# Patient Record
Sex: FEMALE | Race: BLACK OR AFRICAN AMERICAN | NOT HISPANIC OR LATINO | URBAN - METROPOLITAN AREA
[De-identification: names, ages, dates, MRNs, and addresses within clinical notes are randomized per-mention and may not be internally consistent; named-entity substitution may affect disease eponyms.]

---

## 2018-01-17 ENCOUNTER — INPATIENT (INPATIENT)
Facility: HOSPITAL | Age: 53
LOS: 7 days | Discharge: NURSING, REHAB OR LONG-TERM CARE | DRG: 602 | End: 2018-01-25
Attending: INTERNAL MEDICINE | Admitting: INTERNAL MEDICINE
Payer: MEDICARE

## 2018-01-17 VITALS
SYSTOLIC BLOOD PRESSURE: 148 MMHG | WEIGHT: 293 LBS | HEART RATE: 86 BPM | TEMPERATURE: 98 F | OXYGEN SATURATION: 99 % | HEIGHT: 65 IN | DIASTOLIC BLOOD PRESSURE: 89 MMHG | RESPIRATION RATE: 20 BRPM

## 2018-01-17 DIAGNOSIS — Z98.89 OTHER SPECIFIED POSTPROCEDURAL STATES: Chronic | ICD-10-CM

## 2018-01-17 DIAGNOSIS — Z29.9 ENCOUNTER FOR PROPHYLACTIC MEASURES, UNSPECIFIED: ICD-10-CM

## 2018-01-17 DIAGNOSIS — J96.02 ACUTE RESPIRATORY FAILURE WITH HYPERCAPNIA: ICD-10-CM

## 2018-01-17 DIAGNOSIS — L03.119 CELLULITIS OF UNSPECIFIED PART OF LIMB: ICD-10-CM

## 2018-01-17 DIAGNOSIS — E11.9 TYPE 2 DIABETES MELLITUS WITHOUT COMPLICATIONS: ICD-10-CM

## 2018-01-17 DIAGNOSIS — I10 ESSENTIAL (PRIMARY) HYPERTENSION: ICD-10-CM

## 2018-01-17 LAB
ALBUMIN SERPL ELPH-MCNC: 3.3 G/DL — LOW (ref 3.5–5)
ALP SERPL-CCNC: 27 U/L — LOW (ref 40–120)
ALT FLD-CCNC: 37 U/L DA — SIGNIFICANT CHANGE UP (ref 10–60)
ANION GAP SERPL CALC-SCNC: 6 MMOL/L — SIGNIFICANT CHANGE UP (ref 5–17)
ANION GAP SERPL CALC-SCNC: 6 MMOL/L — SIGNIFICANT CHANGE UP (ref 5–17)
APPEARANCE UR: CLEAR — SIGNIFICANT CHANGE UP
APTT BLD: 30.4 SEC — SIGNIFICANT CHANGE UP (ref 27.5–37.4)
AST SERPL-CCNC: 31 U/L — SIGNIFICANT CHANGE UP (ref 10–40)
BASE EXCESS BLDA CALC-SCNC: 7.1 MMOL/L — HIGH (ref -2–2)
BASE EXCESS BLDA CALC-SCNC: 8.5 MMOL/L — HIGH (ref -2–2)
BASOPHILS # BLD AUTO: 0.1 K/UL — SIGNIFICANT CHANGE UP (ref 0–0.2)
BASOPHILS NFR BLD AUTO: 2.2 % — HIGH (ref 0–2)
BILIRUB SERPL-MCNC: 0.6 MG/DL — SIGNIFICANT CHANGE UP (ref 0.2–1.2)
BILIRUB UR-MCNC: NEGATIVE — SIGNIFICANT CHANGE UP
BLOOD GAS COMMENTS ARTERIAL: SIGNIFICANT CHANGE UP
BUN SERPL-MCNC: 35 MG/DL — HIGH (ref 7–18)
BUN SERPL-MCNC: 36 MG/DL — HIGH (ref 7–18)
CALCIUM SERPL-MCNC: 8.4 MG/DL — SIGNIFICANT CHANGE UP (ref 8.4–10.5)
CALCIUM SERPL-MCNC: 8.6 MG/DL — SIGNIFICANT CHANGE UP (ref 8.4–10.5)
CHLORIDE SERPL-SCNC: 91 MMOL/L — LOW (ref 96–108)
CHLORIDE SERPL-SCNC: 94 MMOL/L — LOW (ref 96–108)
CK MB BLD-MCNC: 1 % — SIGNIFICANT CHANGE UP (ref 0–3.5)
CK MB CFR SERPL CALC: 2 NG/ML — SIGNIFICANT CHANGE UP (ref 0–3.6)
CK SERPL-CCNC: 194 U/L — SIGNIFICANT CHANGE UP (ref 21–215)
CO2 SERPL-SCNC: 34 MMOL/L — HIGH (ref 22–31)
CO2 SERPL-SCNC: 36 MMOL/L — HIGH (ref 22–31)
COLOR SPEC: YELLOW — SIGNIFICANT CHANGE UP
CREAT SERPL-MCNC: 1.2 MG/DL — SIGNIFICANT CHANGE UP (ref 0.5–1.3)
CREAT SERPL-MCNC: 1.39 MG/DL — HIGH (ref 0.5–1.3)
DIFF PNL FLD: NEGATIVE — SIGNIFICANT CHANGE UP
EOSINOPHIL # BLD AUTO: 0 K/UL — SIGNIFICANT CHANGE UP (ref 0–0.5)
EOSINOPHIL NFR BLD AUTO: 0.7 % — SIGNIFICANT CHANGE UP (ref 0–6)
GLUCOSE SERPL-MCNC: 62 MG/DL — LOW (ref 70–99)
GLUCOSE SERPL-MCNC: 87 MG/DL — SIGNIFICANT CHANGE UP (ref 70–99)
GLUCOSE UR QL: NEGATIVE — SIGNIFICANT CHANGE UP
HCO3 BLDA-SCNC: 36 MMOL/L — HIGH (ref 23–27)
HCO3 BLDA-SCNC: 36 MMOL/L — HIGH (ref 23–27)
HCT VFR BLD CALC: 45 % — SIGNIFICANT CHANGE UP (ref 34.5–45)
HGB BLD-MCNC: 13.1 G/DL — SIGNIFICANT CHANGE UP (ref 11.5–15.5)
HOROWITZ INDEX BLDA+IHG-RTO: 100 — SIGNIFICANT CHANGE UP
HOROWITZ INDEX BLDA+IHG-RTO: 50 — SIGNIFICANT CHANGE UP
INR BLD: 1.28 RATIO — HIGH (ref 0.88–1.16)
KETONES UR-MCNC: NEGATIVE — SIGNIFICANT CHANGE UP
LACTATE SERPL-SCNC: 0.6 MMOL/L — LOW (ref 0.7–2)
LEUKOCYTE ESTERASE UR-ACNC: NEGATIVE — SIGNIFICANT CHANGE UP
LYMPHOCYTES # BLD AUTO: 0.9 K/UL — LOW (ref 1–3.3)
LYMPHOCYTES # BLD AUTO: 16.7 % — SIGNIFICANT CHANGE UP (ref 13–44)
MAGNESIUM SERPL-MCNC: 2.4 MG/DL — SIGNIFICANT CHANGE UP (ref 1.6–2.6)
MCHC RBC-ENTMCNC: 29.2 GM/DL — LOW (ref 32–36)
MCHC RBC-ENTMCNC: 29.6 PG — SIGNIFICANT CHANGE UP (ref 27–34)
MCV RBC AUTO: 101.2 FL — HIGH (ref 80–100)
MONOCYTES # BLD AUTO: 0.8 K/UL — SIGNIFICANT CHANGE UP (ref 0–0.9)
MONOCYTES NFR BLD AUTO: 15.2 % — HIGH (ref 2–14)
NEUTROPHILS # BLD AUTO: 3.6 K/UL — SIGNIFICANT CHANGE UP (ref 1.8–7.4)
NEUTROPHILS NFR BLD AUTO: 65.2 % — SIGNIFICANT CHANGE UP (ref 43–77)
NITRITE UR-MCNC: NEGATIVE — SIGNIFICANT CHANGE UP
NT-PROBNP SERPL-SCNC: 2373 PG/ML — HIGH (ref 0–125)
PCO2 BLDA: 67 MMHG — HIGH (ref 32–46)
PCO2 BLDA: 77 MMHG — CRITICAL HIGH (ref 32–46)
PH BLDA: 7.29 — LOW (ref 7.35–7.45)
PH BLDA: 7.35 — SIGNIFICANT CHANGE UP (ref 7.35–7.45)
PH UR: 5 — SIGNIFICANT CHANGE UP (ref 5–8)
PLATELET # BLD AUTO: 286 K/UL — SIGNIFICANT CHANGE UP (ref 150–400)
PO2 BLDA: 117 MMHG — HIGH (ref 74–108)
PO2 BLDA: 93 MMHG — SIGNIFICANT CHANGE UP (ref 74–108)
POTASSIUM SERPL-MCNC: 3.8 MMOL/L — SIGNIFICANT CHANGE UP (ref 3.5–5.3)
POTASSIUM SERPL-MCNC: 4.1 MMOL/L — SIGNIFICANT CHANGE UP (ref 3.5–5.3)
POTASSIUM SERPL-SCNC: 3.8 MMOL/L — SIGNIFICANT CHANGE UP (ref 3.5–5.3)
POTASSIUM SERPL-SCNC: 4.1 MMOL/L — SIGNIFICANT CHANGE UP (ref 3.5–5.3)
PROT SERPL-MCNC: 7.2 G/DL — SIGNIFICANT CHANGE UP (ref 6–8.3)
PROT UR-MCNC: 30 MG/DL
PROTHROM AB SERPL-ACNC: 14 SEC — HIGH (ref 9.8–12.7)
RBC # BLD: 4.44 M/UL — SIGNIFICANT CHANGE UP (ref 3.8–5.2)
RBC # FLD: 15.6 % — HIGH (ref 10.3–14.5)
SAO2 % BLDA: 96 % — SIGNIFICANT CHANGE UP (ref 92–96)
SAO2 % BLDA: 98 % — HIGH (ref 92–96)
SODIUM SERPL-SCNC: 131 MMOL/L — LOW (ref 135–145)
SODIUM SERPL-SCNC: 136 MMOL/L — SIGNIFICANT CHANGE UP (ref 135–145)
SP GR SPEC: 1.01 — SIGNIFICANT CHANGE UP (ref 1.01–1.02)
TROPONIN I SERPL-MCNC: <0.015 NG/ML — SIGNIFICANT CHANGE UP (ref 0–0.04)
UROBILINOGEN FLD QL: NEGATIVE — SIGNIFICANT CHANGE UP
WBC # BLD: 5.4 K/UL — SIGNIFICANT CHANGE UP (ref 3.8–10.5)
WBC # FLD AUTO: 5.4 K/UL — SIGNIFICANT CHANGE UP (ref 3.8–10.5)

## 2018-01-17 PROCEDURE — 99291 CRITICAL CARE FIRST HOUR: CPT

## 2018-01-17 PROCEDURE — 71045 X-RAY EXAM CHEST 1 VIEW: CPT | Mod: 26

## 2018-01-17 RX ORDER — CEFTRIAXONE 500 MG/1
1 INJECTION, POWDER, FOR SOLUTION INTRAMUSCULAR; INTRAVENOUS ONCE
Qty: 0 | Refills: 0 | Status: COMPLETED | OUTPATIENT
Start: 2018-01-17 | End: 2018-01-17

## 2018-01-17 RX ORDER — INSULIN LISPRO 100/ML
VIAL (ML) SUBCUTANEOUS EVERY 6 HOURS
Qty: 0 | Refills: 0 | Status: DISCONTINUED | OUTPATIENT
Start: 2018-01-17 | End: 2018-01-21

## 2018-01-17 RX ORDER — CARVEDILOL PHOSPHATE 80 MG/1
3.12 CAPSULE, EXTENDED RELEASE ORAL EVERY 12 HOURS
Qty: 0 | Refills: 0 | Status: DISCONTINUED | OUTPATIENT
Start: 2018-01-17 | End: 2018-01-25

## 2018-01-17 RX ORDER — FUROSEMIDE 40 MG
40 TABLET ORAL EVERY 12 HOURS
Qty: 0 | Refills: 0 | Status: DISCONTINUED | OUTPATIENT
Start: 2018-01-17 | End: 2018-01-17

## 2018-01-17 RX ORDER — CEFAZOLIN SODIUM 1 G
1000 VIAL (EA) INJECTION EVERY 8 HOURS
Qty: 0 | Refills: 0 | Status: DISCONTINUED | OUTPATIENT
Start: 2018-01-17 | End: 2018-01-20

## 2018-01-17 RX ORDER — IPRATROPIUM/ALBUTEROL SULFATE 18-103MCG
3 AEROSOL WITH ADAPTER (GRAM) INHALATION ONCE
Qty: 0 | Refills: 0 | Status: COMPLETED | OUTPATIENT
Start: 2018-01-17 | End: 2018-01-17

## 2018-01-17 RX ORDER — FUROSEMIDE 40 MG
40 TABLET ORAL EVERY 12 HOURS
Qty: 0 | Refills: 0 | Status: DISCONTINUED | OUTPATIENT
Start: 2018-01-17 | End: 2018-01-22

## 2018-01-17 RX ORDER — ENOXAPARIN SODIUM 100 MG/ML
40 INJECTION SUBCUTANEOUS DAILY
Qty: 0 | Refills: 0 | Status: DISCONTINUED | OUTPATIENT
Start: 2018-01-17 | End: 2018-01-25

## 2018-01-17 RX ORDER — FUROSEMIDE 40 MG
40 TABLET ORAL ONCE
Qty: 0 | Refills: 0 | Status: COMPLETED | OUTPATIENT
Start: 2018-01-17 | End: 2018-01-17

## 2018-01-17 RX ORDER — IPRATROPIUM/ALBUTEROL SULFATE 18-103MCG
3 AEROSOL WITH ADAPTER (GRAM) INHALATION EVERY 6 HOURS
Qty: 0 | Refills: 0 | Status: DISCONTINUED | OUTPATIENT
Start: 2018-01-17 | End: 2018-01-25

## 2018-01-17 RX ADMIN — Medication 100 MILLIGRAM(S): at 23:14

## 2018-01-17 RX ADMIN — Medication 3 MILLILITER(S): at 20:48

## 2018-01-17 RX ADMIN — Medication 100 MILLIGRAM(S): at 13:50

## 2018-01-17 RX ADMIN — Medication 40 MILLIGRAM(S): at 23:00

## 2018-01-17 RX ADMIN — Medication 3 MILLILITER(S): at 14:19

## 2018-01-17 RX ADMIN — Medication 40 MILLIGRAM(S): at 15:28

## 2018-01-17 RX ADMIN — CEFTRIAXONE 100 GRAM(S): 500 INJECTION, POWDER, FOR SOLUTION INTRAMUSCULAR; INTRAVENOUS at 14:20

## 2018-01-17 NOTE — ED PROVIDER NOTE - OBJECTIVE STATEMENT
Pertinent PMH/PSH/FHx/SHx and Review of Systems contained within:  53 yo F w/ NIDDM, ?RA, CHF s/p AICD, HTN and morbid obesity pw bl le redness and swelling. symptoms x1 month, saw doctor yestereday and was sent to er. patient notes some and discomfort. no fever, chills, nausea, vomiting. she reports she is more somnolent than normal but does not know why  Fh and Sh not otherwise contributory  ROS otherwise negative

## 2018-01-17 NOTE — ED PROVIDER NOTE - MEDICAL DECISION MAKING DETAILS
patient pw bl le cellulitis. she is also somnolent and will need to be assessed for hypercarbia. will culture and start on empiric abx. I read her ekg as sinus with 1st deg av block as well as left axis deviation and RBBB. Will need to be admitted patient pw bl le cellulitis. she is also somnolent and will need to be assessed for hypercarbia. will culture and start on empiric abx. I read her ekg as sinus with 1st deg av block as well as left axis deviation and RBBB. Will need to be admitted. abg confirms hypercarbia. it resolves with co2 and her mental status normalizes. patient will also need some diureses. case d/w dr kimble, who will admit. patient pw bl le cellulitis. she is also somnolent and will need to be assessed for hypercarbia. will culture and start on empiric abx. I read her ekg as sinus with 1st deg av block as well as left axis deviation and RBBB. Will need to be admitted. abg confirms hypercarbia. it resolves with co2 and her mental status normalizes. patient will also need some diureses. case d/w dr kimble, who will admit to icu given patient is on bipap and hasn't been previously.

## 2018-01-17 NOTE — H&P ADULT - HISTORY OF PRESENT ILLNESS
52 [place] with PMH of CHF s/p ICD, HTN, DM is BIBA for 52 F [place] with PMH of CHF s/p ICD, HTN, DM is BIBA for dyspnea and sleepiness, LE edema and redness. Pt is poor historian, but claims that she has been experiencing some SOB, sleepiness and redness and pain in her LEs. ROS otherwise unremarkable as pt denies fever, chills, CP, cough, palpitations, syncope, purulent discharge from LEs, trauma to area.   In ED, pt was drowsy and dyspneic and found to be hypercapneic to pCO2 of 77 and pH of 7.29. Pt started on NIV and given lasix. CXR revealed b/l opacities c/w pulmonary edema

## 2018-01-17 NOTE — ED ADULT NURSE NOTE - OBJECTIVE STATEMENT
Patient sent from [facility] for BLE cellulitis. Patient noted confused but not agitated. Patient noted with BLE redness swelling +2 pitting edema. Patient c/o shortness of breath.

## 2018-01-17 NOTE — H&P ADULT - ASSESSMENT
52 morbidly obese F with hx of CAD, CHF s/p ICD, p/w dyspnea, somnolence and LE redness and swelling, admitted to MICU for acute hypercapneic respiratory failure 2/2 CHF and/or AMPARO/OHS in setting of LE cellulitis

## 2018-01-17 NOTE — H&P ADULT - ATTENDING COMMENTS
52 morbidly obese F with PMH of CAD, CHF s/p ICD, admitted with  dyspnea, somnolence and LE redness and swelling, admitted to MICU for acute hypercapneic respiratory failure 2/2 CHF decompensation and/or AMPARO/OHS in setting of BL LE cellulitis   Will initiate antibiotic therapy for LE Cellulitis    FU Cultures  Obtain LE Dopplers RO DVT   Start diuresis   NIPPV and adjust settings as needed with ABGs   HOB at 30   DVT prophylaxis   Glycemic control

## 2018-01-17 NOTE — CONSULT NOTE ADULT - SUBJECTIVE AND OBJECTIVE BOX
HPI:  52 [place] with PMH of CHF s/p ICD, HTN, DM is BIBA for dyspnea and sleepiness, LE edema and redness. Pt is poor historian, but claims that she has been experiencing some SOB, sleepiness and redness and pain in her LEs. ROS otherwise unremarkable as pt denies fever, chills, CP, cough, palpitations, syncope, purulent discharge from LEs, trauma to area.   In ED, pt was drowsy and dyspneic and found to be hypercapneic to pCO2 of 77 and pH of 7.29. Pt started on NIV and given lasix. CXR revealed b/l opacities c/w pulmonary edema (2018 18:02)      PAST MEDICAL & SURGICAL HISTORY:  RA (rheumatoid arthritis)  DM (diabetes mellitus)  HTN (hypertension)  History of cholecystectomy      No Known Allergies      ALBUTerol/ipratropium for Nebulization 3 milliLiter(s) Nebulizer every 6 hours  carvedilol 3.125 milliGRAM(s) Oral every 12 hours  ceFAZolin   IVPB 1000 milliGRAM(s) IV Intermittent every 8 hours  enoxaparin Injectable 40 milliGRAM(s) SubCutaneous daily  furosemide   Injectable 40 milliGRAM(s) IV Push every 12 hours  insulin lispro (HumaLOG) corrective regimen sliding scale   SubCutaneous every 6 hours  multivitamin 1 Tablet(s) Oral daily      MEDICATIONS  (PRN):      Social Hx:    FAMILY HISTORY:  Family history of hypertension in mother        ROS  [   ] UNABLE TO ELICIT     [ X  ] ALL ROS DONE    General:  [   ] Fever,    [   ] Malaise, [   ] LETHARGIC, [   ]  ANOREXIA    Skin: [   ]  RASH ,     HEENT:  [   ] Sore Throat  [   ] Photophobia	    Chest: [   ] SOB  [   ] Cough     Cardiovascular: [   ] CP	    Gastrointestinal: [   ] Abd pain   [   ] N    [   ] V   [   ] Diarrhea	    Genitourinary: [   ] Polyuria   [   ] Urgency   [   ] Dysuria    [   ]  Hematuria	    Musculoskeletal:  [  ] Back Pain	    Neurological: [  ]Dizziness  [  ]Visual Disturbance  [  ]Headaches        LABS/DIAGNOSTIC TESTS                          13.1   5.4   )-----------( 286      ( 2018 13:26 )             45.0     WBC Count: 5.4 K/uL ( @ 13:26)    PT/INR - ( 2018 13:26 )   PT: 14.0 sec;   INR: 1.28 ratio         PTT - ( 2018 13:26 )  PTT:30.4 sec        131<L>  |  91<L>  |  36<H>  ----------------------------<  87  4.1   |  34<H>  |  1.39<H>    Ca    8.6      2018 13:26  Mg     2.4         TPro  7.2  /  Alb  3.3<L>  /  TBili  0.6  /  DBili  x   /  AST  31  /  ALT  37  /  AlkPhos  27<L>          CARDIAC MARKERS ( 2018 18:03 )  <0.015 ng/mL / x     / 289 U/L / x     / 2.9 ng/mL          LIVER FUNCTIONS - ( 2018 13:26 )  Alb: 3.3 g/dL / Pro: 7.2 g/dL / ALK PHOS: 27 U/L / ALT: 37 U/L DA / AST: 31 U/L / GGT: x             ABG - ( 2018 14:21 )  pH: 7.35  /  pCO2: 67    /  pO2: 93    / HCO3: 36    / Base Excess: 8.5   /  SaO2: 96                  Urinalysis Basic - ( 2018 15:14 )    Color: Yellow / Appearance: Clear / S.010 / pH: x  Gluc: x / Ketone: Negative  / Bili: Negative / Urobili: Negative   Blood: x / Protein: 30 mg/dL / Nitrite: Negative   Leuk Esterase: Negative / RBC: x / WBC x   Sq Epi: x / Non Sq Epi: Few /HPF / Bacteria: Moderate /HPF        LACTATE:Lactate, Blood: 0.6 mmol/L ( @ 13:26)        CULTURES:       RADIOLOGY    CXR:  < from: Xray Chest 1 View AP-PORTABLE IMMEDIATE (18 @ 14:23) >  IMPRESSION:  Left cardiac device.    Increased interstitial lung markings. No pleural effusion.    Heart size cannot be accurately assessed in this projection, but appear   enlarged.      < end of copied text >      PHYSICAL EXAMINATION:  GENERAL APPEARANCE: NO DISTRESS  HEENT:   NO PALLOR, NO  JVD,  NO   NODES, NECK SUPPLE +  CVS: S1 +, S2 +,   RS: AEEB,   RALES,   RONCHI  ABD: SOFT, NT, NO, BS  EXT:  PE ++ B/L. ERYTHEMA OF B/L LE, RIGHT > LEFT, OOZING OF FLUID FROM RLE.   SKIN: WARM,   SKELETAL:  ROM  ACCEPTABLE  CNS:  AAOX 3   , NO  DEFICITS        ASSESSMENT AND PLAN:    52 [place] with PMH of CHF s/p ICD, HTN, DM is BIBA for dyspnea and sleepiness, LE edema and redness. Pt is poor historian, but claims that she has been experiencing some SOB, sleepiness and redness and pain in her LEs. ROS otherwise unremarkable as pt denies fever, chills, CP, cough, palpitations, syncope, purulent discharge from LEs, trauma to area.     - WORSENING B/L LE LYMPHEDEMA ,AND VENOUS INSUFFICIENCY , CELLULITIS OF RIGHT LOWER LEG ON IV CEFAZOLIN, IV LASIX  - OBESE, SUSPECT OBSTRUCTIVE SLEEP PNOEA, HYPERCAPNOEIC RESPIRATORY FAILURE - STARTED ON BIPAP, ICU CONSULT IS IN PROGRESS  - GI AND DVT PROPHYLAXIS  - DR. NATARAJAN

## 2018-01-17 NOTE — ED PROVIDER NOTE - PHYSICAL EXAMINATION
Gen: somnolent but arousable, morbidly obese  Head: NC, AT   Eyes: PERRL, EOMI, normal lids/conjunctiva  ENT: normal hearing, patent oropharynx without erythema/exudate, uvula midline  Neck: supple, no tenderness, Trachea midline  Pulm: Bilateral BS, normal resp effort, no wheeze/stridor/retractions  CV: RRR, no M/R/G, 2+ radial 2+ edema of lower ext to bl knees  Abd: soft, NT/ND, +BS, no hepatosplenomegaly  Mskel: extremities x4 with normal ROM and no joint effusions. no ctl spine ttp.   Skin: extensive erythema and warmth of the lower ext to the distal thigh bilaterally  Neuro: AAOx3, no sensory/motor deficits, CN 2-12 intact

## 2018-01-17 NOTE — H&P ADULT - MUSCULOSKELETAL
negative detailed exam no joint swelling/no joint erythema/calf tenderness/no joint warmth/ROM intact

## 2018-01-17 NOTE — H&P ADULT - PROBLEM SELECTOR PLAN 4
c/w coreg and valsartan  holding hctz as we are diuresing with lasix  if becomes more hypertensive, consider nitro for preload reduction in pulmonary edema c/w coreg  holding hctz as we are diuresing with lasix  hold arb 2/2 montse  if becomes more hypertensive, consider nitro for preload reduction in pulmonary edema

## 2018-01-17 NOTE — ED PROVIDER NOTE - CARE PLAN
Principal Discharge DX:	Cellulitis of lower extremity, unspecified laterality Principal Discharge DX:	Cellulitis of lower extremity, unspecified laterality  Secondary Diagnosis:	Hypercarbia

## 2018-01-17 NOTE — H&P ADULT - RS GEN PE MLT RESP DETAILS PC
rales/wheezes/diminished breath sounds, R/airway patent/diminished breath sounds, L/breath sounds equal

## 2018-01-18 LAB
24R-OH-CALCIDIOL SERPL-MCNC: 37.4 NG/ML — SIGNIFICANT CHANGE UP (ref 30–80)
ANION GAP SERPL CALC-SCNC: 6 MMOL/L — SIGNIFICANT CHANGE UP (ref 5–17)
BASE EXCESS BLDA CALC-SCNC: 7.6 MMOL/L — HIGH (ref -2–2)
BUN SERPL-MCNC: 32 MG/DL — HIGH (ref 7–18)
CALCIUM SERPL-MCNC: 7.1 MG/DL — LOW (ref 8.4–10.5)
CHLORIDE SERPL-SCNC: 102 MMOL/L — SIGNIFICANT CHANGE UP (ref 96–108)
CK MB BLD-MCNC: 0.9 % — SIGNIFICANT CHANGE UP (ref 0–3.5)
CK MB CFR SERPL CALC: 1.3 NG/ML — SIGNIFICANT CHANGE UP (ref 0–3.6)
CK SERPL-CCNC: 142 U/L — SIGNIFICANT CHANGE UP (ref 21–215)
CO2 SERPL-SCNC: 30 MMOL/L — SIGNIFICANT CHANGE UP (ref 22–31)
CREAT SERPL-MCNC: 1.03 MG/DL — SIGNIFICANT CHANGE UP (ref 0.5–1.3)
CULTURE RESULTS: NO GROWTH — SIGNIFICANT CHANGE UP
GLUCOSE SERPL-MCNC: 59 MG/DL — LOW (ref 70–99)
HBA1C BLD-MCNC: 6 % — HIGH (ref 4–5.6)
HCO3 BLDA-SCNC: 36 MMOL/L — HIGH (ref 23–27)
HCT VFR BLD CALC: 44.1 % — SIGNIFICANT CHANGE UP (ref 34.5–45)
HGB BLD-MCNC: 12.8 G/DL — SIGNIFICANT CHANGE UP (ref 11.5–15.5)
HOROWITZ INDEX BLDA+IHG-RTO: 30 — SIGNIFICANT CHANGE UP
LYMPHOCYTES # BLD AUTO: 15 % — SIGNIFICANT CHANGE UP (ref 13–44)
MAGNESIUM SERPL-MCNC: 1.9 MG/DL — SIGNIFICANT CHANGE UP (ref 1.6–2.6)
MCHC RBC-ENTMCNC: 29.2 GM/DL — LOW (ref 32–36)
MCHC RBC-ENTMCNC: 29.3 PG — SIGNIFICANT CHANGE UP (ref 27–34)
MCV RBC AUTO: 101.5 FL — HIGH (ref 80–100)
MONOCYTES NFR BLD AUTO: 16 % — HIGH (ref 2–14)
NEUTROPHILS NFR BLD AUTO: 68 % — SIGNIFICANT CHANGE UP (ref 43–77)
PCO2 BLDA: 76 MMHG — CRITICAL HIGH (ref 32–46)
PH BLDA: 7.3 — LOW (ref 7.35–7.45)
PLATELET # BLD AUTO: 180 K/UL — SIGNIFICANT CHANGE UP (ref 150–400)
PO2 BLDA: 58 MMHG — LOW (ref 74–108)
POTASSIUM SERPL-MCNC: 3.5 MMOL/L — SIGNIFICANT CHANGE UP (ref 3.5–5.3)
POTASSIUM SERPL-SCNC: 3.5 MMOL/L — SIGNIFICANT CHANGE UP (ref 3.5–5.3)
RBC # BLD: 4.34 M/UL — SIGNIFICANT CHANGE UP (ref 3.8–5.2)
RBC # FLD: 15.4 % — HIGH (ref 10.3–14.5)
SAO2 % BLDA: 85 % — LOW (ref 92–96)
SODIUM SERPL-SCNC: 138 MMOL/L — SIGNIFICANT CHANGE UP (ref 135–145)
SPECIMEN SOURCE: SIGNIFICANT CHANGE UP
TROPONIN I SERPL-MCNC: <0.015 NG/ML — SIGNIFICANT CHANGE UP (ref 0–0.04)
WBC # BLD: 4.1 K/UL — SIGNIFICANT CHANGE UP (ref 3.8–10.5)
WBC # FLD AUTO: 4.1 K/UL — SIGNIFICANT CHANGE UP (ref 3.8–10.5)

## 2018-01-18 PROCEDURE — 71045 X-RAY EXAM CHEST 1 VIEW: CPT | Mod: 26

## 2018-01-18 PROCEDURE — 93970 EXTREMITY STUDY: CPT | Mod: 26

## 2018-01-18 RX ORDER — DEXTROSE 50 % IN WATER 50 %
50 SYRINGE (ML) INTRAVENOUS ONCE
Qty: 0 | Refills: 0 | Status: COMPLETED | OUTPATIENT
Start: 2018-01-18 | End: 2018-01-18

## 2018-01-18 RX ORDER — ATORVASTATIN CALCIUM 80 MG/1
40 TABLET, FILM COATED ORAL AT BEDTIME
Qty: 0 | Refills: 0 | Status: DISCONTINUED | OUTPATIENT
Start: 2018-01-18 | End: 2018-01-25

## 2018-01-18 RX ORDER — ASPIRIN/CALCIUM CARB/MAGNESIUM 324 MG
81 TABLET ORAL DAILY
Qty: 0 | Refills: 0 | Status: DISCONTINUED | OUTPATIENT
Start: 2018-01-18 | End: 2018-01-25

## 2018-01-18 RX ADMIN — Medication 3 MILLILITER(S): at 15:03

## 2018-01-18 RX ADMIN — Medication 40 MILLIGRAM(S): at 22:07

## 2018-01-18 RX ADMIN — Medication 3 MILLILITER(S): at 02:02

## 2018-01-18 RX ADMIN — CARVEDILOL PHOSPHATE 3.12 MILLIGRAM(S): 80 CAPSULE, EXTENDED RELEASE ORAL at 17:36

## 2018-01-18 RX ADMIN — Medication 100 MILLIGRAM(S): at 06:22

## 2018-01-18 RX ADMIN — Medication 100 MILLIGRAM(S): at 22:08

## 2018-01-18 RX ADMIN — ATORVASTATIN CALCIUM 40 MILLIGRAM(S): 80 TABLET, FILM COATED ORAL at 22:07

## 2018-01-18 RX ADMIN — Medication 3 MILLILITER(S): at 09:21

## 2018-01-18 RX ADMIN — ENOXAPARIN SODIUM 40 MILLIGRAM(S): 100 INJECTION SUBCUTANEOUS at 11:50

## 2018-01-18 RX ADMIN — CARVEDILOL PHOSPHATE 3.12 MILLIGRAM(S): 80 CAPSULE, EXTENDED RELEASE ORAL at 06:25

## 2018-01-18 RX ADMIN — Medication 3 MILLILITER(S): at 21:11

## 2018-01-18 RX ADMIN — Medication 50 MILLILITER(S): at 06:24

## 2018-01-18 RX ADMIN — Medication 1 TABLET(S): at 14:01

## 2018-01-18 RX ADMIN — Medication 100 MILLIGRAM(S): at 14:00

## 2018-01-18 RX ADMIN — Medication 40 MILLIGRAM(S): at 10:21

## 2018-01-18 RX ADMIN — Medication 81 MILLIGRAM(S): at 11:50

## 2018-01-18 NOTE — PROGRESS NOTE ADULT - SUBJECTIVE AND OBJECTIVE BOX
Patient is a 52y old  Female who presents with a chief complaint of sob, sleepiness, LE edema and redness (2018 18:02)    PATIENT IS SEEN AND EXAMINED IN ICU.    ALLERGIES:  No Known Allergies      Daily Weight in k (2018 08:00)    VITALS:    Vital Signs Last 24 Hrs  T(C): 37.7 (2018 21:15), Max: 37.7 (2018 21:15)  T(F): 99.9 (2018 21:15), Max: 99.9 (2018 21:15)  HR: 94 (2018 21:12) (78 - 94)  BP: 107/56 (2018 18:00) (80/51 - 116/64)  BP(mean): 67 (2018 18:00) (56 - 75)  RR: 23 (2018 18:00) (15 - 28)  SpO2: 94% (2018 21:12) (89% - 97%)    LABS:  CBC Full  -  ( 2018 06:36 )  WBC Count : 4.1 K/uL  Hemoglobin : 12.8 g/dL  Hematocrit : 44.1 %  Platelet Count - Automated : 180 K/uL  Mean Cell Volume : 101.5 fl  Mean Cell Hemoglobin : 29.3 pg  Mean Cell Hemoglobin Concentration : 29.2 gm/dL  Auto Neutrophil # : x  Auto Lymphocyte # : x  Auto Monocyte # : x  Auto Eosinophil # : x  Auto Basophil # : x  Auto Neutrophil % : 68.0 %  Auto Lymphocyte % : 15.0 %  Auto Monocyte % : 16.0 %  Auto Eosinophil % : x  Auto Basophil % : x    PT/INR - ( 2018 13:26 )   PT: 14.0 sec;   INR: 1.28 ratio         PTT - ( 2018 13:26 )  PTT:30.4 sec  18    138  |  102  |  32<H>  ----------------------------<  59<L>  3.5   |  30  |  1.03    Ca    7.1<L>      2018 06:36  Mg     1.9     18    TPro  7.2  /  Alb  3.3<L>  /  TBili  0.6  /  DBili  x   /  AST  31  /  ALT  37  /  AlkPhos  27<L>  01-17    CAPILLARY BLOOD GLUCOSE      POCT Blood Glucose.: 100 mg/dL (2018 18:09)  POCT Blood Glucose.: 74 mg/dL (2018 11:09)  POCT Blood Glucose.: 112 mg/dL (2018 06:44)  POCT Blood Glucose.: 64 mg/dL (2018 06:16)  POCT Blood Glucose.: 76 mg/dL (2018 06:14)  POCT Blood Glucose.: 66 mg/dL (2018 23:17)    CARDIAC MARKERS ( 2018 06:36 )  <0.015 ng/mL / x     / 142 U/L / x     / 1.3 ng/mL  CARDIAC MARKERS ( 2018 23:14 )  <0.015 ng/mL / x     / 194 U/L / x     / 2.0 ng/mL  CARDIAC MARKERS ( 2018 18:03 )  <0.015 ng/mL / x     / 289 U/L / x     / 2.9 ng/mL      LIVER FUNCTIONS - ( 2018 13:26 )  Alb: 3.3 g/dL / Pro: 7.2 g/dL / ALK PHOS: 27 U/L / ALT: 37 U/L DA / AST: 31 U/L / GGT: x             ABG - ( 2018 05:48 )  pH: 7.30  /  pCO2: 76    /  pO2: 58    / HCO3: 36    / Base Excess: 7.6   /  SaO2: 85                  .Blood Blood  17 @ 18:44   No growth to date.  --  --          MEDICATIONS:    MEDICATIONS  (STANDING):  ALBUTerol/ipratropium for Nebulization 3 milliLiter(s) Nebulizer every 6 hours  aspirin  chewable 81 milliGRAM(s) Oral daily  atorvastatin 40 milliGRAM(s) Oral at bedtime  carvedilol 3.125 milliGRAM(s) Oral every 12 hours  ceFAZolin   IVPB 1000 milliGRAM(s) IV Intermittent every 8 hours  enoxaparin Injectable 40 milliGRAM(s) SubCutaneous daily  furosemide   Injectable 40 milliGRAM(s) IV Push every 12 hours  insulin lispro (HumaLOG) corrective regimen sliding scale   SubCutaneous every 6 hours  multivitamin 1 Tablet(s) Oral daily      MEDICATIONS  (PRN):      REVIEW OF SYSTEMS:                           ALL ROS DONE [ X   ]    CONSTITUTIONAL:  LETHARGIC [   ], FEVER [   ], UNRESPONSIVE [   ]  CVS:  CP  [   ], SOB, [   ], PALPITATIONS [   ], DIZZYNESS [   ]  RS: COUGH [   ], SPUTUM [   ]  GI: ABDOMINAL PAIN [   ], NAUSEA [   ], VOMITINGS [   ], DIARRHEA [   ], CONSTIPATION [   ]  :  DYSURIA [   ], NOCTURIA [   ], INCREASED FREQUENCY [   ], DRIBLING [   ],  SKELETAL: PAINFUL JOINTS [   ], SWOLLEN JOINTS [   ], NECK ACHE [   ], LOW BACK ACHE [   ],  SKIN : ULCERS [   ], RASH [   ], ITCHING [   ]  CNS: HEAD ACHE [   ], DOUBLE VISION [   ], BLURRED VISION [   ], AMS / CONFUSION [   ], SEIZURES [   ], WEAKNESS [   ],TINGLING / NUMBNESS [   ]    PHYSICAL EXAMINATION:  GENERAL APPEARANCE: NO DISTRESS  HEENT:  NO PALLOR, NO  JVD,  NO   NODES, NECK SUPPLE  CVS: S1 +, S2 +,   RS: AEEB,  OCCASIONAL  RALES +,   NO RONCHI  ABD: SOFT, NT, NO, BS +  EXT: PE +, ERYTHEMA OF B/L LE, R > L  SKIN: WARM,   SKELETAL:  ROM ACCEPTABLE  CNS:  AAO X 3   ,   DEFICITS    RADIOLOGY :    < from: Xray Chest 1 View AP -PORTABLE-Routine (18 @ 09:18) >  IMPRESSION: Interstitial prominence bilaterally may be related to   portable technique and shallow inspiration; an element of interstitial   edema cannot be fully excluded.       < end of copied text >  < from: Transthoracic Echocardiogram (18 @ 07:37) >  CONCLUSIONS:  1. Mild mitral regurgitation.  2. Moderately dilated left atrium.  LA volume index = 42  cc/m2.  3. Normal left ventricular internal dimensions and wall  thicknesses.  4. Endocardium not well visualized; grossly low normal left  ventricular systolic function. Segmental wall motion could  not be assessed.  5. Mild right atrial enlargement.  6. Right ventricular enlargement with normal RV function. A  device lead is visualized in the right heart.  7. RV systolic pressure is 48 mm Hg. Mild pulmonary  hypertension.  8. There is severe tricuspid regurgitation.  9. Trace pericardial effusion.    < end of copied text >      ASSESSMENT :     Cellulitis of extremity  RA (rheumatoid arthritis)  DM (diabetes mellitus)  HTN (hypertension)  History of cholecystectomy      PLAN:  HPI:  52 [place] with PMH of CHF s/p ICD, HTN, DM is BIBA for dyspnea and sleepiness, LE edema and redness. Pt is poor historian, but claims that she has been experiencing some SOB, sleepiness and redness and pain in her LEs. ROS otherwise unremarkable as pt denies fever, chills, CP, cough, palpitations, syncope, purulent discharge from LEs, trauma to area.   In ED, pt was drowsy and dyspneic and found to be hypercapneic to pCO2 of 77 and pH of 7.29. Pt started on NIV and given lasix. CXR revealed b/l opacities c/w pulmonary edema (2018 18:02)    - CELLULITIS OF B/L LE , WORSENING VENOUS INSUFFICIENCY OF B/L LE AND LYMPHEDEMA ON CEFAZOLIN  - AMPARO, HYPERCAPNOEA - BIPAP IS DISCONTINUED  - GI AND DVT PROPHYLAXIS  - DR. NATARAJAN

## 2018-01-18 NOTE — PROGRESS NOTE ADULT - ASSESSMENT
2 morbidly obese F with hx of CAD, CHF s/p ICD, p/w dyspnea, somnolence and LE redness and swelling, admitted to MICU for acute hypercapneic respiratory failure 2/2 CHF and/or AMPARO/OHS in setting of LE cellulitis     Problem/Plan - 1:  ·  Problem: Acute respiratory failure with hypercapnia.  Plan: 2/2 CHF and/or likely OHS/AMPARO  necessitating new BiPAP  c/w diuresis and monitor I/O's.      Problem/Plan - 2:  ·  Problem: Cellulitis of lower extremity, unspecified laterality.  Plan: c/w ancef  f/u dopplers  cultures sent.      Problem/Plan - 3:  ·  Problem: DM (diabetes mellitus).  Plan: c/w HISS  f/u A1c.      Problem/Plan - 4:  ·  Problem: HTN (hypertension).  Plan: c/w coreg  holding hctz as we are diuresing with lasix  hold arb 2/2 montse  if becomes more hypertensive, consider nitro for preload reduction in pulmonary edema.      Problem/Plan - 5:  ·  Problem: Prophylactic measure.  Plan: GI px  DVT px. 2 morbidly obese F with hx of CAD, CHF s/p ICD, p/w dyspnea, somnolence and LE redness and swelling, admitted to MICU for acute hypercapneic respiratory failure 2/2 CHF and/or AMPARO/OHS in setting of LE cellulitis     Problem/Plan - 1:  ·  Problem: Acute respiratory failure with hypercapnia.  2/2 CHF and/or likely OHS/AMPARO  On admission ABG Blood Gas Profile -    pH, Arterial: 7.30/76/58/36  patient was placed on Bipap due to hypercapnic Respiratory failure and admitted to ICU    CE neg, BNP 2373  - ECHO EF 55% with Severe TR and mild Pulmonary HTN   Likely due to CHF exacerbation 2/2 infection    - continue with Lasix 40 mg Q 12 hrs   - Strict I/O's patient is negative 2.4 L   - currently patient is off the Bipap, will use Nocturnal Bipap      Problem/Plan - 2:  ·  Problem: Cellulitis of lower extremity, unspecified laterality.  Plan: c/w ancef  f/u dopplers  cultures sent.      Problem/Plan - 3:  ·  Problem: DM (diabetes mellitus).  Plan: c/w HISS  f/u A1c.      Problem/Plan - 4:  ·  Problem: HTN (hypertension).  Plan: c/w coreg  holding hctz as we are diuresing with lasix  hold arb 2/2 montse  if becomes more hypertensive, consider nitro for preload reduction in pulmonary edema.      Problem/Plan - 5:  ·  Problem: Prophylactic measure.  Plan: GI px  DVT px.

## 2018-01-18 NOTE — CHART NOTE - NSCHARTNOTEFT_GEN_A_CORE
ICU Downgrade Note   52 F [place] with PMH of CHF s/p ICD, HTN, DM is BIBA for dyspnea and sleepiness, LE edema and redness. Pt is poor historian, but claims that she has been experiencing some SOB, sleepiness and redness and pain in her LEs. ROS otherwise unremarkable as pt denies fever, chills, CP, cough, palpitations, syncope, purulent discharge from LEs, trauma to area.   In ED, pt was drowsy and dyspneic and found to be hypercapneic to pCO2 of 77 and pH of 7.29. Pt started on NIV and given lasix. CXR revealed b/l opacities c/w pulmonary edema     patient was admitted to ICU with acute hypercapnic respiratory failure 2/2 CHF vs asthma exacerbation, patient was placed on Bipap. Bipap was removed in the morning, patient was maintaining normal SaO2 . She is also be treated for cellulitis of cefazolin, Follow up ECHO and venous doppler Lower Ext.     patient is stable for Downgrade to medicine floor. Plan of care Dr. Barajas ICU Downgrade Note   52 F [place] with PMH of CHF s/p ICD, HTN, DM is BIBA for dyspnea and sleepiness, LE edema and redness. Pt is poor historian, but claims that she has been experiencing some SOB, sleepiness and redness and pain in her LEs. ROS otherwise unremarkable as pt denies fever, chills, CP, cough, palpitations, syncope, purulent discharge from LEs, trauma to area.   In ED, pt was drowsy and dyspneic and found to be hypercapneic to pCO2 of 77 and pH of 7.29. Pt started on NIV and given lasix. CXR revealed b/l opacities c/w pulmonary edema     patient was admitted to ICU with acute hypercapnic respiratory failure 2/2 CHF vs asthma exacerbation, patient was placed on Bipap. Bipap was removed in the morning, patient was maintaining normal SaO2 . She is also be treated for cellulitis of cefazolin, Follow up ECHO and venous doppler Lower Ext.     patient is stable for Downgrade to medicine floor. Plan of care Dr. Barajas    Patient became febrile- so MSSA coverage was escalate to Vancomycin. Blood cultures ordered as well.

## 2018-01-18 NOTE — CONSULT NOTE ADULT - SUBJECTIVE AND OBJECTIVE BOX
HISTORY OF PRESENT ILLNESS: HPI:  52 F [place] with PMH of CHF s/p ICD, HTN, DM is BIBA for dyspnea and sleepiness, LE edema and redness. Pt is poor historian, but claims that she has been experiencing some SOB, sleepiness and redness and pain in her LEs. ROS otherwise unremarkable as pt denies fever, chills, CP, cough, palpitations, syncope, purulent discharge from LEs, trauma to area.   In ED, pt was drowsy and dyspneic and found to be hypercapneic to pCO2 of 77 and pH of 7.29. Pt started on NIV and given lasix. CXR revealed b/l opacities c/w pulmonary edema.  She denies CP, LOC or ICD shocks        PAST MEDICAL & SURGICAL HISTORY:  RA (rheumatoid arthritis)  DM (diabetes mellitus)  HTN (hypertension)  History of cholecystectomy          MEDICATIONS:  MEDICATIONS  (STANDING):  ALBUTerol/ipratropium for Nebulization 3 milliLiter(s) Nebulizer every 6 hours  aspirin  chewable 81 milliGRAM(s) Oral daily  atorvastatin 40 milliGRAM(s) Oral at bedtime  carvedilol 3.125 milliGRAM(s) Oral every 12 hours  ceFAZolin   IVPB 1000 milliGRAM(s) IV Intermittent every 8 hours  enoxaparin Injectable 40 milliGRAM(s) SubCutaneous daily  furosemide   Injectable 40 milliGRAM(s) IV Push every 12 hours  insulin lispro (HumaLOG) corrective regimen sliding scale   SubCutaneous every 6 hours  multivitamin 1 Tablet(s) Oral daily      Allergies    No Known Allergies    Intolerances        FAMILY HISTORY:  Family history of hypertension in mother    Non-contributary for premature coronary disease or sudden cardiac death    SOCIAL HISTORY:    [X ] Non-smoker  [ ] Smoker  [ ] Alcohol      REVIEW OF SYSTEMS:  [ ]chest pain  [X  ]shortness of breath  [  ]palpitations  [  ]syncope  [ ]near syncope [ ]upper extremity weakness   [ ] lower extremity weakness  [  ]diplopia  [  ]altered mental status   [  ]fevers  [ ]chills [ ]nausea  [ ]vomitting  [  ]dysphagia    [ ]abdominal pain  [ ]melena  [ ]BRBPR    [  ]epistaxis  [  ]rash    [ ]lower extremity edema        [X ] All others negative	  [ ] Unable to obtain    PHYSICAL EXAM:  T(C): 36.7 (01-18-18 @ 08:00), Max: 37.2 (01-17-18 @ 15:21)  HR: 83 (01-18-18 @ 11:00) (76 - 88)  BP: 104/59 (01-18-18 @ 11:00) (80/51 - 148/89)  RR: 23 (01-18-18 @ 11:00) (15 - 27)  SpO2: 94% (01-18-18 @ 11:00) (92% - 100%)  Wt(kg): --  I&O's Summary    17 Jan 2018 07:01  -  18 Jan 2018 07:00  --------------------------------------------------------  IN: 100 mL / OUT: 2560 mL / NET: -2460 mL    18 Jan 2018 07:01  -  18 Jan 2018 11:32  --------------------------------------------------------  IN: 0 mL / OUT: 285 mL / NET: -285 mL          HEENT:   Normal oral mucosa, PERRL, EOMI	  Lymphatic: No obvious lymphadenopathy , (+) edema  Cardiovascular: Normal S1 S2, No JVD,  1/6 RAYMOND murmur , Peripheral pulses palpable 2+ bilaterally  Respiratory: Lungs clear to auscultation, normal effort 	  Gastrointestinal:  Soft, Non-tender, + BS	  Skin: No rashes, No cyanosis, warm to touch  Musculoskeletal: Normal range of motion, normal strength  Psychiatry:  Appropriate Mood & affect     TELEMETRY: 	  Sinus  ECG:  	PENDING NON FOUND IN CHART  RADIOLOGY:     CXR:    Interstitial prominence bilaterally may be related to   portable technique and shallow inspiration; an element of interstitial   edema cannot be fully excluded.       	  LABS:	 	    CARDIAC MARKERS:  CARDIAC MARKERS ( 18 Jan 2018 06:36 )  <0.015 ng/mL / x     / 142 U/L / x     / 1.3 ng/mL  CARDIAC MARKERS ( 17 Jan 2018 23:14 )  <0.015 ng/mL / x     / 194 U/L / x     / 2.0 ng/mL  CARDIAC MARKERS ( 17 Jan 2018 18:03 )  <0.015 ng/mL / x     / 289 U/L / x     / 2.9 ng/mL                              12.8   4.1   )-----------( 180      ( 18 Jan 2018 06:36 )             44.1     Hb Trend: 12.8<--, 13.1<--    01-18    138  |  102  |  32<H>  ----------------------------<  59<L>  3.5   |  30  |  1.03    Ca    7.1<L>      18 Jan 2018 06:36  Mg     1.9     01-18    TPro  7.2  /  Alb  3.3<L>  /  TBili  0.6  /  DBili  x   /  AST  31  /  ALT  37  /  AlkPhos  27<L>  01-17    Creatinine Trend: 1.03<--, 1.20<--, 1.39<--    Coags:      proBNP: Serum Pro-Brain Natriuretic Peptide: 2373 pg/mL (01-17 @ 13:26)      ASSESSMENT/PLAN: 	52y Female well known to our office, historically low normal LV function, ICD, HTN, DM admitted with SOB and cellulitis.    - Cont Abx per MICU  - check echo  - Will follow with you    I once again thank you for allowing me to participate in the care of our mutual patient.  If you have any questions or concerns please do not hesitate to contact me.    Manuel Gold MD, FACC  [facility], [facility]  [address] [place] [address]  PHONE:  [phone]  BEEPER : [phone]

## 2018-01-18 NOTE — PROGRESS NOTE ADULT - SUBJECTIVE AND OBJECTIVE BOX
INTERVAL HPI/OVERNIGHT EVENTS: patient seen and examined at bed side, currently on Bipap, patient complains of generalized abdominal pain, No acute events overnight    PRESSORS: [ ] YES [ x] NO  WHICH:    ANTIBIOTICS:  cefazolin               DATE STARTED:  ANTIBIOTICS:                  DATE STARTED:  ANTIBIOTICS:                  DATE STARTED:    Antimicrobial:  ceFAZolin   IVPB 1000 milliGRAM(s) IV Intermittent every 8 hours    Cardiovascular:  carvedilol 3.125 milliGRAM(s) Oral every 12 hours  furosemide   Injectable 40 milliGRAM(s) IV Push every 12 hours    Pulmonary:  ALBUTerol/ipratropium for Nebulization 3 milliLiter(s) Nebulizer every 6 hours    Hematalogic:  aspirin  chewable 81 milliGRAM(s) Oral daily  enoxaparin Injectable 40 milliGRAM(s) SubCutaneous daily    Other:  atorvastatin 40 milliGRAM(s) Oral at bedtime  insulin lispro (HumaLOG) corrective regimen sliding scale   SubCutaneous every 6 hours  multivitamin 1 Tablet(s) Oral daily    ALBUTerol/ipratropium for Nebulization 3 milliLiter(s) Nebulizer every 6 hours  aspirin  chewable 81 milliGRAM(s) Oral daily  atorvastatin 40 milliGRAM(s) Oral at bedtime  carvedilol 3.125 milliGRAM(s) Oral every 12 hours  ceFAZolin   IVPB 1000 milliGRAM(s) IV Intermittent every 8 hours  enoxaparin Injectable 40 milliGRAM(s) SubCutaneous daily  furosemide   Injectable 40 milliGRAM(s) IV Push every 12 hours  insulin lispro (HumaLOG) corrective regimen sliding scale   SubCutaneous every 6 hours  multivitamin 1 Tablet(s) Oral daily    Drug Dosing Weight  Height (cm): 165.1 (2018 11:46)  Weight (kg): 141.5 (2018 11:46)  BMI (kg/m2): 51.9 (2018 11:46)  BSA (m2): 2.39 (2018 11:46)    CENTRAL LINE: [ ] YES [x ] NO  LOCATION:   DATE INSERTED:  REMOVE: [ ] YES [ ] NO  EXPLAIN:    AVILA: [x ] YES [ ] NO    DATE INSERTED:  REMOVE:  [ ] YES [ ] NO  EXPLAIN:    A-LINE:  [ ] YES [x ] NO  LOCATION:   DATE INSERTED:  REMOVE:  [ ] YES [ ] NO  EXPLAIN:    PMH -reviewed admission note, no change since admission  Heart faliure: acute [ ] chronic [ ] acute or chronic [ ] diastolic [ ] systolic [ ] combied systolic and diastolic[ ]  ANJU: ATN[ ] renal medullary necrosis [ ] CKD I [ ]CKDII [ ]CKD III [ ]CKD IV [ ]CKD V [ ]Other pathological lesions [ ]  Abdominal Nutrition Status: malnutrition [ ] cachexia [ ] morbid obesity/BMI=40 [ ] Supplement ordered [___________]     ICU Vital Signs Last 24 Hrs  T(C): 36.8 (2018 06:00), Max: 37.2 (2018 15:21)  T(F): 98.2 (2018 06:00), Max: 99 (2018 15:21)  HR: 86 (2018 08:00) (76 - 88)  BP: 101/60 (2018 08:00) (80/51 - 148/89)  BP(mean): 69 (2018 08:00) (56 - 82)  ABP: --  ABP(mean): --  RR: 21 (2018 08:00) (15 - 27)  SpO2: 96% (2018 08:00) (92% - 100%)      ABG - ( 2018 05:48 )  pH: 7.30  /  pCO2: 76    /  pO2: 58    / HCO3: 36    / Base Excess: 7.6   /  SaO2: 85                     @ 07:01  -  -18 @ 07:00  --------------------------------------------------------  IN: 100 mL / OUT: 2560 mL / NET: -2460 mL            PHYSICAL EXAM:    GENERAL: [ x]NAD, Obese   HEAD:  [x ]Atraumatic, [x ]Normocephalic  EYES: [ x]EOMI, [ ]PERRLA, [x ]conjunctiva and sclera clear  ENMT: [ x]No tonsillar erythema, exudates, or enlargement; [ x]Moist mucous membranes, [ ]Good dentition, [ ]No lesions  NECK: [x ]Supple, normal appearance, [x ]No JVD; [ ]Normal thyroid; [ ]Trachea midline  NERVOUS SYSTEM:  [ x]Alert & Oriented X3,  CHEST/LUNG: [x ]No chest deformity; [ ]Normal percussion bilaterally; [ ]No rales, rhonchi, wheezing   HEART: [ x]Regular rate and rhythm; [ ]No murmurs, rubs, or gallops  ABDOMEN: [x ]Soft, Nontender, Nondistended; [ ]Bowel sounds present  EXTREMITIES: B/l lower Ext swelling   LYMPH: [x]No lymphadenopathy noted  SKIN: B/L. ERYTHEMA OF B/L LE, RIGHT > LEFT      LABS:  CBC Full  -  ( 2018 06:36 )  WBC Count : 4.1 K/uL  Hemoglobin : 12.8 g/dL  Hematocrit : 44.1 %  Platelet Count - Automated : 180 K/uL  Mean Cell Volume : 101.5 fl  Mean Cell Hemoglobin : 29.3 pg  Mean Cell Hemoglobin Concentration : 29.2 gm/dL  Auto Neutrophil # : x  Auto Lymphocyte # : x  Auto Monocyte # : x  Auto Eosinophil # : x  Auto Basophil # : x  Auto Neutrophil % : x  Auto Lymphocyte % : x  Auto Monocyte % : x  Auto Eosinophil % : x  Auto Basophil % : x        138  |  102  |  32<H>  ----------------------------<  59<L>  3.5   |  30  |  1.03    Ca    7.1<L>      2018 06:36  Mg     1.9         TPro  7.2  /  Alb  3.3<L>  /  TBili  0.6  /  DBili  x   /  AST  31  /  ALT  37  /  AlkPhos  27<L>  17    PT/INR - ( 2018 13:26 )   PT: 14.0 sec;   INR: 1.28 ratio         PTT - ( 2018 13:26 )  PTT:30.4 sec  Urinalysis Basic - ( 2018 15:14 )    Color: Yellow / Appearance: Clear / S.010 / pH: x  Gluc: x / Ketone: Negative  / Bili: Negative / Urobili: Negative   Blood: x / Protein: 30 mg/dL / Nitrite: Negative   Leuk Esterase: Negative / RBC: x / WBC x   Sq Epi: x / Non Sq Epi: Few /HPF / Bacteria: Moderate /HPF          RADIOLOGY & ADDITIONAL STUDIES REVIEWED:  ***    [ ]GOALS OF CARE DISCUSSION WITH PATIENT/FAMILY/PROXY:    CRITICAL CARE TIME SPENT: 35 minutes INTERVAL HPI/OVERNIGHT EVENTS: patient seen and examined at bed side, currently on Bipap, patient complains of generalized abdominal pain, No acute events overnight    PRESSORS: [ ] YES [ x] NO  WHICH:    ANTIBIOTICS:  cefazolin               DATE STARTED:   ANTIBIOTICS:                  DATE STARTED:  ANTIBIOTICS:                  DATE STARTED:    Antimicrobial:  ceFAZolin   IVPB 1000 milliGRAM(s) IV Intermittent every 8 hours    Cardiovascular:  carvedilol 3.125 milliGRAM(s) Oral every 12 hours  furosemide   Injectable 40 milliGRAM(s) IV Push every 12 hours    Pulmonary:  ALBUTerol/ipratropium for Nebulization 3 milliLiter(s) Nebulizer every 6 hours    Hematalogic:  aspirin  chewable 81 milliGRAM(s) Oral daily  enoxaparin Injectable 40 milliGRAM(s) SubCutaneous daily    Other:  atorvastatin 40 milliGRAM(s) Oral at bedtime  insulin lispro (HumaLOG) corrective regimen sliding scale   SubCutaneous every 6 hours  multivitamin 1 Tablet(s) Oral daily    ALBUTerol/ipratropium for Nebulization 3 milliLiter(s) Nebulizer every 6 hours  aspirin  chewable 81 milliGRAM(s) Oral daily  atorvastatin 40 milliGRAM(s) Oral at bedtime  carvedilol 3.125 milliGRAM(s) Oral every 12 hours  ceFAZolin   IVPB 1000 milliGRAM(s) IV Intermittent every 8 hours  enoxaparin Injectable 40 milliGRAM(s) SubCutaneous daily  furosemide   Injectable 40 milliGRAM(s) IV Push every 12 hours  insulin lispro (HumaLOG) corrective regimen sliding scale   SubCutaneous every 6 hours  multivitamin 1 Tablet(s) Oral daily    Drug Dosing Weight  Height (cm): 165.1 (2018 11:46)  Weight (kg): 141.5 (2018 11:46)  BMI (kg/m2): 51.9 (2018 11:46)  BSA (m2): 2.39 (2018 11:46)    CENTRAL LINE: [ ] YES [x ] NO  LOCATION:   DATE INSERTED:  REMOVE: [ ] YES [ ] NO  EXPLAIN:    AVILA: [x ] YES [ ] NO    DATE INSERTED:  REMOVE:  [ ] YES [ ] NO  EXPLAIN:    A-LINE:  [ ] YES [x ] NO  LOCATION:   DATE INSERTED:  REMOVE:  [ ] YES [ ] NO  EXPLAIN:    PMH -reviewed admission note, no change since admission      ICU Vital Signs Last 24 Hrs  T(C): 36.8 (2018 06:00), Max: 37.2 (2018 15:21)  T(F): 98.2 (2018 06:00), Max: 99 (2018 15:21)  HR: 86 (2018 08:00) (76 - 88)  BP: 101/60 (2018 08:00) (80/51 - 148/89)  BP(mean): 69 (2018 08:00) (56 - 82)  ABP: --  ABP(mean): --  RR: 21 (2018 08:00) (15 - 27)  SpO2: 96% (2018 08:00) (92% - 100%)      ABG - ( 2018 05:48 )  pH: 7.30  /  pCO2: 76    /  pO2: 58    / HCO3: 36    / Base Excess: 7.6   /  SaO2: 85                    -17 @ 07:01  -  01-18 @ 07:00  --------------------------------------------------------  IN: 100 mL / OUT: 2560 mL / NET: -2460 mL            PHYSICAL EXAM:    GENERAL: [ x]NAD, Obese   HEAD:  [x ]Atraumatic, [x ]Normocephalic  EYES: [ x]EOMI, [ ]PERRLA, [x ]conjunctiva and sclera clear  ENMT: [ x]No tonsillar erythema, exudates, or enlargement; [ x]Moist mucous membranes, [ ]Good dentition, [ ]No lesions  NECK: [x ]Supple, normal appearance, [x ]No JVD; [ ]Normal thyroid; [ ]Trachea midline  NERVOUS SYSTEM:  [ x]Alert & Oriented X3,  CHEST/LUNG: [x ]No chest deformity; [ ]Normal percussion bilaterally; [ ]No rales, rhonchi, wheezing   HEART: [ x]Regular rate and rhythm; [ ]No murmurs, rubs, or gallops  ABDOMEN: [x ]Soft, Nontender, Nondistended; [ ]Bowel sounds present  EXTREMITIES: B/l lower Ext swelling   LYMPH: [x]No lymphadenopathy noted  SKIN: B/L. ERYTHEMA OF B/L LE, RIGHT > LEFT      LABS:  CBC Full  -  ( 2018 06:36 )  WBC Count : 4.1 K/uL  Hemoglobin : 12.8 g/dL  Hematocrit : 44.1 %  Platelet Count - Automated : 180 K/uL  Mean Cell Volume : 101.5 fl  Mean Cell Hemoglobin : 29.3 pg  Mean Cell Hemoglobin Concentration : 29.2 gm/dL  Auto Neutrophil # : x  Auto Lymphocyte # : x  Auto Monocyte # : x  Auto Eosinophil # : x  Auto Basophil # : x  Auto Neutrophil % : x  Auto Lymphocyte % : x  Auto Monocyte % : x  Auto Eosinophil % : x  Auto Basophil % : x        138  |  102  |  32<H>  ----------------------------<  59<L>  3.5   |  30  |  1.03    Ca    7.1<L>      2018 06:36  Mg     1.9         TPro  7.2  /  Alb  3.3<L>  /  TBili  0.6  /  DBili  x   /  AST  31  /  ALT  37  /  AlkPhos  27<L>  17    PT/INR - ( 2018 13:26 )   PT: 14.0 sec;   INR: 1.28 ratio         PTT - ( 2018 13:26 )  PTT:30.4 sec  Urinalysis Basic - ( 2018 15:14 )    Color: Yellow / Appearance: Clear / S.010 / pH: x  Gluc: x / Ketone: Negative  / Bili: Negative / Urobili: Negative   Blood: x / Protein: 30 mg/dL / Nitrite: Negative   Leuk Esterase: Negative / RBC: x / WBC x   Sq Epi: x / Non Sq Epi: Few /HPF / Bacteria: Moderate /HPF          RADIOLOGY & ADDITIONAL STUDIES REVIEWED:    CXR Interstitial prominence bilaterally     ECHO 1. Mild mitral regurgitation.  2. Moderately dilated left atrium.  LA volume index = 42  cc/m2.  3. Normal left ventricular internal dimensions and wall  thicknesses.  4. Endocardium not well visualized; grossly low normal left  ventricular systolic function. Segmental wall motion could  not be assessed.  5. Mild right atrial enlargement.  6. Right ventricular enlargement with normal RV function. A  device lead is visualized in the right heart.  7. RV systolic pressure is 48 mm Hg. Mild pulmonary  hypertension.  8. There is severe tricuspid regurgitation.  9. Trace pericardial effusion.      [ ]GOALS OF CARE DISCUSSION WITH PATIENT/FAMILY/PROXY:    CRITICAL CARE TIME SPENT: 35 minutes

## 2018-01-19 LAB
ANION GAP SERPL CALC-SCNC: 5 MMOL/L — SIGNIFICANT CHANGE UP (ref 5–17)
APPEARANCE UR: ABNORMAL
BACTERIA # UR AUTO: ABNORMAL /HPF
BASOPHILS # BLD AUTO: 0.1 K/UL — SIGNIFICANT CHANGE UP (ref 0–0.2)
BASOPHILS NFR BLD AUTO: 1.8 % — SIGNIFICANT CHANGE UP (ref 0–2)
BILIRUB UR-MCNC: NEGATIVE — SIGNIFICANT CHANGE UP
BUN SERPL-MCNC: 35 MG/DL — HIGH (ref 7–18)
CALCIUM SERPL-MCNC: 8.4 MG/DL — SIGNIFICANT CHANGE UP (ref 8.4–10.5)
CHLORIDE SERPL-SCNC: 93 MMOL/L — LOW (ref 96–108)
CO2 SERPL-SCNC: 38 MMOL/L — HIGH (ref 22–31)
COLOR SPEC: ABNORMAL
CREAT SERPL-MCNC: 1.42 MG/DL — HIGH (ref 0.5–1.3)
DIFF PNL FLD: ABNORMAL
EOSINOPHIL # BLD AUTO: 0.1 K/UL — SIGNIFICANT CHANGE UP (ref 0–0.5)
EOSINOPHIL NFR BLD AUTO: 1 % — SIGNIFICANT CHANGE UP (ref 0–6)
EPI CELLS # UR: SIGNIFICANT CHANGE UP /HPF
GLUCOSE SERPL-MCNC: 94 MG/DL — SIGNIFICANT CHANGE UP (ref 70–99)
GLUCOSE UR QL: NEGATIVE — SIGNIFICANT CHANGE UP
HCT VFR BLD CALC: 46.1 % — HIGH (ref 34.5–45)
HGB BLD-MCNC: 13.2 G/DL — SIGNIFICANT CHANGE UP (ref 11.5–15.5)
KETONES UR-MCNC: NEGATIVE — SIGNIFICANT CHANGE UP
LEUKOCYTE ESTERASE UR-ACNC: ABNORMAL
LYMPHOCYTES # BLD AUTO: 0.9 K/UL — LOW (ref 1–3.3)
LYMPHOCYTES # BLD AUTO: 15.3 % — SIGNIFICANT CHANGE UP (ref 13–44)
MAGNESIUM SERPL-MCNC: 2.4 MG/DL — SIGNIFICANT CHANGE UP (ref 1.6–2.6)
MCHC RBC-ENTMCNC: 28.7 GM/DL — LOW (ref 32–36)
MCHC RBC-ENTMCNC: 29 PG — SIGNIFICANT CHANGE UP (ref 27–34)
MCV RBC AUTO: 101.2 FL — HIGH (ref 80–100)
MONOCYTES # BLD AUTO: 0.7 K/UL — SIGNIFICANT CHANGE UP (ref 0–0.9)
MONOCYTES NFR BLD AUTO: 12.4 % — SIGNIFICANT CHANGE UP (ref 2–14)
NEUTROPHILS # BLD AUTO: 4.1 K/UL — SIGNIFICANT CHANGE UP (ref 1.8–7.4)
NEUTROPHILS NFR BLD AUTO: 69.5 % — SIGNIFICANT CHANGE UP (ref 43–77)
NITRITE UR-MCNC: NEGATIVE — SIGNIFICANT CHANGE UP
PH UR: 5 — SIGNIFICANT CHANGE UP (ref 5–8)
PHOSPHATE SERPL-MCNC: 4.3 MG/DL — SIGNIFICANT CHANGE UP (ref 2.5–4.5)
PLATELET # BLD AUTO: 259 K/UL — SIGNIFICANT CHANGE UP (ref 150–400)
POTASSIUM SERPL-MCNC: 3.7 MMOL/L — SIGNIFICANT CHANGE UP (ref 3.5–5.3)
POTASSIUM SERPL-SCNC: 3.7 MMOL/L — SIGNIFICANT CHANGE UP (ref 3.5–5.3)
PROT UR-MCNC: 100
RBC # BLD: 4.56 M/UL — SIGNIFICANT CHANGE UP (ref 3.8–5.2)
RBC # FLD: 15.4 % — HIGH (ref 10.3–14.5)
RBC CASTS # UR COMP ASSIST: >50 /HPF (ref 0–2)
SODIUM SERPL-SCNC: 136 MMOL/L — SIGNIFICANT CHANGE UP (ref 135–145)
SP GR SPEC: 1.02 — SIGNIFICANT CHANGE UP (ref 1.01–1.02)
UROBILINOGEN FLD QL: 1
WBC # BLD: 5.8 K/UL — SIGNIFICANT CHANGE UP (ref 3.8–10.5)
WBC # FLD AUTO: 5.8 K/UL — SIGNIFICANT CHANGE UP (ref 3.8–10.5)
WBC UR QL: SIGNIFICANT CHANGE UP /HPF (ref 0–5)

## 2018-01-19 PROCEDURE — 71045 X-RAY EXAM CHEST 1 VIEW: CPT | Mod: 26

## 2018-01-19 RX ORDER — ACETAMINOPHEN 500 MG
650 TABLET ORAL EVERY 6 HOURS
Qty: 0 | Refills: 0 | Status: DISCONTINUED | OUTPATIENT
Start: 2018-01-19 | End: 2018-01-25

## 2018-01-19 RX ADMIN — Medication 1 TABLET(S): at 11:06

## 2018-01-19 RX ADMIN — Medication 3 MILLILITER(S): at 14:33

## 2018-01-19 RX ADMIN — CARVEDILOL PHOSPHATE 3.12 MILLIGRAM(S): 80 CAPSULE, EXTENDED RELEASE ORAL at 18:06

## 2018-01-19 RX ADMIN — CARVEDILOL PHOSPHATE 3.12 MILLIGRAM(S): 80 CAPSULE, EXTENDED RELEASE ORAL at 05:25

## 2018-01-19 RX ADMIN — Medication 40 MILLIGRAM(S): at 10:00

## 2018-01-19 RX ADMIN — Medication 100 MILLIGRAM(S): at 14:58

## 2018-01-19 RX ADMIN — Medication 3 MILLILITER(S): at 08:10

## 2018-01-19 RX ADMIN — Medication 650 MILLIGRAM(S): at 16:15

## 2018-01-19 RX ADMIN — ATORVASTATIN CALCIUM 40 MILLIGRAM(S): 80 TABLET, FILM COATED ORAL at 22:18

## 2018-01-19 RX ADMIN — Medication 3 MILLILITER(S): at 02:11

## 2018-01-19 RX ADMIN — Medication 1: at 11:06

## 2018-01-19 RX ADMIN — Medication 81 MILLIGRAM(S): at 11:06

## 2018-01-19 RX ADMIN — ENOXAPARIN SODIUM 40 MILLIGRAM(S): 100 INJECTION SUBCUTANEOUS at 11:07

## 2018-01-19 RX ADMIN — Medication 1: at 23:08

## 2018-01-19 RX ADMIN — Medication 40 MILLIGRAM(S): at 22:18

## 2018-01-19 RX ADMIN — Medication 100 MILLIGRAM(S): at 05:25

## 2018-01-19 RX ADMIN — Medication 100 MILLIGRAM(S): at 22:17

## 2018-01-19 RX ADMIN — Medication 3 MILLILITER(S): at 21:07

## 2018-01-19 NOTE — PHYSICAL THERAPY INITIAL EVALUATION ADULT - RANGE OF MOTION EXAMINATION, REHAB EVAL
bilateral upper extremity ROM was WFL (within functional limits)/deficits as listed below/L hip/knee A/PROM: 30 deg, R hip/knee A/PROM: 20 deg, limited by generalized edema B/L LE.

## 2018-01-19 NOTE — PROGRESS NOTE ADULT - SUBJECTIVE AND OBJECTIVE BOX
Patient denies CP, SOB. ROS (-)  	  MEDICATIONS:  MEDICATIONS  (STANDING):  ALBUTerol/ipratropium for Nebulization 3 milliLiter(s) Nebulizer every 6 hours  aspirin  chewable 81 milliGRAM(s) Oral daily  atorvastatin 40 milliGRAM(s) Oral at bedtime  carvedilol 3.125 milliGRAM(s) Oral every 12 hours  ceFAZolin   IVPB 1000 milliGRAM(s) IV Intermittent every 8 hours  enoxaparin Injectable 40 milliGRAM(s) SubCutaneous daily  furosemide   Injectable 40 milliGRAM(s) IV Push every 12 hours  insulin lispro (HumaLOG) corrective regimen sliding scale   SubCutaneous every 6 hours  multivitamin 1 Tablet(s) Oral daily      LABS:	 	    CARDIAC MARKERS:  CARDIAC MARKERS ( 18 Jan 2018 06:36 )  <0.015 ng/mL / x     / 142 U/L / x     / 1.3 ng/mL  CARDIAC MARKERS ( 17 Jan 2018 23:14 )  <0.015 ng/mL / x     / 194 U/L / x     / 2.0 ng/mL  CARDIAC MARKERS ( 17 Jan 2018 18:03 )  <0.015 ng/mL / x     / 289 U/L / x     / 2.9 ng/mL                                13.2   5.8   )-----------( 259      ( 19 Jan 2018 06:07 )             46.1     Hemoglobin: 13.2 g/dL (01-19 @ 06:07)  Hemoglobin: 12.8 g/dL (01-18 @ 06:36)  Hemoglobin: 13.1 g/dL (01-17 @ 13:26)      01-19    136  |  93<L>  |  35<H>  ----------------------------<  94  3.7   |  38<H>  |  1.42<H>    Ca    8.4      19 Jan 2018 06:07  Phos  4.3     01-19  Mg     2.4     01-19      Creatinine Trend: 1.42<--, 1.03<--, 1.20<--, 1.39<--        PHYSICAL EXAM:  T(C): 38.1 (01-19-18 @ 19:00), Max: 38.2 (01-19-18 @ 15:34)  HR: 93 (01-19-18 @ 19:00) (86 - 102)  BP: 109/58 (01-19-18 @ 19:00) (94/49 - 131/80)  RR: 17 (01-19-18 @ 19:00) (13 - 34)  SpO2: 92% (01-19-18 @ 19:00) (81% - 99%)  Wt(kg): --  I&O's Summary    18 Jan 2018 07:01  -  19 Jan 2018 07:00  --------------------------------------------------------  IN: 400 mL / OUT: 2623 mL / NET: -2223 mL    19 Jan 2018 07:01  -  19 Jan 2018 20:19  --------------------------------------------------------  IN: 925 mL / OUT: 863 mL / NET: 62 mL          HEENT:   Normal oral mucosa, PERRL, EOMI	  Lymphatic: No obvious lymphadenopathy , no edema  Cardiovascular: Normal S1 S2, No JVD, 1/6 RAYMOND murmur, Peripheral pulses palpable 2+ bilaterally  Respiratory: Lungs clear to auscultation, normal effort 	  Gastrointestinal:  Soft, Non-tender, + BS	  Skin: No rashes,  No cyanosis, warm to touch  Musculoskeletal: Normal range of motion, normal strength  Psychiatry:  Appropriate Mood & affect     TELEMETRY: 	sinus     	      ASSESSMENT/PLAN: 	52y Female ell known to our office, historically low normal LV function, ICD, HTN, DM admitted with SOB and cellulitis.    - No pertinent findings on echo  - ABx per primary team    Manuel Gold MD, FACC  [facility], [facility]  [address] [place] [address]  PHONE:  [phone]  BEEPER : [phone]

## 2018-01-19 NOTE — PHYSICAL THERAPY INITIAL EVALUATION ADULT - CRITERIA FOR SKILLED THERAPEUTIC INTERVENTIONS
anticipated discharge recommendation/impairments found/risk reduction/prevention/therapy frequency/predicted duration of therapy intervention/rehab potential/anticipated equipment needs at discharge/functional limitations in following categories

## 2018-01-19 NOTE — PROGRESS NOTE ADULT - ASSESSMENT
52 morbidly obese F with hx of CAD, CHF s/p ICD, p/w dyspnea, somnolence and LE redness and swelling, admitted to MICU for acute hypercapneic respiratory failure 2/2 CHF and/or AMPARO/OHS in setting of LE cellulitis     Problem/Plan - 1:  ·  Problem: Acute respiratory failure with hypercapnia.  2/2 CHF and/or likely OHS/AMPARO  On admission ABG Blood Gas Profile -pH, Arterial: 7.30/76/58/36  patient was placed on Bipap due to hypercapnic Respiratory failure and admitted to ICU    CE neg, BNP 2373  - ECHO EF 55% with Severe TR and mild Pulmonary HTN   - Venous Doppler NO DVT  - Likely due to CHF exacerbation 2/2 infection    - continue with Lasix 40 mg Q 12 hrs   - Strict I/O's patient is negative 2.2L   - currently patient is off the Bipap, will use Nocturnal Bipap      Problem/Plan - 2:  ·  Problem: Cellulitis of lower extremity, unspecified laterality.  Plan: c/w ancef  f/u dopplers negative for DVT   urine, and blood cultures are negative      Problem/Plan - 3:  ·  Problem: DM (diabetes mellitus).  Plan: c/w HISS  f/u A1c. 6.0     Problem/Plan - 4:  ·  Problem: HTN (hypertension).  Plan: c/w coreg  holding hctz as we are diuresing with lasix  hold arb 2/2 montse  if becomes more hypertensive, consider nitro for preload reduction in pulmonary edema.      Problem/Plan - 5:  ·  Problem: Prophylactic measure.  Plan: GI px  DVT px.

## 2018-01-19 NOTE — PROGRESS NOTE ADULT - SUBJECTIVE AND OBJECTIVE BOX
Patient is a 52y old  Female who presents with a chief complaint of sob, sleepiness, LE edema and redness (2018 18:02)    PATIENT IS SEEN AND EXAMINED IN MEDICAL FLOOR.    ALLERGIES:  No Known Allergies      Daily Weight in k.3 (2018 07:00)    VITALS:    Vital Signs Last 24 Hrs  T(C): 38.1 (2018 19:00), Max: 38.2 (2018 15:34)  T(F): 100.6 (2018 19:00), Max: 100.7 (2018 15:34)  HR: 93 (2018 19:00) (86 - 102)  BP: 109/58 (2018 19:00) (94/49 - 131/80)  BP(mean): 68 (2018 19:00) (59 - 92)  RR: 17 (2018 19:00) (13 - 34)  SpO2: 92% (2018 19:00) (81% - 99%)    LABS:  CBC Full  -  ( 2018 06:07 )  WBC Count : 5.8 K/uL  Hemoglobin : 13.2 g/dL  Hematocrit : 46.1 %  Platelet Count - Automated : 259 K/uL  Mean Cell Volume : 101.2 fl  Mean Cell Hemoglobin : 29.0 pg  Mean Cell Hemoglobin Concentration : 28.7 gm/dL  Auto Neutrophil # : 4.1 K/uL  Auto Lymphocyte # : 0.9 K/uL  Auto Monocyte # : 0.7 K/uL  Auto Eosinophil # : 0.1 K/uL  Auto Basophil # : 0.1 K/uL  Auto Neutrophil % : 69.5 %  Auto Lymphocyte % : 15.3 %  Auto Monocyte % : 12.4 %  Auto Eosinophil % : 1.0 %  Auto Basophil % : 1.8 %          136  |  93<L>  |  35<H>  ----------------------------<  94  3.7   |  38<H>  |  1.42<H>    Ca    8.4      2018 06:07  Phos  4.3     -  Mg     2.4           CAPILLARY BLOOD GLUCOSE      POCT Blood Glucose.: 141 mg/dL (2018 16:06)  POCT Blood Glucose.: 194 mg/dL (2018 10:58)  POCT Blood Glucose.: 113 mg/dL (2018 05:30)  POCT Blood Glucose.: 104 mg/dL (2018 23:48)    CARDIAC MARKERS ( 2018 06:36 )  <0.015 ng/mL / x     / 142 U/L / x     / 1.3 ng/mL  CARDIAC MARKERS ( 2018 23:14 )  <0.015 ng/mL / x     / 194 U/L / x     / 2.0 ng/mL          ABG - ( 2018 05:48 )  pH: 7.30  /  pCO2: 76    /  pO2: 58    / HCO3: 36    / Base Excess: 7.6   /  SaO2: 85                  .Urine Clean Catch (Midstream)   @ 22:53   No growth  --  --      .Blood Blood   @ 18:44   No growth to date.  --  --          MEDICATIONS:    MEDICATIONS  (STANDING):  ALBUTerol/ipratropium for Nebulization 3 milliLiter(s) Nebulizer every 6 hours  aspirin  chewable 81 milliGRAM(s) Oral daily  atorvastatin 40 milliGRAM(s) Oral at bedtime  carvedilol 3.125 milliGRAM(s) Oral every 12 hours  ceFAZolin   IVPB 1000 milliGRAM(s) IV Intermittent every 8 hours  enoxaparin Injectable 40 milliGRAM(s) SubCutaneous daily  furosemide   Injectable 40 milliGRAM(s) IV Push every 12 hours  insulin lispro (HumaLOG) corrective regimen sliding scale   SubCutaneous every 6 hours  multivitamin 1 Tablet(s) Oral daily      MEDICATIONS  (PRN):  acetaminophen   Tablet 650 milliGRAM(s) Oral every 6 hours PRN For Temp greater than 38 C (100.4 F)      REVIEW OF SYSTEMS:                           ALL ROS DONE [ X   ]    CONSTITUTIONAL:  LETHARGIC [   ], FEVER [   ], UNRESPONSIVE [   ]  CVS:  CP  [   ], SOB, [   ], PALPITATIONS [   ], DIZZYNESS [   ]  RS: COUGH [   ], SPUTUM [   ]  GI: ABDOMINAL PAIN [   ], NAUSEA [   ], VOMITINGS [   ], DIARRHEA [   ], CONSTIPATION [   ]  :  DYSURIA [   ], NOCTURIA [   ], INCREASED FREQUENCY [   ], DRIBLING [   ],  SKELETAL: PAINFUL JOINTS [   ], SWOLLEN JOINTS [   ], NECK ACHE [   ], LOW BACK ACHE [   ],  SKIN : ULCERS [   ], RASH [   ], ITCHING [   ]  CNS: HEAD ACHE [   ], DOUBLE VISION [   ], BLURRED VISION [   ], AMS / CONFUSION [   ], SEIZURES [   ], WEAKNESS [   ],TINGLING / NUMBNESS [   ]    PHYSICAL EXAMINATION:  GENERAL APPEARANCE: NO DISTRESS  HEENT:  NO PALLOR, NO  JVD,  NO   NODES, NECK SUPPLE  CVS: S1 +, S2 +,   RS: AEEB,  OCCASIONAL  RALES +,   NO RONCHI  ABD: SOFT, NT, NO, BS +  EXT: PE +, ERYTHEMA OF B/L LE, R > L  SKIN: WARM,   SKELETAL:  ROM ACCEPTABLE  CNS:  AAO X 3   ,   DEFICITS    RADIOLOGY :    < from: Xray Chest 1 View AP -PORTABLE-Routine (18 @ 09:18) >  IMPRESSION: Interstitial prominence bilaterally may be related to   portable technique and shallow inspiration; an element of interstitial   edema cannot be fully excluded.       < end of copied text >  < from: Transthoracic Echocardiogram (18 @ 07:37) >  CONCLUSIONS:  1. Mild mitral regurgitation.  2. Moderately dilated left atrium.  LA volume index = 42  cc/m2.  3. Normal left ventricular internal dimensions and wall  thicknesses.  4. Endocardium not well visualized; grossly low normal left  ventricular systolic function. Segmental wall motion could  not be assessed.  5. Mild right atrial enlargement.  6. Right ventricular enlargement with normal RV function. A  device lead is visualized in the right heart.  7. RV systolic pressure is 48 mm Hg. Mild pulmonary  hypertension.  8. There is severe tricuspid regurgitation.  9. Trace pericardial effusion.    < end of copied text >      ASSESSMENT :     Cellulitis of extremity  RA (rheumatoid arthritis)  DM (diabetes mellitus)  HTN (hypertension)  History of cholecystectomy      PLAN:  HPI:  52 [place] with PMH of CHF s/p ICD, HTN, DM is BIBA for dyspnea and sleepiness, LE edema and redness. Pt is poor historian, but claims that she has been experiencing some SOB, sleepiness and redness and pain in her LEs. ROS otherwise unremarkable as pt denies fever, chills, CP, cough, palpitations, syncope, purulent discharge from LEs, trauma to area.   In ED, pt was drowsy and dyspneic and found to be hypercapneic to pCO2 of 77 and pH of 7.29. Pt started on NIV and given lasix. CXR revealed b/l opacities c/w pulmonary edema (2018 18:02)    - CELLULITIS OF B/L LE , WORSENING VENOUS INSUFFICIENCY OF B/L LE AND LYMPHEDEMA ON CEFAZOLIN  - AMPARO, HYPERCAPNOEA - BIPAP AT NIGHTS  - GI AND DVT PROPHYLAXIS  - DR. NATARAJAN

## 2018-01-19 NOTE — PROGRESS NOTE ADULT - SUBJECTIVE AND OBJECTIVE BOX
INTERVAL HPI/OVERNIGHT EVENTS: patient seen and examined at bed, was on Bipap overnight, Afebrile, HD stable,     PRESSORS: [ ] YES [ x] NO  WHICH:    ANTIBIOTICS:   Cefazolin    DATE STARTED:  ANTIBIOTICS:                  DATE STARTED:  ANTIBIOTICS:                  DATE STARTED:    Antimicrobial:  ceFAZolin   IVPB 1000 milliGRAM(s) IV Intermittent every 8 hours    Cardiovascular:  carvedilol 3.125 milliGRAM(s) Oral every 12 hours  furosemide   Injectable 40 milliGRAM(s) IV Push every 12 hours    Pulmonary:  ALBUTerol/ipratropium for Nebulization 3 milliLiter(s) Nebulizer every 6 hours    Hematalogic:  aspirin  chewable 81 milliGRAM(s) Oral daily  enoxaparin Injectable 40 milliGRAM(s) SubCutaneous daily    Other:  atorvastatin 40 milliGRAM(s) Oral at bedtime  insulin lispro (HumaLOG) corrective regimen sliding scale   SubCutaneous every 6 hours  multivitamin 1 Tablet(s) Oral daily    ALBUTerol/ipratropium for Nebulization 3 milliLiter(s) Nebulizer every 6 hours  aspirin  chewable 81 milliGRAM(s) Oral daily  atorvastatin 40 milliGRAM(s) Oral at bedtime  carvedilol 3.125 milliGRAM(s) Oral every 12 hours  ceFAZolin   IVPB 1000 milliGRAM(s) IV Intermittent every 8 hours  enoxaparin Injectable 40 milliGRAM(s) SubCutaneous daily  furosemide   Injectable 40 milliGRAM(s) IV Push every 12 hours  insulin lispro (HumaLOG) corrective regimen sliding scale   SubCutaneous every 6 hours  multivitamin 1 Tablet(s) Oral daily    Drug Dosing Weight  Height (cm): 165.1 (2018 11:46)  Weight (kg): 141.5 (2018 11:46)  BMI (kg/m2): 51.9 (2018 11:46)  BSA (m2): 2.39 (2018 11:46)    CENTRAL LINE: [ ] YES [x ] NO  LOCATION:   DATE INSERTED:  REMOVE: [ ] YES [ ] NO  EXPLAIN:    AVILA: [ x] YES [ ] NO    DATE INSERTED:  REMOVE:  [ ] YES [ ] NO  EXPLAIN:    A-LINE:  [ ] YES [x ] NO  LOCATION:   DATE INSERTED:  REMOVE:  [ ] YES [ ] NO  EXPLAIN:    PMH -reviewed admission note, no change since admission      ICU Vital Signs Last 24 Hrs  T(C): 37.9 (2018 08:00), Max: 37.9 (2018 23:27)  T(F): 100.2 (2018 08:00), Max: 100.3 (2018 23:27)  HR: 92 (2018 08:00) (78 - 97)  BP: 131/80 (2018 08:00) (94/49 - 131/80)  BP(mean): 92 (2018 08:00) (59 - 92)  ABP: --  ABP(mean): --  RR: 15 (2018 08:00) (13 - 34)  SpO2: 92% (2018 08:00) (81% - 99%)      ABG - ( 2018 05:48 )  pH: 7.30  /  pCO2: 76    /  pO2: 58    / HCO3: 36    / Base Excess: 7.6   /  SaO2: 85                     @ 07:01  -  01-19 @ 07:00  --------------------------------------------------------  IN: 400 mL / OUT: 2623 mL / NET: -2223 mL            PHYSICAL EXAM:  GENERAL: [ x]NAD, Obese   HEAD:  [x ]Atraumatic, [x ]Normocephalic  EYES: [ x]EOMI, [ ]PERRLA, [x ]conjunctiva and sclera clear  ENMT: [ x]No tonsillar erythema, exudates, or enlargement; [ x]Moist mucous membranes, [ ]Good dentition, [ ]No lesions  NECK: [x ]Supple, normal appearance, [x ]No JVD; [ ]Normal thyroid; [ ]Trachea midline  NERVOUS SYSTEM:  [ x]Alert & Oriented X3,  CHEST/LUNG: [x ]No chest deformity; [ ]Normal percussion bilaterally; [ ]No rales, rhonchi, wheezing   HEART: [ x]Regular rate and rhythm; [ ]No murmurs, rubs, or gallops  ABDOMEN: [x ]Soft, Nontender, Nondistended; [ ]Bowel sounds present  EXTREMITIES: B/l lower Ext swelling   LYMPH: [x]No lymphadenopathy noted  SKIN: B/L. ERYTHEMA OF B/L LE, RIGHT > LEFT      LABS:  CBC Full  -  ( 2018 06:07 )  WBC Count : 5.8 K/uL  Hemoglobin : 13.2 g/dL  Hematocrit : 46.1 %  Platelet Count - Automated : 259 K/uL  Mean Cell Volume : 101.2 fl  Mean Cell Hemoglobin : 29.0 pg  Mean Cell Hemoglobin Concentration : 28.7 gm/dL  Auto Neutrophil # : 4.1 K/uL  Auto Lymphocyte # : 0.9 K/uL  Auto Monocyte # : 0.7 K/uL  Auto Eosinophil # : 0.1 K/uL  Auto Basophil # : 0.1 K/uL  Auto Neutrophil % : 69.5 %  Auto Lymphocyte % : 15.3 %  Auto Monocyte % : 12.4 %  Auto Eosinophil % : 1.0 %  Auto Basophil % : 1.8 %        136  |  93<L>  |  35<H>  ----------------------------<  94  3.7   |  38<H>  |  1.42<H>    Ca    8.4      2018 06:07  Phos  4.3       Mg     2.4         TPro  7.2  /  Alb  3.3<L>  /  TBili  0.6  /  DBili  x   /  AST  31  /  ALT  37  /  AlkPhos  27<L>      PT/INR - ( 2018 13:26 )   PT: 14.0 sec;   INR: 1.28 ratio         PTT - ( 2018 13:26 )  PTT:30.4 sec  Urinalysis Basic - ( 2018 15:14 )    Color: Yellow / Appearance: Clear / S.010 / pH: x  Gluc: x / Ketone: Negative  / Bili: Negative / Urobili: Negative   Blood: x / Protein: 30 mg/dL / Nitrite: Negative   Leuk Esterase: Negative / RBC: x / WBC x   Sq Epi: x / Non Sq Epi: Few /HPF / Bacteria: Moderate /HPF      Culture Results:   No growth ( @ 22:53)  Culture Results:   No growth to date. ( @ 18:44)  Culture Results:   No growth to date. ( @ 18:44)      RADIOLOGY & ADDITIONAL STUDIES REVIEWED:  ***    [ x]GOALS OF CARE DISCUSSION WITH PATIENT/FAMILY/PROXY:    CRITICAL CARE TIME SPENT: 35 minutes

## 2018-01-19 NOTE — PHYSICAL THERAPY INITIAL EVALUATION ADULT - IMPAIRMENTS FOUND, PT EVAL
posture/ROM/ventilation and respiration/gas exchange/aerobic capacity/endurance/gait, locomotion, and balance

## 2018-01-20 LAB
ANION GAP SERPL CALC-SCNC: 2 MMOL/L — LOW (ref 5–17)
BASOPHILS # BLD AUTO: 0.1 K/UL — SIGNIFICANT CHANGE UP (ref 0–0.2)
BASOPHILS NFR BLD AUTO: 3.4 % — HIGH (ref 0–2)
BUN SERPL-MCNC: 35 MG/DL — HIGH (ref 7–18)
CALCIUM SERPL-MCNC: 8.6 MG/DL — SIGNIFICANT CHANGE UP (ref 8.4–10.5)
CHLORIDE SERPL-SCNC: 93 MMOL/L — LOW (ref 96–108)
CO2 SERPL-SCNC: 39 MMOL/L — HIGH (ref 22–31)
CREAT SERPL-MCNC: 1.33 MG/DL — HIGH (ref 0.5–1.3)
EOSINOPHIL # BLD AUTO: 0.1 K/UL — SIGNIFICANT CHANGE UP (ref 0–0.5)
EOSINOPHIL NFR BLD AUTO: 1.4 % — SIGNIFICANT CHANGE UP (ref 0–6)
GLUCOSE BLDC GLUCOMTR-MCNC: 141 MG/DL — HIGH (ref 70–99)
GLUCOSE BLDC GLUCOMTR-MCNC: 146 MG/DL — HIGH (ref 70–99)
GLUCOSE BLDC GLUCOMTR-MCNC: 173 MG/DL — HIGH (ref 70–99)
GLUCOSE SERPL-MCNC: 157 MG/DL — HIGH (ref 70–99)
HCT VFR BLD CALC: 43.4 % — SIGNIFICANT CHANGE UP (ref 34.5–45)
HGB BLD-MCNC: 12.6 G/DL — SIGNIFICANT CHANGE UP (ref 11.5–15.5)
LYMPHOCYTES # BLD AUTO: 0.6 K/UL — LOW (ref 1–3.3)
LYMPHOCYTES # BLD AUTO: 14.5 % — SIGNIFICANT CHANGE UP (ref 13–44)
MAGNESIUM SERPL-MCNC: 2.5 MG/DL — SIGNIFICANT CHANGE UP (ref 1.6–2.6)
MCHC RBC-ENTMCNC: 29.2 GM/DL — LOW (ref 32–36)
MCHC RBC-ENTMCNC: 29.5 PG — SIGNIFICANT CHANGE UP (ref 27–34)
MCV RBC AUTO: 101.2 FL — HIGH (ref 80–100)
MONOCYTES # BLD AUTO: 1 K/UL — HIGH (ref 0–0.9)
MONOCYTES NFR BLD AUTO: 24.3 % — HIGH (ref 2–14)
NEUTROPHILS # BLD AUTO: 2.2 K/UL — SIGNIFICANT CHANGE UP (ref 1.8–7.4)
NEUTROPHILS NFR BLD AUTO: 56.4 % — SIGNIFICANT CHANGE UP (ref 43–77)
PHOSPHATE SERPL-MCNC: 3.7 MG/DL — SIGNIFICANT CHANGE UP (ref 2.5–4.5)
PLATELET # BLD AUTO: 230 K/UL — SIGNIFICANT CHANGE UP (ref 150–400)
POTASSIUM SERPL-MCNC: 3.7 MMOL/L — SIGNIFICANT CHANGE UP (ref 3.5–5.3)
POTASSIUM SERPL-SCNC: 3.7 MMOL/L — SIGNIFICANT CHANGE UP (ref 3.5–5.3)
RBC # BLD: 4.29 M/UL — SIGNIFICANT CHANGE UP (ref 3.8–5.2)
RBC # FLD: 15.2 % — HIGH (ref 10.3–14.5)
SODIUM SERPL-SCNC: 134 MMOL/L — LOW (ref 135–145)
WBC # BLD: 4 K/UL — SIGNIFICANT CHANGE UP (ref 3.8–10.5)
WBC # FLD AUTO: 4 K/UL — SIGNIFICANT CHANGE UP (ref 3.8–10.5)

## 2018-01-20 RX ORDER — VANCOMYCIN HCL 1 G
1500 VIAL (EA) INTRAVENOUS EVERY 12 HOURS
Qty: 0 | Refills: 0 | Status: DISCONTINUED | OUTPATIENT
Start: 2018-01-20 | End: 2018-01-23

## 2018-01-20 RX ORDER — SENNA PLUS 8.6 MG/1
2 TABLET ORAL AT BEDTIME
Qty: 0 | Refills: 0 | Status: DISCONTINUED | OUTPATIENT
Start: 2018-01-20 | End: 2018-01-25

## 2018-01-20 RX ORDER — SODIUM CHLORIDE 0.65 %
1 AEROSOL, SPRAY (ML) NASAL EVERY 4 HOURS
Qty: 0 | Refills: 0 | Status: DISCONTINUED | OUTPATIENT
Start: 2018-01-20 | End: 2018-01-25

## 2018-01-20 RX ORDER — DOCUSATE SODIUM 100 MG
100 CAPSULE ORAL THREE TIMES A DAY
Qty: 0 | Refills: 0 | Status: DISCONTINUED | OUTPATIENT
Start: 2018-01-20 | End: 2018-01-25

## 2018-01-20 RX ADMIN — ENOXAPARIN SODIUM 40 MILLIGRAM(S): 100 INJECTION SUBCUTANEOUS at 13:03

## 2018-01-20 RX ADMIN — Medication 3 MILLILITER(S): at 14:47

## 2018-01-20 RX ADMIN — Medication 333.33 MILLIGRAM(S): at 18:10

## 2018-01-20 RX ADMIN — Medication 3 MILLILITER(S): at 20:29

## 2018-01-20 RX ADMIN — Medication 3 MILLILITER(S): at 09:04

## 2018-01-20 RX ADMIN — ATORVASTATIN CALCIUM 40 MILLIGRAM(S): 80 TABLET, FILM COATED ORAL at 22:14

## 2018-01-20 RX ADMIN — Medication 1: at 18:06

## 2018-01-20 RX ADMIN — Medication 40 MILLIGRAM(S): at 22:15

## 2018-01-20 RX ADMIN — Medication 81 MILLIGRAM(S): at 13:03

## 2018-01-20 RX ADMIN — CARVEDILOL PHOSPHATE 3.12 MILLIGRAM(S): 80 CAPSULE, EXTENDED RELEASE ORAL at 06:17

## 2018-01-20 RX ADMIN — Medication 1 TABLET(S): at 14:39

## 2018-01-20 RX ADMIN — Medication 100 MILLIGRAM(S): at 22:14

## 2018-01-20 RX ADMIN — Medication 333.33 MILLIGRAM(S): at 05:40

## 2018-01-20 RX ADMIN — CARVEDILOL PHOSPHATE 3.12 MILLIGRAM(S): 80 CAPSULE, EXTENDED RELEASE ORAL at 18:06

## 2018-01-20 RX ADMIN — Medication 3 MILLILITER(S): at 03:18

## 2018-01-20 RX ADMIN — SENNA PLUS 2 TABLET(S): 8.6 TABLET ORAL at 22:14

## 2018-01-20 RX ADMIN — Medication 40 MILLIGRAM(S): at 10:17

## 2018-01-20 NOTE — PROGRESS NOTE ADULT - SUBJECTIVE AND OBJECTIVE BOX
pt seen and examined, no complaints on exam.   NAD on exam, ROS - .    acetaminophen   Tablet 650 milliGRAM(s) Oral every 6 hours PRN  ALBUTerol/ipratropium for Nebulization 3 milliLiter(s) Nebulizer every 6 hours  aspirin  chewable 81 milliGRAM(s) Oral daily  atorvastatin 40 milliGRAM(s) Oral at bedtime  carvedilol 3.125 milliGRAM(s) Oral every 12 hours  enoxaparin Injectable 40 milliGRAM(s) SubCutaneous daily  furosemide   Injectable 40 milliGRAM(s) IV Push every 12 hours  insulin lispro (HumaLOG) corrective regimen sliding scale   SubCutaneous every 6 hours  multivitamin 1 Tablet(s) Oral daily  sodium chloride 0.65% Nasal 1 Spray(s) Both Nostrils every 4 hours PRN  vancomycin  IVPB 1500 milliGRAM(s) IV Intermittent every 12 hours                            13.2   5.8   )-----------( 259      ( 19 Jan 2018 06:07 )             46.1       Hemoglobin: 13.2 g/dL (01-19 @ 06:07)  Hemoglobin: 12.8 g/dL (01-18 @ 06:36)  Hemoglobin: 13.1 g/dL (01-17 @ 13:26)      01-19    136  |  93<L>  |  35<H>  ----------------------------<  94  3.7   |  38<H>  |  1.42<H>    Ca    8.4      19 Jan 2018 06:07  Phos  4.3     01-19  Mg     2.4     01-19      Creatinine Trend: 1.42<--, 1.03<--, 1.20<--, 1.39<--    COAGS:     CARDIAC MARKERS ( 18 Jan 2018 06:36 )  <0.015 ng/mL / x     / 142 U/L / x     / 1.3 ng/mL  CARDIAC MARKERS ( 17 Jan 2018 23:14 )  <0.015 ng/mL / x     / 194 U/L / x     / 2.0 ng/mL  CARDIAC MARKERS ( 17 Jan 2018 18:03 )  <0.015 ng/mL / x     / 289 U/L / x     / 2.9 ng/mL        T(C): 37.3 (01-20-18 @ 06:04), Max: 38.2 (01-19-18 @ 15:34)  HR: 92 (01-20-18 @ 06:04) (86 - 102)  BP: 141/61 (01-20-18 @ 06:04) (108/51 - 148/68)  RR: 18 (01-20-18 @ 06:10) (15 - 32)  SpO2: 94% (01-20-18 @ 06:10) (90% - 98%)  Wt(kg): --    I&O's Summary    19 Jan 2018 07:01  -  20 Jan 2018 07:00  --------------------------------------------------------  IN: 925 mL / OUT: 1763 mL / NET: -838 mL      HEENT:   Normal oral mucosa, PERRL, EOMI	  Lymphatic: No obvious lymphadenopathy , no edema  Cardiovascular: Normal S1 S2, No JVD, 1/6 RAYMOND murmur, Peripheral pulses palpable 2+ bilaterally  Respiratory: Lungs clear to auscultation, normal effort 	  Gastrointestinal:  Soft, Non-tender, + BS	  Skin: No rashes,  No cyanosis, warm to touch  Musculoskeletal: Normal range of motion, normal strength  Psychiatry:  Appropriate Mood & affect     < from: Transthoracic Echocardiogram (01.18.18 @ 07:37) >  CONCLUSIONS:  1. Mild mitral regurgitation.  2. Moderately dilated left atrium.  LA volume index = 42  cc/m2.  3. Normal left ventricular internal dimensions and wall  thicknesses.  4. Endocardium not well visualized; grossly low normal left  ventricular systolic function. Segmental wall motion could  not be assessed.  5. Mild right atrial enlargement.  6. Right ventricular enlargement with normal RV function. A  device lead is visualized in the right heart.  7. RV systolic pressure is 48 mm Hg. Mild pulmonary  hypertension.  8. There is severe tricuspid regurgitation.  9. Trace pericardial effusion.    ------------------------------------------------------------------------  Confirmed on  1/18/2018 - 13:59:26 by Manuel Gold MD  ------------------------------------------------------------------------    < end of copied text >      ASSESSMENT/PLAN: 	52y Female ell known to our office, historically low normal LV function, ICD, HTN, DM admitted with SOB and cellulitis.    - Echo neg   -ASA, statin   - GI / DVT prophylaxis.  - keep K>4, mag >2.0   - ABx per primary team  Bipap per medicine   no chf on exam   D/W Dr Gold

## 2018-01-20 NOTE — PROGRESS NOTE ADULT - SUBJECTIVE AND OBJECTIVE BOX
Patient is a 52y old  Female who presents with a chief complaint of sob, sleepiness, LE edema and redness (2018 18:02)    PATIENT IS SEEN AND EXAMINED IN MEDICAL FLOOR.    ALLERGIES:  No Known Allergies      Daily Weight in k.2 (2018 06:24)    VITALS:    Vital Signs Last 24 Hrs  T(C): 37 (2018 15:00), Max: 37.8 (2018 01:57)  T(F): 98.6 (2018 15:00), Max: 100.1 (2018 01:57)  HR: 100 (2018 18:07) (86 - 100)  BP: 133/67 (2018 18:07) (114/63 - 148/68)  BP(mean): 88 (2018 01:57) (69 - 88)  RR: 16 (2018 15:00) (16 - 30)  SpO2: 93% (2018 15:00) (90% - 98%)    LABS:  CBC Full  -  ( 2018 07:21 )  WBC Count : 4.0 K/uL  Hemoglobin : 12.6 g/dL  Hematocrit : 43.4 %  Platelet Count - Automated : 230 K/uL  Mean Cell Volume : 101.2 fl  Mean Cell Hemoglobin : 29.5 pg  Mean Cell Hemoglobin Concentration : 29.2 gm/dL  Auto Neutrophil # : 2.2 K/uL  Auto Lymphocyte # : 0.6 K/uL  Auto Monocyte # : 1.0 K/uL  Auto Eosinophil # : 0.1 K/uL  Auto Basophil # : 0.1 K/uL  Auto Neutrophil % : 56.4 %  Auto Lymphocyte % : 14.5 %  Auto Monocyte % : 24.3 %  Auto Eosinophil % : 1.4 %  Auto Basophil % : 3.4 %      20    134<L>  |  93<L>  |  35<H>  ----------------------------<  157<H>  3.7   |  39<H>  |  1.33<H>    Ca    8.6      2018 07:21  Phos  3.7     20  Mg     2.5           CAPILLARY BLOOD GLUCOSE      POCT Blood Glucose.: 173 mg/dL (2018 17:55)  POCT Blood Glucose.: 146 mg/dL (2018 12:10)  POCT Blood Glucose.: 141 mg/dL (2018 05:28)  POCT Blood Glucose.: 152 mg/dL (2018 22:59)      .Urine Clean Catch (Midstream)   @ 22:53   No growth  --  --      .Blood Blood   @ 18:44   No growth to date.  --  --          MEDICATIONS:    MEDICATIONS  (STANDING):  ALBUTerol/ipratropium for Nebulization 3 milliLiter(s) Nebulizer every 6 hours  aspirin  chewable 81 milliGRAM(s) Oral daily  atorvastatin 40 milliGRAM(s) Oral at bedtime  carvedilol 3.125 milliGRAM(s) Oral every 12 hours  docusate sodium 100 milliGRAM(s) Oral three times a day  enoxaparin Injectable 40 milliGRAM(s) SubCutaneous daily  furosemide   Injectable 40 milliGRAM(s) IV Push every 12 hours  insulin lispro (HumaLOG) corrective regimen sliding scale   SubCutaneous every 6 hours  multivitamin 1 Tablet(s) Oral daily  senna 2 Tablet(s) Oral at bedtime  vancomycin  IVPB 1500 milliGRAM(s) IV Intermittent every 12 hours      MEDICATIONS  (PRN):  acetaminophen   Tablet 650 milliGRAM(s) Oral every 6 hours PRN For Temp greater than 38 C (100.4 F)  sodium chloride 0.65% Nasal 1 Spray(s) Both Nostrils every 4 hours PRN Nasal Congestion      REVIEW OF SYSTEMS:                           ALL ROS DONE [ X   ]    CONSTITUTIONAL:  LETHARGIC [   ], FEVER [   ], UNRESPONSIVE [   ]  CVS:  CP  [   ], SOB, [   ], PALPITATIONS [   ], DIZZYNESS [   ]  RS: COUGH [   ], SPUTUM [   ]  GI: ABDOMINAL PAIN [   ], NAUSEA [   ], VOMITINGS [   ], DIARRHEA [   ], CONSTIPATION [   ]  :  DYSURIA [   ], NOCTURIA [   ], INCREASED FREQUENCY [   ], DRIBLING [   ],  SKELETAL: PAINFUL JOINTS [   ], SWOLLEN JOINTS [   ], NECK ACHE [   ], LOW BACK ACHE [   ],  SKIN : ULCERS [   ], RASH [   ], ITCHING [   ]  CNS: HEAD ACHE [   ], DOUBLE VISION [   ], BLURRED VISION [   ], AMS / CONFUSION [   ], SEIZURES [   ], WEAKNESS [   ],TINGLING / NUMBNESS [   ]    PHYSICAL EXAMINATION:  GENERAL APPEARANCE: NO DISTRESS  HEENT:  NO PALLOR, NO  JVD,  NO   NODES, NECK SUPPLE  CVS: S1 +, S2 +,   RS: AEEB,  OCCASIONAL  RALES +,   NO RONCHI  ABD: SOFT, NT, NO, BS +  EXT: PE +, ERYTHEMA OF B/L LE, R > L  SKIN: WARM,   SKELETAL:  ROM ACCEPTABLE  CNS:  AAO X 3   ,   DEFICITS    RADIOLOGY :    < from: Xray Chest 1 View AP -PORTABLE-Routine (18 @ 09:18) >  IMPRESSION: Interstitial prominence bilaterally may be related to   portable technique and shallow inspiration; an element of interstitial   edema cannot be fully excluded.       < end of copied text >  < from: Transthoracic Echocardiogram (18 @ 07:37) >  CONCLUSIONS:  1. Mild mitral regurgitation.  2. Moderately dilated left atrium.  LA volume index = 42  cc/m2.  3. Normal left ventricular internal dimensions and wall  thicknesses.  4. Endocardium not well visualized; grossly low normal left  ventricular systolic function. Segmental wall motion could  not be assessed.  5. Mild right atrial enlargement.  6. Right ventricular enlargement with normal RV function.   device lead is visualized in the right heart.  7. RV systolic pressure is 48 mm Hg. Mild pulmonary  hypertension.  8. There is severe tricuspid regurgitation.  9. Trace pericardial effusion.    < end of copied text >      ASSESSMENT :     Cellulitis of extremity  RA (rheumatoid arthritis)  DM (diabetes mellitus)  HTN (hypertension)  History of cholecystectomy      PLAN:  HPI:  52 [place] with PMH of CHF s/p ICD, HTN, DM is BIBA for dyspnea and sleepiness, LE edema and redness. Pt is poor historian, but claims that she has been experiencing some SOB, sleepiness and redness and pain in her LEs. ROS otherwise unremarkable as pt denies fever, chills, CP, cough, palpitations, syncope, purulent discharge from LEs, trauma to area.   In ED, pt was drowsy and dyspneic and found to be hypercapneic to pCO2 of 77 and pH of 7.29. Pt started on NIV and given lasix. CXR revealed b/l opacities c/w pulmonary edema (2018 18:02)    - CELLULITIS OF B/L LE , WORSENING VENOUS INSUFFICIENCY OF B/L LE AND LYMPHEDEMA ON CEFAZOLIN  - AMPARO, HYPERCAPNOEA - BIPAP AT NIGHTS  - GI AND DVT PROPHYLAXIS  - DR. NATARAJAN

## 2018-01-21 LAB
GLUCOSE BLDC GLUCOMTR-MCNC: 119 MG/DL — HIGH (ref 70–99)
GLUCOSE BLDC GLUCOMTR-MCNC: 132 MG/DL — HIGH (ref 70–99)
GLUCOSE BLDC GLUCOMTR-MCNC: 139 MG/DL — HIGH (ref 70–99)
GLUCOSE BLDC GLUCOMTR-MCNC: 156 MG/DL — HIGH (ref 70–99)
GLUCOSE BLDC GLUCOMTR-MCNC: 215 MG/DL — HIGH (ref 70–99)
VANCOMYCIN TROUGH SERPL-MCNC: 22.7 UG/ML — HIGH (ref 10–20)

## 2018-01-21 RX ORDER — POLYETHYLENE GLYCOL 3350 17 G/17G
17 POWDER, FOR SOLUTION ORAL DAILY
Qty: 0 | Refills: 0 | Status: COMPLETED | OUTPATIENT
Start: 2018-01-21 | End: 2018-01-23

## 2018-01-21 RX ORDER — INSULIN LISPRO 100/ML
VIAL (ML) SUBCUTANEOUS
Qty: 0 | Refills: 0 | Status: DISCONTINUED | OUTPATIENT
Start: 2018-01-21 | End: 2018-01-25

## 2018-01-21 RX ADMIN — CARVEDILOL PHOSPHATE 3.12 MILLIGRAM(S): 80 CAPSULE, EXTENDED RELEASE ORAL at 18:21

## 2018-01-21 RX ADMIN — CARVEDILOL PHOSPHATE 3.12 MILLIGRAM(S): 80 CAPSULE, EXTENDED RELEASE ORAL at 06:19

## 2018-01-21 RX ADMIN — Medication 3 MILLILITER(S): at 02:20

## 2018-01-21 RX ADMIN — Medication 3 MILLILITER(S): at 20:09

## 2018-01-21 RX ADMIN — Medication 3 MILLILITER(S): at 15:35

## 2018-01-21 RX ADMIN — ATORVASTATIN CALCIUM 40 MILLIGRAM(S): 80 TABLET, FILM COATED ORAL at 22:23

## 2018-01-21 RX ADMIN — Medication 100 MILLIGRAM(S): at 22:23

## 2018-01-21 RX ADMIN — Medication 2: at 11:58

## 2018-01-21 RX ADMIN — Medication 1 TABLET(S): at 12:00

## 2018-01-21 RX ADMIN — Medication 3 MILLILITER(S): at 09:51

## 2018-01-21 RX ADMIN — Medication 1: at 22:25

## 2018-01-21 RX ADMIN — Medication 100 MILLIGRAM(S): at 06:19

## 2018-01-21 RX ADMIN — Medication 100 MILLIGRAM(S): at 13:39

## 2018-01-21 RX ADMIN — Medication 81 MILLIGRAM(S): at 11:58

## 2018-01-21 RX ADMIN — Medication 40 MILLIGRAM(S): at 22:23

## 2018-01-21 RX ADMIN — Medication 40 MILLIGRAM(S): at 11:57

## 2018-01-21 RX ADMIN — SENNA PLUS 2 TABLET(S): 8.6 TABLET ORAL at 22:23

## 2018-01-21 RX ADMIN — ENOXAPARIN SODIUM 40 MILLIGRAM(S): 100 INJECTION SUBCUTANEOUS at 11:58

## 2018-01-21 RX ADMIN — Medication 333.33 MILLIGRAM(S): at 06:19

## 2018-01-21 NOTE — PROGRESS NOTE ADULT - SUBJECTIVE AND OBJECTIVE BOX
Patient is a 52y old  Female who presents with a chief complaint of sob, sleepiness, LE edema and redness (2018 18:02)    PATIENT IS SEEN AND EXAMINED IN MEDICAL FLOOR.    ALLERGIES:  No Known Allergies      Daily Weight in k (2018 07:00)    VITALS:    Vital Signs Last 24 Hrs  T(C): 36.7 (2018 13:29), Max: 37.4 (2018 21:01)  T(F): 98.1 (2018 13:29), Max: 99.4 (2018 21:01)  HR: 92 (2018 18:00) (85 - 95)  BP: 125/58 (2018 18:00) (118/61 - 143/65)  BP(mean): --  RR: 18 (2018 13:29) (18 - 18)  SpO2: 100% (2018 13:29) (90% - 100%)    LABS:  CBC Full  -  ( 2018 07:21 )  WBC Count : 4.0 K/uL  Hemoglobin : 12.6 g/dL  Hematocrit : 43.4 %  Platelet Count - Automated : 230 K/uL  Mean Cell Volume : 101.2 fl  Mean Cell Hemoglobin : 29.5 pg  Mean Cell Hemoglobin Concentration : 29.2 gm/dL  Auto Neutrophil # : 2.2 K/uL  Auto Lymphocyte # : 0.6 K/uL  Auto Monocyte # : 1.0 K/uL  Auto Eosinophil # : 0.1 K/uL  Auto Basophil # : 0.1 K/uL  Auto Neutrophil % : 56.4 %  Auto Lymphocyte % : 14.5 %  Auto Monocyte % : 24.3 %  Auto Eosinophil % : 1.4 %  Auto Basophil % : 3.4 %      20    134<L>  |  93<L>  |  35<H>  ----------------------------<  157<H>  3.7   |  39<H>  |  1.33<H>    Ca    8.6      2018 07:21  Phos  3.7     -20  Mg     2.5     20      CAPILLARY BLOOD GLUCOSE      POCT Blood Glucose.: 139 mg/dL (2018 17:12)  POCT Blood Glucose.: 215 mg/dL (2018 11:16)  POCT Blood Glucose.: 119 mg/dL (2018 06:17)  POCT Blood Glucose.: 132 mg/dL (2018 00:10)        .Blood Blood-Peripheral   @ 09:22   No growth to date.  --  --      .Urine Clean Catch (Midstream)   @ 22:53   No growth  --  --      .Blood Blood   @ 18:44   No growth to date.  --  --    MEDICATIONS:    MEDICATIONS  (STANDING):  ALBUTerol/ipratropium for Nebulization 3 milliLiter(s) Nebulizer every 6 hours  aspirin  chewable 81 milliGRAM(s) Oral daily  atorvastatin 40 milliGRAM(s) Oral at bedtime  carvedilol 3.125 milliGRAM(s) Oral every 12 hours  docusate sodium 100 milliGRAM(s) Oral three times a day  enoxaparin Injectable 40 milliGRAM(s) SubCutaneous daily  furosemide   Injectable 40 milliGRAM(s) IV Push every 12 hours  insulin lispro (HumaLOG) corrective regimen sliding scale   SubCutaneous every 6 hours  multivitamin 1 Tablet(s) Oral daily  senna 2 Tablet(s) Oral at bedtime  vancomycin  IVPB 1500 milliGRAM(s) IV Intermittent every 12 hours      MEDICATIONS  (PRN):  acetaminophen   Tablet 650 milliGRAM(s) Oral every 6 hours PRN For Temp greater than 38 C (100.4 F)  sodium chloride 0.65% Nasal 1 Spray(s) Both Nostrils every 4 hours PRN Nasal Congestion      REVIEW OF SYSTEMS:                           ALL ROS DONE [ X   ]    CONSTITUTIONAL:  LETHARGIC [   ], FEVER [   ], UNRESPONSIVE [   ]  CVS:  CP  [   ], SOB, [   ], PALPITATIONS [   ], DIZZYNESS [   ]  RS: COUGH [   ], SPUTUM [   ]  GI: ABDOMINAL PAIN [   ], NAUSEA [   ], VOMITINGS [   ], DIARRHEA [   ], CONSTIPATION [   ]  :  DYSURIA [   ], NOCTURIA [   ], INCREASED FREQUENCY [   ], DRIBLING [   ],  SKELETAL: PAINFUL JOINTS [   ], SWOLLEN JOINTS [   ], NECK ACHE [   ], LOW BACK ACHE [   ],  SKIN : ULCERS [   ], RASH [   ], ITCHING [   ]  CNS: HEAD ACHE [   ], DOUBLE VISION [   ], BLURRED VISION [   ], AMS / CONFUSION [   ], SEIZURES [   ], WEAKNESS [   ],TINGLING / NUMBNESS [   ]    PHYSICAL EXAMINATION:  GENERAL APPEARANCE: NO DISTRESS  HEENT:  NO PALLOR, NO  JVD,  NO   NODES, NECK SUPPLE  CVS: S1 +, S2 +,   RS: AEEB,  OCCASIONAL  RALES +,   NO RONCHI  ABD: SOFT, NT, NO, BS +  EXT: PE +, ERYTHEMA OF B/L LE, R > L  SKIN: WARM,   SKELETAL:  ROM ACCEPTABLE  CNS:  AAO X 3   ,   DEFICITS    RADIOLOGY :    < from: Xray Chest 1 View AP -PORTABLE-Routine (18 @ 09:18) >  IMPRESSION: Interstitial prominence bilaterally may be related to   portable technique and shallow inspiration; an element of interstitial   edema cannot be fully excluded.       < end of copied text >  < from: Transthoracic Echocardiogram (18 @ 07:37) >  CONCLUSIONS:  1. Mild mitral regurgitation.  2. Moderately dilated left atrium.  LA volume index = 42  cc/m2.  3. Normal left ventricular internal dimensions and wall  thicknesses.  4. Endocardium not well visualized; grossly low normal left  ventricular systolic function. Segmental wall motion could  not be assessed.  5. Mild right atrial enlargement.  6. Right ventricular enlargement with normal RV function.   device lead is visualized in the right heart.  7. RV systolic pressure is 48 mm Hg. Mild pulmonary  hypertension.  8. There is severe tricuspid regurgitation.  9. Trace pericardial effusion.    < end of copied text >      ASSESSMENT :     Cellulitis of extremity  RA (rheumatoid arthritis)  DM (diabetes mellitus)  HTN (hypertension)  History of cholecystectomy      PLAN:  HPI:  52 [place] with [place] of CHF s/p ICD, HTN, DM is BIBA for dyspnea and sleepiness, LE edema and redness. Pt is poor historian, but claims that she has been experiencing some SOB, sleepiness and redness and pain in her LEs. ROS otherwise unremarkable as pt denies fever, chills, CP, cough, palpitations, syncope, purulent discharge from LEs, trauma to area.   In ED, pt was drowsy and dyspneic and found to be hypercapneic to pCO2 of 77 and pH of 7.29. Pt started on NIV and given lasix. CXR revealed b/l opacities c/w pulmonary edema (2018 18:02)    - CELLULITIS OF B/L LE , WORSENING VENOUS INSUFFICIENCY OF B/L LE AND LYMPHEDEMA ON CEFAZOLIN  - AMPARO, HYPERCAPNOEA - BIPAP AT NIGHTS  - GI AND DVT PROPHYLAXIS  - OBTAIN PT AND OT EVALUATION  - DR. NATARAJAN

## 2018-01-21 NOTE — PROGRESS NOTE ADULT - SUBJECTIVE AND OBJECTIVE BOX
pt seen and examined, no complaints on exam.   NAD on exam, ROS - .  VSS    acetaminophen   Tablet 650 milliGRAM(s) Oral every 6 hours PRN  ALBUTerol/ipratropium for Nebulization 3 milliLiter(s) Nebulizer every 6 hours  aspirin  chewable 81 milliGRAM(s) Oral daily  atorvastatin 40 milliGRAM(s) Oral at bedtime  carvedilol 3.125 milliGRAM(s) Oral every 12 hours  docusate sodium 100 milliGRAM(s) Oral three times a day  enoxaparin Injectable 40 milliGRAM(s) SubCutaneous daily  furosemide   Injectable 40 milliGRAM(s) IV Push every 12 hours  insulin lispro (HumaLOG) corrective regimen sliding scale   SubCutaneous every 6 hours  multivitamin 1 Tablet(s) Oral daily  senna 2 Tablet(s) Oral at bedtime  sodium chloride 0.65% Nasal 1 Spray(s) Both Nostrils every 4 hours PRN  vancomycin  IVPB 1500 milliGRAM(s) IV Intermittent every 12 hours                            12.6   4.0   )-----------( 230      ( 20 Jan 2018 07:21 )             43.4       Hemoglobin: 12.6 g/dL (01-20 @ 07:21)  Hemoglobin: 13.2 g/dL (01-19 @ 06:07)  Hemoglobin: 12.8 g/dL (01-18 @ 06:36)  Hemoglobin: 13.1 g/dL (01-17 @ 13:26)      01-20    134<L>  |  93<L>  |  35<H>  ----------------------------<  157<H>  3.7   |  39<H>  |  1.33<H>    Ca    8.6      20 Jan 2018 07:21  Phos  3.7     01-20  Mg     2.5     01-20      Creatinine Trend: 1.33<--, 1.42<--, 1.03<--, 1.20<--, 1.39<--    COAGS:           T(C): 36.7 (01-21-18 @ 04:40), Max: 37.4 (01-20-18 @ 21:01)  HR: 88 (01-21-18 @ 04:40) (88 - 100)  BP: 118/61 (01-21-18 @ 04:40) (118/61 - 143/65)  RR: 18 (01-21-18 @ 04:40) (16 - 18)  SpO2: 90% (01-21-18 @ 04:40) (90% - 94%)  Wt(kg): --    I&O's Summary    20 Jan 2018 07:01  -  21 Jan 2018 07:00  --------------------------------------------------------  IN: 0 mL / OUT: 1500 mL / NET: -1500 mL          HEENT:   Normal oral mucosa, PERRL, EOMI	  Lymphatic: No obvious lymphadenopathy , no edema  Cardiovascular: Normal S1 S2, No JVD, 1/6 RAYMOND murmur, Peripheral pulses palpable 2+ bilaterally  Respiratory: Lungs clear to auscultation, normal effort 	  Gastrointestinal:  Soft, Non-tender, + BS	  Skin: No rashes,  No cyanosis, warm to touch  Musculoskeletal: Normal range of motion, normal strength  Psychiatry:  Appropriate Mood & affect     < from: Transthoracic Echocardiogram (01.18.18 @ 07:37) >  CONCLUSIONS:  1. Mild mitral regurgitation.  2. Moderately dilated left atrium.  LA volume index = 42  cc/m2.  3. Normal left ventricular internal dimensions and wall  thicknesses.  4. Endocardium not well visualized; grossly low normal left  ventricular systolic function. Segmental wall motion could  not be assessed.  5. Mild right atrial enlargement.  6. Right ventricular enlargement with normal RV function. A  device lead is visualized in the right heart.  7. RV systolic pressure is 48 mm Hg. Mild pulmonary  hypertension.  8. There is severe tricuspid regurgitation.  9. Trace pericardial effusion.    ------------------------------------------------------------------------  Confirmed on  1/18/2018 - 13:59:26 by Manuel Gold MD  ------------------------------------------------------------------------    < end of copied text >      ASSESSMENT/PLAN: 	52y Female ell known to our office, historically low normal LV function, ICD, HTN, DM admitted with SOB and cellulitis.    echo with NL lv /RV fx   -ASA, statin   - GI / DVT prophylaxis.  - keep K>4, mag >2.0   - ABx per primary team  no further cardiac work up needed inpatient, can be done as out patient.   no chf on exam   D/W Dr Gold

## 2018-01-22 LAB
ALBUMIN SERPL ELPH-MCNC: 2.9 G/DL — LOW (ref 3.5–5)
ALP SERPL-CCNC: 22 U/L — LOW (ref 40–120)
ALT FLD-CCNC: 16 U/L DA — SIGNIFICANT CHANGE UP (ref 10–60)
ANION GAP SERPL CALC-SCNC: 2 MMOL/L — LOW (ref 5–17)
AST SERPL-CCNC: 13 U/L — SIGNIFICANT CHANGE UP (ref 10–40)
BILIRUB SERPL-MCNC: 0.5 MG/DL — SIGNIFICANT CHANGE UP (ref 0.2–1.2)
BUN SERPL-MCNC: 34 MG/DL — HIGH (ref 7–18)
CALCIUM SERPL-MCNC: 8.6 MG/DL — SIGNIFICANT CHANGE UP (ref 8.4–10.5)
CHLORIDE SERPL-SCNC: 93 MMOL/L — LOW (ref 96–108)
CO2 SERPL-SCNC: 41 MMOL/L — HIGH (ref 22–31)
CREAT SERPL-MCNC: 1.13 MG/DL — SIGNIFICANT CHANGE UP (ref 0.5–1.3)
CULTURE RESULTS: SIGNIFICANT CHANGE UP
CULTURE RESULTS: SIGNIFICANT CHANGE UP
GLUCOSE BLDC GLUCOMTR-MCNC: 109 MG/DL — HIGH (ref 70–99)
GLUCOSE BLDC GLUCOMTR-MCNC: 118 MG/DL — HIGH (ref 70–99)
GLUCOSE BLDC GLUCOMTR-MCNC: 120 MG/DL — HIGH (ref 70–99)
GLUCOSE BLDC GLUCOMTR-MCNC: 207 MG/DL — HIGH (ref 70–99)
GLUCOSE SERPL-MCNC: 121 MG/DL — HIGH (ref 70–99)
POTASSIUM SERPL-MCNC: 4.4 MMOL/L — SIGNIFICANT CHANGE UP (ref 3.5–5.3)
POTASSIUM SERPL-SCNC: 4.4 MMOL/L — SIGNIFICANT CHANGE UP (ref 3.5–5.3)
PROT SERPL-MCNC: 6.7 G/DL — SIGNIFICANT CHANGE UP (ref 6–8.3)
SODIUM SERPL-SCNC: 136 MMOL/L — SIGNIFICANT CHANGE UP (ref 135–145)
SPECIMEN SOURCE: SIGNIFICANT CHANGE UP
SPECIMEN SOURCE: SIGNIFICANT CHANGE UP

## 2018-01-22 RX ORDER — FUROSEMIDE 40 MG
40 TABLET ORAL DAILY
Qty: 0 | Refills: 0 | Status: DISCONTINUED | OUTPATIENT
Start: 2018-01-22 | End: 2018-01-23

## 2018-01-22 RX ADMIN — Medication 333.33 MILLIGRAM(S): at 17:58

## 2018-01-22 RX ADMIN — ENOXAPARIN SODIUM 40 MILLIGRAM(S): 100 INJECTION SUBCUTANEOUS at 12:00

## 2018-01-22 RX ADMIN — Medication 100 MILLIGRAM(S): at 06:03

## 2018-01-22 RX ADMIN — Medication 40 MILLIGRAM(S): at 11:59

## 2018-01-22 RX ADMIN — CARVEDILOL PHOSPHATE 3.12 MILLIGRAM(S): 80 CAPSULE, EXTENDED RELEASE ORAL at 06:03

## 2018-01-22 RX ADMIN — Medication 81 MILLIGRAM(S): at 11:59

## 2018-01-22 RX ADMIN — Medication 333.33 MILLIGRAM(S): at 07:41

## 2018-01-22 RX ADMIN — Medication 1 TABLET(S): at 11:59

## 2018-01-22 RX ADMIN — CARVEDILOL PHOSPHATE 3.12 MILLIGRAM(S): 80 CAPSULE, EXTENDED RELEASE ORAL at 17:59

## 2018-01-22 RX ADMIN — POLYETHYLENE GLYCOL 3350 17 GRAM(S): 17 POWDER, FOR SOLUTION ORAL at 12:01

## 2018-01-22 RX ADMIN — Medication 3 MILLILITER(S): at 09:20

## 2018-01-22 RX ADMIN — ATORVASTATIN CALCIUM 40 MILLIGRAM(S): 80 TABLET, FILM COATED ORAL at 22:48

## 2018-01-22 RX ADMIN — Medication 100 MILLIGRAM(S): at 13:38

## 2018-01-22 RX ADMIN — Medication 3 MILLILITER(S): at 15:56

## 2018-01-22 RX ADMIN — Medication 2: at 12:00

## 2018-01-22 RX ADMIN — Medication 3 MILLILITER(S): at 20:26

## 2018-01-22 NOTE — PROGRESS NOTE ADULT - ASSESSMENT
61 yo F w/ pmh of morbid obesity who p/w LE cellulitis w/ lympedema c/b hypercarbic resp failure 2/2 pulmonary edema now resolving following diuresis and antibx

## 2018-01-22 NOTE — PROGRESS NOTE ADULT - SUBJECTIVE AND OBJECTIVE BOX
NP Note discussed with  Primary Attending    Patient is a 52y old  Female who presents with a chief complaint of sob, sleepiness, LE edema and redness (17 Jan 2018 18:02)      INTERVAL HPI/OVERNIGHT EVENTS: no new complaints    MEDICATIONS  (STANDING):  ALBUTerol/ipratropium for Nebulization 3 milliLiter(s) Nebulizer every 6 hours  aspirin  chewable 81 milliGRAM(s) Oral daily  atorvastatin 40 milliGRAM(s) Oral at bedtime  carvedilol 3.125 milliGRAM(s) Oral every 12 hours  docusate sodium 100 milliGRAM(s) Oral three times a day  enoxaparin Injectable 40 milliGRAM(s) SubCutaneous daily  furosemide   Injectable 40 milliGRAM(s) IV Push daily  insulin lispro (HumaLOG) corrective regimen sliding scale   SubCutaneous three times a day with meals  multivitamin 1 Tablet(s) Oral daily  polyethylene glycol 3350 17 Gram(s) Oral daily  senna 2 Tablet(s) Oral at bedtime  vancomycin  IVPB 1500 milliGRAM(s) IV Intermittent every 12 hours    MEDICATIONS  (PRN):  acetaminophen   Tablet 650 milliGRAM(s) Oral every 6 hours PRN For Temp greater than 38 C (100.4 F)  sodium chloride 0.65% Nasal 1 Spray(s) Both Nostrils every 4 hours PRN Nasal Congestion      __________________________________________________  REVIEW OF SYSTEMS:    CONSTITUTIONAL: No fever,   EYES: no acute visual disturbances  NECK: No pain or stiffness  RESPIRATORY: No cough; No shortness of breath  CARDIOVASCULAR: No chest pain, no palpitations  GASTROINTESTINAL: No pain. No nausea or vomiting; No diarrhea   NEUROLOGICAL: No headache or numbness, no tremors  MUSCULOSKELETAL: No joint pain, no muscle pain  GENITOURINARY: no dysuria, no frequency, no hesitancy  PSYCHIATRY: no depression , no anxiety  ALL OTHER  ROS negative        Vital Signs Last 24 Hrs  T(C): 37.3 (22 Jan 2018 13:50), Max: 37.4 (21 Jan 2018 21:59)  T(F): 99.1 (22 Jan 2018 13:50), Max: 99.3 (21 Jan 2018 21:59)  HR: 87 (22 Jan 2018 13:50) (87 - 92)  BP: 128/64 (22 Jan 2018 13:50) (125/58 - 151/62)  BP(mean): --  RR: 16 (22 Jan 2018 13:50) (16 - 16)  SpO2: 91% (22 Jan 2018 13:50) (91% - 95%)    ________________________________________________  PHYSICAL EXAM:  GENERAL: NAD  HEENT: Normocephalic;  conjunctivae and sclerae clear; moist mucous membranes;   NECK : supple  CHEST/LUNG: Clear to auscultation bilaterally with good air entry   HEART: S1 S2  regular; no murmurs, gallops or rubs  ABDOMEN: Soft, Nontender, Nondistended; Bowel sounds present  EXTREMITIES: no cyanosis; no edema; no calf tenderness  SKIN: warm and dry; no rash  NERVOUS SYSTEM:  Awake and alert; Oriented  to place, person and time ; no new deficits    _________________________________________________  LABS:    01-22    136  |  93<L>  |  34<H>  ----------------------------<  121<H>  4.4   |  41<H>  |  1.13    Ca    8.6      22 Jan 2018 05:42    TPro  6.7  /  Alb  2.9<L>  /  TBili  0.5  /  DBili  x   /  AST  13  /  ALT  16  /  AlkPhos  22<L>  01-22        CAPILLARY BLOOD GLUCOSE      POCT Blood Glucose.: 207 mg/dL (22 Jan 2018 11:48)  POCT Blood Glucose.: 109 mg/dL (22 Jan 2018 07:40)  POCT Blood Glucose.: 156 mg/dL (21 Jan 2018 21:41)  POCT Blood Glucose.: 139 mg/dL (21 Jan 2018 17:12)        RADIOLOGY & ADDITIONAL TESTS:    Imaging Personally Reviewed:  YES/NO    Consultant(s) Notes Reviewed:   YES/ No    Care Discussed with Consultants :     Plan of care was discussed with patient and /or primary care giver; all questions and concerns were addressed and care was aligned with patient's wishes. NP Note discussed with  Primary Attending    Patient is a 52y old  Female who presents with a chief complaint of sob, sleepiness, LE edema and redness (17 Jan 2018 18:02)    INTERVAL HPI:  52 F [place] with [place]H of CHF s/p ICD, HTN, DM is BIBA for dyspnea and sleepiness, LE edema and redness. Pt is poor historian, but claims that she has been experiencing some SOB, sleepiness and redness and pain in her LEs. In ED, pt was drowsy and dyspneic and found to be hypercapneic to pCO2 of 77 and pH of 7.29. Pt started on NIV and given lasix. CXR revealed b/l opacities c/w pulmonary edema (17 Jan 2018 18:02). Pt noted to have B/L LE cellulitis likely related to lymphedema which was complicated by diuresis. Pt was started on Vancomycin w/ resolution of cellulitis.       MEDICATIONS  (STANDING):  ALBUTerol/ipratropium for Nebulization 3 milliLiter(s) Nebulizer every 6 hours  aspirin  chewable 81 milliGRAM(s) Oral daily  atorvastatin 40 milliGRAM(s) Oral at bedtime  carvedilol 3.125 milliGRAM(s) Oral every 12 hours  docusate sodium 100 milliGRAM(s) Oral three times a day  enoxaparin Injectable 40 milliGRAM(s) SubCutaneous daily  furosemide   Injectable 40 milliGRAM(s) IV Push daily  insulin lispro (HumaLOG) corrective regimen sliding scale   SubCutaneous three times a day with meals  multivitamin 1 Tablet(s) Oral daily  polyethylene glycol 3350 17 Gram(s) Oral daily  senna 2 Tablet(s) Oral at bedtime  vancomycin  IVPB 1500 milliGRAM(s) IV Intermittent every 12 hours    MEDICATIONS  (PRN):  acetaminophen   Tablet 650 milliGRAM(s) Oral every 6 hours PRN For Temp greater than 38 C (100.4 F)  sodium chloride 0.65% Nasal 1 Spray(s) Both Nostrils every 4 hours PRN Nasal Congestion      __________________________________________________  REVIEW OF SYSTEMS:    CONSTITUTIONAL: No fever,   EYES: no acute visual disturbances  NECK: No pain or stiffness  RESPIRATORY: No cough; No shortness of breath  CARDIOVASCULAR: No chest pain, no palpitations  GASTROINTESTINAL: No pain. No nausea or vomiting; No diarrhea   NEUROLOGICAL: No headache or numbness, no tremors  MUSCULOSKELETAL: No joint pain, no muscle pain  GENITOURINARY: no dysuria, no frequency, no hesitancy  PSYCHIATRY: no depression , no anxiety  ALL OTHER  ROS negative        Vital Signs Last 24 Hrs  T(C): 37.3 (22 Jan 2018 13:50), Max: 37.4 (21 Jan 2018 21:59)  T(F): 99.1 (22 Jan 2018 13:50), Max: 99.3 (21 Jan 2018 21:59)  HR: 87 (22 Jan 2018 13:50) (87 - 92)  BP: 128/64 (22 Jan 2018 13:50) (125/58 - 151/62)  BP(mean): --  RR: 16 (22 Jan 2018 13:50) (16 - 16)  SpO2: 91% (22 Jan 2018 13:50) (91% - 95%)    ________________________________________________  PHYSICAL EXAM:  GENERAL: NAD  HEENT: Normocephalic;  conjunctivae and sclerae clear; moist mucous membranes;   NECK : supple  CHEST/LUNG: Clear to auscultation bilaterally with good air entry   HEART: S1 S2  regular; no murmurs, gallops or rubs  ABDOMEN: Soft, Nontender, Nondistended; Bowel sounds present  EXTREMITIES: no cyanosis; no edema; no calf tenderness  SKIN: warm and dry; no rash  NERVOUS SYSTEM:  Awake and alert; Oriented  to place, person and time ; no new deficits    _________________________________________________  LABS:    01-22    136  |  93<L>  |  34<H>  ----------------------------<  121<H>  4.4   |  41<H>  |  1.13    Ca    8.6      22 Jan 2018 05:42    TPro  6.7  /  Alb  2.9<L>  /  TBili  0.5  /  DBili  x   /  AST  13  /  ALT  16  /  AlkPhos  22<L>  01-22        CAPILLARY BLOOD GLUCOSE      POCT Blood Glucose.: 207 mg/dL (22 Jan 2018 11:48)  POCT Blood Glucose.: 109 mg/dL (22 Jan 2018 07:40)  POCT Blood Glucose.: 156 mg/dL (21 Jan 2018 21:41)  POCT Blood Glucose.: 139 mg/dL (21 Jan 2018 17:12)        RADIOLOGY & ADDITIONAL TESTS:    Imaging Personally Reviewed:  YES/NO    Consultant(s) Notes Reviewed:   YES/ No    Care Discussed with Consultants :     Plan of care was discussed with patient and /or primary care giver; all questions and concerns were addressed and care was aligned with patient's wishes. NP Note discussed with  Primary Attending    Patient is a 52y old  Female who presents with a chief complaint of sob, sleepiness, LE edema and redness (17 Jan 2018 18:02)    INTERVAL HPI:  52 F [place] of CHF s/p ICD, HTN, DM is BIBA for dyspnea and sleepiness, LE edema and redness. Pt is poor historian, but claims that she has been experiencing some SOB, sleepiness and redness and pain in her LEs. In ED, pt was drowsy and dyspneic and found to be hypercapneic to pCO2 of 77 and pH of 7.29. Pt started on NIV and given lasix. CXR revealed b/l opacities c/w pulmonary edema (17 Jan 2018 18:02). Pt noted to have B/L LE cellulitis likely related to lymphedema which was complicated by diuresis. Pt was started on Vancomycin w/ resolution of cellulitis.       MEDICATIONS  (STANDING):  ALBUTerol/ipratropium for Nebulization 3 milliLiter(s) Nebulizer every 6 hours  aspirin  chewable 81 milliGRAM(s) Oral daily  atorvastatin 40 milliGRAM(s) Oral at bedtime  carvedilol 3.125 milliGRAM(s) Oral every 12 hours  docusate sodium 100 milliGRAM(s) Oral three times a day  enoxaparin Injectable 40 milliGRAM(s) SubCutaneous daily  furosemide   Injectable 40 milliGRAM(s) IV Push daily  insulin lispro (HumaLOG) corrective regimen sliding scale   SubCutaneous three times a day with meals  multivitamin 1 Tablet(s) Oral daily  polyethylene glycol 3350 17 Gram(s) Oral daily  senna 2 Tablet(s) Oral at bedtime  vancomycin  IVPB 1500 milliGRAM(s) IV Intermittent every 12 hours    MEDICATIONS  (PRN):  acetaminophen   Tablet 650 milliGRAM(s) Oral every 6 hours PRN For Temp greater than 38 C (100.4 F)  sodium chloride 0.65% Nasal 1 Spray(s) Both Nostrils every 4 hours PRN Nasal Congestion      __________________________________________________  REVIEW OF SYSTEMS:    CONSTITUTIONAL: No fever,   EYES: no acute visual disturbances  NECK: No pain or stiffness  RESPIRATORY: No cough; No shortness of breath  CARDIOVASCULAR: No chest pain, no palpitations  GASTROINTESTINAL: No pain. No nausea or vomiting; No diarrhea   NEUROLOGICAL: No headache or numbness, no tremors  MUSCULOSKELETAL: No joint pain, no muscle pain  GENITOURINARY: no dysuria, no frequency, no hesitancy  PSYCHIATRY: no depression , no anxiety  ALL OTHER  ROS negative        Vital Signs Last 24 Hrs  T(C): 37.3 (22 Jan 2018 13:50), Max: 37.4 (21 Jan 2018 21:59)  T(F): 99.1 (22 Jan 2018 13:50), Max: 99.3 (21 Jan 2018 21:59)  HR: 87 (22 Jan 2018 13:50) (87 - 92)  BP: 128/64 (22 Jan 2018 13:50) (125/58 - 151/62)  BP(mean): --  RR: 16 (22 Jan 2018 13:50) (16 - 16)  SpO2: 91% (22 Jan 2018 13:50) (91% - 95%)    ________________________________________________  PHYSICAL EXAM:  GENERAL: NAD  HEENT: Normocephalic;  conjunctivae and sclerae clear; moist mucous membranes;   NECK : supple  CHEST/LUNG: Clear to auscultation bilaterally with good air entry   HEART: S1 S2  regular; no murmurs, gallops or rubs  ABDOMEN: Soft, Nontender, Nondistended; Bowel sounds present  EXTREMITIES: no cyanosis; no edema; no calf tenderness  SKIN: warm and dry; no rash  NERVOUS SYSTEM:  Awake and alert; Oriented  to place, person and time ; no new deficits    _________________________________________________  LABS:    01-22    136  |  93<L>  |  34<H>  ----------------------------<  121<H>  4.4   |  41<H>  |  1.13    Ca    8.6      22 Jan 2018 05:42    TPro  6.7  /  Alb  2.9<L>  /  TBili  0.5  /  DBili  x   /  AST  13  /  ALT  16  /  AlkPhos  22<L>  01-22    CAPILLARY BLOOD GLUCOSE    POCT Blood Glucose.: 207 mg/dL (22 Jan 2018 11:48)  POCT Blood Glucose.: 109 mg/dL (22 Jan 2018 07:40)  POCT Blood Glucose.: 156 mg/dL (21 Jan 2018 21:41)  POCT Blood Glucose.: 139 mg/dL (21 Jan 2018 17:12)    RADIOLOGY & ADDITIONAL TESTS:    Imaging Personally Reviewed:  YES    Consultant(s) Notes Reviewed:   YEs    Care Discussed with Consultants :     Plan of care was discussed with patient and /or primary care giver; all questions and concerns were addressed and care was aligned with patient's wishes.

## 2018-01-22 NOTE — PROGRESS NOTE ADULT - SUBJECTIVE AND OBJECTIVE BOX
Pt denies CP, SOB, Edema improving    acetaminophen   Tablet 650 milliGRAM(s) Oral every 6 hours PRN  ALBUTerol/ipratropium for Nebulization 3 milliLiter(s) Nebulizer every 6 hours  aspirin  chewable 81 milliGRAM(s) Oral daily  atorvastatin 40 milliGRAM(s) Oral at bedtime  carvedilol 3.125 milliGRAM(s) Oral every 12 hours  docusate sodium 100 milliGRAM(s) Oral three times a day  enoxaparin Injectable 40 milliGRAM(s) SubCutaneous daily  furosemide   Injectable 40 milliGRAM(s) IV Push daily  insulin lispro (HumaLOG) corrective regimen sliding scale   SubCutaneous three times a day with meals  multivitamin 1 Tablet(s) Oral daily  polyethylene glycol 3350 17 Gram(s) Oral daily  senna 2 Tablet(s) Oral at bedtime  sodium chloride 0.65% Nasal 1 Spray(s) Both Nostrils every 4 hours PRN  vancomycin  IVPB 1500 milliGRAM(s) IV Intermittent every 12 hours          Hemoglobin: 12.6 g/dL (01-20 @ 07:21)  Hemoglobin: 13.2 g/dL (01-19 @ 06:07)  Hemoglobin: 12.8 g/dL (01-18 @ 06:36)      01-22    136  |  93<L>  |  34<H>  ----------------------------<  121<H>  4.4   |  41<H>  |  1.13    Ca    8.6      22 Jan 2018 05:42    TPro  6.7  /  Alb  2.9<L>  /  TBili  0.5  /  DBili  x   /  AST  13  /  ALT  16  /  AlkPhos  22<L>  01-22    Creatinine Trend: 1.13<--, 1.33<--, 1.42<--, 1.03<--, 1.20<--, 1.39<--    COAGS:           T(C): 37.3 (01-22-18 @ 13:50), Max: 37.4 (01-21-18 @ 21:59)  HR: 87 (01-22-18 @ 13:50) (87 - 92)  BP: 128/64 (01-22-18 @ 13:50) (125/58 - 151/62)  RR: 16 (01-22-18 @ 13:50) (16 - 16)  SpO2: 91% (01-22-18 @ 13:50) (91% - 95%)  Wt(kg): --    I&O's Summary    21 Jan 2018 07:01  -  22 Jan 2018 07:00  --------------------------------------------------------  IN: 0 mL / OUT: 2300 mL / NET: -2300 mL    22 Jan 2018 07:01  -  22 Jan 2018 15:14  --------------------------------------------------------  IN: 0 mL / OUT: 1000 mL / NET: -1000 mL              HEENT:   Normal oral mucosa, PERRL, EOMI	  Lymphatic: No obvious lymphadenopathy , no edema  Cardiovascular: Normal S1 S2, No JVD, 1/6 RAYMOND murmur, Peripheral pulses palpable 2+ bilaterally  Respiratory: Lungs clear to auscultation, normal effort 	  Gastrointestinal:  Soft, Non-tender, + BS	  Skin: No rashes,  No cyanosis, warm to touch  Musculoskeletal: Normal range of motion, normal strength  Psychiatry:  Appropriate Mood & affect     < from: Transthoracic Echocardiogram (01.18.18 @ 07:37) >  CONCLUSIONS:  1. Mild mitral regurgitation.  2. Moderately dilated left atrium.  LA volume index = 42  cc/m2.  3. Normal left ventricular internal dimensions and wall  thicknesses.  4. Endocardium not well visualized; grossly low normal left  ventricular systolic function. Segmental wall motion could  not be assessed.  5. Mild right atrial enlargement.  6. Right ventricular enlargement with normal RV function. A  device lead is visualized in the right heart.  7. RV systolic pressure is 48 mm Hg. Mild pulmonary  hypertension.  8. There is severe tricuspid regurgitation.  9. Trace pericardial effusion.    ------------------------------------------------------------------------  Confirmed on  1/18/2018 - 13:59:26 by Manuel Gold MD  ------------------------------------------------------------------------    < end of copied text >      ASSESSMENT/PLAN: 	52y Female ell known to our office, historically low normal LV function, ICD, HTN, DM admitted with SOB and cellulitis.    - i suspect her edema is at least in part due to obesity and possibly cor pulmonale physiology.  Consider outpatient sleep study  - Cont to keep O>I  - volume status inproving cont IV lasix until d/C    Manuel Gold MD, FACC  [facility], [facility]  [address] [place] [address]  PHONE:  [phone]  BEEPER : [phone]

## 2018-01-23 LAB
ANION GAP SERPL CALC-SCNC: 3 MMOL/L — LOW (ref 5–17)
BASE EXCESS BLDA CALC-SCNC: 14.1 MMOL/L — HIGH (ref -2–2)
BLOOD GAS COMMENTS ARTERIAL: SIGNIFICANT CHANGE UP
BUN SERPL-MCNC: 30 MG/DL — HIGH (ref 7–18)
CALCIUM SERPL-MCNC: 8.9 MG/DL — SIGNIFICANT CHANGE UP (ref 8.4–10.5)
CHLORIDE SERPL-SCNC: 92 MMOL/L — LOW (ref 96–108)
CO2 SERPL-SCNC: 40 MMOL/L — HIGH (ref 22–31)
CREAT SERPL-MCNC: 1.04 MG/DL — SIGNIFICANT CHANGE UP (ref 0.5–1.3)
GLUCOSE BLDC GLUCOMTR-MCNC: 111 MG/DL — HIGH (ref 70–99)
GLUCOSE BLDC GLUCOMTR-MCNC: 138 MG/DL — HIGH (ref 70–99)
GLUCOSE BLDC GLUCOMTR-MCNC: 167 MG/DL — HIGH (ref 70–99)
GLUCOSE BLDC GLUCOMTR-MCNC: 217 MG/DL — HIGH (ref 70–99)
GLUCOSE SERPL-MCNC: 145 MG/DL — HIGH (ref 70–99)
HCO3 BLDA-SCNC: 42 MMOL/L — HIGH (ref 23–27)
HCT VFR BLD CALC: 43.9 % — SIGNIFICANT CHANGE UP (ref 34.5–45)
HGB BLD-MCNC: 12.4 G/DL — SIGNIFICANT CHANGE UP (ref 11.5–15.5)
HOROWITZ INDEX BLDA+IHG-RTO: SIGNIFICANT CHANGE UP
MAGNESIUM SERPL-MCNC: 2.4 MG/DL — SIGNIFICANT CHANGE UP (ref 1.6–2.6)
MCHC RBC-ENTMCNC: 28.2 GM/DL — LOW (ref 32–36)
MCHC RBC-ENTMCNC: 28.7 PG — SIGNIFICANT CHANGE UP (ref 27–34)
MCV RBC AUTO: 101.8 FL — HIGH (ref 80–100)
PCO2 BLDA: 68 MMHG — HIGH (ref 32–46)
PH BLDA: 7.41 — SIGNIFICANT CHANGE UP (ref 7.35–7.45)
PLATELET # BLD AUTO: 202 K/UL — SIGNIFICANT CHANGE UP (ref 150–400)
PO2 BLDA: 57 MMHG — LOW (ref 74–108)
POTASSIUM SERPL-MCNC: 4.5 MMOL/L — SIGNIFICANT CHANGE UP (ref 3.5–5.3)
POTASSIUM SERPL-SCNC: 4.5 MMOL/L — SIGNIFICANT CHANGE UP (ref 3.5–5.3)
RBC # BLD: 4.32 M/UL — SIGNIFICANT CHANGE UP (ref 3.8–5.2)
RBC # FLD: 14.9 % — HIGH (ref 10.3–14.5)
SAO2 % BLDA: 88 % — LOW (ref 92–96)
SODIUM SERPL-SCNC: 135 MMOL/L — SIGNIFICANT CHANGE UP (ref 135–145)
WBC # BLD: 3.1 K/UL — LOW (ref 3.8–10.5)
WBC # FLD AUTO: 3.1 K/UL — LOW (ref 3.8–10.5)

## 2018-01-23 PROCEDURE — 71045 X-RAY EXAM CHEST 1 VIEW: CPT | Mod: 26

## 2018-01-23 RX ORDER — FUROSEMIDE 40 MG
40 TABLET ORAL EVERY 12 HOURS
Qty: 0 | Refills: 0 | Status: DISCONTINUED | OUTPATIENT
Start: 2018-01-23 | End: 2018-01-25

## 2018-01-23 RX ADMIN — Medication 3 MILLILITER(S): at 20:16

## 2018-01-23 RX ADMIN — Medication 333.33 MILLIGRAM(S): at 05:51

## 2018-01-23 RX ADMIN — Medication 100 MILLIGRAM(S): at 05:50

## 2018-01-23 RX ADMIN — Medication: at 13:52

## 2018-01-23 RX ADMIN — Medication 1 TABLET(S): at 11:50

## 2018-01-23 RX ADMIN — Medication 100 MILLIGRAM(S): at 14:34

## 2018-01-23 RX ADMIN — Medication 3 MILLILITER(S): at 14:09

## 2018-01-23 RX ADMIN — Medication 40 MILLIGRAM(S): at 16:00

## 2018-01-23 RX ADMIN — CARVEDILOL PHOSPHATE 3.12 MILLIGRAM(S): 80 CAPSULE, EXTENDED RELEASE ORAL at 05:49

## 2018-01-23 RX ADMIN — SENNA PLUS 2 TABLET(S): 8.6 TABLET ORAL at 22:49

## 2018-01-23 RX ADMIN — Medication 40 MILLIGRAM(S): at 05:50

## 2018-01-23 RX ADMIN — Medication 100 MILLIGRAM(S): at 17:24

## 2018-01-23 RX ADMIN — POLYETHYLENE GLYCOL 3350 17 GRAM(S): 17 POWDER, FOR SOLUTION ORAL at 11:51

## 2018-01-23 RX ADMIN — ENOXAPARIN SODIUM 40 MILLIGRAM(S): 100 INJECTION SUBCUTANEOUS at 11:50

## 2018-01-23 RX ADMIN — Medication 3 MILLILITER(S): at 02:18

## 2018-01-23 RX ADMIN — Medication 81 MILLIGRAM(S): at 11:49

## 2018-01-23 RX ADMIN — Medication 3 MILLILITER(S): at 09:48

## 2018-01-23 RX ADMIN — ATORVASTATIN CALCIUM 40 MILLIGRAM(S): 80 TABLET, FILM COATED ORAL at 22:49

## 2018-01-23 RX ADMIN — CARVEDILOL PHOSPHATE 3.12 MILLIGRAM(S): 80 CAPSULE, EXTENDED RELEASE ORAL at 17:25

## 2018-01-23 RX ADMIN — Medication 100 MILLIGRAM(S): at 22:49

## 2018-01-23 NOTE — DIETITIAN INITIAL EVALUATION ADULT. - PROBLEM SELECTOR PLAN 4
c/w coreg  holding hctz as we are diuresing with lasix  hold arb 2/2 montse  if becomes more hypertensive, consider nitro for preload reduction in pulmonary edema

## 2018-01-23 NOTE — PROGRESS NOTE ADULT - ASSESSMENT
59 yo F w/ pmh of morbid obesity who p/w LE cellulitis w/ lympedema c/b hypercarbic resp failure 2/2 pulmonary edema now resolving following diuresis and antibx

## 2018-01-23 NOTE — PROGRESS NOTE ADULT - SUBJECTIVE AND OBJECTIVE BOX
NP Note discussed with  Primary Attending    Patient is a 52y old  Female who presents with a chief complaint of sob, sleepiness, LE edema and redness (17 Jan 2018 18:02)  52 morbidly obese F with hx of CAD, CHF s/p ICD, p/w dyspnea, somnolence and LE redness and swelling, admitted to MICU for acute hypercapneic respiratory failure 2/2 CHF and/or AMPARO/OHS in setting of LE cellulitis        INTERVAL HPI/OVERNIGHT EVENTS: no new complaints. noted with hypoxia on 3L nasal cannula. abg/cxr ordered. Patient denies any difficulty breathing stating "I'm fine".    MEDICATIONS  (STANDING):  ALBUTerol/ipratropium for Nebulization 3 milliLiter(s) Nebulizer every 6 hours  aspirin  chewable 81 milliGRAM(s) Oral daily  atorvastatin 40 milliGRAM(s) Oral at bedtime  carvedilol 3.125 milliGRAM(s) Oral every 12 hours  docusate sodium 100 milliGRAM(s) Oral three times a day  enoxaparin Injectable 40 milliGRAM(s) SubCutaneous daily  furosemide   Injectable 40 milliGRAM(s) IV Push daily  insulin lispro (HumaLOG) corrective regimen sliding scale   SubCutaneous three times a day with meals  multivitamin 1 Tablet(s) Oral daily  senna 2 Tablet(s) Oral at bedtime  vancomycin  IVPB 1500 milliGRAM(s) IV Intermittent every 12 hours    MEDICATIONS  (PRN):  acetaminophen   Tablet 650 milliGRAM(s) Oral every 6 hours PRN For Temp greater than 38 C (100.4 F)  sodium chloride 0.65% Nasal 1 Spray(s) Both Nostrils every 4 hours PRN Nasal Congestion      __________________________________________________  REVIEW OF SYSTEMS:    CONSTITUTIONAL: No fever,   EYES: no acute visual disturbances  NECK: No pain or stiffness  RESPIRATORY: No cough; No shortness of breath  CARDIOVASCULAR: No chest pain, no palpitations  GASTROINTESTINAL: No pain. No nausea or vomiting; No diarrhea   NEUROLOGICAL: No headache or numbness, no tremors  MUSCULOSKELETAL: No joint pain, no muscle pain  GENITOURINARY: no dysuria, no frequency, no hesitancy  PSYCHIATRY: no depression , no anxiety  ALL OTHER  ROS negative        Vital Signs Last 24 Hrs  T(C): 37.9 (23 Jan 2018 14:40), Max: 37.9 (23 Jan 2018 14:40)  T(F): 100.3 (23 Jan 2018 14:40), Max: 100.3 (23 Jan 2018 14:40)  HR: 93 (23 Jan 2018 14:40) (72 - 93)  BP: 129/66 (23 Jan 2018 14:40) (127/66 - 144/58)  BP(mean): --  RR: 17 (23 Jan 2018 14:40) (17 - 24)  SpO2: 93% (23 Jan 2018 14:40) (88% - 95%)    ________________________________________________  PHYSICAL EXAM:  GENERAL: NAD, large female in nad  HEENT: Normocephalic;  conjunctivae and sclerae clear; moist mucous membranes;   NECK : supple  CHEST/LUNG: Clear to auscultation bilaterally with good air entry, no wheeze, no crackles  HEART: S1 S2  regular; no murmurs, gallops or rubs  ABDOMEN: Soft, Nontender, Nondistended; Bowel sounds present  EXTREMITIES: no cyanosis; no edema; no calf tenderness  SKIN: warm and dry; no rash  NERVOUS SYSTEM:  Awake and alert; Oriented  to place, person and time ; no new deficits    _________________________________________________  LABS:                        12.4   3.1   )-----------( 202      ( 23 Jan 2018 09:02 )             43.9     01-23    135  |  92<L>  |  30<H>  ----------------------------<  145<H>  4.5   |  40<H>  |  1.04    Ca    8.9      23 Jan 2018 09:02  Mg     2.4     01-23    TPro  6.7  /  Alb  2.9<L>  /  TBili  0.5  /  DBili  x   /  AST  13  /  ALT  16  /  AlkPhos  22<L>  01-22        CAPILLARY BLOOD GLUCOSE      POCT Blood Glucose.: 167 mg/dL (23 Jan 2018 12:06)  POCT Blood Glucose.: 138 mg/dL (23 Jan 2018 08:20)  POCT Blood Glucose.: 120 mg/dL (22 Jan 2018 21:20)  POCT Blood Glucose.: 118 mg/dL (22 Jan 2018 16:59)        RADIOLOGY & ADDITIONAL TESTS:    Imaging  Reviewed:  YES    Consultant(s) Notes Reviewed:   YES      Plan of care was discussed with patient and /or primary care giver; all questions and concerns were addressed

## 2018-01-23 NOTE — DIETITIAN INITIAL EVALUATION ADULT. - OTHER INFO
pt visited. Per pt  appetite  is good. Po tolerated. Food choices obtained. Pt H/O DM On oral Hypoglycemic agents. Pt is on o2

## 2018-01-23 NOTE — PROGRESS NOTE ADULT - SUBJECTIVE AND OBJECTIVE BOX
Pt denies CP, SOB, Edema improving    acetaminophen   Tablet 650 milliGRAM(s) Oral every 6 hours PRN  ALBUTerol/ipratropium for Nebulization 3 milliLiter(s) Nebulizer every 6 hours  aspirin  chewable 81 milliGRAM(s) Oral daily  atorvastatin 40 milliGRAM(s) Oral at bedtime  carvedilol 3.125 milliGRAM(s) Oral every 12 hours  docusate sodium 100 milliGRAM(s) Oral three times a day  enoxaparin Injectable 40 milliGRAM(s) SubCutaneous daily  furosemide   Injectable 40 milliGRAM(s) IV Push daily  insulin lispro (HumaLOG) corrective regimen sliding scale   SubCutaneous three times a day with meals  multivitamin 1 Tablet(s) Oral daily  senna 2 Tablet(s) Oral at bedtime  sodium chloride 0.65% Nasal 1 Spray(s) Both Nostrils every 4 hours PRN  vancomycin  IVPB 1500 milliGRAM(s) IV Intermittent every 12 hours                            12.4   3.1   )-----------( 202      ( 23 Jan 2018 09:02 )             43.9       Hemoglobin: 12.4 g/dL (01-23 @ 09:02)  Hemoglobin: 12.6 g/dL (01-20 @ 07:21)  Hemoglobin: 13.2 g/dL (01-19 @ 06:07)      01-23    135  |  92<L>  |  30<H>  ----------------------------<  145<H>  4.5   |  40<H>  |  1.04    Ca    8.9      23 Jan 2018 09:02  Mg     2.4     01-23    TPro  6.7  /  Alb  2.9<L>  /  TBili  0.5  /  DBili  x   /  AST  13  /  ALT  16  /  AlkPhos  22<L>  01-22    Creatinine Trend: 1.04<--, 1.13<--, 1.33<--, 1.42<--, 1.03<--, 1.20<--    COAGS:           T(C): 36.2 (01-23-18 @ 05:42), Max: 37.3 (01-22-18 @ 13:50)  HR: 90 (01-23-18 @ 11:58) (72 - 90)  BP: 143/86 (01-23-18 @ 11:58) (127/66 - 144/58)  RR: 20 (01-23-18 @ 11:58) (16 - 24)  SpO2: 95% (01-23-18 @ 11:58) (88% - 95%)  Wt(kg): --    I&O's Summary    22 Jan 2018 07:01  -  23 Jan 2018 07:00  --------------------------------------------------------  IN: 0 mL / OUT: 2450 mL / NET: -2450 mL              HEENT:   Normal oral mucosa, PERRL, EOMI	  Lymphatic: No obvious lymphadenopathy , no edema  Cardiovascular: Normal S1 S2, No JVD, 1/6 RAYMOND murmur, Peripheral pulses palpable 2+ bilaterally  Respiratory: Lungs clear to auscultation, normal effort 	  Gastrointestinal:  Soft, Non-tender, + BS	  Skin: No rashes,  No cyanosis, warm to touch  Musculoskeletal: Normal range of motion, normal strength  Psychiatry:  Appropriate Mood & affect     < from: Transthoracic Echocardiogram (01.18.18 @ 07:37) >  CONCLUSIONS:  1. Mild mitral regurgitation.  2. Moderately dilated left atrium.  LA volume index = 42  cc/m2.  3. Normal left ventricular internal dimensions and wall  thicknesses.  4. Endocardium not well visualized; grossly low normal left  ventricular systolic function. Segmental wall motion could  not be assessed.  5. Mild right atrial enlargement.  6. Right ventricular enlargement with normal RV function. A  device lead is visualized in the right heart.  7. RV systolic pressure is 48 mm Hg. Mild pulmonary  hypertension.  8. There is severe tricuspid regurgitation.  9. Trace pericardial effusion.    ------------------------------------------------------------------------  Confirmed on  1/18/2018 - 13:59:26 by Manuel Gold MD  ------------------------------------------------------------------------    < end of copied text >      ASSESSMENT/PLAN: 	52y Female ell known to our office, historically low normal LV function, ICD, HTN, DM admitted with SOB and cellulitis.    - i suspect her edema is at least in part due to obesity and possibly cor pulmonale physiology.  Consider outpatient sleep study  - Cont to keep O>I  - volume status inproving cont IV lasix until d/C  - Abx per primary team    Manuel Gold MD, FACC  [facility], [facility]  [address] [place] [address]  PHONE:  [phone]  BEEPER : [phone]

## 2018-01-24 DIAGNOSIS — R53.81 OTHER MALAISE: ICD-10-CM

## 2018-01-24 DIAGNOSIS — E66.01 MORBID (SEVERE) OBESITY DUE TO EXCESS CALORIES: ICD-10-CM

## 2018-01-24 LAB
ANION GAP SERPL CALC-SCNC: 1 MMOL/L — LOW (ref 5–17)
BUN SERPL-MCNC: 31 MG/DL — HIGH (ref 7–18)
CALCIUM SERPL-MCNC: 8.9 MG/DL — SIGNIFICANT CHANGE UP (ref 8.4–10.5)
CHLORIDE SERPL-SCNC: 93 MMOL/L — LOW (ref 96–108)
CO2 SERPL-SCNC: 43 MMOL/L — HIGH (ref 22–31)
CREAT SERPL-MCNC: 1.19 MG/DL — SIGNIFICANT CHANGE UP (ref 0.5–1.3)
GLUCOSE BLDC GLUCOMTR-MCNC: 120 MG/DL — HIGH (ref 70–99)
GLUCOSE BLDC GLUCOMTR-MCNC: 121 MG/DL — HIGH (ref 70–99)
GLUCOSE BLDC GLUCOMTR-MCNC: 128 MG/DL — HIGH (ref 70–99)
GLUCOSE BLDC GLUCOMTR-MCNC: 129 MG/DL — HIGH (ref 70–99)
GLUCOSE SERPL-MCNC: 130 MG/DL — HIGH (ref 70–99)
HCT VFR BLD CALC: 45.5 % — HIGH (ref 34.5–45)
HGB BLD-MCNC: 12.7 G/DL — SIGNIFICANT CHANGE UP (ref 11.5–15.5)
MAGNESIUM SERPL-MCNC: 2.5 MG/DL — SIGNIFICANT CHANGE UP (ref 1.6–2.6)
MCHC RBC-ENTMCNC: 28 GM/DL — LOW (ref 32–36)
MCHC RBC-ENTMCNC: 28.3 PG — SIGNIFICANT CHANGE UP (ref 27–34)
MCV RBC AUTO: 101.1 FL — HIGH (ref 80–100)
PLATELET # BLD AUTO: 205 K/UL — SIGNIFICANT CHANGE UP (ref 150–400)
POTASSIUM SERPL-MCNC: 4.2 MMOL/L — SIGNIFICANT CHANGE UP (ref 3.5–5.3)
POTASSIUM SERPL-SCNC: 4.2 MMOL/L — SIGNIFICANT CHANGE UP (ref 3.5–5.3)
RBC # BLD: 4.5 M/UL — SIGNIFICANT CHANGE UP (ref 3.8–5.2)
RBC # FLD: 14.8 % — HIGH (ref 10.3–14.5)
SODIUM SERPL-SCNC: 137 MMOL/L — SIGNIFICANT CHANGE UP (ref 135–145)
WBC # BLD: 3.5 K/UL — LOW (ref 3.8–10.5)
WBC # FLD AUTO: 3.5 K/UL — LOW (ref 3.8–10.5)

## 2018-01-24 RX ADMIN — Medication 3 MILLILITER(S): at 09:18

## 2018-01-24 RX ADMIN — ENOXAPARIN SODIUM 40 MILLIGRAM(S): 100 INJECTION SUBCUTANEOUS at 12:58

## 2018-01-24 RX ADMIN — SENNA PLUS 2 TABLET(S): 8.6 TABLET ORAL at 21:51

## 2018-01-24 RX ADMIN — Medication 40 MILLIGRAM(S): at 05:34

## 2018-01-24 RX ADMIN — Medication 81 MILLIGRAM(S): at 12:58

## 2018-01-24 RX ADMIN — Medication 100 MILLIGRAM(S): at 05:34

## 2018-01-24 RX ADMIN — CARVEDILOL PHOSPHATE 3.12 MILLIGRAM(S): 80 CAPSULE, EXTENDED RELEASE ORAL at 17:41

## 2018-01-24 RX ADMIN — ATORVASTATIN CALCIUM 40 MILLIGRAM(S): 80 TABLET, FILM COATED ORAL at 21:51

## 2018-01-24 RX ADMIN — CARVEDILOL PHOSPHATE 3.12 MILLIGRAM(S): 80 CAPSULE, EXTENDED RELEASE ORAL at 05:34

## 2018-01-24 RX ADMIN — Medication 3 MILLILITER(S): at 15:09

## 2018-01-24 RX ADMIN — Medication 100 MILLIGRAM(S): at 17:41

## 2018-01-24 RX ADMIN — Medication 40 MILLIGRAM(S): at 17:40

## 2018-01-24 RX ADMIN — Medication 3 MILLILITER(S): at 02:53

## 2018-01-24 RX ADMIN — Medication 100 MILLIGRAM(S): at 21:51

## 2018-01-24 RX ADMIN — Medication 3 MILLILITER(S): at 21:13

## 2018-01-24 RX ADMIN — Medication 100 MILLIGRAM(S): at 13:01

## 2018-01-24 RX ADMIN — Medication 1 TABLET(S): at 12:58

## 2018-01-24 NOTE — PROGRESS NOTE ADULT - SUBJECTIVE AND OBJECTIVE BOX
pt seen and examined, no complaints on exam.   NAD on exam, ROS - .  VSS  Breathing much improved per pt    acetaminophen   Tablet 650 milliGRAM(s) Oral every 6 hours PRN  ALBUTerol/ipratropium for Nebulization 3 milliLiter(s) Nebulizer every 6 hours  aspirin  chewable 81 milliGRAM(s) Oral daily  atorvastatin 40 milliGRAM(s) Oral at bedtime  carvedilol 3.125 milliGRAM(s) Oral every 12 hours  docusate sodium 100 milliGRAM(s) Oral three times a day  doxycycline hyclate Capsule 100 milliGRAM(s) Oral every 12 hours  enoxaparin Injectable 40 milliGRAM(s) SubCutaneous daily  furosemide   Injectable 40 milliGRAM(s) IV Push every 12 hours  insulin lispro (HumaLOG) corrective regimen sliding scale   SubCutaneous three times a day with meals  multivitamin 1 Tablet(s) Oral daily  senna 2 Tablet(s) Oral at bedtime  sodium chloride 0.65% Nasal 1 Spray(s) Both Nostrils every 4 hours PRN                            12.4   3.1   )-----------( 202      ( 23 Jan 2018 09:02 )             43.9       Hemoglobin: 12.4 g/dL (01-23 @ 09:02)  Hemoglobin: 12.6 g/dL (01-20 @ 07:21)  Hemoglobin: 13.2 g/dL (01-19 @ 06:07)      01-23    135  |  92<L>  |  30<H>  ----------------------------<  145<H>  4.5   |  40<H>  |  1.04    Ca    8.9      23 Jan 2018 09:02  Mg     2.4     01-23    TPro  6.7  /  Alb  2.9<L>  /  TBili  0.5  /  DBili  x   /  AST  13  /  ALT  16  /  AlkPhos  22<L>  01-22    Creatinine Trend: 1.04<--, 1.13<--, 1.33<--, 1.42<--, 1.03<--, 1.20<--    COAGS:       T(C): 36.8 (01-23-18 @ 21:34), Max: 37.9 (01-23-18 @ 14:40)  HR: 83 (01-23-18 @ 21:34) (72 - 93)  BP: 146/74 (01-23-18 @ 21:34) (129/66 - 146/74)  RR: 16 (01-23-18 @ 21:34) (16 - 20)  SpO2: 92% (01-23-18 @ 21:34) (88% - 95%)  Wt(kg): --    I&O's Summary    22 Jan 2018 07:01  -  23 Jan 2018 07:00  --------------------------------------------------------  IN: 0 mL / OUT: 2450 mL / NET: -2450 mL      HEENT:   Normal oral mucosa, PERRL, EOMI	  Lymphatic: No obvious lymphadenopathy , ++ edema  Cardiovascular: Normal S1 S2, No JVD, 1/6 RAYMOND murmur, Peripheral pulses palpable 2+ bilaterally  Respiratory: Lungs clear to auscultation, normal effort 	  Gastrointestinal:  Soft, Non-tender, + BS	      < from: Transthoracic Echocardiogram (01.18.18 @ 07:37) >  CONCLUSIONS:  1. Mild mitral regurgitation.  2. Moderately dilated left atrium.  LA volume index = 42  cc/m2.  3. Normal left ventricular internal dimensions and wall  thicknesses.  4. Endocardium not well visualized; grossly low normal left  ventricular systolic function. Segmental wall motion could  not be assessed.  5. Mild right atrial enlargement.  6. Right ventricular enlargement with normal RV function. A  device lead is visualized in the right heart.  7. RV systolic pressure is 48 mm Hg. Mild pulmonary  hypertension.  8. There is severe tricuspid regurgitation.  9. Trace pericardial effusion.    ------------------------------------------------------------------------  Confirmed on  1/18/2018 - 13:59:26 by Manuel Gold MD  ------------------------------------------------------------------------    < end of copied text >      ASSESSMENT/PLAN: 	52y Female ell known to our office, historically low normal LV function, ICD, HTN, DM admitted with SOB and cellulitis.    echo with NL lv /RV fx   -ASA, statin , Coreg   - keep net neg with IV lasix  - GI / DVT prophylaxis.  - keep K>4, mag >2.0   - ABx per primary team  D/W Dr Gold

## 2018-01-24 NOTE — PROGRESS NOTE ADULT - SUBJECTIVE AND OBJECTIVE BOX
NP Note discussed with  Primary Attending    Patient is a 52y old  Female who presents with a chief complaint of sob, sleepiness, LE edema and redness (17 Jan 2018 18:02)  52 morbidly obese F with hx of CAD, CHF s/p ICD, p/w dyspnea, somnolence and LE redness and swelling, admitted to MICU for acute hypercapneic respiratory failure 2/2 CHF and/or AMPARO/OHS in setting of LE cellulitis    INTERVAL HPI/OVERNIGHT EVENTS: no new complaints. doing better. Denies sob.    MEDICATIONS  (STANDING):  ALBUTerol/ipratropium for Nebulization 3 milliLiter(s) Nebulizer every 6 hours  aspirin  chewable 81 milliGRAM(s) Oral daily  atorvastatin 40 milliGRAM(s) Oral at bedtime  carvedilol 3.125 milliGRAM(s) Oral every 12 hours  docusate sodium 100 milliGRAM(s) Oral three times a day  doxycycline hyclate Capsule 100 milliGRAM(s) Oral every 12 hours  enoxaparin Injectable 40 milliGRAM(s) SubCutaneous daily  furosemide   Injectable 40 milliGRAM(s) IV Push every 12 hours  insulin lispro (HumaLOG) corrective regimen sliding scale   SubCutaneous three times a day with meals  multivitamin 1 Tablet(s) Oral daily  senna 2 Tablet(s) Oral at bedtime    MEDICATIONS  (PRN):  acetaminophen   Tablet 650 milliGRAM(s) Oral every 6 hours PRN For Temp greater than 38 C (100.4 F)  sodium chloride 0.65% Nasal 1 Spray(s) Both Nostrils every 4 hours PRN Nasal Congestion      __________________________________________________  REVIEW OF SYSTEMS:    CONSTITUTIONAL: No fever,   EYES: no acute visual disturbances  NECK: No pain or stiffness  RESPIRATORY: No cough; No shortness of breath  CARDIOVASCULAR: No chest pain, no palpitations  GASTROINTESTINAL: No pain. No nausea or vomiting; No diarrhea   NEUROLOGICAL: No headache or numbness, no tremors  MUSCULOSKELETAL: No joint pain, no muscle pain  GENITOURINARY: no dysuria, no frequency, no hesitancy  PSYCHIATRY: no depression , no anxiety  ALL OTHER  ROS negative        Vital Signs Last 24 Hrs  T(C): 36.8 (24 Jan 2018 14:55), Max: 36.8 (23 Jan 2018 21:34)  T(F): 98.3 (24 Jan 2018 14:55), Max: 98.3 (23 Jan 2018 21:34)  HR: 82 (24 Jan 2018 15:22) (76 - 90)  BP: 140/73 (24 Jan 2018 15:22) (135/74 - 158/97)  BP(mean): 92 (23 Jan 2018 21:34) (92 - 92)  RR: 18 (24 Jan 2018 14:55) (16 - 18)  SpO2: 93% (24 Jan 2018 15:22) (90% - 97%)    ________________________________________________  PHYSICAL EXAM:  GENERAL: NAD  HEENT: Normocephalic;  conjunctivae and sclerae clear; moist mucous membranes;   NECK : supple  CHEST/LUNG: Clear to auscultation bilaterally with good air entry   HEART: S1 S2  regular; no murmurs, gallops or rubs  ABDOMEN: Soft, Nontender, Nondistended; Bowel sounds present  EXTREMITIES: no cyanosis; no edema; no calf tenderness  SKIN: warm and dry; no rash  NERVOUS SYSTEM:  Awake and alert; Oriented  to place, person and time ; no new deficits    _________________________________________________  LABS:                        12.7   3.5   )-----------( 205      ( 24 Jan 2018 08:07 )             45.5     01-24    137  |  93<L>  |  31<H>  ----------------------------<  130<H>  4.2   |  43<H>  |  1.19    Ca    8.9      24 Jan 2018 08:07  Mg     2.5     01-24          CAPILLARY BLOOD GLUCOSE      POCT Blood Glucose.: 121 mg/dL (24 Jan 2018 16:42)  POCT Blood Glucose.: 129 mg/dL (24 Jan 2018 12:07)  POCT Blood Glucose.: 128 mg/dL (24 Jan 2018 07:47)  POCT Blood Glucose.: 217 mg/dL (23 Jan 2018 21:39)        RADIOLOGY & ADDITIONAL TESTS:    Imaging  Reviewed:  YES  Consultant(s) Notes Reviewed:   YES      Plan of care was discussed with patient; all questions and concerns were addressed

## 2018-01-25 ENCOUNTER — TRANSCRIPTION ENCOUNTER (OUTPATIENT)
Age: 53
End: 2018-01-25

## 2018-01-25 VITALS
DIASTOLIC BLOOD PRESSURE: 79 MMHG | HEART RATE: 79 BPM | SYSTOLIC BLOOD PRESSURE: 150 MMHG | OXYGEN SATURATION: 92 % | RESPIRATION RATE: 18 BRPM | TEMPERATURE: 98 F

## 2018-01-25 LAB
ANION GAP SERPL CALC-SCNC: 3 MMOL/L — LOW (ref 5–17)
BUN SERPL-MCNC: 30 MG/DL — HIGH (ref 7–18)
CALCIUM SERPL-MCNC: 9 MG/DL — SIGNIFICANT CHANGE UP (ref 8.4–10.5)
CHLORIDE SERPL-SCNC: 94 MMOL/L — LOW (ref 96–108)
CO2 SERPL-SCNC: 41 MMOL/L — HIGH (ref 22–31)
CREAT SERPL-MCNC: 1.04 MG/DL — SIGNIFICANT CHANGE UP (ref 0.5–1.3)
CULTURE RESULTS: SIGNIFICANT CHANGE UP
CULTURE RESULTS: SIGNIFICANT CHANGE UP
GLUCOSE BLDC GLUCOMTR-MCNC: 120 MG/DL — HIGH (ref 70–99)
GLUCOSE BLDC GLUCOMTR-MCNC: 125 MG/DL — HIGH (ref 70–99)
GLUCOSE BLDC GLUCOMTR-MCNC: 138 MG/DL — HIGH (ref 70–99)
GLUCOSE BLDC GLUCOMTR-MCNC: 92 MG/DL — SIGNIFICANT CHANGE UP (ref 70–99)
GLUCOSE SERPL-MCNC: 122 MG/DL — HIGH (ref 70–99)
HCT VFR BLD CALC: 44.7 % — SIGNIFICANT CHANGE UP (ref 34.5–45)
HGB BLD-MCNC: 12.6 G/DL — SIGNIFICANT CHANGE UP (ref 11.5–15.5)
MAGNESIUM SERPL-MCNC: 2.3 MG/DL — SIGNIFICANT CHANGE UP (ref 1.6–2.6)
MCHC RBC-ENTMCNC: 28.1 GM/DL — LOW (ref 32–36)
MCHC RBC-ENTMCNC: 28.4 PG — SIGNIFICANT CHANGE UP (ref 27–34)
MCV RBC AUTO: 100.8 FL — HIGH (ref 80–100)
PLATELET # BLD AUTO: 210 K/UL — SIGNIFICANT CHANGE UP (ref 150–400)
POTASSIUM SERPL-MCNC: 4.1 MMOL/L — SIGNIFICANT CHANGE UP (ref 3.5–5.3)
POTASSIUM SERPL-SCNC: 4.1 MMOL/L — SIGNIFICANT CHANGE UP (ref 3.5–5.3)
RBC # BLD: 4.43 M/UL — SIGNIFICANT CHANGE UP (ref 3.8–5.2)
RBC # FLD: 14.8 % — HIGH (ref 10.3–14.5)
SODIUM SERPL-SCNC: 138 MMOL/L — SIGNIFICANT CHANGE UP (ref 135–145)
SPECIMEN SOURCE: SIGNIFICANT CHANGE UP
SPECIMEN SOURCE: SIGNIFICANT CHANGE UP
WBC # BLD: 4 K/UL — SIGNIFICANT CHANGE UP (ref 3.8–10.5)
WBC # FLD AUTO: 4 K/UL — SIGNIFICANT CHANGE UP (ref 3.8–10.5)

## 2018-01-25 PROCEDURE — 81001 URINALYSIS AUTO W/SCOPE: CPT

## 2018-01-25 PROCEDURE — 80202 ASSAY OF VANCOMYCIN: CPT

## 2018-01-25 PROCEDURE — 82550 ASSAY OF CK (CPK): CPT

## 2018-01-25 PROCEDURE — 82306 VITAMIN D 25 HYDROXY: CPT

## 2018-01-25 PROCEDURE — 85610 PROTHROMBIN TIME: CPT

## 2018-01-25 PROCEDURE — 97162 PT EVAL MOD COMPLEX 30 MIN: CPT

## 2018-01-25 PROCEDURE — 71045 X-RAY EXAM CHEST 1 VIEW: CPT

## 2018-01-25 PROCEDURE — 94660 CPAP INITIATION&MGMT: CPT

## 2018-01-25 PROCEDURE — 87040 BLOOD CULTURE FOR BACTERIA: CPT

## 2018-01-25 PROCEDURE — 93005 ELECTROCARDIOGRAM TRACING: CPT

## 2018-01-25 PROCEDURE — 87086 URINE CULTURE/COLONY COUNT: CPT

## 2018-01-25 PROCEDURE — 96374 THER/PROPH/DIAG INJ IV PUSH: CPT

## 2018-01-25 PROCEDURE — 83036 HEMOGLOBIN GLYCOSYLATED A1C: CPT

## 2018-01-25 PROCEDURE — 84100 ASSAY OF PHOSPHORUS: CPT

## 2018-01-25 PROCEDURE — 93306 TTE W/DOPPLER COMPLETE: CPT

## 2018-01-25 PROCEDURE — 82553 CREATINE MB FRACTION: CPT

## 2018-01-25 PROCEDURE — 84484 ASSAY OF TROPONIN QUANT: CPT

## 2018-01-25 PROCEDURE — 82962 GLUCOSE BLOOD TEST: CPT

## 2018-01-25 PROCEDURE — 93970 EXTREMITY STUDY: CPT

## 2018-01-25 PROCEDURE — 83735 ASSAY OF MAGNESIUM: CPT

## 2018-01-25 PROCEDURE — 97530 THERAPEUTIC ACTIVITIES: CPT

## 2018-01-25 PROCEDURE — 80053 COMPREHEN METABOLIC PANEL: CPT

## 2018-01-25 PROCEDURE — 97110 THERAPEUTIC EXERCISES: CPT

## 2018-01-25 PROCEDURE — 82803 BLOOD GASES ANY COMBINATION: CPT

## 2018-01-25 PROCEDURE — 80048 BASIC METABOLIC PNL TOTAL CA: CPT

## 2018-01-25 PROCEDURE — 83880 ASSAY OF NATRIURETIC PEPTIDE: CPT

## 2018-01-25 PROCEDURE — 99291 CRITICAL CARE FIRST HOUR: CPT | Mod: 25

## 2018-01-25 PROCEDURE — 94640 AIRWAY INHALATION TREATMENT: CPT

## 2018-01-25 PROCEDURE — 85730 THROMBOPLASTIN TIME PARTIAL: CPT

## 2018-01-25 PROCEDURE — 96375 TX/PRO/DX INJ NEW DRUG ADDON: CPT

## 2018-01-25 PROCEDURE — 83605 ASSAY OF LACTIC ACID: CPT

## 2018-01-25 PROCEDURE — 85027 COMPLETE CBC AUTOMATED: CPT

## 2018-01-25 RX ORDER — ASPIRIN/CALCIUM CARB/MAGNESIUM 324 MG
1 TABLET ORAL
Qty: 0 | Refills: 0 | DISCHARGE
Start: 2018-01-25

## 2018-01-25 RX ORDER — SENNA PLUS 8.6 MG/1
2 TABLET ORAL
Qty: 0 | Refills: 0 | DISCHARGE
Start: 2018-01-25

## 2018-01-25 RX ORDER — ALLOPURINOL 300 MG
1 TABLET ORAL
Qty: 0 | Refills: 0 | COMMUNITY

## 2018-01-25 RX ORDER — ATORVASTATIN CALCIUM 80 MG/1
1 TABLET, FILM COATED ORAL
Qty: 0 | Refills: 0 | DISCHARGE
Start: 2018-01-25

## 2018-01-25 RX ORDER — SODIUM CHLORIDE 0.65 %
1 AEROSOL, SPRAY (ML) NASAL
Qty: 0 | Refills: 0 | DISCHARGE
Start: 2018-01-25

## 2018-01-25 RX ADMIN — Medication 40 MILLIGRAM(S): at 17:39

## 2018-01-25 RX ADMIN — Medication 3 MILLILITER(S): at 03:18

## 2018-01-25 RX ADMIN — Medication 3 MILLILITER(S): at 20:47

## 2018-01-25 RX ADMIN — Medication 100 MILLIGRAM(S): at 06:30

## 2018-01-25 RX ADMIN — ENOXAPARIN SODIUM 40 MILLIGRAM(S): 100 INJECTION SUBCUTANEOUS at 12:59

## 2018-01-25 RX ADMIN — CARVEDILOL PHOSPHATE 3.12 MILLIGRAM(S): 80 CAPSULE, EXTENDED RELEASE ORAL at 06:30

## 2018-01-25 RX ADMIN — Medication 3 MILLILITER(S): at 14:43

## 2018-01-25 RX ADMIN — Medication 100 MILLIGRAM(S): at 17:39

## 2018-01-25 RX ADMIN — Medication 100 MILLIGRAM(S): at 21:15

## 2018-01-25 RX ADMIN — Medication 3 MILLILITER(S): at 08:43

## 2018-01-25 RX ADMIN — SENNA PLUS 2 TABLET(S): 8.6 TABLET ORAL at 21:15

## 2018-01-25 RX ADMIN — Medication 40 MILLIGRAM(S): at 06:30

## 2018-01-25 RX ADMIN — Medication 1 TABLET(S): at 12:59

## 2018-01-25 RX ADMIN — ATORVASTATIN CALCIUM 40 MILLIGRAM(S): 80 TABLET, FILM COATED ORAL at 21:16

## 2018-01-25 RX ADMIN — CARVEDILOL PHOSPHATE 3.12 MILLIGRAM(S): 80 CAPSULE, EXTENDED RELEASE ORAL at 19:31

## 2018-01-25 RX ADMIN — Medication 81 MILLIGRAM(S): at 12:59

## 2018-01-25 NOTE — DISCHARGE NOTE ADULT - CARE PLAN
Principal Discharge DX:	Acute respiratory failure with hypercapnia  Goal:	Remain stable  Assessment and plan of treatment:	2/2 CHF and/or likely OHS/AMPARO  necessitating new BiPAP - Settings: IPAP 16; CPAP 8; FIO2 40%   c/w lasix and monitor I/O's.  Secondary Diagnosis:	Cellulitis of lower extremity, unspecified laterality  Assessment and plan of treatment:	c/w doxycycline x 4 more doses  LE doppler neg for DVT  Secondary Diagnosis:	DM (diabetes mellitus)  Assessment and plan of treatment:	A1c 6.0  home meds metformin and januvia resumed  glipizide held can resume if deemed necessary  Secondary Diagnosis:	HTN (hypertension)  Assessment and plan of treatment:	c/w coreg  holding hctz as we are diuresing with lasix  hold arb 2/2 montse which has now resolved  if becomes more hypertensive, consider nitro for preload reduction in pulmonary edema.  Secondary Diagnosis:	Morbid obesity with BMI of 50.0-59.9, adult  Assessment and plan of treatment:	weight reduction plan Principal Discharge DX:	Acute respiratory failure with hypercapnia  Goal:	Remain stable  Assessment and plan of treatment:	2/2 CHF and/or likely OHS/AMPARO  necessitating new BiPAP - Settings: IPAP 16; CPAP 8; FIO2 40%   supplement 02  c/w lasix and monitor I/O's.  Secondary Diagnosis:	Cellulitis of lower extremity, unspecified laterality  Assessment and plan of treatment:	c/w doxycycline x 4 more doses  LE doppler neg for DVT  Secondary Diagnosis:	DM (diabetes mellitus)  Assessment and plan of treatment:	A1c 6.0  home meds metformin and januvia resumed  glipizide held can resume if deemed necessary  Secondary Diagnosis:	HTN (hypertension)  Assessment and plan of treatment:	c/w coreg  holding hctz as we are diuresing with lasix  hold arb 2/2 montse which has now resolved  if becomes more hypertensive, consider nitro for preload reduction in pulmonary edema.  Secondary Diagnosis:	Morbid obesity with BMI of 50.0-59.9, adult  Assessment and plan of treatment:	weight reduction plan

## 2018-01-25 NOTE — PROGRESS NOTE ADULT - PROBLEM SELECTOR PROBLEM 5
Prophylactic measure
Morbid obesity with BMI of 50.0-59.9, adult
Morbid obesity with BMI of 50.0-59.9, adult

## 2018-01-25 NOTE — DISCHARGE NOTE ADULT - HOSPITAL COURSE
52 morbidly obese F with PMH of CAD, CHF s/p ICD, admitted with  dyspnea, somnolence and LE redness and swelling, admitted to MICU for acute hypercapneic respiratory failure 2/2 CHF decompensation and/or AMPARO/OHS in setting of BL LE cellulitis. Treated with IV antibiotic therapy for LE Cellulitis. Diuresis with lasix; Placed on NIV. Will need outpatient sleep study.

## 2018-01-25 NOTE — DISCHARGE NOTE ADULT - CARE PROVIDER_API CALL
Ramana Barajas ([employer]), Medicine  [address] [place] [address]  Phone: [phone]  Fax: [phone]

## 2018-01-25 NOTE — DISCHARGE NOTE ADULT - MEDICATION SUMMARY - MEDICATIONS TO STOP TAKING
I will STOP taking the medications listed below when I get home from the hospital:    hydrochlorothiazide-valsartan 25 mg-160 mg oral tablet  -- 1 tab(s) by mouth once a day    glipiZIDE 5 mg oral tablet  -- 1 tab(s) by mouth once a day    acetaminophen-oxyCODONE 325 mg-5 mg oral tablet  -- 1 tab(s) by mouth every 4 hours, As needed, Moderate Pain    acetaminophen-oxyCODONE 325 mg-5 mg oral tablet  -- 2 tab(s) by mouth every 6 hours, As needed, Severe Pain    celecoxib 200 mg oral capsule  -- 1 cap(s) by mouth once a day    Diovan  mg-12.5 mg oral tablet  -- 1 tab(s) by mouth once a day    allopurinol 100 mg oral tablet  -- 1 tab(s) by mouth 2 times a day

## 2018-01-25 NOTE — DISCHARGE NOTE ADULT - PATIENT PORTAL LINK FT
“You can access the [employer] Patient Portal, offered by [facility], by registering with the following website: [email or web address]”

## 2018-01-25 NOTE — PROGRESS NOTE ADULT - PROBLEM/PLAN-7
----- Message from Joshua Rowe sent at 8/11/2017 10:28 AM CDT -----  Contact: self/[phone]  Patient is returning your call.  Please advise.      Gave patient neg hiv test results and negative gc/ct results   DISPLAY PLAN FREE TEXT

## 2018-01-25 NOTE — DISCHARGE NOTE ADULT - SECONDARY DIAGNOSIS.
Cellulitis of lower extremity, unspecified laterality DM (diabetes mellitus) HTN (hypertension) Morbid obesity with BMI of 50.0-59.9, adult

## 2018-01-25 NOTE — DISCHARGE NOTE ADULT - MEDICATION SUMMARY - MEDICATIONS TO TAKE
I will START or STAY ON the medications listed below when I get home from the hospital:    acetaminophen 325 mg oral tablet  -- 2 tab(s) by mouth every 6 hours, As needed, Mild Pain  -- Indication: For Pain    aspirin 81 mg oral tablet, chewable  -- 1 tab(s) by mouth once a day  -- Indication: For Hypercarbia    metFORMIN 500 mg oral tablet, extended release  -- 1 tab(s) by mouth 2 times a day  -- Indication: For DM (diabetes mellitus)    Januvia 50 mg oral tablet  -- 1 tab(s) by mouth once a day  -- Indication: For DM (diabetes mellitus)    atorvastatin 40 mg oral tablet  -- 1 tab(s) by mouth once a day (at bedtime)  -- Indication: For HLD/CAD    doxycycline monohydrate 100 mg oral capsule  -- 1 cap(s) by mouth every 12 hours for 4 more doses  -- Indication: For Cellulitis of extremity    Coreg 3.125 mg oral tablet  -- 1 tab(s) by mouth 2 times a day  -- Indication: For CAD    Ventolin HFA 90 mcg/inh inhalation aerosol  -- 2 puff(s) inhaled 4 times a day, As Needed  -- Indication: For Acute respiratory failure with hypercapnia    Lasix 40 mg oral tablet  -- 1 tab(s) by mouth every 12 hours  -- Indication: For CHF    Feosol 325 mg (65 mg elemental iron) oral tablet  -- 1 tab(s) by mouth once a day  -- Indication: For supplement    docusate sodium 100 mg oral capsule  -- 1 cap(s) by mouth once a day  -- Indication: For Constipation    senna oral tablet  -- 2 tab(s) by mouth once a day (at bedtime)  -- Indication: For Constipation    sodium chloride 0.65% nasal spray  -- 1 spray(s) in each nostril every 4 hours, As Needed  -- Indication: For nasal congestion    multivitamin  -- 1 tab(s) by mouth once a day  -- Indication: For supplement    folic acid 1 mg oral tablet  -- 1 tab(s) by mouth once a day  -- Indication: For supplement    Vitamin D3 1000 intl units oral capsule  -- 1 cap(s) by mouth once a day  -- Indication: For supplement    cyanocobalamin 1000 mcg oral tablet  -- 1 tab(s) by mouth once a day  -- Indication: For supplement

## 2018-01-25 NOTE — PROGRESS NOTE ADULT - SUBJECTIVE AND OBJECTIVE BOX
pt seen and examined, no complaints on exam.     acetaminophen   Tablet 650 milliGRAM(s) Oral every 6 hours PRN  ALBUTerol/ipratropium for Nebulization 3 milliLiter(s) Nebulizer every 6 hours  aspirin  chewable 81 milliGRAM(s) Oral daily  atorvastatin 40 milliGRAM(s) Oral at bedtime  carvedilol 3.125 milliGRAM(s) Oral every 12 hours  docusate sodium 100 milliGRAM(s) Oral three times a day  doxycycline hyclate Capsule 100 milliGRAM(s) Oral every 12 hours  enoxaparin Injectable 40 milliGRAM(s) SubCutaneous daily  furosemide   Injectable 40 milliGRAM(s) IV Push every 12 hours  insulin lispro (HumaLOG) corrective regimen sliding scale   SubCutaneous three times a day with meals  multivitamin 1 Tablet(s) Oral daily  senna 2 Tablet(s) Oral at bedtime  sodium chloride 0.65% Nasal 1 Spray(s) Both Nostrils every 4 hours PRN                            12.7   3.5   )-----------( 205      ( 24 Jan 2018 08:07 )             45.5       Hemoglobin: 12.7 g/dL (01-24 @ 08:07)  Hemoglobin: 12.4 g/dL (01-23 @ 09:02)  Hemoglobin: 12.6 g/dL (01-20 @ 07:21)      01-24    137  |  93<L>  |  31<H>  ----------------------------<  130<H>  4.2   |  43<H>  |  1.19    Ca    8.9      24 Jan 2018 08:07  Mg     2.5     01-24      Creatinine Trend: 1.19<--, 1.04<--, 1.13<--, 1.33<--, 1.42<--, 1.03<--    COAGS:           T(C): 36.6 (01-25-18 @ 04:30), Max: 36.9 (01-24-18 @ 20:24)  HR: 73 (01-25-18 @ 04:30) (73 - 87)  BP: 153/88 (01-25-18 @ 04:30) (135/74 - 153/88)  RR: 20 (01-25-18 @ 04:30) (18 - 20)  SpO2: 94% (01-25-18 @ 04:30) (90% - 95%)  Wt(kg): --    I&O's Summary    HEENT:   Normal oral mucosa, PERRL, EOMI	  Lymphatic: No obvious lymphadenopathy , ++ edema  Cardiovascular: Normal S1 S2, No JVD, 1/6 RAYMOND murmur, Peripheral pulses palpable 2+ bilaterally  Respiratory: Lungs clear to auscultation, normal effort 	  Gastrointestinal:  Soft, Non-tender, + BS	      < from: Transthoracic Echocardiogram (01.18.18 @ 07:37) >  CONCLUSIONS:  1. Mild mitral regurgitation.  2. Moderately dilated left atrium.  LA volume index = 42  cc/m2.  3. Normal left ventricular internal dimensions and wall  thicknesses.  4. Endocardium not well visualized; grossly low normal left  ventricular systolic function. Segmental wall motion could  not be assessed.  5. Mild right atrial enlargement.  6. Right ventricular enlargement with normal RV function. A  device lead is visualized in the right heart.  7. RV systolic pressure is 48 mm Hg. Mild pulmonary  hypertension.  8. There is severe tricuspid regurgitation.  9. Trace pericardial effusion.    ------------------------------------------------------------------------  Confirmed on  1/18/2018 - 13:59:26 by Manuel Gold MD  ------------------------------------------------------------------------    < end of copied text >      ASSESSMENT/PLAN: 	52y Female ell known to our office, historically low normal LV function, ICD, HTN, DM admitted with SOB and cellulitis.    echo with NL lv /RV fx   -ASA, statin , Coreg   - keep net neg with IV lasix- should switch to PO in the next 24 hrs.   GI / DVT prophylaxis.  - keep K>4, mag >2.0   - ABx per primary team  - tpatient AMPARO work up . primary cause of SOB might be amparo related  D/W Dr Gold

## 2018-01-25 NOTE — PROGRESS NOTE ADULT - PROBLEM SELECTOR PLAN 2
- s/p Vanco, now on doxycycline 100mg po for 4 days more. May be discharge 1/24 to [facility] for rehab per PT recs.
- s/p Vanco, now on doxycycline 100mg po for 3 days more. May be discharge 1/25 to [facility] for rehab per PT recs.
- s/p Vanco, now on doxycycline 100mg po for 3 days more. May be discharge 1/25 to [facility] for rehab per PT recs.
- Continue Vanco (  Day 3) will likely need 7 days course. May be discharge 1/23 or 1/24 to [facility] for rehab per PT recs.

## 2018-01-25 NOTE — DISCHARGE NOTE ADULT - PLAN OF CARE
Remain stable 2/2 CHF and/or likely OHS/AMPARO  necessitating new BiPAP - Settings: IPAP 16; CPAP 8; FIO2 40%   c/w lasix and monitor I/O's. c/w doxycycline x 4 more doses  LE doppler neg for DVT A1c 6.0  home meds metformin and januvia resumed  glipizide held can resume if deemed necessary c/w coreg  holding hctz as we are diuresing with lasix  hold arb 2/2 montse which has now resolved  if becomes more hypertensive, consider nitro for preload reduction in pulmonary edema. weight reduction plan 2/2 CHF and/or likely OHS/AMPARO  necessitating new BiPAP - Settings: IPAP 16; CPAP 8; FIO2 40%   supplement 02  c/w lasix and monitor I/O's.

## 2018-01-25 NOTE — PROGRESS NOTE ADULT - PROBLEM SELECTOR PROBLEM 2
Cellulitis of lower extremity, unspecified laterality

## 2018-01-25 NOTE — PROGRESS NOTE ADULT - PROBLEM SELECTOR PLAN 1
2/2 chf, incrased lasix yesterday, doing better, refusing bipap PRN  - appreciate cardiology f/u
2/2 chf, incrased lasix yesterday, doing better, refusing bipap PRN  - appreciate cardiology f/u
Pt now on 2 L NC without e.o pulm edema on auscultation of BS  - Lasix decreased to 40 mg IV as discussed w. Dr Gold. Pt has normal LV w/o DD w/ total -9 L since admission. Will transition to po diuetic per cards recs
and now hypoxia, f/u cxr, bipap PRN  - Lasix increased to 40 mg IV bid, Pt has normal LV w/o DD w/ total -9 L since admission. Will transition to po diuetic per cards recs upon dc

## 2018-01-25 NOTE — PROGRESS NOTE ADULT - ATTENDING COMMENTS
Agree with above assessment and plan as outlined above.    - Echo with normal LV and RV function  - Abx per med  - Remainder of cardiac w/u can be done on an outpatient basis once she recovers from her infection    Manuel Gold MD, [facility], [facility]  [address] [place] [address]  PHONE:  [phone]  BEEPER : [phone]
Patient seen and examined, agree with above assessment and plan as transcribed above.    - Will need outpatient sleep study  - I suspect she may have AMPARO and cor pulmonale physiology    Manuel Gold MD, FACC  [facility], [facility]  [address] [place] [address]  PHONE:  [phone]  BEEPER : [phone]
Patient seen and examined, agree with above assessment and plan as transcribed above.    - cont Abx per med    Manuel Gold MD, FACC  [facility], [facility]  [address] [place] [address]  PHONE:  [phone]  BEEPER : [phone]
Patient seen and examined, agree with above assessment and plan as transcribed above.    - cont Lasix  - outpt sleep study  - Complete Caleb per team    Manuel Gold MD, FACC  [facility], [facility]  [address] [place] [address]  PHONE:  [phone]  BEEPER : [phone]
83 female with hx of obesity, DM, CHF, AICD, presented with acute resp failure due to pulmonary edema/chf exacerbation, lower extremity edema (suspect stasis dermatitis buy currently on cefazolin for cellulitis).    Plan:  - attempt to wean off bipap   - ancef for possible cellulitis  - IV lasix  - echo pending  - will need nocturnal bipap for chronic hypercapnic resp failure (OHS/AMPARO?)  total cc time 31 min.
83 female with hx of obesity, DM, CHF, AICD, presented with acute resp failure due to pulmonary edema/chf exacerbation, lower extremity edema and suspected cellulitis   Plan:  - nocturnal bipap, daytime nasal cannula  - ancef for possible cellulitis  - continue lasix, can switch to PO soon  - will need nocturnal bipap for chronic hypercapnic resp failure on discharge.

## 2018-01-25 NOTE — PROGRESS NOTE ADULT - PROBLEM SELECTOR PLAN 3
HgA1C 6.0  continue with humalog ss  monitor fsbg  ADA diet

## 2018-01-25 NOTE — PROGRESS NOTE ADULT - PROBLEM SELECTOR PROBLEM 1
Acute respiratory failure with hypercapnia

## 2018-01-25 NOTE — PROGRESS NOTE ADULT - SUBJECTIVE AND OBJECTIVE BOX
NP Note discussed with  Primary Attending    Patient is a 52y old  Female who presents with a chief complaint of sob, sleepiness, LE edema and redness (2018 18:02)  52 morbidly obese F with hx of CAD, CHF s/p ICD, p/w dyspnea, somnolence and LE redness and swelling, admitted to MICU for acute hypercapneic respiratory failure 2/2 CHF and/or AMPARO/OHS in setting of LE cellulitis    INTERVAL HPI/OVERNIGHT EVENTS: no new complaints. doing better. Denies sob.    MEDICATIONS  (STANDING):  ALBUTerol/ipratropium for Nebulization 3 milliLiter(s) Nebulizer every 6 hours  aspirin  chewable 81 milliGRAM(s) Oral daily  atorvastatin 40 milliGRAM(s) Oral at bedtime  carvedilol 3.125 milliGRAM(s) Oral every 12 hours  docusate sodium 100 milliGRAM(s) Oral three times a day  doxycycline hyclate Capsule 100 milliGRAM(s) Oral every 12 hours  enoxaparin Injectable 40 milliGRAM(s) SubCutaneous daily  furosemide   Injectable 40 milliGRAM(s) IV Push every 12 hours  insulin lispro (HumaLOG) corrective regimen sliding scale   SubCutaneous three times a day with meals  multivitamin 1 Tablet(s) Oral daily  senna 2 Tablet(s) Oral at bedtime    MEDICATIONS  (PRN):  acetaminophen   Tablet 650 milliGRAM(s) Oral every 6 hours PRN For Temp greater than 38 C (100.4 F)  sodium chloride 0.65% Nasal 1 Spray(s) Both Nostrils every 4 hours PRN Nasal Congestion  __________________________________________________  REVIEW OF SYSTEMS:    CONSTITUTIONAL: No fever,   EYES: no acute visual disturbances  NECK: No pain or stiffness  RESPIRATORY: No cough; No shortness of breath  CARDIOVASCULAR: No chest pain, no palpitations  GASTROINTESTINAL: No pain. No nausea or vomiting; No diarrhea   NEUROLOGICAL: No headache or numbness, no tremors  MUSCULOSKELETAL: No joint pain, no muscle pain  GENITOURINARY: no dysuria, no frequency, no hesitancy  PSYCHIATRY: no depression , no anxiety  ALL OTHER  ROS negative      Vital Signs Last 24 Hrs  T(C): 36.9 (2018 21:24), Max: 37.1 (2018 13:28)  T(F): 98.5 (2018 21:24), Max: 98.8 (2018 13:28)  HR: 79 (2018 21:24) (73 - 86)  BP: 150/79 (2018 21:24) (138/91 - 153/88)  BP(mean): --  RR: 18 (2018 21:24) (16 - 20)  SpO2: 92% (2018 21:24) (92% - 96%)  ________________________________________________  PHYSICAL EXAM:  GENERAL: NAD  HEENT: Normocephalic;  conjunctivae and sclerae clear; moist mucous membranes;   NECK : supple  CHEST/LUNG: Clear to auscultation bilaterally with good air entry   HEART: S1 S2  regular; no murmurs, gallops or rubs  ABDOMEN: Soft, Nontender, Nondistended; Bowel sounds present  EXTREMITIES: no cyanosis; no edema; no calf tenderness  SKIN: warm and dry; no rash  NERVOUS SYSTEM:  Awake and alert; Oriented  to place, person and time ; no new deficits    _________________________________________________  LABS:      138  |  94<L>  |  30<H>  ----------------------------<  122<H>  4.1   |  41<H>  |  1.04    Ca    9.0      2018 08:28  Mg     2.3           Creatinine Trend: 1.04 <--, 1.19 <--, 1.04 <--, 1.13 <--, 1.33 <--, 1.42 <--                        12.6   4.0   )-----------( 210      ( 2018 08:28 )             44.7     Urine Studies:  Urinalysis Basic - ( 2018 19:00 )    Color: Ruthann / Appearance: Slightly Turbid / S.020 / pH:   Gluc:  / Ketone: Negative  / Bili: Negative / Urobili: 1   Blood:  / Protein: 100 / Nitrite: Negative   Leuk Esterase: Small / RBC: >50 /HPF / WBC 0-2 /HPF   Sq Epi:  / Non Sq Epi: Few /HPF / Bacteria: Trace /HPF

## 2018-01-25 NOTE — PROGRESS NOTE ADULT - PROVIDER SPECIALTY LIST ADULT
Cardiology
Internal Medicine
MICU
MICU
Cardiology
Internal Medicine

## 2018-01-25 NOTE — DISCHARGE NOTE ADULT - NURSING SECTION COMPLETE
Breath Sounds equal & clear to percussion & auscultation, no accessory muscle use
Patient/Caregiver provided printed discharge information.

## 2018-10-26 ENCOUNTER — INPATIENT (INPATIENT)
Facility: HOSPITAL | Age: 53
LOS: 9 days | Discharge: ANOTHER HOSPITAL | DRG: 305 | End: 2018-11-05
Attending: INTERNAL MEDICINE | Admitting: INTERNAL MEDICINE
Payer: MEDICARE

## 2018-10-26 VITALS
WEIGHT: 293 LBS | SYSTOLIC BLOOD PRESSURE: 166 MMHG | HEIGHT: 68 IN | DIASTOLIC BLOOD PRESSURE: 92 MMHG | TEMPERATURE: 98 F | HEART RATE: 102 BPM | RESPIRATION RATE: 20 BRPM | OXYGEN SATURATION: 98 %

## 2018-10-26 DIAGNOSIS — Z98.89 OTHER SPECIFIED POSTPROCEDURAL STATES: Chronic | ICD-10-CM

## 2018-10-26 DIAGNOSIS — I50.9 HEART FAILURE, UNSPECIFIED: ICD-10-CM

## 2018-10-26 LAB
ACETONE SERPL-MCNC: NEGATIVE — SIGNIFICANT CHANGE UP
ALBUMIN SERPL ELPH-MCNC: 2.8 G/DL — LOW (ref 3.5–5)
ALP SERPL-CCNC: 62 U/L — SIGNIFICANT CHANGE UP (ref 40–120)
ALT FLD-CCNC: 18 U/L DA — SIGNIFICANT CHANGE UP (ref 10–60)
ANION GAP SERPL CALC-SCNC: 6 MMOL/L — SIGNIFICANT CHANGE UP (ref 5–17)
APTT BLD: 31 SEC — SIGNIFICANT CHANGE UP (ref 27.5–37.4)
AST SERPL-CCNC: 12 U/L — SIGNIFICANT CHANGE UP (ref 10–40)
BASOPHILS # BLD AUTO: 0.2 K/UL — SIGNIFICANT CHANGE UP (ref 0–0.2)
BASOPHILS NFR BLD AUTO: 1.7 % — SIGNIFICANT CHANGE UP (ref 0–2)
BILIRUB SERPL-MCNC: 0.6 MG/DL — SIGNIFICANT CHANGE UP (ref 0.2–1.2)
BUN SERPL-MCNC: 29 MG/DL — HIGH (ref 7–18)
CALCIUM SERPL-MCNC: 8.4 MG/DL — SIGNIFICANT CHANGE UP (ref 8.4–10.5)
CHLORIDE SERPL-SCNC: 104 MMOL/L — SIGNIFICANT CHANGE UP (ref 96–108)
CK MB BLD-MCNC: 0.9 % — SIGNIFICANT CHANGE UP (ref 0–3.5)
CK MB CFR SERPL CALC: 1.2 NG/ML — SIGNIFICANT CHANGE UP (ref 0–3.6)
CK SERPL-CCNC: 133 U/L — SIGNIFICANT CHANGE UP (ref 21–215)
CO2 SERPL-SCNC: 33 MMOL/L — HIGH (ref 22–31)
CREAT SERPL-MCNC: 1.38 MG/DL — HIGH (ref 0.5–1.3)
EOSINOPHIL # BLD AUTO: 0 K/UL — SIGNIFICANT CHANGE UP (ref 0–0.5)
EOSINOPHIL NFR BLD AUTO: 0.1 % — SIGNIFICANT CHANGE UP (ref 0–6)
GLUCOSE SERPL-MCNC: 120 MG/DL — HIGH (ref 70–99)
HCT VFR BLD CALC: 39 % — SIGNIFICANT CHANGE UP (ref 34.5–45)
HGB BLD-MCNC: 11.9 G/DL — SIGNIFICANT CHANGE UP (ref 11.5–15.5)
INR BLD: 1.38 RATIO — HIGH (ref 0.88–1.16)
LACTATE SERPL-SCNC: 1 MMOL/L — SIGNIFICANT CHANGE UP (ref 0.7–2)
LYMPHOCYTES # BLD AUTO: 1.3 K/UL — SIGNIFICANT CHANGE UP (ref 1–3.3)
LYMPHOCYTES # BLD AUTO: 8.9 % — LOW (ref 13–44)
MAGNESIUM SERPL-MCNC: 2.1 MG/DL — SIGNIFICANT CHANGE UP (ref 1.6–2.6)
MCHC RBC-ENTMCNC: 29.2 PG — SIGNIFICANT CHANGE UP (ref 27–34)
MCHC RBC-ENTMCNC: 30.4 GM/DL — LOW (ref 32–36)
MCV RBC AUTO: 96 FL — SIGNIFICANT CHANGE UP (ref 80–100)
MONOCYTES # BLD AUTO: 1.8 K/UL — HIGH (ref 0–0.9)
MONOCYTES NFR BLD AUTO: 12.7 % — SIGNIFICANT CHANGE UP (ref 2–14)
NEUTROPHILS # BLD AUTO: 11 K/UL — HIGH (ref 1.8–7.4)
NEUTROPHILS NFR BLD AUTO: 76.6 % — SIGNIFICANT CHANGE UP (ref 43–77)
NT-PROBNP SERPL-SCNC: 7597 PG/ML — HIGH (ref 0–125)
PLATELET # BLD AUTO: 296 K/UL — SIGNIFICANT CHANGE UP (ref 150–400)
POTASSIUM SERPL-MCNC: 3.3 MMOL/L — LOW (ref 3.5–5.3)
POTASSIUM SERPL-SCNC: 3.3 MMOL/L — LOW (ref 3.5–5.3)
PROT SERPL-MCNC: 7 G/DL — SIGNIFICANT CHANGE UP (ref 6–8.3)
PROTHROM AB SERPL-ACNC: 15.1 SEC — HIGH (ref 9.8–12.7)
RBC # BLD: 4.07 M/UL — SIGNIFICANT CHANGE UP (ref 3.8–5.2)
RBC # FLD: 16.5 % — HIGH (ref 10.3–14.5)
SODIUM SERPL-SCNC: 143 MMOL/L — SIGNIFICANT CHANGE UP (ref 135–145)
TROPONIN I SERPL-MCNC: 0.07 NG/ML — HIGH (ref 0–0.04)
WBC # BLD: 14.3 K/UL — HIGH (ref 3.8–10.5)
WBC # FLD AUTO: 14.3 K/UL — HIGH (ref 3.8–10.5)

## 2018-10-26 PROCEDURE — 71045 X-RAY EXAM CHEST 1 VIEW: CPT | Mod: 26

## 2018-10-26 PROCEDURE — 99285 EMERGENCY DEPT VISIT HI MDM: CPT

## 2018-10-26 RX ORDER — POTASSIUM CHLORIDE 20 MEQ
40 PACKET (EA) ORAL ONCE
Qty: 0 | Refills: 0 | Status: COMPLETED | OUTPATIENT
Start: 2018-10-26 | End: 2018-10-26

## 2018-10-26 RX ORDER — ACETAMINOPHEN 500 MG
650 TABLET ORAL ONCE
Qty: 0 | Refills: 0 | Status: COMPLETED | OUTPATIENT
Start: 2018-10-26 | End: 2018-10-26

## 2018-10-26 RX ORDER — FUROSEMIDE 40 MG
80 TABLET ORAL ONCE
Qty: 0 | Refills: 0 | Status: COMPLETED | OUTPATIENT
Start: 2018-10-26 | End: 2018-10-26

## 2018-10-26 RX ORDER — ASPIRIN/CALCIUM CARB/MAGNESIUM 324 MG
81 TABLET ORAL ONCE
Qty: 0 | Refills: 0 | Status: COMPLETED | OUTPATIENT
Start: 2018-10-26 | End: 2018-10-26

## 2018-10-26 RX ORDER — KETOROLAC TROMETHAMINE 30 MG/ML
30 SYRINGE (ML) INJECTION ONCE
Qty: 0 | Refills: 0 | Status: DISCONTINUED | OUTPATIENT
Start: 2018-10-26 | End: 2018-10-26

## 2018-10-26 RX ORDER — PIPERACILLIN AND TAZOBACTAM 4; .5 G/20ML; G/20ML
3.38 INJECTION, POWDER, LYOPHILIZED, FOR SOLUTION INTRAVENOUS ONCE
Qty: 0 | Refills: 0 | Status: COMPLETED | OUTPATIENT
Start: 2018-10-26 | End: 2018-10-26

## 2018-10-26 RX ADMIN — Medication 650 MILLIGRAM(S): at 19:46

## 2018-10-26 RX ADMIN — Medication 81 MILLIGRAM(S): at 21:01

## 2018-10-26 RX ADMIN — Medication 80 MILLIGRAM(S): at 21:01

## 2018-10-26 RX ADMIN — Medication 40 MILLIEQUIVALENT(S): at 21:01

## 2018-10-26 RX ADMIN — PIPERACILLIN AND TAZOBACTAM 200 GRAM(S): 4; .5 INJECTION, POWDER, LYOPHILIZED, FOR SOLUTION INTRAVENOUS at 19:46

## 2018-10-26 RX ADMIN — Medication 30 MILLIGRAM(S): at 19:46

## 2018-10-26 NOTE — ED ADULT TRIAGE NOTE - DIRECT TO ROOM CARE INITIATED:
Chief Complaint   Patient presents with   • Follow-up     3 month f/u HTN       Nettie Aviles female 67 year old presents with hypertension follow up.       Current Medications    ANASTROZOLE (ARIMIDEX) 1 MG TABLET    Take 1 tablet by mouth daily.    ASCORBIC ACID (VITAMIN C) 250 MG TABLET    Take 250 mg by mouth daily.    ATORVASTATIN (LIPITOR) 20 MG TABLET    Take 1 tablet by mouth daily.    CLOPIDOGREL (PLAVIX) 75 MG TABLET    Take 1 tablet by mouth daily. Not to be taken together with aspirin.    LEVOTHYROXINE (SYNTHROID, LEVOTHROID) 88 MCG TABLET    Take 1 tablet by mouth daily.    MECLIZINE HCL (ANTIVERT) 25 MG TABLET    Take 1 tablet by mouth 3 times daily as needed for dizziness.    METOPROLOL (TOPROL-XL) 100 MG 24 HR TABLET    Take 1 tablet by mouth daily.    MULTIPLE VITAMINS-MINERALS (CENTRUM SILVER ULTRA WOMENS PO)    Take 1 tablet by mouth daily.    PAROXETINE (PAXIL) 40 MG TABLET    Take 1 tablet by mouth daily    SALINE NASAL (AYR,NASOGEL) GEL        TRAMADOL (ULTRAM) 50 MG TABLET    1 tab  every 6 hours prn pain    TRAZODONE (DESYREL) 150 MG TABLET    Take one and one-half tablets by mouth at bedtime as needed for sleep.     ALLERGIES:   Allergen Reactions   • Shellfish Allergy SHORTNESS OF BREATH     Chest tightness.   • Meloxicam RASH   • Aspirin RASH   • Celebrex [Celecoxib] RASH   • Penicillins RASH   • Septra [Bactrim]      Septra DS. Swelling    • Sulfa Drugs Cross Reactors      Swelling    • Vioxx [Rofecoxib]      Swelling      Visit Vitals  /67   Pulse 61   Resp 12   Wt 90.3 kg   BMI 33.63 kg/m²       GENERAL: Patient is awake, alert, and in no apparent distress. Appears stated age.  EYE: PERRLA/EOMI  EENT: Ears are clear without any cerumen or bulging erythematous membrane. Throat is clear. There is no sinus tenderness. No discolored rhinorrhea.   CV: S1S2, no murmurs, pulse is regular, no peripheral edema  RESP: Clear to auscultation, no wheezes, no apparent shortness of  breath  PSYCH: Does not appear anxious or depressed    Health Maintenance Summary     Topic Due On Due Status Completed On Postpone Until Reason    Osteoporosis Screening  Completed Jan 11, 2016      Colorectal Cancer Screening - Colonoscopy  Nov 25, 2018 Not Due Nov 25, 2015      Immunization - Pneumococcal  Completed Oct 12, 2015      Immunization - TDAP Pregnancy  Hidden       Medicare Wellness Visit Oct 23, 2018 Not Due Oct 23, 2017      IMMUNIZATION - DTaP/Tdap/Td Nov 7, 2006 Postponed Nov 6, 2006 Jan 24, 2018 Patient Refused    Immunization-Influenza  Completed Oct 23, 2017      Hepatitis C Screening Nov 6, 1999 Postponed  Jan 24, 2018 Patient Refused          ASSESSMENT AND PLAN:  Essential hypertension, benign  Patient is here for blood pressure. She feels that if she takes her metoprolol xl 100 mg daily in the night she can't sleep. She is not exercising. She is not having any chest pains or shortness of breath. She will continue with the metoprolol xl 100 mg in AM. Will see her for her MWV    Memory loss  She feels she has had more memory loss. She does drive. She does not forget where she is going. She just forgets little thiings. She tried PRevagen and that didn't help. She just wanted us to be aware.       No orders of the defined types were placed in this encounter.       26-Oct-2018 17:32

## 2018-10-26 NOTE — ED PROVIDER NOTE - PMH
AICD (automatic cardioverter/defibrillator) present    COPD (chronic obstructive pulmonary disease)    DM (diabetes mellitus)    HTN (hypertension)    RA (rheumatoid arthritis) AICD (automatic cardioverter/defibrillator) present    CAD (coronary artery disease)    CHF (congestive heart failure)    COPD (chronic obstructive pulmonary disease)    DM (diabetes mellitus)    GERD (gastroesophageal reflux disease)    HTN (hypertension)    RA (rheumatoid arthritis)

## 2018-10-26 NOTE — ED PROVIDER NOTE - MEDICAL DECISION MAKING DETAILS
Pt w/ SOB, cough, congestion, decreased PO intake. Rhonchi on the L upper and lower fields. Concern for PNA. Will do Labs, give abx. Will not give IV fluids due to pt's CHF.

## 2018-10-26 NOTE — ED ADULT NURSE NOTE - NSIMPLEMENTINTERV_GEN_ALL_ED
Implemented All Universal Safety Interventions:  [place] to call system. Call bell, personal items and telephone within reach. Instruct patient to call for assistance. Room bathroom lighting operational. Non-slip footwear when patient is off stretcher. Physically safe environment: no spills, clutter or unnecessary equipment. Stretcher in lowest position, wheels locked, appropriate side rails in place.

## 2018-10-26 NOTE — ED PROVIDER NOTE - OBJECTIVE STATEMENT
54 y/o f pt w/ hx of RA, DM, HTN, CAD, CHF, COPD, HLD, gout, GERD cholecystectomy presents c/o SOB,  congestion, productive cough w/ white sputum, fever, decreased PO intake x 2 days. Pt is from assisted living; PMD is Dr. Barajas who sent in pt for crackles in the left base and cough. Got flu shot this year. Pt has chronic pedal edema at baseline. Denies any other complaints. Former smoker. NKDA.

## 2018-10-26 NOTE — ED PROVIDER NOTE - CARE PLAN
Principal Discharge DX:	CHF exacerbation  Secondary Diagnosis:	PNA (pneumonia) Principal Discharge DX:	CHF exacerbation  Secondary Diagnosis:	PNA (pneumonia)  Secondary Diagnosis:	Hypokalemia

## 2018-10-27 DIAGNOSIS — N17.9 ACUTE KIDNEY FAILURE, UNSPECIFIED: ICD-10-CM

## 2018-10-27 DIAGNOSIS — E11.9 TYPE 2 DIABETES MELLITUS WITHOUT COMPLICATIONS: ICD-10-CM

## 2018-10-27 DIAGNOSIS — J96.02 ACUTE RESPIRATORY FAILURE WITH HYPERCAPNIA: ICD-10-CM

## 2018-10-27 DIAGNOSIS — I10 ESSENTIAL (PRIMARY) HYPERTENSION: ICD-10-CM

## 2018-10-27 DIAGNOSIS — J44.9 CHRONIC OBSTRUCTIVE PULMONARY DISEASE, UNSPECIFIED: ICD-10-CM

## 2018-10-27 DIAGNOSIS — I25.10 ATHEROSCLEROTIC HEART DISEASE OF NATIVE CORONARY ARTERY WITHOUT ANGINA PECTORIS: ICD-10-CM

## 2018-10-27 DIAGNOSIS — I50.9 HEART FAILURE, UNSPECIFIED: ICD-10-CM

## 2018-10-27 DIAGNOSIS — Z29.9 ENCOUNTER FOR PROPHYLACTIC MEASURES, UNSPECIFIED: ICD-10-CM

## 2018-10-27 DIAGNOSIS — J18.9 PNEUMONIA, UNSPECIFIED ORGANISM: ICD-10-CM

## 2018-10-27 LAB
24R-OH-CALCIDIOL SERPL-MCNC: 40.7 NG/ML — SIGNIFICANT CHANGE UP (ref 30–80)
ANION GAP SERPL CALC-SCNC: 8 MMOL/L — SIGNIFICANT CHANGE UP (ref 5–17)
BASE EXCESS BLDA CALC-SCNC: 0.8 MMOL/L — SIGNIFICANT CHANGE UP (ref -2–2)
BASE EXCESS BLDA CALC-SCNC: 2.1 MMOL/L — HIGH (ref -2–2)
BASE EXCESS BLDA CALC-SCNC: 2.4 MMOL/L — HIGH (ref -2–2)
BLOOD GAS COMMENTS ARTERIAL: SIGNIFICANT CHANGE UP
BUN SERPL-MCNC: 33 MG/DL — HIGH (ref 7–18)
CALCIUM SERPL-MCNC: 8.1 MG/DL — LOW (ref 8.4–10.5)
CHLORIDE SERPL-SCNC: 104 MMOL/L — SIGNIFICANT CHANGE UP (ref 96–108)
CHOLEST SERPL-MCNC: 119 MG/DL — SIGNIFICANT CHANGE UP (ref 10–199)
CK MB BLD-MCNC: 1.6 % — SIGNIFICANT CHANGE UP (ref 0–3.5)
CK MB CFR SERPL CALC: 2 NG/ML — SIGNIFICANT CHANGE UP (ref 0–3.6)
CK SERPL-CCNC: 124 U/L — SIGNIFICANT CHANGE UP (ref 21–215)
CO2 SERPL-SCNC: 30 MMOL/L — SIGNIFICANT CHANGE UP (ref 22–31)
CREAT SERPL-MCNC: 1.64 MG/DL — HIGH (ref 0.5–1.3)
EOSINOPHIL NFR BLD AUTO: 1 % — SIGNIFICANT CHANGE UP (ref 0–6)
FOLATE SERPL-MCNC: 18.4 NG/ML — SIGNIFICANT CHANGE UP
GLUCOSE SERPL-MCNC: 165 MG/DL — HIGH (ref 70–99)
HBA1C BLD-MCNC: 6.9 % — HIGH (ref 4–5.6)
HCO3 BLDA-SCNC: 29 MMOL/L — HIGH (ref 23–27)
HCO3 BLDA-SCNC: 30 MMOL/L — HIGH (ref 23–27)
HCO3 BLDA-SCNC: 31 MMOL/L — HIGH (ref 23–27)
HCT VFR BLD CALC: 37.2 % — SIGNIFICANT CHANGE UP (ref 34.5–45)
HDLC SERPL-MCNC: 39 MG/DL — LOW
HGB BLD-MCNC: 11.1 G/DL — LOW (ref 11.5–15.5)
HOROWITZ INDEX BLDA+IHG-RTO: 40 — SIGNIFICANT CHANGE UP
LIPID PNL WITH DIRECT LDL SERPL: 66 MG/DL — SIGNIFICANT CHANGE UP
LYMPHOCYTES # BLD AUTO: 15 % — SIGNIFICANT CHANGE UP (ref 13–44)
MAGNESIUM SERPL-MCNC: 2.1 MG/DL — SIGNIFICANT CHANGE UP (ref 1.6–2.6)
MCHC RBC-ENTMCNC: 29 PG — SIGNIFICANT CHANGE UP (ref 27–34)
MCHC RBC-ENTMCNC: 29.9 GM/DL — LOW (ref 32–36)
MCV RBC AUTO: 96.8 FL — SIGNIFICANT CHANGE UP (ref 80–100)
MONOCYTES NFR BLD AUTO: 13 % — SIGNIFICANT CHANGE UP (ref 2–14)
NEUTROPHILS NFR BLD AUTO: 71 % — SIGNIFICANT CHANGE UP (ref 43–77)
PCO2 BLDA: 65 MMHG — HIGH (ref 32–46)
PCO2 BLDA: 66 MMHG — HIGH (ref 32–46)
PCO2 BLDA: 70 MMHG — CRITICAL HIGH (ref 32–46)
PH BLDA: 7.26 — LOW (ref 7.35–7.45)
PH BLDA: 7.26 — LOW (ref 7.35–7.45)
PH BLDA: 7.28 — LOW (ref 7.35–7.45)
PHOSPHATE SERPL-MCNC: 4.9 MG/DL — HIGH (ref 2.5–4.5)
PLATELET # BLD AUTO: 268 K/UL — SIGNIFICANT CHANGE UP (ref 150–400)
PO2 BLDA: 60 MMHG — LOW (ref 74–108)
PO2 BLDA: 72 MMHG — LOW (ref 74–108)
PO2 BLDA: 80 MMHG — SIGNIFICANT CHANGE UP (ref 74–108)
POTASSIUM SERPL-MCNC: 3.3 MMOL/L — LOW (ref 3.5–5.3)
POTASSIUM SERPL-SCNC: 3.3 MMOL/L — LOW (ref 3.5–5.3)
RAPID RVP RESULT: SIGNIFICANT CHANGE UP
RBC # BLD: 3.84 M/UL — SIGNIFICANT CHANGE UP (ref 3.8–5.2)
RBC # FLD: 16.5 % — HIGH (ref 10.3–14.5)
SAO2 % BLDA: 87 % — LOW (ref 92–96)
SAO2 % BLDA: 92 % — SIGNIFICANT CHANGE UP (ref 92–96)
SAO2 % BLDA: 93 % — SIGNIFICANT CHANGE UP (ref 92–96)
SODIUM SERPL-SCNC: 142 MMOL/L — SIGNIFICANT CHANGE UP (ref 135–145)
TOTAL CHOLESTEROL/HDL RATIO MEASUREMENT: 3.1 RATIO — LOW (ref 3.3–7.1)
TRIGL SERPL-MCNC: 72 MG/DL — SIGNIFICANT CHANGE UP (ref 10–149)
TROPONIN I SERPL-MCNC: 0.06 NG/ML — HIGH (ref 0–0.04)
TSH SERPL-MCNC: 1.3 UU/ML — SIGNIFICANT CHANGE UP (ref 0.34–4.82)
VIT B12 SERPL-MCNC: >2000 PG/ML — HIGH (ref 232–1245)
WBC # BLD: 11.7 K/UL — HIGH (ref 3.8–10.5)
WBC # FLD AUTO: 11.7 K/UL — HIGH (ref 3.8–10.5)

## 2018-10-27 PROCEDURE — 93970 EXTREMITY STUDY: CPT | Mod: 26

## 2018-10-27 RX ORDER — ASPIRIN/CALCIUM CARB/MAGNESIUM 324 MG
81 TABLET ORAL DAILY
Qty: 0 | Refills: 0 | Status: DISCONTINUED | OUTPATIENT
Start: 2018-10-27 | End: 2018-11-05

## 2018-10-27 RX ORDER — HEPARIN SODIUM 5000 [USP'U]/ML
5000 INJECTION INTRAVENOUS; SUBCUTANEOUS EVERY 8 HOURS
Qty: 0 | Refills: 0 | Status: DISCONTINUED | OUTPATIENT
Start: 2018-10-27 | End: 2018-11-05

## 2018-10-27 RX ORDER — FUROSEMIDE 40 MG
40 TABLET ORAL EVERY 12 HOURS
Qty: 0 | Refills: 0 | Status: DISCONTINUED | OUTPATIENT
Start: 2018-10-27 | End: 2018-11-05

## 2018-10-27 RX ORDER — BUDESONIDE AND FORMOTEROL FUMARATE DIHYDRATE 160; 4.5 UG/1; UG/1
2 AEROSOL RESPIRATORY (INHALATION)
Qty: 0 | Refills: 0 | Status: DISCONTINUED | OUTPATIENT
Start: 2018-10-27 | End: 2018-10-27

## 2018-10-27 RX ORDER — INSULIN LISPRO 100/ML
VIAL (ML) SUBCUTANEOUS
Qty: 0 | Refills: 0 | Status: DISCONTINUED | OUTPATIENT
Start: 2018-10-27 | End: 2018-10-28

## 2018-10-27 RX ORDER — PREGABALIN 225 MG/1
1000 CAPSULE ORAL DAILY
Qty: 0 | Refills: 0 | Status: DISCONTINUED | OUTPATIENT
Start: 2018-10-27 | End: 2018-11-05

## 2018-10-27 RX ORDER — IPRATROPIUM/ALBUTEROL SULFATE 18-103MCG
3 AEROSOL WITH ADAPTER (GRAM) INHALATION EVERY 6 HOURS
Qty: 0 | Refills: 0 | Status: DISCONTINUED | OUTPATIENT
Start: 2018-10-27 | End: 2018-10-27

## 2018-10-27 RX ORDER — CEFTRIAXONE 500 MG/1
1 INJECTION, POWDER, FOR SOLUTION INTRAMUSCULAR; INTRAVENOUS ONCE
Qty: 0 | Refills: 0 | Status: COMPLETED | OUTPATIENT
Start: 2018-10-27 | End: 2018-10-27

## 2018-10-27 RX ORDER — CEFTRIAXONE 500 MG/1
INJECTION, POWDER, FOR SOLUTION INTRAMUSCULAR; INTRAVENOUS
Qty: 0 | Refills: 0 | Status: DISCONTINUED | OUTPATIENT
Start: 2018-10-27 | End: 2018-10-27

## 2018-10-27 RX ORDER — POTASSIUM CHLORIDE 20 MEQ
10 PACKET (EA) ORAL
Qty: 0 | Refills: 0 | Status: COMPLETED | OUTPATIENT
Start: 2018-10-27 | End: 2018-10-27

## 2018-10-27 RX ORDER — SITAGLIPTIN 50 MG/1
1 TABLET, FILM COATED ORAL
Qty: 0 | Refills: 0 | COMMUNITY

## 2018-10-27 RX ORDER — CHOLECALCIFEROL (VITAMIN D3) 125 MCG
1000 CAPSULE ORAL DAILY
Qty: 0 | Refills: 0 | Status: DISCONTINUED | OUTPATIENT
Start: 2018-10-27 | End: 2018-11-05

## 2018-10-27 RX ORDER — AZITHROMYCIN 500 MG/1
500 TABLET, FILM COATED ORAL ONCE
Qty: 0 | Refills: 0 | Status: COMPLETED | OUTPATIENT
Start: 2018-10-27 | End: 2018-10-27

## 2018-10-27 RX ORDER — ATORVASTATIN CALCIUM 80 MG/1
40 TABLET, FILM COATED ORAL AT BEDTIME
Qty: 0 | Refills: 0 | Status: DISCONTINUED | OUTPATIENT
Start: 2018-10-27 | End: 2018-11-05

## 2018-10-27 RX ORDER — PANTOPRAZOLE SODIUM 20 MG/1
40 TABLET, DELAYED RELEASE ORAL DAILY
Qty: 0 | Refills: 0 | Status: DISCONTINUED | OUTPATIENT
Start: 2018-10-27 | End: 2018-11-05

## 2018-10-27 RX ORDER — METFORMIN HYDROCHLORIDE 850 MG/1
1 TABLET ORAL
Qty: 0 | Refills: 0 | COMMUNITY

## 2018-10-27 RX ORDER — AZITHROMYCIN 500 MG/1
TABLET, FILM COATED ORAL
Qty: 0 | Refills: 0 | Status: DISCONTINUED | OUTPATIENT
Start: 2018-10-27 | End: 2018-10-27

## 2018-10-27 RX ADMIN — ATORVASTATIN CALCIUM 40 MILLIGRAM(S): 80 TABLET, FILM COATED ORAL at 21:07

## 2018-10-27 RX ADMIN — Medication 100 MILLIEQUIVALENT(S): at 15:22

## 2018-10-27 RX ADMIN — Medication 40 MILLIGRAM(S): at 10:26

## 2018-10-27 RX ADMIN — Medication 100 MILLIEQUIVALENT(S): at 13:17

## 2018-10-27 RX ADMIN — AZITHROMYCIN 250 MILLIGRAM(S): 500 TABLET, FILM COATED ORAL at 12:32

## 2018-10-27 RX ADMIN — HEPARIN SODIUM 5000 UNIT(S): 5000 INJECTION INTRAVENOUS; SUBCUTANEOUS at 15:22

## 2018-10-27 RX ADMIN — CEFTRIAXONE 100 GRAM(S): 500 INJECTION, POWDER, FOR SOLUTION INTRAMUSCULAR; INTRAVENOUS at 12:30

## 2018-10-27 RX ADMIN — Medication 125 MILLIGRAM(S): at 10:27

## 2018-10-27 RX ADMIN — PANTOPRAZOLE SODIUM 40 MILLIGRAM(S): 20 TABLET, DELAYED RELEASE ORAL at 12:19

## 2018-10-27 RX ADMIN — HEPARIN SODIUM 5000 UNIT(S): 5000 INJECTION INTRAVENOUS; SUBCUTANEOUS at 21:06

## 2018-10-27 RX ADMIN — Medication 100 MILLIEQUIVALENT(S): at 12:13

## 2018-10-27 NOTE — H&P ADULT - ASSESSMENT
53 Female form [facility] PMH of Obesity, CHF HFpEF 55%,  s/p ICD, HTN, B/l LE edema, DM, Gout, CAD, HLD, COPD ( not on home O2) presented to ED with SOB. As per patient she was having cough since 4-5 days along with Productive cough with whitish sputum, pt reports of having low grade fever on Thursday. patient was started on Abx at AL, fever was resolved but  with  worsening of SOB. Patient denies headaches, chest pain, palpitations, nausea, vomiting, diarrhea, abdominal pain or dysuria. Patient has been compliant  with her medications. patient has chronic B/l lower leg swelling, with CHF was on lasix 40 mg BID and metolazone 5 mg daily. Last ECHO was done in Jan 2018 has EF 55%.    on admission patient was afebrile, HD stable, O2 sat 85 on 40 % oxygen venti mask, AGB 7.28/65/60/87, cbc leucocytosis, elevated BUN and creatinine. CXR showed cardiomegaly, patient was in mod respiratory distress and lethargic, RRT was called patient was placed on Bipap   s/p lasix 120 mg, Iv solumedrol 125 mg and levofloxacin, pt was transferred to ICU

## 2018-10-27 NOTE — H&P ADULT - PROBLEM SELECTOR PLAN 5
no chest pain, no ekg changes   trop slightly elevated likely demand   c/w with aspirin, statin   Hold coreg for now due to SOB

## 2018-10-27 NOTE — H&P ADULT - PMH
AICD (automatic cardioverter/defibrillator) present    CAD (coronary artery disease)    CHF (congestive heart failure)    COPD (chronic obstructive pulmonary disease)    DM (diabetes mellitus)    GERD (gastroesophageal reflux disease)    HTN (hypertension)    RA (rheumatoid arthritis)

## 2018-10-27 NOTE — CONSULT NOTE ADULT - SUBJECTIVE AND OBJECTIVE BOX
Patient is a 53y old  Female who presents with a chief complaint of     Initial HPI on admission:  HPI:        PAST MEDICAL & SURGICAL HISTORY:  GERD (gastroesophageal reflux disease)  CHF (congestive heart failure)  CAD (coronary artery disease)  AICD (automatic cardioverter/defibrillator) present  COPD (chronic obstructive pulmonary disease)  RA (rheumatoid arthritis)  DM (diabetes mellitus)  HTN (hypertension)  History of cholecystectomy    Allergies    No Known Allergies    Intolerances      FAMILY HISTORY:  Family history of hypertension in mother    Social history reviewed: ***    Review of Systems:  CONSTITUTIONAL: No fever, chills, or fatigue  EYES: No eye pain, visual disturbances, or discharge  ENMT:  No difficulty hearing, tinnitus, vertigo; No sinus or throat pain  NECK: No pain or stiffness  RESPIRATORY: No cough, wheezing, chills or hemoptysis; No shortness of breath  CARDIOVASCULAR: No chest pain, palpitations, dizziness, or leg swelling  GASTROINTESTINAL: No abdominal or epigastric pain. No nausea, vomiting, or hematemesis; No diarrhea or constipation. No melena or hematochezia.  GENITOURINARY: No dysuria, frequency, hematuria, or incontinence  NEUROLOGICAL: No headaches, memory loss, loss of strength, numbness, or tremors  SKIN: No itching, burning, rashes, or lesions   MUSCULOSKELETAL: No joint pain or swelling; No muscle, back, or extremity pain  PSYCHIATRIC: No depression, anxiety, mood swings, or difficulty sleeping      Medications:  ALBUTerol/ipratropium for Nebulization 3 milliLiter(s) Nebulizer every 6 hours  aspirin  chewable 81 milliGRAM(s) Oral daily  atorvastatin 40 milliGRAM(s) Oral at bedtime  azithromycin  IVPB 500 milliGRAM(s) IV Intermittent once  azithromycin  IVPB      buDESOnide  80 MICROgram(s)/formoterol 4.5 MICROgram(s) Inhaler 2 Puff(s) Inhalation two times a day  cefTRIAXone   IVPB      cefTRIAXone   IVPB 1 Gram(s) IV Intermittent once  cholecalciferol 1000 Unit(s) Oral daily  cyanocobalamin 1000 MICROGram(s) Oral daily  furosemide   Injectable 40 milliGRAM(s) IV Push every 12 hours  heparin  Injectable 5000 Unit(s) SubCutaneous every 8 hours  insulin lispro (HumaLOG) corrective regimen sliding scale   SubCutaneous three times a day before meals  methylPREDNISolone sodium succinate Injectable 40 milliGRAM(s) IV Push every 8 hours  potassium chloride  10 mEq/100 mL IVPB 10 milliEquivalent(s) IV Intermittent every 1 hour      vent settings      Vital Signs Last 24 Hrs  T(C): 37 (27 Oct 2018 07:22), Max: 37.4 (26 Oct 2018 21:11)  T(F): 98.6 (27 Oct 2018 07:22), Max: 99.3 (26 Oct 2018 21:11)  HR: 79 (27 Oct 2018 07:22) (79 - 104)  BP: 132/84 (27 Oct 2018 07:22) (127/68 - 166/92)  BP(mean): --  RR: 18 (27 Oct 2018 07:22) (18 - 20)  SpO2: 92% (27 Oct 2018 07:22) (83% - 100%)    ABG - ( 27 Oct 2018 10:05 )  pH, Arterial: 7.28  pH, Blood: x     /  pCO2: 65    /  pO2: 60    / HCO3: 30    / Base Excess: 2.1   /  SaO2: 87                        LABS:                        11.1   11.7  )-----------( 268      ( 27 Oct 2018 06:34 )             37.2     10-27    142  |  104  |  33<H>  ----------------------------<  165<H>  3.3<L>   |  30  |  1.64<H>    Ca    8.1<L>      27 Oct 2018 06:34  Phos  4.9     10-27  Mg     2.1     10-27    TPro  7.0  /  Alb  2.8<L>  /  TBili  0.6  /  DBili  x   /  AST  12  /  ALT  18  /  AlkPhos  62  10-26      CARDIAC MARKERS ( 27 Oct 2018 06:34 )  0.059 ng/mL / x     / 124 U/L / x     / 2.0 ng/mL  CARDIAC MARKERS ( 26 Oct 2018 19:15 )  0.066 ng/mL / x     / 133 U/L / x     / 1.2 ng/mL      CAPILLARY BLOOD GLUCOSE        PT/INR - ( 26 Oct 2018 19:15 )   PT: 15.1 sec;   INR: 1.38 ratio         PTT - ( 26 Oct 2018 19:15 )  PTT:31.0 sec    CULTURES:        Physical Examination:    General: No acute distress.      HEENT: Pupils equal, reactive to light.  Symmetric.    PULM: Clear to auscultation bilaterally, no significant sputum production    CVS: Regular rate and rhythm, no murmurs, rubs, or gallops    ABD: Soft, nondistended, nontender, normoactive bowel sounds, no masses    EXT: No edema, nontender    SKIN: Warm and well perfused, no rashes noted.    NEURO: Alert, oriented, interactive, nonfocal    RADIOLOGY REVIEWED ***    CRITICAL CARE TIME SPENT: 35 minutes Patient is a 53y old  Female who presents with a chief complaint of SOB    HPI:      53 Female form [facility] PMH of Obesity, CHF HFpEF 55%,  s/p ICD, HTN, B/l LE edema, DM, Gout, CAD, HLD, COPD ( not on home O2) presented to ED with SOB. As per patient she was having cough since 4-5 days along with Productive cough with whitish sputum, pt reports of having low grade fever on Thursday. patient was started on Abx at AL, fever was resolved but  with  worsening of SOB. Patient denies headaches, chest pain, palpitations, nausea, vomiting, diarrhea, abdominal pain or dysuria. Patient has been compliant  with her medications. patient has chronic B/l lower leg swelling, with CHF was on lasix 40 mg BID and metolazone 5 mg daily. Last ECHO was done in Jan 2018 has EF 55%.      PAST MEDICAL & SURGICAL HISTORY:  GERD (gastroesophageal reflux disease)  CHF (congestive heart failure)  CAD (coronary artery disease)  AICD (automatic cardioverter/defibrillator) present  COPD (chronic obstructive pulmonary disease)  RA (rheumatoid arthritis)  DM (diabetes mellitus)  HTN (hypertension)  History of cholecystectomy      FAMILY HISTORY:  Family history of hypertension in mother         Review of Systems:  · Negative General Symptoms	no chills; no anorexia; no weight loss; no weight gain; no polyuria; no polydipsia  · General Symptoms	fever; fatigue; weakness  · Negative Skin Symptoms	no rash; no itching  · Negative Ophthalmologic Symptoms	no diplopia; no photophobia  · Negative ENMT Symptoms	no hearing difficulty  · Negative Respiratory and Thorax Symptoms	no pleuritic chest pain  · Respiratory and Thorax Symptoms	wheezing  dyspnea  cough  sputum production  · Negative Cardiovascular Symptoms	no chest pain; no palpitations; no orthopnea; no paroxysmal nocturnal dyspnea  · Cardiovascular Symptoms	dyspnea on exertion; peripheral edema  · Negative Gastrointestinal Symptoms	no nausea; no vomiting; no diarrhea; no abdominal pain  · Negative General Genitourinary Symptoms	no hematuria; no dysuria; normal urinary frequency  · Negative Musculoskeletal Symptoms	no arthralgia; no arthritis; no joint swelling  · Negative Neurological Symptoms	no headache  · Psychiatric	negative  · Hematology/Lymphatics	negative  · Endocrine	negative  · Allergic/Immunologic	negative        Medications:  ALBUTerol/ipratropium for Nebulization 3 milliLiter(s) Nebulizer every 6 hours  aspirin  chewable 81 milliGRAM(s) Oral daily  atorvastatin 40 milliGRAM(s) Oral at bedtime  azithromycin  IVPB 500 milliGRAM(s) IV Intermittent once  azithromycin  IVPB      buDESOnide  80 MICROgram(s)/formoterol 4.5 MICROgram(s) Inhaler 2 Puff(s) Inhalation two times a day  cefTRIAXone   IVPB      cefTRIAXone   IVPB 1 Gram(s) IV Intermittent once  cholecalciferol 1000 Unit(s) Oral daily  cyanocobalamin 1000 MICROGram(s) Oral daily  furosemide   Injectable 40 milliGRAM(s) IV Push every 12 hours  heparin  Injectable 5000 Unit(s) SubCutaneous every 8 hours  insulin lispro (HumaLOG) corrective regimen sliding scale   SubCutaneous three times a day before meals  methylPREDNISolone sodium succinate Injectable 40 milliGRAM(s) IV Push every 8 hours  potassium chloride  10 mEq/100 mL IVPB 10 milliEquivalent(s) IV Intermittent every 1 hour      Vital Signs Last 24 Hrs  T(C): 37 (27 Oct 2018 07:22), Max: 37.4 (26 Oct 2018 21:11)  T(F): 98.6 (27 Oct 2018 07:22), Max: 99.3 (26 Oct 2018 21:11)  HR: 79 (27 Oct 2018 07:22) (79 - 104)  BP: 132/84 (27 Oct 2018 07:22) (127/68 - 166/92)  BP(mean): --  RR: 18 (27 Oct 2018 07:22) (18 - 20)  SpO2: 92% (27 Oct 2018 07:22) (83% - 100%)    ABG - ( 27 Oct 2018 10:05 )  pH, Arterial: 7.28  pH, Blood: x     /  pCO2: 65    /  pO2: 60    / HCO3: 30    / Base Excess: 2.1   /  SaO2: 87                        LABS:                        11.1   11.7  )-----------( 268      ( 27 Oct 2018 06:34 )             37.2     10-27    142  |  104  |  33<H>  ----------------------------<  165<H>  3.3<L>   |  30  |  1.64<H>    Ca    8.1<L>      27 Oct 2018 06:34  Phos  4.9     10-27  Mg     2.1     10-27    TPro  7.0  /  Alb  2.8<L>  /  TBili  0.6  /  DBili  x   /  AST  12  /  ALT  18  /  AlkPhos  62  10-26      CARDIAC MARKERS ( 27 Oct 2018 06:34 )  0.059 ng/mL / x     / 124 U/L / x     / 2.0 ng/mL  CARDIAC MARKERS ( 26 Oct 2018 19:15 )  0.066 ng/mL / x     / 133 U/L / x     / 1.2 ng/mL      CAPILLARY BLOOD GLUCOSE        PT/INR - ( 26 Oct 2018 19:15 )   PT: 15.1 sec;   INR: 1.38 ratio         PTT - ( 26 Oct 2018 19:15 )  PTT:31.0 sec    CULTURES:         Physical Exam:  · Constitutional	detailed exam  · Constitutional Details	respiratory distress  · Respiratory Distress	moderate  · Constitutional Comments	Morbid obese, lethargic  · Eyes	detailed exam  · Eyes Details	PERRL; EOMI; conjunctiva clear  · ENMT	No oral lesions; no gross abnormalities  · Neck	detailed exam  · Neck Details	supple; no JVD  · Respiratory	detailed exam  · Respiratory Comments	B/l decreased breath sounds  · Cardiovascular	detailed exam  · Cardiovascular Details	regular rate and rhythm  no murmur  · Cardiovascular Details	positive S1; positive S2  · Gastrointestinal	detailed exam  · GI Normal	soft; nontender; no rebound tenderness; no guarding, abdominal hernia  · GI Abnormal	distended  · Gastrointestinal Comments	abdominal hernia  · Extremities	detailed exam  · Extremities Details	pedal edema  · Pedal Edema Severity	3+  · Pedal Edema Type	pitting  · Extremities Comments	chronic hyperpigmentation changes  · Vascular	detailed exam  · Radial Pulse	right normal; left normal  · DP Pulse	right normal; left normal  · Neurological	detailed exam  · Mental Status	AAOx2  · Cranial Nerve	intact  · Motor	5/5 upper and lower Ext  · Sensory	intact  · Skin	detailed exam  · Skin Details	warm and dry  · Lymph Nodes	No lymphadedenopathy  · Musculoskeletal	No joint pain, swelling or deformity; no limitation of movement  · Psychiatric	Affect and characteristics of appearance, verbalizations, behaviors are appropriate

## 2018-10-27 NOTE — CONSULT NOTE ADULT - ATTENDING COMMENTS
Patient seen and examined with resident, Addendum to above.    54y/o female with history of Obesity, CHF HFpEF 55%,  s/p ICD, HTN, B/l LE edema, DM, Gout, CAD, HLD patient likely also has underlying OHS. Admitted with shortness of breath and lower extremity swelling. ICU evaluation for hypercapnic respiratory failure. CXR noted with enlarged heart and pulmonary vascular congestion. Patient placed on bipap and given lasix. Will admit patient to ICU for optimization of cardiac function given acute decompensation of heart failure vs due to decompensation of suspected AMPARO     Assessment:  1. Acute hypercapnic respiratory failure  2. Decompensation of heart failure   3. r/o AMPARO/OHS  4. ANJU   5. DM  6. HTN     - Admit patient to ICU   - Continue lasix to achieve negative balance  - Cont. bipap support   - Obtain ABG on bipap   - Supplement electrolytes and repeat BMP  - No consolidation noted on chest xray, d/c antibiotics   - Patient likely has underlying OHS which may be the primary reason for CO2 retention   - No history of COPD in the past, d/c IV steroids  - Check RVP panel  - F/u cardiology recs  - DVT prophylaxis

## 2018-10-27 NOTE — CONSULT NOTE ADULT - SUBJECTIVE AND OBJECTIVE BOX
HPI:  53 Female form [facility] PMH of Obesity, CHF HFpEF 55%,  s/p ICD, HTN, B/l LE edema, DM, Gout, CAD, HLD, COPD ( not on home O2) presented to ED with SOB. As per patient she was having cough since 4-5 days along with Productive cough with whitish sputum, pt reports of having low grade fever on Thursday. patient was started on Abx at AL, fever was resolved but  with  worsening of SOB. Patient denies headaches, chest pain, palpitations, nausea, vomiting, diarrhea, abdominal pain or dysuria. Patient has been compliant  with her medications. patient has chronic B/l lower leg swelling, with CHF was on lasix 40 mg BID and metolazone 5 mg daily. Last ECHO was done in Jan 2018 has EF 55%. (27 Oct 2018 10:22)      PAST MEDICAL & SURGICAL HISTORY:  GERD (gastroesophageal reflux disease)  CHF (congestive heart failure)  CAD (coronary artery disease)  AICD (automatic cardioverter/defibrillator) present  COPD (chronic obstructive pulmonary disease)  RA (rheumatoid arthritis)  DM (diabetes mellitus)  HTN (hypertension)  History of cholecystectomy      No Known Allergies      Social Hx:    FAMILY HISTORY:  Family history of hypertension in mother (Mother)      aspirin  chewable 81 milliGRAM(s) Oral daily  atorvastatin 40 milliGRAM(s) Oral at bedtime  cholecalciferol 1000 Unit(s) Oral daily  cyanocobalamin 1000 MICROGram(s) Oral daily  furosemide   Injectable 40 milliGRAM(s) IV Push every 12 hours  heparin  Injectable 5000 Unit(s) SubCutaneous every 8 hours  insulin lispro (HumaLOG) corrective regimen sliding scale   SubCutaneous three times a day before meals  pantoprazole  Injectable 40 milliGRAM(s) IV Push daily      MEDICATIONS  (PRN):        LABS/DIAGNOSTIC TESTS                          11.1   11.7  )-----------( 268      ( 27 Oct 2018 06:34 )             37.2     WBC Count: 11.7 K/uL (10-27 @ 06:34)  WBC Count: 14.3 K/uL (10-26 @ 19:15)    PT/INR - ( 26 Oct 2018 19:15 )   PT: 15.1 sec;   INR: 1.38 ratio         PTT - ( 26 Oct 2018 19:15 )  PTT:31.0 sec    10-27    142  |  104  |  33<H>  ----------------------------<  165<H>  3.3<L>   |  30  |  1.64<H>    Ca    8.1<L>      27 Oct 2018 06:34  Phos  4.9     10-27  Mg     2.1     10-27    TPro  7.0  /  Alb  2.8<L>  /  TBili  0.6  /  DBili  x   /  AST  12  /  ALT  18  /  AlkPhos  62  10-26      CAPILLARY BLOOD GLUCOSE      POCT Blood Glucose.: 182 mg/dL (27 Oct 2018 16:23)      CARDIAC MARKERS ( 27 Oct 2018 06:34 )  0.059 ng/mL / x     / 124 U/L / x     / 2.0 ng/mL  CARDIAC MARKERS ( 26 Oct 2018 19:15 )  0.066 ng/mL / x     / 133 U/L / x     / 1.2 ng/mL        PRO BNP :    LIVER FUNCTIONS - ( 26 Oct 2018 19:15 )  Alb: 2.8 g/dL / Pro: 7.0 g/dL / ALK PHOS: 62 U/L / ALT: 18 U/L DA / AST: 12 U/L / GGT: x             ABG - ( 27 Oct 2018 19:42 )  pH, Arterial: 7.26  pH, Blood: x     /  pCO2: 70    /  pO2: 72    / HCO3: 31    / Base Excess: 2.4   /  SaO2: 92            RADIOLOGY :    CXR:  < from: Xray Chest 1 View AP/PA (10.26.18 @ 22:01) >  FINDINGS:     There is a cardiac conduction device overlying the left axilla with   intact lead to the heart.    There are no lung consolidations. The heart is enlarged. The osseous   structures are intact.    IMPRESSION:    No lung consolidations.    < end of copied text >    < from: US Duplex Venous Lower Ext Complete, Bilateral (10.27.18 @ 11:59) >    IMPRESSION:  No evidence of DVT in the bilateral femoropopliteal systems    < end of copied text >          ROS  [   ] UNABLE TO ELICIT     [   ] ALL ROS DONE    General:  [   ] Fever,    [   ] Malaise, [   ] LETHARGIC, [   ]  ANOREXIA    Skin: [   ]  RASH ,     HEENT:  [   ] Sore Throat  [   ] Photophobia	    Chest: [   ] SOB  [   ] Cough     Cardiovascular: [   ] CP	    Gastrointestinal: [   ] Abd pain   [   ] N    [   ] V   [   ] Diarrhea	    Genitourinary: [   ] Polyuria   [   ] Urgency   [   ] Dysuria    [   ]  Hematuria	    Musculoskeletal:  [  ] Back Pain	    Neurological: [  ]Dizziness  [  ]Visual Disturbance  [  ]Headaches    Vital Signs Last 24 Hrs  T(C): 36.2 (27 Oct 2018 16:00), Max: 37.4 (26 Oct 2018 21:11)  T(F): 97.2 (27 Oct 2018 16:00), Max: 99.3 (26 Oct 2018 21:11)  HR: 76 (27 Oct 2018 19:00) (72 - 104)  BP: 111/59 (27 Oct 2018 19:00) (105/50 - 140/66)  BP(mean): 70 (27 Oct 2018 19:00) (63 - 87)  RR: 16 (27 Oct 2018 19:00) (13 - 21)  SpO2: 94% (27 Oct 2018 19:00) (83% - 100%)    PHYSICAL EXAMINATION:  GENERAL APPEARANCE: NO DISTRESS  HEENT:  NO  PALLOR, NO  JVD, NO    NODES, NECK SUPPLE +  CVS: S1 +, S2 +,   RS: AEEB,   OCCASIONAL RALES +, NO  RONCHI  ABD: SOFT, NT, NO, BS +  EXT:  PE +  SKIN: WARM,   SKELETAL:  ROM  ACCEPTABLE  CNS:  AAO X  0  , NO  DEFICITS        ASSESSMENT AND PLAN:      - RESPIRATORY FAILURE, CHF EXACERBATION ON IV LASIX. BEING MONITORED IN ICU  - FEVER, COUGH - SUSPECT ACUTE BRONCHITIS  - GI AND DVT PROPHYLAXIS  - DR. NATARAJAN

## 2018-10-27 NOTE — H&P ADULT - ENDOCRINE
Carpal Tunnel Syndrome   AMBULATORY CARE:   Carpal tunnel syndrome (CTS)  is a condition that causes pressure to build in the carpal tunnel  The carpal tunnel is a small area between bones and tissues in your wrist  Swelling in this area puts pressure on the median nerve  The median nerve controls muscles and feeling in the hand  Common signs and symptoms:   · Dull, sharp, or shooting pain in your hand    · Numbness, tingling, or a burning feeling in your thumb, first finger, and middle finger    · Arm pain that may extend to your shoulder    · Weakness in your hand    · Swelling in your hand    · Not being able to control how your hand moves, or you drop objects  Seek care immediately if:   · You suddenly lose feeling in your hand or fingers and you cannot move them  · Your hand suddenly changes color  Contact your healthcare provider if:   · Your symptoms get worse  · Your hand and fingers are so weak that you cannot grab, squeeze, or lift items  · You have questions or concerns about your condition or care  Treatment  may not be needed  If your symptoms continue or are severe, you may need any of the following:  · NSAIDs  may be recommended to decrease swelling and pain  NSAIDs are available without a doctor's order  Ask your healthcare provider which medicine is right for you and how much to take  Take as directed  NSAIDs can cause stomach bleeding or kidney problems if not taken correctly  If you take blood thinning medicine, always ask your healthcare provider if NSAIDs are safe for you  · Steroid injections  may help decrease pain and swelling  Steroid medicine is injected into the carpal tunnel  · Transcutaneous electric nerve stimulation  uses mild electrical impulses to help decrease your wrist pain      · Surgery  called decompression may be used to take pressure off of the median nerve in your wrist   Manage your symptoms:   · Apply ice to your wrist   Ice helps decrease swelling and pain in your wrist  Ice may also help prevent tissue damage  Use an ice pack, or put crushed ice in a plastic bag  Cover the ice pack with a towel  Place it on your wrist for 15 to 20 minutes every hour, or as directed  · Rest your hands  Let your hands rest for a short time between repetitive motions, such as typing  If you feel pain, stop what you are doing and gently massage your wrist and hand  · Get physical and occupational therapy, if directed  Physical therapists will show you ways to exercise and strengthen your wrist  Occupational therapists will show you safe ways to use your wrist while you do your usual activities  · Use a wrist splint as directed  A splint will keep your wrist straight or in a slightly bent position  A wrist splint decreases pressure on the median nerve by letting your wrist rest  You may need to wear the splint for up to 8 weeks  You may need to wear it at night  Follow up with your healthcare provider as directed:  Write down your questions so you remember to ask them during your visits  © 2017 [facility] Information is for End User's use only and may not be sold, redistributed or otherwise used for commercial purposes  All illustrations and images included in CareNotes® are the copyrighted property of A D A M [employer] Inc  or Aryan Chowdary  The above information is an  only  It is not intended as medical advice for individual conditions or treatments  Talk to your doctor, nurse or pharmacist before following any medical regimen to see if it is safe and effective for you  Velcro wrist splints  He can purchase these at the drug store  Return to emergency room if condition worsens  negative

## 2018-10-27 NOTE — CONSULT NOTE ADULT - PROBLEM SELECTOR RECOMMENDATION 9
Patient came with SOB, productive cough with B/l lower Ext swelling   afebrile, Hd stable, mild leucocytosis   on PE patient was in mod respiratory distress, with B/l decreased breath sounds   Trop t1 0.066, Bnp 7k   ABG 7.28/65/60/87 on 40% O2 venti mask   respiratory Failure likely due to CHF exacerbation Vs COPD   s/p 120 mg Lasix in 24 hrs. s/p IV solumedrol   placed on Bipap for hypercapnic RF   ICU team evaluated the patient and patient was tranferred to ICU   c/w with Aspirin, statin, lasix 40 mg BID   daily I/o   f/u venous doppler. heparin sc

## 2018-10-27 NOTE — H&P ADULT - NSHPLABSRESULTS_GEN_ALL_CORE
11.1   11.7  )-----------( 268      ( 27 Oct 2018 06:34 )             37.2       10-27    142  |  104  |  33<H>  ----------------------------<  165<H>  3.3<L>   |  30  |  1.64<H>    Ca    8.1<L>      27 Oct 2018 06:34  Phos  4.9     10-27  Mg     2.1     10-27    TPro  7.0  /  Alb  2.8<L>  /  TBili  0.6  /  DBili  x   /  AST  12  /  ALT  18  /  AlkPhos  62  10-26      Blood Gas Profile - Arterial (10.27.18 @ 10:05)    pH, Arterial: 7.28    pCO2, Arterial: 65 mmHg    pO2, Arterial: 60 mmHg    HCO3, Arterial: 30 mmol/L    Base Excess, Arterial: 2.1 mmol/L    Oxygen Saturation, Arterial: 87 %    FIO2, Arterial: 40    Blood Gas Comments Arterial: left radial

## 2018-10-27 NOTE — PATIENT PROFILE ADULT - NSPROGENOTHERPROVIDER_GEN_A_NUR
History & Physical    Date of admission: 1/11/2018    Patient name: Vivian Magallanes  MRN: [id number]  YOB: 1944  Age: 74 y.o. Primary care provider:  Bill Cheng MD     Source of Information: patient, ED and medical records                                 Chief complaint:   Patient does not provide    History of present illness  Vivina Magallanes is a 74 y.o. male from [facility] with past medical history of sleep disorder presented to the ED from the [facility] via EMS with reported altered mental status. Patient is a poor historian. Majority of limited history is obtained per review of ED and medical records. Per these collective reports, per EMS, patient became acutely confused tonight. Patient reportedly is usually oriented but en route to ED, he was only oriented to self and place. On arrival in the ED, patient complained of back pain. Workup included CT head which was negative. CT lumbar spine revealed an acute L1 fracture and L4, L5 central spinal stenosis. ABG showed pO2 = 57, pCO2= 46.0, saO2= 88%. CTA chest revealed left lower lobe pneumonia and emphysema but no PE.  patient was started on Levofloxacin and Zosyn. CK= 1,926. Patient was given 0.9% NS 1000 ml IV fluid bolus. Patient is now seen for admission to the hospitalist service for continued evaluation and treatments. There were no reports of new onset syncope, loss of consciousness, headache, neck pain, visual disturbance, numbness, paresthesias, focal weakness, chest pain, palpitations, abdominal pain, nausea, vomiting, diarrhea, calf pain, increased leg swelling/ edema, rash, fever, chills. Past Medical History:   Diagnosis Date    Sleep disorder     insomnia      History reviewed. No pertinent surgical history. Prior to Admission medications    Medication Sig Start Date End Date Taking?  Authorizing Provider   temazepam (RESTORIL) 30 mg capsule Take  by mouth nightly as needed for Sleep. Yes Bill Cheng MD   traZODone (DESYREL) 150 mg tablet Take 150 mg by mouth nightly. Yes Bill Cheng MD   risperiDONE (RISPERDAL) 2 mg tablet Take  by mouth. Yes Bill Cheng MD     ALLERGIES:  NO KNOWN DRUG ALLERGIES    FAMILY HISTORY:  Unable to obtain from patient        Social history  Patient resides    Independently      With family care      Assisted living      SNF    Ambulates    Independently      With cane       Assisted walker         Alcohol history     None     Social     Chronic   Smoking history    None     Former smoker     Current smoker     History   Smoking Status    Unknown If Ever Smoked   Smokeless Tobacco    Never Used       Code status  x  Full code     Review of systems  All systems reviewed; pertinent positives were as noted in HPI, otherwise negative. Physical Examination   Visit Vitals    /57    Pulse 70    Temp 97.8 °F (36.6 °C)    Resp 16    Ht 5' 9\" (1.753 m)    Wt 79.4 kg (175 lb)    SpO2 98%    BMI 25.84 kg/m2      O2 Flow Rate (L/min): 2 l/min   O2 Device: Nasal cannula    General:  Elderly appearing male in no acute respiratory distress; appears disheveled   Head:  Normocephalic, without obvious abnormality, atraumatic   Eyes:  Conjunctivae/corneas clear. PERRL, EOMs intact   E/N/M/T: Nares normal. Septum midline.  No nasal drainage or sinus tenderness  Tongue midline/ non-edematous  Oral mucosa dry  Clear oropharynx   Neck: Normal appearance and movements, symmetrical, trachea midline  No palpable adenopathy  No thyroid enlargement, tenderness or nodules  No carotid bruit   Normal JVD   Lungs:   Symmetrical chest expansion and respiratory effort  Clear to auscultation bilaterally   Chest wall:  No tenderness or deformity   Heart:  Regular rate and rhythm   Sounds normal; no murmur, click, rub or gallop   Abdomen:   Soft, no tenderness  No rebound, guarding, or rigidity  Non-distended  Bowel sounds normal  No masses or hepatosplenomegaly  No hernias present   Back: Generalized tenderness   Extremities: Extremities normal, atraumatic  No cyanosis or edema     Pulses 2+ radial/ 1+ DP bilateral pulses   Skin: No rashes or ulcers  Warm// dry   Musculo-      skeletal: Gait not tested  No calf tenderness   Neuro: GCS 14 (E4 V4 M6). Moves all extremities x 4 with generalized weakness. No slurred speech. No facial droop. Sensation grossly intact. Psych: Intermittently somnolent but arouses and slowly orients x 3  Very slow to answer some questions       Data Review      24 Hour Results:  Recent Results (from the past 24 hour(s))   MAGNESIUM    Collection Time: 01/11/18  1:19 AM   Result Value Ref Range    Magnesium 2.1 1.6 - 2.4 mg/dL   PHOSPHORUS    Collection Time: 01/11/18  1:19 AM   Result Value Ref Range    Phosphorus 4.4 2.6 - 4.7 MG/DL   CBC WITH AUTOMATED DIFF    Collection Time: 01/11/18  1:19 AM   Result Value Ref Range    WBC 8.5 4.1 - 11.1 K/uL    RBC 3.57 (L) 4.10 - 5.70 M/uL    HGB 12.1 12.1 - 17.0 g/dL    HCT 35.9 (L) 36.6 - 50.3 %    .6 (H) 80.0 - 99.0 FL    MCH 33.9 26.0 - 34.0 PG    MCHC 33.7 30.0 - 36.5 g/dL    RDW 12.6 11.5 - 14.5 %    PLATELET 663 739 - 842 K/uL    NEUTROPHILS 84 (H) 32 - 75 %    LYMPHOCYTES 8 (L) 12 - 49 %    MONOCYTES 8 5 - 13 %    EOSINOPHILS 0 0 - 7 %    BASOPHILS 0 0 - 1 %    ABS. NEUTROPHILS 7.1 1.8 - 8.0 K/UL    ABS. LYMPHOCYTES 0.7 (L) 0.8 - 3.5 K/UL    ABS. MONOCYTES 0.7 0.0 - 1.0 K/UL    ABS. EOSINOPHILS 0.0 0.0 - 0.4 K/UL    ABS.  BASOPHILS 0.0 0.0 - 0.1 K/UL    DF SMEAR SCANNED      RBC COMMENTS MACROCYTOSIS  1+        RBC COMMENTS OVALOCYTES  PRESENT       METABOLIC PANEL, COMPREHENSIVE    Collection Time: 01/11/18  1:19 AM   Result Value Ref Range    Sodium 137 136 - 145 mmol/L    Potassium 3.9 3.5 - 5.1 mmol/L    Chloride 100 97 - 108 mmol/L    CO2 30 21 - 32 mmol/L    Anion gap 7 5 - 15 mmol/L    Glucose 101 (H) 65 - 100 mg/dL    BUN 16 6 - 20 MG/DL Creatinine 1.03 0.70 - 1.30 MG/DL    BUN/Creatinine ratio 16 12 - 20      GFR est AA >60 >60 ml/min/1.73m2    GFR est non-AA >60 >60 ml/min/1.73m2    Calcium 8.9 8.5 - 10.1 MG/DL    Bilirubin, total 0.9 0.2 - 1.0 MG/DL    ALT (SGPT) 38 12 - 78 U/L    AST (SGOT) 79 (H) 15 - 37 U/L    Alk.  phosphatase 68 45 - 117 U/L    Protein, total 7.4 6.4 - 8.2 g/dL    Albumin 3.4 (L) 3.5 - 5.0 g/dL    Globulin 4.0 2.0 - 4.0 g/dL    A-G Ratio 0.9 (L) 1.1 - 2.2     CK W/ CKMB & INDEX    Collection Time: 01/11/18  1:19 AM   Result Value Ref Range    CK 1926 (H) 39 - 308 U/L    CK - MB 9.2 (H) <3.6 NG/ML    CK-MB Index 0.5 0 - 2.5     TROPONIN I    Collection Time: 01/11/18  1:19 AM   Result Value Ref Range    Troponin-I, Qt. <0.04 <0.05 ng/mL   PTT    Collection Time: 01/11/18  1:19 AM   Result Value Ref Range    aPTT 49.7 (H) 22.1 - 32.5 sec    aPTT, therapeutic range     58.0 - 77.0 SECS   ETHYL ALCOHOL    Collection Time: 01/11/18  1:19 AM   Result Value Ref Range    ALCOHOL(ETHYL),SERUM <10 <10 MG/DL   AMMONIA    Collection Time: 01/11/18  1:19 AM   Result Value Ref Range    Ammonia 14 <32 UMOL/L   POC LACTIC ACID    Collection Time: 01/11/18  1:30 AM   Result Value Ref Range    Lactic Acid (POC) 1.1 0.4 - 2.0 mmol/L   EKG, 12 LEAD, INITIAL    Collection Time: 01/11/18  2:13 AM   Result Value Ref Range    Ventricular Rate 76 BPM    Atrial Rate 76 BPM    P-R Interval 118 ms    QRS Duration 72 ms    Q-T Interval 408 ms    QTC Calculation (Bezet) 459 ms    Calculated P Axis 78 degrees    Calculated R Axis 65 degrees    Calculated T Axis 85 degrees    Diagnosis Normal sinus rhythm  No previous ECGs available      URINALYSIS W/ RFLX MICROSCOPIC    Collection Time: 01/11/18  2:27 AM   Result Value Ref Range    Color DARK YELLOW      Appearance CLOUDY (A) CLEAR      Specific gravity 1.027 1.003 - 1.030      pH (UA) 5.0 5.0 - 8.0      Protein 30 (A) NEG mg/dL    Glucose NEGATIVE  NEG mg/dL    Ketone NEGATIVE  NEG mg/dL    Bilirubin NEGATIVE  NEG      Blood NEGATIVE  NEG      Urobilinogen 0.2 0.2 - 1.0 EU/dL    Nitrites NEGATIVE  NEG      Leukocyte Esterase NEGATIVE  NEG      WBC 0-4 0 - 4 /hpf    RBC 0-5 0 - 5 /hpf    Epithelial cells FEW FEW /lpf    Bacteria NEGATIVE  NEG /hpf    Mucus 1+ (A) NEG /lpf    Amorphous Crystals FEW (A) NEG     DRUG SCREEN, URINE    Collection Time: 01/11/18  2:27 AM   Result Value Ref Range    AMPHETAMINES NEGATIVE  NEG      BARBITURATES NEGATIVE  NEG      BENZODIAZEPINES POSITIVE (A) NEG      COCAINE NEGATIVE  NEG      METHADONE NEGATIVE  NEG      OPIATES NEGATIVE  NEG      PCP(PHENCYCLIDINE) NEGATIVE  NEG      THC (TH-CANNABINOL) NEGATIVE  NEG      Drug screen comment (NOTE)    POC G3 - PUL    Collection Time: 01/11/18  2:45 AM   Result Value Ref Range    pH (POC) 7.361 7.35 - 7.45      pCO2 (POC) 46.0 (H) 35.0 - 45.0 MMHG    pO2 (POC) 57 (L) 80 - 100 MMHG    HCO3 (POC) 26.1 (H) 22 - 26 MMOL/L    sO2 (POC) 88 (L) 92 - 97 %    Base excess (POC) 1 mmol/L    Site RIGHT BRACHIAL      Device: ROOM AIR      Allens test (POC) N/A      Specimen type (POC) ARTERIAL      Total resp. rate 18       Recent Labs      01/11/18   0119   WBC  8.5   HGB  12.1   HCT  35.9*   PLT  172     Recent Labs      01/11/18   0119   NA  137   K  3.9   CL  100   CO2  30   GLU  101*   BUN  16   CREA  1.03   CA  8.9   MG  2.1   PHOS  4.4   ALB  3.4*   TBILI  0.9   SGOT  79*   ALT  38       Imaging  CT HEAD WO CONT     INDICATION:   AMS, confusion     COMPARISON: None.     CONTRAST:  None.     TECHNIQUE: Unenhanced CT of the head was performed using 5 mm images. Brain and  bone windows were generated. CT dose reduction was achieved through use of a  standardized protocol tailored for this examination and automatic exposure  control for dose modulation.       FINDINGS:  The ventricles and sulci are normal in size, shape and configuration and  midline.  There is periventricular hypoattenuation compatible with chronic small  vessel ischemic changes. There is no intracranial hemorrhage, extra-axial  collection, mass, mass effect or midline shift. The basilar cisterns are open. No acute infarct is identified. The bone windows demonstrate no abnormalities. The visualized portions of the paranasal sinuses and mastoid air cells are  clear. IMPRESSION: No acute findings. CT lumbar spine without contrast:  FINDINGS:     There is a mild compression deformity of L2 with a fracture of the anterior  superior lip of the vertebral body. There is a benign-appearing area of  sclerosis within the right iliac wing.     T12-L1: The spinal canal and neural foramina are widely patent.     L1-L2:   The spinal canal and neural foramina are widely patent.     L2-L3:   The spinal canal and neural foramina are widely patent.     L3-L4:   The spinal canal and neural foramina are widely patent.     L4-L5:   There is a disc bulge and bilateral ligamentum flavum hypertrophy  resulting in moderate central spinal stenosis.     L5-S1:   The spinal canal and neural foramina are widely patent. IMPRESSION:     1. Acute L1 fracture. 2. Central spinal stenosis L4-5. XR CHEST PORT     INDICATION:  Chest Pain     COMPARISON:  None.     FINDINGS: A portable AP radiograph of the chest was obtained. The patient is on  a cardiac monitor. The lungs are hyperinflated. There is airspace disease in  the left lower lobe. The cardiac and mediastinal contours and pulmonary  vascularity are normal.  The bones and soft tissues are grossly within normal  limits.      IMPRESSION: Left lower lobe airspace disease which may be acute or chronic    Assessment and Plan   1. AMS (altered mental status). Admit to telemetry. Place on neurovascular checks and fall precautions. Ordered acetaminophen, salicylate, TSH levels. 2.  Pneumonia. Continue IV antibiotic coverage with Zosyn and Levofloxacin. Check blood culture results. May adjust antibiotics accordingly.      3.  Acute on likely chronic hypercapnic/ hypoxemic respiratory failure- with compensated primary respiratory acidosis. Continue supplemental oxygen. Continuous pulse oximetry monitoring. 4.  Acute L1 fracture. Consult orthopedic spine specialist for further evaluation and treatments. In interim, patient will be on bedrest.    5. L4-L5 spinal stenosis. Plan as noted above. 6.  Back pain. Plan as above. 7.  COPD. Order Duoneb nebulizer treatments. 8.   Elevated CK total.  Possible rhabdomyolysis. Continue IV fluid hydration. Repeat CK levels. Check urine myoglobin. 9.  Sleep disorder (unspecified). Considering ABG results possible sleep apnea. Recommend overnight sleep study to evaluate for the same. Will likely need eventual CPAP. VTE prophylaxis. SCDs to BLEs.        Signed by: Milton Holter, MD    January 11, 2018 at 5:05 AM none

## 2018-10-27 NOTE — CONSULT NOTE ADULT - PROBLEM SELECTOR RECOMMENDATION 2
patient has h/o CHF HF pEf 55% last EcHO jan 2018  on lasix 40 mg BID pO and metolazone 5 mg daily   trop T1 0,06 T2 0.055   EKG showed RBBB with sinus rhythm  with no st t changes    F/u ECHO   tele monitor   continue with Diuresis with Lasix 40 mg BID   monitor daily I/o's   cardio consult Dr Gold.

## 2018-10-27 NOTE — H&P ADULT - PROBLEM SELECTOR PLAN 1
Patient came with SOB, productive cough with B/l lower Ext swelling   afebrile, Hd stable, mild leucocytosis   on PE patient was in mod respiratory distress, with B/l decreased breath sounds   Trop t1 0.066, Bnp 7k   ABG 7.28/65/60/87 on 40% O2 venti mask   respiratory Failure likely due to CHF exacerbation Vs COPD   s/p 120 mg Lasix in 24 hrs. s/p IV solumedrol   placed on Bipap for hypercapnic RF   ICU team evaluated the patient and patient was tranferred to ICU   c/w with Aspirin, statin, lasix 40 mg BID   daily I/o   f/u venous doppler

## 2018-10-27 NOTE — H&P ADULT - NSHPPHYSICALEXAM_GEN_ALL_CORE
Vital Signs Last 24 Hrs  T(C): 37 (27 Oct 2018 07:22), Max: 37.4 (26 Oct 2018 21:11)  T(F): 98.6 (27 Oct 2018 07:22), Max: 99.3 (26 Oct 2018 21:11)  HR: 79 (27 Oct 2018 07:22) (79 - 104)  BP: 132/84 (27 Oct 2018 07:22) (127/68 - 166/92)  BP(mean): --  RR: 18 (27 Oct 2018 07:22) (18 - 20)  SpO2: 92% (27 Oct 2018 07:22) (83% - 100%)

## 2018-10-27 NOTE — CONSULT NOTE ADULT - ASSESSMENT
53 Female form [facility] PMH of Obesity, CHF HFpEF 55%,  s/p ICD, HTN, B/l LE edema, DM, Gout, CAD, HLD, COPD ( not on home O2) presented to ED with SOB. As per patient she was having cough since 4-5 days along with Productive cough with whitish sputum, pt reports of having low grade fever on Thursday. patient was started on Abx at AL, fever was resolved but  with  worsening of SOB. Patient denies headaches, chest pain, palpitations, nausea, vomiting, diarrhea, abdominal pain or dysuria. Patient has been compliant  with her medications. patient has chronic B/l lower leg swelling, with CHF was on lasix 40 mg BID and metolazone 5 mg daily. Last ECHO was done in Jan 2018 has EF 55%.    on admission patient was afebrile, HD stable, O2 sat 85 on 40 % oxygen venti mask, AGB 7.28/65/60/87, cbc leucocytosis, elevated BUN and creatinine. CXR showed cardiomegaly, patient was in mod respiratory distress and lethargic, RRT was called patient was placed on Bipap   s/p lasix 120 mg, Iv solumedrol 125 mg and levofloxacin, pt was transferred to ICU

## 2018-10-27 NOTE — CONSULT NOTE ADULT - PROBLEM SELECTOR RECOMMENDATION 3
patient came with productive cough and h/o low grade fever at NH   mild leucocytosis   c/w Rocephin and Azithromycin   f/u procal.

## 2018-10-27 NOTE — CONSULT NOTE ADULT - PROBLEM SELECTOR RECOMMENDATION 5
no chest pain, no ekg changes   trop slightly elevated likely demand   c/w with aspirin, statin   Hold coreg for now due to SOB.

## 2018-10-27 NOTE — H&P ADULT - PROBLEM SELECTOR PLAN 3
patient came with productive cough and h/o low grade fever at NH   mild leucocytosis   c/w Rocephin and Azithromycin   f/u procal

## 2018-10-27 NOTE — CONSULT NOTE ADULT - SUBJECTIVE AND OBJECTIVE BOX
Requesting Physician : Dr. Barajas    Reason for Consultation: CHF     HISTORY OF PRESENT ILLNESS:  52 yo F with history of HFpEF (last TTE with EF 55%), s/p AICD, HTN, HLD, COPD, ? history of CAD who is being seen for heart failure.  The patient was admitted from NH with dyspnea and cough for 4-5 days.  Upon evaluation, the patient was lethargic and unable to provide a full history.  She denies chest pain.  She was admitted and found to have hypercapneic respiratory failure.  She was evaluated by the ICU and uninitiated on BIPAP with plans for ICU transfer.       PAST MEDICAL & SURGICAL HISTORY:  GERD (gastroesophageal reflux disease)  CHF (congestive heart failure)  CAD (coronary artery disease)  AICD (automatic cardioverter/defibrillator) present  COPD (chronic obstructive pulmonary disease)  RA (rheumatoid arthritis)  DM (diabetes mellitus)  HTN (hypertension)  History of cholecystectomy          MEDICATIONS:  MEDICATIONS  (STANDING):  ALBUTerol/ipratropium for Nebulization 3 milliLiter(s) Nebulizer every 6 hours  aspirin  chewable 81 milliGRAM(s) Oral daily  atorvastatin 40 milliGRAM(s) Oral at bedtime  azithromycin  IVPB      buDESOnide  80 MICROgram(s)/formoterol 4.5 MICROgram(s) Inhaler 2 Puff(s) Inhalation two times a day  cefTRIAXone   IVPB      cholecalciferol 1000 Unit(s) Oral daily  cyanocobalamin 1000 MICROGram(s) Oral daily  furosemide   Injectable 40 milliGRAM(s) IV Push every 12 hours  heparin  Injectable 5000 Unit(s) SubCutaneous every 8 hours  insulin lispro (HumaLOG) corrective regimen sliding scale   SubCutaneous three times a day before meals  methylPREDNISolone sodium succinate Injectable 40 milliGRAM(s) IV Push every 8 hours  pantoprazole  Injectable 40 milliGRAM(s) IV Push daily  potassium chloride  10 mEq/100 mL IVPB 10 milliEquivalent(s) IV Intermittent every 1 hour      Allergies    No Known Allergies    Intolerances        FAMILY HISTORY:  Family history of hypertension in mother (Mother)    Non-contributary for premature coronary disease or sudden cardiac death    SOCIAL HISTORY:    [x ] Non-smoker  [ ] Smoker  [ ] Alcohol      REVIEW OF SYSTEMS:  [ ]chest pain  [ x ]shortness of breath  [  ]palpitations  [  ]syncope  [ ]near syncope [ ]upper extremity weakness   [ ] lower extremity weakness  [  ]diplopia  [  ]altered mental status   [  ]fevers  [ ]chills [ ]nausea  [ ]vomitting  [  ]dysphagia    [ ]abdominal pain  [ ]melena  [ ]BRBPR    [  ]epistaxis  [  ]rash    [x ]lower extremity edema        [ ] All others negative	  [ ] Unable to obtain    PHYSICAL EXAM:  T(C): 36.8 (10-27-18 @ 11:32), Max: 37.4 (10-26-18 @ 21:11)  HR: 81 (10-27-18 @ 11:32) (79 - 104)  BP: 137/75 (10-27-18 @ 11:32) (127/68 - 166/92)  RR: 18 (10-27-18 @ 11:32) (18 - 20)  SpO2: 96% (10-27-18 @ 11:32) (83% - 100%)  Wt(kg): --  I&O's Summary        I	  Lymphatic: No lymphadenopathy + edema in LE bilaterally   Cardiovascular: Normal S1 S2, RRR, No JVD, No murmurs , Peripheral pulses palpable 2+ bilaterally  Respiratory: decreased BS at bases bilaterally 	  Gastrointestinal:  Soft, Non-tender, + BS	  Skin: No rashes, No ecchymoses, No cyanosis, warm to touch        TELEMETRY: SR	    ECG: SR, RBBB  	  RADIOLOGY:  OTHER:     DIAGNOSTIC TESTING:  [ ] Echocardiogram: < from: Transthoracic Echocardiogram (01.18.18 @ 07:37) >  CONCLUSIONS:  1. Mild mitral regurgitation.  2. Moderately dilated left atrium.  LA volume index = 42  cc/m2.  3. Normal left ventricular internal dimensions and wall  thicknesses.  4. Endocardium not well visualized; grossly low normal left  ventricular systolic function. Segmental wall motion could  not be assessed.  5. Mild right atrial enlargement.  6. Right ventricular enlargement with normal RV function. A  device lead is visualized in the right heart.  7. RV systolic pressure is 48 mm Hg. Mild pulmonary  hypertension.  8. There is severe tricuspid regurgitation.  9. Trace pericardial effusion.    < end of copied text >    [ ]  Catheterization:  [ ] Stress Test:    	  	  LABS:	 	    CARDIAC MARKERS:  CARDIAC MARKERS ( 27 Oct 2018 06:34 )  0.059 ng/mL / x     / 124 U/L / x     / 2.0 ng/mL  CARDIAC MARKERS ( 26 Oct 2018 19:15 )  0.066 ng/mL / x     / 133 U/L / x     / 1.2 ng/mL                              11.1   11.7  )-----------( 268      ( 27 Oct 2018 06:34 )             37.2     10-27    142  |  104  |  33<H>  ----------------------------<  165<H>  3.3<L>   |  30  |  1.64<H>    Ca    8.1<L>      27 Oct 2018 06:34  Phos  4.9     10-27  Mg     2.1     10-27    TPro  7.0  /  Alb  2.8<L>  /  TBili  0.6  /  DBili  x   /  AST  12  /  ALT  18  /  AlkPhos  62  10-26    proBNP: Serum Pro-Brain Natriuretic Peptide: 7597 pg/mL (10-26 @ 19:15)    Lipid Profile:   HgA1c: Hemoglobin A1C, Whole Blood: 6.9 % (10-27 @ 09:28)    TSH: Thyroid Stimulating Hormone, Serum: 1.30 uU/mL (10-27 @ 06:34)      ASSESSMENT/PLAN: 	53yFemale with history of HFpEF, HTN, HLD, COPD admitted with dyspnea/cough found to have hypercapnic respiratory failure.   -BIPAP per pulm/ICU  -continue with IV diuresis to keep O > I  -check TTE  -pt. with ? history of PAF - will obtain old records  -abx per primary team  -further workup pending above    Camilo Polk MD

## 2018-10-27 NOTE — H&P ADULT - PROBLEM SELECTOR PLAN 9
IMPROVE VTE Individual Risk Assessment          RISK                                                          Points  [  ] Previous VTE                                                3  [  ] Thrombophilia                                             2  [  ] Lower limb paralysis                                   2        (unable to hold up >15 seconds)    [  ] Current Cancer                                             2         (within 6 months)  [ x ] Immobilization > 24 hrs                              1  [ x ] ICU/CCU stay > 24 hours                             1  [  ] Age > 60                                                         1    IMPROVE VTE Score: VTE 2 heparin for DVT prophylaxis   protonix for GI prophylaxis

## 2018-10-27 NOTE — H&P ADULT - PROBLEM SELECTOR PLAN 8
came with elevated BUN and creatinine level   at baseline pt has normal renal functions   likely due to CHF   continue with Diuresis   avoid nephrotoxic drugs

## 2018-10-27 NOTE — CHART NOTE - NSCHARTNOTEFT_GEN_A_CORE
Pt was on NC O2 2L, O2 Sat dropped to late 80s. O2 increased to 4L. Pt was on NC O2 2L, O2 Sat dropped to late 80s. O2 increased to 4L. Initially improve to 92% but when down to mid 80s. Venturi mask at 40% ordered.

## 2018-10-27 NOTE — H&P ADULT - HISTORY OF PRESENT ILLNESS
53 Female form [facility] PMH of Obesity, CHF HFpEF 55%,  s/p ICD, HTN, B/l LE edema, DM, Gout, CAD, HLD, COPD ( not on home O2) presented to ED with SOB. As per patient she was having cough since 4-5 days along with Productive cough with whitish sputum, pt reports of having low grade fever on Thursday. patient was started on Abx at AL, fever was resolved but  with  worsening of SOB. Patient denies headaches, chest pain, palpitations, nausea, vomiting, diarrhea, abdominal pain or dysuria. Patient has been compliant  with her medications. patient has chronic B/l lower leg swelling, with CHF was on lasix 40 mg BID and metolazone 5 mg daily. Last ECHO was done in Jan 2018 has EF 55%.

## 2018-10-27 NOTE — CONSULT NOTE ADULT - PROBLEM SELECTOR RECOMMENDATION 6
patient is on glipizide XL 5 mg daily   hold home medications   sliding scale   lantus at bed time and humalog premeals.

## 2018-10-27 NOTE — CONSULT NOTE ADULT - PROBLEM SELECTOR RECOMMENDATION 8
came with elevated BUN and creatinine level   at baseline pt has normal renal functions   likely due to CHF   continue with Diuresis   avoid nephrotoxic drugs.

## 2018-10-27 NOTE — H&P ADULT - PROBLEM SELECTOR PLAN 2
patient has h/o CHF HF pEf 55% last [facility] jan 2018  on lasix 40 mg BID pO and metolazone 5 mg daily   trop T1 0,06 T2 0.055   EKG showed RBBB with sinus rhythm  with no st t changes    F/u ECHO   tele monitor   continue with Diuresis with Lasix 40 mg BID   monitor daily I/o's   cardio consult Dr Gold

## 2018-10-28 LAB
ANION GAP SERPL CALC-SCNC: 8 MMOL/L — SIGNIFICANT CHANGE UP (ref 5–17)
BASE EXCESS BLDA CALC-SCNC: 3.7 MMOL/L — HIGH (ref -2–2)
BASOPHILS # BLD AUTO: 0 K/UL — SIGNIFICANT CHANGE UP (ref 0–0.2)
BASOPHILS NFR BLD AUTO: 0.3 % — SIGNIFICANT CHANGE UP (ref 0–2)
BLOOD GAS COMMENTS ARTERIAL: SIGNIFICANT CHANGE UP
BUN SERPL-MCNC: 40 MG/DL — HIGH (ref 7–18)
CALCIUM SERPL-MCNC: 8.4 MG/DL — SIGNIFICANT CHANGE UP (ref 8.4–10.5)
CHLORIDE SERPL-SCNC: 106 MMOL/L — SIGNIFICANT CHANGE UP (ref 96–108)
CO2 SERPL-SCNC: 30 MMOL/L — SIGNIFICANT CHANGE UP (ref 22–31)
CREAT SERPL-MCNC: 1.49 MG/DL — HIGH (ref 0.5–1.3)
EOSINOPHIL # BLD AUTO: 0 K/UL — SIGNIFICANT CHANGE UP (ref 0–0.5)
EOSINOPHIL NFR BLD AUTO: 0 % — SIGNIFICANT CHANGE UP (ref 0–6)
GLUCOSE SERPL-MCNC: 134 MG/DL — HIGH (ref 70–99)
HCO3 BLDA-SCNC: 31 MMOL/L — HIGH (ref 23–27)
HCT VFR BLD CALC: 38.6 % — SIGNIFICANT CHANGE UP (ref 34.5–45)
HGB BLD-MCNC: 11.4 G/DL — LOW (ref 11.5–15.5)
HOROWITZ INDEX BLDA+IHG-RTO: 40 — SIGNIFICANT CHANGE UP
LYMPHOCYTES # BLD AUTO: 1 K/UL — SIGNIFICANT CHANGE UP (ref 1–3.3)
LYMPHOCYTES # BLD AUTO: 9.7 % — LOW (ref 13–44)
MAGNESIUM SERPL-MCNC: 2.2 MG/DL — SIGNIFICANT CHANGE UP (ref 1.6–2.6)
MCHC RBC-ENTMCNC: 28.9 PG — SIGNIFICANT CHANGE UP (ref 27–34)
MCHC RBC-ENTMCNC: 29.7 GM/DL — LOW (ref 32–36)
MCV RBC AUTO: 97.4 FL — SIGNIFICANT CHANGE UP (ref 80–100)
MONOCYTES # BLD AUTO: 0.7 K/UL — SIGNIFICANT CHANGE UP (ref 0–0.9)
MONOCYTES NFR BLD AUTO: 6.6 % — SIGNIFICANT CHANGE UP (ref 2–14)
NEUTROPHILS # BLD AUTO: 8.8 K/UL — HIGH (ref 1.8–7.4)
NEUTROPHILS NFR BLD AUTO: 83.4 % — HIGH (ref 43–77)
PCO2 BLDA: 63 MMHG — HIGH (ref 32–46)
PH BLDA: 7.31 — LOW (ref 7.35–7.45)
PHOSPHATE SERPL-MCNC: 6.2 MG/DL — HIGH (ref 2.5–4.5)
PLATELET # BLD AUTO: 272 K/UL — SIGNIFICANT CHANGE UP (ref 150–400)
PO2 BLDA: 95 MMHG — SIGNIFICANT CHANGE UP (ref 74–108)
POTASSIUM SERPL-MCNC: 3.9 MMOL/L — SIGNIFICANT CHANGE UP (ref 3.5–5.3)
POTASSIUM SERPL-SCNC: 3.9 MMOL/L — SIGNIFICANT CHANGE UP (ref 3.5–5.3)
PROCALCITONIN SERPL-MCNC: 0.15 NG/ML — HIGH (ref 0.02–0.1)
RBC # BLD: 3.96 M/UL — SIGNIFICANT CHANGE UP (ref 3.8–5.2)
RBC # FLD: 16.6 % — HIGH (ref 10.3–14.5)
SAO2 % BLDA: 97 % — HIGH (ref 92–96)
SODIUM SERPL-SCNC: 144 MMOL/L — SIGNIFICANT CHANGE UP (ref 135–145)
WBC # BLD: 10.5 K/UL — SIGNIFICANT CHANGE UP (ref 3.8–10.5)
WBC # FLD AUTO: 10.5 K/UL — SIGNIFICANT CHANGE UP (ref 3.8–10.5)

## 2018-10-28 PROCEDURE — 71045 X-RAY EXAM CHEST 1 VIEW: CPT | Mod: 26

## 2018-10-28 RX ORDER — INSULIN LISPRO 100/ML
VIAL (ML) SUBCUTANEOUS EVERY 6 HOURS
Qty: 0 | Refills: 0 | Status: DISCONTINUED | OUTPATIENT
Start: 2018-10-28 | End: 2018-10-29

## 2018-10-28 RX ORDER — CARVEDILOL PHOSPHATE 80 MG/1
3.12 CAPSULE, EXTENDED RELEASE ORAL EVERY 12 HOURS
Qty: 0 | Refills: 0 | Status: DISCONTINUED | OUTPATIENT
Start: 2018-10-28 | End: 2018-11-05

## 2018-10-28 RX ADMIN — HEPARIN SODIUM 5000 UNIT(S): 5000 INJECTION INTRAVENOUS; SUBCUTANEOUS at 21:01

## 2018-10-28 RX ADMIN — CARVEDILOL PHOSPHATE 3.12 MILLIGRAM(S): 80 CAPSULE, EXTENDED RELEASE ORAL at 20:59

## 2018-10-28 RX ADMIN — PREGABALIN 1000 MICROGRAM(S): 225 CAPSULE ORAL at 11:11

## 2018-10-28 RX ADMIN — PANTOPRAZOLE SODIUM 40 MILLIGRAM(S): 20 TABLET, DELAYED RELEASE ORAL at 11:11

## 2018-10-28 RX ADMIN — ATORVASTATIN CALCIUM 40 MILLIGRAM(S): 80 TABLET, FILM COATED ORAL at 21:01

## 2018-10-28 RX ADMIN — Medication 1000 UNIT(S): at 11:11

## 2018-10-28 RX ADMIN — Medication 40 MILLIGRAM(S): at 17:09

## 2018-10-28 RX ADMIN — HEPARIN SODIUM 5000 UNIT(S): 5000 INJECTION INTRAVENOUS; SUBCUTANEOUS at 13:05

## 2018-10-28 RX ADMIN — Medication 81 MILLIGRAM(S): at 11:11

## 2018-10-28 RX ADMIN — Medication 40 MILLIGRAM(S): at 05:41

## 2018-10-28 RX ADMIN — HEPARIN SODIUM 5000 UNIT(S): 5000 INJECTION INTRAVENOUS; SUBCUTANEOUS at 05:41

## 2018-10-28 NOTE — PROGRESS NOTE ADULT - SUBJECTIVE AND OBJECTIVE BOX
INTERVAL HPI/ OVERNIGHT EVENTS:     PRESSORS: [ ] YES [ ] NO  WHICH:    ANTIBIOTICS:                  DATE STARTED:  ANTIBIOTICS:                  DATE STARTED:  ANTIBIOTICS:                  DATE STARTED:    Antimicrobial:    Cardiovascular:  furosemide   Injectable 40 milliGRAM(s) IV Push every 12 hours    Pulmonary:    Hematalogic:  aspirin  chewable 81 milliGRAM(s) Oral daily  heparin  Injectable 5000 Unit(s) SubCutaneous every 8 hours    Other:  atorvastatin 40 milliGRAM(s) Oral at bedtime  cholecalciferol 1000 Unit(s) Oral daily  cyanocobalamin 1000 MICROGram(s) Oral daily  insulin lispro (HumaLOG) corrective regimen sliding scale   SubCutaneous every 6 hours  pantoprazole  Injectable 40 milliGRAM(s) IV Push daily    aspirin  chewable 81 milliGRAM(s) Oral daily  atorvastatin 40 milliGRAM(s) Oral at bedtime  cholecalciferol 1000 Unit(s) Oral daily  cyanocobalamin 1000 MICROGram(s) Oral daily  furosemide   Injectable 40 milliGRAM(s) IV Push every 12 hours  heparin  Injectable 5000 Unit(s) SubCutaneous every 8 hours  insulin lispro (HumaLOG) corrective regimen sliding scale   SubCutaneous every 6 hours  pantoprazole  Injectable 40 milliGRAM(s) IV Push daily    Drug Dosing Weight  Height (cm): 172.72 (26 Oct 2018 17:30)  Weight (kg): 142.7 (27 Oct 2018 13:58)  BMI (kg/m2): 47.8 (27 Oct 2018 13:58)  BSA (m2): 2.48 (27 Oct 2018 13:58)    CENTRAL LINE: [ ] YES [ ] NO  LOCATION:         AVILA: [ ] YES [ ] NO          A-LINE:  [ ] YES [ ] NO  LOCATION:             ICU Vital Signs Last 24 Hrs  T(C): 35.6 (28 Oct 2018 00:00), Max: 37.2 (27 Oct 2018 04:35)  T(F): 96 (28 Oct 2018 00:00), Max: 98.9 (27 Oct 2018 04:35)  HR: 78 (27 Oct 2018 23:00) (72 - 92)  BP: 125/71 (27 Oct 2018 23:00) (103/56 - 140/66)  BP(mean): 84 (27 Oct 2018 23:00) (63 - 87)  ABP: --  ABP(mean): --  RR: 33 (27 Oct 2018 23:00) (3 - 33)  SpO2: 93% (27 Oct 2018 23:00) (83% - 97%)      ABG - ( 27 Oct 2018 19:42 )  pH, Arterial: 7.26  pH, Blood: x     /  pCO2: 70    /  pO2: 72    / HCO3: 31    / Base Excess: 2.4   /  SaO2: 92                            REVIEW OF SYSTEMS:    CONSTITUTIONAL: No weakness, fevers or chills  NECK: No pain or stiffness  RESPIRATORY: No cough, wheezing, hemoptysis; No shortness of breath  CARDIOVASCULAR: No chest pain or palpitations  GASTROINTESTINAL: No abdominal or epigastric pain. No nausea, vomiting, No diarrhea or constipation. No melena or hematochezia.  GENITOURINARY: No dysuria, frequency or hematuria  NEUROLOGICAL: No numbness or weakness  All other review of systems is negative unless indicated above      PHYSICAL EXAM:    GENERAL: NAD, well-groomed, well-developed  EYES: EOMI, PERRLA,   NECK: Supple, No JVD; Normal thyroid; Trachea midline  NERVOUS SYSTEM:  Alert & Oriented X3,  Motor Strength 5/5 B/L upper and lower extremities; DTRs 2+ intact and symmetric  CHEST/LUNG: No rales, rhonchi, wheezing   HEART: Regular rate and rhythm; No murmurs,   ABDOMEN: Soft, Nontender, Nondistended; Bowel sounds present  EXTREMITIES:  2+ Peripheral Pulses, No clubbing, cyanosis, or edema        LABS:  CBC Full  -  ( 27 Oct 2018 06:34 )  WBC Count : 11.7 K/uL  Hemoglobin : 11.1 g/dL  Hematocrit : 37.2 %  Platelet Count - Automated : 268 K/uL  Mean Cell Volume : 96.8 fl  Mean Cell Hemoglobin : 29.0 pg  Mean Cell Hemoglobin Concentration : 29.9 gm/dL  Auto Neutrophil # : x  Auto Lymphocyte # : x  Auto Monocyte # : x  Auto Eosinophil # : x  Auto Basophil # : x  Auto Neutrophil % : 71.0 %  Auto Lymphocyte % : 15.0 %  Auto Monocyte % : 13.0 %  Auto Eosinophil % : 1.0 %  Auto Basophil % : x    10-27    142  |  104  |  33<H>  ----------------------------<  165<H>  3.3<L>   |  30  |  1.64<H>    Ca    8.1<L>      27 Oct 2018 06:34  Phos  4.9     10-27  Mg     2.1     10-27    TPro  7.0  /  Alb  2.8<L>  /  TBili  0.6  /  DBili  x   /  AST  12  /  ALT  18  /  AlkPhos  62  10-26    PT/INR - ( 26 Oct 2018 19:15 )   PT: 15.1 sec;   INR: 1.38 ratio         PTT - ( 26 Oct 2018 19:15 )  PTT:31.0 sec        RADIOLOGY & ADDITIONAL STUDIES REVIEWED:  ***    [ ]GOALS OF CARE DISCUSSION WITH PATIENT/FAMILY/PROXY:    CRITICAL CARE TIME SPENT: 35 minutes INTERVAL HPI/ OVERNIGHT EVENTS: on BIPAP overnight    PRESSORS: [ ] YES [x ] NO  WHICH:    ANTIBIOTICS:                  DATE STARTED:  ANTIBIOTICS:                  DATE STARTED:  ANTIBIOTICS:                  DATE STARTED:    Antimicrobial:    Cardiovascular:  furosemide   Injectable 40 milliGRAM(s) IV Push every 12 hours    Pulmonary:    Hematalogic:  aspirin  chewable 81 milliGRAM(s) Oral daily  heparin  Injectable 5000 Unit(s) SubCutaneous every 8 hours    Other:  atorvastatin 40 milliGRAM(s) Oral at bedtime  cholecalciferol 1000 Unit(s) Oral daily  cyanocobalamin 1000 MICROGram(s) Oral daily  insulin lispro (HumaLOG) corrective regimen sliding scale   SubCutaneous every 6 hours  pantoprazole  Injectable 40 milliGRAM(s) IV Push daily    aspirin  chewable 81 milliGRAM(s) Oral daily  atorvastatin 40 milliGRAM(s) Oral at bedtime  cholecalciferol 1000 Unit(s) Oral daily  cyanocobalamin 1000 MICROGram(s) Oral daily  furosemide   Injectable 40 milliGRAM(s) IV Push every 12 hours  heparin  Injectable 5000 Unit(s) SubCutaneous every 8 hours  insulin lispro (HumaLOG) corrective regimen sliding scale   SubCutaneous every 6 hours  pantoprazole  Injectable 40 milliGRAM(s) IV Push daily    Drug Dosing Weight  Height (cm): 172.72 (26 Oct 2018 17:30)  Weight (kg): 142.7 (27 Oct 2018 13:58)  BMI (kg/m2): 47.8 (27 Oct 2018 13:58)  BSA (m2): 2.48 (27 Oct 2018 13:58)    CENTRAL LINE: [ ] YES [ ] NO  LOCATION:         AVILA: [ ] YES [ ] NO          A-LINE:  [ ] YES [ ] NO  LOCATION:             ICU Vital Signs Last 24 Hrs  T(C): 35.6 (28 Oct 2018 00:00), Max: 37.2 (27 Oct 2018 04:35)  T(F): 96 (28 Oct 2018 00:00), Max: 98.9 (27 Oct 2018 04:35)  HR: 78 (27 Oct 2018 23:00) (72 - 92)  BP: 125/71 (27 Oct 2018 23:00) (103/56 - 140/66)  BP(mean): 84 (27 Oct 2018 23:00) (63 - 87)  ABP: --  ABP(mean): --  RR: 33 (27 Oct 2018 23:00) (3 - 33)  SpO2: 93% (27 Oct 2018 23:00) (83% - 97%)      ABG - ( 27 Oct 2018 19:42 )  pH, Arterial: 7.26  pH, Blood: x     /  pCO2: 70    /  pO2: 72    / HCO3: 31    / Base Excess: 2.4   /  SaO2: 92                            REVIEW OF SYSTEMS:    CONSTITUTIONAL: No weakness, fevers or chills  NECK: No pain or stiffness  RESPIRATORY: No cough, wheezing, hemoptysis; No shortness of breath  CARDIOVASCULAR: No chest pain or palpitations  GASTROINTESTINAL: No abdominal or epigastric pain. No nausea, vomiting, No diarrhea or constipation. No melena or hematochezia.  GENITOURINARY: No dysuria, frequency or hematuria  NEUROLOGICAL: No numbness or weakness  All other review of systems is negative unless indicated above      PHYSICAL EXAM:    GENERAL: NAD, well-groomed, well-developed  EYES: EOMI, PERRLA,   NECK: Supple, No JVD; Normal thyroid; Trachea midline  NERVOUS SYSTEM:  Alert & Oriented X3,  Motor Strength 5/5 B/L upper and lower extremities; DTRs 2+ intact and symmetric  CHEST/LUNG: No rales, rhonchi, wheezing   HEART: Regular rate and rhythm; No murmurs,   ABDOMEN: Soft, Nontender, Nondistended; Bowel sounds present  EXTREMITIES:  2+ Peripheral Pulses, No clubbing, cyanosis, or edema        LABS:  CBC Full  -  ( 27 Oct 2018 06:34 )  WBC Count : 11.7 K/uL  Hemoglobin : 11.1 g/dL  Hematocrit : 37.2 %  Platelet Count - Automated : 268 K/uL  Mean Cell Volume : 96.8 fl  Mean Cell Hemoglobin : 29.0 pg  Mean Cell Hemoglobin Concentration : 29.9 gm/dL  Auto Neutrophil # : x  Auto Lymphocyte # : x  Auto Monocyte # : x  Auto Eosinophil # : x  Auto Basophil # : x  Auto Neutrophil % : 71.0 %  Auto Lymphocyte % : 15.0 %  Auto Monocyte % : 13.0 %  Auto Eosinophil % : 1.0 %  Auto Basophil % : x    10-27    142  |  104  |  33<H>  ----------------------------<  165<H>  3.3<L>   |  30  |  1.64<H>    Ca    8.1<L>      27 Oct 2018 06:34  Phos  4.9     10-27  Mg     2.1     10-27    TPro  7.0  /  Alb  2.8<L>  /  TBili  0.6  /  DBili  x   /  AST  12  /  ALT  18  /  AlkPhos  62  10-26    PT/INR - ( 26 Oct 2018 19:15 )   PT: 15.1 sec;   INR: 1.38 ratio         PTT - ( 26 Oct 2018 19:15 )  PTT:31.0 sec        RADIOLOGY & ADDITIONAL STUDIES REVIEWED:  ***    [ ]GOALS OF CARE DISCUSSION WITH PATIENT/FAMILY/PROXY:    CRITICAL CARE TIME SPENT: 35 minutes INTERVAL HPI/ OVERNIGHT EVENTS: on BIPAP overnight    PRESSORS: [ ] YES [x ] NO  WHICH:    ANTIBIOTICS:                  DATE STARTED:  ANTIBIOTICS:                  DATE STARTED:  ANTIBIOTICS:                  DATE STARTED:    Antimicrobial:    Cardiovascular:  furosemide   Injectable 40 milliGRAM(s) IV Push every 12 hours    Pulmonary:    Hematalogic:  aspirin  chewable 81 milliGRAM(s) Oral daily  heparin  Injectable 5000 Unit(s) SubCutaneous every 8 hours    Other:  atorvastatin 40 milliGRAM(s) Oral at bedtime  cholecalciferol 1000 Unit(s) Oral daily  cyanocobalamin 1000 MICROGram(s) Oral daily  insulin lispro (HumaLOG) corrective regimen sliding scale   SubCutaneous every 6 hours  pantoprazole  Injectable 40 milliGRAM(s) IV Push daily    aspirin  chewable 81 milliGRAM(s) Oral daily  atorvastatin 40 milliGRAM(s) Oral at bedtime  cholecalciferol 1000 Unit(s) Oral daily  cyanocobalamin 1000 MICROGram(s) Oral daily  furosemide   Injectable 40 milliGRAM(s) IV Push every 12 hours  heparin  Injectable 5000 Unit(s) SubCutaneous every 8 hours  insulin lispro (HumaLOG) corrective regimen sliding scale   SubCutaneous every 6 hours  pantoprazole  Injectable 40 milliGRAM(s) IV Push daily    Drug Dosing Weight  Height (cm): 172.72 (26 Oct 2018 17:30)  Weight (kg): 142.7 (27 Oct 2018 13:58)  BMI (kg/m2): 47.8 (27 Oct 2018 13:58)  BSA (m2): 2.48 (27 Oct 2018 13:58)    CENTRAL LINE: [ ] YES [ ] NO  LOCATION:         AVILA: [ ] YES [ ] NO          A-LINE:  [ ] YES [ ] NO  LOCATION:             ICU Vital Signs Last 24 Hrs  T(C): 35.6 (28 Oct 2018 00:00), Max: 37.2 (27 Oct 2018 04:35)  T(F): 96 (28 Oct 2018 00:00), Max: 98.9 (27 Oct 2018 04:35)  HR: 78 (27 Oct 2018 23:00) (72 - 92)  BP: 125/71 (27 Oct 2018 23:00) (103/56 - 140/66)  BP(mean): 84 (27 Oct 2018 23:00) (63 - 87)  ABP: --  ABP(mean): --  RR: 33 (27 Oct 2018 23:00) (3 - 33)  SpO2: 93% (27 Oct 2018 23:00) (83% - 97%)      ABG - ( 27 Oct 2018 19:42 )  pH, Arterial: 7.26  pH, Blood: x     /  pCO2: 70    /  pO2: 72    / HCO3: 31    / Base Excess: 2.4   /  SaO2: 92        REVIEW OF SYSTEMS:    CONSTITUTIONAL: No weakness, fevers or chills  NECK: No pain or stiffness  RESPIRATORY: No cough, wheezing, hemoptysis; No shortness of breath  CARDIOVASCULAR: No chest pain or palpitations  GASTROINTESTINAL: No abdominal or epigastric pain. No nausea, vomiting, No diarrhea or constipation. No melena or hematochezia.  GENITOURINARY: No dysuria, frequency or hematuria  NEUROLOGICAL: No numbness or weakness  All other review of systems is negative unless indicated above      PHYSICAL EXAM:    GENERAL: NAD, Morbidly obese  EYES: EOMI, PERRLA,   NECK: Supple, No JVD; Normal thyroid; Trachea midline  NERVOUS SYSTEM:  Alert & Oriented X3,  Motor Strength 5/5 B/L upper and lower extremities; DTRs 2+ intact and symmetric  CHEST/LUNG: No rales, rhonchi, wheezing   HEART: Regular rate and rhythm; No murmurs,   ABDOMEN: Soft, Nontender, Nondistended; Bowel sounds present  EXTREMITIES:  2+ Peripheral Pulses, No clubbing, cyanosis, +2 edema        LABS:  CBC Full  -  ( 27 Oct 2018 06:34 )  WBC Count : 11.7 K/uL  Hemoglobin : 11.1 g/dL  Hematocrit : 37.2 %  Platelet Count - Automated : 268 K/uL  Mean Cell Volume : 96.8 fl  Mean Cell Hemoglobin : 29.0 pg  Mean Cell Hemoglobin Concentration : 29.9 gm/dL  Auto Neutrophil # : x  Auto Lymphocyte # : x  Auto Monocyte # : x  Auto Eosinophil # : x  Auto Basophil # : x  Auto Neutrophil % : 71.0 %  Auto Lymphocyte % : 15.0 %  Auto Monocyte % : 13.0 %  Auto Eosinophil % : 1.0 %  Auto Basophil % : x    10-27    142  |  104  |  33<H>  ----------------------------<  165<H>  3.3<L>   |  30  |  1.64<H>    Ca    8.1<L>      27 Oct 2018 06:34  Phos  4.9     10-27  Mg     2.1     10-27    TPro  7.0  /  Alb  2.8<L>  /  TBili  0.6  /  DBili  x   /  AST  12  /  ALT  18  /  AlkPhos  62  10-26    PT/INR - ( 26 Oct 2018 19:15 )   PT: 15.1 sec;   INR: 1.38 ratio         PTT - ( 26 Oct 2018 19:15 )  PTT:31.0 sec        RADIOLOGY & ADDITIONAL STUDIES REVIEWED:  ***    [ ]GOALS OF CARE DISCUSSION WITH PATIENT/FAMILY/PROXY:    CRITICAL CARE TIME SPENT: 35 minutes INTERVAL HPI/ OVERNIGHT EVENTS: on BIPAP overnight    PRESSORS: [ ] YES [x ] NO  WHICH:        Cardiovascular:  furosemide   Injectable 40 milliGRAM(s) IV Push every 12 hours    Pulmonary:    Hematalogic:  aspirin  chewable 81 milliGRAM(s) Oral daily  heparin  Injectable 5000 Unit(s) SubCutaneous every 8 hours    Other:  atorvastatin 40 milliGRAM(s) Oral at bedtime  cholecalciferol 1000 Unit(s) Oral daily  cyanocobalamin 1000 MICROGram(s) Oral daily  insulin lispro (HumaLOG) corrective regimen sliding scale   SubCutaneous every 6 hours  pantoprazole  Injectable 40 milliGRAM(s) IV Push daily    aspirin  chewable 81 milliGRAM(s) Oral daily  atorvastatin 40 milliGRAM(s) Oral at bedtime  cholecalciferol 1000 Unit(s) Oral daily  cyanocobalamin 1000 MICROGram(s) Oral daily  furosemide   Injectable 40 milliGRAM(s) IV Push every 12 hours  heparin  Injectable 5000 Unit(s) SubCutaneous every 8 hours  insulin lispro (HumaLOG) corrective regimen sliding scale   SubCutaneous every 6 hours  pantoprazole  Injectable 40 milliGRAM(s) IV Push daily    Drug Dosing Weight  Height (cm): 172.72 (26 Oct 2018 17:30)  Weight (kg): 142.7 (27 Oct 2018 13:58)  BMI (kg/m2): 47.8 (27 Oct 2018 13:58)  BSA (m2): 2.48 (27 Oct 2018 13:58)    CENTRAL LINE: [ ] YES [ ] NO  LOCATION:         AVILA: [ ] YES [x ] NO          A-LINE:  [ ] YES [ x] NO  LOCATION:             ICU Vital Signs Last 24 Hrs  T(C): 35.6 (28 Oct 2018 00:00), Max: 37.2 (27 Oct 2018 04:35)  T(F): 96 (28 Oct 2018 00:00), Max: 98.9 (27 Oct 2018 04:35)  HR: 78 (27 Oct 2018 23:00) (72 - 92)  BP: 125/71 (27 Oct 2018 23:00) (103/56 - 140/66)  BP(mean): 84 (27 Oct 2018 23:00) (63 - 87)  ABP: --  ABP(mean): --  RR: 33 (27 Oct 2018 23:00) (3 - 33)  SpO2: 93% (27 Oct 2018 23:00) (83% - 97%)      ABG - ( 27 Oct 2018 19:42 )  pH, Arterial: 7.26  pH, Blood: x     /  pCO2: 70    /  pO2: 72    / HCO3: 31    / Base Excess: 2.4   /  SaO2: 92        REVIEW OF SYSTEMS:    CONSTITUTIONAL: No weakness, fevers or chills  NECK: No pain or stiffness  RESPIRATORY: No cough, wheezing, hemoptysis; No shortness of breath  CARDIOVASCULAR: No chest pain or palpitations  GASTROINTESTINAL: No abdominal or epigastric pain. No nausea, vomiting, No diarrhea or constipation. No melena or hematochezia.  GENITOURINARY: No dysuria, frequency or hematuria  NEUROLOGICAL: No numbness or weakness  All other review of systems is negative unless indicated above      PHYSICAL EXAM:    GENERAL: NAD, Morbidly obese  EYES: EOMI, PERRLA,   NECK: Supple, No JVD; Normal thyroid; Trachea midline  NERVOUS SYSTEM:  Alert & Oriented X3,  Motor Strength 5/5 B/L upper and lower extremities; DTRs 2+ intact and symmetric  CHEST/LUNG: No rales, rhonchi, wheezing   HEART: Regular rate and rhythm; No murmurs,   ABDOMEN: Soft, Nontender, Nondistended; Bowel sounds present  EXTREMITIES:  2+ Peripheral Pulses, No clubbing, cyanosis, +2 edema        LABS:  CBC Full  -  ( 27 Oct 2018 06:34 )  WBC Count : 11.7 K/uL  Hemoglobin : 11.1 g/dL  Hematocrit : 37.2 %  Platelet Count - Automated : 268 K/uL  Mean Cell Volume : 96.8 fl  Mean Cell Hemoglobin : 29.0 pg  Mean Cell Hemoglobin Concentration : 29.9 gm/dL  Auto Neutrophil # : x  Auto Lymphocyte # : x  Auto Monocyte # : x  Auto Eosinophil # : x  Auto Basophil # : x  Auto Neutrophil % : 71.0 %  Auto Lymphocyte % : 15.0 %  Auto Monocyte % : 13.0 %  Auto Eosinophil % : 1.0 %  Auto Basophil % : x    10-27    142  |  104  |  33<H>  ----------------------------<  165<H>  3.3<L>   |  30  |  1.64<H>    Ca    8.1<L>      27 Oct 2018 06:34  Phos  4.9     10-27  Mg     2.1     10-27    TPro  7.0  /  Alb  2.8<L>  /  TBili  0.6  /  DBili  x   /  AST  12  /  ALT  18  /  AlkPhos  62  10-26    PT/INR - ( 26 Oct 2018 19:15 )   PT: 15.1 sec;   INR: 1.38 ratio         PTT - ( 26 Oct 2018 19:15 )  PTT:31.0 sec        RADIOLOGY & ADDITIONAL STUDIES REVIEWED:  ***    [ ]GOALS OF CARE DISCUSSION WITH PATIENT/FAMILY/PROXY:    CRITICAL CARE TIME SPENT: 35 minutes

## 2018-10-28 NOTE — PROGRESS NOTE ADULT - SUBJECTIVE AND OBJECTIVE BOX
Patient is a 53y old  Female who presents with a chief complaint of SOB with cough and fever (28 Oct 2018 00:31)    PATIENT IS SEEN AND EXAMINED IN MEDICAL FLOOR.    ALLERGIES:  No Known Allergies      Daily Weight in k (28 Oct 2018 07:00)    VITALS:    Vital Signs Last 24 Hrs  T(C): 36.6 (28 Oct 2018 10:00), Max: 36.7 (27 Oct 2018 20:39)  T(F): 97.9 (28 Oct 2018 10:00), Max: 98.1 (28 Oct 2018 06:00)  HR: 73 (28 Oct 2018 13:00) (64 - 84)  BP: 127/71 (28 Oct 2018 13:00) (103/56 - 129/70)  BP(mean): 84 (28 Oct 2018 13:00) (63 - 87)  RR: 15 (28 Oct 2018 13:00) (3 - 33)  SpO2: 87% (28 Oct 2018 13:00) (87% - 100%)    LABS:  CBC Full  -  ( 28 Oct 2018 06:18 )  WBC Count : 10.5 K/uL  Hemoglobin : 11.4 g/dL  Hematocrit : 38.6 %  Platelet Count - Automated : 272 K/uL  Mean Cell Volume : 97.4 fl  Mean Cell Hemoglobin : 28.9 pg  Mean Cell Hemoglobin Concentration : 29.7 gm/dL  Auto Neutrophil # : 8.8 K/uL  Auto Lymphocyte # : 1.0 K/uL  Auto Monocyte # : 0.7 K/uL  Auto Eosinophil # : 0.0 K/uL  Auto Basophil # : 0.0 K/uL  Auto Neutrophil % : 83.4 %  Auto Lymphocyte % : 9.7 %  Auto Monocyte % : 6.6 %  Auto Eosinophil % : 0.0 %  Auto Basophil % : 0.3 %    PT/INR - ( 26 Oct 2018 19:15 )   PT: 15.1 sec;   INR: 1.38 ratio         PTT - ( 26 Oct 2018 19:15 )  PTT:31.0 sec  10-28    144  |  106  |  40<H>  ----------------------------<  134<H>  3.9   |  30  |  1.49<H>    Ca    8.4      28 Oct 2018 06:18  Phos  6.2     10-28  Mg     2.2     10-28    TPro  7.0  /  Alb  2.8<L>  /  TBili  0.6  /  DBili  x   /  AST  12  /  ALT  18  /  AlkPhos  62  10-26    CAPILLARY BLOOD GLUCOSE    POCT Blood Glucose.: 120 mg/dL (28 Oct 2018 11:09)  POCT Blood Glucose.: 142 mg/dL (28 Oct 2018 05:34)  POCT Blood Glucose.: 182 mg/dL (27 Oct 2018 16:23)    CARDIAC MARKERS ( 28 Oct 2018 09:25 )  0.015 ng/mL / x     / 92 U/L / x     / x      CARDIAC MARKERS ( 28 Oct 2018 09:24 )  x     / x     / x     / x     / 1.5 ng/mL  CARDIAC MARKERS ( 27 Oct 2018 06:34 )  0.059 ng/mL / x     / 124 U/L / x     / 2.0 ng/mL  CARDIAC MARKERS ( 26 Oct 2018 19:15 )  0.066 ng/mL / x     / 133 U/L / x     / 1.2 ng/mL      LIVER FUNCTIONS - ( 26 Oct 2018 19:15 )  Alb: 2.8 g/dL / Pro: 7.0 g/dL / ALK PHOS: 62 U/L / ALT: 18 U/L DA / AST: 12 U/L / GGT: x             ABG - ( 28 Oct 2018 04:22 )  pH, Arterial: 7.31  pH, Blood: x     /  pCO2: 63    /  pO2: 95    / HCO3: 31    / Base Excess: 3.7   /  SaO2: 97          .Blood Blood-Peripheral  10-27 @ 00:14   No growth to date.  --  --      MEDICATIONS:    MEDICATIONS  (STANDING):  aspirin  chewable 81 milliGRAM(s) Oral daily  atorvastatin 40 milliGRAM(s) Oral at bedtime  cholecalciferol 1000 Unit(s) Oral daily  cyanocobalamin 1000 MICROGram(s) Oral daily  furosemide   Injectable 40 milliGRAM(s) IV Push every 12 hours  heparin  Injectable 5000 Unit(s) SubCutaneous every 8 hours  insulin lispro (HumaLOG) corrective regimen sliding scale   SubCutaneous every 6 hours  pantoprazole  Injectable 40 milliGRAM(s) IV Push daily      MEDICATIONS  (PRN):      REVIEW OF SYSTEMS:                           ALL ROS DONE [ X   ]    CONSTITUTIONAL:  LETHARGIC [   ], FEVER [   ], UNRESPONSIVE [   ]  CVS:  CP  [   ], SOB, [   ], PALPITATIONS [   ], DIZZYNESS [   ]  RS: COUGH [   ], SPUTUM [   ]  GI: ABDOMINAL PAIN [   ], NAUSEA [   ], VOMITINGS [   ], DIARRHEA [   ], CONSTIPATION [   ]  :  DYSURIA [   ], NOCTURIA [   ], INCREASED FREQUENCY [   ], DRIBLING [   ],  SKELETAL: PAINFUL JOINTS [   ], SWOLLEN JOINTS [   ], NECK ACHE [   ], LOW BACK ACHE [   ],  SKIN : ULCERS [   ], RASH [   ], ITCHING [   ]  CNS: HEAD ACHE [   ], DOUBLE VISION [   ], BLURRED VISION [   ], AMS / CONFUSION [   ], SEIZURES [   ], WEAKNESS [   ],TINGLING / NUMBNESS [   ]    PHYSICAL EXAMINATION:  GENERAL APPEARANCE: NO DISTRESS  HEENT:  NO PALLOR, NO  JVD,  NO   NODES, NECK SUPPLE  CVS: S1 +, S2 +,   RS: AEEB,  OCCASIONAL  RALES +, MILD RONCHI +  ABD: SOFT, NT, NO, BS +  EXT:  PE ++  SKIN: WARM,   SKELETAL:  ROM ACCEPTABLE  CNS:  AAO X 1-2   , NO  DEFICITS    RADIOLOGY :    < from: Xray Chest 1 View- PORTABLE-Routine (10.28.18 @ 10:29) >  INTERPRETATION:  Clinical history: Shortness of breath    Compared to 10/26/2018, diffuse cardiomegaly is unaltered. Vascular   prominence is unchanged and suggests pulmonary venous congestion. ICD   overlies the left hemithorax with the distal electrode overlying the   right ventricle. No progressive airspace opacities or pneumothorax.    < end of copied text >    < from: US Duplex Venous Lower Ext Complete, Bilateral (10.27.18 @ 11:59) >    IMPRESSION:  No evidence of DVT in the bilateral femoropopliteal systems    < end of copied text >      ASSESSMENT :     Heart failure  GERD (gastroesophageal reflux disease)  CHF (congestive heart failure)  CAD (coronary artery disease)  AICD (automatic cardioverter/defibrillator) present  COPD (chronic obstructive pulmonary disease)  RA (rheumatoid arthritis)  DM (diabetes mellitus)  HTN (hypertension)  History of cholecystectomy      PLAN:  HPI:  53 Female form [facility] PMH of Obesity, CHF HFpEF 55%,  s/p ICD, HTN, B/l LE edema, DM, Gout, CAD, HLD, COPD ( not on home O2) presented to ED with SOB. As per patient she was having cough since 4-5 days along with Productive cough with whitish sputum, pt reports of having low grade fever on Thursday. patient was started on Abx at [facility], fever was resolved but  with  worsening of SOB. Patient denies headaches, chest pain, palpitations, nausea, vomiting, diarrhea, abdominal pain or dysuria. Patient has been compliant  with her medications. patient has chronic B/l lower leg swelling, with CHF was on lasix 40 mg BID and metolazone 5 mg daily. Last ECHO was done in 2018 has EF 55%. (27 Oct 2018 10:22)    - RESPIRATORY FAILURE ON BiPAP , CHF EXACERBATION ON IV LASIX. BEING MONITORED IN ICU  - FEVER, COUGH - SUSPECT ACUTE BRONCHITIS  - GI AND DVT PROPHYLAXIS  - DR. NATARAJAN

## 2018-10-28 NOTE — PROGRESS NOTE ADULT - SUBJECTIVE AND OBJECTIVE BOX
Subjective: off bipap upon examination this morning   	  MEDICATIONS:  MEDICATIONS  (STANDING):  aspirin  chewable 81 milliGRAM(s) Oral daily  atorvastatin 40 milliGRAM(s) Oral at bedtime  cholecalciferol 1000 Unit(s) Oral daily  cyanocobalamin 1000 MICROGram(s) Oral daily  furosemide   Injectable 40 milliGRAM(s) IV Push every 12 hours  heparin  Injectable 5000 Unit(s) SubCutaneous every 8 hours  insulin lispro (HumaLOG) corrective regimen sliding scale   SubCutaneous every 6 hours  pantoprazole  Injectable 40 milliGRAM(s) IV Push daily      LABS:	 	    CARDIAC MARKERS:  CARDIAC MARKERS ( 28 Oct 2018 09:25 )  0.015 ng/mL / x     / 92 U/L / x     / x      CARDIAC MARKERS ( 28 Oct 2018 09:24 )  x     / x     / x     / x     / 1.5 ng/mL  CARDIAC MARKERS ( 27 Oct 2018 06:34 )  0.059 ng/mL / x     / 124 U/L / x     / 2.0 ng/mL  CARDIAC MARKERS ( 26 Oct 2018 19:15 )  0.066 ng/mL / x     / 133 U/L / x     / 1.2 ng/mL                                11.4   10.5  )-----------( 272      ( 28 Oct 2018 06:18 )             38.6     10-28    144  |  106  |  40<H>  ----------------------------<  134<H>  3.9   |  30  |  1.49<H>    Ca    8.4      28 Oct 2018 06:18  Phos  6.2     10-28  Mg     2.2     10-28    TPro  7.0  /  Alb  2.8<L>  /  TBili  0.6  /  DBili  x   /  AST  12  /  ALT  18  /  AlkPhos  62  10-26    proBNP:   Lipid Profile:   HgA1c:   TSH:       PHYSICAL EXAM:  T(C): 36.6 (10-28-18 @ 10:00), Max: 36.7 (10-27-18 @ 20:39)  HR: 73 (10-28-18 @ 13:00) (64 - 84)  BP: 127/71 (10-28-18 @ 13:00) (103/56 - 129/70)  RR: 15 (10-28-18 @ 13:00) (3 - 33)  SpO2: 87% (10-28-18 @ 13:00) (87% - 100%)  Wt(kg): --  I&O's Summary    27 Oct 2018 07:01  -  28 Oct 2018 07:00  --------------------------------------------------------  IN: 200 mL / OUT: 1600 mL / NET: -1400 mL    28 Oct 2018 07:01  -  28 Oct 2018 13:54  --------------------------------------------------------  IN: 0 mL / OUT: 400 mL / NET: -400 mL        Weight (kg): 142.7 (10-27 @ 13:58)      Lymphatic: No lymphadenopathy , + edema in LE bilaterally   Cardiovascular: Normal S1 S2, RRR, No JVD, No murmurs , Peripheral pulses palpable 2+ bilaterally  Respiratory: Lungs clear to auscultation, normal effort 	  Gastrointestinal:  Soft, Non-tender, + BS	  Skin: No rashes, No ecchymoses, No cyanosis, warm to touch      TELEMETRY: SR	    ECG:  < from: 12 Lead ECG (10.26.18 @ 17:51) >  Normal sinus rhythm  Possible Left atrial enlargement  Right bundle branch block  Left ventricular hypertrophy with repolarization abnormality  Abnormal ECG    < end of copied text >  	  RADIOLOGY:   DIAGNOSTIC TESTING:  [ ] Echocardiogram: < from: Transthoracic Echocardiogram (01.18.18 @ 07:37) >  1. Mild mitral regurgitation.  2. Moderately dilated left atrium.  LA volume index = 42  cc/m2.  3. Normal left ventricular internal dimensions and wall  thicknesses.  4. Endocardium not well visualized; grossly low normal left  ventricular systolic function. Segmental wall motion could  not be assessed.  5. Mild right atrial enlargement.  6. Right ventricular enlargement with normal RV function. A  device lead is visualized in the right heart.  7. RV systolic pressure is 48 mm Hg. Mild pulmonary  hypertension.  8. There is severe tricuspid regurgitation.  9. Trace pericardial effusion.    < end of copied text >    [ ]  Catheterization:  [ ] Stress Test:    OTHER: 	      ASSESSMENT/PLAN:  53yFemale with history of HFpEF, HTN, HLD, COPD admitted with dyspnea/cough found to have hypercapnic respiratory failure.     -pt. with lower extremity edema / appears volume overloaded  -continue with iv lasix to keep O > I  -check TTE  -treatment of hypercapnic respiratory failure per ICU  -supportive care per ICU    Camilo Polk MD

## 2018-10-28 NOTE — PROGRESS NOTE ADULT - ASSESSMENT
53 Female form [facility] PMH of Obesity, CHF HFpEF 55%,  s/p ICD, HTN, B/l LE edema, DM, Gout, CAD, HLD, COPD ( not on home O2) presented to ED with SOB, Productive cough with whitish sputum since 4-5 days, low grade fever on Thursday. patient was started on Abx at AL, fever was resolved but with  worsening of SOB. Patient has chronic B/l lower leg swelling, with CHF was on lasix 40 mg BID and metolazone 5 mg daily. Last ECHO was done in Jan 2018 has EF 55%.    On admission patient was afebrile, HD stable, O2 sat 85 on 40 % oxygen venti mask, AGB 7.28/65/60/87, cbc leucocytosis, elevated BUN and creatinine. CXR showed cardiomegaly, patient was in mod respiratory distress and lethargic, RRT was called patient was placed on Bipap.  s/p lasix 120 mg, Iv solumedrol 125 mg and levofloxacin, pt was transferred to ICU. 53 Female form [facility] PMH of Obesity, CHF HFpEF 55%,  s/p ICD, HTN, B/l LE edema, DM, Gout, CAD, HLD presented to ED with SOB, Productive cough with whitish sputum since 4-5 days, low grade fever on Thursday. patient was started on Abx at AL, fever was resolved but with  worsening of SOB. Patient has chronic B/l lower leg swelling, with CHF was on lasix 40 mg BID and metolazone 5 mg daily. Last ECHO was done in Jan 2018 has EF 55%.    On admission patient was afebrile, HD stable, O2 sat 85 on 40 % oxygen venti mask, AGB 7.28/65/60/87, cbc leucocytosis, elevated BUN and creatinine. CXR showed cardiomegaly, patient was in mod respiratory distress and lethargic, RRT was called patient was placed on Bipap.  s/p lasix 120 mg, Iv solumedrol 125 mg and levofloxacin, pt was transferred to ICU.

## 2018-10-28 NOTE — PROGRESS NOTE ADULT - PROBLEM SELECTOR PLAN 2
h/o CHF HF pEf 55% last [facility] jan 2018  on lasix 40 mg BID pO and metolazone 5 mg daily   trop T1 0,06 T2 0.055   EKG showed RBBB with sinus rhythm  with no st t changes    tele monitor   continue with Diuresis with Lasix 40 mg BID   monitor daily I/o's   F/u ECHO   cardio consult Dr Gold. h/o CHF HF pEf 55% last [facility] jan 2018  on lasix 40 mg BID pO and metolazone 5 mg daily   trop T1 0,06 T2 0.055   CXR enlarged heart and pulmonary vascular congestion  EKG showed RBBB with sinus rhythm  with no st t changes    tele monitor   continue with Diuresis with Lasix 40 mg BID   monitor daily I/o's ,maintain negative balance  F/u ECHO   cardio consult Dr Gold.

## 2018-10-28 NOTE — PROGRESS NOTE ADULT - PROBLEM SELECTOR PLAN 1
p/w SOB, productive cough with B/l lower Ext swelling   Respiratory Failure likely due to CHF exacerbation Vs COPD   Trop t1 0.066, t2 0.059 Bnp 7k   RVP -ve  s/p 120 mg Lasix in 24 hrs. s/p IV solumedrol   on Bipap for hypercapnic RF   c/w with Aspirin, statin, lasix 40 mg BID   daily I/o , weight  obtain old records p/w SOB, productive cough with B/l lower Ext swelling   Respiratory Failure likely due to CHF exacerbation vs AMPARO  Trop t1 0.066, t2 0.059 Bnp 7k   RVP -ve  s/p 120 mg Lasix in 24 hrs. s/p IV solumedrol   on Bipap for hypercapnic RF   c/w with Aspirin, statin, lasix 40 mg BID   daily I/o , weight  maintain negative balance  obtain old records p/w SOB, productive cough with B/l lower Ext swelling   Respiratory Failure likely due to CHF exacerbation vs AMPARO  Trop t1 0.066, t2 0.059 Bnp 7k   RVP -ve  s/p 120 mg Lasix in 24 hrs. s/p IV solumedrol   on Bipap for hypercapnic RF with breaks for meal   c/w with Aspirin, statin, lasix 40 mg BID   daily I/o , weight  maintain negative balance  obtain old records  No sedatives

## 2018-10-28 NOTE — PROGRESS NOTE ADULT - PROBLEM SELECTOR PLAN 3
trop T1 0,06 T2 0.055   trop slightly elevated likely demand   c/w with aspirin, statin   Hold coreg for now due to SOB.

## 2018-10-29 LAB
ANION GAP SERPL CALC-SCNC: 4 MMOL/L — LOW (ref 5–17)
ANION GAP SERPL CALC-SCNC: 7 MMOL/L — SIGNIFICANT CHANGE UP (ref 5–17)
BASE EXCESS BLDA CALC-SCNC: 8 MMOL/L — HIGH (ref -2–2)
BASOPHILS # BLD AUTO: 0.1 K/UL — SIGNIFICANT CHANGE UP (ref 0–0.2)
BASOPHILS NFR BLD AUTO: 0.7 % — SIGNIFICANT CHANGE UP (ref 0–2)
BLOOD GAS COMMENTS ARTERIAL: SIGNIFICANT CHANGE UP
BUN SERPL-MCNC: 38 MG/DL — HIGH (ref 7–18)
BUN SERPL-MCNC: 42 MG/DL — HIGH (ref 7–18)
CALCIUM SERPL-MCNC: 8.2 MG/DL — LOW (ref 8.4–10.5)
CALCIUM SERPL-MCNC: 8.2 MG/DL — LOW (ref 8.4–10.5)
CHLORIDE SERPL-SCNC: 103 MMOL/L — SIGNIFICANT CHANGE UP (ref 96–108)
CHLORIDE SERPL-SCNC: 104 MMOL/L — SIGNIFICANT CHANGE UP (ref 96–108)
CO2 SERPL-SCNC: 34 MMOL/L — HIGH (ref 22–31)
CO2 SERPL-SCNC: 38 MMOL/L — HIGH (ref 22–31)
CREAT SERPL-MCNC: 1.4 MG/DL — HIGH (ref 0.5–1.3)
CREAT SERPL-MCNC: 1.44 MG/DL — HIGH (ref 0.5–1.3)
EOSINOPHIL # BLD AUTO: 0.1 K/UL — SIGNIFICANT CHANGE UP (ref 0–0.5)
EOSINOPHIL NFR BLD AUTO: 0.7 % — SIGNIFICANT CHANGE UP (ref 0–6)
GLUCOSE SERPL-MCNC: 118 MG/DL — HIGH (ref 70–99)
GLUCOSE SERPL-MCNC: 99 MG/DL — SIGNIFICANT CHANGE UP (ref 70–99)
HCO3 BLDA-SCNC: 35 MMOL/L — HIGH (ref 23–27)
HCT VFR BLD CALC: 38.3 % — SIGNIFICANT CHANGE UP (ref 34.5–45)
HGB BLD-MCNC: 11.4 G/DL — LOW (ref 11.5–15.5)
HOROWITZ INDEX BLDA+IHG-RTO: 50 — SIGNIFICANT CHANGE UP
LYMPHOCYTES # BLD AUTO: 1.9 K/UL — SIGNIFICANT CHANGE UP (ref 1–3.3)
LYMPHOCYTES # BLD AUTO: 19 % — SIGNIFICANT CHANGE UP (ref 13–44)
MAGNESIUM SERPL-MCNC: 1.9 MG/DL — SIGNIFICANT CHANGE UP (ref 1.6–2.6)
MCHC RBC-ENTMCNC: 28.8 PG — SIGNIFICANT CHANGE UP (ref 27–34)
MCHC RBC-ENTMCNC: 29.8 GM/DL — LOW (ref 32–36)
MCV RBC AUTO: 96.7 FL — SIGNIFICANT CHANGE UP (ref 80–100)
MONOCYTES # BLD AUTO: 0.7 K/UL — SIGNIFICANT CHANGE UP (ref 0–0.9)
MONOCYTES NFR BLD AUTO: 6.7 % — SIGNIFICANT CHANGE UP (ref 2–14)
NEUTROPHILS # BLD AUTO: 7.4 K/UL — SIGNIFICANT CHANGE UP (ref 1.8–7.4)
NEUTROPHILS NFR BLD AUTO: 72.9 % — SIGNIFICANT CHANGE UP (ref 43–77)
PCO2 BLDA: 67 MMHG — HIGH (ref 32–46)
PH BLDA: 7.34 — LOW (ref 7.35–7.45)
PHOSPHATE SERPL-MCNC: 4.5 MG/DL — SIGNIFICANT CHANGE UP (ref 2.5–4.5)
PLATELET # BLD AUTO: 316 K/UL — SIGNIFICANT CHANGE UP (ref 150–400)
PO2 BLDA: 84 MMHG — SIGNIFICANT CHANGE UP (ref 74–108)
POTASSIUM SERPL-MCNC: 3.3 MMOL/L — LOW (ref 3.5–5.3)
POTASSIUM SERPL-MCNC: 3.6 MMOL/L — SIGNIFICANT CHANGE UP (ref 3.5–5.3)
POTASSIUM SERPL-SCNC: 3.3 MMOL/L — LOW (ref 3.5–5.3)
POTASSIUM SERPL-SCNC: 3.6 MMOL/L — SIGNIFICANT CHANGE UP (ref 3.5–5.3)
RBC # BLD: 3.96 M/UL — SIGNIFICANT CHANGE UP (ref 3.8–5.2)
RBC # FLD: 16.6 % — HIGH (ref 10.3–14.5)
SAO2 % BLDA: 96 % — SIGNIFICANT CHANGE UP (ref 92–96)
SODIUM SERPL-SCNC: 145 MMOL/L — SIGNIFICANT CHANGE UP (ref 135–145)
SODIUM SERPL-SCNC: 145 MMOL/L — SIGNIFICANT CHANGE UP (ref 135–145)
WBC # BLD: 10.2 K/UL — SIGNIFICANT CHANGE UP (ref 3.8–10.5)
WBC # FLD AUTO: 10.2 K/UL — SIGNIFICANT CHANGE UP (ref 3.8–10.5)

## 2018-10-29 PROCEDURE — 71045 X-RAY EXAM CHEST 1 VIEW: CPT | Mod: 26

## 2018-10-29 RX ORDER — INSULIN LISPRO 100/ML
VIAL (ML) SUBCUTANEOUS
Qty: 0 | Refills: 0 | Status: DISCONTINUED | OUTPATIENT
Start: 2018-10-29 | End: 2018-11-05

## 2018-10-29 RX ORDER — POTASSIUM CHLORIDE 20 MEQ
40 PACKET (EA) ORAL ONCE
Qty: 0 | Refills: 0 | Status: COMPLETED | OUTPATIENT
Start: 2018-10-29 | End: 2018-10-29

## 2018-10-29 RX ORDER — DOCUSATE SODIUM 100 MG
100 CAPSULE ORAL DAILY
Qty: 0 | Refills: 0 | Status: DISCONTINUED | OUTPATIENT
Start: 2018-10-29 | End: 2018-11-05

## 2018-10-29 RX ORDER — SENNA PLUS 8.6 MG/1
1 TABLET ORAL DAILY
Qty: 0 | Refills: 0 | Status: DISCONTINUED | OUTPATIENT
Start: 2018-10-29 | End: 2018-11-05

## 2018-10-29 RX ADMIN — ATORVASTATIN CALCIUM 40 MILLIGRAM(S): 80 TABLET, FILM COATED ORAL at 21:33

## 2018-10-29 RX ADMIN — Medication 100 MILLIGRAM(S): at 12:11

## 2018-10-29 RX ADMIN — PANTOPRAZOLE SODIUM 40 MILLIGRAM(S): 20 TABLET, DELAYED RELEASE ORAL at 12:09

## 2018-10-29 RX ADMIN — HEPARIN SODIUM 5000 UNIT(S): 5000 INJECTION INTRAVENOUS; SUBCUTANEOUS at 13:09

## 2018-10-29 RX ADMIN — Medication 1000 UNIT(S): at 12:09

## 2018-10-29 RX ADMIN — CARVEDILOL PHOSPHATE 3.12 MILLIGRAM(S): 80 CAPSULE, EXTENDED RELEASE ORAL at 17:34

## 2018-10-29 RX ADMIN — HEPARIN SODIUM 5000 UNIT(S): 5000 INJECTION INTRAVENOUS; SUBCUTANEOUS at 06:27

## 2018-10-29 RX ADMIN — Medication 40 MILLIEQUIVALENT(S): at 10:39

## 2018-10-29 RX ADMIN — Medication 1: at 12:09

## 2018-10-29 RX ADMIN — HEPARIN SODIUM 5000 UNIT(S): 5000 INJECTION INTRAVENOUS; SUBCUTANEOUS at 21:33

## 2018-10-29 RX ADMIN — SENNA PLUS 1 TABLET(S): 8.6 TABLET ORAL at 12:11

## 2018-10-29 RX ADMIN — Medication 40 MILLIGRAM(S): at 17:34

## 2018-10-29 RX ADMIN — CARVEDILOL PHOSPHATE 3.12 MILLIGRAM(S): 80 CAPSULE, EXTENDED RELEASE ORAL at 06:25

## 2018-10-29 RX ADMIN — Medication 40 MILLIGRAM(S): at 06:25

## 2018-10-29 RX ADMIN — PREGABALIN 1000 MICROGRAM(S): 225 CAPSULE ORAL at 13:09

## 2018-10-29 RX ADMIN — Medication 81 MILLIGRAM(S): at 12:09

## 2018-10-29 NOTE — PROGRESS NOTE ADULT - SUBJECTIVE AND OBJECTIVE BOX
Patient is a 53y old  Female who presents with a chief complaint of SOB with cough and fever (29 Oct 2018 14:42)    PATIENT IS SEEN AND EXAMINED IN  ICU ON NASAL CANULA. TOLERATING WELL    ALLERGIES:  No Known Allergies      Daily Weight in k.3 (29 Oct 2018 08:00)    VITALS:    Vital Signs Last 24 Hrs  T(C): 36.5 (29 Oct 2018 17:29), Max: 36.9 (29 Oct 2018 06:00)  T(F): 97.7 (29 Oct 2018 17:29), Max: 98.5 (29 Oct 2018 06:00)  HR: 72 (29 Oct 2018 17:29) (65 - 92)  BP: 117/60 (29 Oct 2018 17:29) (96/54 - 136/78)  BP(mean): 90 (29 Oct 2018 17:00) (63 - 91)  RR: 17 (29 Oct 2018 17:29) (11 - 31)  SpO2: 96% (29 Oct 2018 17:29) (88% - 100%)    LABS:  CBC Full  -  ( 29 Oct 2018 05:52 )  WBC Count : 10.2 K/uL  Hemoglobin : 11.4 g/dL  Hematocrit : 38.3 %  Platelet Count - Automated : 316 K/uL  Mean Cell Volume : 96.7 fl  Mean Cell Hemoglobin : 28.8 pg  Mean Cell Hemoglobin Concentration : 29.8 gm/dL  Auto Neutrophil # : 7.4 K/uL  Auto Lymphocyte # : 1.9 K/uL  Auto Monocyte # : 0.7 K/uL  Auto Eosinophil # : 0.1 K/uL  Auto Basophil # : 0.1 K/uL  Auto Neutrophil % : 72.9 %  Auto Lymphocyte % : 19.0 %  Auto Monocyte % : 6.7 %  Auto Eosinophil % : 0.7 %  Auto Basophil % : 0.7 %      10-29    145  |  103  |  38<H>  ----------------------------<  118<H>  3.6   |  38<H>  |  1.40<H>    Ca    8.2<L>      29 Oct 2018 15:35  Phos  4.5     10-29  Mg     1.9     10-29      CAPILLARY BLOOD GLUCOSE      POCT Blood Glucose.: 112 mg/dL (29 Oct 2018 16:28)  POCT Blood Glucose.: 167 mg/dL (29 Oct 2018 11:35)  POCT Blood Glucose.: 105 mg/dL (29 Oct 2018 05:32)  POCT Blood Glucose.: 128 mg/dL (28 Oct 2018 23:08)    CARDIAC MARKERS ( 28 Oct 2018 09:25 )  0.015 ng/mL / x     / 92 U/L / x     / x      CARDIAC MARKERS ( 28 Oct 2018 09:24 )  x     / x     / x     / x     / 1.5 ng/mL      ABG - ( 29 Oct 2018 06:32 )  pH, Arterial: 7.34  pH, Blood: x     /  pCO2: 67    /  pO2: 84    / HCO3: 35    / Base Excess: 8.0   /  SaO2: 96        .Blood Blood-Peripheral  10- @ 00:14   No growth to date.  --  --      MEDICATIONS:    MEDICATIONS  (STANDING):  aspirin  chewable 81 milliGRAM(s) Oral daily  atorvastatin 40 milliGRAM(s) Oral at bedtime  carvedilol 3.125 milliGRAM(s) Oral every 12 hours  cholecalciferol 1000 Unit(s) Oral daily  cyanocobalamin 1000 MICROGram(s) Oral daily  docusate sodium 100 milliGRAM(s) Oral daily  furosemide   Injectable 40 milliGRAM(s) IV Push every 12 hours  heparin  Injectable 5000 Unit(s) SubCutaneous every 8 hours  insulin lispro (HumaLOG) corrective regimen sliding scale   SubCutaneous Before meals and at bedtime  pantoprazole  Injectable 40 milliGRAM(s) IV Push daily  senna 1 Tablet(s) Oral daily      MEDICATIONS  (PRN):      REVIEW OF SYSTEMS:                           ALL ROS DONE [ X   ]    CONSTITUTIONAL:  LETHARGIC [   ], FEVER [   ], UNRESPONSIVE [   ]  CVS:  CP  [   ], SOB, [   ], PALPITATIONS [   ], DIZZYNESS [   ]  RS: COUGH [   ], SPUTUM [   ]  GI: ABDOMINAL PAIN [   ], NAUSEA [   ], VOMITINGS [   ], DIARRHEA [   ], CONSTIPATION [   ]  :  DYSURIA [   ], NOCTURIA [   ], INCREASED FREQUENCY [   ], DRIBLING [   ],  SKELETAL: PAINFUL JOINTS [   ], SWOLLEN JOINTS [   ], NECK ACHE [   ], LOW BACK ACHE [   ],  SKIN : ULCERS [   ], RASH [   ], ITCHING [   ]  CNS: HEAD ACHE [   ], DOUBLE VISION [   ], BLURRED VISION [   ], AMS / CONFUSION [   ], SEIZURES [   ], WEAKNESS [   ],TINGLING / NUMBNESS [   ]    PHYSICAL EXAMINATION:  GENERAL APPEARANCE: NO DISTRESS  HEENT:  NO PALLOR, NO  JVD,  NO   NODES, NECK SUPPLE  CVS: S1 +, S2 +,   RS: AEEB,  OCCASIONAL  RALES +, MILD RONCHI +  ABD: SOFT, NT, NO, BS +  EXT:  PE ++  SKIN: WARM,   SKELETAL:  ROM ACCEPTABLE  CNS:  AAO X 1-2   , NO  DEFICITS    RADIOLOGY :    < from: Xray Chest 1 View- PORTABLE-Routine (10.28.18 @ 10:29) >  INTERPRETATION:  Clinical history: Shortness of breath    Compared to 10/26/2018, diffuse cardiomegaly is unaltered. Vascular   prominence is unchanged and suggests pulmonary venous congestion. ICD   overlies the left hemithorax with the distal electrode overlying the   right ventricle. No progressive airspace opacities or pneumothorax.    < end of copied text >    < from: US Duplex Venous Lower Ext Complete, Bilateral (10.27.18 @ 11:59) >    IMPRESSION:  No evidence of DVT in the bilateral femoropopliteal systems    < end of copied text >      ASSESSMENT :     Heart failure  GERD (gastroesophageal reflux disease)  CHF (congestive heart failure)  CAD (coronary artery disease)  AICD (automatic cardioverter/defibrillator) present  COPD (chronic obstructive pulmonary disease)  RA (rheumatoid arthritis)  DM (diabetes mellitus)  HTN (hypertension)  History of cholecystectomy      PLAN:  HPI:  53 Female form [facility] PMH of Obesity, CHF HFpEF 55%,  s/p ICD, HTN, B/l LE edema, DM, Gout, CAD, HLD, COPD ( not on home O2) presented to ED with SOB. As per patient she was having cough since 4-5 days along with Productive cough with whitish sputum, pt reports of having low grade fever on Thursday. patient was started on Abx at AL, fever was resolved but  with  worsening of SOB. Patient denies headaches, chest pain, palpitations, nausea, vomiting, diarrhea, abdominal pain or dysuria. Patient has been compliant  with her medications. patient has chronic B/l lower leg swelling, with CHF was on lasix 40 mg BID and metolazone 5 mg daily. Last ECHO was done in 2018 has EF 55%. (27 Oct 2018 10:22)    - DC PLAN BACK TO [facility] IF STABLE IN AM. PT AND OT EVALUATION.  - RESPIRATORY FAILURE ON BiPAP. NOW TOLERATING NASAL CANULA WELL. CHF EXACERBATION ON IV LASIX. BEING DOWN GRADED FROM ICU  - FEVER, COUGH - SUSPECT ACUTE BRONCHITIS  - GI AND DVT PROPHYLAXIS  - DR. NATARAJAN

## 2018-10-29 NOTE — PROGRESS NOTE ADULT - PROBLEM SELECTOR PLAN 3
trop T1 0,06 T2 0.055   trop slightly elevated likely demand   c/w with aspirin, statin   Hold coreg for now due to SOB. trop T1 0,06 T2 0.055   trop slightly elevated likely demand   c/w with aspirin, statin , coreg

## 2018-10-29 NOTE — PROGRESS NOTE ADULT - SUBJECTIVE AND OBJECTIVE BOX
INTERVAL HPI/OVERNIGHT EVENTS:     PRESSORS: [ ] YES [x ] NO  WHICH:    ANTIBIOTICS:                  DATE STARTED:  ANTIBIOTICS:                  DATE STARTED:  ANTIBIOTICS:                  DATE STARTED:    Antimicrobial:    Cardiovascular:  carvedilol 3.125 milliGRAM(s) Oral every 12 hours  furosemide   Injectable 40 milliGRAM(s) IV Push every 12 hours    Pulmonary:    Hematalogic:  aspirin  chewable 81 milliGRAM(s) Oral daily  heparin  Injectable 5000 Unit(s) SubCutaneous every 8 hours    Other:  atorvastatin 40 milliGRAM(s) Oral at bedtime  cholecalciferol 1000 Unit(s) Oral daily  cyanocobalamin 1000 MICROGram(s) Oral daily  insulin lispro (HumaLOG) corrective regimen sliding scale   SubCutaneous every 6 hours  pantoprazole  Injectable 40 milliGRAM(s) IV Push daily    aspirin  chewable 81 milliGRAM(s) Oral daily  atorvastatin 40 milliGRAM(s) Oral at bedtime  carvedilol 3.125 milliGRAM(s) Oral every 12 hours  cholecalciferol 1000 Unit(s) Oral daily  cyanocobalamin 1000 MICROGram(s) Oral daily  furosemide   Injectable 40 milliGRAM(s) IV Push every 12 hours  heparin  Injectable 5000 Unit(s) SubCutaneous every 8 hours  insulin lispro (HumaLOG) corrective regimen sliding scale   SubCutaneous every 6 hours  pantoprazole  Injectable 40 milliGRAM(s) IV Push daily    Drug Dosing Weight  Height (cm): 172.72 (26 Oct 2018 17:30)  Weight (kg): 142.7 (27 Oct 2018 13:58)  BMI (kg/m2): 47.8 (27 Oct 2018 13:58)  BSA (m2): 2.48 (27 Oct 2018 13:58)    CENTRAL LINE: [ ] YES [x ] NO  LOCATION:         AVILA: [ ] YES [x ] NO          A-LINE:  [ ] YES [x ] NO  LOCATION:             ICU Vital Signs Last 24 Hrs  T(C): 36.2 (29 Oct 2018 00:26), Max: 36.6 (28 Oct 2018 10:00)  T(F): 97.1 (29 Oct 2018 00:26), Max: 97.9 (28 Oct 2018 10:00)  HR: 70 (29 Oct 2018 04:00) (64 - 92)  BP: 123/59 (29 Oct 2018 04:00) (106/51 - 140/82)  BP(mean): 72 (29 Oct 2018 04:00) (63 - 94)  ABP: --  ABP(mean): --  RR: 15 (29 Oct 2018 04:00) (11 - 31)  SpO2: 98% (29 Oct 2018 04:00) (87% - 100%)      ABG - ( 29 Oct 2018 06:32 )  pH, Arterial: 7.34  pH, Blood: x     /  pCO2: 67    /  pO2: 84    / HCO3: 35    / Base Excess: 8.0   /  SaO2: 96                    10-27 @ 07:01  -  10-28 @ 07:00  --------------------------------------------------------  IN: 200 mL / OUT: 1600 mL / NET: -1400 mL            REVIEW OF SYSTEMS:    CONSTITUTIONAL: No weakness, fevers or chills  NECK: No pain or stiffness  RESPIRATORY: No cough, wheezing, hemoptysis; No shortness of breath  CARDIOVASCULAR: No chest pain or palpitations  GASTROINTESTINAL: No abdominal or epigastric pain. No nausea, vomiting, No diarrhea or constipation. No melena or hematochezia.  GENITOURINARY: No dysuria, frequency or hematuria  NEUROLOGICAL: No numbness or weakness  All other review of systems is negative unless indicated above      PHYSICAL EXAM:    GENERAL: NAD, well-groomed, well-developed  EYES: EOMI, PERRLA,   NECK: Supple, No JVD; Normal thyroid; Trachea midline  NERVOUS SYSTEM:  Alert & Oriented X3,  Motor Strength 5/5 B/L upper and lower extremities; DTRs 2+ intact and symmetric  CHEST/LUNG: No rales, rhonchi, wheezing   HEART: Regular rate and rhythm; No murmurs,   ABDOMEN: Soft, Nontender, Nondistended; Bowel sounds present  EXTREMITIES:  2+ Peripheral Pulses, No clubbing, cyanosis, or edema        LABS:  CBC Full  -  ( 28 Oct 2018 06:18 )  WBC Count : 10.5 K/uL  Hemoglobin : 11.4 g/dL  Hematocrit : 38.6 %  Platelet Count - Automated : 272 K/uL  Mean Cell Volume : 97.4 fl  Mean Cell Hemoglobin : 28.9 pg  Mean Cell Hemoglobin Concentration : 29.7 gm/dL  Auto Neutrophil # : 8.8 K/uL  Auto Lymphocyte # : 1.0 K/uL  Auto Monocyte # : 0.7 K/uL  Auto Eosinophil # : 0.0 K/uL  Auto Basophil # : 0.0 K/uL  Auto Neutrophil % : 83.4 %  Auto Lymphocyte % : 9.7 %  Auto Monocyte % : 6.6 %  Auto Eosinophil % : 0.0 %  Auto Basophil % : 0.3 %    10-29    145  |  104  |  42<H>  ----------------------------<  99  3.3<L>   |  34<H>  |  1.44<H>    Ca    8.2<L>      29 Oct 2018 05:52  Phos  4.5     10-29  Mg     1.9     10-29          Culture Results:   No growth to date. (10-27 @ 00:14)  Culture Results:   No growth to date. (10-27 @ 00:14)      RADIOLOGY & ADDITIONAL STUDIES REVIEWED:          < from: US Duplex Venous Lower Ext Complete, Bilateral (10.27.18 @ 11:59) >    IMPRESSION:  No evidence of DVT in the bilateral femoropopliteal systems    < from: Xray Chest 1 View- PORTABLE-Routine (10.28.18 @ 10:29) >  INTERPRETATION:  Clinical history: Shortness of breath    Compared to 10/26/2018, diffuse cardiomegaly is unaltered. Vascular   prominence is unchanged and suggests pulmonary venous congestion. ICD   overlies the left hemithorax with the distal electrode overlying the   right ventricle. No progressive airspace opacities or pneumothorax.    < from: Xray Chest 1 View AP/PA (10.26.18 @ 22:01) >  INTERPRETATION:  PROCEDURE: AP view of the chest.    CLINICAL INFORMATION: Shortness of breath and cough    COMPARISON: 1/23/2018    FINDINGS:     There is a cardiac conduction device overlying the left axilla with   intact lead to the heart.    There are no lung consolidations. The heart is enlarged. The osseous   structures are intact.    IMPRESSION:    No lung consolidations.          [ ]GOALS OF CARE DISCUSSION WITH PATIENT/FAMILY/PROXY:    CRITICAL CARE TIME SPENT: 35 minutes INTERVAL HPI/OVERNIGHT EVENTS: off BIPAP, on NC     PRESSORS: [ ] YES [x ] NO  WHICH:    ANTIBIOTICS:                  DATE STARTED:  ANTIBIOTICS:                  DATE STARTED:  ANTIBIOTICS:                  DATE STARTED:    Antimicrobial:    Cardiovascular:  carvedilol 3.125 milliGRAM(s) Oral every 12 hours  furosemide   Injectable 40 milliGRAM(s) IV Push every 12 hours    Pulmonary:    Hematalogic:  aspirin  chewable 81 milliGRAM(s) Oral daily  heparin  Injectable 5000 Unit(s) SubCutaneous every 8 hours    Other:  atorvastatin 40 milliGRAM(s) Oral at bedtime  cholecalciferol 1000 Unit(s) Oral daily  cyanocobalamin 1000 MICROGram(s) Oral daily  insulin lispro (HumaLOG) corrective regimen sliding scale   SubCutaneous every 6 hours  pantoprazole  Injectable 40 milliGRAM(s) IV Push daily    aspirin  chewable 81 milliGRAM(s) Oral daily  atorvastatin 40 milliGRAM(s) Oral at bedtime  carvedilol 3.125 milliGRAM(s) Oral every 12 hours  cholecalciferol 1000 Unit(s) Oral daily  cyanocobalamin 1000 MICROGram(s) Oral daily  furosemide   Injectable 40 milliGRAM(s) IV Push every 12 hours  heparin  Injectable 5000 Unit(s) SubCutaneous every 8 hours  insulin lispro (HumaLOG) corrective regimen sliding scale   SubCutaneous every 6 hours  pantoprazole  Injectable 40 milliGRAM(s) IV Push daily    Drug Dosing Weight  Height (cm): 172.72 (26 Oct 2018 17:30)  Weight (kg): 142.7 (27 Oct 2018 13:58)  BMI (kg/m2): 47.8 (27 Oct 2018 13:58)  BSA (m2): 2.48 (27 Oct 2018 13:58)    CENTRAL LINE: [ ] YES [x ] NO  LOCATION:         AVILA: [ ] YES [x ] NO          A-LINE:  [ ] YES [x ] NO  LOCATION:             ICU Vital Signs Last 24 Hrs  T(C): 36.2 (29 Oct 2018 00:26), Max: 36.6 (28 Oct 2018 10:00)  T(F): 97.1 (29 Oct 2018 00:26), Max: 97.9 (28 Oct 2018 10:00)  HR: 70 (29 Oct 2018 04:00) (64 - 92)  BP: 123/59 (29 Oct 2018 04:00) (106/51 - 140/82)  BP(mean): 72 (29 Oct 2018 04:00) (63 - 94)  ABP: --  ABP(mean): --  RR: 15 (29 Oct 2018 04:00) (11 - 31)  SpO2: 98% (29 Oct 2018 04:00) (87% - 100%)      ABG - ( 29 Oct 2018 06:32 )  pH, Arterial: 7.34  pH, Blood: x     /  pCO2: 67    /  pO2: 84    / HCO3: 35    / Base Excess: 8.0   /  SaO2: 96                    10-27 @ 07:01  -  10-28 @ 07:00  --------------------------------------------------------  IN: 200 mL / OUT: 1600 mL / NET: -1400 mL            REVIEW OF SYSTEMS:    CONSTITUTIONAL: No weakness, fevers or chills  NECK: No pain or stiffness  RESPIRATORY: No cough, wheezing, hemoptysis; No shortness of breath  CARDIOVASCULAR: No chest pain or palpitations  GASTROINTESTINAL: No abdominal or epigastric pain. No nausea, vomiting, No diarrhea or constipation. No melena or hematochezia.  GENITOURINARY: No dysuria, frequency or hematuria  NEUROLOGICAL: No numbness or weakness  All other review of systems is negative unless indicated above      PHYSICAL EXAM:    GENERAL: NAD, well-groomed, well-developed  EYES: EOMI, PERRLA,   NECK: Supple, No JVD; Normal thyroid; Trachea midline  NERVOUS SYSTEM:  Alert & Oriented X3,  Motor Strength 5/5 B/L upper and lower extremities; DTRs 2+ intact and symmetric  CHEST/LUNG: No rales, rhonchi, wheezing   HEART: Regular rate and rhythm; No murmurs,   ABDOMEN: Soft, Nontender, Nondistended; Bowel sounds present  EXTREMITIES:  2+ Peripheral Pulses, No clubbing, cyanosis, or edema        LABS:  CBC Full  -  ( 28 Oct 2018 06:18 )  WBC Count : 10.5 K/uL  Hemoglobin : 11.4 g/dL  Hematocrit : 38.6 %  Platelet Count - Automated : 272 K/uL  Mean Cell Volume : 97.4 fl  Mean Cell Hemoglobin : 28.9 pg  Mean Cell Hemoglobin Concentration : 29.7 gm/dL  Auto Neutrophil # : 8.8 K/uL  Auto Lymphocyte # : 1.0 K/uL  Auto Monocyte # : 0.7 K/uL  Auto Eosinophil # : 0.0 K/uL  Auto Basophil # : 0.0 K/uL  Auto Neutrophil % : 83.4 %  Auto Lymphocyte % : 9.7 %  Auto Monocyte % : 6.6 %  Auto Eosinophil % : 0.0 %  Auto Basophil % : 0.3 %    10-29    145  |  104  |  42<H>  ----------------------------<  99  3.3<L>   |  34<H>  |  1.44<H>    Ca    8.2<L>      29 Oct 2018 05:52  Phos  4.5     10-29  Mg     1.9     10-29          Culture Results:   No growth to date. (10-27 @ 00:14)  Culture Results:   No growth to date. (10-27 @ 00:14)      RADIOLOGY & ADDITIONAL STUDIES REVIEWED:          < from: US Duplex Venous Lower Ext Complete, Bilateral (10.27.18 @ 11:59) >    IMPRESSION:  No evidence of DVT in the bilateral femoropopliteal systems    < from: Xray Chest 1 View- PORTABLE-Routine (10.28.18 @ 10:29) >  INTERPRETATION:  Clinical history: Shortness of breath    Compared to 10/26/2018, diffuse cardiomegaly is unaltered. Vascular   prominence is unchanged and suggests pulmonary venous congestion. ICD   overlies the left hemithorax with the distal electrode overlying the   right ventricle. No progressive airspace opacities or pneumothorax.    < from: Xray Chest 1 View AP/PA (10.26.18 @ 22:01) >  INTERPRETATION:  PROCEDURE: AP view of the chest.    CLINICAL INFORMATION: Shortness of breath and cough    COMPARISON: 1/23/2018    FINDINGS:     There is a cardiac conduction device overlying the left axilla with   intact lead to the heart.    There are no lung consolidations. The heart is enlarged. The osseous   structures are intact.    IMPRESSION:    No lung consolidations.          [ ]GOALS OF CARE DISCUSSION WITH PATIENT/FAMILY/PROXY:    CRITICAL CARE TIME SPENT: 35 minutes INTERVAL HPI/OVERNIGHT EVENTS: off BIPAP, on NC     PRESSORS: [ ] YES [x ] NO  WHICH:    ANTIBIOTICS:                  DATE STARTED:  ANTIBIOTICS:                  DATE STARTED:  ANTIBIOTICS:                  DATE STARTED:    Antimicrobial:    Cardiovascular:  carvedilol 3.125 milliGRAM(s) Oral every 12 hours  furosemide   Injectable 40 milliGRAM(s) IV Push every 12 hours    Pulmonary:    Hematalogic:  aspirin  chewable 81 milliGRAM(s) Oral daily  heparin  Injectable 5000 Unit(s) SubCutaneous every 8 hours    Other:  atorvastatin 40 milliGRAM(s) Oral at bedtime  cholecalciferol 1000 Unit(s) Oral daily  cyanocobalamin 1000 MICROGram(s) Oral daily  insulin lispro (HumaLOG) corrective regimen sliding scale   SubCutaneous every 6 hours  pantoprazole  Injectable 40 milliGRAM(s) IV Push daily    aspirin  chewable 81 milliGRAM(s) Oral daily  atorvastatin 40 milliGRAM(s) Oral at bedtime  carvedilol 3.125 milliGRAM(s) Oral every 12 hours  cholecalciferol 1000 Unit(s) Oral daily  cyanocobalamin 1000 MICROGram(s) Oral daily  furosemide   Injectable 40 milliGRAM(s) IV Push every 12 hours  heparin  Injectable 5000 Unit(s) SubCutaneous every 8 hours  insulin lispro (HumaLOG) corrective regimen sliding scale   SubCutaneous every 6 hours  pantoprazole  Injectable 40 milliGRAM(s) IV Push daily    Drug Dosing Weight  Height (cm): 172.72 (26 Oct 2018 17:30)  Weight (kg): 142.7 (27 Oct 2018 13:58)  BMI (kg/m2): 47.8 (27 Oct 2018 13:58)  BSA (m2): 2.48 (27 Oct 2018 13:58)    CENTRAL LINE: [ ] YES [x ] NO  LOCATION:         AVILA: [ ] YES [x ] NO          A-LINE:  [ ] YES [x ] NO  LOCATION:             ICU Vital Signs Last 24 Hrs  T(C): 36.2 (29 Oct 2018 00:26), Max: 36.6 (28 Oct 2018 10:00)  T(F): 97.1 (29 Oct 2018 00:26), Max: 97.9 (28 Oct 2018 10:00)  HR: 70 (29 Oct 2018 04:00) (64 - 92)  BP: 123/59 (29 Oct 2018 04:00) (106/51 - 140/82)  BP(mean): 72 (29 Oct 2018 04:00) (63 - 94)  ABP: --  ABP(mean): --  RR: 15 (29 Oct 2018 04:00) (11 - 31)  SpO2: 98% (29 Oct 2018 04:00) (87% - 100%)      ABG - ( 29 Oct 2018 06:32 )  pH, Arterial: 7.34  pH, Blood: x     /  pCO2: 67    /  pO2: 84    / HCO3: 35    / Base Excess: 8.0   /  SaO2: 96                    10-27 @ 07:01  -  10-28 @ 07:00  --------------------------------------------------------  IN: 200 mL / OUT: 1600 mL / NET: -1400 mL            REVIEW OF SYSTEMS:    CONSTITUTIONAL: No weakness, fevers or chills  NECK: No pain or stiffness  RESPIRATORY: No cough, wheezing, hemoptysis; No shortness of breath  CARDIOVASCULAR: No chest pain or palpitations  GASTROINTESTINAL: No abdominal or epigastric pain. No nausea, vomiting, No diarrhea or constipation. No melena or hematochezia.  GENITOURINARY: No dysuria, frequency or hematuria  NEUROLOGICAL: No numbness or weakness  All other review of systems is negative unless indicated above      PHYSICAL EXAM:    GENERAL: NAD, well-groomed, well-developed  EYES: EOMI, PERRLA,   NECK: Supple, No JVD; Normal thyroid; Trachea midline  NERVOUS SYSTEM:  Alert & Oriented X3,  Motor Strength 5/5 B/L upper and lower extremities; DTRs 2+ intact and symmetric  CHEST/LUNG: No rales, rhonchi, wheezing   HEART: Regular rate and rhythm; No murmurs,   ABDOMEN: Soft, Nontender, Nondistended; Bowel sounds present  EXTREMITIES:  2+ Peripheral Pulses, No clubbing, cyanosis, 1+ edema        LABS:  CBC Full  -  ( 28 Oct 2018 06:18 )  WBC Count : 10.5 K/uL  Hemoglobin : 11.4 g/dL  Hematocrit : 38.6 %  Platelet Count - Automated : 272 K/uL  Mean Cell Volume : 97.4 fl  Mean Cell Hemoglobin : 28.9 pg  Mean Cell Hemoglobin Concentration : 29.7 gm/dL  Auto Neutrophil # : 8.8 K/uL  Auto Lymphocyte # : 1.0 K/uL  Auto Monocyte # : 0.7 K/uL  Auto Eosinophil # : 0.0 K/uL  Auto Basophil # : 0.0 K/uL  Auto Neutrophil % : 83.4 %  Auto Lymphocyte % : 9.7 %  Auto Monocyte % : 6.6 %  Auto Eosinophil % : 0.0 %  Auto Basophil % : 0.3 %    10-29    145  |  104  |  42<H>  ----------------------------<  99  3.3<L>   |  34<H>  |  1.44<H>    Ca    8.2<L>      29 Oct 2018 05:52  Phos  4.5     10-29  Mg     1.9     10-29          Culture Results:   No growth to date. (10-27 @ 00:14)  Culture Results:   No growth to date. (10-27 @ 00:14)      RADIOLOGY & ADDITIONAL STUDIES REVIEWED:          < from: US Duplex Venous Lower Ext Complete, Bilateral (10.27.18 @ 11:59) >    IMPRESSION:  No evidence of DVT in the bilateral femoropopliteal systems    < from: Xray Chest 1 View- PORTABLE-Routine (10.28.18 @ 10:29) >  INTERPRETATION:  Clinical history: Shortness of breath    Compared to 10/26/2018, diffuse cardiomegaly is unaltered. Vascular   prominence is unchanged and suggests pulmonary venous congestion. ICD   overlies the left hemithorax with the distal electrode overlying the   right ventricle. No progressive airspace opacities or pneumothorax.    < from: Xray Chest 1 View AP/PA (10.26.18 @ 22:01) >  INTERPRETATION:  PROCEDURE: AP view of the chest.    CLINICAL INFORMATION: Shortness of breath and cough    COMPARISON: 1/23/2018    FINDINGS:     There is a cardiac conduction device overlying the left axilla with   intact lead to the heart.    There are no lung consolidations. The heart is enlarged. The osseous   structures are intact.    IMPRESSION:    No lung consolidations.          [ ]GOALS OF CARE DISCUSSION WITH PATIENT/FAMILY/PROXY:    CRITICAL CARE TIME SPENT: 35 minutes INTERVAL HPI/OVERNIGHT EVENTS: off BIPAP, saturating well on NC 4lt     PRESSORS: [ ] YES [x ] NO  WHICH:    ANTIBIOTICS:                  DATE STARTED:  ANTIBIOTICS:                  DATE STARTED:  ANTIBIOTICS:                  DATE STARTED:    Antimicrobial:    Cardiovascular:  carvedilol 3.125 milliGRAM(s) Oral every 12 hours  furosemide   Injectable 40 milliGRAM(s) IV Push every 12 hours    Pulmonary:    Hematalogic:  aspirin  chewable 81 milliGRAM(s) Oral daily  heparin  Injectable 5000 Unit(s) SubCutaneous every 8 hours    Other:  atorvastatin 40 milliGRAM(s) Oral at bedtime  cholecalciferol 1000 Unit(s) Oral daily  cyanocobalamin 1000 MICROGram(s) Oral daily  insulin lispro (HumaLOG) corrective regimen sliding scale   SubCutaneous every 6 hours  pantoprazole  Injectable 40 milliGRAM(s) IV Push daily    aspirin  chewable 81 milliGRAM(s) Oral daily  atorvastatin 40 milliGRAM(s) Oral at bedtime  carvedilol 3.125 milliGRAM(s) Oral every 12 hours  cholecalciferol 1000 Unit(s) Oral daily  cyanocobalamin 1000 MICROGram(s) Oral daily  furosemide   Injectable 40 milliGRAM(s) IV Push every 12 hours  heparin  Injectable 5000 Unit(s) SubCutaneous every 8 hours  insulin lispro (HumaLOG) corrective regimen sliding scale   SubCutaneous every 6 hours  pantoprazole  Injectable 40 milliGRAM(s) IV Push daily    Drug Dosing Weight  Height (cm): 172.72 (26 Oct 2018 17:30)  Weight (kg): 142.7 (27 Oct 2018 13:58)  BMI (kg/m2): 47.8 (27 Oct 2018 13:58)  BSA (m2): 2.48 (27 Oct 2018 13:58)    CENTRAL LINE: [ ] YES [x ] NO  LOCATION:         AVILA: [ ] YES [x ] NO          A-LINE:  [ ] YES [x ] NO  LOCATION:             ICU Vital Signs Last 24 Hrs  T(C): 36.2 (29 Oct 2018 00:26), Max: 36.6 (28 Oct 2018 10:00)  T(F): 97.1 (29 Oct 2018 00:26), Max: 97.9 (28 Oct 2018 10:00)  HR: 70 (29 Oct 2018 04:00) (64 - 92)  BP: 123/59 (29 Oct 2018 04:00) (106/51 - 140/82)  BP(mean): 72 (29 Oct 2018 04:00) (63 - 94)  ABP: --  ABP(mean): --  RR: 15 (29 Oct 2018 04:00) (11 - 31)  SpO2: 98% (29 Oct 2018 04:00) (87% - 100%)      ABG - ( 29 Oct 2018 06:32 )  pH, Arterial: 7.34  pH, Blood: x     /  pCO2: 67    /  pO2: 84    / HCO3: 35    / Base Excess: 8.0   /  SaO2: 96                    10-27 @ 07:01  -  10-28 @ 07:00  --------------------------------------------------------  IN: 200 mL / OUT: 1600 mL / NET: -1400 mL            REVIEW OF SYSTEMS:    CONSTITUTIONAL: No weakness, fevers or chills  NECK: No pain or stiffness  RESPIRATORY: No cough, wheezing, hemoptysis; No shortness of breath  CARDIOVASCULAR: No chest pain or palpitations  GASTROINTESTINAL: No abdominal or epigastric pain. No nausea, vomiting, No diarrhea or constipation. No melena or hematochezia.  GENITOURINARY: No dysuria, frequency or hematuria  NEUROLOGICAL: No numbness or weakness  All other review of systems is negative unless indicated above      PHYSICAL EXAM:    GENERAL: obese, NAD, well-groomed, well-developed  EYES: EOMI, PERRLA,   NECK: Supple, No JVD; Normal thyroid; Trachea midline  NERVOUS SYSTEM:  Alert & Oriented X3,  Motor Strength 5/5 B/L upper and lower extremities; DTRs 2+ intact and symmetric  CHEST/LUNG: No rales, rhonchi, wheezing   HEART: Regular rate and rhythm; No murmurs,   ABDOMEN: Soft, Nontender, Nondistended; Bowel sounds present  EXTREMITIES:  2+ Peripheral Pulses, No clubbing, cyanosis, 1+ edema        LABS:  CBC Full  -  ( 28 Oct 2018 06:18 )  WBC Count : 10.5 K/uL  Hemoglobin : 11.4 g/dL  Hematocrit : 38.6 %  Platelet Count - Automated : 272 K/uL  Mean Cell Volume : 97.4 fl  Mean Cell Hemoglobin : 28.9 pg  Mean Cell Hemoglobin Concentration : 29.7 gm/dL  Auto Neutrophil # : 8.8 K/uL  Auto Lymphocyte # : 1.0 K/uL  Auto Monocyte # : 0.7 K/uL  Auto Eosinophil # : 0.0 K/uL  Auto Basophil # : 0.0 K/uL  Auto Neutrophil % : 83.4 %  Auto Lymphocyte % : 9.7 %  Auto Monocyte % : 6.6 %  Auto Eosinophil % : 0.0 %  Auto Basophil % : 0.3 %    10-29    145  |  104  |  42<H>  ----------------------------<  99  3.3<L>   |  34<H>  |  1.44<H>    Ca    8.2<L>      29 Oct 2018 05:52  Phos  4.5     10-29  Mg     1.9     10-29          Culture Results:   No growth to date. (10-27 @ 00:14)  Culture Results:   No growth to date. (10-27 @ 00:14)      RADIOLOGY & ADDITIONAL STUDIES REVIEWED:          < from: US Duplex Venous Lower Ext Complete, Bilateral (10.27.18 @ 11:59) >    IMPRESSION:  No evidence of DVT in the bilateral femoropopliteal systems    < from: Xray Chest 1 View- PORTABLE-Routine (10.28.18 @ 10:29) >  INTERPRETATION:  Clinical history: Shortness of breath    Compared to 10/26/2018, diffuse cardiomegaly is unaltered. Vascular   prominence is unchanged and suggests pulmonary venous congestion. ICD   overlies the left hemithorax with the distal electrode overlying the   right ventricle. No progressive airspace opacities or pneumothorax.    < from: Xray Chest 1 View AP/PA (10.26.18 @ 22:01) >  INTERPRETATION:  PROCEDURE: AP view of the chest.    CLINICAL INFORMATION: Shortness of breath and cough    COMPARISON: 1/23/2018    FINDINGS:     There is a cardiac conduction device overlying the left axilla with   intact lead to the heart.    There are no lung consolidations. The heart is enlarged. The osseous   structures are intact.    IMPRESSION:    No lung consolidations.          [ ]GOALS OF CARE DISCUSSION WITH PATIENT/FAMILY/PROXY:    CRITICAL CARE TIME SPENT: 35 minutes INTERVAL HPI/OVERNIGHT EVENTS: off BIPAP, saturating well on NC 4lt. Denies any chest pain or shortness of breath. Not in distress.     PRESSORS: [ ] YES [x ] NO  WHICH:    ANTIBIOTICS:                  DATE STARTED:  ANTIBIOTICS:                  DATE STARTED:  ANTIBIOTICS:                  DATE STARTED:    Antimicrobial:    Cardiovascular:  carvedilol 3.125 milliGRAM(s) Oral every 12 hours  furosemide   Injectable 40 milliGRAM(s) IV Push every 12 hours    Pulmonary:    Hematalogic:  aspirin  chewable 81 milliGRAM(s) Oral daily  heparin  Injectable 5000 Unit(s) SubCutaneous every 8 hours    Other:  atorvastatin 40 milliGRAM(s) Oral at bedtime  cholecalciferol 1000 Unit(s) Oral daily  cyanocobalamin 1000 MICROGram(s) Oral daily  insulin lispro (HumaLOG) corrective regimen sliding scale   SubCutaneous every 6 hours  pantoprazole  Injectable 40 milliGRAM(s) IV Push daily    aspirin  chewable 81 milliGRAM(s) Oral daily  atorvastatin 40 milliGRAM(s) Oral at bedtime  carvedilol 3.125 milliGRAM(s) Oral every 12 hours  cholecalciferol 1000 Unit(s) Oral daily  cyanocobalamin 1000 MICROGram(s) Oral daily  furosemide   Injectable 40 milliGRAM(s) IV Push every 12 hours  heparin  Injectable 5000 Unit(s) SubCutaneous every 8 hours  insulin lispro (HumaLOG) corrective regimen sliding scale   SubCutaneous every 6 hours  pantoprazole  Injectable 40 milliGRAM(s) IV Push daily    Drug Dosing Weight  Height (cm): 172.72 (26 Oct 2018 17:30)  Weight (kg): 142.7 (27 Oct 2018 13:58)  BMI (kg/m2): 47.8 (27 Oct 2018 13:58)  BSA (m2): 2.48 (27 Oct 2018 13:58)    CENTRAL LINE: [ ] YES [x ] NO  LOCATION:         AVILA: [ ] YES [x ] NO          A-LINE:  [ ] YES [x ] NO  LOCATION:             ICU Vital Signs Last 24 Hrs  T(C): 36.2 (29 Oct 2018 00:26), Max: 36.6 (28 Oct 2018 10:00)  T(F): 97.1 (29 Oct 2018 00:26), Max: 97.9 (28 Oct 2018 10:00)  HR: 70 (29 Oct 2018 04:00) (64 - 92)  BP: 123/59 (29 Oct 2018 04:00) (106/51 - 140/82)  BP(mean): 72 (29 Oct 2018 04:00) (63 - 94)  ABP: --  ABP(mean): --  RR: 15 (29 Oct 2018 04:00) (11 - 31)  SpO2: 98% (29 Oct 2018 04:00) (87% - 100%)      ABG - ( 29 Oct 2018 06:32 )  pH, Arterial: 7.34  pH, Blood: x     /  pCO2: 67    /  pO2: 84    / HCO3: 35    / Base Excess: 8.0   /  SaO2: 96                    10-27 @ 07:01  -  10-28 @ 07:00  --------------------------------------------------------  IN: 200 mL / OUT: 1600 mL / NET: -1400 mL            REVIEW OF SYSTEMS:    CONSTITUTIONAL: No weakness, fevers or chills  NECK: No pain or stiffness  RESPIRATORY: No cough, wheezing, hemoptysis; No shortness of breath  CARDIOVASCULAR: No chest pain or palpitations  GASTROINTESTINAL: No abdominal or epigastric pain. No nausea, vomiting, No diarrhea or constipation. No melena or hematochezia.  GENITOURINARY: No dysuria, frequency or hematuria  NEUROLOGICAL: No numbness or weakness  All other review of systems is negative unless indicated above      PHYSICAL EXAM:    GENERAL: obese, NAD, well-groomed, well-developed  EYES: EOMI, PERRLA,   NECK: Supple, No JVD; Normal thyroid; Trachea midline  NERVOUS SYSTEM:  Alert & Oriented X3,  Motor Strength 5/5 B/L upper and lower extremities; DTRs 2+ intact and symmetric  CHEST/LUNG: No rales, rhonchi, wheezing   HEART: Regular rate and rhythm; No murmurs,   ABDOMEN: Soft, Nontender, Nondistended; Bowel sounds present  EXTREMITIES:  2+ Peripheral Pulses, No clubbing, cyanosis, 1+ edema        LABS:  CBC Full  -  ( 28 Oct 2018 06:18 )  WBC Count : 10.5 K/uL  Hemoglobin : 11.4 g/dL  Hematocrit : 38.6 %  Platelet Count - Automated : 272 K/uL  Mean Cell Volume : 97.4 fl  Mean Cell Hemoglobin : 28.9 pg  Mean Cell Hemoglobin Concentration : 29.7 gm/dL  Auto Neutrophil # : 8.8 K/uL  Auto Lymphocyte # : 1.0 K/uL  Auto Monocyte # : 0.7 K/uL  Auto Eosinophil # : 0.0 K/uL  Auto Basophil # : 0.0 K/uL  Auto Neutrophil % : 83.4 %  Auto Lymphocyte % : 9.7 %  Auto Monocyte % : 6.6 %  Auto Eosinophil % : 0.0 %  Auto Basophil % : 0.3 %    10-29    145  |  104  |  42<H>  ----------------------------<  99  3.3<L>   |  34<H>  |  1.44<H>    Ca    8.2<L>      29 Oct 2018 05:52  Phos  4.5     10-29  Mg     1.9     10-29          Culture Results:   No growth to date. (10-27 @ 00:14)  Culture Results:   No growth to date. (10-27 @ 00:14)      RADIOLOGY & ADDITIONAL STUDIES REVIEWED:          < from: US Duplex Venous Lower Ext Complete, Bilateral (10.27.18 @ 11:59) >    IMPRESSION:  No evidence of DVT in the bilateral femoropopliteal systems    < from: Xray Chest 1 View- PORTABLE-Routine (10.28.18 @ 10:29) >  INTERPRETATION:  Clinical history: Shortness of breath    Compared to 10/26/2018, diffuse cardiomegaly is unaltered. Vascular   prominence is unchanged and suggests pulmonary venous congestion. ICD   overlies the left hemithorax with the distal electrode overlying the   right ventricle. No progressive airspace opacities or pneumothorax.    < from: Xray Chest 1 View AP/PA (10.26.18 @ 22:01) >  INTERPRETATION:  PROCEDURE: AP view of the chest.    CLINICAL INFORMATION: Shortness of breath and cough    COMPARISON: 1/23/2018    FINDINGS:     There is a cardiac conduction device overlying the left axilla with   intact lead to the heart.    There are no lung consolidations. The heart is enlarged. The osseous   structures are intact.    IMPRESSION:    No lung consolidations.          [ ]GOALS OF CARE DISCUSSION WITH PATIENT/FAMILY/PROXY:    CRITICAL CARE TIME SPENT: 35 minutes

## 2018-10-29 NOTE — PROGRESS NOTE ADULT - PROBLEM SELECTOR PLAN 2
h/o CHF HF pEf 55% last [facility] jan 2018  on lasix 40 mg BID pO and metolazone 5 mg daily   trop T1 0,06 T2 0.055   CXR enlarged heart and pulmonary vascular congestion  EKG showed RBBB with sinus rhythm  with no st t changes    tele monitor   continue with Diuresis with Lasix 40 mg BID   monitor daily I/o's ,maintain negative balance  F/u ECHO   cardio consult Dr Gold. h/o CHF HF pEf 55% last [facility] jan 2018  on lasix 40 mg BID pO and metolazone 5 mg daily at home  trop T1 0,06 T2 0.055   CXR enlarged heart and pulmonary vascular congestion  EKG showed RBBB with sinus rhythm  with no st t changes    tele monitor   continue with Diuresis with Lasix 40 mg BID   on aspirin, statin and coreg  monitor daily I/o's ,maintain negative balance   ECHO shows EF 20-25%, GII DD, severe TR, mod. MR, AI.  cardio consult Dr Gold.

## 2018-10-29 NOTE — PROGRESS NOTE ADULT - ASSESSMENT
53 Female form [facility] PMH of Obesity, CHF HFpEF 55%,  s/p ICD, HTN, B/l LE edema, DM, Gout, CAD, HLD presented to ED with SOB, Productive cough with whitish sputum since 4-5 days, low grade fever on Thursday. patient was started on Abx at [facility], fever was resolved but with  worsening of SOB. Patient has chronic B/l lower leg swelling, with CHF was on lasix 40 mg BID and metolazone 5 mg daily. Last ECHO was done in Jan 2018 has EF 55%.    On admission patient was afebrile, HD stable, O2 sat 85 on 40 % oxygen venti mask, AGB 7.28/65/60/87, cbc leucocytosis, elevated BUN and creatinine. CXR showed cardiomegaly, patient was in mod respiratory distress and lethargic, RRT was called patient was placed on Bipap.  s/p lasix 120 mg, Iv solumedrol 125 mg and levofloxacin, pt was transferred to ICU.

## 2018-10-29 NOTE — PROGRESS NOTE ADULT - SUBJECTIVE AND OBJECTIVE BOX
Patient denies CP, Breathing better  Review of systems otherwise (-)    aspirin  chewable 81 milliGRAM(s) Oral daily  atorvastatin 40 milliGRAM(s) Oral at bedtime  carvedilol 3.125 milliGRAM(s) Oral every 12 hours  cholecalciferol 1000 Unit(s) Oral daily  cyanocobalamin 1000 MICROGram(s) Oral daily  docusate sodium 100 milliGRAM(s) Oral daily  furosemide   Injectable 40 milliGRAM(s) IV Push every 12 hours  heparin  Injectable 5000 Unit(s) SubCutaneous every 8 hours  insulin lispro (HumaLOG) corrective regimen sliding scale   SubCutaneous every 6 hours  pantoprazole  Injectable 40 milliGRAM(s) IV Push daily  senna 1 Tablet(s) Oral daily                            11.4   10.2  )-----------( 316      ( 29 Oct 2018 05:52 )             38.3       Hemoglobin: 11.4 g/dL (10-29 @ 05:52)  Hemoglobin: 11.4 g/dL (10-28 @ 06:18)  Hemoglobin: 11.1 g/dL (10-27 @ 06:34)  Hemoglobin: 11.9 g/dL (10-26 @ 19:15)      10-29    145  |  104  |  42<H>  ----------------------------<  99  3.3<L>   |  34<H>  |  1.44<H>    Ca    8.2<L>      29 Oct 2018 05:52  Phos  4.5     10-29  Mg     1.9     10-29      Creatinine Trend: 1.44<--, 1.49<--, 1.64<--, 1.38<--    COAGS:     CARDIAC MARKERS ( 28 Oct 2018 09:25 )  0.015 ng/mL / x     / 92 U/L / x     / x      CARDIAC MARKERS ( 28 Oct 2018 09:24 )  x     / x     / x     / x     / 1.5 ng/mL  CARDIAC MARKERS ( 27 Oct 2018 06:34 )  0.059 ng/mL / x     / 124 U/L / x     / 2.0 ng/mL  CARDIAC MARKERS ( 26 Oct 2018 19:15 )  0.066 ng/mL / x     / 133 U/L / x     / 1.2 ng/mL        T(C): 36.1 (10-29-18 @ 10:00), Max: 36.9 (10-29-18 @ 06:00)  HR: 76 (10-29-18 @ 12:11) (65 - 92)  BP: 127/67 (10-29-18 @ 12:00) (96/54 - 140/82)  RR: 26 (10-29-18 @ 12:00) (11 - 31)  SpO2: 93% (10-29-18 @ 12:11) (88% - 100%)  Wt(kg): --    I&O's Summary    28 Oct 2018 07:01  -  29 Oct 2018 07:00  --------------------------------------------------------  IN: 590 mL / OUT: 5600 mL / NET: -5010 mL    29 Oct 2018 07:01  -  29 Oct 2018 14:43  --------------------------------------------------------  IN: 150 mL / OUT: 800 mL / NET: -650 mL        Lymphatic: No lymphadenopathy , + edema in LE bilaterally   Cardiovascular: Normal S1 S2, RRR, No JVD, No murmurs , Peripheral pulses palpable 2+ bilaterally  Respiratory: Lungs clear to auscultation, normal effort 	  Gastrointestinal:  Soft, Non-tender, + BS	  Skin: No rashes, No ecchymoses, No cyanosis, warm to touch      TELEMETRY: SR	    ECG:  < from: 12 Lead ECG (10.26.18 @ 17:51) >  Normal sinus rhythm  Possible Left atrial enlargement  Right bundle branch block  Left ventricular hypertrophy with repolarization abnormality  Abnormal ECG    < end of copied text >  	  RADIOLOGY:   DIAGNOSTIC TESTING:  [ ] Echocardiogram: < from: Transthoracic Echocardiogram (01.18.18 @ 07:37) >  1. Mild mitral regurgitation.  2. Moderately dilated left atrium.  LA volume index = 42  cc/m2.  3. Normal left ventricular internal dimensions and wall  thicknesses.  4. Endocardium not well visualized; grossly low normal left  ventricular systolic function. Segmental wall motion could  not be assessed.  5. Mild right atrial enlargement.  6. Right ventricular enlargement with normal RV function. A  device lead is visualized in the right heart.  7. RV systolic pressure is 48 mm Hg. Mild pulmonary  hypertension.  8. There is severe tricuspid regurgitation.  9. Trace pericardial effusion.    < end of copied text >    [ ]  Catheterization:  [ ] Stress Test:    OTHER: 	      ASSESSMENT/PLAN:  53yFemale with history of HFpEF, HTN, HLD, COPD admitted with dyspnea/cough found to have hypercapnic respiratory failure.     -pt. with lower extremity edema / appears volume overloaded  -continue with iv lasix to keep O > I  - Echo with severe LV dysfx, will need cath once optimized    Manuel Gold MD, FACC

## 2018-10-29 NOTE — PROGRESS NOTE ADULT - PROBLEM SELECTOR PLAN 5
patient on coreg 6.125 bid, lasix 40 mg BID and metolazone 5 mg daily   c/w lasix for now. patient on coreg 6.125 bid, lasix 40 mg BID and metolazone 5 mg daily at home  on coreg and lasix

## 2018-10-29 NOTE — PHYSICAL THERAPY INITIAL EVALUATION ADULT - GENERAL OBSERVATIONS, REHAB EVAL
Awake, alert, oriented A&Ox3, IV access, O2 support 4L via NC, Bilateral LE edema, morbidly obese, pt seen supine in bed

## 2018-10-29 NOTE — PROGRESS NOTE ADULT - PROBLEM SELECTOR PLAN 1
off bipap  p/w SOB, productive cough with B/l lower Ext swelling   Respiratory Failure likely due to CHF exacerbation vs AMPARO  chest xray shows diffuse cardiomegaly with ICD   Trop t1 0.066, t2 0.059 Bnp 7k   RVP -ve  s/p 120 mg Lasix in 24 hrs. s/p IV solumedrol   c/w with Aspirin, statin, lasix 40 mg BID   daily I/o , weight  maintain negative balance  obtain old records  No sedatives off bipap, on NC 4lt  p/w SOB, productive cough with B/l lower Ext swelling   Respiratory Failure likely due to CHF exacerbation vs AMPARO  chest xray shows diffuse cardiomegaly with ICD   Trop t1 0.066, t2 0.059 Bnp 7k   RVP -ve  s/p 120 mg Lasix in 24 hrs. s/p IV solumedrol   c/w with Aspirin, statin, lasix 40 mg BID   daily I/o , weight  maintain negative balance  obtain old records  No sedatives

## 2018-10-29 NOTE — CHART NOTE - NSCHARTNOTEFT_GEN_A_CORE
53 Female form [facility] PMH of Obesity, CHF HFpEF 55%, CAD, s/p ICD, HTN, DM, Gout, HLD presented to ED with SOB, Productive cough with whitish sputum since 4-5 days, low grade fever on 10/26. She was started on Abx at [facility], fever was resolved but with  worsening of SOB. She has chronic B/l lower leg swelling, with CHF was on lasix 40 mg BID and metolazone 5 mg daily. Last ECHO was done in Jan 2018 has EF 55%. On admission patient was afebrile, HD stable, O2 sat 85 on 40 % oxygen venti mask, AGB 7.28/65/60/87, cbc leucocytosis, elevated BUN and creatinine. CXR showed cardiomegaly, patient was in mod respiratory distress and lethargic, RRT was called patient was placed on Bipap. She was  transferred to ICU for Respiratory Failure likely due to CHF exacerbation vs AMPARO. She has been titrated off bipap and is saturating well on NC 4lt. Her RVP is negative. EKG showed RBBB with sinus rhythm  with no st t changes.  ECHO shows EF 20-25%, GII DD, severe TR, mod. MR, AI. Cardio Dr Gold was consulted. Her trop slightly elevated likely demand ischemia and she is on aspirin, statin , coreg, and lasix.      daily I/o , weight  maintain negative balance 53 Female form [facility] PMH of Obesity, CHF HFpEF 55%, CAD, s/p ICD, HTN, DM, Gout, HLD presented to ED with SOB, productive cough with whitish sputum since 4-5 days, low grade fever on 10/26. She was started on Abx at [facility], fever was resolved but with  worsening of SOB. She has chronic B/l lower leg swelling, with CHF was on lasix 40 mg BID and metolazone 5 mg daily. Last ECHO was done in Jan 2018 has EF 55%. On admission patient was afebrile, HD stable, O2 sat 85 on 40 % oxygen venti mask, AGB 7.28/65/60/87, cbc leucocytosis, elevated BUN and creatinine. CXR showed cardiomegaly, patient was in mod respiratory distress and lethargic, RRT was called patient was placed on Bipap. She was  transferred to ICU for Respiratory Failure likely due to CHF exacerbation vs AMPARO. She has been titrated off bipap and is saturating well on NC 4lt. Her RVP is negative. EKG showed RBBB with sinus rhythm  with no st t changes.  ECHO shows EF 20-25%, GII DD, severe TR, mod. MR, AI. Cardio Dr Gold was consulted. Her trop slightly elevated likely demand ischemia and she is on aspirin, statin , coreg, and lasix. Sheis stable for downgrade. Attending is Dr. Barajas.    To monitor and follow:  1) Daily I/o , weight  2) Maintain negative balance

## 2018-10-29 NOTE — PHYSICAL THERAPY INITIAL EVALUATION ADULT - ACTIVE RANGE OF MOTION EXAMINATION, REHAB EVAL
deficits as listed below/Bilateral hip flexion limited to 30 degrees; L Knee flexion 60 degrees, R knee flexion is 30 degrees

## 2018-10-30 LAB
ANION GAP SERPL CALC-SCNC: 5 MMOL/L — SIGNIFICANT CHANGE UP (ref 5–17)
BASOPHILS # BLD AUTO: 0.1 K/UL — SIGNIFICANT CHANGE UP (ref 0–0.2)
BASOPHILS NFR BLD AUTO: 1.3 % — SIGNIFICANT CHANGE UP (ref 0–2)
BUN SERPL-MCNC: 33 MG/DL — HIGH (ref 7–18)
CALCIUM SERPL-MCNC: 8 MG/DL — LOW (ref 8.4–10.5)
CHLORIDE SERPL-SCNC: 102 MMOL/L — SIGNIFICANT CHANGE UP (ref 96–108)
CO2 SERPL-SCNC: 39 MMOL/L — HIGH (ref 22–31)
CREAT SERPL-MCNC: 1.25 MG/DL — SIGNIFICANT CHANGE UP (ref 0.5–1.3)
EOSINOPHIL # BLD AUTO: 0.2 K/UL — SIGNIFICANT CHANGE UP (ref 0–0.5)
EOSINOPHIL NFR BLD AUTO: 2.5 % — SIGNIFICANT CHANGE UP (ref 0–6)
GLUCOSE BLDC GLUCOMTR-MCNC: 139 MG/DL — HIGH (ref 70–99)
GLUCOSE BLDC GLUCOMTR-MCNC: 155 MG/DL — HIGH (ref 70–99)
GLUCOSE BLDC GLUCOMTR-MCNC: 211 MG/DL — HIGH (ref 70–99)
GLUCOSE SERPL-MCNC: 100 MG/DL — HIGH (ref 70–99)
HCT VFR BLD CALC: 38.4 % — SIGNIFICANT CHANGE UP (ref 34.5–45)
HGB BLD-MCNC: 11.3 G/DL — LOW (ref 11.5–15.5)
LYMPHOCYTES # BLD AUTO: 1.7 K/UL — SIGNIFICANT CHANGE UP (ref 1–3.3)
LYMPHOCYTES # BLD AUTO: 24 % — SIGNIFICANT CHANGE UP (ref 13–44)
MAGNESIUM SERPL-MCNC: 1.8 MG/DL — SIGNIFICANT CHANGE UP (ref 1.6–2.6)
MCHC RBC-ENTMCNC: 28.8 PG — SIGNIFICANT CHANGE UP (ref 27–34)
MCHC RBC-ENTMCNC: 29.4 GM/DL — LOW (ref 32–36)
MCV RBC AUTO: 98 FL — SIGNIFICANT CHANGE UP (ref 80–100)
MONOCYTES # BLD AUTO: 0.9 K/UL — SIGNIFICANT CHANGE UP (ref 0–0.9)
MONOCYTES NFR BLD AUTO: 12.9 % — SIGNIFICANT CHANGE UP (ref 2–14)
NEUTROPHILS # BLD AUTO: 4.2 K/UL — SIGNIFICANT CHANGE UP (ref 1.8–7.4)
NEUTROPHILS NFR BLD AUTO: 59.3 % — SIGNIFICANT CHANGE UP (ref 43–77)
PHOSPHATE SERPL-MCNC: 4.3 MG/DL — SIGNIFICANT CHANGE UP (ref 2.5–4.5)
PLATELET # BLD AUTO: 284 K/UL — SIGNIFICANT CHANGE UP (ref 150–400)
POTASSIUM SERPL-MCNC: 3.3 MMOL/L — LOW (ref 3.5–5.3)
POTASSIUM SERPL-SCNC: 3.3 MMOL/L — LOW (ref 3.5–5.3)
RBC # BLD: 3.92 M/UL — SIGNIFICANT CHANGE UP (ref 3.8–5.2)
RBC # FLD: 16.2 % — HIGH (ref 10.3–14.5)
SODIUM SERPL-SCNC: 146 MMOL/L — HIGH (ref 135–145)
WBC # BLD: 7.1 K/UL — SIGNIFICANT CHANGE UP (ref 3.8–10.5)
WBC # FLD AUTO: 7.1 K/UL — SIGNIFICANT CHANGE UP (ref 3.8–10.5)

## 2018-10-30 RX ORDER — POTASSIUM CHLORIDE 20 MEQ
40 PACKET (EA) ORAL ONCE
Qty: 0 | Refills: 0 | Status: COMPLETED | OUTPATIENT
Start: 2018-10-30 | End: 2018-10-30

## 2018-10-30 RX ADMIN — HEPARIN SODIUM 5000 UNIT(S): 5000 INJECTION INTRAVENOUS; SUBCUTANEOUS at 21:51

## 2018-10-30 RX ADMIN — Medication 2: at 21:51

## 2018-10-30 RX ADMIN — PANTOPRAZOLE SODIUM 40 MILLIGRAM(S): 20 TABLET, DELAYED RELEASE ORAL at 11:10

## 2018-10-30 RX ADMIN — SENNA PLUS 1 TABLET(S): 8.6 TABLET ORAL at 11:10

## 2018-10-30 RX ADMIN — Medication 100 MILLIGRAM(S): at 11:11

## 2018-10-30 RX ADMIN — CARVEDILOL PHOSPHATE 3.12 MILLIGRAM(S): 80 CAPSULE, EXTENDED RELEASE ORAL at 06:31

## 2018-10-30 RX ADMIN — Medication 1: at 11:47

## 2018-10-30 RX ADMIN — Medication 81 MILLIGRAM(S): at 11:09

## 2018-10-30 RX ADMIN — HEPARIN SODIUM 5000 UNIT(S): 5000 INJECTION INTRAVENOUS; SUBCUTANEOUS at 06:31

## 2018-10-30 RX ADMIN — ATORVASTATIN CALCIUM 40 MILLIGRAM(S): 80 TABLET, FILM COATED ORAL at 21:51

## 2018-10-30 RX ADMIN — HEPARIN SODIUM 5000 UNIT(S): 5000 INJECTION INTRAVENOUS; SUBCUTANEOUS at 13:16

## 2018-10-30 RX ADMIN — CARVEDILOL PHOSPHATE 3.12 MILLIGRAM(S): 80 CAPSULE, EXTENDED RELEASE ORAL at 17:18

## 2018-10-30 RX ADMIN — Medication 40 MILLIGRAM(S): at 06:31

## 2018-10-30 RX ADMIN — Medication 40 MILLIGRAM(S): at 17:18

## 2018-10-30 RX ADMIN — PREGABALIN 1000 MICROGRAM(S): 225 CAPSULE ORAL at 11:09

## 2018-10-30 RX ADMIN — Medication 1000 UNIT(S): at 11:09

## 2018-10-30 RX ADMIN — Medication 40 MILLIEQUIVALENT(S): at 11:21

## 2018-10-30 NOTE — PROGRESS NOTE ADULT - PROBLEM SELECTOR PLAN 5
patient on coreg 6.125 bid, lasix 40 mg BID and metolazone 5 mg daily   c/w lasix for now. , lasix 40 mg BID and metolazone 5 mg daily   c/w lasix for now.

## 2018-10-30 NOTE — PROGRESS NOTE ADULT - SUBJECTIVE AND OBJECTIVE BOX
Patient denies CP, Breathing better  but still orthopneic Review of systems otherwise (-)    aspirin  chewable 81 milliGRAM(s) Oral daily  atorvastatin 40 milliGRAM(s) Oral at bedtime  carvedilol 3.125 milliGRAM(s) Oral every 12 hours  cholecalciferol 1000 Unit(s) Oral daily  cyanocobalamin 1000 MICROGram(s) Oral daily  docusate sodium 100 milliGRAM(s) Oral daily  furosemide   Injectable 40 milliGRAM(s) IV Push every 12 hours  heparin  Injectable 5000 Unit(s) SubCutaneous every 8 hours  insulin lispro (HumaLOG) corrective regimen sliding scale   SubCutaneous Before meals and at bedtime  pantoprazole  Injectable 40 milliGRAM(s) IV Push daily  senna 1 Tablet(s) Oral daily                            11.3   7.1   )-----------( 284      ( 30 Oct 2018 06:16 )             38.4       Hemoglobin: 11.3 g/dL (10-30 @ 06:16)  Hemoglobin: 11.4 g/dL (10-29 @ 05:52)  Hemoglobin: 11.4 g/dL (10-28 @ 06:18)  Hemoglobin: 11.1 g/dL (10-27 @ 06:34)  Hemoglobin: 11.9 g/dL (10-26 @ 19:15)      10-30    146<H>  |  102  |  33<H>  ----------------------------<  100<H>  3.3<L>   |  39<H>  |  1.25    Ca    8.0<L>      30 Oct 2018 06:16  Phos  4.3     10-30  Mg     1.8     10-30      Creatinine Trend: 1.25<--, 1.40<--, 1.44<--, 1.49<--, 1.64<--, 1.38<--    COAGS:     CARDIAC MARKERS ( 28 Oct 2018 09:25 )  0.015 ng/mL / x     / 92 U/L / x     / x      CARDIAC MARKERS ( 28 Oct 2018 09:24 )  x     / x     / x     / x     / 1.5 ng/mL        T(C): 36.8 (10-30-18 @ 15:44), Max: 36.8 (10-29-18 @ 23:30)  HR: 79 (10-30-18 @ 15:44) (68 - 79)  BP: 154/81 (10-30-18 @ 15:44) (117/60 - 154/81)  RR: 16 (10-30-18 @ 15:44) (14 - 20)  SpO2: 99% (10-30-18 @ 15:44) (95% - 100%)  Wt(kg): --    I&O's Summary    29 Oct 2018 07:01  -  30 Oct 2018 07:00  --------------------------------------------------------  IN: 625 mL / OUT: 2550 mL / NET: -1925 mL      Lymphatic: No lymphadenopathy , + edema in LE bilaterally   Cardiovascular: Normal S1 S2, RRR, No JVD, No murmurs , Peripheral pulses palpable 2+ bilaterally  Respiratory: Lungs clear to auscultation, normal effort 	  Gastrointestinal:  Soft, Non-tender, + BS	  Skin: No rashes, No ecchymoses, No cyanosis, warm to touch      TELEMETRY: SR	    ECG:  < from: 12 Lead ECG (10.26.18 @ 17:51) >  Normal sinus rhythm  Possible Left atrial enlargement  Right bundle branch block  Left ventricular hypertrophy with repolarization abnormality  Abnormal ECG    < end of copied text >  	  RADIOLOGY:   DIAGNOSTIC TESTING:  [ ] Echocardiogram: < from: Transthoracic Echocardiogram (01.18.18 @ 07:37) >  1. Mild mitral regurgitation.  2. Moderately dilated left atrium.  LA volume index = 42  cc/m2.  3. Normal left ventricular internal dimensions and wall  thicknesses.  4. Endocardium not well visualized; grossly low normal left  ventricular systolic function. Segmental wall motion could  not be assessed.  5. Mild right atrial enlargement.  6. Right ventricular enlargement with normal RV function. A  device lead is visualized in the right heart.  7. RV systolic pressure is 48 mm Hg. Mild pulmonary  hypertension.  8. There is severe tricuspid regurgitation.  9. Trace pericardial effusion.    < end of copied text >    [ ]  Catheterization:  [ ] Stress Test:    OTHER: 	      ASSESSMENT/PLAN:  53yFemale with history of HFpEF, HTN, HLD, COPD admitted with dyspnea/cough found to have hypercapnic respiratory failure.     -pt. with lower extremity edema / appears volume overloaded  -continue with iv lasix to keep O > I  - Echo with severe LV dysfx, will need cath once optimized and can lay flat  - Daily bernard Gold MD, FACC

## 2018-10-30 NOTE — PROGRESS NOTE ADULT - PROBLEM SELECTOR PLAN 2
h/o CHF HF pEf 55% last [facility] jan 2018  on lasix 40 mg BID pO and metolazone 5 mg daily   tele monitor   continue with Diuresis with Lasix 40 mg BID   monitor daily I/o's ,maintaining  negative balance of 8335   ECHO showed grade 2 diastolic dysfunction and EF 20-25%. Severe left atrial enlargement and moderate pulmonary hypertension.  cardio consult Dr Gold.

## 2018-10-30 NOTE — PROGRESS NOTE ADULT - ASSESSMENT
53 Female form [facility] PMH of Obesity, CHF HFpEF 55%,  s/p ICD, HTN, B/l LE edema, DM, Gout, CAD, HLD presented to ED with SOB, Productive cough with whitish sputum since 4-5 days, low grade fever on Thursday. patient was started on Abx at [facility], fever was resolved but with  worsening of SOB. Patient has chronic B/l lower leg swelling, with CHF was on lasix 40 mg BID and metolazone 5 mg daily. Last ECHO was done in Jan 2018 has EF 55%.    On admission patient was afebrile, HD stable, O2 sat 85 on 40 % oxygen venti mask, AGB 7.28/65/60/87, cbc leucocytosis, elevated BUN and creatinine. CXR showed cardiomegaly, patient was in mod respiratory distress and lethargic, RRT was called patient was placed on Bipap.  s/p lasix 120 mg, Iv solumedrol 125 mg and levofloxacin, pt was transferred to ICU.

## 2018-10-30 NOTE — PROGRESS NOTE ADULT - SUBJECTIVE AND OBJECTIVE BOX
PGY 1 Note discussed with supervising resident and primary attending    Patient is a 53y old  Female who presents with a chief complaint of SOB with cough and fever (29 Oct 2018 19:27)      INTERVAL HPI/OVERNIGHT EVENTS: no events noted overnight.    MEDICATIONS  (STANDING):  aspirin  chewable 81 milliGRAM(s) Oral daily  atorvastatin 40 milliGRAM(s) Oral at bedtime  carvedilol 3.125 milliGRAM(s) Oral every 12 hours  cholecalciferol 1000 Unit(s) Oral daily  cyanocobalamin 1000 MICROGram(s) Oral daily  docusate sodium 100 milliGRAM(s) Oral daily  furosemide   Injectable 40 milliGRAM(s) IV Push every 12 hours  heparin  Injectable 5000 Unit(s) SubCutaneous every 8 hours  insulin lispro (HumaLOG) corrective regimen sliding scale   SubCutaneous Before meals and at bedtime  pantoprazole  Injectable 40 milliGRAM(s) IV Push daily  senna 1 Tablet(s) Oral daily    MEDICATIONS  (PRN):      __________________________________________________  REVIEW OF SYSTEMS:  Patient denies cough, sputum production, fevers/chills/nausea/vomiting. No diarrhea, abdominal pain.        Vital Signs Last 24 Hrs  T(C): 36.8 (30 Oct 2018 15:44), Max: 36.8 (29 Oct 2018 23:30)  T(F): 98.2 (30 Oct 2018 15:44), Max: 98.3 (30 Oct 2018 07:14)  HR: 79 (30 Oct 2018 15:44) (68 - 79)  BP: 154/81 (30 Oct 2018 15:44) (117/60 - 154/81)  BP(mean): 90 (29 Oct 2018 17:00) (90 - 90)  RR: 16 (30 Oct 2018 15:44) (14 - 20)  SpO2: 99% (30 Oct 2018 15:44) (95% - 100%)    ________________________________________________  PHYSICAL EXAM:  GENERAL: NAD  HEENT: Normocephalic;  conjunctivae and sclerae clear; moist mucous membranes;   NECK : supple  CHEST/LUNG: Clear to auscultation bilaterally with good air entry   HEART: S1 S2  regular; no murmurs, gallops or rubs  ABDOMEN: Soft, Nontender, Nondistended; Bowel sounds present  EXTREMITIES: no cyanosis; no edema; no calf tenderness  SKIN: warm and dry; no rash  NERVOUS SYSTEM:  Awake and alert; Oriented  to place, person and time ; no new deficits    _________________________________________________  LABS:                        11.3   7.1   )-----------( 284      ( 30 Oct 2018 06:16 )             38.4     10-30    146<H>  |  102  |  33<H>  ----------------------------<  100<H>  3.3<L>   |  39<H>  |  1.25    Ca    8.0<L>      30 Oct 2018 06:16  Phos  4.3     10-30  Mg     1.8     10-30          CAPILLARY BLOOD GLUCOSE      POCT Blood Glucose.: 155 mg/dL (30 Oct 2018 11:41)  POCT Blood Glucose.: 108 mg/dL (30 Oct 2018 07:44)  POCT Blood Glucose.: 116 mg/dL (29 Oct 2018 21:03)  POCT Blood Glucose.: 112 mg/dL (29 Oct 2018 16:28)

## 2018-10-31 LAB
ANION GAP SERPL CALC-SCNC: 1 MMOL/L — LOW (ref 5–17)
BUN SERPL-MCNC: 30 MG/DL — HIGH (ref 7–18)
CALCIUM SERPL-MCNC: 8.2 MG/DL — LOW (ref 8.4–10.5)
CHLORIDE SERPL-SCNC: 101 MMOL/L — SIGNIFICANT CHANGE UP (ref 96–108)
CO2 SERPL-SCNC: 43 MMOL/L — HIGH (ref 22–31)
CREAT SERPL-MCNC: 1.16 MG/DL — SIGNIFICANT CHANGE UP (ref 0.5–1.3)
GLUCOSE BLDC GLUCOMTR-MCNC: 130 MG/DL — HIGH (ref 70–99)
GLUCOSE BLDC GLUCOMTR-MCNC: 131 MG/DL — HIGH (ref 70–99)
GLUCOSE BLDC GLUCOMTR-MCNC: 200 MG/DL — HIGH (ref 70–99)
GLUCOSE BLDC GLUCOMTR-MCNC: 98 MG/DL — SIGNIFICANT CHANGE UP (ref 70–99)
GLUCOSE SERPL-MCNC: 107 MG/DL — HIGH (ref 70–99)
HCT VFR BLD CALC: 39.8 % — SIGNIFICANT CHANGE UP (ref 34.5–45)
HGB BLD-MCNC: 11.7 G/DL — SIGNIFICANT CHANGE UP (ref 11.5–15.5)
MCHC RBC-ENTMCNC: 28.7 PG — SIGNIFICANT CHANGE UP (ref 27–34)
MCHC RBC-ENTMCNC: 29.4 GM/DL — LOW (ref 32–36)
MCV RBC AUTO: 97.6 FL — SIGNIFICANT CHANGE UP (ref 80–100)
PLATELET # BLD AUTO: 286 K/UL — SIGNIFICANT CHANGE UP (ref 150–400)
POTASSIUM SERPL-MCNC: 3.5 MMOL/L — SIGNIFICANT CHANGE UP (ref 3.5–5.3)
POTASSIUM SERPL-SCNC: 3.5 MMOL/L — SIGNIFICANT CHANGE UP (ref 3.5–5.3)
RBC # BLD: 4.08 M/UL — SIGNIFICANT CHANGE UP (ref 3.8–5.2)
RBC # FLD: 15.9 % — HIGH (ref 10.3–14.5)
SODIUM SERPL-SCNC: 145 MMOL/L — SIGNIFICANT CHANGE UP (ref 135–145)
WBC # BLD: 6 K/UL — SIGNIFICANT CHANGE UP (ref 3.8–10.5)
WBC # FLD AUTO: 6 K/UL — SIGNIFICANT CHANGE UP (ref 3.8–10.5)

## 2018-10-31 RX ADMIN — PREGABALIN 1000 MICROGRAM(S): 225 CAPSULE ORAL at 11:59

## 2018-10-31 RX ADMIN — Medication 81 MILLIGRAM(S): at 11:59

## 2018-10-31 RX ADMIN — HEPARIN SODIUM 5000 UNIT(S): 5000 INJECTION INTRAVENOUS; SUBCUTANEOUS at 14:15

## 2018-10-31 RX ADMIN — CARVEDILOL PHOSPHATE 3.12 MILLIGRAM(S): 80 CAPSULE, EXTENDED RELEASE ORAL at 05:50

## 2018-10-31 RX ADMIN — Medication 1: at 17:07

## 2018-10-31 RX ADMIN — CARVEDILOL PHOSPHATE 3.12 MILLIGRAM(S): 80 CAPSULE, EXTENDED RELEASE ORAL at 17:07

## 2018-10-31 RX ADMIN — HEPARIN SODIUM 5000 UNIT(S): 5000 INJECTION INTRAVENOUS; SUBCUTANEOUS at 05:50

## 2018-10-31 RX ADMIN — ATORVASTATIN CALCIUM 40 MILLIGRAM(S): 80 TABLET, FILM COATED ORAL at 21:26

## 2018-10-31 RX ADMIN — Medication 40 MILLIGRAM(S): at 17:07

## 2018-10-31 RX ADMIN — PANTOPRAZOLE SODIUM 40 MILLIGRAM(S): 20 TABLET, DELAYED RELEASE ORAL at 11:59

## 2018-10-31 RX ADMIN — Medication 100 MILLIGRAM(S): at 11:59

## 2018-10-31 RX ADMIN — Medication 40 MILLIGRAM(S): at 05:50

## 2018-10-31 RX ADMIN — SENNA PLUS 1 TABLET(S): 8.6 TABLET ORAL at 11:59

## 2018-10-31 RX ADMIN — HEPARIN SODIUM 5000 UNIT(S): 5000 INJECTION INTRAVENOUS; SUBCUTANEOUS at 21:25

## 2018-10-31 RX ADMIN — Medication 1000 UNIT(S): at 11:59

## 2018-10-31 NOTE — PROGRESS NOTE ADULT - SUBJECTIVE AND OBJECTIVE BOX
PGY 1 Note discussed with supervising resident and primary attending    Patient is a 53y old  Female who presents with a chief complaint of SOB with cough and fever (31 Oct 2018 14:14)      INTERVAL HPI/OVERNIGHT EVENTS: no events noted overnight.    MEDICATIONS  (STANDING):  aspirin  chewable 81 milliGRAM(s) Oral daily  atorvastatin 40 milliGRAM(s) Oral at bedtime  carvedilol 3.125 milliGRAM(s) Oral every 12 hours  cholecalciferol 1000 Unit(s) Oral daily  cyanocobalamin 1000 MICROGram(s) Oral daily  docusate sodium 100 milliGRAM(s) Oral daily  furosemide   Injectable 40 milliGRAM(s) IV Push every 12 hours  heparin  Injectable 5000 Unit(s) SubCutaneous every 8 hours  insulin lispro (HumaLOG) corrective regimen sliding scale   SubCutaneous Before meals and at bedtime  pantoprazole  Injectable 40 milliGRAM(s) IV Push daily  senna 1 Tablet(s) Oral daily    MEDICATIONS  (PRN):      __________________________________________________  REVIEW OF SYSTEMS:  Patient denies cough, sputum production, fevers/chills/nausea/vomiting. No diarrhea, abdominal pain.      Vital Signs Last 24 Hrs  T(C): 36.4 (31 Oct 2018 15:40), Max: 37.2 (30 Oct 2018 23:54)  T(F): 97.5 (31 Oct 2018 15:40), Max: 98.9 (30 Oct 2018 23:54)  HR: 73 (31 Oct 2018 15:40) (70 - 77)  BP: 121/66 (31 Oct 2018 15:40) (110/55 - 147/72)  BP(mean): 94 (31 Oct 2018 12:34) (94 - 94)  RR: 18 (31 Oct 2018 15:40) (16 - 18)  SpO2: 100% (31 Oct 2018 15:40) (96% - 100%)    ________________________________________________  PHYSICAL EXAM:  GENERAL: NAD  HEENT: Normocephalic;  conjunctivae and sclerae clear; moist mucous membranes;   NECK : supple  CHEST/LUNG: Clear to auscultation bilaterally with good air entry   HEART: S1 S2  regular; no murmurs, gallops or rubs  ABDOMEN: Soft, Nontender, Nondistended; Bowel sounds present  EXTREMITIES: no cyanosis; no edema; no calf tenderness  SKIN: warm and dry; no rash  NERVOUS SYSTEM:  Awake and alert; Oriented  to place, person and time ; no new deficits    _________________________________________________  LABS:                        11.7   6.0   )-----------( 286      ( 31 Oct 2018 07:03 )             39.8     10-31    145  |  101  |  30<H>  ----------------------------<  107<H>  3.5   |  43<H>  |  1.16    Ca    8.2<L>      31 Oct 2018 07:03  Phos  4.3     10-30  Mg     1.8     10-30          CAPILLARY BLOOD GLUCOSE      POCT Blood Glucose.: 130 mg/dL (31 Oct 2018 11:51)  POCT Blood Glucose.: 98 mg/dL (31 Oct 2018 08:06)  POCT Blood Glucose.: 211 mg/dL (30 Oct 2018 21:40)  POCT Blood Glucose.: 139 mg/dL (30 Oct 2018 16:40)

## 2018-10-31 NOTE — PROGRESS NOTE ADULT - SUBJECTIVE AND OBJECTIVE BOX
Patient denies CP, Breathing better  Review of systems otherwise (-)    aspirin  chewable 81 milliGRAM(s) Oral daily  atorvastatin 40 milliGRAM(s) Oral at bedtime  carvedilol 3.125 milliGRAM(s) Oral every 12 hours  cholecalciferol 1000 Unit(s) Oral daily  cyanocobalamin 1000 MICROGram(s) Oral daily  docusate sodium 100 milliGRAM(s) Oral daily  furosemide   Injectable 40 milliGRAM(s) IV Push every 12 hours  heparin  Injectable 5000 Unit(s) SubCutaneous every 8 hours  insulin lispro (HumaLOG) corrective regimen sliding scale   SubCutaneous Before meals and at bedtime  pantoprazole  Injectable 40 milliGRAM(s) IV Push daily  senna 1 Tablet(s) Oral daily                            11.7   6.0   )-----------( 286      ( 31 Oct 2018 07:03 )             39.8       Hemoglobin: 11.7 g/dL (10-31 @ 07:03)  Hemoglobin: 11.3 g/dL (10-30 @ 06:16)  Hemoglobin: 11.4 g/dL (10-29 @ 05:52)  Hemoglobin: 11.4 g/dL (10-28 @ 06:18)  Hemoglobin: 11.1 g/dL (10-27 @ 06:34)      10-31    145  |  101  |  30<H>  ----------------------------<  107<H>  3.5   |  43<H>  |  1.16    Ca    8.2<L>      31 Oct 2018 07:03  Phos  4.3     10-30  Mg     1.8     10-30      Creatinine Trend: 1.16<--, 1.25<--, 1.40<--, 1.44<--, 1.49<--, 1.64<--    COAGS:           T(C): 36.9 (10-31-18 @ 11:29), Max: 37.2 (10-30-18 @ 23:54)  HR: 72 (10-31-18 @ 12:34) (70 - 79)  BP: 146/79 (10-31-18 @ 12:34) (110/55 - 154/81)  RR: 18 (10-31-18 @ 12:34) (16 - 18)  SpO2: 100% (10-31-18 @ 12:34) (96% - 100%)  Wt(kg): --    I&O's Summary    30 Oct 2018 07:01  -  31 Oct 2018 07:00  --------------------------------------------------------  IN: 0 mL / OUT: 200 mL / NET: -200 mL    31 Oct 2018 07:01  -  31 Oct 2018 14:14  --------------------------------------------------------  IN: 0 mL / OUT: 300 mL / NET: -300 mL        Lymphatic: No lymphadenopathy , + edema in LE bilaterally   Cardiovascular: Normal S1 S2, RRR, No JVD, No murmurs , Peripheral pulses palpable 2+ bilaterally  Respiratory: Lungs clear to auscultation, normal effort 	  Gastrointestinal:  Soft, Non-tender, + BS	  Skin: No rashes, No ecchymoses, No cyanosis, warm to touch      TELEMETRY: SR	    ECG:  < from: 12 Lead ECG (10.26.18 @ 17:51) >  Normal sinus rhythm  Possible Left atrial enlargement  Right bundle branch block  Left ventricular hypertrophy with repolarization abnormality  Abnormal ECG    < end of copied text >  	  RADIOLOGY:   DIAGNOSTIC TESTING:  [ ] Echocardiogram: < from: Transthoracic Echocardiogram (01.18.18 @ 07:37) >  1. Mild mitral regurgitation.  2. Moderately dilated left atrium.  LA volume index = 42  cc/m2.  3. Normal left ventricular internal dimensions and wall  thicknesses.  4. Endocardium not well visualized; grossly low normal left  ventricular systolic function. Segmental wall motion could  not be assessed.  5. Mild right atrial enlargement.  6. Right ventricular enlargement with normal RV function. A  device lead is visualized in the right heart.  7. RV systolic pressure is 48 mm Hg. Mild pulmonary  hypertension.  8. There is severe tricuspid regurgitation.  9. Trace pericardial effusion.    < end of copied text >      ASSESSMENT/PLAN:  53yFemale with history of HFpEF, HTN, HLD, COPD admitted with dyspnea/cough found to have hypercapnic respiratory failure.     -pt. with lower extremity edema / appears volume overloaded  -continue with iv lasix to keep O > I  - Echo with severe LV dysfx, will need cath once optimized and can lay flat  - Daily bernard Gold MD, FACC

## 2018-10-31 NOTE — PROGRESS NOTE ADULT - PROBLEM SELECTOR PLAN 2
h/o CHF HF pEf 55% last [facility] jan 2018  on lasix 40 mg BID pO and metolazone 5 mg daily   tele monitor   continue with Diuresis with Lasix 40 mg BID   monitor daily I/o's    cardio consult Dr Gold.

## 2018-10-31 NOTE — PROGRESS NOTE ADULT - ASSESSMENT
53 Female form [facility] PMH of Obesity, CHF HFpEF 55%,  s/p ICD, HTN, B/l LE edema, DM, Gout, CAD, HLD presented to ED with SOB, Productive cough with whitish sputum since 4-5 days, low grade fever on Thursday. she was admitted for CHF.

## 2018-11-01 LAB
ANION GAP SERPL CALC-SCNC: 3 MMOL/L — LOW (ref 5–17)
BUN SERPL-MCNC: 22 MG/DL — HIGH (ref 7–18)
CALCIUM SERPL-MCNC: 8.3 MG/DL — LOW (ref 8.4–10.5)
CHLORIDE SERPL-SCNC: 97 MMOL/L — SIGNIFICANT CHANGE UP (ref 96–108)
CO2 SERPL-SCNC: 45 MMOL/L — CRITICAL HIGH (ref 22–31)
CREAT SERPL-MCNC: 1.05 MG/DL — SIGNIFICANT CHANGE UP (ref 0.5–1.3)
CULTURE RESULTS: SIGNIFICANT CHANGE UP
CULTURE RESULTS: SIGNIFICANT CHANGE UP
GLUCOSE BLDC GLUCOMTR-MCNC: 135 MG/DL — HIGH (ref 70–99)
GLUCOSE BLDC GLUCOMTR-MCNC: 135 MG/DL — HIGH (ref 70–99)
GLUCOSE BLDC GLUCOMTR-MCNC: 140 MG/DL — HIGH (ref 70–99)
GLUCOSE BLDC GLUCOMTR-MCNC: 146 MG/DL — HIGH (ref 70–99)
GLUCOSE BLDC GLUCOMTR-MCNC: 185 MG/DL — HIGH (ref 70–99)
GLUCOSE BLDC GLUCOMTR-MCNC: 94 MG/DL — SIGNIFICANT CHANGE UP (ref 70–99)
GLUCOSE SERPL-MCNC: 96 MG/DL — SIGNIFICANT CHANGE UP (ref 70–99)
HCT VFR BLD CALC: 39.8 % — SIGNIFICANT CHANGE UP (ref 34.5–45)
HGB BLD-MCNC: 11.8 G/DL — SIGNIFICANT CHANGE UP (ref 11.5–15.5)
MCHC RBC-ENTMCNC: 29 PG — SIGNIFICANT CHANGE UP (ref 27–34)
MCHC RBC-ENTMCNC: 29.6 GM/DL — LOW (ref 32–36)
MCV RBC AUTO: 98 FL — SIGNIFICANT CHANGE UP (ref 80–100)
PLATELET # BLD AUTO: 282 K/UL — SIGNIFICANT CHANGE UP (ref 150–400)
POTASSIUM SERPL-MCNC: 3.5 MMOL/L — SIGNIFICANT CHANGE UP (ref 3.5–5.3)
POTASSIUM SERPL-SCNC: 3.5 MMOL/L — SIGNIFICANT CHANGE UP (ref 3.5–5.3)
RBC # BLD: 4.06 M/UL — SIGNIFICANT CHANGE UP (ref 3.8–5.2)
RBC # FLD: 16.1 % — HIGH (ref 10.3–14.5)
SODIUM SERPL-SCNC: 145 MMOL/L — SIGNIFICANT CHANGE UP (ref 135–145)
SPECIMEN SOURCE: SIGNIFICANT CHANGE UP
SPECIMEN SOURCE: SIGNIFICANT CHANGE UP
WBC # BLD: 5.2 K/UL — SIGNIFICANT CHANGE UP (ref 3.8–10.5)
WBC # FLD AUTO: 5.2 K/UL — SIGNIFICANT CHANGE UP (ref 3.8–10.5)

## 2018-11-01 PROCEDURE — 71045 X-RAY EXAM CHEST 1 VIEW: CPT | Mod: 26

## 2018-11-01 RX ORDER — ACETAZOLAMIDE 250 MG/1
125 TABLET ORAL DAILY
Qty: 0 | Refills: 0 | Status: DISCONTINUED | OUTPATIENT
Start: 2018-11-01 | End: 2018-11-05

## 2018-11-01 RX ORDER — ACETAZOLAMIDE 250 MG/1
250 TABLET ORAL ONCE
Qty: 0 | Refills: 0 | Status: COMPLETED | OUTPATIENT
Start: 2018-11-01 | End: 2018-11-01

## 2018-11-01 RX ADMIN — HEPARIN SODIUM 5000 UNIT(S): 5000 INJECTION INTRAVENOUS; SUBCUTANEOUS at 05:15

## 2018-11-01 RX ADMIN — ACETAZOLAMIDE 125 MILLIGRAM(S): 250 TABLET ORAL at 13:26

## 2018-11-01 RX ADMIN — HEPARIN SODIUM 5000 UNIT(S): 5000 INJECTION INTRAVENOUS; SUBCUTANEOUS at 13:28

## 2018-11-01 RX ADMIN — Medication 81 MILLIGRAM(S): at 13:25

## 2018-11-01 RX ADMIN — ATORVASTATIN CALCIUM 40 MILLIGRAM(S): 80 TABLET, FILM COATED ORAL at 22:08

## 2018-11-01 RX ADMIN — PREGABALIN 1000 MICROGRAM(S): 225 CAPSULE ORAL at 13:25

## 2018-11-01 RX ADMIN — SENNA PLUS 1 TABLET(S): 8.6 TABLET ORAL at 13:28

## 2018-11-01 RX ADMIN — Medication 100 MILLIGRAM(S): at 13:25

## 2018-11-01 RX ADMIN — HEPARIN SODIUM 5000 UNIT(S): 5000 INJECTION INTRAVENOUS; SUBCUTANEOUS at 22:08

## 2018-11-01 RX ADMIN — ACETAZOLAMIDE 250 MILLIGRAM(S): 250 TABLET ORAL at 17:59

## 2018-11-01 RX ADMIN — PANTOPRAZOLE SODIUM 40 MILLIGRAM(S): 20 TABLET, DELAYED RELEASE ORAL at 18:00

## 2018-11-01 RX ADMIN — Medication 1000 UNIT(S): at 13:25

## 2018-11-01 RX ADMIN — Medication 40 MILLIGRAM(S): at 17:59

## 2018-11-01 RX ADMIN — Medication 40 MILLIGRAM(S): at 05:15

## 2018-11-01 RX ADMIN — CARVEDILOL PHOSPHATE 3.12 MILLIGRAM(S): 80 CAPSULE, EXTENDED RELEASE ORAL at 05:15

## 2018-11-01 RX ADMIN — CARVEDILOL PHOSPHATE 3.12 MILLIGRAM(S): 80 CAPSULE, EXTENDED RELEASE ORAL at 17:59

## 2018-11-01 NOTE — PROGRESS NOTE ADULT - SUBJECTIVE AND OBJECTIVE BOX
Patient denies CP, Breathing better  Review of systems otherwise (-)    aspirin  chewable 81 milliGRAM(s) Oral daily  atorvastatin 40 milliGRAM(s) Oral at bedtime  carvedilol 3.125 milliGRAM(s) Oral every 12 hours  cholecalciferol 1000 Unit(s) Oral daily  cyanocobalamin 1000 MICROGram(s) Oral daily  docusate sodium 100 milliGRAM(s) Oral daily  furosemide   Injectable 40 milliGRAM(s) IV Push every 12 hours  heparin  Injectable 5000 Unit(s) SubCutaneous every 8 hours  insulin lispro (HumaLOG) corrective regimen sliding scale   SubCutaneous Before meals and at bedtime  pantoprazole  Injectable 40 milliGRAM(s) IV Push daily  senna 1 Tablet(s) Oral daily                            11.8   5.2   )-----------( 282      ( 01 Nov 2018 06:40 )             39.8       Hemoglobin: 11.8 g/dL (11-01 @ 06:40)  Hemoglobin: 11.7 g/dL (10-31 @ 07:03)  Hemoglobin: 11.3 g/dL (10-30 @ 06:16)  Hemoglobin: 11.4 g/dL (10-29 @ 05:52)  Hemoglobin: 11.4 g/dL (10-28 @ 06:18)      11-01    145  |  97  |  22<H>  ----------------------------<  96  3.5   |  45<HH>  |  1.05    Ca    8.3<L>      01 Nov 2018 06:40      Creatinine Trend: 1.05<--, 1.16<--, 1.25<--, 1.40<--, 1.44<--, 1.49<--    COAGS:           T(C): 36.5 (11-01-18 @ 07:27), Max: 36.9 (10-31-18 @ 11:29)  HR: 68 (11-01-18 @ 07:27) (65 - 73)  BP: 125/66 (11-01-18 @ 07:27) (110/55 - 146/79)  RR: 18 (11-01-18 @ 07:27) (18 - 18)  SpO2: 100% (11-01-18 @ 07:27) (98% - 100%)  Wt(kg): --    I&O's Summary    31 Oct 2018 07:01  -  01 Nov 2018 07:00  --------------------------------------------------------  IN: 0 mL / OUT: 300 mL / NET: -300 mL          Lymphatic: No lymphadenopathy , + edema in LE bilaterally   Cardiovascular: Normal S1 S2, RRR, No JVD, No murmurs , Peripheral pulses palpable 2+ bilaterally  Respiratory: Lungs clear to auscultation, normal effort 	  Gastrointestinal:  Soft, Non-tender, + BS	  Skin: No rashes, No ecchymoses, No cyanosis, warm to touch      TELEMETRY: SR	    ECG:  < from: 12 Lead ECG (10.26.18 @ 17:51) >  Normal sinus rhythm  Possible Left atrial enlargement  Right bundle branch block  Left ventricular hypertrophy with repolarization abnormality  Abnormal ECG    < end of copied text >  	  RADIOLOGY:   DIAGNOSTIC TESTING:  [ ] Echocardiogram: < from: Transthoracic Echocardiogram (01.18.18 @ 07:37) >  1. Mild mitral regurgitation.  2. Moderately dilated left atrium.  LA volume index = 42  cc/m2.  3. Normal left ventricular internal dimensions and wall  thicknesses.  4. Endocardium not well visualized; grossly low normal left  ventricular systolic function. Segmental wall motion could  not be assessed.  5. Mild right atrial enlargement.  6. Right ventricular enlargement with normal RV function. A  device lead is visualized in the right heart.  7. RV systolic pressure is 48 mm Hg. Mild pulmonary  hypertension.  8. There is severe tricuspid regurgitation.  9. Trace pericardial effusion.    < end of copied text >      ASSESSMENT/PLAN:  53yFemale with history of HFpEF, HTN, HLD, COPD admitted with dyspnea/cough found to have hypercapnic respiratory failure.     -pt. with lower extremity edema / appears volume overloaded  -continue with iv lasix to keep O > I  - HCO3 continues to rise despite requiring diuresis.  Start Diamox 25 mg PO daily  - Eventual cath when optimized    Manuel Gold MD, FACC

## 2018-11-01 NOTE — PROGRESS NOTE ADULT - SUBJECTIVE AND OBJECTIVE BOX
PGY 1 Note discussed with supervising resident and primary attending    Patient is a 53y old  Female who presents with a chief complaint of SOB with cough and fever (01 Nov 2018 18:18)      INTERVAL HPI/OVERNIGHT EVENTS: no events noted overnight.    MEDICATIONS  (STANDING):  acetazolamide    Tablet 125 milliGRAM(s) Oral daily  aspirin  chewable 81 milliGRAM(s) Oral daily  atorvastatin 40 milliGRAM(s) Oral at bedtime  carvedilol 3.125 milliGRAM(s) Oral every 12 hours  cholecalciferol 1000 Unit(s) Oral daily  cyanocobalamin 1000 MICROGram(s) Oral daily  docusate sodium 100 milliGRAM(s) Oral daily  furosemide   Injectable 40 milliGRAM(s) IV Push every 12 hours  heparin  Injectable 5000 Unit(s) SubCutaneous every 8 hours  insulin lispro (HumaLOG) corrective regimen sliding scale   SubCutaneous Before meals and at bedtime  pantoprazole  Injectable 40 milliGRAM(s) IV Push daily  senna 1 Tablet(s) Oral daily    MEDICATIONS  (PRN):      __________________________________________________  REVIEW OF SYSTEMS:    CONSTITUTIONAL: No fever,   EYES: no acute visual disturbances  NECK: No pain or stiffness  RESPIRATORY: No cough; No shortness of breath  CARDIOVASCULAR: No chest pain, no palpitations  GASTROINTESTINAL: No pain. No nausea or vomiting; No diarrhea   NEUROLOGICAL: No headache or numbness, no tremors  MUSCULOSKELETAL: No joint pain, no muscle pain  GENITOURINARY: no dysuria, no frequency, no hesitancy  PSYCHIATRY: no depression , no anxiety  ALL OTHER  ROS negative        Vital Signs Last 24 Hrs  T(C): 36.3 (01 Nov 2018 16:00), Max: 36.5 (01 Nov 2018 07:27)  T(F): 97.4 (01 Nov 2018 16:00), Max: 97.7 (01 Nov 2018 07:27)  HR: 73 (01 Nov 2018 16:00) (65 - 73)  BP: 142/81 (01 Nov 2018 16:00) (123/70 - 142/81)  BP(mean): --  RR: 18 (01 Nov 2018 16:00) (18 - 18)  SpO2: 100% (01 Nov 2018 16:00) (99% - 100%)    ________________________________________________  PHYSICAL EXAM:  GENERAL: NAD  HEENT: Normocephalic;  conjunctivae and sclerae clear; moist mucous membranes;   NECK : supple  CHEST/LUNG: Clear to auscultation bilaterally with good air entry   HEART: S1 S2  regular; no murmurs, gallops or rubs  ABDOMEN: Soft, Nontender, Nondistended; Bowel sounds present  EXTREMITIES: no cyanosis; no edema; no calf tenderness  SKIN: warm and dry; no rash  NERVOUS SYSTEM:  Awake and alert; Oriented  to place, person and time ; no new deficits    _________________________________________________  LABS:                        11.8   5.2   )-----------( 282      ( 01 Nov 2018 06:40 )             39.8     11-01    145  |  97  |  22<H>  ----------------------------<  96  3.5   |  45<HH>  |  1.05    Ca    8.3<L>      01 Nov 2018 06:40          CAPILLARY BLOOD GLUCOSE      POCT Blood Glucose.: 140 mg/dL (01 Nov 2018 12:03)  POCT Blood Glucose.: 94 mg/dL (01 Nov 2018 08:07)  POCT Blood Glucose.: 131 mg/dL (31 Oct 2018 21:10)

## 2018-11-01 NOTE — PROGRESS NOTE ADULT - PROBLEM SELECTOR PLAN 2
h/o CHF HF pEf 55% last [facility] jan 2018  on lasix 40 mg BID pO and metolazone 5 mg daily   tele monitor   PPM is interrogated and papers are put in the chart  continue with Diuresis with Lasix 40 mg BID   monitor daily I/o's    cardio consult Dr Gold.

## 2018-11-02 LAB
ANION GAP SERPL CALC-SCNC: 3 MMOL/L — LOW (ref 5–17)
BUN SERPL-MCNC: 24 MG/DL — HIGH (ref 7–18)
CALCIUM SERPL-MCNC: 8.1 MG/DL — LOW (ref 8.4–10.5)
CHLORIDE SERPL-SCNC: 98 MMOL/L — SIGNIFICANT CHANGE UP (ref 96–108)
CO2 SERPL-SCNC: 42 MMOL/L — HIGH (ref 22–31)
CREAT SERPL-MCNC: 1.15 MG/DL — SIGNIFICANT CHANGE UP (ref 0.5–1.3)
GLUCOSE BLDC GLUCOMTR-MCNC: 102 MG/DL — HIGH (ref 70–99)
GLUCOSE BLDC GLUCOMTR-MCNC: 106 MG/DL — HIGH (ref 70–99)
GLUCOSE BLDC GLUCOMTR-MCNC: 115 MG/DL — HIGH (ref 70–99)
GLUCOSE BLDC GLUCOMTR-MCNC: 138 MG/DL — HIGH (ref 70–99)
GLUCOSE SERPL-MCNC: 101 MG/DL — HIGH (ref 70–99)
HCT VFR BLD CALC: 38.4 % — SIGNIFICANT CHANGE UP (ref 34.5–45)
HGB BLD-MCNC: 11.3 G/DL — LOW (ref 11.5–15.5)
MCHC RBC-ENTMCNC: 28.7 PG — SIGNIFICANT CHANGE UP (ref 27–34)
MCHC RBC-ENTMCNC: 29.4 GM/DL — LOW (ref 32–36)
MCV RBC AUTO: 97.4 FL — SIGNIFICANT CHANGE UP (ref 80–100)
PLATELET # BLD AUTO: 257 K/UL — SIGNIFICANT CHANGE UP (ref 150–400)
POTASSIUM SERPL-MCNC: 3.6 MMOL/L — SIGNIFICANT CHANGE UP (ref 3.5–5.3)
POTASSIUM SERPL-SCNC: 3.6 MMOL/L — SIGNIFICANT CHANGE UP (ref 3.5–5.3)
RBC # BLD: 3.94 M/UL — SIGNIFICANT CHANGE UP (ref 3.8–5.2)
RBC # FLD: 16.1 % — HIGH (ref 10.3–14.5)
SODIUM SERPL-SCNC: 143 MMOL/L — SIGNIFICANT CHANGE UP (ref 135–145)
WBC # BLD: 5.6 K/UL — SIGNIFICANT CHANGE UP (ref 3.8–10.5)
WBC # FLD AUTO: 5.6 K/UL — SIGNIFICANT CHANGE UP (ref 3.8–10.5)

## 2018-11-02 RX ADMIN — Medication 100 MILLIGRAM(S): at 11:53

## 2018-11-02 RX ADMIN — HEPARIN SODIUM 5000 UNIT(S): 5000 INJECTION INTRAVENOUS; SUBCUTANEOUS at 22:36

## 2018-11-02 RX ADMIN — SENNA PLUS 1 TABLET(S): 8.6 TABLET ORAL at 11:53

## 2018-11-02 RX ADMIN — PREGABALIN 1000 MICROGRAM(S): 225 CAPSULE ORAL at 11:53

## 2018-11-02 RX ADMIN — Medication 81 MILLIGRAM(S): at 11:53

## 2018-11-02 RX ADMIN — HEPARIN SODIUM 5000 UNIT(S): 5000 INJECTION INTRAVENOUS; SUBCUTANEOUS at 17:11

## 2018-11-02 RX ADMIN — Medication 1000 UNIT(S): at 11:53

## 2018-11-02 RX ADMIN — ACETAZOLAMIDE 125 MILLIGRAM(S): 250 TABLET ORAL at 11:55

## 2018-11-02 RX ADMIN — HEPARIN SODIUM 5000 UNIT(S): 5000 INJECTION INTRAVENOUS; SUBCUTANEOUS at 06:13

## 2018-11-02 RX ADMIN — CARVEDILOL PHOSPHATE 3.12 MILLIGRAM(S): 80 CAPSULE, EXTENDED RELEASE ORAL at 06:13

## 2018-11-02 RX ADMIN — Medication 40 MILLIGRAM(S): at 17:11

## 2018-11-02 RX ADMIN — ATORVASTATIN CALCIUM 40 MILLIGRAM(S): 80 TABLET, FILM COATED ORAL at 22:36

## 2018-11-02 RX ADMIN — Medication 40 MILLIGRAM(S): at 06:06

## 2018-11-02 RX ADMIN — PANTOPRAZOLE SODIUM 40 MILLIGRAM(S): 20 TABLET, DELAYED RELEASE ORAL at 11:54

## 2018-11-02 RX ADMIN — CARVEDILOL PHOSPHATE 3.12 MILLIGRAM(S): 80 CAPSULE, EXTENDED RELEASE ORAL at 17:11

## 2018-11-02 NOTE — PROGRESS NOTE ADULT - SUBJECTIVE AND OBJECTIVE BOX
PGY 1 Note discussed with supervising resident and primary attending    Patient is a 53y old  Female who presents with a chief complaint of SOB with cough and fever (02 Nov 2018 14:16)      INTERVAL HPI/OVERNIGHT EVENTS: no events noted overnight.    MEDICATIONS  (STANDING):  acetazolamide    Tablet 125 milliGRAM(s) Oral daily  aspirin  chewable 81 milliGRAM(s) Oral daily  atorvastatin 40 milliGRAM(s) Oral at bedtime  carvedilol 3.125 milliGRAM(s) Oral every 12 hours  cholecalciferol 1000 Unit(s) Oral daily  cyanocobalamin 1000 MICROGram(s) Oral daily  docusate sodium 100 milliGRAM(s) Oral daily  furosemide   Injectable 40 milliGRAM(s) IV Push every 12 hours  heparin  Injectable 5000 Unit(s) SubCutaneous every 8 hours  insulin lispro (HumaLOG) corrective regimen sliding scale   SubCutaneous Before meals and at bedtime  pantoprazole  Injectable 40 milliGRAM(s) IV Push daily  senna 1 Tablet(s) Oral daily    MEDICATIONS  (PRN):      __________________________________________________  REVIEW OF SYSTEMS:   Denies nausea, vomiting, diarrhea, abdominal pain, chest pain, palpitations, dizziness, headache, cough, wheezing, joint pain or swelling, fever, chills.   D          Vital Signs Last 24 Hrs  T(C): 36.4 (02 Nov 2018 15:15), Max: 36.9 (02 Nov 2018 12:00)  T(F): 97.5 (02 Nov 2018 15:15), Max: 98.4 (02 Nov 2018 12:00)  HR: 71 (02 Nov 2018 15:15) (67 - 75)  BP: 121/70 (02 Nov 2018 15:15) (112/77 - 128/70)  BP(mean): --  RR: 17 (02 Nov 2018 15:15) (16 - 18)  SpO2: 100% (02 Nov 2018 15:15) (93% - 100%)    ________________________________________________  PHYSICAL EXAM:  GENERAL: NAD  HEENT: Normocephalic;  conjunctivae and sclerae clear; moist mucous membranes;   NECK : supple  CHEST/LUNG: Clear to auscultation bilaterally with good air entry   HEART: S1 S2  regular; no murmurs, gallops or rubs  ABDOMEN: Soft, Nontender, Nondistended; Bowel sounds present  EXTREMITIES: no cyanosis; no edema; no calf tenderness  SKIN: warm and dry; no rash  NERVOUS SYSTEM:  Awake and alert; Oriented  to place, person and time ; no new deficits    _________________________________________________  LABS:                        11.3   5.6   )-----------( 257      ( 02 Nov 2018 06:35 )             38.4     11-02    143  |  98  |  24<H>  ----------------------------<  101<H>  3.6   |  42<H>  |  1.15    Ca    8.1<L>      02 Nov 2018 06:35          CAPILLARY BLOOD GLUCOSE      POCT Blood Glucose.: 138 mg/dL (02 Nov 2018 16:36)  POCT Blood Glucose.: 115 mg/dL (02 Nov 2018 11:35)  POCT Blood Glucose.: 102 mg/dL (02 Nov 2018 07:50)  POCT Blood Glucose.: 135 mg/dL (01 Nov 2018 22:07)

## 2018-11-02 NOTE — PROGRESS NOTE ADULT - SUBJECTIVE AND OBJECTIVE BOX
Patient denies CP, Breathing better  Review of systems otherwise (-)    acetazolamide    Tablet 125 milliGRAM(s) Oral daily  aspirin  chewable 81 milliGRAM(s) Oral daily  atorvastatin 40 milliGRAM(s) Oral at bedtime  carvedilol 3.125 milliGRAM(s) Oral every 12 hours  cholecalciferol 1000 Unit(s) Oral daily  cyanocobalamin 1000 MICROGram(s) Oral daily  docusate sodium 100 milliGRAM(s) Oral daily  furosemide   Injectable 40 milliGRAM(s) IV Push every 12 hours  heparin  Injectable 5000 Unit(s) SubCutaneous every 8 hours  insulin lispro (HumaLOG) corrective regimen sliding scale   SubCutaneous Before meals and at bedtime  pantoprazole  Injectable 40 milliGRAM(s) IV Push daily  senna 1 Tablet(s) Oral daily                            11.3   5.6   )-----------( 257      ( 02 Nov 2018 06:35 )             38.4       Hemoglobin: 11.3 g/dL (11-02 @ 06:35)  Hemoglobin: 11.8 g/dL (11-01 @ 06:40)  Hemoglobin: 11.7 g/dL (10-31 @ 07:03)  Hemoglobin: 11.3 g/dL (10-30 @ 06:16)  Hemoglobin: 11.4 g/dL (10-29 @ 05:52)      11-02    143  |  98  |  24<H>  ----------------------------<  101<H>  3.6   |  42<H>  |  1.15    Ca    8.1<L>      02 Nov 2018 06:35      Creatinine Trend: 1.15<--, 1.05<--, 1.16<--, 1.25<--, 1.40<--, 1.44<--    COAGS:           T(C): 36.9 (11-02-18 @ 12:00), Max: 36.9 (11-02-18 @ 12:00)  HR: 75 (11-02-18 @ 12:00) (67 - 75)  BP: 128/70 (11-02-18 @ 12:00) (112/77 - 142/81)  RR: 18 (11-02-18 @ 12:00) (16 - 18)  SpO2: 99% (11-02-18 @ 12:00) (93% - 100%)  Wt(kg): --    I&O's Summary        Lymphatic: No lymphadenopathy , + edema in LE bilaterally   Cardiovascular: Normal S1 S2, RRR, No JVD, No murmurs , Peripheral pulses palpable 2+ bilaterally  Respiratory: Lungs clear to auscultation, normal effort 	  Gastrointestinal:  Soft, Non-tender, + BS	  Skin: No rashes, No ecchymoses, No cyanosis, warm to touch      TELEMETRY: SR	    ECG:  < from: 12 Lead ECG (10.26.18 @ 17:51) >  Normal sinus rhythm  Possible Left atrial enlargement  Right bundle branch block  Left ventricular hypertrophy with repolarization abnormality  Abnormal ECG    < end of copied text >  	  RADIOLOGY:   DIAGNOSTIC TESTING:  [ ] Echocardiogram: < from: Transthoracic Echocardiogram (01.18.18 @ 07:37) >  1. Mild mitral regurgitation.  2. Moderately dilated left atrium.  LA volume index = 42  cc/m2.  3. Normal left ventricular internal dimensions and wall  thicknesses.  4. Endocardium not well visualized; grossly low normal left  ventricular systolic function. Segmental wall motion could  not be assessed.  5. Mild right atrial enlargement.  6. Right ventricular enlargement with normal RV function. A  device lead is visualized in the right heart.  7. RV systolic pressure is 48 mm Hg. Mild pulmonary  hypertension.  8. There is severe tricuspid regurgitation.  9. Trace pericardial effusion.    < end of copied text >      ASSESSMENT/PLAN:  53yFemale with history of HFpEF, HTN, HLD, COPD admitted with dyspnea/cough found to have hypercapnic respiratory failure.     -pt. with lower extremity edema / appears volume overloaded  -continue with iv lasix to keep O > I  - HCO3 improving on diamox  - Eventual cath when optimized  - CPAP per primary team    Manuel Gold MD, FACC

## 2018-11-03 ENCOUNTER — TRANSCRIPTION ENCOUNTER (OUTPATIENT)
Age: 53
End: 2018-11-03

## 2018-11-03 LAB
ANION GAP SERPL CALC-SCNC: 3 MMOL/L — LOW (ref 5–17)
BUN SERPL-MCNC: 28 MG/DL — HIGH (ref 7–18)
CALCIUM SERPL-MCNC: 8.3 MG/DL — LOW (ref 8.4–10.5)
CHLORIDE SERPL-SCNC: 99 MMOL/L — SIGNIFICANT CHANGE UP (ref 96–108)
CO2 SERPL-SCNC: 41 MMOL/L — HIGH (ref 22–31)
CREAT SERPL-MCNC: 1.31 MG/DL — HIGH (ref 0.5–1.3)
GLUCOSE BLDC GLUCOMTR-MCNC: 115 MG/DL — HIGH (ref 70–99)
GLUCOSE BLDC GLUCOMTR-MCNC: 121 MG/DL — HIGH (ref 70–99)
GLUCOSE BLDC GLUCOMTR-MCNC: 121 MG/DL — HIGH (ref 70–99)
GLUCOSE BLDC GLUCOMTR-MCNC: 139 MG/DL — HIGH (ref 70–99)
GLUCOSE SERPL-MCNC: 96 MG/DL — SIGNIFICANT CHANGE UP (ref 70–99)
HCT VFR BLD CALC: 39.6 % — SIGNIFICANT CHANGE UP (ref 34.5–45)
HGB BLD-MCNC: 11.7 G/DL — SIGNIFICANT CHANGE UP (ref 11.5–15.5)
MCHC RBC-ENTMCNC: 28.6 PG — SIGNIFICANT CHANGE UP (ref 27–34)
MCHC RBC-ENTMCNC: 29.5 GM/DL — LOW (ref 32–36)
MCV RBC AUTO: 97.1 FL — SIGNIFICANT CHANGE UP (ref 80–100)
PLATELET # BLD AUTO: 258 K/UL — SIGNIFICANT CHANGE UP (ref 150–400)
POTASSIUM SERPL-MCNC: 3.8 MMOL/L — SIGNIFICANT CHANGE UP (ref 3.5–5.3)
POTASSIUM SERPL-SCNC: 3.8 MMOL/L — SIGNIFICANT CHANGE UP (ref 3.5–5.3)
RBC # BLD: 4.08 M/UL — SIGNIFICANT CHANGE UP (ref 3.8–5.2)
RBC # FLD: 15.9 % — HIGH (ref 10.3–14.5)
SODIUM SERPL-SCNC: 143 MMOL/L — SIGNIFICANT CHANGE UP (ref 135–145)
WBC # BLD: 5.4 K/UL — SIGNIFICANT CHANGE UP (ref 3.8–10.5)
WBC # FLD AUTO: 5.4 K/UL — SIGNIFICANT CHANGE UP (ref 3.8–10.5)

## 2018-11-03 RX ADMIN — Medication 100 MILLIGRAM(S): at 12:54

## 2018-11-03 RX ADMIN — ACETAZOLAMIDE 125 MILLIGRAM(S): 250 TABLET ORAL at 12:53

## 2018-11-03 RX ADMIN — HEPARIN SODIUM 5000 UNIT(S): 5000 INJECTION INTRAVENOUS; SUBCUTANEOUS at 21:29

## 2018-11-03 RX ADMIN — PREGABALIN 1000 MICROGRAM(S): 225 CAPSULE ORAL at 12:53

## 2018-11-03 RX ADMIN — Medication 81 MILLIGRAM(S): at 12:53

## 2018-11-03 RX ADMIN — CARVEDILOL PHOSPHATE 3.12 MILLIGRAM(S): 80 CAPSULE, EXTENDED RELEASE ORAL at 05:55

## 2018-11-03 RX ADMIN — Medication 1000 UNIT(S): at 12:54

## 2018-11-03 RX ADMIN — ATORVASTATIN CALCIUM 40 MILLIGRAM(S): 80 TABLET, FILM COATED ORAL at 21:30

## 2018-11-03 RX ADMIN — SENNA PLUS 1 TABLET(S): 8.6 TABLET ORAL at 12:54

## 2018-11-03 RX ADMIN — CARVEDILOL PHOSPHATE 3.12 MILLIGRAM(S): 80 CAPSULE, EXTENDED RELEASE ORAL at 17:14

## 2018-11-03 RX ADMIN — Medication 40 MILLIGRAM(S): at 05:55

## 2018-11-03 RX ADMIN — HEPARIN SODIUM 5000 UNIT(S): 5000 INJECTION INTRAVENOUS; SUBCUTANEOUS at 17:13

## 2018-11-03 RX ADMIN — Medication 40 MILLIGRAM(S): at 17:14

## 2018-11-03 RX ADMIN — PANTOPRAZOLE SODIUM 40 MILLIGRAM(S): 20 TABLET, DELAYED RELEASE ORAL at 12:54

## 2018-11-03 RX ADMIN — HEPARIN SODIUM 5000 UNIT(S): 5000 INJECTION INTRAVENOUS; SUBCUTANEOUS at 05:55

## 2018-11-03 NOTE — DISCHARGE NOTE ADULT - HOSPITAL COURSE
53 Female form [facility] PMH of Obesity, CHF HFpEF 55%,  s/p ICD, HTN, B/l LE edema, DM, Gout, CAD, HLD, COPD ( not on home O2) presented to ED with SOB. As per patient she was having cough since 4-5 days along with Productive cough with whitish sputum, pt reports of having low grade fever. patient has chronic B/l lower leg swelling, with CHF was on lasix 40 mg BID and metolazone 5 mg daily. Last ECHO was done in Jan 2018 has EF 55. She was treated with lasix and solumedrol was given. She was placed on Biapap and was transferred to ICU. She clinically improved and transferred to telemetry floor. Her  trop were mildly elevated due to demand ischenmia. EKG showed RBBB with sinus rhythm  with no st t changes. ECHO showed EF 20-25% and grade 2 diastolic dysfunction. Left atrial enlargement and pulmonary hypertension. AICD was interrogated and it was functioning well. She is being transferred to the facility for cardiac cath.

## 2018-11-03 NOTE — DIETITIAN INITIAL EVALUATION ADULT. - NUTRITION INTERVENTION
Collaboration and Referral of Nutrition Care/Discharge and Transfer of Nutrition Care to New Setting/Meals and Snack/Feeding Assistance

## 2018-11-03 NOTE — DIETITIAN INITIAL EVALUATION ADULT. - PERTINENT MEDS FT
MEDICATIONS  (STANDING):  acetazolamide    Tablet 125 milliGRAM(s) Oral daily  aspirin  chewable 81 milliGRAM(s) Oral daily  atorvastatin 40 milliGRAM(s) Oral at bedtime  carvedilol 3.125 milliGRAM(s) Oral every 12 hours  cholecalciferol 1000 Unit(s) Oral daily  cyanocobalamin 1000 MICROGram(s) Oral daily  docusate sodium 100 milliGRAM(s) Oral daily  furosemide   Injectable 40 milliGRAM(s) IV Push every 12 hours  heparin  Injectable 5000 Unit(s) SubCutaneous every 8 hours  insulin lispro (HumaLOG) corrective regimen sliding scale   SubCutaneous Before meals and at bedtime  pantoprazole  Injectable 40 milliGRAM(s) IV Push daily  senna 1 Tablet(s) Oral daily

## 2018-11-03 NOTE — DIETITIAN INITIAL EVALUATION ADULT. - NS FNS WEIGHT USED FOR CALC
adjusted/Ht=5' 8"   Adjusted HWR=357 lb    Current ok=787.8 lb 11/3/18   BMI=37.1    Admission lm=782.5 lb 10/27/18, 3+ pitting edema

## 2018-11-03 NOTE — DISCHARGE NOTE ADULT - CARE PLAN
Principal Discharge DX:	CHF exacerbation  Goal:	Prevention of exacerbations of CHF  Assessment and plan of treatment:	You came here with shortness of breath. You were diagnosed with acute exacerbation of CHF. You had Echo sig for reduced ejection fraction 20-25% and Grade ii diastolic dysfunction. EKG RBBB. You were started on Lasix and oxygen, monitoring strict I/O s and daily weights. You had telemonitoring.  You were counselled regarding compliance of taking medications and visiting doctors for appointments regularly. You have to limit your drinking to 1 liter/day. Please avoid salty food. Please visit your physician when you have chest pain and worsening of shortness of breath.  Secondary Diagnosis:	COPD (chronic obstructive pulmonary disease)  Goal:	Prevention of worsening of COPD  Assessment and plan of treatment:	You came here with shortness of breath, fever and cough. You had respiratory failure. You were transferred to ICU for Bipap. You were given lasix and solumedrol. You were transferred to telemetry floor. Follow up with your Primary medical doctor within 1 week of discharge.  Secondary Diagnosis:	DM (diabetes mellitus)  Goal:	Control of blood sugar within normal range  Assessment and plan of treatment:	You have high blood sugar. Please continue the current medication regimen. Follow up with your primary medical doctor for Hba1c level measurement. Your sugars are well controlled. You were given education about diabetes. Please carry with you a candy, eat it whenever you feel dizzy, sweating a lot and weak that may be due to low blood glucose. Please take the medications regularly and go to appointments regularly.  Outpatient follow up with ophthalmologist due to possible diabetic retinopathy.  Please follow up with your PCp within 1-2 weeks of discharge from the hospital for continued care.  Secondary Diagnosis:	HTN (hypertension)  Goal:	Control of blood pressure within normal range  Assessment and plan of treatment:	You have a history of hypertension. Your blood pressure has been controlled. You are advised to eat DASH diet. Please monitor your blood pressure daily, record it and take it to your primary doctor. Follow up with your Primary medical doctor. You are advised to exercise 30min/day for 5-7 days/week. You are advised to reduce weight and also control your diet.

## 2018-11-03 NOTE — DIETITIAN INITIAL EVALUATION ADULT. - NS AS NUTRI INTERV MEALS SNACK
Composition of meals/snacks/Diets modified for specific foods and ingredients/continue diet Rx as ordered

## 2018-11-03 NOTE — DIETITIAN INITIAL EVALUATION ADULT. - OTHER INFO
nutrition assessment for length of stay; from assisted living facility; downgraded from ICU; skin intact; no GI distress, chewing or swallowing problem reported at present, tolerating 55 to 100% of meals per flow sheets; wt data from [facility] EMR reviewed: 312.6 lb 1/25/18 -->307.5 lb 10/27/18-->243.8 lb 11/3/18, significant wt loss since this admission from 10/27/18, quantity questionable, part of wt loss may due to fluid loss from diuretic Rx and scale variance; 3+ pitting edema upon admission; Pending cardiac catheterization when medically optimized per MD

## 2018-11-03 NOTE — DISCHARGE NOTE ADULT - PATIENT PORTAL LINK FT
You can access the [email or web address] Patient Portal, offered by [facility], by registering with the following website: [email or web address]

## 2018-11-03 NOTE — DISCHARGE NOTE ADULT - PLAN OF CARE
Prevention of exacerbations of CHF You came here with shortness of breath. You were diagnosed with acute exacerbation of CHF. You had Echo sig for reduced ejection fraction 20-25% and Grade ii diastolic dysfunction. EKG RBBB. You were started on Lasix and oxygen, monitoring strict I/O s and daily weights. You had telemonitoring.  You were counselled regarding compliance of taking medications and visiting doctors for appointments regularly. You have to limit your drinking to 1 liter/day. Please avoid salty food. Please visit your physician when you have chest pain and worsening of shortness of breath. Prevention of worsening of COPD You came here with shortness of breath, fever and cough. You had respiratory failure. You were transferred to ICU for Bipap. You were given lasix and solumedrol. You were transferred to telemetry floor. Follow up with your Primary medical doctor within 1 week of discharge. Control of blood sugar within normal range You have high blood sugar. Please continue the current medication regimen. Follow up with your primary medical doctor for Hba1c level measurement. Your sugars are well controlled. You were given education about diabetes. Please carry with you a candy, eat it whenever you feel dizzy, sweating a lot and weak that may be due to low blood glucose. Please take the medications regularly and go to appointments regularly.  Outpatient follow up with ophthalmologist due to possible diabetic retinopathy.  Please follow up with your PCp within 1-2 weeks of discharge from the hospital for continued care. Control of blood pressure within normal range You have a history of hypertension. Your blood pressure has been controlled. You are advised to eat DASH diet. Please monitor your blood pressure daily, record it and take it to your primary doctor. Follow up with your Primary medical doctor. You are advised to exercise 30min/day for 5-7 days/week. You are advised to reduce weight and also control your diet.

## 2018-11-03 NOTE — PROGRESS NOTE ADULT - SUBJECTIVE AND OBJECTIVE BOX
Patient is a 53y old  Female who presents with a chief complaint of SOB with cough and fever (2018 21:01)    PATIENT IS SEEN AND EXAMINED IN MEDICAL FLOOR / TELEMETRY    ALLERGIES:  No Known Allergies      Daily Weight in k.6 (2018 21:01)    VITALS:    Vital Signs Last 24 Hrs  T(C): 36.4 (2018 20:00), Max: 36.6 (2018 17:00)  T(F): 97.5 (2018 20:00), Max: 97.9 (2018 17:00)  HR: 67 (2018 20:00) (62 - 71)  BP: 120/66 (2018 20:00) (116/59 - 141/80)  BP(mean): --  RR: 16 (2018 20:00) (16 - 19)  SpO2: 100% (2018 20:00) (93% - 100%)    LABS:  CBC Full  -  ( 2018 06:31 )  WBC Count : 5.4 K/uL  Hemoglobin : 11.7 g/dL  Hematocrit : 39.6 %  Platelet Count - Automated : 258 K/uL  Mean Cell Volume : 97.1 fl  Mean Cell Hemoglobin : 28.6 pg  Mean Cell Hemoglobin Concentration : 29.5 gm/dL  Auto Neutrophil # : x  Auto Lymphocyte # : x  Auto Monocyte # : x  Auto Eosinophil # : x  Auto Basophil # : x  Auto Neutrophil % : x  Auto Lymphocyte % : x  Auto Monocyte % : x  Auto Eosinophil % : x  Auto Basophil % : x      11-03    143  |  99  |  28<H>  ----------------------------<  96  3.8   |  41<H>  |  1.31<H>    Ca    8.3<L>      2018 06:31      CAPILLARY BLOOD GLUCOSE    POCT Blood Glucose.: 139 mg/dL (2018 21:29)  POCT Blood Glucose.: 115 mg/dL (2018 16:32)  POCT Blood Glucose.: 121 mg/dL (2018 12:44)  POCT Blood Glucose.: 121 mg/dL (2018 08:42)    .Blood Blood-Peripheral  10-27 @ 00:14   No growth at 5 days.  --  --      MEDICATIONS:    MEDICATIONS  (STANDING):  acetazolamide    Tablet 125 milliGRAM(s) Oral daily  aspirin  chewable 81 milliGRAM(s) Oral daily  atorvastatin 40 milliGRAM(s) Oral at bedtime  carvedilol 3.125 milliGRAM(s) Oral every 12 hours  cholecalciferol 1000 Unit(s) Oral daily  cyanocobalamin 1000 MICROGram(s) Oral daily  docusate sodium 100 milliGRAM(s) Oral daily  furosemide   Injectable 40 milliGRAM(s) IV Push every 12 hours  heparin  Injectable 5000 Unit(s) SubCutaneous every 8 hours  insulin lispro (HumaLOG) corrective regimen sliding scale   SubCutaneous Before meals and at bedtime  pantoprazole  Injectable 40 milliGRAM(s) IV Push daily  senna 1 Tablet(s) Oral daily      MEDICATIONS  (PRN):      REVIEW OF SYSTEMS:                           ALL ROS DONE [ X   ]    CONSTITUTIONAL:  LETHARGIC [   ], FEVER [   ], UNRESPONSIVE [   ]  CVS:  CP  [   ], SOB, [   ], PALPITATIONS [   ], DIZZYNESS [   ]  RS: COUGH [   ], SPUTUM [   ]  GI: ABDOMINAL PAIN [   ], NAUSEA [   ], VOMITINGS [   ], DIARRHEA [   ], CONSTIPATION [   ]  :  DYSURIA [   ], NOCTURIA [   ], INCREASED FREQUENCY [   ], DRIBLING [   ],  SKELETAL: PAINFUL JOINTS [   ], SWOLLEN JOINTS [   ], NECK ACHE [   ], LOW BACK ACHE [   ],  SKIN : ULCERS [   ], RASH [   ], ITCHING [   ]  CNS: HEAD ACHE [   ], DOUBLE VISION [   ], BLURRED VISION [   ], AMS / CONFUSION [   ], SEIZURES [   ], WEAKNESS [   ],TINGLING / NUMBNESS [   ]    PHYSICAL EXAMINATION:  GENERAL APPEARANCE: NO DISTRESS  HEENT:  NO PALLOR, NO  JVD,  NO   NODES, NECK SUPPLE  CVS: S1 +, S2 +,   RS: AEEB,  OCCASIONAL  RALES +,   NO RONCHI  ABD: SOFT, NT, NO, BS +  EXT:  PE +  SKIN: WARM,   SKELETAL:  ROM ACCEPTABLE  CNS:  AAO X  3  , NO  DEFICITS    RADIOLOGY :      ASSESSMENT :     Heart failure  GERD (gastroesophageal reflux disease)  CHF (congestive heart failure)  CAD (coronary artery disease)  AICD (automatic cardioverter/defibrillator) present  COPD (chronic obstructive pulmonary disease)  RA (rheumatoid arthritis)  DM (diabetes mellitus)  HTN (hypertension)  History of cholecystectomy      PLAN:  HPI:  53 Female form [facility] PMH of Obesity, CHF HFpEF 55%,  s/p ICD, HTN, B/l LE edema, DM, Gout, CAD, HLD, COPD ( not on home O2) presented to ED with SOB. As per patient she was having cough since 4-5 days along with Productive cough with whitish sputum, pt reports of having low grade fever on Thursday. patient was started on Abx at [facility], fever was resolved but  with  worsening of SOB. Patient denies headaches, chest pain, palpitations, nausea, vomiting, diarrhea, abdominal pain or dysuria. Patient has been compliant  with her medications. patient has chronic B/l lower leg swelling, with CHF was on lasix 40 mg BID and metolazone 5 mg daily. Last ECHO was done in 2018 has EF 55%. (27 Oct 2018 10:22)    - SYSTOLIC CHF ( ON AICD ) , HYPERCAPNOEIC RESPIRATORY FAILURE ON BiPAP, IV LASIX, ACETAZOLAMIDE. S/P DOWN GRADE FROM ICU   - OBESE, OBSTRUCTIVE SLEEP APNOEA  - DC PLAN ON MONDAY BACK TO [facility]  - GI AND DVT PROPHYLAXIS  - DR. NATARAJAN

## 2018-11-03 NOTE — DIETITIAN INITIAL EVALUATION ADULT. - PERTINENT LABORATORY DATA
11-03 Na143 mmol/L Glu 96 mg/dL K+ 3.8 mmol/L Cr  1.31 mg/dL<H> BUN 28 mg/dL<H> 11-03 Na143 mmol/L Glu 96 mg/dL K+ 3.8 mmol/L Cr  1.31 mg/dL<H> BUN 28 mg/dL<H>     VrhM3J=5.9

## 2018-11-03 NOTE — PROGRESS NOTE ADULT - SUBJECTIVE AND OBJECTIVE BOX
pt seen and examined, no complaints, ROS - .     acetazolamide    Tablet 125 milliGRAM(s) Oral daily  aspirin  chewable 81 milliGRAM(s) Oral daily  atorvastatin 40 milliGRAM(s) Oral at bedtime  carvedilol 3.125 milliGRAM(s) Oral every 12 hours  cholecalciferol 1000 Unit(s) Oral daily  cyanocobalamin 1000 MICROGram(s) Oral daily  docusate sodium 100 milliGRAM(s) Oral daily  furosemide   Injectable 40 milliGRAM(s) IV Push every 12 hours  heparin  Injectable 5000 Unit(s) SubCutaneous every 8 hours  insulin lispro (HumaLOG) corrective regimen sliding scale   SubCutaneous Before meals and at bedtime  pantoprazole  Injectable 40 milliGRAM(s) IV Push daily  senna 1 Tablet(s) Oral daily                            11.3   5.6   )-----------( 257      ( 02 Nov 2018 06:35 )             38.4       Hemoglobin: 11.3 g/dL (11-02 @ 06:35)  Hemoglobin: 11.8 g/dL (11-01 @ 06:40)  Hemoglobin: 11.7 g/dL (10-31 @ 07:03)  Hemoglobin: 11.3 g/dL (10-30 @ 06:16)  Hemoglobin: 11.4 g/dL (10-29 @ 05:52)      11-02    143  |  98  |  24<H>  ----------------------------<  101<H>  3.6   |  42<H>  |  1.15    Ca    8.1<L>      02 Nov 2018 06:35      Creatinine Trend: 1.15<--, 1.05<--, 1.16<--, 1.25<--, 1.40<--, 1.44<--    COAGS:           T(C): 36.3 (11-03-18 @ 00:06), Max: 36.9 (11-02-18 @ 12:00)  HR: 65 (11-03-18 @ 00:06) (63 - 75)  BP: 134/63 (11-03-18 @ 00:06) (112/77 - 134/63)  RR: 18 (11-03-18 @ 00:06) (16 - 18)  SpO2: 100% (11-03-18 @ 00:06) (93% - 100%)  Wt(kg): --    I&O's Summary    02 Nov 2018 07:01  -  03 Nov 2018 04:21  --------------------------------------------------------  IN: 150 mL / OUT: 600 mL / NET: -450 mL        Lymphatic: No lymphadenopathy , + edema in LE bilaterally   Cardiovascular: Normal S1 S2, RRR, No JVD, No murmurs , Peripheral pulses palpable 2+ bilaterally  Respiratory: Lungs clear to auscultation, normal effort 	  Gastrointestinal:  Soft, Non-tender, + BS	  Skin: No rashes, No ecchymoses, No cyanosis, warm to touch      TELEMETRY: SR	    ECG:  < from: 12 Lead ECG (10.26.18 @ 17:51) >  Normal sinus rhythm  Possible Left atrial enlargement  Right bundle branch block  Left ventricular hypertrophy with repolarization abnormality  Abnormal ECG    < end of copied text >  	  RADIOLOGY:   DIAGNOSTIC TESTING:  [ ] Echocardiogram: < from: Transthoracic Echocardiogram (01.18.18 @ 07:37) >  1. Mild mitral regurgitation.  2. Moderately dilated left atrium.  LA volume index = 42  cc/m2.  3. Normal left ventricular internal dimensions and wall  thicknesses.  4. Endocardium not well visualized; grossly low normal left  ventricular systolic function. Segmental wall motion could  not be assessed.  5. Mild right atrial enlargement.  6. Right ventricular enlargement with normal RV function. A  device lead is visualized in the right heart.  7. RV systolic pressure is 48 mm Hg. Mild pulmonary  hypertension.  8. There is severe tricuspid regurgitation.  9. Trace pericardial effusion.    < end of copied text >      ASSESSMENT/PLAN:  53yFemale with history of HFpEF, HTN, HLD, COPD admitted with dyspnea/cough found to have hypercapnic respiratory failure.     - ASA , statin   - tele stable ,   - cont LASIX / Diamox at present , SOB improving   - ECHO noted   -  GI / DVT prophylaxis,  keep K>4, mag >2.0   - Eventual cath when optimized  - CPAP per primary team  D/W Dr Peterson

## 2018-11-04 LAB
ANION GAP SERPL CALC-SCNC: 3 MMOL/L — LOW (ref 5–17)
BUN SERPL-MCNC: 30 MG/DL — HIGH (ref 7–18)
CALCIUM SERPL-MCNC: 8.6 MG/DL — SIGNIFICANT CHANGE UP (ref 8.4–10.5)
CHLORIDE SERPL-SCNC: 101 MMOL/L — SIGNIFICANT CHANGE UP (ref 96–108)
CO2 SERPL-SCNC: 40 MMOL/L — HIGH (ref 22–31)
CREAT SERPL-MCNC: 1.27 MG/DL — SIGNIFICANT CHANGE UP (ref 0.5–1.3)
GLUCOSE BLDC GLUCOMTR-MCNC: 106 MG/DL — HIGH (ref 70–99)
GLUCOSE BLDC GLUCOMTR-MCNC: 108 MG/DL — HIGH (ref 70–99)
GLUCOSE BLDC GLUCOMTR-MCNC: 119 MG/DL — HIGH (ref 70–99)
GLUCOSE BLDC GLUCOMTR-MCNC: 166 MG/DL — HIGH (ref 70–99)
GLUCOSE SERPL-MCNC: 100 MG/DL — HIGH (ref 70–99)
HCT VFR BLD CALC: 38.6 % — SIGNIFICANT CHANGE UP (ref 34.5–45)
HGB BLD-MCNC: 11.4 G/DL — LOW (ref 11.5–15.5)
MCHC RBC-ENTMCNC: 28.7 PG — SIGNIFICANT CHANGE UP (ref 27–34)
MCHC RBC-ENTMCNC: 29.6 GM/DL — LOW (ref 32–36)
MCV RBC AUTO: 96.9 FL — SIGNIFICANT CHANGE UP (ref 80–100)
PLATELET # BLD AUTO: 246 K/UL — SIGNIFICANT CHANGE UP (ref 150–400)
POTASSIUM SERPL-MCNC: 3.8 MMOL/L — SIGNIFICANT CHANGE UP (ref 3.5–5.3)
POTASSIUM SERPL-SCNC: 3.8 MMOL/L — SIGNIFICANT CHANGE UP (ref 3.5–5.3)
RBC # BLD: 3.99 M/UL — SIGNIFICANT CHANGE UP (ref 3.8–5.2)
RBC # FLD: 15.6 % — HIGH (ref 10.3–14.5)
SODIUM SERPL-SCNC: 144 MMOL/L — SIGNIFICANT CHANGE UP (ref 135–145)
WBC # BLD: 4.6 K/UL — SIGNIFICANT CHANGE UP (ref 3.8–10.5)
WBC # FLD AUTO: 4.6 K/UL — SIGNIFICANT CHANGE UP (ref 3.8–10.5)

## 2018-11-04 RX ORDER — CALCIUM CARBONATE 500(1250)
1 TABLET ORAL ONCE
Qty: 0 | Refills: 0 | Status: COMPLETED | OUTPATIENT
Start: 2018-11-04 | End: 2018-11-04

## 2018-11-04 RX ADMIN — HEPARIN SODIUM 5000 UNIT(S): 5000 INJECTION INTRAVENOUS; SUBCUTANEOUS at 06:05

## 2018-11-04 RX ADMIN — Medication 40 MILLIGRAM(S): at 06:05

## 2018-11-04 RX ADMIN — PANTOPRAZOLE SODIUM 40 MILLIGRAM(S): 20 TABLET, DELAYED RELEASE ORAL at 12:20

## 2018-11-04 RX ADMIN — CARVEDILOL PHOSPHATE 3.12 MILLIGRAM(S): 80 CAPSULE, EXTENDED RELEASE ORAL at 06:05

## 2018-11-04 RX ADMIN — ATORVASTATIN CALCIUM 40 MILLIGRAM(S): 80 TABLET, FILM COATED ORAL at 22:17

## 2018-11-04 RX ADMIN — Medication 100 MILLIGRAM(S): at 12:21

## 2018-11-04 RX ADMIN — Medication 40 MILLIGRAM(S): at 17:17

## 2018-11-04 RX ADMIN — Medication 1000 UNIT(S): at 12:21

## 2018-11-04 RX ADMIN — ACETAZOLAMIDE 125 MILLIGRAM(S): 250 TABLET ORAL at 12:21

## 2018-11-04 RX ADMIN — Medication 1 TABLET(S): at 06:05

## 2018-11-04 RX ADMIN — PREGABALIN 1000 MICROGRAM(S): 225 CAPSULE ORAL at 12:21

## 2018-11-04 RX ADMIN — Medication 81 MILLIGRAM(S): at 12:21

## 2018-11-04 RX ADMIN — SENNA PLUS 1 TABLET(S): 8.6 TABLET ORAL at 12:21

## 2018-11-04 RX ADMIN — CARVEDILOL PHOSPHATE 3.12 MILLIGRAM(S): 80 CAPSULE, EXTENDED RELEASE ORAL at 17:17

## 2018-11-04 RX ADMIN — HEPARIN SODIUM 5000 UNIT(S): 5000 INJECTION INTRAVENOUS; SUBCUTANEOUS at 22:17

## 2018-11-04 RX ADMIN — Medication 1: at 12:22

## 2018-11-04 NOTE — PROGRESS NOTE ADULT - SUBJECTIVE AND OBJECTIVE BOX
PGY 1 Note discussed with supervising resident and primary attending    Patient is a 53y old  Female who presents with a chief complaint of SOB with cough and fever (04 Nov 2018 01:04)      INTERVAL HPI/OVERNIGHT EVENTS: no events noted overnight.    MEDICATIONS  (STANDING):  acetazolamide    Tablet 125 milliGRAM(s) Oral daily  aspirin  chewable 81 milliGRAM(s) Oral daily  atorvastatin 40 milliGRAM(s) Oral at bedtime  carvedilol 3.125 milliGRAM(s) Oral every 12 hours  cholecalciferol 1000 Unit(s) Oral daily  cyanocobalamin 1000 MICROGram(s) Oral daily  docusate sodium 100 milliGRAM(s) Oral daily  furosemide   Injectable 40 milliGRAM(s) IV Push every 12 hours  heparin  Injectable 5000 Unit(s) SubCutaneous every 8 hours  insulin lispro (HumaLOG) corrective regimen sliding scale   SubCutaneous Before meals and at bedtime  pantoprazole  Injectable 40 milliGRAM(s) IV Push daily  senna 1 Tablet(s) Oral daily    MEDICATIONS  (PRN):      __________________________________________________  REVIEW OF SYSTEMS:   Denies nausea, vomiting, diarrhea, abdominal pain, chest pain, palpitations, dizziness, headache, cough, wheezing, joint pain or swelling, fever, chills.   D      Vital Signs Last 24 Hrs  T(C): 36.9 (04 Nov 2018 00:43), Max: 36.9 (04 Nov 2018 00:43)  T(F): 98.4 (04 Nov 2018 00:43), Max: 98.4 (04 Nov 2018 00:43)  HR: 67 (04 Nov 2018 00:43) (62 - 74)  BP: 114/56 (04 Nov 2018 00:43) (114/56 - 141/80)  BP(mean): --  RR: 18 (04 Nov 2018 00:43) (16 - 21)  SpO2: 98% (04 Nov 2018 00:43) (96% - 100%)    ________________________________________________  PHYSICAL EXAM:  GENERAL: NAD  HEENT: Normocephalic;  conjunctivae and sclerae clear; moist mucous membranes;   NECK : supple  CHEST/LUNG: Clear to auscultation bilaterally with good air entry   HEART: S1 S2  regular; no murmurs, gallops or rubs  ABDOMEN: Soft, Nontender, Nondistended; Bowel sounds present  EXTREMITIES: no cyanosis; no edema; no calf tenderness  SKIN: warm and dry; no rash  NERVOUS SYSTEM:  Awake and alert; Oriented  to place, person and time ; no new deficits    _________________________________________________  LABS:                        11.7   5.4   )-----------( 258      ( 03 Nov 2018 06:31 )             39.6     11-03    143  |  99  |  28<H>  ----------------------------<  96  3.8   |  41<H>  |  1.31<H>    Ca    8.3<L>      03 Nov 2018 06:31          CAPILLARY BLOOD GLUCOSE      POCT Blood Glucose.: 139 mg/dL (03 Nov 2018 21:29)  POCT Blood Glucose.: 115 mg/dL (03 Nov 2018 16:32)  POCT Blood Glucose.: 121 mg/dL (03 Nov 2018 12:44)  POCT Blood Glucose.: 121 mg/dL (03 Nov 2018 08:42)

## 2018-11-04 NOTE — PROGRESS NOTE ADULT - PROBLEM/PLAN-1
DISPLAY PLAN FREE TEXT
warm/no tingling/no numbness/no discoloration

## 2018-11-04 NOTE — PROGRESS NOTE ADULT - PROBLEM SELECTOR PROBLEM 2
CHF exacerbation

## 2018-11-04 NOTE — PROGRESS NOTE ADULT - PROBLEM SELECTOR PLAN 4
patient is on glipizide XL 5 mg daily   hold home medications   on sliding scale

## 2018-11-04 NOTE — PROGRESS NOTE ADULT - SUBJECTIVE AND OBJECTIVE BOX
pt seen and examined, no complaints, ROS - .     acetazolamide    Tablet 125 milliGRAM(s) Oral daily  aspirin  chewable 81 milliGRAM(s) Oral daily  atorvastatin 40 milliGRAM(s) Oral at bedtime  carvedilol 3.125 milliGRAM(s) Oral every 12 hours  cholecalciferol 1000 Unit(s) Oral daily  cyanocobalamin 1000 MICROGram(s) Oral daily  docusate sodium 100 milliGRAM(s) Oral daily  furosemide   Injectable 40 milliGRAM(s) IV Push every 12 hours  heparin  Injectable 5000 Unit(s) SubCutaneous every 8 hours  insulin lispro (HumaLOG) corrective regimen sliding scale   SubCutaneous Before meals and at bedtime  pantoprazole  Injectable 40 milliGRAM(s) IV Push daily  senna 1 Tablet(s) Oral daily                            11.7   5.4   )-----------( 258      ( 03 Nov 2018 06:31 )             39.6       Hemoglobin: 11.7 g/dL (11-03 @ 06:31)  Hemoglobin: 11.3 g/dL (11-02 @ 06:35)  Hemoglobin: 11.8 g/dL (11-01 @ 06:40)  Hemoglobin: 11.7 g/dL (10-31 @ 07:03)  Hemoglobin: 11.3 g/dL (10-30 @ 06:16)      11-03    143  |  99  |  28<H>  ----------------------------<  96  3.8   |  41<H>  |  1.31<H>    Ca    8.3<L>      03 Nov 2018 06:31      Creatinine Trend: 1.31<--, 1.15<--, 1.05<--, 1.16<--, 1.25<--, 1.40<--    COAGS:           T(C): 36.9 (11-04-18 @ 00:43), Max: 36.9 (11-04-18 @ 00:43)  HR: 67 (11-04-18 @ 00:43) (62 - 74)  BP: 114/56 (11-04-18 @ 00:43) (114/56 - 141/80)  RR: 18 (11-04-18 @ 00:43) (16 - 21)  SpO2: 98% (11-04-18 @ 00:43) (96% - 100%)  Wt(kg): --    I&O's Summary    02 Nov 2018 07:01  -  03 Nov 2018 07:00  --------------------------------------------------------  IN: 150 mL / OUT: 1300 mL / NET: -1150 mL      Lymphatic: No lymphadenopathy , + edema in LE bilaterally   Cardiovascular: Normal S1 S2, RRR, No JVD, No murmurs , Peripheral pulses palpable 2+ bilaterally  Respiratory: Lungs clear to auscultation, normal effort 	  Gastrointestinal:  Soft, Non-tender, + BS	  Skin: No rashes, No ecchymoses, No cyanosis, warm to touch      TELEMETRY: SR	    ECG:  < from: 12 Lead ECG (10.26.18 @ 17:51) >  Normal sinus rhythm  Possible Left atrial enlargement  Right bundle branch block  Left ventricular hypertrophy with repolarization abnormality  Abnormal ECG    < end of copied text >  	  RADIOLOGY:   DIAGNOSTIC TESTING:  [ ] Echocardiogram: < from: Transthoracic Echocardiogram (01.18.18 @ 07:37) >  1. Mild mitral regurgitation.  2. Moderately dilated left atrium.  LA volume index = 42  cc/m2.  3. Normal left ventricular internal dimensions and wall  thicknesses.  4. Endocardium not well visualized; grossly low normal left  ventricular systolic function. Segmental wall motion could  not be assessed.  5. Mild right atrial enlargement.  6. Right ventricular enlargement with normal RV function. A  device lead is visualized in the right heart.  7. RV systolic pressure is 48 mm Hg. Mild pulmonary  hypertension.  8. There is severe tricuspid regurgitation.  9. Trace pericardial effusion.    < end of copied text >      ASSESSMENT/PLAN:  53yFemale with history of HFpEF, HTN, HLD, COPD admitted with dyspnea/cough found to have hypercapnic respiratory failure.     - ASA , statin   - tele stable ,   - cont LASIX / Diamox at present ,  cpap inuse per pulm  - ECHO noted   -  GI / DVT prophylaxis,  keep K>4, mag >2.0   - Eventual cath when optimized  D/W Dr Peterson

## 2018-11-04 NOTE — PROGRESS NOTE ADULT - PROBLEM SELECTOR PLAN 6
resolved  at baseline pt has normal renal functions   likely due to CHF   continue with Diuresis   avoid nephrotoxic drugs.
resolved  at baseline pt has normal renal functions   likely due to CHF   continue with Diuresis   avoid nephrotoxic drugs.
came with elevated BUN and creatinine level   at baseline pt has normal renal functions   likely due to CHF   continue with Diuresis   avoid nephrotoxic drugs.
resolved  at baseline pt has normal renal functions   likely due to CHF   continue with Diuresis   avoid nephrotoxic drugs.
resolved  at baseline pt has normal renal functions   likely due to CHF   continue with Diuresis   avoid nephrotoxic drugs.
came with elevated BUN and creatinine level   at baseline pt has normal renal functions   likely due to CHF   continue with Diuresis   avoid nephrotoxic drugs.
came with elevated BUN and creatinine level   at baseline pt has normal renal functions   likely due to CHF   continue with Diuresis   avoid nephrotoxic drugs.

## 2018-11-04 NOTE — PROGRESS NOTE ADULT - PROBLEM SELECTOR PROBLEM 1
Acute hypercapnic respiratory failure

## 2018-11-04 NOTE — PROGRESS NOTE ADULT - ATTENDING COMMENTS
CARDIOLOGY ATTENDING    Agree with above. Continue lasix, eventual cath
CARDIOLOGY ATTENDING    Agree with above. Continue lasix, eventual cath
pt was evaluated by me around 1:10 pm
Patient seen and examined with resident, Addendum to above.    52y/o female with history of Obesity, CHF HFpEF 55%,  s/p ICD, HTN, B/l LE edema, DM, Gout, CAD, HLD patient likely also has underlying OHS. Admitted with shortness of breath and lower extremity swelling. ICU evaluation for hypercapnic respiratory failure. Secondary to suspected OHS vs CHF exacerbation. Edema is improved, respiratory status is better as well.     Assessment:  1. Acute hypercapnic respiratory failure  2. Decompensation of heart failure   3. r/o AMPARO/OHS  4. ANJU   5. DM  6. HTN     - Continue lasix to negative balance  - Cont. bipap support at night time   - Patient likely has underlying OHS which may be the primary reason for CO2 retention   - RVP negative  - F/u cardiology recs  - Echo findings showing depressed EF, will likely need evaluation once optimized  - Supplement potassium and recheck bmp in PM  - DVT prophylaxis  - OOB to chair  - PT evaluation  - Can be transferred out to telemetry today
Patient seen and examined with resident, Addendum to above.    52y/o female with history of Obesity, CHF HFpEF 55%,  s/p ICD, HTN, B/l LE edema, DM, Gout, CAD, HLD patient likely also has underlying OHS. Admitted with shortness of breath and lower extremity swelling. ICU evaluation for hypercapnic respiratory failure. Secondary to suspected OHS vs CHF exacerbation. Clinically she is improved, slight improvement with bipap.     Assessment:  1. Acute hypercapnic respiratory failure  2. Decompensation of heart failure   3. r/o AMPARO/OHS  4. ANJU   5. DM  6. HTN     - Continue lasix to negative balance, better lower extremity edema today  - Cont. bipap support, with breaks for meals  - Acidosis is improving  - Patient likely has underlying OHS which may be the primary reason for CO2 retention   - RVP negative  - F/u cardiology recs  - F/u echo results   - DVT prophylaxis  - OOB to chair  - PT evaluation  - 36 min of critical care time spent on this patient's care

## 2018-11-04 NOTE — PROGRESS NOTE ADULT - PROBLEM/PLAN-6
Date & Time: 3/2/2019, 10:29 AM  Patient: Dallas Gloria  Encounter Provider(s):    Stefanie Adair MD       To Whom It May Concern:    Christian Brennangilbertos was seen and treated in our department on 3/2/2019.  He should not return to
DISPLAY PLAN FREE TEXT

## 2018-11-05 ENCOUNTER — INPATIENT (INPATIENT)
Facility: HOSPITAL | Age: 53
LOS: 1 days | Discharge: HOME | End: 2018-11-07
Attending: INTERNAL MEDICINE | Admitting: INTERNAL MEDICINE
Payer: MEDICARE

## 2018-11-05 VITALS
OXYGEN SATURATION: 100 % | TEMPERATURE: 98 F | HEART RATE: 66 BPM | DIASTOLIC BLOOD PRESSURE: 73 MMHG | SYSTOLIC BLOOD PRESSURE: 136 MMHG | RESPIRATION RATE: 17 BRPM

## 2018-11-05 VITALS
HEART RATE: 70 BPM | DIASTOLIC BLOOD PRESSURE: 66 MMHG | TEMPERATURE: 98 F | OXYGEN SATURATION: 99 % | RESPIRATION RATE: 20 BRPM | SYSTOLIC BLOOD PRESSURE: 124 MMHG

## 2018-11-05 DIAGNOSIS — I21.4 NON-ST ELEVATION (NSTEMI) MYOCARDIAL INFARCTION: ICD-10-CM

## 2018-11-05 DIAGNOSIS — Z98.89 OTHER SPECIFIED POSTPROCEDURAL STATES: Chronic | ICD-10-CM

## 2018-11-05 DIAGNOSIS — Z95.810 PRESENCE OF AUTOMATIC (IMPLANTABLE) CARDIAC DEFIBRILLATOR: Chronic | ICD-10-CM

## 2018-11-05 DIAGNOSIS — I50.22 CHRONIC SYSTOLIC (CONGESTIVE) HEART FAILURE: ICD-10-CM

## 2018-11-05 DIAGNOSIS — I50.9 HEART FAILURE, UNSPECIFIED: ICD-10-CM

## 2018-11-05 PROBLEM — J44.9 CHRONIC OBSTRUCTIVE PULMONARY DISEASE, UNSPECIFIED: Chronic | Status: ACTIVE | Noted: 2018-10-26

## 2018-11-05 PROBLEM — I25.10 ATHEROSCLEROTIC HEART DISEASE OF NATIVE CORONARY ARTERY WITHOUT ANGINA PECTORIS: Chronic | Status: ACTIVE | Noted: 2018-10-26

## 2018-11-05 PROBLEM — K21.9 GASTRO-ESOPHAGEAL REFLUX DISEASE WITHOUT ESOPHAGITIS: Chronic | Status: ACTIVE | Noted: 2018-10-26

## 2018-11-05 LAB
ANION GAP SERPL CALC-SCNC: 4 MMOL/L — LOW (ref 5–17)
BUN SERPL-MCNC: 32 MG/DL — HIGH (ref 7–18)
CALCIUM SERPL-MCNC: 8.5 MG/DL — SIGNIFICANT CHANGE UP (ref 8.4–10.5)
CHLORIDE SERPL-SCNC: 100 MMOL/L — SIGNIFICANT CHANGE UP (ref 96–108)
CO2 SERPL-SCNC: 39 MMOL/L — HIGH (ref 22–31)
CREAT SERPL-MCNC: 1.41 MG/DL — HIGH (ref 0.5–1.3)
GLUCOSE BLDC GLUCOMTR-MCNC: 101 MG/DL — HIGH (ref 70–99)
GLUCOSE BLDC GLUCOMTR-MCNC: 111 MG/DL — HIGH (ref 70–99)
GLUCOSE SERPL-MCNC: 105 MG/DL — HIGH (ref 70–99)
HCT VFR BLD CALC: 38.9 % — SIGNIFICANT CHANGE UP (ref 34.5–45)
HGB BLD-MCNC: 11.6 G/DL — SIGNIFICANT CHANGE UP (ref 11.5–15.5)
MCHC RBC-ENTMCNC: 28.7 PG — SIGNIFICANT CHANGE UP (ref 27–34)
MCHC RBC-ENTMCNC: 29.7 GM/DL — LOW (ref 32–36)
MCV RBC AUTO: 96.5 FL — SIGNIFICANT CHANGE UP (ref 80–100)
PLATELET # BLD AUTO: 246 K/UL — SIGNIFICANT CHANGE UP (ref 150–400)
POTASSIUM SERPL-MCNC: 3.9 MMOL/L — SIGNIFICANT CHANGE UP (ref 3.5–5.3)
POTASSIUM SERPL-SCNC: 3.9 MMOL/L — SIGNIFICANT CHANGE UP (ref 3.5–5.3)
RBC # BLD: 4.02 M/UL — SIGNIFICANT CHANGE UP (ref 3.8–5.2)
RBC # FLD: 15.6 % — HIGH (ref 10.3–14.5)
SODIUM SERPL-SCNC: 143 MMOL/L — SIGNIFICANT CHANGE UP (ref 135–145)
WBC # BLD: 4.8 K/UL — SIGNIFICANT CHANGE UP (ref 3.8–10.5)
WBC # FLD AUTO: 4.8 K/UL — SIGNIFICANT CHANGE UP (ref 3.8–10.5)

## 2018-11-05 PROCEDURE — 93010 ELECTROCARDIOGRAM REPORT: CPT

## 2018-11-05 RX ORDER — PANTOPRAZOLE SODIUM 20 MG/1
1 TABLET, DELAYED RELEASE ORAL
Qty: 0 | Refills: 0 | DISCHARGE
Start: 2018-11-05

## 2018-11-05 RX ORDER — ATORVASTATIN CALCIUM 80 MG/1
40 TABLET, FILM COATED ORAL AT BEDTIME
Qty: 0 | Refills: 0 | Status: DISCONTINUED | OUTPATIENT
Start: 2018-11-05 | End: 2018-11-07

## 2018-11-05 RX ORDER — INSULIN LISPRO 100/ML
VIAL (ML) SUBCUTANEOUS AT BEDTIME
Qty: 0 | Refills: 0 | Status: DISCONTINUED | OUTPATIENT
Start: 2018-11-05 | End: 2018-11-07

## 2018-11-05 RX ORDER — DEXTROSE 50 % IN WATER 50 %
15 SYRINGE (ML) INTRAVENOUS ONCE
Qty: 0 | Refills: 0 | Status: DISCONTINUED | OUTPATIENT
Start: 2018-11-05 | End: 2018-11-07

## 2018-11-05 RX ORDER — ACETAZOLAMIDE 250 MG/1
1 TABLET ORAL
Qty: 0 | Refills: 0 | COMMUNITY
Start: 2018-11-05

## 2018-11-05 RX ORDER — CARVEDILOL PHOSPHATE 80 MG/1
1 CAPSULE, EXTENDED RELEASE ORAL
Qty: 0 | Refills: 0 | COMMUNITY

## 2018-11-05 RX ORDER — DOCUSATE SODIUM 100 MG
100 CAPSULE ORAL DAILY
Qty: 0 | Refills: 0 | Status: DISCONTINUED | OUTPATIENT
Start: 2018-11-05 | End: 2018-11-07

## 2018-11-05 RX ORDER — DEXTROSE 50 % IN WATER 50 %
25 SYRINGE (ML) INTRAVENOUS ONCE
Qty: 0 | Refills: 0 | Status: DISCONTINUED | OUTPATIENT
Start: 2018-11-05 | End: 2018-11-07

## 2018-11-05 RX ORDER — SODIUM CHLORIDE 9 MG/ML
3 INJECTION INTRAMUSCULAR; INTRAVENOUS; SUBCUTANEOUS EVERY 8 HOURS
Qty: 0 | Refills: 0 | Status: DISCONTINUED | OUTPATIENT
Start: 2018-11-05 | End: 2018-11-07

## 2018-11-05 RX ORDER — SODIUM CHLORIDE 9 MG/ML
1000 INJECTION, SOLUTION INTRAVENOUS
Qty: 0 | Refills: 0 | Status: DISCONTINUED | OUTPATIENT
Start: 2018-11-05 | End: 2018-11-07

## 2018-11-05 RX ORDER — ASPIRIN/CALCIUM CARB/MAGNESIUM 324 MG
81 TABLET ORAL DAILY
Qty: 0 | Refills: 0 | Status: DISCONTINUED | OUTPATIENT
Start: 2018-11-05 | End: 2018-11-07

## 2018-11-05 RX ORDER — DEXTROSE 50 % IN WATER 50 %
12.5 SYRINGE (ML) INTRAVENOUS ONCE
Qty: 0 | Refills: 0 | Status: DISCONTINUED | OUTPATIENT
Start: 2018-11-05 | End: 2018-11-07

## 2018-11-05 RX ORDER — CARVEDILOL PHOSPHATE 80 MG/1
3.12 CAPSULE, EXTENDED RELEASE ORAL EVERY 12 HOURS
Qty: 0 | Refills: 0 | Status: DISCONTINUED | OUTPATIENT
Start: 2018-11-05 | End: 2018-11-07

## 2018-11-05 RX ORDER — GLUCAGON INJECTION, SOLUTION 0.5 MG/.1ML
1 INJECTION, SOLUTION SUBCUTANEOUS ONCE
Qty: 0 | Refills: 0 | Status: DISCONTINUED | OUTPATIENT
Start: 2018-11-05 | End: 2018-11-07

## 2018-11-05 RX ORDER — ENOXAPARIN SODIUM 100 MG/ML
40 INJECTION SUBCUTANEOUS EVERY 24 HOURS
Qty: 0 | Refills: 0 | Status: DISCONTINUED | OUTPATIENT
Start: 2018-11-06 | End: 2018-11-07

## 2018-11-05 RX ORDER — INSULIN LISPRO 100/ML
VIAL (ML) SUBCUTANEOUS
Qty: 0 | Refills: 0 | Status: DISCONTINUED | OUTPATIENT
Start: 2018-11-05 | End: 2018-11-07

## 2018-11-05 RX ORDER — ACETAZOLAMIDE 250 MG/1
125 TABLET ORAL DAILY
Qty: 0 | Refills: 0 | Status: DISCONTINUED | OUTPATIENT
Start: 2018-11-05 | End: 2018-11-06

## 2018-11-05 RX ORDER — PANTOPRAZOLE SODIUM 20 MG/1
40 TABLET, DELAYED RELEASE ORAL
Qty: 0 | Refills: 0 | Status: DISCONTINUED | OUTPATIENT
Start: 2018-11-05 | End: 2018-11-05

## 2018-11-05 RX ORDER — FUROSEMIDE 40 MG
40 TABLET ORAL EVERY 12 HOURS
Qty: 0 | Refills: 0 | Status: DISCONTINUED | OUTPATIENT
Start: 2018-11-05 | End: 2018-11-06

## 2018-11-05 RX ORDER — SENNA PLUS 8.6 MG/1
2 TABLET ORAL AT BEDTIME
Qty: 0 | Refills: 0 | Status: DISCONTINUED | OUTPATIENT
Start: 2018-11-05 | End: 2018-11-07

## 2018-11-05 RX ORDER — PANTOPRAZOLE SODIUM 20 MG/1
40 TABLET, DELAYED RELEASE ORAL
Qty: 0 | Refills: 0 | Status: DISCONTINUED | OUTPATIENT
Start: 2018-11-05 | End: 2018-11-07

## 2018-11-05 RX ADMIN — Medication 100 MILLIGRAM(S): at 11:56

## 2018-11-05 RX ADMIN — CARVEDILOL PHOSPHATE 3.12 MILLIGRAM(S): 80 CAPSULE, EXTENDED RELEASE ORAL at 05:21

## 2018-11-05 RX ADMIN — SENNA PLUS 1 TABLET(S): 8.6 TABLET ORAL at 11:56

## 2018-11-05 RX ADMIN — PREGABALIN 1000 MICROGRAM(S): 225 CAPSULE ORAL at 11:56

## 2018-11-05 RX ADMIN — ATORVASTATIN CALCIUM 40 MILLIGRAM(S): 80 TABLET, FILM COATED ORAL at 21:29

## 2018-11-05 RX ADMIN — Medication 40 MILLIGRAM(S): at 19:01

## 2018-11-05 RX ADMIN — HEPARIN SODIUM 5000 UNIT(S): 5000 INJECTION INTRAVENOUS; SUBCUTANEOUS at 05:21

## 2018-11-05 RX ADMIN — Medication 81 MILLIGRAM(S): at 11:56

## 2018-11-05 RX ADMIN — SENNA PLUS 2 TABLET(S): 8.6 TABLET ORAL at 21:29

## 2018-11-05 RX ADMIN — Medication 1000 UNIT(S): at 11:56

## 2018-11-05 RX ADMIN — Medication 100 MILLIGRAM(S): at 21:29

## 2018-11-05 RX ADMIN — Medication 40 MILLIGRAM(S): at 05:21

## 2018-11-05 RX ADMIN — ACETAZOLAMIDE 125 MILLIGRAM(S): 250 TABLET ORAL at 13:44

## 2018-11-05 RX ADMIN — SODIUM CHLORIDE 3 MILLILITER(S): 9 INJECTION INTRAMUSCULAR; INTRAVENOUS; SUBCUTANEOUS at 21:29

## 2018-11-05 RX ADMIN — CARVEDILOL PHOSPHATE 3.12 MILLIGRAM(S): 80 CAPSULE, EXTENDED RELEASE ORAL at 19:01

## 2018-11-05 NOTE — PROGRESS NOTE ADULT - PROVIDER SPECIALTY LIST ADULT
Cardiology
Critical Care
Critical Care
Internal Medicine

## 2018-11-05 NOTE — TRANSFER ACCEPTANCE NOTE - ASSESSMENT
52 y/o F form [facility] Assited Living w/ PMH of Obesity, CHF HFpEF 55%,  s/p ICD, HTN, B/l LE edema, DM, Gout, CAD, HLD, COPD ( not on home O2) presented to [facility] ED with SOB.    + Acute on Chronic CHF exacerbation -  40mg IV Lasix BID  + NSTEMI

## 2018-11-05 NOTE — PROGRESS NOTE ADULT - SUBJECTIVE AND OBJECTIVE BOX
Patient denies CP, SOB Review of systems otherwise (-)    acetazolamide    Tablet 125 milliGRAM(s) Oral daily  aspirin  chewable 81 milliGRAM(s) Oral daily  atorvastatin 40 milliGRAM(s) Oral at bedtime  carvedilol 3.125 milliGRAM(s) Oral every 12 hours  cholecalciferol 1000 Unit(s) Oral daily  cyanocobalamin 1000 MICROGram(s) Oral daily  docusate sodium 100 milliGRAM(s) Oral daily  furosemide   Injectable 40 milliGRAM(s) IV Push every 12 hours  heparin  Injectable 5000 Unit(s) SubCutaneous every 8 hours  insulin lispro (HumaLOG) corrective regimen sliding scale   SubCutaneous Before meals and at bedtime  pantoprazole  Injectable 40 milliGRAM(s) IV Push daily  senna 1 Tablet(s) Oral daily                            11.6   4.8   )-----------( 246      ( 05 Nov 2018 06:09 )             38.9       Hemoglobin: 11.6 g/dL (11-05 @ 06:09)  Hemoglobin: 11.4 g/dL (11-04 @ 06:37)  Hemoglobin: 11.7 g/dL (11-03 @ 06:31)  Hemoglobin: 11.3 g/dL (11-02 @ 06:35)  Hemoglobin: 11.8 g/dL (11-01 @ 06:40)      11-05    143  |  100  |  32<H>  ----------------------------<  105<H>  3.9   |  39<H>  |  1.41<H>    Ca    8.5      05 Nov 2018 06:09      Creatinine Trend: 1.41<--, 1.27<--, 1.31<--, 1.15<--, 1.05<--, 1.16<--    COAGS:           T(C): 36.8 (11-05-18 @ 07:00), Max: 37 (11-05-18 @ 00:08)  HR: 68 (11-05-18 @ 07:00) (64 - 70)  BP: 141/58 (11-05-18 @ 07:00) (114/60 - 141/58)  RR: 16 (11-05-18 @ 07:00) (16 - 20)  SpO2: 98% (11-05-18 @ 07:00) (94% - 100%)  Wt(kg): --    I&O's Summary    04 Nov 2018 07:01  -  05 Nov 2018 07:00  --------------------------------------------------------  IN: 0 mL / OUT: 1075 mL / NET: -1075 mL          Lymphatic: No lymphadenopathy , + edema in LE bilaterally   Cardiovascular: Normal S1 S2, RRR, No JVD, No murmurs , Peripheral pulses palpable 2+ bilaterally  Respiratory: Lungs clear to auscultation, normal effort 	  Gastrointestinal:  Soft, Non-tender, + BS	  Skin: No rashes, No ecchymoses, No cyanosis, warm to touch      TELEMETRY: SR	    ECG:  < from: 12 Lead ECG (10.26.18 @ 17:51) >  Normal sinus rhythm  Possible Left atrial enlargement  Right bundle branch block  Left ventricular hypertrophy with repolarization abnormality  Abnormal ECG    < end of copied text >  	  RADIOLOGY:   DIAGNOSTIC TESTING:  [ ] Echocardiogram: < from: Transthoracic Echocardiogram (01.18.18 @ 07:37) >  1. Mild mitral regurgitation.  2. Moderately dilated left atrium.  LA volume index = 42  cc/m2.  3. Normal left ventricular internal dimensions and wall  thicknesses.  4. Endocardium not well visualized; grossly low normal left  ventricular systolic function. Segmental wall motion could  not be assessed.  5. Mild right atrial enlargement.  6. Right ventricular enlargement with normal RV function. A  device lead is visualized in the right heart.  7. RV systolic pressure is 48 mm Hg. Mild pulmonary  hypertension.  8. There is severe tricuspid regurgitation.  9. Trace pericardial effusion.    < end of copied text >      ASSESSMENT/PLAN:  53yFemale with history of HFpEF, HTN, HLD, COPD admitted with dyspnea/cough found to have hypercapnic respiratory failure.     - Clinically improved  - Given decline in EF plan for Cath Today  - change to Lasix 40 mg PO BID today  - Plan to D/C diamox in next 48 hrs  - Cont Coreg / Ace    Manuel Gold MD, FACC

## 2018-11-05 NOTE — TRANSFER ACCEPTANCE NOTE - NEGATIVE MUSCULOSKELETAL SYMPTOMS
no arthralgia/no arthritis/no joint swelling/no stiffness/no back pain/no neck pain/no muscle cramps/no muscle weakness/no myalgia

## 2018-11-05 NOTE — TRANSFER ACCEPTANCE NOTE - NEGATIVE NEUROLOGICAL SYMPTOMS
no syncope/no vertigo/no loss of consciousness/no hemiparesis/no facial palsy/no transient paralysis/no difficulty walking/no tremors/no loss of sensation/no weakness/no generalized seizures/no focal seizures/no confusion/no headache/no paresthesias

## 2018-11-05 NOTE — ACUTE INTERFACILITY TRANSFER NOTE - PLAN OF CARE
Transfer to [facility] for cath Your echocardiogram revealed systolic and diastolic congestive heart failure with EF 20-25%. Our cardiologist has advised that you be transferred to [facility] for cardiac catheterization.

## 2018-11-05 NOTE — PROGRESS NOTE ADULT - REASON FOR ADMISSION
SOB with cough and fever

## 2018-11-05 NOTE — ACUTE INTERFACILITY TRANSFER NOTE - HOSPITAL COURSE
53 Female form [facility] PMH of Obesity, CHF HFpEF 55%,  s/p ICD, HTN, B/l LE edema, DM, Gout, CAD, HLD, COPD ( not on home O2) presented to ED with SOB. As per patient she was having cough since 4-5 days along with Productive cough with whitish sputum, pt reports of having low grade fever on Thursday. patient was started on Abx at AL, fever was resolved but  with  worsening of SOB. Patient denies headaches, chest pain, palpitations, nausea, vomiting, diarrhea, abdominal pain or dysuria. Patient has been compliant  with her medications. patient has chronic B/l lower leg swelling, with CHF was on lasix 40 mg BID and metolazone 5 mg daily. Last ECHO was done in Jan 2018 has EF 55%.    on admission patient was afebrile, HD stable, O2 sat 85 on 40 % oxygen venti mask, AGB 7.28/65/60/87, cbc leucocytosis, elevated BUN and creatinine. CXR showed cardiomegaly, patient was in mod respiratory distress and lethargic, RRT was called patient was placed on Bipap   s/p lasix 120 mg, Iv solumedrol 125 mg and levofloxacin, pt was transferred to ICU.    Patient downgraded to tele and stable for transfer for cardiac cath (due to significantly 53 Female form [facility] PMH of Obesity, CHF HFpEF 55%,  s/p ICD, HTN, B/l LE edema, DM, Gout, CAD, HLD, COPD ( not on home O2) presented to ED with SOB. As per patient she was having cough since 4-5 days along with Productive cough with whitish sputum, pt reports of having low grade fever on Thursday. patient was started on Abx at AL, fever was resolved but  with  worsening of SOB. Patient denies headaches, chest pain, palpitations, nausea, vomiting, diarrhea, abdominal pain or dysuria. Patient has been compliant  with her medications. patient has chronic B/l lower leg swelling, with CHF was on lasix 40 mg BID and metolazone 5 mg daily. Last ECHO was done in Jan 2018 has EF 55%.    on admission patient was afebrile, HD stable, O2 sat 85 on 40 % oxygen venti mask, AGB 7.28/65/60/87, cbc leucocytosis, elevated BUN and creatinine. CXR showed cardiomegaly, patient was in mod respiratory distress and lethargic, RRT was called patient was placed on Bipap   s/p lasix 120 mg, Iv solumedrol 125 mg and levofloxacin, pt was transferred to ICU.    Patient downgraded to [facility] and stable for transfer for cardiac cath to [facility] (due to EF 20-25%).

## 2018-11-06 LAB
ALBUMIN SERPL ELPH-MCNC: 3.2 G/DL — LOW (ref 3.3–5)
ALP SERPL-CCNC: 56 U/L — SIGNIFICANT CHANGE UP (ref 40–120)
ALT FLD-CCNC: 18 U/L — SIGNIFICANT CHANGE UP (ref 4–33)
AST SERPL-CCNC: 19 U/L — SIGNIFICANT CHANGE UP (ref 4–32)
BILIRUB SERPL-MCNC: 0.5 MG/DL — SIGNIFICANT CHANGE UP (ref 0.2–1.2)
BUN SERPL-MCNC: 25 MG/DL — HIGH (ref 7–23)
CALCIUM SERPL-MCNC: 8.9 MG/DL — SIGNIFICANT CHANGE UP (ref 8.4–10.5)
CHLORIDE SERPL-SCNC: 98 MMOL/L — SIGNIFICANT CHANGE UP (ref 98–107)
CO2 SERPL-SCNC: 32 MMOL/L — HIGH (ref 22–31)
CREAT SERPL-MCNC: 1.25 MG/DL — SIGNIFICANT CHANGE UP (ref 0.5–1.3)
GLUCOSE SERPL-MCNC: 97 MG/DL — SIGNIFICANT CHANGE UP (ref 70–99)
HCT VFR BLD CALC: 41.4 % — SIGNIFICANT CHANGE UP (ref 34.5–45)
HGB BLD-MCNC: 12.3 G/DL — SIGNIFICANT CHANGE UP (ref 11.5–15.5)
MCHC RBC-ENTMCNC: 29.2 PG — SIGNIFICANT CHANGE UP (ref 27–34)
MCHC RBC-ENTMCNC: 29.7 % — LOW (ref 32–36)
MCV RBC AUTO: 98.3 FL — SIGNIFICANT CHANGE UP (ref 80–100)
NRBC # FLD: 0 — SIGNIFICANT CHANGE UP
PLATELET # BLD AUTO: 229 K/UL — SIGNIFICANT CHANGE UP (ref 150–400)
PMV BLD: 10.4 FL — SIGNIFICANT CHANGE UP (ref 7–13)
POTASSIUM SERPL-MCNC: 4.1 MMOL/L — SIGNIFICANT CHANGE UP (ref 3.5–5.3)
POTASSIUM SERPL-SCNC: 4.1 MMOL/L — SIGNIFICANT CHANGE UP (ref 3.5–5.3)
PROT SERPL-MCNC: 6.5 G/DL — SIGNIFICANT CHANGE UP (ref 6–8.3)
RBC # BLD: 4.21 M/UL — SIGNIFICANT CHANGE UP (ref 3.8–5.2)
RBC # FLD: 15 % — HIGH (ref 10.3–14.5)
SODIUM SERPL-SCNC: 141 MMOL/L — SIGNIFICANT CHANGE UP (ref 135–145)
WBC # BLD: 4.52 K/UL — SIGNIFICANT CHANGE UP (ref 3.8–10.5)
WBC # FLD AUTO: 4.52 K/UL — SIGNIFICANT CHANGE UP (ref 3.8–10.5)

## 2018-11-06 RX ORDER — FUROSEMIDE 40 MG
40 TABLET ORAL
Qty: 0 | Refills: 0 | Status: DISCONTINUED | OUTPATIENT
Start: 2018-11-06 | End: 2018-11-07

## 2018-11-06 RX ADMIN — CARVEDILOL PHOSPHATE 3.12 MILLIGRAM(S): 80 CAPSULE, EXTENDED RELEASE ORAL at 06:25

## 2018-11-06 RX ADMIN — CARVEDILOL PHOSPHATE 3.12 MILLIGRAM(S): 80 CAPSULE, EXTENDED RELEASE ORAL at 17:43

## 2018-11-06 RX ADMIN — Medication 100 MILLIGRAM(S): at 13:40

## 2018-11-06 RX ADMIN — Medication 40 MILLIGRAM(S): at 06:24

## 2018-11-06 RX ADMIN — SODIUM CHLORIDE 3 MILLILITER(S): 9 INJECTION INTRAMUSCULAR; INTRAVENOUS; SUBCUTANEOUS at 21:49

## 2018-11-06 RX ADMIN — Medication 40 MILLIGRAM(S): at 17:43

## 2018-11-06 RX ADMIN — PANTOPRAZOLE SODIUM 40 MILLIGRAM(S): 20 TABLET, DELAYED RELEASE ORAL at 06:25

## 2018-11-06 RX ADMIN — SODIUM CHLORIDE 3 MILLILITER(S): 9 INJECTION INTRAMUSCULAR; INTRAVENOUS; SUBCUTANEOUS at 06:27

## 2018-11-06 RX ADMIN — ACETAZOLAMIDE 125 MILLIGRAM(S): 250 TABLET ORAL at 13:40

## 2018-11-06 RX ADMIN — ATORVASTATIN CALCIUM 40 MILLIGRAM(S): 80 TABLET, FILM COATED ORAL at 21:47

## 2018-11-06 RX ADMIN — Medication 81 MILLIGRAM(S): at 13:40

## 2018-11-06 RX ADMIN — SODIUM CHLORIDE 3 MILLILITER(S): 9 INJECTION INTRAMUSCULAR; INTRAVENOUS; SUBCUTANEOUS at 13:40

## 2018-11-06 RX ADMIN — ENOXAPARIN SODIUM 40 MILLIGRAM(S): 100 INJECTION SUBCUTANEOUS at 06:30

## 2018-11-06 RX ADMIN — SENNA PLUS 2 TABLET(S): 8.6 TABLET ORAL at 21:47

## 2018-11-06 NOTE — PROGRESS NOTE ADULT - PROBLEM SELECTOR PLAN 6
resolved  at baseline pt has normal renal functions   likely due to CHF   continue with Diuresis   avoid nephrotoxic drugs.

## 2018-11-06 NOTE — PROGRESS NOTE ADULT - SUBJECTIVE AND OBJECTIVE BOX
INTERVAL HPI/OVERNIGHT EVENTS: no events noted overnight.  _  MEDICATIONS  (STANDING):  aspirin enteric coated 81 milliGRAM(s) Oral daily  atorvastatin 40 milliGRAM(s) Oral at bedtime  carvedilol 3.125 milliGRAM(s) Oral every 12 hours  dextrose 5%. 1000 milliLiter(s) (50 mL/Hr) IV Continuous <Continuous>  dextrose 50% Injectable 12.5 Gram(s) IV Push once  dextrose 50% Injectable 25 Gram(s) IV Push once  dextrose 50% Injectable 25 Gram(s) IV Push once  docusate sodium 100 milliGRAM(s) Oral daily  enoxaparin Injectable 40 milliGRAM(s) SubCutaneous every 24 hours  furosemide    Tablet 40 milliGRAM(s) Oral two times a day  insulin lispro (HumaLOG) corrective regimen sliding scale   SubCutaneous three times a day before meals  insulin lispro (HumaLOG) corrective regimen sliding scale   SubCutaneous at bedtime  pantoprazole    Tablet 40 milliGRAM(s) Oral before breakfast  senna 2 Tablet(s) Oral at bedtime  sodium chloride 0.9% lock flush 3 milliLiter(s) IV Push every 8 hours    MEDICATIONS  (PRN):  dextrose 40% Gel 15 Gram(s) Oral once PRN Blood Glucose LESS THAN 70 milliGRAM(s)/deciliter  glucagon  Injectable 1 milliGRAM(s) IntraMuscular once PRN Glucose LESS THAN 70 milligrams/deciliter    Vital Signs Last 24 Hrs  T(C): 36.9 (06 Nov 2018 20:23), Max: 36.9 (06 Nov 2018 20:23)  T(F): 98.4 (06 Nov 2018 20:23), Max: 98.4 (06 Nov 2018 20:23)  HR: 65 (06 Nov 2018 20:23) (65 - 83)  BP: 134/71 (06 Nov 2018 20:23) (115/62 - 135/72)  BP(mean): --  RR: 18 (06 Nov 2018 20:23) (18 - 19)  SpO2: 99% (06 Nov 2018 20:23) (98% - 100%)    Constitutional: No fever, fatigue  Skin: No rash.  Eyes: No recent vision problems or eye pain.  ENT: No congestion, ear pain, or sore throat.  Cardiovascular: No chest pain or palpation.  Respiratory: No cough, shortness of breath, congestion, or wheezing.  Gastrointestinal: No abdominal pain, nausea, vomiting, or diarrhea.  Genitourinary: No dysuria.  Musculoskeletal: No joint swelling.  Neurologic: No headache.      PHYSICAL EXAM:  GENERAL: NAD  HEENT: Normocephalic;  conjunctivae and sclerae clear; moist mucous membranes;   NECK : supple  CHEST/LUNG: Clear to auscultation bilaterally with good air entry   HEART: S1 S2  regular; no murmurs, gallops or rubs  ABDOMEN: Soft, Nontender, Nondistended; Bowel sounds present  EXTREMITIES: no cyanosis; no edema; no calf tenderness  SKIN: warm and dry; no rash  NERVOUS SYSTEM:  Awake and alert; Oriented  to place, person and time ; no new deficits    _________________________________________________LABS:  11-06    141  |  98  |  25<H>  ----------------------------<  97  4.1   |  32<H>  |  1.25    Ca    8.9      06 Nov 2018 07:10    TPro  6.5  /  Alb  3.2<L>  /  TBili  0.5  /  DBili      /  AST  19  /  ALT  18  /  AlkPhos  56  11-06    Creatinine Trend: 1.25 <--, 1.41 <--, 1.27 <--, 1.31 <--, 1.15 <--, 1.05 <--, 1.16 <--                        12.3   4.52  )-----------( 229      ( 06 Nov 2018 07:10 )             41.4     Urine Studies:            LIVER FUNCTIONS - ( 06 Nov 2018 07:10 )  Alb: 3.2 g/dL / Pro: 6.5 g/dL / ALK PHOS: 56 u/L / ALT: 18 u/L / AST: 19 u/L / GGT: x             POCT Blood Glucose.: 121 mg/dL (03 Nov 2018 12:44)  POCT Blood Glucose.: 121 mg/dL (03 Nov 2018 08:42)

## 2018-11-06 NOTE — CHART NOTE - NSCHARTNOTEFT_GEN_A_CORE
Patient s/p cardiac cath, Rt wrist appears clean, dry, intact, no evidence of active bleeding, no hematoma, + pulses, good capillary refill. Continue to monitor.

## 2018-11-06 NOTE — PROGRESS NOTE ADULT - ATTENDING COMMENTS
Patient seen and examined.  Agree with above.   -Cath with mild CAD  -medical management of NICM recommended  -pt. appears more euvolemic  -change lasix to PO   -dc diamox  -no further inpatient cardiac workup needed at this time.   -dc planning to assisted living    Camilo Polk MD

## 2018-11-06 NOTE — PROGRESS NOTE ADULT - SUBJECTIVE AND OBJECTIVE BOX
Patient denies CP, SOB Review of systems otherwise (-)    MEDICATIONS  (STANDING):  acetazolamide    Tablet 125 milliGRAM(s) Oral daily  aspirin enteric coated 81 milliGRAM(s) Oral daily  atorvastatin 40 milliGRAM(s) Oral at bedtime  carvedilol 3.125 milliGRAM(s) Oral every 12 hours  docusate sodium 100 milliGRAM(s) Oral daily  enoxaparin Injectable 40 milliGRAM(s) SubCutaneous every 24 hours  furosemide   Injectable 40 milliGRAM(s) IV Push every 12 hours  insulin lispro (HumaLOG) corrective regimen sliding scale   SubCutaneous three times a day before meals  insulin lispro (HumaLOG) corrective regimen sliding scale   SubCutaneous at bedtime  pantoprazole    Tablet 40 milliGRAM(s) Oral before breakfast  senna 2 Tablet(s) Oral at bedtime  sodium chloride 0.9% lock flush 3 milliLiter(s) IV Push every 8 hours    MEDICATIONS  (PRN):  dextrose 40% Gel 15 Gram(s) Oral once PRN Blood Glucose LESS THAN 70 milliGRAM(s)/deciliter  glucagon  Injectable 1 milliGRAM(s) IntraMuscular once PRN Glucose LESS THAN 70 milligrams/deciliter      LABS:                        12.3   4.52  )-----------( 229      ( 06 Nov 2018 07:10 )             41.4     141  |  98  |  25<H>  ----------------------------<  97  4.1   |  32<H>  |  1.25    Ca    8.9      06 Nov 2018 07:10    TPro  6.5  /  Alb  3.2<L>  /  TBili  0.5  /  DBili  x   /  AST  19  /  ALT  18  /  AlkPhos  56  11-06    Creatinine Trend: 1.25<--, 1.41<--, 1.27<--, 1.31<--, 1.15<--, 1.05<--     PHYSICAL EXAM  Vital Signs Last 24 Hrs  T(C): 36.6 (06 Nov 2018 06:17), Max: 37.3 (05 Nov 2018 22:33)  T(F): 97.8 (06 Nov 2018 06:17), Max: 99.1 (05 Nov 2018 22:33)  HR: 72 (06 Nov 2018 06:17) (66 - 72)  BP: 115/62 (06 Nov 2018 06:17) (115/62 - 146/73)  RR: 18 (06 Nov 2018 06:17) (18 - 20)  SpO2: 100% (06 Nov 2018 06:17) (99% - 100%)    Lymphatic: No lymphadenopathy , + edema in LE bilaterally   Cardiovascular: Normal S1 S2, RRR, No JVD, No murmurs , Peripheral pulses palpable 2+ bilaterally  Respiratory: Lungs clear to auscultation, normal effort 	  Gastrointestinal:  Soft, Non-tender, + BS	  Skin: No rashes, No ecchymoses, No cyanosis, warm to touch      TELEMETRY: SR	      < from: 12 Lead ECG (10.26.18 @ 17:51) >  Normal sinus rhythm  Possible Left atrial enlargement  Right bundle branch block  Left ventricular hypertrophy with repolarization abnormality  Abnormal ECG    < end of copied text >  	   < from: Transthoracic Echocardiogram (01.18.18 @ 07:37) >  1. Mild mitral regurgitation.  2. Moderately dilated left atrium.  LA volume index = 42  cc/m2.  3. Normal left ventricular internal dimensions and wall  thicknesses.  4. Endocardium not well visualized; grossly low normal left  ventricular systolic function. Segmental wall motion could  not be assessed.  5. Mild right atrial enlargement.  6. Right ventricular enlargement with normal RV function. A  device lead is visualized in the right heart.  7. RV systolic pressure is 48 mm Hg. Mild pulmonary  hypertension.  8. There is severe tricuspid regurgitation.  9. Trace pericardial effusion.    < end of copied text >      < from: Transthoracic Echocardiogram (10.28.18 @ 10:29) >  CONCLUSIONS:  1. Normal mitral valve. Moderate mitral regurgitation.  2. Normal trileaflet aortic valve. Mild aortic  insufficiency.  3. Aortic Root: 3.5 cm.  4. Severe left atrial enlargement.  5. Normal left ventricular internal dimensions and wall  thicknesses.  6. Dilated LV with severe global left ventricular systolic  dysfunction (EF=20-25%).  7. Grade II diastolic dysfunction.  8. Mild right atrial enlargement.  9. Normal right ventricular size and function.A device lead  is visualized in the right heart.  10. RV systolic pressure is mildly increased at  44 mm Hg.  11. There is severe tricuspid regurgitation.  12. There is mild pulmonic regurgitation.  13. Normal pericardium with no pericardial effusion.    ------------------------------------------------------------------------  Confirmed on  10/29/2018 - 08:00:43 by Miguelina Lynn MD    < end of copied text >        ASSESSMENT/PLAN:  53yFemale with history of HFpEF, HTN, HLD, COPD admitted with dyspnea/cough found to have hypercapnic respiratory failure and New severe LV dysfxn    - Clinically improved  - s/p LHC 11/5 with no obs CAD and EDP 21  - cont IV lasix  - Plan to D/C diamox in next 24 hrs  - Cont Coreg / Ace

## 2018-11-07 ENCOUNTER — TRANSCRIPTION ENCOUNTER (OUTPATIENT)
Age: 53
End: 2018-11-07

## 2018-11-07 LAB
BUN SERPL-MCNC: 27 MG/DL — HIGH (ref 7–23)
CALCIUM SERPL-MCNC: 9.3 MG/DL — SIGNIFICANT CHANGE UP (ref 8.4–10.5)
CHLORIDE SERPL-SCNC: 100 MMOL/L — SIGNIFICANT CHANGE UP (ref 98–107)
CO2 SERPL-SCNC: 39 MMOL/L — HIGH (ref 22–31)
CREAT SERPL-MCNC: 1.43 MG/DL — HIGH (ref 0.5–1.3)
GLUCOSE SERPL-MCNC: 116 MG/DL — HIGH (ref 70–99)
HCT VFR BLD CALC: 41.9 % — SIGNIFICANT CHANGE UP (ref 34.5–45)
HGB BLD-MCNC: 12.3 G/DL — SIGNIFICANT CHANGE UP (ref 11.5–15.5)
MAGNESIUM SERPL-MCNC: 2.3 MG/DL — SIGNIFICANT CHANGE UP (ref 1.6–2.6)
MCHC RBC-ENTMCNC: 29.4 % — LOW (ref 32–36)
MCHC RBC-ENTMCNC: 29.4 PG — SIGNIFICANT CHANGE UP (ref 27–34)
MCV RBC AUTO: 100 FL — SIGNIFICANT CHANGE UP (ref 80–100)
NRBC # FLD: 0 — SIGNIFICANT CHANGE UP
PLATELET # BLD AUTO: 218 K/UL — SIGNIFICANT CHANGE UP (ref 150–400)
PMV BLD: 10.5 FL — SIGNIFICANT CHANGE UP (ref 7–13)
POTASSIUM SERPL-MCNC: 4.3 MMOL/L — SIGNIFICANT CHANGE UP (ref 3.5–5.3)
POTASSIUM SERPL-SCNC: 4.3 MMOL/L — SIGNIFICANT CHANGE UP (ref 3.5–5.3)
RBC # BLD: 4.19 M/UL — SIGNIFICANT CHANGE UP (ref 3.8–5.2)
RBC # FLD: 14.9 % — HIGH (ref 10.3–14.5)
SODIUM SERPL-SCNC: 145 MMOL/L — SIGNIFICANT CHANGE UP (ref 135–145)
WBC # BLD: 4.17 K/UL — SIGNIFICANT CHANGE UP (ref 3.8–10.5)
WBC # FLD AUTO: 4.17 K/UL — SIGNIFICANT CHANGE UP (ref 3.8–10.5)

## 2018-11-07 RX ADMIN — Medication 40 MILLIGRAM(S): at 06:15

## 2018-11-07 RX ADMIN — Medication 100 MILLIGRAM(S): at 11:58

## 2018-11-07 RX ADMIN — SODIUM CHLORIDE 3 MILLILITER(S): 9 INJECTION INTRAMUSCULAR; INTRAVENOUS; SUBCUTANEOUS at 06:15

## 2018-11-07 RX ADMIN — ENOXAPARIN SODIUM 40 MILLIGRAM(S): 100 INJECTION SUBCUTANEOUS at 06:15

## 2018-11-07 RX ADMIN — PANTOPRAZOLE SODIUM 40 MILLIGRAM(S): 20 TABLET, DELAYED RELEASE ORAL at 06:15

## 2018-11-07 RX ADMIN — SODIUM CHLORIDE 3 MILLILITER(S): 9 INJECTION INTRAMUSCULAR; INTRAVENOUS; SUBCUTANEOUS at 13:10

## 2018-11-07 RX ADMIN — Medication 81 MILLIGRAM(S): at 11:58

## 2018-11-07 RX ADMIN — CARVEDILOL PHOSPHATE 3.12 MILLIGRAM(S): 80 CAPSULE, EXTENDED RELEASE ORAL at 06:15

## 2018-11-07 NOTE — DISCHARGE NOTE ADULT - PLAN OF CARE
Please follow up with your primary care provider within 2 weeks call for an appointment Low salt diet, fluid restriction to 1500 ml daily, monitor your fluid intake and weight daily, exercise as tolerated 30 minutes daily, and follow up with your physician within 1 to 2 weeks.

## 2018-11-07 NOTE — DISCHARGE NOTE ADULT - HOSPITAL COURSE
This is a 54 yo F admitted with acute on chronic CHF, Patient treated with IV lasix. Patient had a Left heart cath with Left anterior descending artery luminal disease and left circumflex mild disease. Patient was on diamox as well. She has a creat of 1.44 and can have creatinine checked when she follow up with her PMD within the week . This is a 52 yo F admitted with acute on chronic CHF, Patient treated with IV lasix. Patient had a Left heart cath with Left anterior descending artery luminal disease and left circumflex mild disease. Patient was on diamox as well. She has a creat of 1.44 and can have creatinine checked when she follow up with her PMD within the week .       Attending addendum: Patient seen and examined.  Agree with above.  Admitted initially to [facility] with hypercapnic respiratory failure and heart failure.  The pt. was placed on BIPAP and diuresed at [facility] and subsequently transferred to [facility] for LHC after TTE showed severe LV dysfunction.  Cath was performed showing mild CAD which was medically managed.  The patient's NICM was medically managed and she was sent back to assisted living in stable condition with outpatient follow up with Dr. Guerrero.  DC planning performed > 35 minutes.      Camilo Polk MD

## 2018-11-07 NOTE — DISCHARGE NOTE ADULT - MEDICATION SUMMARY - MEDICATIONS TO TAKE
I will START or STAY ON the medications listed below when I get home from the hospital:    aspirin 81 mg oral tablet, chewable  -- 1 tab(s) by mouth once a day  -- Indication: For heart    Glucotrol XL 5 mg oral tablet, extended release  -- 1 tab(s) by mouth once a day  -- Indication: For diabetes    atorvastatin 40 mg oral tablet  -- 1 tab(s) by mouth once a day (at bedtime)  -- Indication: For Cholesterol    Coreg 6.25 mg oral tablet  -- 1 tab(s) by mouth 2 times a day  -- Indication: For HTN    Ventolin HFA 90 mcg/inh inhalation aerosol  -- 2 puff(s) inhaled 4 times a day, As Needed  -- Indication: For Inhaler    Lasix 40 mg oral tablet  -- 1 tab(s) by mouth every 12 hours  -- Indication: For HTN/ heart failure    Feosol 325 mg (65 mg elemental iron) oral tablet  -- 1 tab(s) by mouth once a day  -- Indication: For Iron supplement    senna oral tablet  -- 2 tab(s) by mouth once a day (at bedtime)  -- Indication: For stool softener    docusate sodium 100 mg oral capsule  -- 1 cap(s) by mouth once a day  -- Indication: For stool softener    sodium chloride 0.65% nasal spray  -- 1 spray(s) in each nostril every 4 hours, As Needed  -- Indication: For Nasal spray     pantoprazole 40 mg oral delayed release tablet  -- 1 tab(s) by mouth once a day (before a meal)  -- Indication: For GERD    multivitamin  -- 1 tab(s) by mouth once a day  -- Indication: For SUPPLEMENT    folic acid 1 mg oral tablet  -- 1 tab(s) by mouth once a day  -- Indication: For supplement    Vitamin D3 1000 intl units oral capsule  -- 1 cap(s) by mouth once a day  -- Indication: For supplement    cyanocobalamin 1000 mcg oral tablet  -- 1 tab(s) by mouth once a day  -- Indication: For supplement

## 2018-11-07 NOTE — DISCHARGE NOTE ADULT - CARE PROVIDER_API CALL
Please call your PMD for follow up within 2 weeks,   Phone: (   )    -  Fax: (   )    -    Call your PMD for follow up within the week,   Phone: (   )    -  Fax: (   )    -

## 2018-11-07 NOTE — DISCHARGE NOTE ADULT - MEDICATION SUMMARY - MEDICATIONS TO STOP TAKING
I will STOP taking the medications listed below when I get home from the hospital:    acetaZOLAMIDE 125 mg oral tablet  -- 1 tab(s) by mouth once a day

## 2018-11-07 NOTE — DISCHARGE NOTE ADULT - PROVIDER TOKENS
FREE:[LAST:[Please call your PMD for follow up within 2 weeks],PHONE:[(   )    -],FAX:[(   )    -]],FREE:[LAST:[Call your PMD for follow up within the week],PHONE:[(   )    -],FAX:[(   )    -]]

## 2018-11-07 NOTE — PROGRESS NOTE ADULT - SUBJECTIVE AND OBJECTIVE BOX
Patient denies CP, SOB Review of systems otherwise (-)    MEDICATIONS  (STANDING):  aspirin enteric coated 81 milliGRAM(s) Oral daily  atorvastatin 40 milliGRAM(s) Oral at bedtime  carvedilol 3.125 milliGRAM(s) Oral every 12 hours  dextrose 5%. 1000 milliLiter(s) (50 mL/Hr) IV Continuous <Continuous>  dextrose 50% Injectable 12.5 Gram(s) IV Push once  dextrose 50% Injectable 25 Gram(s) IV Push once  dextrose 50% Injectable 25 Gram(s) IV Push once  docusate sodium 100 milliGRAM(s) Oral daily  enoxaparin Injectable 40 milliGRAM(s) SubCutaneous every 24 hours  furosemide    Tablet 40 milliGRAM(s) Oral two times a day  insulin lispro (HumaLOG) corrective regimen sliding scale   SubCutaneous three times a day before meals  insulin lispro (HumaLOG) corrective regimen sliding scale   SubCutaneous at bedtime  pantoprazole    Tablet 40 milliGRAM(s) Oral before breakfast  senna 2 Tablet(s) Oral at bedtime  sodium chloride 0.9% lock flush 3 milliLiter(s) IV Push every 8 hours    LABS:                        12.3   4.17  )-----------( 218      ( 07 Nov 2018 06:30 )             41.9     145  |  100  |  27<H>  ----------------------------<  116<H>  4.3   |  39<H>  |  1.43<H>    Ca    9.3      07 Nov 2018 06:30  Mg     2.3     11-07    TPro  6.5  /  Alb  3.2<L>  /  TBili  0.5  /  DBili  x   /  AST  19  /  ALT  18  /  AlkPhos  56  11-06    Creatinine Trend: 1.43<--, 1.25<--, 1.41<--, 1.27<--, 1.31<--, 1.15<--     PHYSICAL EXAM  Vital Signs Last 24 Hrs  T(C): 36.7 (07 Nov 2018 06:13), Max: 36.9 (06 Nov 2018 20:23)  T(F): 98 (07 Nov 2018 06:13), Max: 98.4 (06 Nov 2018 20:23)  HR: 60 (07 Nov 2018 06:13) (60 - 83)  BP: 120/64 (07 Nov 2018 06:13) (115/63 - 134/71)  RR: 19 (07 Nov 2018 06:13) (18 - 19)  SpO2: 100% (07 Nov 2018 06:13) (98% - 100%)    Lymphatic: No lymphadenopathy , + edema in LE bilaterally   Cardiovascular: Normal S1 S2, RRR, No JVD, No murmurs , Peripheral pulses palpable 2+ bilaterally  Respiratory: Lungs clear to auscultation, normal effort 	  Gastrointestinal:  Soft, Non-tender, + BS	  Skin: No rashes, No ecchymoses, No cyanosis, warm to touch    DIAGNOSTIC DATA:    TELEMETRY: SR	 , 6 NSVT     < from: 12 Lead ECG (10.26.18 @ 17:51) >  Normal sinus rhythm  Possible Left atrial enlargement  Right bundle branch block  Left ventricular hypertrophy with repolarization abnormality  Abnormal ECG    < end of copied text >  	   < from: Transthoracic Echocardiogram (01.18.18 @ 07:37) >  1. Mild mitral regurgitation.  2. Moderately dilated left atrium.  LA volume index = 42  cc/m2.  3. Normal left ventricular internal dimensions and wall  thicknesses.  4. Endocardium not well visualized; grossly low normal left  ventricular systolic function. Segmental wall motion could  not be assessed.  5. Mild right atrial enlargement.  6. Right ventricular enlargement with normal RV function. A  device lead is visualized in the right heart.  7. RV systolic pressure is 48 mm Hg. Mild pulmonary  hypertension.  8. There is severe tricuspid regurgitation.  9. Trace pericardial effusion.    < end of copied text >      < from: Transthoracic Echocardiogram (10.28.18 @ 10:29) >  CONCLUSIONS:  1. Normal mitral valve. Moderate mitral regurgitation.  2. Normal trileaflet aortic valve. Mild aortic  insufficiency.  3. Aortic Root: 3.5 cm.  4. Severe left atrial enlargement.  5. Normal left ventricular internal dimensions and wall  thicknesses.  6. Dilated LV with severe global left ventricular systolic  dysfunction (EF=20-25%).  7. Grade II diastolic dysfunction.  8. Mild right atrial enlargement.  9. Normal right ventricular size and function.A device lead  is visualized in the right heart.  10. RV systolic pressure is mildly increased at  44 mm Hg.  11. There is severe tricuspid regurgitation.  12. There is mild pulmonic regurgitation.  13. Normal pericardium with no pericardial effusion.    ------------------------------------------------------------------------  Confirmed on  10/29/2018 - 08:00:43 by Miguelina Lynn MD    < end of copied text >        ASSESSMENT/PLAN:  53yFemale with history of HFpEF, AICD, HTN, HLD, COPD admitted with dyspnea/cough found to have hypercapnic respiratory failure and acute on chronic systolic CHF, TTE with New severe LV dysfxn comp to prior    - Clinically improved  - s/p LHC 11/5 with no obs CAD and EDP 21  - cont PO Lasix/ Coreg  - Eval to start ACE As OP   - DC planning back to Assisted living

## 2018-11-07 NOTE — DISCHARGE NOTE ADULT - CARE PLAN
Principal Discharge DX:	Acute on chronic congestive heart failure, unspecified heart failure type  Goal:	Please follow up with your primary care provider within 2 weeks call for an appointment  Assessment and plan of treatment:	Low salt diet, fluid restriction to 1500 ml daily, monitor your fluid intake and weight daily, exercise as tolerated 30 minutes daily, and follow up with your physician within 1 to 2 weeks.

## 2018-11-07 NOTE — DISCHARGE NOTE ADULT - PATIENT PORTAL LINK FT
You can access the [facility] Patient Portal, offered by [facility], by registering with the following website: [email or web address]

## 2018-11-11 PROCEDURE — 82306 VITAMIN D 25 HYDROXY: CPT

## 2018-11-11 PROCEDURE — 85027 COMPLETE CBC AUTOMATED: CPT

## 2018-11-11 PROCEDURE — 82803 BLOOD GASES ANY COMBINATION: CPT

## 2018-11-11 PROCEDURE — 94760 N-INVAS EAR/PLS OXIMETRY 1: CPT

## 2018-11-11 PROCEDURE — 82009 KETONE BODYS QUAL: CPT

## 2018-11-11 PROCEDURE — 87633 RESP VIRUS 12-25 TARGETS: CPT

## 2018-11-11 PROCEDURE — 83880 ASSAY OF NATRIURETIC PEPTIDE: CPT

## 2018-11-11 PROCEDURE — 87486 CHLMYD PNEUM DNA AMP PROBE: CPT

## 2018-11-11 PROCEDURE — 80048 BASIC METABOLIC PNL TOTAL CA: CPT

## 2018-11-11 PROCEDURE — 87581 M.PNEUMON DNA AMP PROBE: CPT

## 2018-11-11 PROCEDURE — 82607 VITAMIN B-12: CPT

## 2018-11-11 PROCEDURE — 93005 ELECTROCARDIOGRAM TRACING: CPT

## 2018-11-11 PROCEDURE — 93306 TTE W/DOPPLER COMPLETE: CPT

## 2018-11-11 PROCEDURE — 97162 PT EVAL MOD COMPLEX 30 MIN: CPT

## 2018-11-11 PROCEDURE — 80061 LIPID PANEL: CPT

## 2018-11-11 PROCEDURE — 85610 PROTHROMBIN TIME: CPT

## 2018-11-11 PROCEDURE — 82962 GLUCOSE BLOOD TEST: CPT

## 2018-11-11 PROCEDURE — 84100 ASSAY OF PHOSPHORUS: CPT

## 2018-11-11 PROCEDURE — 84443 ASSAY THYROID STIM HORMONE: CPT

## 2018-11-11 PROCEDURE — 82746 ASSAY OF FOLIC ACID SERUM: CPT

## 2018-11-11 PROCEDURE — 71045 X-RAY EXAM CHEST 1 VIEW: CPT

## 2018-11-11 PROCEDURE — 84145 PROCALCITONIN (PCT): CPT

## 2018-11-11 PROCEDURE — 83735 ASSAY OF MAGNESIUM: CPT

## 2018-11-11 PROCEDURE — 99285 EMERGENCY DEPT VISIT HI MDM: CPT | Mod: 25

## 2018-11-11 PROCEDURE — 82550 ASSAY OF CK (CPK): CPT

## 2018-11-11 PROCEDURE — 87040 BLOOD CULTURE FOR BACTERIA: CPT

## 2018-11-11 PROCEDURE — 87798 DETECT AGENT NOS DNA AMP: CPT

## 2018-11-11 PROCEDURE — 85730 THROMBOPLASTIN TIME PARTIAL: CPT

## 2018-11-11 PROCEDURE — 94660 CPAP INITIATION&MGMT: CPT

## 2018-11-11 PROCEDURE — 82553 CREATINE MB FRACTION: CPT

## 2018-11-11 PROCEDURE — 93970 EXTREMITY STUDY: CPT

## 2018-11-11 PROCEDURE — 83605 ASSAY OF LACTIC ACID: CPT

## 2018-11-11 PROCEDURE — 80053 COMPREHEN METABOLIC PANEL: CPT

## 2018-11-11 PROCEDURE — 84484 ASSAY OF TROPONIN QUANT: CPT

## 2018-11-11 PROCEDURE — 83036 HEMOGLOBIN GLYCOSYLATED A1C: CPT

## 2019-01-01 NOTE — PATIENT PROFILE ADULT. - NS PRO OT REFERRAL QUES 1 YN
..... Pentacel, Prevnar, Hepatitis B and Rotavirus vaccines given today.    ..... Petroleum Jelly may be applied to skin of neck or armpits to prevent excessive wetting of the skin.     [facility]            PATIENT INFORMATION SHEET: 2 MONTH EXAM    Kathi  2019  Temperature 97.2 °F (36.2 °C), temperature source Temporal, height 24\" (61 cm), weight 5.514 kg, head circumference 40 cm (15.75\").    BABYSITTERS: You may occasionally need a break from your baby even as much as you love him/her.  Information to leave:  1.  YOUR name, address and telephone number.  2.  Where you will be.  3.   Police telephone number (or 911 if available in your area).  4.   Fire Department number (or 911).    5.   Poison Control number  [phone]  6.   Name and telephone number of person to call if you cannot be reached.    FEEDING:     Breast milk or formula continue to be the major source of calories for infants from 2 months of age through 4 months of age.  It provides all the fluid, calories and nutrients that a baby needs to grow and develop.  Most infants will be ready to start solid food feedings around 4 months of age.  Lip smacking and pushing the tongue out are signs they have sufficient oromotor development to eat from the spoon.  If they have difficulty eating from the spoon initially, then stop and try again in the next 2-3 weeks.      STARTING SOLID FOODS.  Start with 1-2 Tbsp of dry rice cereal mixed with either formula, breast milk or water to make a loose oatmeal-type consistency.  It is best to start the feeding about 2  hours after the last breast or bottle feeding so your infant is not voraciously hungry.   Solid food just doesn't go in as fast as breast milk or formula and they can get frustrated with the slower pace.    For the first week, feed the cereal once daily.  The time of day is not important, it will depend on your time and when your infant is most alert.  After the first week of cereal, then  increase to twice daily and mix up a larger amount, feeding until your baby is full, turning her head away or not opening her mouth.  Babies do a very good job of regulating how much they need to eat and will stop eating when they are full.    DEVELOPMENT:  Most babies at this age will begin to make experimental noises, smile, stare at their fists and gain control over head movements. In the next 2 months, your baby will start to bat at objects with her hands and reach to grasp for things closer to 4 months of age.  You may start to hear high pitched, gleeful squealing around 4 months of age as well.  Vision starts to improve and they will be able to focus about 4-5 feet from their faces at 2 months and 15 feet at 4 months.  Occasional eye crossing is still common at 2 months but should be absent at 4 months of age.    SAFETY:  Infants become more active between 2 and 4 months, increasing the risk for rolling off surfaces like beds and changing tables.   Try to keep your hand on your child when turning away from her on changing tables or pick her up and take her with you.   Around 4 months of age, everything will go to their mouths as they continue to sample the world.  This increases the risk for choking or poking injuries to the face.   Make sure toys are appropriate for infants like teething rings or rattles. Do not use metal keys, pens or pencils or cell phones for infants to hold and play with.    SLEEPING:   Between 2 and 4 months of age, most babies will start sleeping longer periods at night.  Once a baby has had several spontaneous sleeps of 5-6 hours, babies are ready to sleep up to 8-10 hours without feeding.  Their livers have matured to the point of being able to sustain their blood sugars for longer times without eating.  In order to sleep all night, however, a baby must be capable of putting themselves to sleep without any external influences (i.e.. pacifiers, rocking, being fed to sleep).   For  consistent sleeping at night, it is crucial that you separate feeding and sleeping, otherwise the baby will always need to feed in order to fall asleep.  Around 4 months of age, a baby's sleep pattern changes to include two 4-5 hour sleep cycles.  If they needed something to get them to sleep initially at the beginning of the night, they will be waking up to get you to perform that same ritual 4-5 hours later.  If this is not addressed in the first 6 months of life, this will likely become a very strong habit and will continue for the next 2-3 years of their life.  The following program is very successful and will have your baby sleeping through the night in 2-3 days.  It is important that the baby be in her own room for this to work.  Infants who sleep in their parent's rooms or beds, rarely sleep through the night because they can hear you and you can hear them.    .... On the First night - You may feed your baby just before bed but do not let her fall asleep while feeding.  Watch her face and when her eyes start getting sleepy, stop the feeding at that time, burp her and change her diaper (if needed) and dress her for bed.  This little bit of intervention assures that your baby is going into bed somewhat alert so she can be aware of their surroundings and be familiar with where she will be waking up.  Your baby will probably fuss or even cry and this may continue for 15-30 or 40 minutes on the first night.  She eventually will tire out, however, and fall asleep.  Try to resist going in and comforting her but if you need to go in, comfort her by voice and gentle touch but not by picking them up. (If she cries for longer than 40 minutes, then please pick her up, comfort her and try this again in another week or two).  Make sure it is dark in the room and no night lights are on.  The lights may encourage waking later in the night or early in the morning.   She will generally wake that first night about 4-5 hours  into the night. It is important not to intervene at this time.  You may peek into the room to make sure she are not crying for a reason (scooted to the edge of the crib, foot caught in crib rail) but do so without her seeing you.  She may cry for another 15-20 minutes at this time, it will generally be about 1/2 the time she cried at the beginning of the night.  If she was successful in getting to sleep at the beginning of the night, she should be successful at this time as well and fall asleep for another 4-5 hours.  ..... On the Second night - Having survived the first night, things should get much easier.  Follow the same routine.  Your baby should be able to fall asleep in about 1/2 the time that it took the first night and if she wakes in the middle of the night,  it should only be about 5-10 minutes of crying before falling asleep again.  ..... On the Third night - Most babies will be able to fall asleep in about 5-10 minutes with much less fussing and you will probably not hear from them in the middle of the night.     Please realize that the time you invest in healthy, independent sleep for your baby now will reap many benefits in the months and years to come.  It is very important for adults to get 8 hours of uninterrupted  sleep in order to be fully rested, healthy and alert.  Chronic sleep deprivation can lead to more frequent infections, poor short term memory and drowsiness while driving.  And finally if a baby is a good, all-night sleeper, when she starts waking in the middle of the night, you have a better indicator that she may truly be ill or feeling pain.    ACCIDENT PREVENTION:  1.   Falls: Many babies roll over between three and six months of age.  Be careful not to leave them alone on high places.  Position your infant on a changing table so they are perpendicular to the length of the changing table.  In this position, they can roll several times without falling off the table.   2.   Scalding:  Double check that your hot water is no more than 130 degrees.  3.   Walkers: Please do not purchase or use walkers.  They are the cause of many accidents and have no development advantages.  Stationary \"saucers\" and jumpers provide the advantages without the dangers.  4.   Toys: Choose toys that have been approved for your child's age and which encourage the development of appropriate motor skills. Around 4 months of age, infants will be able to grasp objects and bring them up to the face and mouth.  Make sure that they cannot grasp objects that may be dangerous (such as keys, pens, small toys from older siblings).    INFANT CAR SEAT: Take advantage of one of the free programs through the police, hospitals, fire departments or car dealers that checks to see if your seat is properly installed.    MEDICATION FOR FEVER OR PAIN:   Acetaminophen (Tylenol) Infant drops (80 mg/1 mL).  Give 1 mL by mouth every 4 hours as needed for fever/pain or   Acetaminophen (Tylenol) Infant or Children's suspension (160 mg/5 mL).  Give 1/2 tsp or 2.5 mL by mouth every 4 hours as needed for fever/pain.  These are maximum doses.  Half these doses may be used for mild fever or pain.  Do not exceed the maximum doses.     Because we have less sunlight in our part of the country, infants whose only source of nutrition is mother's milk should have nursing baby vitamins (available without prescription at the drug store) most days of the week.  Poly-Vi-Sol with iron or NovaFerrum with Iron infant drops.  Take 1 mL once daily.  This should be taken with breast milk or formula as Vitamin D is fat soluble and needs fat to be absorbed.     Most Recent Immunizations   Administered Date(s) Administered   • Hep B, adolescent or pediatric 2019       Notify your pediatrician by calling the pediatric phone nurse, if your child develops any of the following reactions within 72 hours after the immunization:  1.   A temperature of 105 degrees or  above.  2.   More than 3 hours of continuous crying.  3.   A shrill high pitched cry.  4.   A seizure or a fainting spell.  In this case you should call 911 or proceed to the emergency room.    NEXT VISIT: IN TWO MONTHS   no

## 2019-05-02 NOTE — ED ADULT NURSE NOTE - NSSISCREENINGQ5_ED_A_ED
Routine Office Visit    Patient Name: Alaina Vee    : 1944  MRN: [id number]    Subjective:  Alaina is a 74 y.o. female who presents today for:   Chief Complaint   Patient presents with    Establish Care     74-year-old female comes in to establish care with myself as a primary.  Patient has hypertension, atrial fibrillation, chronic bilateral elbow pain however left is worse, bilateral knee pains status post knee replacements, obstructive sleep apnea, GERD, hyperlipidemia, and does with disc disease. She has recently seen Cardiology for her cardiac care her reports that she is stable from that perspective.  She is currently seeing vascular surgery for lower extremity edema and had some studies done recently and has an appointment for follow-up on these studies scheduled.  The patient's only new concern today is that like 2 months ago she is having a postnasal drip causing some hoarseness.  She states that she forgot she was previously prescribed loratadine.  She states that it had helped at the time.    Past Medical History  Past Medical History:   Diagnosis Date    Arthritis     knees    Atrial fibrillation     Atrial flutter     Back pain     Degenerative disc disease     Depression     General anesthetics causing adverse effect in therapeutic use     pt states sometimes slow to awaken.    GERD (gastroesophageal reflux disease)     Hyperlipidemia     Hypertension     Kidney stone     Obesity     Sleep apnea     does not wear CPAP    Urinary incontinence     Varicosities     Ventral hernia        Past Surgical History  Past Surgical History:   Procedure Laterality Date    ABLATION N/A 2017    Per[facility] CATH LAB    APPENDECTOMY      BREAST BIOPSY Bilateral     1985    breast biopsy, left      CARDIOVERSION N/A 10/4/2017    [facility] CATH LAB    CARDIOVERSION N/A 2017    [facility]  CATH LAB    CHOLECYSTECTOMY      JOINT REPLACEMENT      TKR , right    JOINT REPLACEMENT  2009    TKR  left    LITHOTRIPSY-EXTRACORPOREAL SHOCK WAVE Right 2/27/2017    [facility]    ID EXPLORATORY OF ABDOMEN      TRANSESOPHAGEAL ECHOCARDIOGRAM (SKINNY) N/A 8/31/2017    [facility] C[facility]    TRANSESOPHAGEAL ECHOCARDIOGRAM (SKINNY) N/A 6/12/2017    Per[facility] CATH LAB        Family History  Family History   Problem Relation Age of Onset    COPD Mother     Heart disease Sister         half sister    COPD Sister     Parkinsonism Sister        Social History  Social History     Socioeconomic History    Marital status:      Spouse name: Not on file    Number of children: Not on file    Years of education: Not on file    Highest education level: Not on file   Occupational History    Not on file   Social Needs    Financial resource strain: Not on file    Food insecurity:     Worry: Not on file     Inability: Not on file    Transportation needs:     Medical: Not on file     Non-medical: Not on file   Tobacco Use    Smoking status: Never Smoker    Smokeless tobacco: Never Used   Substance and Sexual Activity    Alcohol use: No    Drug use: No    Sexual activity: Yes     Partners: Male   Lifestyle    Physical activity:     Days per week: Not on file     Minutes per session: Not on file    Stress: Not on file   Relationships    Social connections:     Talks on phone: Not on file     Gets together: Not on file     Attends [employer] service: Not on file     Active member of club or organization: Not on file     Attends meetings of clubs or organizations: Not on file     Relationship status: Not on file   Other Topics Concern    Not on file   Social History Narrative    Not on file       Current Medications  Current Outpatient Medications on File Prior to Visit   Medication Sig Dispense Refill     "baclofen (LIORESAL) 10 MG tablet       CYANOCOBALAMIN, VITAMIN B-12, (VITAMIN B-12 ORAL) Take 2,500 mcg by mouth every evening.      diclofenac sodium (VOLTAREN) 1 % Gel Apply 2 g topically once daily. 100 g 3    fish oil-omega-3 fatty acids 300-1,000 mg capsule Take 1 g by mouth once daily.      flecainide (TAMBOCOR) 100 MG Tab Take 1 tablet (100 mg total) by mouth every 12 (twelve) hours. 180 tablet 3    ketoconazole (NIZORAL) 2 % cream       loratadine (CLARITIN) 10 mg tablet Take 1 tablet (10 mg total) by mouth once daily. 30 tablet 0    multivitamin (THERAGRAN) per tablet Take 1 tablet by mouth every evening. 1 Tablet Oral Every day      oxybutynin (DITROPAN) 5 MG Tab TAKE 1 TABLET THREE TIMES DAILY 270 tablet 11    simvastatin (ZOCOR) 40 MG tablet Take 40 mg by mouth every evening.      warfarin (COUMADIN) 5 MG tablet Take 0.5-1 tablets (2.5-5 mg total) by mouth Daily. As directed by coumadin clinic 90 tablet 3     No current facility-administered medications on file prior to visit.        Allergies   Review of patient's allergies indicates:   Allergen Reactions    Celebrex [celecoxib]      Chest tightness    Lipitor [atorvastatin] Other (See Comments)     Review of Systems   Constitutional: Negative for chills and fever.   HENT: Positive for postnasal drip and voice change. Negative for congestion and trouble swallowing.    Respiratory: Negative for shortness of breath and wheezing.    Cardiovascular: Positive for leg swelling. Negative for chest pain and palpitations.   Gastrointestinal: Negative for abdominal pain, blood in stool, constipation and diarrhea.   Genitourinary: Negative for dysuria.   Hematological: Negative for adenopathy.     /76 (BP Location: Left arm, Patient Position: Sitting, BP Method: Medium (Manual))   Pulse 69   Temp 97.6 °F (36.4 °C) (Oral)   Resp 17   Ht 5' 6" (1.676 m)   Wt 114.6 kg (252 lb 10.4 oz)   SpO2 97%   BMI 40.78 kg/m²     Physical Exam "   Constitutional: She appears well-developed and well-nourished.   HENT:   Head: Normocephalic and atraumatic.   Right Ear: External ear normal.   Left Ear: External ear normal.   Nose: Nose normal.   Mouth/Throat: Oropharynx is clear and moist. No oropharyngeal exudate.   Eyes: Pupils are equal, round, and reactive to light. Conjunctivae and EOM are normal. Right eye exhibits no discharge. Left eye exhibits no discharge.   Neck: Normal range of motion. Neck supple. No tracheal deviation present.   Cardiovascular: Normal rate, regular rhythm, normal heart sounds and intact distal pulses.   No murmur heard.  Pulmonary/Chest: Effort normal and breath sounds normal. She has no wheezes. She has no rales.   Abdominal: Soft. Bowel sounds are normal. She exhibits no mass. There is no tenderness.   Musculoskeletal:        Right lower leg: She exhibits edema (2+).        Left lower leg: She exhibits edema (2+).   Lymphadenopathy:     She has no cervical adenopathy.   Psychiatric: She has a normal mood and affect.   Vitals reviewed.      Assessment/Plan:  Alaina was seen today for establish care.    Diagnoses and all orders for this visit:    Chronic elbow pain, left  -     X-Ray Elbow Complete Left; Future  Will check x-ray for further evaluation.    Postnasal drip  Advised patient to restart very short course of loratadine for symptom relief.  If no improvement within a week or 2, patient will need re-evaluation and should contact me.    Change in voice  If no improvement within the next week or 2, will need referral to ENT.    Essential hypertension  Blood pressure stable.    PAF (paroxysmal atrial fibrillation)  Recent seen by Cardiology.  Patient is on warfarin.    Typical atrial flutter  Recently seen by Cardiology.    Need for vaccination  -     SHINGRIX, PF, 50 mcg/0.5 mL injection; Inject 0.5 mLs into the muscle once. Now and 1 dose in 2-6 months for 1 dose  -     (In Office Administered) Td Vaccine - Preservative  Free  Tetanus vaccination ordered.  Will have shingles vaccine done at pharmacy.        This office note has been dictated.  This dictation has been generated using [employer] Fluency Direct dictation; some phonetic errors may occur.      No

## 2019-09-27 ENCOUNTER — INPATIENT (INPATIENT)
Facility: HOSPITAL | Age: 54
LOS: 6 days | Discharge: ANOTHER HOSPITAL | DRG: 291 | End: 2019-10-04
Attending: INTERNAL MEDICINE | Admitting: INTERNAL MEDICINE
Payer: MEDICARE

## 2019-09-27 VITALS
HEART RATE: 83 BPM | DIASTOLIC BLOOD PRESSURE: 77 MMHG | HEIGHT: 65 IN | SYSTOLIC BLOOD PRESSURE: 137 MMHG | RESPIRATION RATE: 22 BRPM | WEIGHT: 259.93 LBS

## 2019-09-27 DIAGNOSIS — Z95.810 PRESENCE OF AUTOMATIC (IMPLANTABLE) CARDIAC DEFIBRILLATOR: Chronic | ICD-10-CM

## 2019-09-27 DIAGNOSIS — I10 ESSENTIAL (PRIMARY) HYPERTENSION: ICD-10-CM

## 2019-09-27 DIAGNOSIS — J44.1 CHRONIC OBSTRUCTIVE PULMONARY DISEASE WITH (ACUTE) EXACERBATION: ICD-10-CM

## 2019-09-27 DIAGNOSIS — J12.9 VIRAL PNEUMONIA, UNSPECIFIED: ICD-10-CM

## 2019-09-27 DIAGNOSIS — E13.01 OTHER SPECIFIED DIABETES MELLITUS WITH HYPEROSMOLARITY WITH COMA: ICD-10-CM

## 2019-09-27 DIAGNOSIS — I50.9 HEART FAILURE, UNSPECIFIED: ICD-10-CM

## 2019-09-27 DIAGNOSIS — R09.02 HYPOXEMIA: ICD-10-CM

## 2019-09-27 DIAGNOSIS — Z29.9 ENCOUNTER FOR PROPHYLACTIC MEASURES, UNSPECIFIED: ICD-10-CM

## 2019-09-27 DIAGNOSIS — Z98.89 OTHER SPECIFIED POSTPROCEDURAL STATES: Chronic | ICD-10-CM

## 2019-09-27 DIAGNOSIS — N17.9 ACUTE KIDNEY FAILURE, UNSPECIFIED: ICD-10-CM

## 2019-09-27 LAB
ACETONE SERPL-MCNC: NEGATIVE — SIGNIFICANT CHANGE UP
ALBUMIN SERPL ELPH-MCNC: 3 G/DL — LOW (ref 3.5–5)
ALP SERPL-CCNC: 74 U/L — SIGNIFICANT CHANGE UP (ref 40–120)
ALT FLD-CCNC: 27 U/L DA — SIGNIFICANT CHANGE UP (ref 10–60)
ANION GAP SERPL CALC-SCNC: 8 MMOL/L — SIGNIFICANT CHANGE UP (ref 5–17)
APTT BLD: 24.5 SEC — LOW (ref 27.5–36.3)
AST SERPL-CCNC: 28 U/L — SIGNIFICANT CHANGE UP (ref 10–40)
BASE EXCESS BLDA CALC-SCNC: 7.2 MMOL/L — HIGH (ref -2–2)
BASOPHILS # BLD AUTO: 0.04 K/UL — SIGNIFICANT CHANGE UP (ref 0–0.2)
BASOPHILS NFR BLD AUTO: 0.4 % — SIGNIFICANT CHANGE UP (ref 0–2)
BILIRUB SERPL-MCNC: 0.4 MG/DL — SIGNIFICANT CHANGE UP (ref 0.2–1.2)
BLOOD GAS COMMENTS ARTERIAL: SIGNIFICANT CHANGE UP
BUN SERPL-MCNC: 77 MG/DL — HIGH (ref 7–18)
CALCIUM SERPL-MCNC: 8.5 MG/DL — SIGNIFICANT CHANGE UP (ref 8.4–10.5)
CHLORIDE SERPL-SCNC: 95 MMOL/L — LOW (ref 96–108)
CK MB BLD-MCNC: 0.6 % — SIGNIFICANT CHANGE UP (ref 0–3.5)
CK MB CFR SERPL CALC: 1.7 NG/ML — SIGNIFICANT CHANGE UP (ref 0–3.6)
CK SERPL-CCNC: 281 U/L — HIGH (ref 21–215)
CO2 SERPL-SCNC: 33 MMOL/L — HIGH (ref 22–31)
CREAT SERPL-MCNC: 3.43 MG/DL — HIGH (ref 0.5–1.3)
EOSINOPHIL # BLD AUTO: 0.05 K/UL — SIGNIFICANT CHANGE UP (ref 0–0.5)
EOSINOPHIL NFR BLD AUTO: 0.5 % — SIGNIFICANT CHANGE UP (ref 0–6)
GLUCOSE BLDC GLUCOMTR-MCNC: 422 MG/DL — HIGH (ref 70–99)
GLUCOSE SERPL-MCNC: 312 MG/DL — HIGH (ref 70–99)
HCO3 BLDA-SCNC: 33 MMOL/L — HIGH (ref 23–27)
HCT VFR BLD CALC: 32.4 % — LOW (ref 34.5–45)
HGB BLD-MCNC: 10.1 G/DL — LOW (ref 11.5–15.5)
HOROWITZ INDEX BLDA+IHG-RTO: 32 — SIGNIFICANT CHANGE UP
IMM GRANULOCYTES NFR BLD AUTO: 0.6 % — SIGNIFICANT CHANGE UP (ref 0–1.5)
INR BLD: 1.26 RATIO — HIGH (ref 0.88–1.16)
LACTATE SERPL-SCNC: 1.1 MMOL/L — SIGNIFICANT CHANGE UP (ref 0.7–2)
LYMPHOCYTES # BLD AUTO: 1.21 K/UL — SIGNIFICANT CHANGE UP (ref 1–3.3)
LYMPHOCYTES # BLD AUTO: 12.1 % — LOW (ref 13–44)
MAGNESIUM SERPL-MCNC: 2.7 MG/DL — HIGH (ref 1.6–2.6)
MCHC RBC-ENTMCNC: 30 PG — SIGNIFICANT CHANGE UP (ref 27–34)
MCHC RBC-ENTMCNC: 31.2 GM/DL — LOW (ref 32–36)
MCV RBC AUTO: 96.1 FL — SIGNIFICANT CHANGE UP (ref 80–100)
MONOCYTES # BLD AUTO: 0.95 K/UL — HIGH (ref 0–0.9)
MONOCYTES NFR BLD AUTO: 9.5 % — SIGNIFICANT CHANGE UP (ref 2–14)
NEUTROPHILS # BLD AUTO: 7.65 K/UL — HIGH (ref 1.8–7.4)
NEUTROPHILS NFR BLD AUTO: 76.9 % — SIGNIFICANT CHANGE UP (ref 43–77)
NRBC # BLD: 0 /100 WBCS — SIGNIFICANT CHANGE UP (ref 0–0)
NT-PROBNP SERPL-SCNC: 6789 PG/ML — HIGH (ref 0–125)
PCO2 BLDA: 56 MMHG — HIGH (ref 32–46)
PH BLDA: 7.39 — SIGNIFICANT CHANGE UP (ref 7.35–7.45)
PLATELET # BLD AUTO: 239 K/UL — SIGNIFICANT CHANGE UP (ref 150–400)
PO2 BLDA: 37 MMHG — CRITICAL LOW (ref 74–108)
POTASSIUM SERPL-MCNC: 4 MMOL/L — SIGNIFICANT CHANGE UP (ref 3.5–5.3)
POTASSIUM SERPL-SCNC: 4 MMOL/L — SIGNIFICANT CHANGE UP (ref 3.5–5.3)
PROT SERPL-MCNC: 7.6 G/DL — SIGNIFICANT CHANGE UP (ref 6–8.3)
PROTHROM AB SERPL-ACNC: 14.1 SEC — HIGH (ref 10–12.9)
RBC # BLD: 3.37 M/UL — LOW (ref 3.8–5.2)
RBC # FLD: 14 % — SIGNIFICANT CHANGE UP (ref 10.3–14.5)
SAO2 % BLDA: 64 % — LOW (ref 92–96)
SODIUM SERPL-SCNC: 136 MMOL/L — SIGNIFICANT CHANGE UP (ref 135–145)
TROPONIN I SERPL-MCNC: 0.05 NG/ML — HIGH (ref 0–0.04)
TROPONIN I SERPL-MCNC: 0.05 NG/ML — HIGH (ref 0–0.04)
WBC # BLD: 9.96 K/UL — SIGNIFICANT CHANGE UP (ref 3.8–10.5)
WBC # FLD AUTO: 9.96 K/UL — SIGNIFICANT CHANGE UP (ref 3.8–10.5)

## 2019-09-27 PROCEDURE — 99291 CRITICAL CARE FIRST HOUR: CPT

## 2019-09-27 PROCEDURE — 71045 X-RAY EXAM CHEST 1 VIEW: CPT | Mod: 26

## 2019-09-27 RX ORDER — GLUCAGON INJECTION, SOLUTION 0.5 MG/.1ML
1 INJECTION, SOLUTION SUBCUTANEOUS ONCE
Refills: 0 | Status: DISCONTINUED | OUTPATIENT
Start: 2019-09-27 | End: 2019-09-28

## 2019-09-27 RX ORDER — PANTOPRAZOLE SODIUM 20 MG/1
40 TABLET, DELAYED RELEASE ORAL
Refills: 0 | Status: DISCONTINUED | OUTPATIENT
Start: 2019-09-27 | End: 2019-10-04

## 2019-09-27 RX ORDER — DEXTROSE 50 % IN WATER 50 %
15 SYRINGE (ML) INTRAVENOUS ONCE
Refills: 0 | Status: DISCONTINUED | OUTPATIENT
Start: 2019-09-27 | End: 2019-09-28

## 2019-09-27 RX ORDER — ALBUTEROL 90 UG/1
2 AEROSOL, METERED ORAL EVERY 6 HOURS
Refills: 0 | Status: DISCONTINUED | OUTPATIENT
Start: 2019-09-27 | End: 2019-10-04

## 2019-09-27 RX ORDER — ASPIRIN/CALCIUM CARB/MAGNESIUM 324 MG
81 TABLET ORAL DAILY
Refills: 0 | Status: DISCONTINUED | OUTPATIENT
Start: 2019-09-27 | End: 2019-10-04

## 2019-09-27 RX ORDER — ATORVASTATIN CALCIUM 80 MG/1
40 TABLET, FILM COATED ORAL AT BEDTIME
Refills: 0 | Status: DISCONTINUED | OUTPATIENT
Start: 2019-09-27 | End: 2019-10-04

## 2019-09-27 RX ORDER — CEFTRIAXONE 500 MG/1
1000 INJECTION, POWDER, FOR SOLUTION INTRAMUSCULAR; INTRAVENOUS EVERY 24 HOURS
Refills: 0 | Status: COMPLETED | OUTPATIENT
Start: 2019-09-28 | End: 2019-10-02

## 2019-09-27 RX ORDER — ALLOPURINOL 300 MG
100 TABLET ORAL DAILY
Refills: 0 | Status: DISCONTINUED | OUTPATIENT
Start: 2019-09-27 | End: 2019-10-04

## 2019-09-27 RX ORDER — SENNA PLUS 8.6 MG/1
2 TABLET ORAL AT BEDTIME
Refills: 0 | Status: DISCONTINUED | OUTPATIENT
Start: 2019-09-27 | End: 2019-10-04

## 2019-09-27 RX ORDER — DEXTROSE 50 % IN WATER 50 %
12.5 SYRINGE (ML) INTRAVENOUS ONCE
Refills: 0 | Status: DISCONTINUED | OUTPATIENT
Start: 2019-09-27 | End: 2019-09-28

## 2019-09-27 RX ORDER — DOCUSATE SODIUM 100 MG
100 CAPSULE ORAL DAILY
Refills: 0 | Status: DISCONTINUED | OUTPATIENT
Start: 2019-09-27 | End: 2019-10-04

## 2019-09-27 RX ORDER — CEFTRIAXONE 500 MG/1
INJECTION, POWDER, FOR SOLUTION INTRAMUSCULAR; INTRAVENOUS
Refills: 0 | Status: COMPLETED | OUTPATIENT
Start: 2019-09-27 | End: 2019-10-03

## 2019-09-27 RX ORDER — DEXTROSE 50 % IN WATER 50 %
25 SYRINGE (ML) INTRAVENOUS ONCE
Refills: 0 | Status: DISCONTINUED | OUTPATIENT
Start: 2019-09-27 | End: 2019-09-30

## 2019-09-27 RX ORDER — IPRATROPIUM/ALBUTEROL SULFATE 18-103MCG
3 AEROSOL WITH ADAPTER (GRAM) INHALATION
Refills: 0 | Status: COMPLETED | OUTPATIENT
Start: 2019-09-27 | End: 2019-09-27

## 2019-09-27 RX ORDER — CEFTRIAXONE 500 MG/1
1000 INJECTION, POWDER, FOR SOLUTION INTRAMUSCULAR; INTRAVENOUS ONCE
Refills: 0 | Status: COMPLETED | OUTPATIENT
Start: 2019-09-27 | End: 2019-09-27

## 2019-09-27 RX ORDER — IPRATROPIUM/ALBUTEROL SULFATE 18-103MCG
3 AEROSOL WITH ADAPTER (GRAM) INHALATION EVERY 6 HOURS
Refills: 0 | Status: DISCONTINUED | OUTPATIENT
Start: 2019-09-27 | End: 2019-10-04

## 2019-09-27 RX ORDER — CHOLECALCIFEROL (VITAMIN D3) 125 MCG
1000 CAPSULE ORAL DAILY
Refills: 0 | Status: DISCONTINUED | OUTPATIENT
Start: 2019-09-27 | End: 2019-10-04

## 2019-09-27 RX ORDER — AZITHROMYCIN 500 MG/1
TABLET, FILM COATED ORAL
Refills: 0 | Status: COMPLETED | OUTPATIENT
Start: 2019-09-27 | End: 2019-10-03

## 2019-09-27 RX ORDER — INSULIN LISPRO 100/ML
VIAL (ML) SUBCUTANEOUS
Refills: 0 | Status: DISCONTINUED | OUTPATIENT
Start: 2019-09-27 | End: 2019-10-04

## 2019-09-27 RX ORDER — CARVEDILOL PHOSPHATE 80 MG/1
6.25 CAPSULE, EXTENDED RELEASE ORAL EVERY 12 HOURS
Refills: 0 | Status: DISCONTINUED | OUTPATIENT
Start: 2019-09-27 | End: 2019-10-04

## 2019-09-27 RX ORDER — PIPERACILLIN AND TAZOBACTAM 4; .5 G/20ML; G/20ML
3.38 INJECTION, POWDER, LYOPHILIZED, FOR SOLUTION INTRAVENOUS ONCE
Refills: 0 | Status: COMPLETED | OUTPATIENT
Start: 2019-09-27 | End: 2019-09-27

## 2019-09-27 RX ORDER — AZITHROMYCIN 500 MG/1
500 TABLET, FILM COATED ORAL ONCE
Refills: 0 | Status: COMPLETED | OUTPATIENT
Start: 2019-09-27 | End: 2019-09-27

## 2019-09-27 RX ORDER — FERROUS SULFATE 325(65) MG
325 TABLET ORAL DAILY
Refills: 0 | Status: DISCONTINUED | OUTPATIENT
Start: 2019-09-27 | End: 2019-09-29

## 2019-09-27 RX ORDER — PREGABALIN 225 MG/1
1000 CAPSULE ORAL DAILY
Refills: 0 | Status: DISCONTINUED | OUTPATIENT
Start: 2019-09-27 | End: 2019-10-04

## 2019-09-27 RX ORDER — SODIUM CHLORIDE 9 MG/ML
1000 INJECTION, SOLUTION INTRAVENOUS
Refills: 0 | Status: DISCONTINUED | OUTPATIENT
Start: 2019-09-27 | End: 2019-09-28

## 2019-09-27 RX ORDER — HEPARIN SODIUM 5000 [USP'U]/ML
5000 INJECTION INTRAVENOUS; SUBCUTANEOUS EVERY 8 HOURS
Refills: 0 | Status: DISCONTINUED | OUTPATIENT
Start: 2019-09-27 | End: 2019-10-04

## 2019-09-27 RX ORDER — FUROSEMIDE 40 MG
80 TABLET ORAL
Refills: 0 | Status: DISCONTINUED | OUTPATIENT
Start: 2019-09-27 | End: 2019-09-29

## 2019-09-27 RX ORDER — AZITHROMYCIN 500 MG/1
500 TABLET, FILM COATED ORAL EVERY 24 HOURS
Refills: 0 | Status: COMPLETED | OUTPATIENT
Start: 2019-09-28 | End: 2019-10-02

## 2019-09-27 RX ADMIN — HEPARIN SODIUM 5000 UNIT(S): 5000 INJECTION INTRAVENOUS; SUBCUTANEOUS at 22:33

## 2019-09-27 RX ADMIN — CEFTRIAXONE 100 MILLIGRAM(S): 500 INJECTION, POWDER, FOR SOLUTION INTRAMUSCULAR; INTRAVENOUS at 20:32

## 2019-09-27 RX ADMIN — Medication 40 MILLIGRAM(S): at 22:32

## 2019-09-27 RX ADMIN — SENNA PLUS 2 TABLET(S): 8.6 TABLET ORAL at 22:44

## 2019-09-27 RX ADMIN — PIPERACILLIN AND TAZOBACTAM 200 GRAM(S): 4; .5 INJECTION, POWDER, LYOPHILIZED, FOR SOLUTION INTRAVENOUS at 16:25

## 2019-09-27 RX ADMIN — Medication 80 MILLIGRAM(S): at 20:33

## 2019-09-27 RX ADMIN — PIPERACILLIN AND TAZOBACTAM 3.38 GRAM(S): 4; .5 INJECTION, POWDER, LYOPHILIZED, FOR SOLUTION INTRAVENOUS at 16:30

## 2019-09-27 RX ADMIN — Medication 3 MILLILITER(S): at 20:38

## 2019-09-27 RX ADMIN — AZITHROMYCIN 255 MILLIGRAM(S): 500 TABLET, FILM COATED ORAL at 20:32

## 2019-09-27 RX ADMIN — Medication 12: at 22:34

## 2019-09-27 RX ADMIN — Medication 80 MILLIGRAM(S): at 16:27

## 2019-09-27 RX ADMIN — Medication 3 MILLILITER(S): at 16:27

## 2019-09-27 RX ADMIN — Medication 3 MILLILITER(S): at 16:54

## 2019-09-27 RX ADMIN — ATORVASTATIN CALCIUM 40 MILLIGRAM(S): 80 TABLET, FILM COATED ORAL at 22:33

## 2019-09-27 RX ADMIN — Medication 3 MILLILITER(S): at 16:30

## 2019-09-27 NOTE — H&P ADULT - PROBLEM SELECTOR PLAN 4
Holding home medications   -Currently on  -BP WNL upon evaluation  DASH diet   -Continue to monitor patient on coreg 6.125 bid,   c/w lasix for now.     -Continue to monitor

## 2019-09-27 NOTE — H&P ADULT - HISTORY OF PRESENT ILLNESS
[facility] is 53 y/o  [facility] H[facility] 25 %, s/p AICD, HTN, B/l LE edema, DM, Gout, CAD, HLD, COPD  presented to ED with SOB. As per patient she had fever, chills, shortness of breath, dizzy, loss of appetite, weight loss, muscle aches on Tuesday and she was diagnosed with Flu. She was given antibiotics and treatment for flu. But she took medications for 2 days. She was saturating 77% on admission and gasping for breath. In ED she was on NRB and saturated 97%. She had sick contacts at her place diagnosed with Flu. Patient denies cough, sputum production, fevers/chills/nausea/vomiting. No diarrhea, abdominal pain.  GOC FUll CODE

## 2019-09-27 NOTE — H&P ADULT - NSICDXPASTSURGICALHX_GEN_ALL_CORE_FT
PAST SURGICAL HISTORY:  History of cholecystectomy     ICD (implantable cardioverter-defibrillator) in place

## 2019-09-27 NOTE — H&P ADULT - PROBLEM SELECTOR PLAN 3
ISS  Accucheck: Before meals and at bedtime  f/u Hba1c patient is on glipizide XL 5 mg daily   hold home medications   sliding scale   f/u Hb a1c

## 2019-09-27 NOTE — H&P ADULT - ASSESSMENT
53 [facility]  [facility] HFrEF 25 %, s/p AICD, HTN, B/l LE edema, DM, Gout, CAD, HLD, COPD  presented to ED with SOB. As per patient she had fever, chills, shortness of breath, dizzy, loss of appetite, weight loss, muscle aches on Tuesday and she was diagnosed with Flu. She was given antibiotics and treatment for flu. But she took medications for 2 days. She was saturating 77% on admission and gasping for breath. In ED she was on NRB and saturated 97%. She had sick contacts at her place diagnosed with Flu. Patient denies cough, sputum production, fevers/chills/nausea/vomiting. No diarrhea, abdominal pain.  Pt was admitted for CHF exacerbation.

## 2019-09-27 NOTE — H&P ADULT - PROBLEM SELECTOR PLAN 5
patient came with productive cough and h/o low grade fever at NH   mild leucocytosis   c/w Rocephin and Azithromycin   f/u procal, strep Ag, Legionell Ag

## 2019-09-27 NOTE — ED ADULT NURSE NOTE - NSIMPLEMENTINTERV_GEN_ALL_ED
Implemented All Universal Safety Interventions:  [place] to call system. Call bell, personal items and telephone within reach. Instruct patient to call for assistance. Room bathroom lighting operational. Non-slip footwear when patient is off stretcher. Physically safe environment: no spills, clutter or unnecessary equipment. Stretcher in lowest position, wheels locked, appropriate side rails in place.

## 2019-09-27 NOTE — H&P ADULT - PROBLEM SELECTOR PLAN 2
Acute hypercapnic and hypoxic respiratory failure.  Plan: Patient came with SOB, productive cough with B/l lower Ext swelling   afebrile, Hd stable,   on PE patient was in mod respiratory distress, with wheezing   Trop t1 0.04, Bnp 6k   ABG 7.39/56/37 on NC 3L  respiratory Failure likely due to CHF exacerbation Vs COPD   . s/p IV solumedrol   placed on Bipap for hypercapnic RF   c/w solumedrol 40 q 8

## 2019-09-27 NOTE — ED PROVIDER NOTE - OBJECTIVE STATEMENT
54 y.o. female Adult home resident with h/o NIDDM BIBA c/o coughing since Tues., greyish to yellowish sputum, fever on Tues., chills, no appetite, sob, sick contact, no wheezing, n/v, CP, pt is currently on Ab for past 2 days.

## 2019-09-27 NOTE — H&P ADULT - NSHPSOCIALHISTORY_GEN_ALL_CORE
Pt denies smoking, drinking alcohol and illicit drug use. She smoked for 2 months <1ppd at the age of 13 yrs. Occasionally drinks Alcohol and no illicit drug use.

## 2019-09-27 NOTE — H&P ADULT - PROBLEM SELECTOR PLAN 7
Problem: Prophylactic measure.  Plan: IMPROVE VTE Individual Risk Assessment          RISK                                                          Points  [  ] Previous VTE                                                3  [  ] Thrombophilia                                             2  [  ] Lower limb paralysis                                   2        (unable to hold up >15 seconds)    [  ] Current Cancer                                             2         (within 6 months)  [ x ] Immobilization > 24 hrs                              1  [ x ] ICU/CCU stay > 24 hours                             1  [  ] Age > 60                                                         1    IMPROVE VTE Score: VTE 2 heparin for DVT prophylaxis   protonix for GI prophylaxis

## 2019-09-27 NOTE — H&P ADULT - PROBLEM SELECTOR PLAN 6
came with elevated BUN and creatinine level 77/3.43   and Bseline is 27/1.43  likely due to CHF   continue with Diuresis   avoid nephrotoxic drugs.   f/u Urine lytes and ultrasound of kidney  Dr Ledesma

## 2019-09-27 NOTE — H&P ADULT - NSICDXPASTMEDICALHX_GEN_ALL_CORE_FT
PAST MEDICAL HISTORY:  AICD (automatic cardioverter/defibrillator) present     CAD (coronary artery disease)     CHF (congestive heart failure)     COPD (chronic obstructive pulmonary disease)     DM (diabetes mellitus)     GERD (gastroesophageal reflux disease)     HTN (hypertension)     RA (rheumatoid arthritis)

## 2019-09-27 NOTE — H&P ADULT - NSHPLABSRESULTS_GEN_ALL_CORE
LABS:                        10.1   9.96  )-----------( 239      ( 27 Sep 2019 16:25 )             32.4     09-27    136  |  95<L>  |  77<H>  ----------------------------<  312<H>  4.0   |  33<H>  |  3.43<H>    Ca    8.5      27 Sep 2019 16:25  Mg     2.7     09-27    TPro  7.6  /  Alb  3.0<L>  /  TBili  0.4  /  DBili  x   /  AST  28  /  ALT  27  /  AlkPhos  74  09-27    PT/INR - ( 27 Sep 2019 16:25 )   PT: 14.1 sec;   INR: 1.26 ratio         PTT - ( 27 Sep 2019 16:25 )  PTT:24.5 sec    LIVER FUNCTIONS - ( 27 Sep 2019 16:25 )  Alb: 3.0 g/dL / Pro: 7.6 g/dL / ALK PHOS: 74 U/L / ALT: 27 U/L DA / AST: 28 U/L / GGT: x           Lactate, Blood: 1.1 mmol/L (09-27-19 @ 16:25)

## 2019-09-27 NOTE — H&P ADULT - NSHPREVIEWOFSYSTEMS_GEN_ALL_CORE
REVIEW OF SYSTEMS:    CONSTITUTIONAL: No weakness, fevers or chills  EYES/ENT: No visual changes;  No vertigo or throat pain   NECK: No pain or stiffness  RESPIRATORY: No cough, wheezing, hemoptysis; shortness of breath  CARDIOVASCULAR: No chest pain or palpitations  GASTROINTESTINAL: No abdominal or epigastric pain. No nausea, vomiting, or hematemesis; No diarrhea or constipation. No melena or hematochezia.  GENITOURINARY: No dysuria, frequency or hematuria  NEUROLOGICAL: No numbness or weakness  SKIN: No itching, burning, rashes, or lesions   All other review of systems is negative unless indicated above.

## 2019-09-27 NOTE — H&P ADULT - NSHPPHYSICALEXAM_GEN_ALL_CORE
CONSTITUTIONAL: Well appearing, well nourished, awake, alert and in no apparent distress  ENT: Airway patent, Nasal mucosa clear. Mouth with normal mucosa. Throat has no vesicles, no oropharyngeal exudates and uvula is midline.  EYES: Clear bilaterally, pupils equal, round and reactive to light. EOMI.  CARDIAC: Normal rate, regular rhythm.  Heart sounds S1, S2.  No murmurs, rubs or gallops   RESPIRATORY: Breath sounds clear and equal bilaterally. Wheezing is present.  MUSCULOSKELETAL: Spine appears normal, range of motion is not limited, no muscle or joint tenderness  EXTREMITIES: Bilateral lower extremity swelling  NEUROLOGICAL: Alert and oriented, no focal deficits, no motor or sensory deficits.  SKIN: No rash, skin turgor

## 2019-09-27 NOTE — H&P ADULT - PROBLEM SELECTOR PLAN 1
Patient has h/o CHF HFrEf 25% as of 2018  Started On lasix 80mg iv  c/w metolazone 5 mg daily   trop T1 0.04> 0.04  EKG showed no ischemic changes    F/u ECHO   tele monitor   s/p morales  monitor daily I/o's   cardio consult Dr Gold.

## 2019-09-27 NOTE — ED PROVIDER NOTE - CARE PLAN
Principal Discharge DX:	Hypoxemia  Secondary Diagnosis:	COPD exacerbation  Secondary Diagnosis:	Renal insufficiency  Secondary Diagnosis:	CHF exacerbation

## 2019-09-28 LAB
ANION GAP SERPL CALC-SCNC: 10 MMOL/L — SIGNIFICANT CHANGE UP (ref 5–17)
APPEARANCE UR: CLEAR — SIGNIFICANT CHANGE UP
B PERT DNA SPEC QL NAA+PROBE: DETECTED
BASE EXCESS BLDA CALC-SCNC: 6 MMOL/L — HIGH (ref -2–2)
BILIRUB UR-MCNC: NEGATIVE — SIGNIFICANT CHANGE UP
BLOOD GAS COMMENTS ARTERIAL: SIGNIFICANT CHANGE UP
BLOOD GAS COMMENTS ARTERIAL: SIGNIFICANT CHANGE UP
BUN SERPL-MCNC: 86 MG/DL — HIGH (ref 7–18)
CALCIUM SERPL-MCNC: 8.6 MG/DL — SIGNIFICANT CHANGE UP (ref 8.4–10.5)
CHLORIDE SERPL-SCNC: 91 MMOL/L — LOW (ref 96–108)
CK MB BLD-MCNC: 1 % — SIGNIFICANT CHANGE UP (ref 0–3.5)
CK MB CFR SERPL CALC: 2.1 NG/ML — SIGNIFICANT CHANGE UP (ref 0–3.6)
CK SERPL-CCNC: 211 U/L — SIGNIFICANT CHANGE UP (ref 21–215)
CO2 SERPL-SCNC: 33 MMOL/L — HIGH (ref 22–31)
COLOR SPEC: YELLOW — SIGNIFICANT CHANGE UP
CREAT ?TM UR-MCNC: 50 MG/DL — SIGNIFICANT CHANGE UP
CREAT ?TM UR-MCNC: 62 MG/DL — SIGNIFICANT CHANGE UP
CREAT SERPL-MCNC: 3.22 MG/DL — HIGH (ref 0.5–1.3)
DIFF PNL FLD: NEGATIVE — SIGNIFICANT CHANGE UP
ERYTHROCYTE [SEDIMENTATION RATE] IN BLOOD: 86 MM/HR — HIGH (ref 0–20)
FERRITIN SERPL-MCNC: 396 NG/ML — HIGH (ref 15–150)
FOLATE SERPL-MCNC: >20 NG/ML — SIGNIFICANT CHANGE UP
GLUCOSE BLDC GLUCOMTR-MCNC: 350 MG/DL — HIGH (ref 70–99)
GLUCOSE BLDC GLUCOMTR-MCNC: 375 MG/DL — HIGH (ref 70–99)
GLUCOSE BLDC GLUCOMTR-MCNC: 411 MG/DL — HIGH (ref 70–99)
GLUCOSE BLDC GLUCOMTR-MCNC: 412 MG/DL — HIGH (ref 70–99)
GLUCOSE BLDC GLUCOMTR-MCNC: 447 MG/DL — HIGH (ref 70–99)
GLUCOSE SERPL-MCNC: 344 MG/DL — HIGH (ref 70–99)
GLUCOSE UR QL: NEGATIVE — SIGNIFICANT CHANGE UP
HBA1C BLD-MCNC: 10.5 % — HIGH (ref 4–5.6)
HCO3 BLDA-SCNC: 32 MMOL/L — HIGH (ref 23–27)
HCT VFR BLD CALC: 32.1 % — LOW (ref 34.5–45)
HCV AB S/CO SERPL IA: 0.12 S/CO — SIGNIFICANT CHANGE UP (ref 0–0.99)
HCV AB SERPL-IMP: SIGNIFICANT CHANGE UP
HGB BLD-MCNC: 10 G/DL — LOW (ref 11.5–15.5)
HOROWITZ INDEX BLDA+IHG-RTO: 21 — SIGNIFICANT CHANGE UP
IRON SATN MFR SERPL: 18 % — SIGNIFICANT CHANGE UP (ref 15–50)
IRON SATN MFR SERPL: 44 UG/DL — SIGNIFICANT CHANGE UP (ref 40–150)
KETONES UR-MCNC: NEGATIVE — SIGNIFICANT CHANGE UP
LEUKOCYTE ESTERASE UR-ACNC: NEGATIVE — SIGNIFICANT CHANGE UP
MCHC RBC-ENTMCNC: 29.7 PG — SIGNIFICANT CHANGE UP (ref 27–34)
MCHC RBC-ENTMCNC: 31.2 GM/DL — LOW (ref 32–36)
MCV RBC AUTO: 95.3 FL — SIGNIFICANT CHANGE UP (ref 80–100)
NITRITE UR-MCNC: NEGATIVE — SIGNIFICANT CHANGE UP
NRBC # BLD: 0 /100 WBCS — SIGNIFICANT CHANGE UP (ref 0–0)
OSMOLALITY SERPL: 329 MOSMOL/KG — HIGH (ref 275–300)
OSMOLALITY UR: 372 MOS/KG — SIGNIFICANT CHANGE UP (ref 50–1200)
PCO2 BLDA: 54 MMHG — HIGH (ref 32–46)
PH BLDA: 7.38 — SIGNIFICANT CHANGE UP (ref 7.35–7.45)
PH UR: 5 — SIGNIFICANT CHANGE UP (ref 5–8)
PLATELET # BLD AUTO: 269 K/UL — SIGNIFICANT CHANGE UP (ref 150–400)
PO2 BLDA: 46 MMHG — CRITICAL LOW (ref 74–108)
POTASSIUM SERPL-MCNC: 3.7 MMOL/L — SIGNIFICANT CHANGE UP (ref 3.5–5.3)
POTASSIUM SERPL-SCNC: 3.7 MMOL/L — SIGNIFICANT CHANGE UP (ref 3.5–5.3)
PREALB SERPL-MCNC: 16 MG/DL — LOW (ref 20–40)
PROCALCITONIN SERPL-MCNC: 0.62 NG/ML — HIGH (ref 0.02–0.1)
PROT ?TM UR-MCNC: 48 MG/DL — HIGH (ref 0–12)
PROT UR-MCNC: 30 MG/DL
RAPID RVP RESULT: DETECTED
RBC # BLD: 3.37 M/UL — LOW (ref 3.8–5.2)
RBC # FLD: 14 % — SIGNIFICANT CHANGE UP (ref 10.3–14.5)
SAO2 % BLDA: 79 % — LOW (ref 92–96)
SODIUM SERPL-SCNC: 134 MMOL/L — LOW (ref 135–145)
SODIUM UR-SCNC: 61 MMOL/L — SIGNIFICANT CHANGE UP (ref 40–220)
SP GR SPEC: 1.01 — SIGNIFICANT CHANGE UP (ref 1.01–1.02)
TIBC SERPL-MCNC: 252 UG/DL — SIGNIFICANT CHANGE UP (ref 250–450)
TRANSFERRIN SERPL-MCNC: 199 MG/DL — LOW (ref 200–360)
TROPONIN I SERPL-MCNC: 0.02 NG/ML — SIGNIFICANT CHANGE UP (ref 0–0.04)
TSH SERPL-MCNC: 0.64 UU/ML — SIGNIFICANT CHANGE UP (ref 0.34–4.82)
UIBC SERPL-MCNC: 208 UG/DL — SIGNIFICANT CHANGE UP (ref 110–370)
URATE UR-MCNC: 13.3 MG/DL — SIGNIFICANT CHANGE UP
UROBILINOGEN FLD QL: NEGATIVE — SIGNIFICANT CHANGE UP
UUN UR-MCNC: 387 MG/DL — SIGNIFICANT CHANGE UP
VIT B12 SERPL-MCNC: >2000 PG/ML — HIGH (ref 232–1245)
VIT D25+D1,25 OH+D1,25 PNL SERPL-MCNC: 76.2 PG/ML — SIGNIFICANT CHANGE UP (ref 19.9–79.3)
WBC # BLD: 10.81 K/UL — HIGH (ref 3.8–10.5)
WBC # FLD AUTO: 10.81 K/UL — HIGH (ref 3.8–10.5)

## 2019-09-28 RX ORDER — INSULIN LISPRO 100/ML
6 VIAL (ML) SUBCUTANEOUS ONCE
Refills: 0 | Status: COMPLETED | OUTPATIENT
Start: 2019-09-28 | End: 2019-09-28

## 2019-09-28 RX ORDER — INFLUENZA VIRUS VACCINE 15; 15; 15; 15 UG/.5ML; UG/.5ML; UG/.5ML; UG/.5ML
0.5 SUSPENSION INTRAMUSCULAR ONCE
Refills: 0 | Status: COMPLETED | OUTPATIENT
Start: 2019-09-28 | End: 2019-09-28

## 2019-09-28 RX ORDER — INSULIN GLARGINE 100 [IU]/ML
20 INJECTION, SOLUTION SUBCUTANEOUS AT BEDTIME
Refills: 0 | Status: DISCONTINUED | OUTPATIENT
Start: 2019-09-28 | End: 2019-09-30

## 2019-09-28 RX ADMIN — HEPARIN SODIUM 5000 UNIT(S): 5000 INJECTION INTRAVENOUS; SUBCUTANEOUS at 21:32

## 2019-09-28 RX ADMIN — Medication 6 UNIT(S): at 01:23

## 2019-09-28 RX ADMIN — Medication 1000 UNIT(S): at 11:53

## 2019-09-28 RX ADMIN — Medication 3 MILLILITER(S): at 09:37

## 2019-09-28 RX ADMIN — Medication 100 MILLIGRAM(S): at 11:53

## 2019-09-28 RX ADMIN — CARVEDILOL PHOSPHATE 6.25 MILLIGRAM(S): 80 CAPSULE, EXTENDED RELEASE ORAL at 17:06

## 2019-09-28 RX ADMIN — AZITHROMYCIN 255 MILLIGRAM(S): 500 TABLET, FILM COATED ORAL at 20:34

## 2019-09-28 RX ADMIN — CARVEDILOL PHOSPHATE 6.25 MILLIGRAM(S): 80 CAPSULE, EXTENDED RELEASE ORAL at 06:22

## 2019-09-28 RX ADMIN — Medication 80 MILLIGRAM(S): at 17:06

## 2019-09-28 RX ADMIN — Medication 40 MILLIGRAM(S): at 13:42

## 2019-09-28 RX ADMIN — Medication 10: at 08:06

## 2019-09-28 RX ADMIN — HEPARIN SODIUM 5000 UNIT(S): 5000 INJECTION INTRAVENOUS; SUBCUTANEOUS at 13:42

## 2019-09-28 RX ADMIN — Medication 12: at 21:30

## 2019-09-28 RX ADMIN — Medication 12: at 11:53

## 2019-09-28 RX ADMIN — Medication 40 MILLIGRAM(S): at 06:21

## 2019-09-28 RX ADMIN — Medication 80 MILLIGRAM(S): at 06:22

## 2019-09-28 RX ADMIN — PREGABALIN 1000 MICROGRAM(S): 225 CAPSULE ORAL at 11:53

## 2019-09-28 RX ADMIN — Medication 8: at 17:05

## 2019-09-28 RX ADMIN — ATORVASTATIN CALCIUM 40 MILLIGRAM(S): 80 TABLET, FILM COATED ORAL at 21:32

## 2019-09-28 RX ADMIN — PANTOPRAZOLE SODIUM 40 MILLIGRAM(S): 20 TABLET, DELAYED RELEASE ORAL at 06:22

## 2019-09-28 RX ADMIN — Medication 40 MILLIGRAM(S): at 21:32

## 2019-09-28 RX ADMIN — Medication 5 MILLIGRAM(S): at 08:06

## 2019-09-28 RX ADMIN — Medication 325 MILLIGRAM(S): at 11:53

## 2019-09-28 RX ADMIN — Medication 81 MILLIGRAM(S): at 11:53

## 2019-09-28 RX ADMIN — Medication 3 MILLILITER(S): at 15:48

## 2019-09-28 RX ADMIN — CEFTRIAXONE 100 MILLIGRAM(S): 500 INJECTION, POWDER, FOR SOLUTION INTRAMUSCULAR; INTRAVENOUS at 20:34

## 2019-09-28 RX ADMIN — SENNA PLUS 2 TABLET(S): 8.6 TABLET ORAL at 21:32

## 2019-09-28 RX ADMIN — HEPARIN SODIUM 5000 UNIT(S): 5000 INJECTION INTRAVENOUS; SUBCUTANEOUS at 06:22

## 2019-09-28 RX ADMIN — INSULIN GLARGINE 20 UNIT(S): 100 INJECTION, SOLUTION SUBCUTANEOUS at 21:30

## 2019-09-28 RX ADMIN — Medication 3 MILLILITER(S): at 20:31

## 2019-09-28 NOTE — CHART NOTE - NSCHARTNOTEFT_GEN_A_CORE
EVENT: POCT Blood Glucose.: 447 mg/dL (09-28-19 @ 00:53)  POCT Blood Glucose.: 422 mg/dL (09-27-19 @ 22:08)      OBJECTIVE:  Vital Signs Last 24 Hrs  T(C): 36.6 (28 Sep 2019 00:55), Max: 36.9 (27 Sep 2019 15:47)  T(F): 97.9 (28 Sep 2019 00:55), Max: 98.5 (27 Sep 2019 15:47)  HR: 77 (28 Sep 2019 00:55) (70 - 86)  BP: 122/56 (28 Sep 2019 00:55) (122/56 - 148/86)  BP(mean): --  RR: 24 (28 Sep 2019 00:55) (20 - 24)  SpO2: 98% (28 Sep 2019 00:55) (94% - 98%)    FOCUSED PHYSICAL EXAM:    LABS:                        10.1   9.96  )-----------( 239      ( 27 Sep 2019 16:25 )             32.4   CARDIAC MARKERS ( 27 Sep 2019 19:51 )  0.049 ng/mL / x     / 281 U/L / x     / 1.7 ng/mL  CARDIAC MARKERS ( 27 Sep 2019 16:25 )  0.047 ng/mL / x     / x     / x     / x        09-27    136  |  95<L>  |  77<H>  ----------------------------<  312<H>  4.0   |  33<H>  |  3.43<H>    Ca    8.5      27 Sep 2019 16:25  Mg     2.7     09-27    TPro  7.6  /  Alb  3.0<L>  /  TBili  0.4  /  DBili  x   /  AST  28  /  ALT  27  /  AlkPhos  74  09-27      EKG:   IMGAGING:    ASSESSMENT:  HPI:  53 y/o  Female from [facility] of Obesity, CHF HFrEF 25 %, s/p AICD, HTN, B/l LE edema, DM, Gout, CAD, HLD, COPD  presented to ED with SOB. As per patient she had fever, chills, shortness of breath, dizzy, loss of appetite, weight loss, muscle aches on Tuesday and she was diagnosed with Flu. She was given antibiotics and treatment for flu. But she took medications for 2 days. She was saturating 77% on admission and gasping for breath. In ED she was on NRB and saturated 97%. She had sick contacts at her place diagnosed with Flu. Patient denies cough, sputum production, fevers/chills/nausea/vomiting. No diarrhea, abdominal pain.  [facility] FUll CODE (27 Sep 2019 19:52)      PLAN:     FOLLOW UP / RESULT: EVENT: POCT Blood Glucose.: 447 mg/dL (09-28-19 @ 00:53)             POCT Blood Glucose.: 422 mg/dL (09-27-19 @ 22:08)      OBJECTIVE:  Vital Signs Last 24 Hrs  T(C): 36.6 (28 Sep 2019 00:55), Max: 36.9 (27 Sep 2019 15:47)  T(F): 97.9 (28 Sep 2019 00:55), Max: 98.5 (27 Sep 2019 15:47)  HR: 77 (28 Sep 2019 00:55) (70 - 86)  BP: 122/56 (28 Sep 2019 00:55) (122/56 - 148/86)  BP(mean): --  RR: 24 (28 Sep 2019 00:55) (20 - 24)  SpO2: 98% (28 Sep 2019 00:55) (94% - 98%)    FOCUSED PHYSICAL EXAM:  GENERAL: Morbidly obese, sitting up in bed  EYES: EOMI, PERRLA, conjunctiva and sclera clear  NECK: Supple, No JVD  CHEST/LUNG: BiPAP in place, decreased at lung bases  ABDOMEN: Obese, rounded, Nontender, Nondistended, Normal bowel sounds  EXTREMITIES:  2+ Peripheral Pulses, 2+ pedal edema  Skin: Warm and dry    LABS:                        10.1   9.96  )-----------( 239      ( 27 Sep 2019 16:25 )             32.4   CARDIAC MARKERS ( 27 Sep 2019 19:51 )  0.049 ng/mL / x     / 281 U/L / x     / 1.7 ng/mL  CARDIAC MARKERS ( 27 Sep 2019 16:25 )  0.047 ng/mL / x     / x     / x     / x        09-27    136  |  95<L>  |  77<H>  ----------------------------<  312<H>  4.0   |  33<H>  |  3.43<H>    Ca    8.5      27 Sep 2019 16:25  Mg     2.7     09-27    TPro  7.6  /  Alb  3.0<L>  /  TBili  0.4  /  DBili  x   /  AST  28  /  ALT  27  /  AlkPhos  74  09-27    IMAGING:  EXAM:  XR CHEST AP OR PA 1V                        PROCEDURE DATE:  09/27/2019    INTERPRETATION:  Chest radiograph (one view)     CPT 03848  CLINICAL INFORMATION:  Patient is unable to communicate.  Short of   breath.   TECHNIQUE:  Single frontal view of the chest was obtained.  FINDINGS:  Prior study dated 11/1/2018 was available for review.  The lungs demonstrate increased pulmonary vascular congestion. No gross   consolidation is seen. The right costophrenic angle appears sharp.   Pacemaker apparatus obscures the left costophrenic angle and left lower   lung field limiting evaluation. The heart is difficult to evaluate.    Pacemaker present.  IMPRESSION: Increased pulmonary vascular congestion noted. No gross   consolidation is seen. Pacemaker present.    ASSESSMENT:  HPI:  53 y/o  Female from [facility] of Obesity, CHF HFrEF 25 %, s/p AICD, HTN, B/l LE edema, DM, Gout, CAD, HLD, COPD  presented to ED with SOB. As per patient she had fever, chills, shortness of breath, dizzy, loss of appetite, weight loss, muscle aches on Tuesday and she was diagnosed with Flu. She was given antibiotics and treatment for flu. But she took medications for 2 days. She was saturating 77% on admission and gasping for breath. In ED she was on NRB and saturated 97%. She had sick contacts at her place diagnosed with Flu. Patient denies cough, sputum production, fevers/chills/nausea/vomiting. No diarrhea, abdominal pain.  [facility] FUll CODE (27 Sep 2019 19:52)    PLAN:   Problem: Elevated CBG due to DM  1. Insulin lispro Injectable (HumaLOG) 6 Unit(s) SubCutaneous once lachelle  2. Cont insulin lispro (HumaLOG) corrective regimen sliding scale   SubCutaneous Before meals and at bedtime  3. F/u HA1c  4. Cont dextrose 40% Gel 15 Gram(s) Oral once PRN Blood Glucose LESS THAN 70 milliGRAM(s)/deciLiter  5. Cont glucagon  Injectable 1 milliGRAM(s) IntraMuscular once PRN Glucose <70 milliGRAM(s)/deciLiter    FOLLOW UP / RESULT: EVENT: POCT Blood Glucose.: 447 mg/dL (09-28-19 @ 00:53)              POCT Blood Glucose.: 422 mg/dL (09-27-19 @ 22:08)      OBJECTIVE:  Vital Signs Last 24 Hrs  T(C): 36.6 (28 Sep 2019 00:55), Max: 36.9 (27 Sep 2019 15:47)  T(F): 97.9 (28 Sep 2019 00:55), Max: 98.5 (27 Sep 2019 15:47)  HR: 77 (28 Sep 2019 00:55) (70 - 86)  BP: 122/56 (28 Sep 2019 00:55) (122/56 - 148/86)  BP(mean): --  RR: 24 (28 Sep 2019 00:55) (20 - 24)  SpO2: 98% (28 Sep 2019 00:55) (94% - 98%)    FOCUSED PHYSICAL EXAM:  GENERAL: Morbidly obese, sitting up in bed  EYES: EOMI, PERRLA, conjunctiva and sclera clear  NECK: Supple, No JVD  CHEST/LUNG: BiPAP in place, decreased lung sounds at bases  ABDOMEN: Obese, rounded, Nontender, Nondistended, Normal bowel sounds  EXTREMITIES:  2+ Peripheral Pulses, 2+ pedal edema  Skin: Warm and dry    LABS:                        10.1   9.96  )-----------( 239      ( 27 Sep 2019 16:25 )             32.4   CARDIAC MARKERS ( 27 Sep 2019 19:51 )  0.049 ng/mL / x     / 281 U/L / x     / 1.7 ng/mL  CARDIAC MARKERS ( 27 Sep 2019 16:25 )  0.047 ng/mL / x     / x     / x     / x        09-27    136  |  95<L>  |  77<H>  ----------------------------<  312<H>  4.0   |  33<H>  |  3.43<H>    Ca    8.5      27 Sep 2019 16:25  Mg     2.7     09-27    TPro  7.6  /  Alb  3.0<L>  /  TBili  0.4  /  DBili  x   /  AST  28  /  ALT  27  /  AlkPhos  74  09-27    IMAGING:  EXAM:  XR CHEST AP OR PA 1V                        PROCEDURE DATE:  09/27/2019    INTERPRETATION:  Chest radiograph (one view)     CPT 33012  CLINICAL INFORMATION:  Patient is unable to communicate.  Short of   breath.   TECHNIQUE:  Single frontal view of the chest was obtained.  FINDINGS:  Prior study dated 11/1/2018 was available for review.  The lungs demonstrate increased pulmonary vascular congestion. No gross   consolidation is seen. The right costophrenic angle appears sharp.   Pacemaker apparatus obscures the left costophrenic angle and left lower   lung field limiting evaluation. The heart is difficult to evaluate.    Pacemaker present.  IMPRESSION: Increased pulmonary vascular congestion noted. No gross   consolidation is seen. Pacemaker present.    ASSESSMENT:  HPI:  53 y/o  Female from [facility] of Obesity, CHF HFrEF 25 %, s/p AICD, HTN, B/l LE edema, DM, Gout, CAD, HLD, COPD  presented to ED with SOB. As per patient she had fever, chills, shortness of breath, dizzy, loss of appetite, weight loss, muscle aches on Tuesday and she was diagnosed with Flu. She was given antibiotics and treatment for flu. But she took medications for 2 days. She was saturating 77% on admission and gasping for breath. In ED she was on NRB and saturated 97%. She had sick contacts at her place diagnosed with Flu. Patient denies cough, sputum production, fevers/chills/nausea/vomiting. No diarrhea, abdominal pain.  [facility] FUll CODE (27 Sep 2019 19:52)    PLAN:   Problem: Elevated CBG due to DM  1. Insulin lispro Injectable (HumaLOG) 6 Unit(s) SubCutaneous once lachelle  2. Cont insulin lispro (HumaLOG) corrective regimen sliding scale   SubCutaneous Before meals and at bedtime  3. Resume PTA glipiZIDE XL 5 milliGRAM(s) Oral with breakfast  4. Cont dextrose 40% Gel 15 Gram(s) Oral once PRN Blood Glucose LESS THAN 70 milliGRAM(s)/deciLiter  5. Cont glucagon  Injectable 1 milliGRAM(s) IntraMuscular once PRN Glucose <70 milliGRAM(s)/deciLiter  6. HA1c with am labs  7. Diet CC- no snack  8. Continue POCT as ordered    FOLLOW UP / RESULT:  HA1c EVENT:  POCT Blood Glucose.: 447 mg/dL (09-28-19 @ 00:53)              POCT Blood Glucose.: 422 mg/dL (09-27-19 @ 22:08)    OBJECTIVE:  Vital Signs Last 24 Hrs  T(C): 36.6 (28 Sep 2019 00:55), Max: 36.9 (27 Sep 2019 15:47)  T(F): 97.9 (28 Sep 2019 00:55), Max: 98.5 (27 Sep 2019 15:47)  HR: 77 (28 Sep 2019 00:55) (70 - 86)  BP: 122/56 (28 Sep 2019 00:55) (122/56 - 148/86)  BP(mean): --  RR: 24 (28 Sep 2019 00:55) (20 - 24)  SpO2: 98% (28 Sep 2019 00:55) (94% - 98%)    FOCUSED PHYSICAL EXAM:  GENERAL: Morbidly obese, sitting up in bed  EYES: EOMI, PERRLA, conjunctiva and sclera clear  NECK: Supple, No JVD  CHEST/LUNG: BiPAP in place, decreased lung sounds at bases  ABDOMEN: Obese, rounded, Nontender, Nondistended, Normal bowel sounds  EXTREMITIES:  2+ Peripheral Pulses, 2+ pedal edema  Skin: Warm and dry    LABS:                        10.1   9.96  )-----------( 239      ( 27 Sep 2019 16:25 )             32.4   CARDIAC MARKERS ( 27 Sep 2019 19:51 )  0.049 ng/mL / x     / 281 U/L / x     / 1.7 ng/mL  CARDIAC MARKERS ( 27 Sep 2019 16:25 )  0.047 ng/mL / x     / x     / x     / x        09-27    136  |  95<L>  |  77<H>  ----------------------------<  312<H>  4.0   |  33<H>  |  3.43<H>    Ca    8.5      27 Sep 2019 16:25  Mg     2.7     09-27    TPro  7.6  /  Alb  3.0<L>  /  TBili  0.4  /  DBili  x   /  AST  28  /  ALT  27  /  AlkPhos  74  09-27    IMAGING:  EXAM:  XR CHEST AP OR PA 1V                        PROCEDURE DATE:  09/27/2019    INTERPRETATION:  Chest radiograph (one view)     CPT 43018  CLINICAL INFORMATION:  Patient is unable to communicate.  Short of   breath.   TECHNIQUE:  Single frontal view of the chest was obtained.  FINDINGS:  Prior study dated 11/1/2018 was available for review.  The lungs demonstrate increased pulmonary vascular congestion. No gross   consolidation is seen. The right costophrenic angle appears sharp.   Pacemaker apparatus obscures the left costophrenic angle and left lower   lung field limiting evaluation. The heart is difficult to evaluate.    Pacemaker present.  IMPRESSION: Increased pulmonary vascular congestion noted. No gross   consolidation is seen. Pacemaker present.    ASSESSMENT:  HPI:  55 y/o  Female from [facility] of Obesity, CHF HFrEF 25 %, s/p AICD, HTN, B/l LE edema, DM, Gout, CAD, HLD, COPD  presented to ED with SOB. As per patient she had fever, chills, shortness of breath, dizzy, loss of appetite, weight loss, muscle aches on Tuesday and she was diagnosed with Flu. She was given antibiotics and treatment for flu. But she took medications for 2 days. She was saturating 77% on admission and gasping for breath. In ED she was on NRB and saturated 97%. She had sick contacts at her place diagnosed with Flu. Patient denies cough, sputum production, fevers/chills/nausea/vomiting. No diarrhea, abdominal pain.  [facility] FUll CODE (27 Sep 2019 19:52)    PLAN:   Problem: Elevated CBG due to DM  1. Insulin lispro Injectable (HumaLOG) 6 Unit(s) SubCutaneous once lachelle  2. Cont insulin lispro (HumaLOG) corrective regimen sliding scale   SubCutaneous Before meals and at bedtime  3. Resume PTA glipiZIDE XL 5 milliGRAM(s) Oral with breakfast  4. Cont dextrose 40% Gel 15 Gram(s) Oral once PRN Blood Glucose LESS THAN 70 milliGRAM(s)/deciLiter  5. Cont glucagon  Injectable 1 milliGRAM(s) IntraMuscular once PRN Glucose <70 milliGRAM(s)/deciLiter  6. HA1c with am labs  7. Diet CC- no snack  8. Continue POCT as ordered    FOLLOW UP / RESULT:  HA1c EVENT:  POCT Blood Glucose.: 447 mg/dL (09-28-19 @ 00:53)              POCT Blood Glucose.: 422 mg/dL (09-27-19 @ 22:08)    OBJECTIVE:  Vital Signs Last 24 Hrs  T(C): 36.6 (28 Sep 2019 00:55), Max: 36.9 (27 Sep 2019 15:47)  T(F): 97.9 (28 Sep 2019 00:55), Max: 98.5 (27 Sep 2019 15:47)  HR: 77 (28 Sep 2019 00:55) (70 - 86)  BP: 122/56 (28 Sep 2019 00:55) (122/56 - 148/86)  BP(mean): --  RR: 24 (28 Sep 2019 00:55) (20 - 24)  SpO2: 98% (28 Sep 2019 00:55) (94% - 98%)    FOCUSED PHYSICAL EXAM:  GENERAL: Morbidly obese, sitting up in bed  EYES: EOMI, PERRLA, conjunctiva and sclera clear  NECK: Supple, No JVD  CHEST/LUNG: BiPAP in place, decreased lung sounds at bases  ABDOMEN: Obese, rounded, Nontender, Nondistended, Normal bowel sounds  EXTREMITIES:  2+ Peripheral Pulses, 2+ pedal edema  Skin: Warm and dry    LABS:                        10.1   9.96  )-----------( 239      ( 27 Sep 2019 16:25 )             32.4   CARDIAC MARKERS ( 27 Sep 2019 19:51 )  0.049 ng/mL / x     / 281 U/L / x     / 1.7 ng/mL  CARDIAC MARKERS ( 27 Sep 2019 16:25 )  0.047 ng/mL / x     / x     / x     / x        09-27    136  |  95<L>  |  77<H>  ----------------------------<  312<H>  4.0   |  33<H>  |  3.43<H>    Ca    8.5      27 Sep 2019 16:25  Mg     2.7     09-27    TPro  7.6  /  Alb  3.0<L>  /  TBili  0.4  /  DBili  x   /  AST  28  /  ALT  27  /  AlkPhos  74  09-27    IMAGING:  EXAM:  XR CHEST AP OR PA 1V                        PROCEDURE DATE:  09/27/2019    INTERPRETATION:  Chest radiograph (one view)     CPT 64939  CLINICAL INFORMATION:  Patient is unable to communicate.  Short of   breath.   TECHNIQUE:  Single frontal view of the chest was obtained.  FINDINGS:  Prior study dated 11/1/2018 was available for review.  The lungs demonstrate increased pulmonary vascular congestion. No gross   consolidation is seen. The right costophrenic angle appears sharp.   Pacemaker apparatus obscures the left costophrenic angle and left lower   lung field limiting evaluation. The heart is difficult to evaluate.    Pacemaker present.  IMPRESSION: Increased pulmonary vascular congestion noted. No gross   consolidation is seen. Pacemaker present.    ASSESSMENT:  HPI:  55 y/o  Female from [facility] of Obesity, CHF HFrEF 25 %, s/p AICD, HTN, B/l LE edema, DM, Gout, CAD, HLD, COPD  presented to ED with SOB. As per patient she had fever, chills, shortness of breath, dizzy, loss of appetite, weight loss, muscle aches on Tuesday and she was diagnosed with Flu. She was given antibiotics and treatment for flu. But she took medications for 2 days. She was saturating 77% on admission and gasping for breath. In ED she was on NRB and saturated 97%. She had sick contacts at her place diagnosed with Flu. Patient denies cough, sputum production, fevers/chills/nausea/vomiting. No diarrhea, abdominal pain.  [facility] FUll CODE (27 Sep 2019 19:52)    PLAN:   Problem: Elevated capillary blood glucose due to DM  1. Insulin lispro Injectable (Humalog) 6 Unit(s) Subcutaneous once now  2. Cont insulin lispro (Humalog) corrective regimen sliding scale   Subcutaneous Before meals and at bedtime  3. Resume PTA glipizide XL 5 jessie GRAM(s) Oral with breakfast  4. Cont dextrose 40% Gel 15 Gram(s) Oral once PRN Blood Glucose LESS THAN 70 jessie GRAM(s)/decilitre  5. Cont glucagon  Injectable 1 jessie GRAM(s) Intramuscular once PRN Glucose <70 jessie GRAM(s)/deciliter  6. HA1c with am labs  7. Diet CC- no snack  8. Continue POCT blood glucose as ordered    FOLLOW UP / RESULT:  HA1c

## 2019-09-28 NOTE — CONSULT NOTE ADULT - ATTENDING COMMENTS
Agree with above assessment and plan as outlined above. Briefly 53 yo F with known Severe NICM, single chamber  ICD CKD admitted with acute decompensated systolic heart failure    - IV lasix to keep net negative  - Echo  - Will follow    Manuel Gold MD, [facility]  BEEPER [phone]

## 2019-09-28 NOTE — CONSULT NOTE ADULT - ASSESSMENT
A/P:  1.ANJU ON CKD(stage 3): R/O ANJU secondary to pre-renal azotemia ,secondary to chf plus pulmonary infection, less likley other cause like obstructive uropathy/ATN  -suggest renal us  -f/u urine protein  CKD stage 3 due to DM/HTN/obesity, less likley primary GN   -Keep patient euvolemic and Diabetic renal diet  -Avoid Nephrotoxic Meds/ Agents such as (NSAIDs, IV contrast, Aminoglycosides such as gentamicin, -Gadolinium contrast, Phosphate containing enemas, etc..)  -Adjust Medications according to eGFR  -f/u bmp daily  2.HTN: bp is controlled  -goal bp >110/70 and <130/80  -low salt diet  3.ANEMIA: mild, r/o secondary to ckd plus infection vs irn deficiency?  -suggest iron studies , stool for ob x 2  -f/u Hb daily  4.RESPIRATORY ACIDOISS ;with renal compensation and hypoxia  -suggest high o2 via NC , f/u ABG  -Suggest Pulmonary consult

## 2019-09-28 NOTE — CONSULT NOTE ADULT - SUBJECTIVE AND OBJECTIVE BOX
Patient is a 54y Female whom presented to the hospital with     HPI:  53 y/o  Female from [facility] PMH of Obesity, CHF HFrEF 25 %, s/p AICD, HTN, B/l LE edema, DM, Gout, CAD, HLD, COPD  presented to ED with SOB. As per patient she had fever, chills, shortness of breath, dizzy, loss of appetite, weight loss, muscle aches on Tuesday and she was diagnosed with Flu. She was given antibiotics and treatment for flu. But she took medications for 2 days. She was saturating 77% on admission and gasping for breath. In ED she was on NRB and saturated 97%. She had sick contacts at her place diagnosed with Flu. Patient denies cough, sputum production, fevers/chills/nausea/vomiting. No diarrhea, abdominal pain.  [facility] FUll CODE (27 Sep 2019 19:52)    pts current chart reviewed and case discussed with resident  pt denies pmh of renal ds, proteinuria, renal stones, recurrent uti or nephrotic edema or nephrotic syndrome  pt give pmh of retinopathy and s/p laser treatment  pt denies Nsaids use or otc other nephrotoxic supplements  PAST MEDICAL & SURGICAL HISTORY:  GERD (gastroesophageal reflux disease)  CHF (congestive heart failure)  CAD (coronary artery disease)  AICD (automatic cardioverter/defibrillator) present  COPD (chronic obstructive pulmonary disease)  RA (rheumatoid arthritis)  DM (diabetes mellitus)  HTN (hypertension)  ICD (implantable cardioverter-defibrillator) in place  History of cholecystectomy      Home Medications: Reviewed    MEDICATIONS  (STANDING):  ALBUTerol    90 MICROgram(s) HFA Inhaler 2 Puff(s) Inhalation every 6 hours  ALBUTerol/ipratropium for Nebulization 3 milliLiter(s) Nebulizer every 6 hours  allopurinol 100 milliGRAM(s) Oral daily  aspirin  chewable 81 milliGRAM(s) Oral daily  atorvastatin 40 milliGRAM(s) Oral at bedtime  azithromycin  IVPB 500 milliGRAM(s) IV Intermittent every 24 hours  azithromycin  IVPB      carvedilol 6.25 milliGRAM(s) Oral every 12 hours  cefTRIAXone   IVPB 1000 milliGRAM(s) IV Intermittent every 24 hours  cefTRIAXone   IVPB      cholecalciferol 1000 Unit(s) Oral daily  cyanocobalamin 1000 MICROGram(s) Oral daily  dextrose 50% Injectable 25 Gram(s) IV Push once  dextrose 50% Injectable 25 Gram(s) IV Push once  docusate sodium 100 milliGRAM(s) Oral daily  ferrous    sulfate 325 milliGRAM(s) Oral daily  furosemide   Injectable 80 milliGRAM(s) IV Push two times a day  glipiZIDE XL 5 milliGRAM(s) Oral with breakfast  heparin  Injectable 5000 Unit(s) SubCutaneous every 8 hours  influenza   Vaccine 0.5 milliLiter(s) IntraMuscular once  insulin lispro (HumaLOG) corrective regimen sliding scale   SubCutaneous Before meals and at bedtime  methylPREDNISolone sodium succinate Injectable 40 milliGRAM(s) IV Push every 8 hours  pantoprazole    Tablet 40 milliGRAM(s) Oral before breakfast  senna 2 Tablet(s) Oral at bedtime    MEDICATIONS  (PRN):      Allergies    codeine (Urticaria)    Intolerances        SOCIAL HISTORY:  Alcohol use [X ] No  [ ] Yes  Smoking  [ X] No  [ ] Yes  Drug Abuse [X ] No  [ ] Yes  Tattoo [X ] No  [ ] Yes  History of Blood transfusion [ X] No  [ ] Yes    FAMILY HISTORY:  Family history of hypertension in mother      Diabetes [ ]  Hypertension [ ]  Kidney Stones [ ]  Heart Disease [ ]  Hyperlipidemia [ ]  Cancer [ ]    REVIEW OF SYSTEMS:  CONSTITUTIONAL: No weakness, fevers or chills  EYES/ENT: No visual changes;  No vertigo or throat pain   NECK: No pain or stiffness  RESPIRATORY: No cough, wheezing, hemoptysis; No shortness of breath  CARDIOVASCULAR: No chest pain or palpitations  GASTROINTESTINAL: No abdominal or epigastric pain. No nausea, vomiting, or hematemesis; No diarrhea or constipation. No melena or hematochezia.  GENITOURINARY: No dysuria, frequency or hematuria  NEUROLOGICAL: No numbness or weakness  SKIN: No itching, burning, rashes, or lesions   All other review of systems is negative unless indicated above    Vital Signs  T(F): 98.2 (19 @ 11:25), Max: 98.5 (19 @ 15:47)  HR: 74 (19 @ 11:25) (66 - 86)  BP: 126/66 (19 @ 11:25) (122/56 - 148/86)  ABP: --  RR: 18 (19 @ 11:25) (18 - 24)  SpO2: 95% (19 @ 11:25) (92% - 99%)  Wt(kg): --  CVP(cm H2O): --  CO: --  PCWP: --    I and O's:    Daily Height in cm: 165.1 (27 Sep 2019 14:28)    Daily Weight in k (28 Sep 2019 04:44)    PHYSICAL EXAM:  Constitutional: well developed, well nourished  and in nad  HEENT: PERRLA,  no icteric sclera and mild pallor of conjunctiva noted  Neck: No JVD, thyromegaly or adenopathy  Respiratory: reduced air entry lower lungs with no rales, wheezing or rhonchi  Cardiovascular: S1 and S2 normally heard  Gastrointestinal: soft, nondistended, nontender and normal bowel sounds heard  Extremities: No peripheral edema or cyanosis  Neurological: A/O x 3, no focal deficits  Psychiatric: Normal mood, normal affect  : No flank tenderness  Skin: No rashes        LABS:                        10.0   10.81 )-----------( 269      ( 28 Sep 2019 10:22 )             32.1               134<L>  |  91<L>  |  86<H>  ----------------------------<  344<H>  3.7   |  33<H>  |  3.22<H>      136  |  95<L>  |  77<H>  ----------------------------<  312<H>  4.0   |  33<H>  |  3.43<H>    Ca    8.6      28 Sep 2019 10:23  Ca    8.5      27 Sep 2019 16:25  Mg     2.7         TPro  7.6  /  Alb  3.0<L>  /  TBili  0.4  /  DBili  x   /  AST    /  ALT  27  /  AlkPhos  74            URINE STUDIES:      Osmolality, Random Urine: 372 mos/kg (19 @ 09:19)  Sodium, Random Urine: 61 mmol/L (19 @ 09:19)  Creatinine, Random Urine: 62 mg/dL (19 @ 09:19)                RADIOLOGY & ADDITIONAL STUDIES: Patient is a 54y Female whom presented to the hospital with sob , being treated for pulmonary infection with hypoxia and respiratory acidosis and fluid overload ,noted to have abnormal renal function    Medical HPI:  55 y/o  Female from [facility] PMH of Obesity, CHF HFrEF 25 %, s/p AICD, HTN, B/l LE edema, DM, Gout, CAD, HLD, COPD  presented to ED with SOB. As per patient she had fever, chills, shortness of breath, dizzy, loss of appetite, weight loss, muscle aches on Tuesday and she was diagnosed with Flu. She was given antibiotics and treatment for flu. But she took medications for 2 days. She was saturating 77% on admission and gasping for breath. In ED she was on NRB and saturated 97%. She had sick contacts at her place diagnosed with Flu. Patient denies cough, sputum production, fevers/chills/nausea/vomiting. No diarrhea, abdominal pain.  [facility] FUll CODE (27 Sep 2019 19:52)  Renal HPI:  pts current chart reviewed and case discussed with resident  pt is known to have ckd -stage 3 with baseline cr 1.4 as per old labs but pt is not aware of renal ds   pt denies pmh of renal ds, proteinuria, renal stones, recurrent uti or nephrotic edema or nephrotic syndrome  pt denies Nsaids use or otc other nephrotoxic supplements recently  pt currently feels ok and denies cp,sob ,gi symptoms ,skin rash ,fever ,flank pain,gross hematuria ,joint pain or wt loss  pt does have reduced urine output despite high dose Lasix last 18 hrs  pt has been refusing f/c placement and 150 ml bladder volume noted on bladder scan at bedside  pt has long standing CHF since , s/p AICD Placement  PAST MEDICAL & SURGICAL HISTORY:  GERD (gastroesophageal reflux disease)  CHF (congestive heart failure)  CAD (coronary artery disease)  AICD (automatic cardioverter/defibrillator) present  COPD (chronic obstructive pulmonary disease)  RA (rheumatoid arthritis)  DM (diabetes mellitus)  HTN (hypertension)  ICD (implantable cardioverter-defibrillator) in place  History of cholecystectomy  s/p     Home Medications: Reviewed    MEDICATIONS  (STANDING):  ALBUTerol    90 MICROgram(s) HFA Inhaler 2 Puff(s) Inhalation every 6 hours  ALBUTerol/ipratropium for Nebulization 3 milliLiter(s) Nebulizer every 6 hours  allopurinol 100 milliGRAM(s) Oral daily  aspirin  chewable 81 milliGRAM(s) Oral daily  atorvastatin 40 milliGRAM(s) Oral at bedtime  azithromycin  IVPB 500 milliGRAM(s) IV Intermittent every 24 hours  azithromycin  IVPB      carvedilol 6.25 milliGRAM(s) Oral every 12 hours  cefTRIAXone   IVPB 1000 milliGRAM(s) IV Intermittent every 24 hours  cefTRIAXone   IVPB      cholecalciferol 1000 Unit(s) Oral daily  cyanocobalamin 1000 MICROGram(s) Oral daily  dextrose 50% Injectable 25 Gram(s) IV Push once  dextrose 50% Injectable 25 Gram(s) IV Push once  docusate sodium 100 milliGRAM(s) Oral daily  ferrous    sulfate 325 milliGRAM(s) Oral daily  furosemide   Injectable 80 milliGRAM(s) IV Push two times a day  glipiZIDE XL 5 milliGRAM(s) Oral with breakfast  heparin  Injectable 5000 Unit(s) SubCutaneous every 8 hours  influenza   Vaccine 0.5 milliLiter(s) IntraMuscular once  insulin lispro (HumaLOG) corrective regimen sliding scale   SubCutaneous Before meals and at bedtime  methylPREDNISolone sodium succinate Injectable 40 milliGRAM(s) IV Push every 8 hours  pantoprazole    Tablet 40 milliGRAM(s) Oral before breakfast  senna 2 Tablet(s) Oral at bedtime    MEDICATIONS  (PRN):      Allergies    codeine (Urticaria)    Intolerances        SOCIAL HISTORY:  Alcohol use [X ] No  [ ] Yes  Smoking  [ X] No  [ ] Yes  Drug Abuse [X ] No  [ ] Yes  Tattoo [X ] No  [ ] Yes  History of Blood transfusion [ X] No  [ ] Yes    FAMILY HISTORY:  Family history of hypertension in mother        REVIEW OF SYSTEMS:  CONSTITUTIONAL: No weakness, fevers or chills  EYES/ENT: No visual changes;  No vertigo or throat pain   NECK: No pain or stiffness  RESPIRATORY: + cough and  wheezing, no hemoptysis; No shortness of breath  CARDIOVASCULAR: No chest pain or palpitations  GASTROINTESTINAL: No abdominal or epigastric pain. No nausea, vomiting, or hematemesis; No diarrhea or constipation. No melena or hematochezia.  GENITOURINARY: No dysuria, frequency or hematuria  NEUROLOGICAL: No numbness or weakness  SKIN: No itching, burning, rashes, or lesions   All other review of systems is negative unless indicated above    Vital Signs  T(F): 98.2 (19 @ 11:25), Max: 98.5 (19 @ 15:47)  HR: 74 (19 @ 11:25) (66 - 86)  BP: 126/66 (19 @ 11:25) (122/56 - 148/86)  ABP: --  RR: 18 (19 @ 11:25) (18 - 24)  SpO2: 95% (19 @ 11:25) (92% - 99%)  Wt(kg): --  CVP(cm H2O): --  CO: --  PCWP: --    I and O's:    Daily Height in cm: 165.1 (27 Sep 2019 14:28)    Daily Weight in k (28 Sep 2019 04:44)    PHYSICAL EXAM:  Constitutional: well developed, obese  and in nad  HEENT: PERRLA,  no icteric sclera and mild pallor of conjunctiva noted  Neck: No JVD, thyromegaly or adenopathy  Respiratory: reduced air entry lower lungs with no rales, wheezing or rhonchi  Cardiovascular: S1 and S2 normally heard  Gastrointestinal: soft, nondistended, nontender and normal bowel sounds heard  +rt lower abdominal large hernia , s/p cholecystectomy  Extremities: Trace peripheral edema noted in both LE s  Neurological: A/O x 3, no focal deficits  : No flank tenderness  Skin: No rashes        LABS:                        10.0   10.81 )-----------( 269      ( 28 Sep 2019 10:22 )             32.1               134<L>  |  91<L>  |  86<H>  ----------------------------<  344<H>  3.7   |  33<H>  |  3.22<H>      136  |  95<L>  |  77<H>  ----------------------------<  312<H>  4.0   |  33<H>  |  3.43<H>    Ca    8.6      28 Sep 2019 10:23  Ca    8.5      27 Sep 2019 16:25  Mg     2.7         TPro  7.6  /  Alb  3.0<L>  /  TBili  0.4  /  DBili  x   /  AST  28  /  ALT  27  /  AlkPhos  74            URINE STUDIES:  Osmolality, Random Urine: 372 mos/kg (19 @ 09:19)  Sodium, Random Urine: 61 mmol/L (19 @ 09:19)  Creatinine, Random Urine: 62 mg/dL (19 @ 09:19)    Urinalysis + Microscopic Examination (18 @ 19:00)    Blood, Urine: Large    Glucose Qualitative, Urine: Negative    Bilirubin: Negative    Ketone - Urine: Negative    Nitrite: Negative    Leukocyte Esterase Concentration: Small    Specific Gravity: 1.020    Urobilinogen: 1    Urine Appearance: Slightly Turbid    Protein, Urine: 100    Color: Ruthann    pH Urine: 5.0    Red Blood Cell - Urine: >50 /HPF    White Blood Cell - Urine: 0-2 /HPF    Epithelial Cells: Few /HPF    Bacteria: Trace /HPF                RADIOLOGY & ADDITIONAL STUDIES:    < from: Xray Chest 1 View AP/PA (19 @ 17:09) >  EXAM:  XR CHEST AP OR PA 1V                            PROCEDURE DATE:  2019          INTERPRETATION:  Chest radiograph (one view)     CPT 99812    CLINICAL INFORMATION:  Patient is unable to communicate.  Short of   breath.     TECHNIQUE:  Single frontal view of the chest was obtained.    FINDINGS:  Prior study dated 2018 was available for review.    The lungs demonstrate increased pulmonary vascular congestion. No gross   consolidation is seen. The right costophrenic angle appears sharp.   Pacemaker apparatus obscures the left costophrenic angle and left lower   lung field limiting evaluation. The heart is difficult to evaluate.    Pacemaker present.      IMPRESSION: Increased pulmonary vascular congestion noted. No gross   consolidation is seen. Pacemaker present.      < end of copied text >

## 2019-09-28 NOTE — PROGRESS NOTE ADULT - SUBJECTIVE AND OBJECTIVE BOX
Patient is a 54y old  Female who presents with a chief complaint of shortness of breath (28 Sep 2019 14:25)    PATIENT IS SEEN AND EXAMINED IN MEDICAL FLOOR.  ALLERGIES:  codeine (Urticaria)  Daily Weight in k (28 Sep 2019 04:44)    VITALS:    Vital Signs Last 24 Hrs  T(C): 36.4 (28 Sep 2019 15:58), Max: 36.8 (28 Sep 2019 07:30)  T(F): 97.5 (28 Sep 2019 15:58), Max: 98.2 (28 Sep 2019 07:30)  HR: 69 (28 Sep 2019 15:58) (66 - 86)  BP: 148/81 (28 Sep 2019 15:58) (122/56 - 148/86)  BP(mean): --  RR: 20 (28 Sep 2019 15:58) (18 - 24)  SpO2: 99% (28 Sep 2019 15:58) (92% - 99%)    LABS:    CBC Full  -  ( 28 Sep 2019 10:22 )  WBC Count : 10.81 K/uL  RBC Count : 3.37 M/uL  Hemoglobin : 10.0 g/dL  Hematocrit : 32.1 %  Platelet Count - Automated : 269 K/uL  Mean Cell Volume : 95.3 fl  Mean Cell Hemoglobin : 29.7 pg  Mean Cell Hemoglobin Concentration : 31.2 gm/dL  Auto Neutrophil # : x  Auto Lymphocyte # : x  Auto Monocyte # : x  Auto Eosinophil # : x  Auto Basophil # : x  Auto Neutrophil % : x  Auto Lymphocyte % : x  Auto Monocyte % : x  Auto Eosinophil % : x  Auto Basophil % : x    PT/INR - ( 27 Sep 2019 16:25 )   PT: 14.1 sec;   INR: 1.26 ratio      PTT - ( 27 Sep 2019 16:25 )  PTT:24.5 sec      134<L>  |  91<L>  |  86<H>  ----------------------------<  344<H>  3.7   |  33<H>  |  3.22<H>    Ca    8.6      28 Sep 2019 10:23  Mg     2.7         TPro  7.6  /  Alb  3.0<L>  /  TBili  0.4  /  DBili  x   /  AST    /  ALT    /  AlkPhos  74      CAPILLARY BLOOD GLUCOSE    POCT Blood Glucose.: 412 mg/dL (28 Sep 2019 11:40)  POCT Blood Glucose.: 375 mg/dL (28 Sep 2019 07:53)  POCT Blood Glucose.: 447 mg/dL (28 Sep 2019 00:53)  POCT Blood Glucose.: 422 mg/dL (27 Sep 2019 22:08)    CARDIAC MARKERS ( 28 Sep 2019 07:48 )  0.019 ng/mL / x     / 211 U/L / x     / 2.1 ng/mL  CARDIAC MARKERS ( 27 Sep 2019 19:51 )  0.049 ng/mL / x     / 281 U/L / x     / 1.7 ng/mL  CARDIAC MARKERS ( 27 Sep 2019 16:25 )  0.047 ng/mL / x     / x     / x     / x          LIVER FUNCTIONS - ( 27 Sep 2019 16:25 )  Alb: 3.0 g/dL / Pro: 7.6 g/dL / ALK PHOS: 74 U/L / ALT: 27 U/L DA / AST: 28 U/L / GGT: x           Creatinine Trend: 3.22<--, 3.43<--  I&O's Summary      ABG - ( 28 Sep 2019 13:44 )  pH, Arterial: 7.38  pH, Blood: x     /  pCO2: 54    /  pO2: 46    / HCO3: 32    / Base Excess: 6.0   /  SaO2: 79          MEDICATIONS:    MEDICATIONS  (STANDING):  ALBUTerol    90 MICROgram(s) HFA Inhaler 2 Puff(s) Inhalation every 6 hours  ALBUTerol/ipratropium for Nebulization 3 milliLiter(s) Nebulizer every 6 hours  allopurinol 100 milliGRAM(s) Oral daily  aspirin  chewable 81 milliGRAM(s) Oral daily  atorvastatin 40 milliGRAM(s) Oral at bedtime  azithromycin  IVPB 500 milliGRAM(s) IV Intermittent every 24 hours  azithromycin  IVPB      carvedilol 6.25 milliGRAM(s) Oral every 12 hours  cefTRIAXone   IVPB 1000 milliGRAM(s) IV Intermittent every 24 hours  cefTRIAXone   IVPB      cholecalciferol 1000 Unit(s) Oral daily  cyanocobalamin 1000 MICROGram(s) Oral daily  dextrose 50% Injectable 25 Gram(s) IV Push once  dextrose 50% Injectable 25 Gram(s) IV Push once  docusate sodium 100 milliGRAM(s) Oral daily  ferrous    sulfate 325 milliGRAM(s) Oral daily  furosemide   Injectable 80 milliGRAM(s) IV Push two times a day  glipiZIDE XL 5 milliGRAM(s) Oral with breakfast  heparin  Injectable 5000 Unit(s) SubCutaneous every 8 hours  influenza   Vaccine 0.5 milliLiter(s) IntraMuscular once  insulin lispro (HumaLOG) corrective regimen sliding scale   SubCutaneous Before meals and at bedtime  methylPREDNISolone sodium succinate Injectable 40 milliGRAM(s) IV Push every 8 hours  metolazone 5 milliGRAM(s) Oral daily  pantoprazole    Tablet 40 milliGRAM(s) Oral before breakfast  senna 2 Tablet(s) Oral at bedtime      MEDICATIONS  (PRN):      REVIEW OF SYSTEMS:                           ALL ROS DONE [ X   ]    CONSTITUTIONAL:  LETHARGIC [   ], FEVER [   ], UNRESPONSIVE [   ]  CVS:  CP  [   ], SOB, [   ], PALPITATIONS [   ], DIZZYNESS [   ]  RS: COUGH [   ], SPUTUM [   ]  GI: ABDOMINAL PAIN [   ], NAUSEA [   ], VOMITINGS [   ], DIARRHEA [   ], CONSTIPATION [   ]  :  DYSURIA [   ], NOCTURIA [   ], INCREASED FREQUENCY [   ], DRIBLING [   ],  SKELETAL: PAINFUL JOINTS [   ], SWOLLEN JOINTS [   ], NECK ACHE [   ], LOW BACK ACHE [   ],  SKIN : ULCERS [   ], RASH [   ], ITCHING [   ]  CNS: HEAD ACHE [   ], DOUBLE VISION [   ], BLURRED VISION [   ], AMS / CONFUSION [   ], SEIZURES [   ], WEAKNESS [   ],TINGLING / NUMBNESS [   ]    PHYSICAL EXAMINATION:  GENERAL APPEARANCE: NO DISTRESS  HEENT:  NO PALLOR, NO  JVD,  NO   NODES, NECK SUPPLE  CVS: S1 +, S2 +,   RS: AEEB,  OCCASIONAL  RALES +,   B/L RONCHI  ABD: SOFT, NT, NO, BS +  EXT: PE ++  SKIN: WARM,   SKELETAL:  ROM ACCEPTABLE  CNS:  AAO X  2  , NO  DEFICITS    RADIOLOGY :    < from: Xray Chest 1 View AP/PA (19 @ 17:09) >  IMPRESSION: Increased pulmonary vascular congestion noted. No gross   consolidation is seen. Pacemaker present.    < end of copied text >      ASSESSMENT :     Hypoxemia  GERD (gastroesophageal reflux disease)  CHF (congestive heart failure)  CAD (coronary artery disease)  AICD (automatic cardioverter/defibrillator) present  COPD (chronic obstructive pulmonary disease)  RA (rheumatoid arthritis)  DM (diabetes mellitus)  HTN (hypertension)  ICD (implantable cardioverter-defibrillator) in place  History of cholecystectomy      PLAN:  HPI:  55 y/o  Female from [facility] PMH of Obesity, CHF HFrEF 25 %, s/p AICD, HTN, B/l LE edema, DM, Gout, CAD, HLD, COPD  presented to ED with SOB. As per patient she had fever, chills, shortness of breath, dizzy, loss of appetite, weight loss, muscle aches on Tuesday and she was diagnosed with Flu. She was given antibiotics and treatment for flu. But she took medications for 2 days. She was saturating 77% on admission and gasping for breath. In ED she was on NRB and saturated 97%. She had sick contacts at her place diagnosed with Flu. Patient denies cough, sputum production, fevers/chills/nausea/vomiting. No diarrhea, abdominal pain.  [facility] FUll CODE (27 Sep 2019 19:52)    - COPD EXACERBATION , VIRAL URTI, MYCOPLASMA PNEUMONIAE POSITIVE SWAB ON IV STEROIDS , IV ROCEPHIN, AZITHROMYCIN  - ACUTE ON CHRONIC CHF ON LASIX, ZAROXYLIN, O2 SUPPLEMENTS  - ARF, CKD STAGE 4, ANEMIA OF CKD   - GI AND DVT PROPHYLAXIS  - DR. NATARAJAN

## 2019-09-28 NOTE — CONSULT NOTE ADULT - SUBJECTIVE AND OBJECTIVE BOX
HISTORY OF PRESENT ILLNESS: 53 y/o  Female from [facility], CHF HFrEF 25 %, s/p AICD, HTN, B/l LE edema, DM, Gout, CAD, HLD, COPD  presented to ED with SOB. As per patient she had fever, chills, shortness of breath, dizzy, loss of appetite, weight loss, muscle aches on Tuesday and she was diagnosed with Flu. She was given antibiotics and treatment for flu. But she took medications for 2 days. She was saturating 77% on admission and gasping for breath. In ED she was on NRB and saturated 97%. She had sick contacts at her place diagnosed with Flu. Patient denies cough, sputum production, fevers/chills/nausea/vomiting. No diarrhea, abdominal pain.    PAST MEDICAL & SURGICAL HISTORY:  GERD (gastroesophageal reflux disease)  CHF (congestive heart failure)  CAD (coronary artery disease)  AICD (automatic cardioverter/defibrillator) present  COPD (chronic obstructive pulmonary disease)  RA (rheumatoid arthritis)  DM (diabetes mellitus)  HTN (hypertension)  ICD (implantable cardioverter-defibrillator) in place  History of cholecystectomy       PREVIOUS DIAGNOSTIC TESTING:    [ ] Echocardiogram: < from: Transthoracic Echocardiogram (10.28.18 @ 10:29) >  ------------------------------------------------------------------------  CONCLUSIONS:  1. Normal mitral valve. Moderate mitral regurgitation.  2. Normal trileaflet aortic valve. Mild aortic  insufficiency.  3. Aortic Root: 3.5 cm.  4. Severe left atrial enlargement.  5. Normal left ventricular internal dimensions and wall  thicknesses.  6. Dilated LV with severe global left ventricular systolic  dysfunction (EF=20-25%).  7. Grade II diastolic dysfunction.  8. Mild right atrial enlargement.  9. Normal right ventricular size and function.A device lead  is visualized in the right heart.  10. RV systolic pressure is mildly increased at  44 mm Hg.  11. There is severe tricuspid regurgitation.  12. There is mild pulmonic regurgitation.  13. Normal pericardium with no pericardial effusion.    ------------------------------------------------------------------------  Confirmed on  10/29/2018 - 08:00:43 by Miguelina Lynn MD  ------------------------------------------------------------------------    < end of copied text >    [ ]  Catheterization:  [ ] Stress Test:  	    MEDICATIONS:  aspirin  chewable 81 milliGRAM(s) Oral daily  carvedilol 6.25 milliGRAM(s) Oral every 12 hours  furosemide   Injectable 80 milliGRAM(s) IV Push two times a day  heparin  Injectable 5000 Unit(s) SubCutaneous every 8 hours  metolazone 5 milliGRAM(s) Oral <User Schedule>    azithromycin  IVPB 500 milliGRAM(s) IV Intermittent every 24 hours  azithromycin  IVPB      cefTRIAXone   IVPB 1000 milliGRAM(s) IV Intermittent every 24 hours  cefTRIAXone   IVPB        ALBUTerol    90 MICROgram(s) HFA Inhaler 2 Puff(s) Inhalation every 6 hours  ALBUTerol/ipratropium for Nebulization 3 milliLiter(s) Nebulizer every 6 hours      docusate sodium 100 milliGRAM(s) Oral daily  pantoprazole    Tablet 40 milliGRAM(s) Oral before breakfast  senna 2 Tablet(s) Oral at bedtime    allopurinol 100 milliGRAM(s) Oral daily  atorvastatin 40 milliGRAM(s) Oral at bedtime  dextrose 50% Injectable 25 Gram(s) IV Push once  dextrose 50% Injectable 25 Gram(s) IV Push once  glipiZIDE XL 5 milliGRAM(s) Oral with breakfast  insulin lispro (HumaLOG) corrective regimen sliding scale   SubCutaneous Before meals and at bedtime  methylPREDNISolone sodium succinate Injectable 40 milliGRAM(s) IV Push every 8 hours    cholecalciferol 1000 Unit(s) Oral daily  cyanocobalamin 1000 MICROGram(s) Oral daily  ferrous    sulfate 325 milliGRAM(s) Oral daily  influenza   Vaccine 0.5 milliLiter(s) IntraMuscular once      Allergies    codeine (Urticaria)    Intolerances        FAMILY HISTORY:  Family history of hypertension in mother      SOCIAL HISTORY:    [ ] Non-smoker  [ ] Smoker  [ ] Alcohol      REVIEW OF SYSTEMS:  [ ]chest pain  [  x]shortness of breath  [  ]palpitations  [  ]syncope  [ ]near syncope [  ]diplopia  [  ]altered mental status   [  ]fevers  [ ]chills [ ]nausea  [ ]vomitting  [ ]abdominal pain  [ ]melena  [ ]BRBPR  [  ]epistaxis  [  ]rash  [  ]lower extremity edema      CONSTITUTIONAL: No fever, weight loss, or fatigue  EYES: No eye pain, visual disturbances, or discharge  ENMT:  No difficulty hearing, tinnitus, vertigo; No sinus or throat pain  NECK: No pain or stiffness  RESPIRATORY: No cough, wheezing, chills or hemoptysis; No Shortness of Breath  CARDIOVASCULAR: No chest pain, palpitations, passing out, dizziness, or leg swelling  GASTROINTESTINAL: No abdominal or epigastric pain. No nausea, vomiting, or hematemesis; No diarrhea or constipation. No melena or hematochezia.  GENITOURINARY: No dysuria, frequency, hematuria, or incontinence  NEUROLOGICAL: No headaches, memory loss, loss of strength, numbness, or tremors  SKIN: No itching, burning, rashes, or lesions   LYMPH Nodes: No enlarged glands  ENDOCRINE: No heat or cold intolerance; No hair loss  MUSCULOSKELETAL: No joint pain or swelling; No muscle, back, or extremity pain  PSYCHIATRIC: No depression, anxiety, mood swings, or difficulty sleeping  HEME/LYMPH: No easy bruising, or bleeding gums  ALLERY AND IMMUNOLOGIC: No hives or eczema	    [ ] All others negative	  [ ] Unable to obtain    PHYSICAL EXAM:  T(C): 36.8 (09-28-19 @ 07:30), Max: 36.9 (09-27-19 @ 15:47)  HR: 78 (09-28-19 @ 07:30) (66 - 86)  BP: 133/72 (09-28-19 @ 07:30) (122/56 - 148/86)  RR: 19 (09-28-19 @ 07:30) (19 - 24)  SpO2: 92% (09-28-19 @ 07:30) (92% - 99%)  Wt(kg): --  I&O's Summary      Appearance: Normal	  HEENT:   Normal oral mucosa, PERRL, EOMI	  Lymphatic: No lymphadenopathy  Cardiovascular: Normal S1 S2, ++ JVD, No murmurs, ++edema  Respiratory: Lungs clear to auscultation	  Psychiatry: A & O x 3, Mood & affect appropriate  Gastrointestinal:  Soft, Non-tender, + BS	  Skin: No rashes, No ecchymoses, No cyanosis	  Neurologic: Non-focal  Extremities: Normal range of motion, No clubbing, cyanosis or edema  Vascular: Peripheral pulses palpable 2+ bilaterally    TELEMETRY: 	  nsr      RADIOLOGY: < from: Xray Chest 1 View AP/PA (09.27.19 @ 17:09) >  IMPRESSION: Increased pulmonary vascular congestion noted. No gross   consolidation is seen. Pacemaker present.      < end of copied text >    OTHER: 	  	  LABS:	 	    CARDIAC MARKERS:  Troponin I, Serum: 0.049 ng/mL (09-27 @ 19:51)  Troponin I, Serum: 0.047 ng/mL (09-27 @ 16:25)                                  10.1   9.96  )-----------( 239      ( 27 Sep 2019 16:25 )             32.4     09-27    136  |  95<L>  |  77<H>  ----------------------------<  312<H>  4.0   |  33<H>  |  3.43<H>    Ca    8.5      27 Sep 2019 16:25  Mg     2.7     09-27    TPro  7.6  /  Alb  3.0<L>  /  TBili  0.4  /  DBili  x   /  AST  28  /  ALT  27  /  AlkPhos  74  09-27    proBNP: Serum Pro-Brain Natriuretic Peptide: 6789 pg/mL (09-27 @ 16:25)    Lipid Profile:   HgA1c:   TSH:     ASSESSMENT/PLAN:  53 [facility]  [facility] CHF HFrEF 25 %, s/p AICD, HTN, B/l LE edema, DM, Gout, CAD, HLD, COPD  presented to ED with SOB/ CHF     ECHO pending   Diuresis with lasix , zaroxlyn    GI / DVT prophylaxis.   keep K>4, mag >2.0  ASA, stain   ABX per medicine   Cont all home meds for heart failure,  coreg , ACE   monitor creatine   Tele stable   cardiac enzymes neg ,   D/W Dr Gold

## 2019-09-29 LAB
ANION GAP SERPL CALC-SCNC: 7 MMOL/L — SIGNIFICANT CHANGE UP (ref 5–17)
BASOPHILS # BLD AUTO: 0.02 K/UL — SIGNIFICANT CHANGE UP (ref 0–0.2)
BASOPHILS NFR BLD AUTO: 0.2 % — SIGNIFICANT CHANGE UP (ref 0–2)
BUN SERPL-MCNC: 107 MG/DL — HIGH (ref 7–18)
CALCIUM SERPL-MCNC: 8.6 MG/DL — SIGNIFICANT CHANGE UP (ref 8.4–10.5)
CALCIUM SERPL-MCNC: 8.7 MG/DL — SIGNIFICANT CHANGE UP (ref 8.4–10.5)
CHLORIDE SERPL-SCNC: 89 MMOL/L — LOW (ref 96–108)
CO2 SERPL-SCNC: 38 MMOL/L — HIGH (ref 22–31)
CREAT SERPL-MCNC: 3.46 MG/DL — HIGH (ref 0.5–1.3)
EOSINOPHIL # BLD AUTO: 0 K/UL — SIGNIFICANT CHANGE UP (ref 0–0.5)
EOSINOPHIL NFR BLD AUTO: 0 % — SIGNIFICANT CHANGE UP (ref 0–6)
GLUCOSE BLDC GLUCOMTR-MCNC: 335 MG/DL — HIGH (ref 70–99)
GLUCOSE BLDC GLUCOMTR-MCNC: 336 MG/DL — HIGH (ref 70–99)
GLUCOSE BLDC GLUCOMTR-MCNC: 404 MG/DL — HIGH (ref 70–99)
GLUCOSE BLDC GLUCOMTR-MCNC: 431 MG/DL — HIGH (ref 70–99)
GLUCOSE SERPL-MCNC: 326 MG/DL — HIGH (ref 70–99)
HAV IGM SER-ACNC: SIGNIFICANT CHANGE UP
HBV CORE IGM SER-ACNC: SIGNIFICANT CHANGE UP
HBV SURFACE AG SER-ACNC: SIGNIFICANT CHANGE UP
HCT VFR BLD CALC: 32.4 % — LOW (ref 34.5–45)
HCV AB S/CO SERPL IA: 0.11 S/CO — SIGNIFICANT CHANGE UP (ref 0–0.99)
HCV AB SERPL-IMP: SIGNIFICANT CHANGE UP
HGB BLD-MCNC: 9.9 G/DL — LOW (ref 11.5–15.5)
IMM GRANULOCYTES NFR BLD AUTO: 1.3 % — SIGNIFICANT CHANGE UP (ref 0–1.5)
LYMPHOCYTES # BLD AUTO: 1 K/UL — SIGNIFICANT CHANGE UP (ref 1–3.3)
LYMPHOCYTES # BLD AUTO: 7.5 % — LOW (ref 13–44)
MAGNESIUM SERPL-MCNC: 2.9 MG/DL — HIGH (ref 1.6–2.6)
MCHC RBC-ENTMCNC: 29.6 PG — SIGNIFICANT CHANGE UP (ref 27–34)
MCHC RBC-ENTMCNC: 30.6 GM/DL — LOW (ref 32–36)
MCV RBC AUTO: 97 FL — SIGNIFICANT CHANGE UP (ref 80–100)
MONOCYTES # BLD AUTO: 0.44 K/UL — SIGNIFICANT CHANGE UP (ref 0–0.9)
MONOCYTES NFR BLD AUTO: 3.3 % — SIGNIFICANT CHANGE UP (ref 2–14)
NEUTROPHILS # BLD AUTO: 11.69 K/UL — HIGH (ref 1.8–7.4)
NEUTROPHILS NFR BLD AUTO: 87.7 % — HIGH (ref 43–77)
NRBC # BLD: 0 /100 WBCS — SIGNIFICANT CHANGE UP (ref 0–0)
PHOSPHATE SERPL-MCNC: 5.8 MG/DL — HIGH (ref 2.5–4.5)
PLATELET # BLD AUTO: 300 K/UL — SIGNIFICANT CHANGE UP (ref 150–400)
POTASSIUM SERPL-MCNC: 3.4 MMOL/L — LOW (ref 3.5–5.3)
POTASSIUM SERPL-SCNC: 3.4 MMOL/L — LOW (ref 3.5–5.3)
PTH-INTACT FLD-MCNC: 222 PG/ML — HIGH (ref 15–65)
RBC # BLD: 3.34 M/UL — LOW (ref 3.8–5.2)
RBC # FLD: 13.8 % — SIGNIFICANT CHANGE UP (ref 10.3–14.5)
SODIUM SERPL-SCNC: 134 MMOL/L — LOW (ref 135–145)
WBC # BLD: 13.32 K/UL — HIGH (ref 3.8–10.5)
WBC # FLD AUTO: 13.32 K/UL — HIGH (ref 3.8–10.5)

## 2019-09-29 RX ORDER — FERROUS SULFATE 325(65) MG
325 TABLET ORAL
Refills: 0 | Status: DISCONTINUED | OUTPATIENT
Start: 2019-09-29 | End: 2019-10-04

## 2019-09-29 RX ORDER — FUROSEMIDE 40 MG
80 TABLET ORAL DAILY
Refills: 0 | Status: DISCONTINUED | OUTPATIENT
Start: 2019-09-29 | End: 2019-10-01

## 2019-09-29 RX ORDER — SEVELAMER CARBONATE 2400 MG/1
800 POWDER, FOR SUSPENSION ORAL
Refills: 0 | Status: DISCONTINUED | OUTPATIENT
Start: 2019-09-29 | End: 2019-09-30

## 2019-09-29 RX ORDER — SPIRONOLACTONE 25 MG/1
50 TABLET, FILM COATED ORAL DAILY
Refills: 0 | Status: DISCONTINUED | OUTPATIENT
Start: 2019-09-29 | End: 2019-10-04

## 2019-09-29 RX ADMIN — SEVELAMER CARBONATE 800 MILLIGRAM(S): 2400 POWDER, FOR SUSPENSION ORAL at 17:13

## 2019-09-29 RX ADMIN — Medication 100 MILLIGRAM(S): at 12:10

## 2019-09-29 RX ADMIN — PREGABALIN 1000 MICROGRAM(S): 225 CAPSULE ORAL at 12:10

## 2019-09-29 RX ADMIN — Medication 325 MILLIGRAM(S): at 12:10

## 2019-09-29 RX ADMIN — CARVEDILOL PHOSPHATE 6.25 MILLIGRAM(S): 80 CAPSULE, EXTENDED RELEASE ORAL at 17:13

## 2019-09-29 RX ADMIN — CEFTRIAXONE 100 MILLIGRAM(S): 500 INJECTION, POWDER, FOR SOLUTION INTRAMUSCULAR; INTRAVENOUS at 19:25

## 2019-09-29 RX ADMIN — CARVEDILOL PHOSPHATE 6.25 MILLIGRAM(S): 80 CAPSULE, EXTENDED RELEASE ORAL at 06:07

## 2019-09-29 RX ADMIN — HEPARIN SODIUM 5000 UNIT(S): 5000 INJECTION INTRAVENOUS; SUBCUTANEOUS at 14:53

## 2019-09-29 RX ADMIN — Medication 3 MILLILITER(S): at 02:40

## 2019-09-29 RX ADMIN — Medication 12: at 12:09

## 2019-09-29 RX ADMIN — Medication 3 MILLILITER(S): at 20:09

## 2019-09-29 RX ADMIN — Medication 40 MILLIGRAM(S): at 14:53

## 2019-09-29 RX ADMIN — Medication 8: at 07:55

## 2019-09-29 RX ADMIN — ATORVASTATIN CALCIUM 40 MILLIGRAM(S): 80 TABLET, FILM COATED ORAL at 22:04

## 2019-09-29 RX ADMIN — Medication 1000 UNIT(S): at 12:10

## 2019-09-29 RX ADMIN — SENNA PLUS 2 TABLET(S): 8.6 TABLET ORAL at 22:04

## 2019-09-29 RX ADMIN — Medication 81 MILLIGRAM(S): at 12:10

## 2019-09-29 RX ADMIN — Medication 12: at 22:04

## 2019-09-29 RX ADMIN — PANTOPRAZOLE SODIUM 40 MILLIGRAM(S): 20 TABLET, DELAYED RELEASE ORAL at 06:07

## 2019-09-29 RX ADMIN — HEPARIN SODIUM 5000 UNIT(S): 5000 INJECTION INTRAVENOUS; SUBCUTANEOUS at 06:08

## 2019-09-29 RX ADMIN — Medication 80 MILLIGRAM(S): at 06:07

## 2019-09-29 RX ADMIN — Medication 8: at 17:13

## 2019-09-29 RX ADMIN — Medication 40 MILLIGRAM(S): at 06:07

## 2019-09-29 RX ADMIN — INSULIN GLARGINE 20 UNIT(S): 100 INJECTION, SOLUTION SUBCUTANEOUS at 22:04

## 2019-09-29 RX ADMIN — Medication 325 MILLIGRAM(S): at 17:13

## 2019-09-29 RX ADMIN — Medication 3 MILLILITER(S): at 14:50

## 2019-09-29 RX ADMIN — Medication 3 MILLILITER(S): at 09:42

## 2019-09-29 RX ADMIN — HEPARIN SODIUM 5000 UNIT(S): 5000 INJECTION INTRAVENOUS; SUBCUTANEOUS at 22:04

## 2019-09-29 RX ADMIN — AZITHROMYCIN 255 MILLIGRAM(S): 500 TABLET, FILM COATED ORAL at 19:25

## 2019-09-29 RX ADMIN — Medication 5 MILLIGRAM(S): at 07:55

## 2019-09-29 NOTE — PROGRESS NOTE ADULT - SUBJECTIVE AND OBJECTIVE BOX
PGY 1 Note discussed with supervising resident and primary attending    Patient is a 54y old  Female who presents with a chief complaint of shortness of breath (29 Sep 2019 12:36)      INTERVAL HPI/OVERNIGHT EVENTS: offers no new complaints; current symptoms resolving    MEDICATIONS  (STANDING):  ALBUTerol    90 MICROgram(s) HFA Inhaler 2 Puff(s) Inhalation every 6 hours  ALBUTerol/ipratropium for Nebulization 3 milliLiter(s) Nebulizer every 6 hours  allopurinol 100 milliGRAM(s) Oral daily  aspirin  chewable 81 milliGRAM(s) Oral daily  atorvastatin 40 milliGRAM(s) Oral at bedtime  azithromycin  IVPB 500 milliGRAM(s) IV Intermittent every 24 hours  azithromycin  IVPB      carvedilol 6.25 milliGRAM(s) Oral every 12 hours  cefTRIAXone   IVPB 1000 milliGRAM(s) IV Intermittent every 24 hours  cefTRIAXone   IVPB      cholecalciferol 1000 Unit(s) Oral daily  cyanocobalamin 1000 MICROGram(s) Oral daily  dextrose 50% Injectable 25 Gram(s) IV Push once  dextrose 50% Injectable 25 Gram(s) IV Push once  docusate sodium 100 milliGRAM(s) Oral daily  ferrous    sulfate 325 milliGRAM(s) Oral daily  furosemide   Injectable 80 milliGRAM(s) IV Push two times a day  glipiZIDE XL 5 milliGRAM(s) Oral with breakfast  heparin  Injectable 5000 Unit(s) SubCutaneous every 8 hours  influenza   Vaccine 0.5 milliLiter(s) IntraMuscular once  insulin glargine Injectable (LANTUS) 20 Unit(s) SubCutaneous at bedtime  insulin lispro (HumaLOG) corrective regimen sliding scale   SubCutaneous Before meals and at bedtime  methylPREDNISolone sodium succinate Injectable 40 milliGRAM(s) IV Push every 8 hours  metolazone 5 milliGRAM(s) Oral daily  pantoprazole    Tablet 40 milliGRAM(s) Oral before breakfast  senna 2 Tablet(s) Oral at bedtime    MEDICATIONS  (PRN):      __________________________________________________  REVIEW OF SYSTEMS:    CONSTITUTIONAL: No fever,   EYES: no acute visual disturbances  NECK: No pain or stiffness  RESPIRATORY: No cough; No shortness of breath  CARDIOVASCULAR: No chest pain, no palpitations  GASTROINTESTINAL: No pain. No nausea or vomiting; No diarrhea   NEUROLOGICAL: No headache or numbness, no tremors  MUSCULOSKELETAL: No joint pain, no muscle pain  GENITOURINARY: no dysuria, no frequency, no hesitancy  PSYCHIATRY: no depression , no anxiety  ALL OTHER  ROS negative        Vital Signs Last 24 Hrs  T(C): 36.4 (29 Sep 2019 12:33), Max: 36.8 (28 Sep 2019 23:29)  T(F): 97.6 (29 Sep 2019 12:33), Max: 98.2 (28 Sep 2019 23:29)  HR: 69 (29 Sep 2019 12:33) (55 - 83)  BP: 100/59 (29 Sep 2019 12:33) (100/59 - 148/81)  BP(mean): --  RR: 20 (29 Sep 2019 12:33) (20 - 21)  SpO2: 90% (29 Sep 2019 12:33) (90% - 100%)    ________________________________________________  PHYSICAL EXAM:  GENERAL: NAD  HEENT: Normocephalic;  conjunctivae and sclerae clear; moist mucous membranes;   NECK : supple  CHEST/LUNG: Clear to auscultation bilaterally with good air entry   HEART: S1 S2  regular; no murmurs, gallops or rubs  ABDOMEN: Soft, Nontender, Nondistended; Bowel sounds present  EXTREMITIES: no cyanosis; no edema; no calf tenderness  SKIN: warm and dry; no rash  NERVOUS SYSTEM:  Awake and alert; Oriented  to place, person and time ; no new deficits    _________________________________________________  LABS:                        9.9    13.32 )-----------( 300      ( 29 Sep 2019 08:05 )             32.4     -    134<L>  |  89<L>  |  107<H>  ----------------------------<  326<H>  3.4<L>   |  38<H>  |  3.46<H>    Ca    8.6      29 Sep 2019 08:05  Phos  5.8       Mg     2.9         TPro  7.6  /  Alb  3.0<L>  /  TBili  0.4  /  DBili  x   /  AST  28  /  ALT  27  /  AlkPhos  74      PT/INR - ( 27 Sep 2019 16:25 )   PT: 14.1 sec;   INR: 1.26 ratio         PTT - ( 27 Sep 2019 16:25 )  PTT:24.5 sec  Urinalysis Basic - ( 28 Sep 2019 21:23 )    Color: Yellow / Appearance: Clear / S.015 / pH: x  Gluc: x / Ketone: Negative  / Bili: Negative / Urobili: Negative   Blood: x / Protein: 30 mg/dL / Nitrite: Negative   Leuk Esterase: Negative / RBC: 0-2 /HPF / WBC 0-2 /HPF   Sq Epi: x / Non Sq Epi: Few /HPF / Bacteria: Few /HPF      CAPILLARY BLOOD GLUCOSE      POCT Blood Glucose.: 431 mg/dL (29 Sep 2019 11:40)  POCT Blood Glucose.: 335 mg/dL (29 Sep 2019 07:48)  POCT Blood Glucose.: 411 mg/dL (28 Sep 2019 21:10)  POCT Blood Glucose.: 350 mg/dL (28 Sep 2019 16:32)        RADIOLOGY & ADDITIONAL TESTS:    Imaging Personally Reviewed:  YES/NO    Consultant(s) Notes Reviewed:   YES/ No    Care Discussed with Consultants :     Plan of care was discussed with patient and /or primary care giver; all questions and concerns were addressed and care was aligned with patient's wishes.

## 2019-09-29 NOTE — PHYSICAL THERAPY INITIAL EVALUATION ADULT - ADDITIONAL COMMENTS
Patient a [facility].  She reports she ambulated independently with a rollator.  Required some assistance with dressing but states she is capable of dressing herself.

## 2019-09-29 NOTE — PROGRESS NOTE ADULT - ASSESSMENT
55 [facility]  [facility] HFrEF 25 %, s/p AICD, HTN, B/l LE edema, DM, Gout, CAD, HLD, COPD  presented to ED with SOB. As per patient she had fever, chills, shortness of breath, dizzy, loss of appetite, weight loss, muscle aches on Tuesday and she was diagnosed with Flu. She was given antibiotics and treatment for flu. But she took medications for 2 days. She was saturating 77% on admission and gasping for breath. In ED she was on NRB and saturated 97%. She had sick contacts at her place diagnosed with Flu. Patient denies cough, sputum production, fevers/chills/nausea/vomiting. No diarrhea, abdominal pain.  Pt was admitted for CHF exacerbation.

## 2019-09-29 NOTE — PROGRESS NOTE ADULT - ASSESSMENT
A/P:  1.ANJU ON CKD(stage 3): R/O ANJU secondary to pre-renal azotemia ,secondary to chf plus pulmonary infection, less likley other cause like obstructive uropathy/ATN  -pts renal function is mildly worsening with diuresis?  -suggest to reduce Lasix once a day from bid  -suggest to f/u  renal us  -moniter I/Os daily  CKD stage 3 , with non nephrotic proteinuria ,due to DM/HTN/obesity, less likley primary GN   -Keep patient euvolemic and Diabetic renal diet  -Avoid Nephrotoxic Meds/ Agents such as (NSAIDs, IV contrast, Aminoglycosides such as gentamicin, -Gadolinium contrast, Phosphate containing enemas, etc..)  -Adjust Medications according to eGFR  -f/u bmp daily  2.HTN: bp is controlled  -goal bp >110/70 and <130/80  -low salt diet  3.ANEMIA: mild, r/o secondary to ckd plus infection plus  iron deficiency  -add po feso4 325mg bid prior to meals  -f/u Hb daily  4.RESPIRATORY ACIDOISS ;with renal compensation and hypoxia  -suggest high o2 via NC , f/u ABG  -Suggest Pulmonary consult  5.HYPOKALEMIA/ALKALOSIS: secondary to primary respiratory acidosis plus diuresis induced  -suggest to add Aldactone 50 mg daily to keep k normal and to avoid further alkalosis and d/c Zaroxlyn  -f/u k an dco2 daily

## 2019-09-29 NOTE — PROGRESS NOTE ADULT - SUBJECTIVE AND OBJECTIVE BOX
Patient is a 54y old  Female who presents with a chief complaint of shortness of breath (29 Sep 2019 07:39)    PATIENT IS SEEN AND EXAMINED IN MEDICAL FLOOR.    ALLERGIES:  codeine (Urticaria)    Daily Weight in k.1 (29 Sep 2019 04:47)    VITALS:    Vital Signs Last 24 Hrs  T(C): 36.4 (29 Sep 2019 12:33), Max: 36.8 (28 Sep 2019 23:29)  T(F): 97.6 (29 Sep 2019 12:33), Max: 98.2 (28 Sep 2019 23:29)  HR: 69 (29 Sep 2019 12:33) (55 - 83)  BP: 100/59 (29 Sep 2019 12:33) (100/59 - 148/81)  BP(mean): --  RR: 20 (29 Sep 2019 12:33) (20 - 21)  SpO2: 90% (29 Sep 2019 12:33) (90% - 100%)    LABS:    CBC Full  -  ( 29 Sep 2019 08:05 )  WBC Count : 13.32 K/uL  RBC Count : 3.34 M/uL  Hemoglobin : 9.9 g/dL  Hematocrit : 32.4 %  Platelet Count - Automated : 300 K/uL  Mean Cell Volume : 97.0 fl  Mean Cell Hemoglobin : 29.6 pg  Mean Cell Hemoglobin Concentration : 30.6 gm/dL  Auto Neutrophil # : 11.69 K/uL  Auto Lymphocyte # : 1.00 K/uL  Auto Monocyte # : 0.44 K/uL  Auto Eosinophil # : 0.00 K/uL  Auto Basophil # : 0.02 K/uL  Auto Neutrophil % : 87.7 %  Auto Lymphocyte % : 7.5 %  Auto Monocyte % : 3.3 %  Auto Eosinophil % : 0.0 %  Auto Basophil % : 0.2 %    PT/INR - ( 27 Sep 2019 16:25 )   PT: 14.1 sec;   INR: 1.26 ratio         PTT - ( 27 Sep 2019 16:25 )  PTT:24.5 sec      134<L>  |  89<L>  |  107<H>  ----------------------------<  326<H>  3.4<L>   |  38<H>  |  3.46<H>    Ca    8.6      29 Sep 2019 08:05  Phos  5.8       Mg     2.9     09-29    TPro  7.6  /  Alb  3.0<L>  /  TBili  0.4  /  DBili  x   /  AST  28  /  ALT  27  /  AlkPhos  74      CAPILLARY BLOOD GLUCOSE    POCT Blood Glucose.: 431 mg/dL (29 Sep 2019 11:40)  POCT Blood Glucose.: 335 mg/dL (29 Sep 2019 07:48)  POCT Blood Glucose.: 411 mg/dL (28 Sep 2019 21:10)  POCT Blood Glucose.: 350 mg/dL (28 Sep 2019 16:32)    CARDIAC MARKERS ( 28 Sep 2019 07:48 )  0.019 ng/mL / x     / 211 U/L / x     / 2.1 ng/mL  CARDIAC MARKERS ( 27 Sep 2019 19:51 )  0.049 ng/mL / x     / 281 U/L / x     / 1.7 ng/mL  CARDIAC MARKERS ( 27 Sep 2019 16:25 )  0.047 ng/mL / x     / x     / x     / x          LIVER FUNCTIONS - ( 27 Sep 2019 16:25 )  Alb: 3.0 g/dL / Pro: 7.6 g/dL / ALK PHOS: 74 U/L / ALT: 27 U/L DA / AST: 28 U/L / GGT: x           Creatinine Trend: 3.46<--, 3.22<--, 3.43<--  I&O's Summary    28 Sep 2019 07:01  -  29 Sep 2019 07:00  --------------------------------------------------------  IN: 250 mL / OUT: 0 mL / NET: 250 mL      ABG - ( 28 Sep 2019 13:44 )  pH, Arterial: 7.38  pH, Blood: x     /  pCO2: 54    /  pO2: 46    / HCO3: 32    / Base Excess: 6.0   /  SaO2: 79          .Blood   @ 01:46   No growth to date.  --  --      MEDICATIONS:    MEDICATIONS  (STANDING):  ALBUTerol    90 MICROgram(s) HFA Inhaler 2 Puff(s) Inhalation every 6 hours  ALBUTerol/ipratropium for Nebulization 3 milliLiter(s) Nebulizer every 6 hours  allopurinol 100 milliGRAM(s) Oral daily  aspirin  chewable 81 milliGRAM(s) Oral daily  atorvastatin 40 milliGRAM(s) Oral at bedtime  azithromycin  IVPB 500 milliGRAM(s) IV Intermittent every 24 hours  azithromycin  IVPB      carvedilol 6.25 milliGRAM(s) Oral every 12 hours  cefTRIAXone   IVPB 1000 milliGRAM(s) IV Intermittent every 24 hours  cefTRIAXone   IVPB      cholecalciferol 1000 Unit(s) Oral daily  cyanocobalamin 1000 MICROGram(s) Oral daily  dextrose 50% Injectable 25 Gram(s) IV Push once  dextrose 50% Injectable 25 Gram(s) IV Push once  docusate sodium 100 milliGRAM(s) Oral daily  ferrous    sulfate 325 milliGRAM(s) Oral daily  furosemide   Injectable 80 milliGRAM(s) IV Push two times a day  glipiZIDE XL 5 milliGRAM(s) Oral with breakfast  heparin  Injectable 5000 Unit(s) SubCutaneous every 8 hours  influenza   Vaccine 0.5 milliLiter(s) IntraMuscular once  insulin glargine Injectable (LANTUS) 20 Unit(s) SubCutaneous at bedtime  insulin lispro (HumaLOG) corrective regimen sliding scale   SubCutaneous Before meals and at bedtime  methylPREDNISolone sodium succinate Injectable 40 milliGRAM(s) IV Push every 8 hours  metolazone 5 milliGRAM(s) Oral daily  pantoprazole    Tablet 40 milliGRAM(s) Oral before breakfast  senna 2 Tablet(s) Oral at bedtime      MEDICATIONS  (PRN):      REVIEW OF SYSTEMS:                           ALL ROS DONE [ X   ]    CONSTITUTIONAL:  LETHARGIC [   ], FEVER [   ], UNRESPONSIVE [   ]  CVS:  CP  [   ], SOB, [   ], PALPITATIONS [   ], DIZZYNESS [   ]  RS: COUGH [   ], SPUTUM [   ]  GI: ABDOMINAL PAIN [   ], NAUSEA [   ], VOMITINGS [   ], DIARRHEA [   ], CONSTIPATION [   ]  :  DYSURIA [   ], NOCTURIA [   ], INCREASED FREQUENCY [   ], DRIBLING [   ],  SKELETAL: PAINFUL JOINTS [   ], SWOLLEN JOINTS [   ], NECK ACHE [   ], LOW BACK ACHE [   ],  SKIN : ULCERS [   ], RASH [   ], ITCHING [   ]  CNS: HEAD ACHE [   ], DOUBLE VISION [   ], BLURRED VISION [   ], AMS / CONFUSION [   ], SEIZURES [   ], WEAKNESS [   ],TINGLING / NUMBNESS [   ]    PHYSICAL EXAMINATION:  GENERAL APPEARANCE: NO DISTRESS  HEENT:  NO PALLOR, NO  JVD,  NO   NODES, NECK SUPPLE  CVS: S1 +, S2 +,   RS: AEEB,  OCCASIONAL  RALES +,   B/L RONCHI  ABD: SOFT, NT, NO, BS +  EXT: PE ++  SKIN: WARM,   SKELETAL:  ROM ACCEPTABLE  CNS:  AAO X  2  , NO  DEFICITS    RADIOLOGY :    < from: Xray Chest 1 View AP/PA (19 @ 17:09) >  IMPRESSION: Increased pulmonary vascular congestion noted. No gross   consolidation is seen. Pacemaker present.    < end of copied text >      ASSESSMENT :     Hypoxemia  GERD (gastroesophageal reflux disease)  CHF (congestive heart failure)  CAD (coronary artery disease)  AICD (automatic cardioverter/defibrillator) present  COPD (chronic obstructive pulmonary disease)  RA (rheumatoid arthritis)  DM (diabetes mellitus)  HTN (hypertension)  ICD (implantable cardioverter-defibrillator) in place  History of cholecystectomy      PLAN:  HPI:  53 y/o  [facility], CHF HFrEF 25 %, s/p AICD, HTN, B/l LE edema, DM, Gout, CAD, HLD, COPD  presented to ED with SOB. As per patient she had fever, chills, shortness of breath, dizzy, loss of appetite, weight loss, muscle aches on Tuesday and she was diagnosed with Flu. She was given antibiotics and treatment for flu. But she took medications for 2 days. She was saturating 77% on admission and gasping for breath. In ED she was on NRB and saturated 97%. She had sick contacts at her place diagnosed with Flu. Patient denies cough, sputum production, fevers/chills/nausea/vomiting. No diarrhea, abdominal pain.  [facility] FUll CODE (27 Sep 2019 19:52)    - COPD EXACERBATION , VIRAL URTI, MYCOPLASMA PNEUMONIAE POSITIVE SWAB ON IV STEROIDS , IV ROCEPHIN, AZITHROMYCIN  - ACUTE ON CHRONIC CHF ON LASIX, ZAROXYLIN, O2 SUPPLEMENTS  - CHRONIC HYPOXIA AND HYPERCARBIA, OBESITY WITH OBSTRUCTIVE SLEEP APNOEA ON HOME O2  - ARF, CKD STAGE 4, ANEMIA OF CKD   - GI AND DVT PROPHYLAXIS  - DR. NATARAJAN

## 2019-09-29 NOTE — PROGRESS NOTE ADULT - SUBJECTIVE AND OBJECTIVE BOX
pt seen and examined.    SUBJECTIVE:  pt feels fine and denies cp,sob,gi or uremic  symptons  pt is voiding better last 12 to 24 hrs on high dose lasix plus Zaroxlyn  REVIEW OF SYSTEMS:  CONSTITUTIONAL: No weakness, fevers or chills  EYES/ENT: No visual changes;  No vertigo or throat pain   NECK: No pain or stiffness  RESPIRATORY: +cough,no  wheezing, hemoptysis; No shortness of breath  CARDIOVASCULAR: No chest pain or palpitations  GASTROINTESTINAL: No abdominal or epigastric pain. No nausea, vomiting, or hematemesis; No diarrhea or constipation. No melena or hematochezia.  GENITOURINARY: No dysuria, frequency , hematuria, flank pain or nocturia  NEUROLOGICAL: No numbness or weakness  SKIN: No itching, burning, rashes, or lesions   All other review of systems is negative unless indicated above    Current meds:    ALBUTerol    90 MICROgram(s) HFA Inhaler 2 Puff(s) Inhalation every 6 hours  ALBUTerol/ipratropium for Nebulization 3 milliLiter(s) Nebulizer every 6 hours  allopurinol 100 milliGRAM(s) Oral daily  aspirin  chewable 81 milliGRAM(s) Oral daily  atorvastatin 40 milliGRAM(s) Oral at bedtime  azithromycin  IVPB 500 milliGRAM(s) IV Intermittent every 24 hours  azithromycin  IVPB      carvedilol 6.25 milliGRAM(s) Oral every 12 hours  cefTRIAXone   IVPB 1000 milliGRAM(s) IV Intermittent every 24 hours  cefTRIAXone   IVPB      cholecalciferol 1000 Unit(s) Oral daily  cyanocobalamin 1000 MICROGram(s) Oral daily  dextrose 50% Injectable 25 Gram(s) IV Push once  dextrose 50% Injectable 25 Gram(s) IV Push once  docusate sodium 100 milliGRAM(s) Oral daily  ferrous    sulfate 325 milliGRAM(s) Oral daily  furosemide   Injectable 80 milliGRAM(s) IV Push two times a day  glipiZIDE XL 5 milliGRAM(s) Oral with breakfast  heparin  Injectable 5000 Unit(s) SubCutaneous every 8 hours  influenza   Vaccine 0.5 milliLiter(s) IntraMuscular once  insulin glargine Injectable (LANTUS) 20 Unit(s) SubCutaneous at bedtime  insulin lispro (HumaLOG) corrective regimen sliding scale   SubCutaneous Before meals and at bedtime  metolazone 5 milliGRAM(s) Oral daily  pantoprazole    Tablet 40 milliGRAM(s) Oral before breakfast  senna 2 Tablet(s) Oral at bedtime  sevelamer carbonate 800 milliGRAM(s) Oral three times a day with meals      Vital Signs    T(F): 97.6 (19 @ 12:33), Max: 98.2 (19 @ 23:29)  HR: 69 (19 @ 12:33) (55 - 83)  BP: 100/59 (19 @ 12:33) (100/59 - 148/81)  ABP: --  RR: 20 (19 @ 12:33) (20 - 21)  SpO2: 90% (19 @ 12:33) (90% - 100%)  Wt(kg): --  CVP(cm H2O): --  CO: --  PCWP: --    I and O's:     @ 07:01  -   @ 07:00  --------------------------------------------------------  IN:    Oral Fluid: 250 mL  Total IN: 250 mL    OUT:  Total OUT: 0 mL    Total NET: 250 mL       @ 07:01  -   @ 15:46  --------------------------------------------------------  IN:    Oral Fluid: 118 mL  Total IN: 118 mL    OUT:    Voided: 700 mL  Total OUT: 700 mL    Total NET: -582 mL        Daily     Daily Weight in k.1 (29 Sep 2019 04:47)    PHYSICAL EXAM:  Constitutional: well developed, obese  and in nad  HEENT: PERRLA,  no icteric sclera and mild pallor of conjunctiva noted  Neck: No JVD, thyromegaly or adenopathy  Respiratory: reduced air entry lower lungs with few  wheezing and  rhonchi  Cardiovascular: S1 and S2 normally heard  Gastrointestinal: soft, nondistended, nontender and normal bowel sounds heard  +rt lower abdominal large hernia , s/p cholecystectomy  Extremities: Trace peripheral edema noted in both LE s  Neurological: A/O x 3, no focal deficits  Skin: No rashes      LABS:    CBC:                          9.9    13.32 )-----------( 300      ( 29 Sep 2019 08:05 )             32.4           BMP:        134<L>  |  89<L>  |  107<H>  ----------------------------<  326<H>  3.4<L>   |  38<H>  |  3.46<H>      134<L>  |  91<L>  |  86<H>  ----------------------------<  344<H>  3.7   |  33<H>  |  3.22<H>      136  |  95<L>  |  77<H>  ----------------------------<  312<H>  4.0   |  33<H>  |  3.43<H>    Ca    8.6      29 Sep 2019 08:05  Ca    8.6      28 Sep 2019 10:23  Ca    8.5      27 Sep 2019 16:25  Phos  5.8       Mg     2.9       Mg     2.7         TPro  7.6  /  Alb  3.0<L>  /  TBili  0.4  /  DBili  x   /  AST  28  /  ALT  27  /  AlkPhos  74        Intact PTH: 222 pg/mL ( @ 11:39)  Vitamin D, 1, 25-Dihydroxy: 76.2 pg/mL ( @ 15:50)  Thyroid Stimulating Hormone, Serum: 0.64 uU/mL ( @ 07:48)  % Saturation, Iron: 18 % (19 @ 07:48)        URINE STUDIES:  Creatinine, Random Urine: 50 mg/dL ( @ 21:23)  Osmolality, Random Urine: 372 mos/kg ( @ 09:19)  Sodium, Random Urine: 61 mmol/L ( @ 09:19)  Creatinine, Random Urine: 62 mg/dL ( @ 09:19)  Total Protein, Random Urine (19 @ 21:24)    Total Protein, Random Urine: 48 mg/dL    Urine Microscopic-Add On (NC) (19 @ 21:23)    White Blood Cell - Urine: 0-2 /HPF    Red Blood Cell - Urine: 0-2 /HPF    Bacteria: Few /HPF    Epithelial Cells: Few /HPF    Comment - Urine: few amorphous sediments present    Urinalysis in AM (19 @ 21:23)    pH Urine: 5.0    Glucose Qualitative, Urine: Negative    Blood, Urine: Negative    Color: Yellow    Urine Appearance: Clear    Bilirubin: Negative    Ketone - Urine: Negative    Specific Gravity: 1.015    Protein, Urine: 30 mg/dL    Urobilinogen: Negative    Nitrite: Negative    Leukocyte Esterase Concentration: Negative

## 2019-09-29 NOTE — PROGRESS NOTE ADULT - SUBJECTIVE AND OBJECTIVE BOX
pt seen and examined, no complaints, ROS - .    ALBUTerol    90 MICROgram(s) HFA Inhaler 2 Puff(s) Inhalation every 6 hours  ALBUTerol/ipratropium for Nebulization 3 milliLiter(s) Nebulizer every 6 hours  allopurinol 100 milliGRAM(s) Oral daily  aspirin  chewable 81 milliGRAM(s) Oral daily  atorvastatin 40 milliGRAM(s) Oral at bedtime  azithromycin  IVPB 500 milliGRAM(s) IV Intermittent every 24 hours  azithromycin  IVPB      carvedilol 6.25 milliGRAM(s) Oral every 12 hours  cefTRIAXone   IVPB 1000 milliGRAM(s) IV Intermittent every 24 hours  cefTRIAXone   IVPB      cholecalciferol 1000 Unit(s) Oral daily  cyanocobalamin 1000 MICROGram(s) Oral daily  dextrose 50% Injectable 25 Gram(s) IV Push once  dextrose 50% Injectable 25 Gram(s) IV Push once  docusate sodium 100 milliGRAM(s) Oral daily  ferrous    sulfate 325 milliGRAM(s) Oral daily  furosemide   Injectable 80 milliGRAM(s) IV Push two times a day  glipiZIDE XL 5 milliGRAM(s) Oral with breakfast  heparin  Injectable 5000 Unit(s) SubCutaneous every 8 hours  influenza   Vaccine 0.5 milliLiter(s) IntraMuscular once  insulin glargine Injectable (LANTUS) 20 Unit(s) SubCutaneous at bedtime  insulin lispro (HumaLOG) corrective regimen sliding scale   SubCutaneous Before meals and at bedtime  methylPREDNISolone sodium succinate Injectable 40 milliGRAM(s) IV Push every 8 hours  metolazone 5 milliGRAM(s) Oral daily  pantoprazole    Tablet 40 milliGRAM(s) Oral before breakfast  senna 2 Tablet(s) Oral at bedtime                            10.0   10.81 )-----------( 269      ( 28 Sep 2019 10:22 )             32.1       Hemoglobin: 10.0 g/dL (09-28 @ 10:22)  Hemoglobin: 10.1 g/dL (09-27 @ 16:25)      09-28    134<L>  |  91<L>  |  86<H>  ----------------------------<  344<H>  3.7   |  33<H>  |  3.22<H>    Ca    8.6      28 Sep 2019 10:23  Mg     2.7     09-27    TPro  7.6  /  Alb  3.0<L>  /  TBili  0.4  /  DBili  x   /  AST  28  /  ALT  27  /  AlkPhos  74  09-27    Creatinine Trend: 3.22<--, 3.43<--    COAGS:     CARDIAC MARKERS ( 28 Sep 2019 07:48 )  0.019 ng/mL / x     / 211 U/L / x     / 2.1 ng/mL  CARDIAC MARKERS ( 27 Sep 2019 19:51 )  0.049 ng/mL / x     / 281 U/L / x     / 1.7 ng/mL  CARDIAC MARKERS ( 27 Sep 2019 16:25 )  0.047 ng/mL / x     / x     / x     / x            T(C): 36.2 (09-29-19 @ 07:39), Max: 36.8 (09-28-19 @ 11:25)  HR: 55 (09-29-19 @ 07:39) (55 - 83)  BP: 117/51 (09-29-19 @ 07:39) (117/51 - 148/81)  RR: 20 (09-29-19 @ 07:39) (18 - 21)  SpO2: 100% (09-29-19 @ 07:39) (95% - 100%)  Wt(kg): --    I&O's Summary    28 Sep 2019 07:01  -  29 Sep 2019 07:00  --------------------------------------------------------  IN: 250 mL / OUT: 0 mL / NET: 250 mL      Appearance: Normal	  HEENT:   Normal oral mucosa, PERRL, EOMI	  Lymphatic: No lymphadenopathy  Cardiovascular: Normal S1 S2, ++ JVD, No murmurs, ++edema  Respiratory: Lungs clear to auscultation	  Psychiatry: A & O x 3, Mood & affect appropriate  Gastrointestinal:  Soft, Non-tender, + BS	  Skin: No rashes, No ecchymoses, No cyanosis	  Neurologic: Non-focal  Extremities: Normal range of motion, No clubbing, cyanosis or edema  Vascular: Peripheral pulses palpable 2+ bilaterally    TELEMETRY: 	  nsr      RADIOLOGY: < from: Xray Chest 1 View AP/PA (09.27.19 @ 17:09) >  IMPRESSION: Increased pulmonary vascular congestion noted. No gross   consolidation is seen. Pacemaker present.      < end of copied text >      ASSESSMENT/PLAN:  53 y/o  Female from [facility] of Obesity, CHF HFrEF 25 %, s/p AICD, HTN, B/l LE edema, DM, Gout, CAD, HLD, COPD  presented to ED with SOB/ CHF     ECHO pending   Diuresis with lasix , zaroxlyn    GI / DVT prophylaxis,  keep K>4, mag >2.0  ASA, stain   ABX per medicine  monitor creatine, cont to hold ACE at present  , renal follow up   tolerating coreg    cardiac enzymes neg  D/W Dr Gold

## 2019-09-30 LAB
ANION GAP SERPL CALC-SCNC: 8 MMOL/L — SIGNIFICANT CHANGE UP (ref 5–17)
BASOPHILS # BLD AUTO: 0 K/UL — SIGNIFICANT CHANGE UP (ref 0–0.2)
BASOPHILS NFR BLD AUTO: 0 % — SIGNIFICANT CHANGE UP (ref 0–2)
BUN SERPL-MCNC: 115 MG/DL — HIGH (ref 7–18)
CALCIUM SERPL-MCNC: 8.5 MG/DL — SIGNIFICANT CHANGE UP (ref 8.4–10.5)
CHLORIDE SERPL-SCNC: 91 MMOL/L — LOW (ref 96–108)
CO2 SERPL-SCNC: 36 MMOL/L — HIGH (ref 22–31)
CREAT SERPL-MCNC: 3.23 MG/DL — HIGH (ref 0.5–1.3)
EOSINOPHIL # BLD AUTO: 0 K/UL — SIGNIFICANT CHANGE UP (ref 0–0.5)
EOSINOPHIL NFR BLD AUTO: 0 % — SIGNIFICANT CHANGE UP (ref 0–6)
GLUCOSE BLDC GLUCOMTR-MCNC: 156 MG/DL — HIGH (ref 70–99)
GLUCOSE BLDC GLUCOMTR-MCNC: 358 MG/DL — HIGH (ref 70–99)
GLUCOSE BLDC GLUCOMTR-MCNC: 401 MG/DL — HIGH (ref 70–99)
GLUCOSE BLDC GLUCOMTR-MCNC: 79 MG/DL — SIGNIFICANT CHANGE UP (ref 70–99)
GLUCOSE SERPL-MCNC: 372 MG/DL — HIGH (ref 70–99)
HBV CORE AB SER-ACNC: SIGNIFICANT CHANGE UP
HBV SURFACE AB SER-ACNC: SIGNIFICANT CHANGE UP
HBV SURFACE AG SER-ACNC: SIGNIFICANT CHANGE UP
HCT VFR BLD CALC: 33.9 % — LOW (ref 34.5–45)
HGB BLD-MCNC: 10.4 G/DL — LOW (ref 11.5–15.5)
LEGIONELLA AG UR QL: NEGATIVE — SIGNIFICANT CHANGE UP
LYMPHOCYTES # BLD AUTO: 0.6 K/UL — LOW (ref 1–3.3)
LYMPHOCYTES # BLD AUTO: 4 % — LOW (ref 13–44)
MAGNESIUM SERPL-MCNC: 3 MG/DL — HIGH (ref 1.6–2.6)
MCHC RBC-ENTMCNC: 29.6 PG — SIGNIFICANT CHANGE UP (ref 27–34)
MCHC RBC-ENTMCNC: 30.7 GM/DL — LOW (ref 32–36)
MCV RBC AUTO: 96.6 FL — SIGNIFICANT CHANGE UP (ref 80–100)
MONOCYTES # BLD AUTO: 0.9 K/UL — SIGNIFICANT CHANGE UP (ref 0–0.9)
MONOCYTES NFR BLD AUTO: 6 % — SIGNIFICANT CHANGE UP (ref 2–14)
NEUTROPHILS # BLD AUTO: 13.28 K/UL — HIGH (ref 1.8–7.4)
NEUTROPHILS NFR BLD AUTO: 89 % — HIGH (ref 43–77)
PHOSPHATE SERPL-MCNC: 6.1 MG/DL — HIGH (ref 2.5–4.5)
PLATELET # BLD AUTO: 339 K/UL — SIGNIFICANT CHANGE UP (ref 150–400)
POTASSIUM SERPL-MCNC: 3.7 MMOL/L — SIGNIFICANT CHANGE UP (ref 3.5–5.3)
POTASSIUM SERPL-SCNC: 3.7 MMOL/L — SIGNIFICANT CHANGE UP (ref 3.5–5.3)
RBC # BLD: 3.51 M/UL — LOW (ref 3.8–5.2)
RBC # FLD: 13.6 % — SIGNIFICANT CHANGE UP (ref 10.3–14.5)
SODIUM SERPL-SCNC: 135 MMOL/L — SIGNIFICANT CHANGE UP (ref 135–145)
WBC # BLD: 14.92 K/UL — HIGH (ref 3.8–10.5)
WBC # FLD AUTO: 14.92 K/UL — HIGH (ref 3.8–10.5)

## 2019-09-30 RX ORDER — INSULIN GLARGINE 100 [IU]/ML
30 INJECTION, SOLUTION SUBCUTANEOUS AT BEDTIME
Refills: 0 | Status: DISCONTINUED | OUTPATIENT
Start: 2019-09-30 | End: 2019-10-01

## 2019-09-30 RX ORDER — SEVELAMER CARBONATE 2400 MG/1
1600 POWDER, FOR SUSPENSION ORAL
Refills: 0 | Status: DISCONTINUED | OUTPATIENT
Start: 2019-09-30 | End: 2019-10-04

## 2019-09-30 RX ORDER — INSULIN LISPRO 100/ML
6 VIAL (ML) SUBCUTANEOUS
Refills: 0 | Status: DISCONTINUED | OUTPATIENT
Start: 2019-09-30 | End: 2019-10-01

## 2019-09-30 RX ORDER — ISOSORBIDE DINITRATE 5 MG/1
10 TABLET ORAL THREE TIMES A DAY
Refills: 0 | Status: DISCONTINUED | OUTPATIENT
Start: 2019-09-30 | End: 2019-10-04

## 2019-09-30 RX ORDER — DEXTROSE 10 % IN WATER 10 %
1000 INTRAVENOUS SOLUTION INTRAVENOUS
Refills: 0 | Status: DISCONTINUED | OUTPATIENT
Start: 2019-09-30 | End: 2019-09-30

## 2019-09-30 RX ADMIN — HEPARIN SODIUM 5000 UNIT(S): 5000 INJECTION INTRAVENOUS; SUBCUTANEOUS at 22:45

## 2019-09-30 RX ADMIN — SENNA PLUS 2 TABLET(S): 8.6 TABLET ORAL at 22:45

## 2019-09-30 RX ADMIN — Medication 3 MILLILITER(S): at 02:44

## 2019-09-30 RX ADMIN — SEVELAMER CARBONATE 1600 MILLIGRAM(S): 2400 POWDER, FOR SUSPENSION ORAL at 17:30

## 2019-09-30 RX ADMIN — CEFTRIAXONE 100 MILLIGRAM(S): 500 INJECTION, POWDER, FOR SOLUTION INTRAMUSCULAR; INTRAVENOUS at 19:26

## 2019-09-30 RX ADMIN — AZITHROMYCIN 255 MILLIGRAM(S): 500 TABLET, FILM COATED ORAL at 19:26

## 2019-09-30 RX ADMIN — Medication 10: at 08:38

## 2019-09-30 RX ADMIN — Medication 6 UNIT(S): at 13:01

## 2019-09-30 RX ADMIN — Medication 6 UNIT(S): at 17:31

## 2019-09-30 RX ADMIN — Medication 3 MILLILITER(S): at 21:10

## 2019-09-30 RX ADMIN — ISOSORBIDE DINITRATE 10 MILLIGRAM(S): 5 TABLET ORAL at 22:45

## 2019-09-30 RX ADMIN — Medication 100 MILLIGRAM(S): at 13:01

## 2019-09-30 RX ADMIN — SEVELAMER CARBONATE 800 MILLIGRAM(S): 2400 POWDER, FOR SUSPENSION ORAL at 13:01

## 2019-09-30 RX ADMIN — HEPARIN SODIUM 5000 UNIT(S): 5000 INJECTION INTRAVENOUS; SUBCUTANEOUS at 13:01

## 2019-09-30 RX ADMIN — Medication 5 MILLIGRAM(S): at 08:38

## 2019-09-30 RX ADMIN — Medication 81 MILLIGRAM(S): at 13:01

## 2019-09-30 RX ADMIN — CARVEDILOL PHOSPHATE 6.25 MILLIGRAM(S): 80 CAPSULE, EXTENDED RELEASE ORAL at 17:30

## 2019-09-30 RX ADMIN — Medication 325 MILLIGRAM(S): at 17:30

## 2019-09-30 RX ADMIN — PREGABALIN 1000 MICROGRAM(S): 225 CAPSULE ORAL at 13:01

## 2019-09-30 RX ADMIN — Medication 80 MILLIGRAM(S): at 06:09

## 2019-09-30 RX ADMIN — PANTOPRAZOLE SODIUM 40 MILLIGRAM(S): 20 TABLET, DELAYED RELEASE ORAL at 06:08

## 2019-09-30 RX ADMIN — Medication 3 MILLILITER(S): at 15:04

## 2019-09-30 RX ADMIN — HEPARIN SODIUM 5000 UNIT(S): 5000 INJECTION INTRAVENOUS; SUBCUTANEOUS at 06:10

## 2019-09-30 RX ADMIN — Medication 325 MILLIGRAM(S): at 06:09

## 2019-09-30 RX ADMIN — Medication 1000 UNIT(S): at 13:01

## 2019-09-30 RX ADMIN — Medication 12: at 13:00

## 2019-09-30 RX ADMIN — SPIRONOLACTONE 50 MILLIGRAM(S): 25 TABLET, FILM COATED ORAL at 06:11

## 2019-09-30 RX ADMIN — ATORVASTATIN CALCIUM 40 MILLIGRAM(S): 80 TABLET, FILM COATED ORAL at 22:45

## 2019-09-30 RX ADMIN — SEVELAMER CARBONATE 800 MILLIGRAM(S): 2400 POWDER, FOR SUSPENSION ORAL at 08:37

## 2019-09-30 RX ADMIN — Medication 3 MILLILITER(S): at 09:53

## 2019-09-30 RX ADMIN — Medication 2: at 17:30

## 2019-09-30 NOTE — PROGRESS NOTE ADULT - PROBLEM SELECTOR PLAN 5
patient came with productive cough and h/o low grade fever at NH   c/o cough  mild leucocytosis   Diagnosed with Mycoplasma pnuemonia  on air borne precaution for flu  c/w Rocephin and Azithromycin   f/u procal, strep Ag, Legionell Ag

## 2019-09-30 NOTE — PROGRESS NOTE ADULT - ASSESSMENT
A/P:  1.ANJU ON CKD(stage 3): R/O ANJU secondary to pre-renal azotemia ,secondary to chf plus pulmonary infection, less likley other cause like obstructive uropathy/ATN  -pts renal function is mildly worsening with diuresis?  -suggest to reduce Lasix once a day from bid  -suggest to f/u  renal us  -moniter I/Os daily  CKD stage 3 , with non nephrotic proteinuria ,due to DM/HTN/obesity, less likley primary GN   -Keep patient euvolemic and Diabetic renal diet  -Avoid Nephrotoxic Meds/ Agents such as (NSAIDs, IV contrast, Aminoglycosides such as gentamicin, -Gadolinium contrast, Phosphate containing enemas, etc..)  -Adjust Medications according to eGFR  -f/u bmp daily  2.HTN: bp is controlled  -goal bp >110/70 and <130/80  -low salt diet  3.ANEMIA: mild, r/o secondary to ckd plus infection plus  iron deficiency  -add po feso4 325mg bid prior to meals  -f/u Hb daily  4.RESPIRATORY ACIDOISS ;with renal compensation and hypoxia  -suggest high o2 via NC , f/u ABG  -Suggest Pulmonary consult  5.HYPOKALEMIA/ALKALOSIS: secondary to primary respiratory acidosis plus diuresis induced  -suggest to add Aldactone 50 mg daily to keep k normal and to avoid further alkalosis and d/c Zaroxlyn  -f/u k an dco2 daily A/P:  1.ANJU ON CKD(stage 3): R/O ANJU secondary to pre-renal azotemia ,secondary to chf plus pulmonary infection, less likley other cause like obstructive uropathy/ATN  -pts renal function is stable last 24 hrs but above her baseline(1.4 )  -suggest to f/u  renal us  -moniter I/Os daily  CKD stage 3 , with non nephrotic proteinuria ,due to DM/HTN/obesity, less likley primary GN   -Keep patient euvolemic and Diabetic renal diet  -Avoid Nephrotoxic Meds/ Agents such as (NSAIDs, IV contrast, Aminoglycosides such as gentamicin, -Gadolinium contrast, Phosphate containing enemas, etc..)  -Adjust Medications according to eGFR  -f/u bmp daily  2.HTN: bp is controlled  -goal bp >110/70 and <130/80  -low salt diet  3.ANEMIA: mild, r/o secondary to ckd plus infection plus  iron deficiency  -continue  po feso4 325mg bid prior to meals  -f/u Hb daily  4.RESPIRATORY ACIDOISS ;with renal compensation   -f/u co2 daily  -Suggest Pulmonary consult  5.ALKALOSIS: stable   -suggest to continue  Aldactone 50 mg daily to keep k normal and to avoid further alkalosis   -f/u k an dco2 daily  6.S/P HYPOKALEMIA: now k is normal  -f/u k level daily  -keep k>4 and <5 A/P:  1.ANJU ON CKD(stage 3): R/O ANJU secondary to pre-renal azotemia ,secondary to chf plus pulmonary infection, less likley other cause like obstructive uropathy/ATN  -pts renal function is stable last 24 hrs but above her baseline(1.4 )  -suggest to f/u  renal us  -moniter I/Os daily  CKD stage 3 , with non nephrotic proteinuria ,due to DM/HTN/obesity, less likley primary GN   -Keep patient euvolemic and Diabetic renal diet  -Avoid Nephrotoxic Meds/ Agents such as (NSAIDs, IV contrast, Aminoglycosides such as gentamicin, -Gadolinium contrast, Phosphate containing enemas, etc..)  -Adjust Medications according to eGFR  -f/u bmp daily  2.HTN: bp is controlled  -goal bp >110/70 and <130/80  -low salt diet  3.ANEMIA: mild, r/o secondary to ckd plus infection plus  iron deficiency  -continue  po feso4 325mg bid prior to meals  -f/u Hb daily  4.RESPIRATORY ACIDOISS ;with renal compensation   -f/u co2 daily  -Suggest Pulmonary consult  5.ALKALOSIS: stable   -suggest to continue  Aldactone 50 mg daily to keep k normal and to avoid further alkalosis   -f/u k an dco2 daily  6.S/P HYPOKALEMIA: now k is normal  -f/u k level daily  -keep k>4 and <5  7.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

## 2019-09-30 NOTE — PROGRESS NOTE ADULT - PROBLEM SELECTOR PLAN 6
came with elevated BUN and creatinine level 77/3.43   and Bseline is 27/1.43  likely due to CHF   continue with Diuresis   avoid nephrotoxic drugs.  PTH - 222   f/u Urine lytes and ultrasound of kidney  Phosphorous 6.1--> renvela dose doubled today  Dr Ledesma

## 2019-09-30 NOTE — PROGRESS NOTE ADULT - SUBJECTIVE AND OBJECTIVE BOX
Patient denies CP, SOB Review of systems otherwise (-)    ALBUTerol    90 MICROgram(s) HFA Inhaler 2 Puff(s) Inhalation every 6 hours  ALBUTerol/ipratropium for Nebulization 3 milliLiter(s) Nebulizer every 6 hours  allopurinol 100 milliGRAM(s) Oral daily  aspirin  chewable 81 milliGRAM(s) Oral daily  atorvastatin 40 milliGRAM(s) Oral at bedtime  azithromycin  IVPB 500 milliGRAM(s) IV Intermittent every 24 hours  azithromycin  IVPB      carvedilol 6.25 milliGRAM(s) Oral every 12 hours  cefTRIAXone   IVPB 1000 milliGRAM(s) IV Intermittent every 24 hours  cefTRIAXone   IVPB      cholecalciferol 1000 Unit(s) Oral daily  cyanocobalamin 1000 MICROGram(s) Oral daily  dextrose 10%. 1000 milliLiter(s) IV Continuous <Continuous>  docusate sodium 100 milliGRAM(s) Oral daily  ferrous    sulfate 325 milliGRAM(s) Oral two times a day  furosemide   Injectable 80 milliGRAM(s) IV Push daily  heparin  Injectable 5000 Unit(s) SubCutaneous every 8 hours  influenza   Vaccine 0.5 milliLiter(s) IntraMuscular once  insulin glargine Injectable (LANTUS) 30 Unit(s) SubCutaneous at bedtime  insulin lispro (HumaLOG) corrective regimen sliding scale   SubCutaneous Before meals and at bedtime  insulin lispro Injectable (HumaLOG) 6 Unit(s) SubCutaneous three times a day before meals  methylPREDNISolone sodium succinate Injectable 40 milliGRAM(s) IV Push daily  pantoprazole    Tablet 40 milliGRAM(s) Oral before breakfast  senna 2 Tablet(s) Oral at bedtime  sevelamer carbonate 800 milliGRAM(s) Oral three times a day with meals  spironolactone 50 milliGRAM(s) Oral daily                            10.4   14.92 )-----------( 339      ( 30 Sep 2019 07:33 )             33.9       Hemoglobin: 10.4 g/dL (09-30 @ 07:33)  Hemoglobin: 9.9 g/dL (09-29 @ 08:05)  Hemoglobin: 10.0 g/dL (09-28 @ 10:22)  Hemoglobin: 10.1 g/dL (09-27 @ 16:25)      09-30    135  |  91<L>  |  115<H>  ----------------------------<  372<H>  3.7   |  36<H>  |  3.23<H>    Ca    8.5      30 Sep 2019 07:33  Phos  6.1     09-30  Mg     3.0     09-30      Creatinine Trend: 3.23<--, 3.46<--, 3.22<--, 3.43<--    COAGS:     CARDIAC MARKERS ( 28 Sep 2019 07:48 )  0.019 ng/mL / x     / 211 U/L / x     / 2.1 ng/mL  CARDIAC MARKERS ( 27 Sep 2019 19:51 )  0.049 ng/mL / x     / 281 U/L / x     / 1.7 ng/mL  CARDIAC MARKERS ( 27 Sep 2019 16:25 )  0.047 ng/mL / x     / x     / x     / x            T(C): 36.4 (09-30-19 @ 07:20), Max: 36.6 (09-29-19 @ 19:45)  HR: 56 (09-30-19 @ 07:20) (56 - 69)  BP: 146/76 (09-30-19 @ 07:20) (100/59 - 146/76)  RR: 18 (09-30-19 @ 07:20) (18 - 20)  SpO2: 97% (09-30-19 @ 07:20) (90% - 100%)  Wt(kg): --    I&O's Summary    29 Sep 2019 07:01  -  30 Sep 2019 07:00  --------------------------------------------------------  IN: 118 mL / OUT: 700 mL / NET: -582 mL    Gen: Appears well in NAD  HEENT:  (-)icterus (-)pallor  CV: N S1 S2 1/6 RAYMOND (+)2 Pulses B/l  Resp:  Clear to ausculatation B/L, normal effort  GI: (+) BS Soft, NT, ND  Lymph:  (-)Edema, (-)obvious lymphadenopathy  Skin: Warm to touch, Normal turgor  Psych: Appropriate mood and affect    TELE: Sinus    ASSESSMENT/PLAN:  55 y/o  Female from [place] [facility] of Obesity, CHF HFrEF 25 %, s/p ICD, HTN, B/l LE edema, DM, Gout, CAD, HLD, COPD  presented to ED with SOB/ CHF found with M. Pneumonia     - Echo with no significant interval changes  - Approaching euvolemia anticipat can swithc back to PO lasix in next 24 hrs  - cont Coreg  - Monitor K+ with aldactone and CKD  - Start Isordil 10 mg PO TID  - Hope to add hydralazine if BP tolerated  - can D/C tele    Manuel Gold MD, FACC  BEEPER [phone]

## 2019-09-30 NOTE — PROGRESS NOTE ADULT - PROBLEM SELECTOR PLAN 1
Patient has h/o CHF HFrEf 25% as of 2018  Started On lasix 80mg iv   trop T1 0.04> 0.04>0.01  EKG showed no ischemic changes     ECHO EF-35%, Severe LV dysfunction, moderate PAH  s/p morales  monitor daily I/o's. output today 09/30-> -332   on lasix 80mg Od, aldactone 50mg, isordil 10mg TID.  cardio consult Dr Gold.

## 2019-09-30 NOTE — H&P ADULT - NEGATIVE CARDIOVASCULAR SYMPTOMS
"SUBJECTIVE:   Chief Complaint   Patient presents with   • Miscarriage     pt reports spotting after intercouse. Pt deisres IUD for contraception         Latosha Cutler is a 24 y.o.  who presents for follow up from miscarriage on 19. Reports that she is doing fine. Never picked up oral contraceptive pill because \"I have been too busy.\"  Denies any abdominal pain, fever, chills.  Has had intercourse since miscarriage, unprotected.   Pathology showed chorionic villi, hcg was 3k at that time    Past Medical History:   Diagnosis Date   • Anxiety    • Asthma    • Chlamydia    • Depression    • Urinary tract infection      Past Surgical History:   Procedure Laterality Date   •  SECTION       OB History    Para Term  AB Living   4 2 2   1 2   SAB TAB Ectopic Molar Multiple Live Births   1 0       2      # Outcome Date GA Lbr Toy/2nd Weight Sex Delivery Anes PTL Lv   4             3 Term 06/10/15 39w0d  2920 g (6 lb 7 oz) M CS-LTranv   SUSIE   2 Term 13 37w0d  3033 g (6 lb 11 oz) M CS-LTranv   SUSIE      Complications: Severe pre-eclampsia in third trimester   1 SAB      SAB            Social History     Tobacco Use   • Smoking status: Current Every Day Smoker     Packs/day: 0.50     Types: Cigarettes   • Smokeless tobacco: Never Used   Substance Use Topics   • Alcohol use: No   • Drug use: No     Family History   Problem Relation Age of Onset   • Endometriosis Sister    • Breast cancer Neg Hx    • Ovarian cancer Neg Hx    • Uterine cancer Neg Hx    • Colon cancer Neg Hx    • Deep vein thrombosis Neg Hx    • Pulmonary embolism Neg Hx      Current Outpatient Medications on File Prior to Visit   Medication Sig Dispense Refill   • albuterol sulfate  (90 Base) MCG/ACT inhaler Inhale 2 puffs.     • busPIRone (BUSPAR) 7.5 MG tablet      • [DISCONTINUED] norethindrone-ethinyl estradiol FE (JUNEL FE 1/20) 1-20 MG-MCG per tablet Take 1 tablet by mouth Daily. 28 tablet 12   • " "[DISCONTINUED] Peak Flow Meter device 1 Device Daily. 1 each 0     No current facility-administered medications on file prior to visit.      Allergies   Allergen Reactions   • Povidone Iodine Other (See Comments)     Unsure - but has strong anaphlaxis to shellfish & was told not to use betadine   • Shellfish-Derived Products Swelling        Review of Systems   Constitutional: Negative for activity change, appetite change, fatigue, fever and unexpected weight change.   Gastrointestinal: Negative for abdominal pain, nausea and vomiting.   Genitourinary: Positive for menstrual problem and vaginal bleeding. Negative for vaginal discharge and vaginal pain.   Hematological: Does not bruise/bleed easily.   Psychiatric/Behavioral: Negative for agitation.         OBJECTIVE:   Vitals:    09/30/19 1349   BP: 112/76   Pulse: 84   Weight: 65.3 kg (144 lb)   Height: 157.5 cm (62.01\")      Physical Exam   Constitutional: She is oriented to person, place, and time. She appears well-developed and well-nourished. No distress.   HENT:   Head: Normocephalic and atraumatic.   Eyes: EOM are normal. No scleral icterus.   Neck: Normal range of motion.   Cardiovascular: Normal rate and regular rhythm.   Pulmonary/Chest: Effort normal. No respiratory distress.   Abdominal: Soft. She exhibits no distension.   Genitourinary: Uterus normal and cervix normal. There is no rash, tenderness, lesion, injury or Bartholin's cyst on the right labia. There is no rash, tenderness, lesion, injury or Bartholin's cyst on the left labia. Cervix does not exhibit motion tenderness. Right adnexum displays no mass, no tenderness and no fullness. Right adnexum is present.Left adnexum displays no mass, no tenderness and no fullness. Left adnexum is present.Vagina exhibits no lesion and no loss of rugae. No erythema, tenderness or bleeding in the vagina. No foreign body in the vagina. No signs of injury around the vagina. No vaginal discharge found. " "  Musculoskeletal: Normal range of motion.   Neurological: She is alert and oriented to person, place, and time.   Skin: Skin is warm and dry. No rash noted. She is not diaphoretic. No erythema.   Psychiatric: She has a normal mood and affect. Her behavior is normal. Judgment and thought content normal.       ASSESSMENT/PLAN:     ICD-10-CM ICD-9-CM   1. Miscarriage O03.9 634.90   Reviewed labs and pathology  Spotting after intercourse:  Reviewed with patient that if bleeding after intercourse continued after 4 weeks, would recommend colposcopy  IUD for contraception:  Discussed that we need to be \"reasonably certain\" that she is not pregnant with either insertion within 7 days of LMP, 14 days of abstinence.    Declines bridge    Return in about 2 weeks (around 10/14/2019) for IUD insertion.            " no chest pain/no orthopnea/no paroxysmal nocturnal dyspnea/no palpitations

## 2019-09-30 NOTE — PROGRESS NOTE ADULT - SUBJECTIVE AND OBJECTIVE BOX
pt seen and examined.pts current chart reviewed and case discussed with resident covering.    SUBJECTIVE:  pt feels fine and denies cp,sob,gi or gu/uremic  symptons    REVIEW OF SYSTEMS:  CONSTITUTIONAL: No weakness, fevers or chills  EYES/ENT: No visual changes;  No vertigo or throat pain   NECK: No pain or stiffness  RESPIRATORY: No cough, wheezing, hemoptysis; No shortness of breath  CARDIOVASCULAR: No chest pain or palpitations  GASTROINTESTINAL: No abdominal or epigastric pain. No nausea, vomiting, or hematemesis; No diarrhea or constipation. No melena or hematochezia.  GENITOURINARY: No dysuria, frequency , hematuria, flank pain or nocturia  NEUROLOGICAL: No numbness or weakness  SKIN: No itching, burning, rashes, or lesions   All other review of systems is negative unless indicated above    Current meds:    ALBUTerol    90 MICROgram(s) HFA Inhaler 2 Puff(s) Inhalation every 6 hours  ALBUTerol/ipratropium for Nebulization 3 milliLiter(s) Nebulizer every 6 hours  allopurinol 100 milliGRAM(s) Oral daily  aspirin  chewable 81 milliGRAM(s) Oral daily  atorvastatin 40 milliGRAM(s) Oral at bedtime  azithromycin  IVPB 500 milliGRAM(s) IV Intermittent every 24 hours  azithromycin  IVPB      carvedilol 6.25 milliGRAM(s) Oral every 12 hours  cefTRIAXone   IVPB 1000 milliGRAM(s) IV Intermittent every 24 hours  cefTRIAXone   IVPB      cholecalciferol 1000 Unit(s) Oral daily  cyanocobalamin 1000 MICROGram(s) Oral daily  dextrose 10%. 1000 milliLiter(s) IV Continuous <Continuous>  docusate sodium 100 milliGRAM(s) Oral daily  ferrous    sulfate 325 milliGRAM(s) Oral two times a day  furosemide   Injectable 80 milliGRAM(s) IV Push daily  heparin  Injectable 5000 Unit(s) SubCutaneous every 8 hours  influenza   Vaccine 0.5 milliLiter(s) IntraMuscular once  insulin glargine Injectable (LANTUS) 30 Unit(s) SubCutaneous at bedtime  insulin lispro (HumaLOG) corrective regimen sliding scale   SubCutaneous Before meals and at bedtime  insulin lispro Injectable (HumaLOG) 6 Unit(s) SubCutaneous three times a day before meals  methylPREDNISolone sodium succinate Injectable 40 milliGRAM(s) IV Push daily  pantoprazole    Tablet 40 milliGRAM(s) Oral before breakfast  senna 2 Tablet(s) Oral at bedtime  sevelamer carbonate 800 milliGRAM(s) Oral three times a day with meals  spironolactone 50 milliGRAM(s) Oral daily      Vital Signs    T(F): 97.4 (19 @ 11:48), Max: 97.9 (19 @ 19:45)  HR: 63 (19 @ 11:48) (56 - 69)  BP: 138/60 (19 @ 11:48) (109/61 - 146/76)  ABP: --  RR: 20 (19 @ 11:48) (18 - 20)  SpO2: 98% (19 @ 11:48) (91% - 100%)  Wt(kg): --  CVP(cm H2O): --  CO: --  PCWP: --    I and O's:     @ 07:01  -   @ 07:00  --------------------------------------------------------  IN:    Oral Fluid: 250 mL  Total IN: 250 mL    OUT:  Total OUT: 0 mL    Total NET: 250 mL       @ 07:01  -   @ 07:00  --------------------------------------------------------  IN:    Oral Fluid: 118 mL  Total IN: 118 mL    OUT:    Voided: 700 mL  Total OUT: 700 mL    Total NET: -582 mL        Daily     Daily Weight in k.4 (30 Sep 2019 04:33)    PHYSICAL EXAM:  Constitutional: well developed, well nourished  and in nad  HEENT: PERRLA,  no icteric sclera and mild pallor of conjunctiva noted  Neck: No JVD, thyromegaly or adenopathy  Respiratory: reduced air entry lower lungs with no rales, wheezing or rhonchi  Cardiovascular: S1 and S2 normally heard  Gastrointestinal: soft, nondistended, nontender and normal bowel sounds heard  Extremities: No peripheral edema or cyanosis  Neurological: A/O x 3, no focal deficits  : No flank or cva tenderness palpated.  Skin: No rashes      LABS:    CBC:                          10.4   14.92 )-----------( 339      ( 30 Sep 2019 07:33 )             33.9           BMP:        135  |  91<L>  |  115<H>  ----------------------------<  372<H>  3.7   |  36<H>  |  3.23<H>      134<L>  |  89<L>  |  107<H>  ----------------------------<  326<H>  3.4<L>   |  38<H>  |  3.46<H>      134<L>  |  91<L>  |  86<H>  ----------------------------<  344<H>  3.7   |  33<H>  |  3.22<H>      136  |  95<L>  |  77<H>  ----------------------------<  312<H>  4.0   |  33<H>  |  3.43<H>    Ca    8.5      30 Sep 2019 07:33  Ca    8.6      29 Sep 2019 08:05  Ca    8.6      28 Sep 2019 10:23  Ca    8.5      27 Sep 2019 16:25  Phos  6.1       Phos  5.8       Mg     3.0       Mg     2.9       Mg     2.7         TPro  7.6  /  Alb  3.0<L>  /  TBili  0.4  /  DBili  x   /  AST  28  /  ALT  27  /  AlkPhos  74        Intact PTH: 222 pg/mL ( @ 11:39)  Vitamin D, 1, 25-Dihydroxy: 76.2 pg/mL ( @ 15:50)  Thyroid Stimulating Hormone, Serum: 0.64 uU/mL ( @ 07:48)      URINE STUDIES:        Creatinine, Random Urine: 50 mg/dL ( @ 21:23)  Osmolality, Random Urine: 372 mos/kg ( @ 09:19)  Sodium, Random Urine: 61 mmol/L ( @ 09:19)  Creatinine, Random Urine: 62 mg/dL ( @ 09:19)                  RADIOLOGY & ADDITIONAL STUDIES: note is incomplete  pt seen and examined.pts current chart reviewed and case discussed with resident covering.    SUBJECTIVE:  pt feels fine and denies cp,sob,gi or gu/uremic  symptons    REVIEW OF SYSTEMS:  CONSTITUTIONAL: No weakness, fevers or chills  EYES/ENT: No visual changes;  No vertigo or throat pain   NECK: No pain or stiffness  RESPIRATORY: No cough, wheezing, hemoptysis; No shortness of breath  CARDIOVASCULAR: No chest pain or palpitations  GASTROINTESTINAL: No abdominal or epigastric pain. No nausea, vomiting, or hematemesis; No diarrhea or constipation. No melena or hematochezia.  GENITOURINARY: No dysuria, frequency , hematuria, flank pain or nocturia  NEUROLOGICAL: No numbness or weakness  SKIN: No itching, burning, rashes, or lesions   All other review of systems is negative unless indicated above    Current meds:    ALBUTerol    90 MICROgram(s) HFA Inhaler 2 Puff(s) Inhalation every 6 hours  ALBUTerol/ipratropium for Nebulization 3 milliLiter(s) Nebulizer every 6 hours  allopurinol 100 milliGRAM(s) Oral daily  aspirin  chewable 81 milliGRAM(s) Oral daily  atorvastatin 40 milliGRAM(s) Oral at bedtime  azithromycin  IVPB 500 milliGRAM(s) IV Intermittent every 24 hours  azithromycin  IVPB      carvedilol 6.25 milliGRAM(s) Oral every 12 hours  cefTRIAXone   IVPB 1000 milliGRAM(s) IV Intermittent every 24 hours  cefTRIAXone   IVPB      cholecalciferol 1000 Unit(s) Oral daily  cyanocobalamin 1000 MICROGram(s) Oral daily  dextrose 10%. 1000 milliLiter(s) IV Continuous <Continuous>  docusate sodium 100 milliGRAM(s) Oral daily  ferrous    sulfate 325 milliGRAM(s) Oral two times a day  furosemide   Injectable 80 milliGRAM(s) IV Push daily  heparin  Injectable 5000 Unit(s) SubCutaneous every 8 hours  influenza   Vaccine 0.5 milliLiter(s) IntraMuscular once  insulin glargine Injectable (LANTUS) 30 Unit(s) SubCutaneous at bedtime  insulin lispro (HumaLOG) corrective regimen sliding scale   SubCutaneous Before meals and at bedtime  insulin lispro Injectable (HumaLOG) 6 Unit(s) SubCutaneous three times a day before meals  methylPREDNISolone sodium succinate Injectable 40 milliGRAM(s) IV Push daily  pantoprazole    Tablet 40 milliGRAM(s) Oral before breakfast  senna 2 Tablet(s) Oral at bedtime  sevelamer carbonate 800 milliGRAM(s) Oral three times a day with meals  spironolactone 50 milliGRAM(s) Oral daily      Vital Signs    T(F): 97.4 (19 @ 11:48), Max: 97.9 (19 @ 19:45)  HR: 63 (19 @ 11:48) (56 - 69)  BP: 138/60 (19 @ 11:48) (109/61 - 146/76)  ABP: --  RR: 20 (19 @ 11:48) (18 - 20)  SpO2: 98% (19 @ 11:48) (91% - 100%)  Wt(kg): --  CVP(cm H2O): --  CO: --  PCWP: --    I and O's:     @ 07:01  -   @ 07:00  --------------------------------------------------------  IN:    Oral Fluid: 250 mL  Total IN: 250 mL    OUT:  Total OUT: 0 mL    Total NET: 250 mL       @ 07:01  -   @ 07:00  --------------------------------------------------------  IN:    Oral Fluid: 118 mL  Total IN: 118 mL    OUT:    Voided: 700 mL  Total OUT: 700 mL    Total NET: -582 mL        Daily     Daily Weight in k.4 (30 Sep 2019 04:33)    PHYSICAL EXAM:  Constitutional: well developed, well nourished  and in nad  HEENT: PERRLA,  no icteric sclera and mild pallor of conjunctiva noted  Neck: No JVD, thyromegaly or adenopathy  Respiratory: reduced air entry lower lungs with no rales, wheezing or rhonchi  Cardiovascular: S1 and S2 normally heard  Gastrointestinal: soft, nondistended, nontender and normal bowel sounds heard  Extremities: No peripheral edema or cyanosis  Neurological: A/O x 3, no focal deficits  : No flank or cva tenderness palpated.  Skin: No rashes      LABS:    CBC:                          10.4   14.92 )-----------( 339      ( 30 Sep 2019 07:33 )             33.9           BMP:        135  |  91<L>  |  115<H>  ----------------------------<  372<H>  3.7   |  36<H>  |  3.23<H>      134<L>  |  89<L>  |  107<H>  ----------------------------<  326<H>  3.4<L>   |  38<H>  |  3.46<H>      134<L>  |  91<L>  |  86<H>  ----------------------------<  344<H>  3.7   |  33<H>  |  3.22<H>      136  |  95<L>  |  77<H>  ----------------------------<  312<H>  4.0   |  33<H>  |  3.43<H>    Ca    8.5      30 Sep 2019 07:33  Ca    8.6      29 Sep 2019 08:05  Ca    8.6      28 Sep 2019 10:23  Ca    8.5      27 Sep 2019 16:25  Phos  6.1       Phos  5.8       Mg     3.0       Mg     2.9       Mg     2.7         TPro  7.6  /  Alb  3.0<L>  /  TBili  0.4  /  DBili  x   /  AST  28  /  ALT  27  /  AlkPhos  74        Intact PTH: 222 pg/mL ( @ 11:39)  Vitamin D, 1, 25-Dihydroxy: 76.2 pg/mL ( @ 15:50)  Thyroid Stimulating Hormone, Serum: 0.64 uU/mL ( @ 07:48)      URINE STUDIES:        Creatinine, Random Urine: 50 mg/dL ( @ 21:23)  Osmolality, Random Urine: 372 mos/kg ( @ 09:19)  Sodium, Random Urine: 61 mmol/L ( @ 09:19)  Creatinine, Random Urine: 62 mg/dL ( @ 09:19)                  RADIOLOGY & ADDITIONAL STUDIES: pt seen and examined.    SUBJECTIVE:  pt feels fine and denies cp,sob,gi or gu symptons  pt is voiding ok  REVIEW OF SYSTEMS:  CONSTITUTIONAL: No weakness, fevers or chills  EYES/ENT: No visual changes;  No vertigo or throat pain   NECK: No pain or stiffness  RESPIRATORY: + cough, no wheezing, hemoptysis; No shortness of breath  CARDIOVASCULAR: No chest pain or palpitations  GASTROINTESTINAL: No abdominal or epigastric pain. No nausea, vomiting, or hematemesis; No diarrhea or constipation. No melena or hematochezia.  GENITOURINARY: No dysuria, frequency , hematuria, flank pain or nocturia  NEUROLOGICAL: No numbness or weakness  SKIN: No itching, burning, rashes, or lesions   All other review of systems is negative unless indicated above    Current meds:    ALBUTerol    90 MICROgram(s) HFA Inhaler 2 Puff(s) Inhalation every 6 hours  ALBUTerol/ipratropium for Nebulization 3 milliLiter(s) Nebulizer every 6 hours  allopurinol 100 milliGRAM(s) Oral daily  aspirin  chewable 81 milliGRAM(s) Oral daily  atorvastatin 40 milliGRAM(s) Oral at bedtime  azithromycin  IVPB 500 milliGRAM(s) IV Intermittent every 24 hours  azithromycin  IVPB      carvedilol 6.25 milliGRAM(s) Oral every 12 hours  cefTRIAXone   IVPB 1000 milliGRAM(s) IV Intermittent every 24 hours  cefTRIAXone   IVPB      cholecalciferol 1000 Unit(s) Oral daily  cyanocobalamin 1000 MICROGram(s) Oral daily  dextrose 10%. 1000 milliLiter(s) IV Continuous <Continuous>  docusate sodium 100 milliGRAM(s) Oral daily  ferrous    sulfate 325 milliGRAM(s) Oral two times a day  furosemide   Injectable 80 milliGRAM(s) IV Push daily  heparin  Injectable 5000 Unit(s) SubCutaneous every 8 hours  influenza   Vaccine 0.5 milliLiter(s) IntraMuscular once  insulin glargine Injectable (LANTUS) 30 Unit(s) SubCutaneous at bedtime  insulin lispro (HumaLOG) corrective regimen sliding scale   SubCutaneous Before meals and at bedtime  insulin lispro Injectable (HumaLOG) 6 Unit(s) SubCutaneous three times a day before meals  methylPREDNISolone sodium succinate Injectable 40 milliGRAM(s) IV Push daily  pantoprazole    Tablet 40 milliGRAM(s) Oral before breakfast  senna 2 Tablet(s) Oral at bedtime  sevelamer carbonate 800 milliGRAM(s) Oral three times a day with meals  spironolactone 50 milliGRAM(s) Oral daily      Vital Signs    T(F): 97.4 (19 @ 11:48), Max: 97.9 (19 @ 19:45)  HR: 63 (19 @ 11:48) (56 - 69)  BP: 138/60 (19 @ 11:48) (109/61 - 146/76)  ABP: --  RR: 20 (19 @ 11:48) (18 - 20)  SpO2: 98% (19 @ 11:48) (91% - 100%)  Wt(kg): --  CVP(cm H2O): --  CO: --  PCWP: --    I and O's:     @ 07:01  -   @ 07:00  --------------------------------------------------------  IN:    Oral Fluid: 250 mL  Total IN: 250 mL    OUT:  Total OUT: 0 mL    Total NET: 250 mL       @ 07:01  -   @ 07:00  --------------------------------------------------------  IN:    Oral Fluid: 118 mL  Total IN: 118 mL    OUT:    Voided: 700 mL  Total OUT: 700 mL    Total NET: -582 mL        Daily     Daily Weight in k.4 (30 Sep 2019 04:33)    PHYSICAL EXAM:  Constitutional: well developed, obese  and in nad  HEENT: PERRLA,  no icteric sclera and mild pallor of conjunctiva noted  Neck: No JVD, thyromegaly or adenopathy  Respiratory: reduced air entry lower lungs with few  wheezing and  rhonchi  Cardiovascular: S1 and S2 normally heard  Gastrointestinal: soft, nondistended, nontender and normal bowel sounds heard  +rt lower abdominal large hernia , s/p cholecystectomy  Extremities: Trace peripheral edema noted in both LE s  Neurological: A/O x 3, no focal deficits  Skin: No rashes    LABS:    CBC:                          10.4   14.92 )-----------( 339      ( 30 Sep 2019 07:33 )             33.9           BMP:        135  |  91<L>  |  115<H>  ----------------------------<  372<H>  3.7   |  36<H>  |  3.23<H>      134<L>  |  89<L>  |  107<H>  ----------------------------<  326<H>  3.4<L>   |  38<H>  |  3.46<H>      134<L>  |  91<L>  |  86<H>  ----------------------------<  344<H>  3.7   |  33<H>  |  3.22<H>      136  |  95<L>  |  77<H>  ----------------------------<  312<H>  4.0   |  33<H>  |  3.43<H>    Ca    8.5      30 Sep 2019 07:33  Ca    8.6      29 Sep 2019 08:05  Ca    8.6      28 Sep 2019 10:23  Ca    8.5      27 Sep 2019 16:25  Phos  6.1       Phos  5.8       Mg     3.0       Mg     2.9       Mg     2.7         TPro  7.6  /  Alb  3.0<L>  /  TBili  0.4  /  DBili  x   /  AST  28  /  ALT  27  /  AlkPhos  74        Intact PTH: 222 pg/mL ( @ 11:39)  Vitamin D, 1, 25-Dihydroxy: 76.2 pg/mL ( @ 15:50)  Thyroid Stimulating Hormone, Serum: 0.64 uU/mL ( @ 07:48)      URINE STUDIES:  Creatinine, Random Urine: 50 mg/dL ( @ 21:23)  Osmolality, Random Urine: 372 mos/kg ( @ 09:19)  Sodium, Random Urine: 61 mmol/L ( @ 09:19)  Creatinine, Random Urine: 62 mg/dL ( @ 09:19)                  RADIOLOGY & ADDITIONAL STUDIES:    renal sono : not done yet pt seen and examined.    SUBJECTIVE:  pt feels fine and denies cp,sob,gi or gu symptoms  pt is voiding ok  REVIEW OF SYSTEMS:  CONSTITUTIONAL: No weakness, fevers or chills  EYES/ENT: No visual changes;  No vertigo or throat pain   NECK: No pain or stiffness  RESPIRATORY: + cough, no wheezing, hemoptysis; No shortness of breath  CARDIOVASCULAR: No chest pain or palpitations  GASTROINTESTINAL: No abdominal or epigastric pain. No nausea, vomiting, or hematemesis; No diarrhea or constipation. No melena or hematochezia.  GENITOURINARY: No dysuria, frequency , hematuria, flank pain or nocturia  NEUROLOGICAL: No numbness or weakness  SKIN: No itching, burning, rashes, or lesions   All other review of systems is negative unless indicated above    Current meds:    ALBUTerol    90 MICROgram(s) HFA Inhaler 2 Puff(s) Inhalation every 6 hours  ALBUTerol/ipratropium for Nebulization 3 milliLiter(s) Nebulizer every 6 hours  allopurinol 100 milliGRAM(s) Oral daily  aspirin  chewable 81 milliGRAM(s) Oral daily  atorvastatin 40 milliGRAM(s) Oral at bedtime  azithromycin  IVPB 500 milliGRAM(s) IV Intermittent every 24 hours  azithromycin  IVPB      carvedilol 6.25 milliGRAM(s) Oral every 12 hours  cefTRIAXone   IVPB 1000 milliGRAM(s) IV Intermittent every 24 hours  cefTRIAXone   IVPB      cholecalciferol 1000 Unit(s) Oral daily  cyanocobalamin 1000 MICROGram(s) Oral daily  dextrose 10%. 1000 milliLiter(s) IV Continuous <Continuous>  docusate sodium 100 milliGRAM(s) Oral daily  ferrous    sulfate 325 milliGRAM(s) Oral two times a day  furosemide   Injectable 80 milliGRAM(s) IV Push daily  heparin  Injectable 5000 Unit(s) SubCutaneous every 8 hours  influenza   Vaccine 0.5 milliLiter(s) IntraMuscular once  insulin glargine Injectable (LANTUS) 30 Unit(s) SubCutaneous at bedtime  insulin lispro (HumaLOG) corrective regimen sliding scale   SubCutaneous Before meals and at bedtime  insulin lispro Injectable (HumaLOG) 6 Unit(s) SubCutaneous three times a day before meals  methylPREDNISolone sodium succinate Injectable 40 milliGRAM(s) IV Push daily  pantoprazole    Tablet 40 milliGRAM(s) Oral before breakfast  senna 2 Tablet(s) Oral at bedtime  sevelamer carbonate 800 milliGRAM(s) Oral three times a day with meals  spironolactone 50 milliGRAM(s) Oral daily      Vital Signs    T(F): 97.4 (19 @ 11:48), Max: 97.9 (19 @ 19:45)  HR: 63 (19 @ 11:48) (56 - 69)  BP: 138/60 (19 @ 11:48) (109/61 - 146/76)  ABP: --  RR: 20 (19 @ 11:48) (18 - 20)  SpO2: 98% (19 @ 11:48) (91% - 100%)  Wt(kg): --  CVP(cm H2O): --  CO: --  PCWP: --    I and O's:     @ 07:01  -   @ 07:00  --------------------------------------------------------  IN:    Oral Fluid: 250 mL  Total IN: 250 mL    OUT:  Total OUT: 0 mL    Total NET: 250 mL       @ 07:01  -   @ 07:00  --------------------------------------------------------  IN:    Oral Fluid: 118 mL  Total IN: 118 mL    OUT:    Voided: 700 mL  Total OUT: 700 mL    Total NET: -582 mL        Daily     Daily Weight in k.4 (30 Sep 2019 04:33)    PHYSICAL EXAM:  Constitutional: well developed, obese  and in nad  HEENT: PERRLA,  no icteric sclera and mild pallor of conjunctiva noted  Neck: No JVD, thyromegaly or adenopathy  Respiratory: reduced air entry lower lungs with few  wheezing and  rhonchi  Cardiovascular: S1 and S2 normally heard  Gastrointestinal: soft, nondistended, nontender and normal bowel sounds heard  +rt lower abdominal large hernia , s/p cholecystectomy  Extremities: Trace peripheral edema noted in both LE s  Neurological: A/O x 3, no focal deficits  Skin: No rashes    LABS:    CBC:                          10.4   14.92 )-----------( 339      ( 30 Sep 2019 07:33 )             33.9           BMP:        135  |  91<L>  |  115<H>  ----------------------------<  372<H>  3.7   |  36<H>  |  3.23<H>      134<L>  |  89<L>  |  107<H>  ----------------------------<  326<H>  3.4<L>   |  38<H>  |  3.46<H>      134<L>  |  91<L>  |  86<H>  ----------------------------<  344<H>  3.7   |  33<H>  |  3.22<H>      136  |  95<L>  |  77<H>  ----------------------------<  312<H>  4.0   |  33<H>  |  3.43<H>    Ca    8.5      30 Sep 2019 07:33  Ca    8.6      29 Sep 2019 08:05  Ca    8.6      28 Sep 2019 10:23  Ca    8.5      27 Sep 2019 16:25  Phos  6.1       Phos  5.8       Mg     3.0       Mg     2.9       Mg     2.7         TPro  7.6  /  Alb  3.0<L>  /  TBili  0.4  /  DBili  x   /  AST  28  /  ALT  27  /  AlkPhos  74        Intact PTH: 222 pg/mL ( @ 11:39)  Vitamin D, 1, 25-Dihydroxy: 76.2 pg/mL ( @ 15:50)  Thyroid Stimulating Hormone, Serum: 0.64 uU/mL ( @ 07:48)      URINE STUDIES:  Creatinine, Random Urine: 50 mg/dL ( @ 21:23)  Osmolality, Random Urine: 372 mos/kg ( @ 09:19)  Sodium, Random Urine: 61 mmol/L ( @ 09:19)  Creatinine, Random Urine: 62 mg/dL ( @ 09:19)                  RADIOLOGY & ADDITIONAL STUDIES:    renal sono : not done yet

## 2019-09-30 NOTE — PROGRESS NOTE ADULT - ASSESSMENT
55 y[facility]o  [facility] HFr[facility] 25 %, s/p AICD, HTN, B/l LE edema, DM, Gout, CAD, HLD, COPD  presented to ED with SOB. As per patient she had fever, chills, shortness of breath, dizzy, loss of appetite, weight loss, muscle aches on Tuesday and she was diagnosed with Flu. She was given antibiotics and treatment for flu. But she took medications for 2 days. She was saturating 77% on admission and gasping for breath. In ED she was on NRB and saturated 97%. She had sick contacts at her place diagnosed with Flu. Patient denies cough, sputum production, fevers/chills/nausea/vomiting. No diarrhea, abdominal pain.  Pt was admitted for CHF exacerbation.

## 2019-09-30 NOTE — PROGRESS NOTE ADULT - PROBLEM SELECTOR PLAN 3
patient is on glipizide XL 5 mg daily   BLOOD SUGAR ELEVATED- 350 - 400'S.   hba1c- 10.5  hold home medications   sliding scale  increased Lantus to 30U and Lispro to 6U TID

## 2019-09-30 NOTE — PROGRESS NOTE ADULT - PROBLEM SELECTOR PLAN 2
Acute hypercapnic and hypoxic respiratory failure.  Plan: Patient came with SOB, productive cough with B/l lower Ext swelling - RESOLVING  afebrile, Hd stable,   on PE patient was in mod respiratory distress, with wheezing   Trop t1 0.04, Bnp 6k   ABG 7.39/56/37 on NC 3L  respiratory Failure likely due to CHF exacerbation Vs COPD   . s/p IV solumedrol   placed on Bipap for hypercapnic RF   c/w solumedrol 40 q 8

## 2019-09-30 NOTE — PROGRESS NOTE ADULT - SUBJECTIVE AND OBJECTIVE BOX
PGY 1 Note discussed with supervising resident and primary attending    Patient is a 54y old  Female who presents with a chief complaint of shortness of breath (30 Sep 2019 14:03)      INTERVAL HPI/OVERNIGHT EVENTS: offers no new complaints; current symptoms resolving    MEDICATIONS  (STANDING):  ALBUTerol    90 MICROgram(s) HFA Inhaler 2 Puff(s) Inhalation every 6 hours  ALBUTerol/ipratropium for Nebulization 3 milliLiter(s) Nebulizer every 6 hours  allopurinol 100 milliGRAM(s) Oral daily  aspirin  chewable 81 milliGRAM(s) Oral daily  atorvastatin 40 milliGRAM(s) Oral at bedtime  azithromycin  IVPB 500 milliGRAM(s) IV Intermittent every 24 hours  azithromycin  IVPB      carvedilol 6.25 milliGRAM(s) Oral every 12 hours  cefTRIAXone   IVPB 1000 milliGRAM(s) IV Intermittent every 24 hours  cefTRIAXone   IVPB      cholecalciferol 1000 Unit(s) Oral daily  cyanocobalamin 1000 MICROGram(s) Oral daily  dextrose 10%. 1000 milliLiter(s) (100 mL/Hr) IV Continuous <Continuous>  docusate sodium 100 milliGRAM(s) Oral daily  ferrous    sulfate 325 milliGRAM(s) Oral two times a day  furosemide   Injectable 80 milliGRAM(s) IV Push daily  heparin  Injectable 5000 Unit(s) SubCutaneous every 8 hours  influenza   Vaccine 0.5 milliLiter(s) IntraMuscular once  insulin glargine Injectable (LANTUS) 30 Unit(s) SubCutaneous at bedtime  insulin lispro (HumaLOG) corrective regimen sliding scale   SubCutaneous Before meals and at bedtime  insulin lispro Injectable (HumaLOG) 6 Unit(s) SubCutaneous three times a day before meals  isosorbide   dinitrate Tablet (ISORDIL) 10 milliGRAM(s) Oral three times a day  methylPREDNISolone sodium succinate Injectable 40 milliGRAM(s) IV Push daily  pantoprazole    Tablet 40 milliGRAM(s) Oral before breakfast  senna 2 Tablet(s) Oral at bedtime  sevelamer carbonate 1600 milliGRAM(s) Oral three times a day with meals  spironolactone 50 milliGRAM(s) Oral daily    MEDICATIONS  (PRN):      __________________________________________________  REVIEW OF SYSTEMS:    CONSTITUTIONAL: No fever,   EYES: no acute visual disturbances  NECK: No pain or stiffness  RESPIRATORY: No cough; No shortness of breath  CARDIOVASCULAR: No chest pain, no palpitations  GASTROINTESTINAL: No pain. No nausea or vomiting; No diarrhea   NEUROLOGICAL: No headache or numbness, no tremors  MUSCULOSKELETAL: No joint pain, no muscle pain  GENITOURINARY: no dysuria, no frequency, no hesitancy  PSYCHIATRY: no depression , no anxiety  ALL OTHER  ROS negative        Vital Signs Last 24 Hrs  T(C): 36.3 (30 Sep 2019 11:48), Max: 36.6 (29 Sep 2019 19:45)  T(F): 97.4 (30 Sep 2019 11:48), Max: 97.9 (29 Sep 2019 19:45)  HR: 63 (30 Sep 2019 11:48) (56 - 69)  BP: 138/60 (30 Sep 2019 11:48) (109/61 - 146/76)  BP(mean): --  RR: 20 (30 Sep 2019 11:48) (18 - 20)  SpO2: 98% (30 Sep 2019 11:48) (91% - 100%)    ________________________________________________  PHYSICAL EXAM:  GENERAL: NAD  HEENT: Normocephalic;  conjunctivae and sclerae clear; moist mucous membranes;   NECK : supple  CHEST/LUNG: Clear to auscultation bilaterally with good air entry   HEART: S1 S2  regular; no murmurs, gallops or rubs  ABDOMEN: Soft, Nontender, Nondistended; Bowel sounds present  EXTREMITIES: no cyanosis; no edema; no calf tenderness  SKIN: warm and dry; no rash  NERVOUS SYSTEM:  Awake and alert; Oriented  to place, person and time ; no new deficits    _________________________________________________  LABS:                        10.4   14.92 )-----------( 339      ( 30 Sep 2019 07:33 )             33.9     09-30    135  |  91<L>  |  115<H>  ----------------------------<  372<H>  3.7   |  36<H>  |  3.23<H>    Ca    8.5      30 Sep 2019 07:33  Phos  6.1       Mg     3.0             Urinalysis Basic - ( 28 Sep 2019 21:23 )    Color: Yellow / Appearance: Clear / S.015 / pH: x  Gluc: x / Ketone: Negative  / Bili: Negative / Urobili: Negative   Blood: x / Protein: 30 mg/dL / Nitrite: Negative   Leuk Esterase: Negative / RBC: 0-2 /HPF / WBC 0-2 /HPF   Sq Epi: x / Non Sq Epi: Few /HPF / Bacteria: Few /HPF      CAPILLARY BLOOD GLUCOSE      POCT Blood Glucose.: 401 mg/dL (30 Sep 2019 12:02)  POCT Blood Glucose.: 358 mg/dL (30 Sep 2019 08:03)  POCT Blood Glucose.: 404 mg/dL (29 Sep 2019 21:19)  POCT Blood Glucose.: 336 mg/dL (29 Sep 2019 16:56)        RADIOLOGY & ADDITIONAL TESTS:    Imaging Personally Reviewed:  YES/NO    Consultant(s) Notes Reviewed:   YES/ No    Care Discussed with Consultants :     Plan of care was discussed with patient and /or primary care giver; all questions and concerns were addressed and care was aligned with patient's wishes.

## 2019-10-01 LAB
ANION GAP SERPL CALC-SCNC: 6 MMOL/L — SIGNIFICANT CHANGE UP (ref 5–17)
BUN SERPL-MCNC: 106 MG/DL — HIGH (ref 7–18)
CALCIUM SERPL-MCNC: 8.6 MG/DL — SIGNIFICANT CHANGE UP (ref 8.4–10.5)
CHLORIDE SERPL-SCNC: 94 MMOL/L — LOW (ref 96–108)
CO2 SERPL-SCNC: 40 MMOL/L — HIGH (ref 22–31)
CREAT SERPL-MCNC: 2.71 MG/DL — HIGH (ref 0.5–1.3)
GLUCOSE BLDC GLUCOMTR-MCNC: 120 MG/DL — HIGH (ref 70–99)
GLUCOSE BLDC GLUCOMTR-MCNC: 272 MG/DL — HIGH (ref 70–99)
GLUCOSE BLDC GLUCOMTR-MCNC: 373 MG/DL — HIGH (ref 70–99)
GLUCOSE BLDC GLUCOMTR-MCNC: 461 MG/DL — CRITICAL HIGH (ref 70–99)
GLUCOSE SERPL-MCNC: 97 MG/DL — SIGNIFICANT CHANGE UP (ref 70–99)
HCT VFR BLD CALC: 36.3 % — SIGNIFICANT CHANGE UP (ref 34.5–45)
HGB BLD-MCNC: 11.1 G/DL — LOW (ref 11.5–15.5)
MAGNESIUM SERPL-MCNC: 2.9 MG/DL — HIGH (ref 1.6–2.6)
MCHC RBC-ENTMCNC: 29.8 PG — SIGNIFICANT CHANGE UP (ref 27–34)
MCHC RBC-ENTMCNC: 30.6 GM/DL — LOW (ref 32–36)
MCV RBC AUTO: 97.6 FL — SIGNIFICANT CHANGE UP (ref 80–100)
NRBC # BLD: 1 /100 WBCS — HIGH (ref 0–0)
PHOSPHATE SERPL-MCNC: 4.4 MG/DL — SIGNIFICANT CHANGE UP (ref 2.5–4.5)
PLATELET # BLD AUTO: 343 K/UL — SIGNIFICANT CHANGE UP (ref 150–400)
POTASSIUM SERPL-MCNC: 3.3 MMOL/L — LOW (ref 3.5–5.3)
POTASSIUM SERPL-SCNC: 3.3 MMOL/L — LOW (ref 3.5–5.3)
RBC # BLD: 3.72 M/UL — LOW (ref 3.8–5.2)
RBC # FLD: 13.8 % — SIGNIFICANT CHANGE UP (ref 10.3–14.5)
S PNEUM AG SER QL: SIGNIFICANT CHANGE UP
SODIUM SERPL-SCNC: 140 MMOL/L — SIGNIFICANT CHANGE UP (ref 135–145)
WBC # BLD: 11.68 K/UL — HIGH (ref 3.8–10.5)
WBC # FLD AUTO: 11.68 K/UL — HIGH (ref 3.8–10.5)

## 2019-10-01 RX ORDER — DEXTROSE 50 % IN WATER 50 %
25 SYRINGE (ML) INTRAVENOUS ONCE
Refills: 0 | Status: DISCONTINUED | OUTPATIENT
Start: 2019-10-01 | End: 2019-10-02

## 2019-10-01 RX ORDER — INSULIN NPH HUM/REG INSULIN HM 70-30/ML
18 VIAL (ML) SUBCUTANEOUS
Refills: 0 | Status: DISCONTINUED | OUTPATIENT
Start: 2019-10-01 | End: 2019-10-02

## 2019-10-01 RX ORDER — INSULIN NPH HUM/REG INSULIN HM 70-30/ML
25 VIAL (ML) SUBCUTANEOUS
Refills: 0 | Status: DISCONTINUED | OUTPATIENT
Start: 2019-10-01 | End: 2019-10-02

## 2019-10-01 RX ORDER — POTASSIUM CHLORIDE 20 MEQ
40 PACKET (EA) ORAL
Refills: 0 | Status: DISCONTINUED | OUTPATIENT
Start: 2019-10-01 | End: 2019-10-01

## 2019-10-01 RX ORDER — FUROSEMIDE 40 MG
40 TABLET ORAL
Refills: 0 | Status: DISCONTINUED | OUTPATIENT
Start: 2019-10-01 | End: 2019-10-01

## 2019-10-01 RX ORDER — SODIUM CHLORIDE 9 MG/ML
1000 INJECTION, SOLUTION INTRAVENOUS
Refills: 0 | Status: DISCONTINUED | OUTPATIENT
Start: 2019-10-01 | End: 2019-10-02

## 2019-10-01 RX ORDER — DEXTROSE 50 % IN WATER 50 %
12.5 SYRINGE (ML) INTRAVENOUS ONCE
Refills: 0 | Status: DISCONTINUED | OUTPATIENT
Start: 2019-10-01 | End: 2019-10-02

## 2019-10-01 RX ORDER — DEXTROSE 50 % IN WATER 50 %
15 SYRINGE (ML) INTRAVENOUS ONCE
Refills: 0 | Status: DISCONTINUED | OUTPATIENT
Start: 2019-10-01 | End: 2019-10-02

## 2019-10-01 RX ORDER — CALCITRIOL 0.5 UG/1
0.25 CAPSULE ORAL DAILY
Refills: 0 | Status: DISCONTINUED | OUTPATIENT
Start: 2019-10-02 | End: 2019-10-04

## 2019-10-01 RX ORDER — GLUCAGON INJECTION, SOLUTION 0.5 MG/.1ML
1 INJECTION, SOLUTION SUBCUTANEOUS ONCE
Refills: 0 | Status: DISCONTINUED | OUTPATIENT
Start: 2019-10-01 | End: 2019-10-02

## 2019-10-01 RX ADMIN — Medication 80 MILLIGRAM(S): at 06:13

## 2019-10-01 RX ADMIN — SPIRONOLACTONE 50 MILLIGRAM(S): 25 TABLET, FILM COATED ORAL at 06:13

## 2019-10-01 RX ADMIN — Medication 40 MILLIGRAM(S): at 06:14

## 2019-10-01 RX ADMIN — ISOSORBIDE DINITRATE 10 MILLIGRAM(S): 5 TABLET ORAL at 06:13

## 2019-10-01 RX ADMIN — SEVELAMER CARBONATE 1600 MILLIGRAM(S): 2400 POWDER, FOR SUSPENSION ORAL at 08:13

## 2019-10-01 RX ADMIN — Medication 18 UNIT(S): at 17:32

## 2019-10-01 RX ADMIN — AZITHROMYCIN 255 MILLIGRAM(S): 500 TABLET, FILM COATED ORAL at 20:36

## 2019-10-01 RX ADMIN — Medication 6: at 11:51

## 2019-10-01 RX ADMIN — ATORVASTATIN CALCIUM 40 MILLIGRAM(S): 80 TABLET, FILM COATED ORAL at 21:44

## 2019-10-01 RX ADMIN — Medication 100 MILLIGRAM(S): at 11:52

## 2019-10-01 RX ADMIN — PREGABALIN 1000 MICROGRAM(S): 225 CAPSULE ORAL at 11:52

## 2019-10-01 RX ADMIN — CEFTRIAXONE 100 MILLIGRAM(S): 500 INJECTION, POWDER, FOR SOLUTION INTRAMUSCULAR; INTRAVENOUS at 20:35

## 2019-10-01 RX ADMIN — ISOSORBIDE DINITRATE 10 MILLIGRAM(S): 5 TABLET ORAL at 21:44

## 2019-10-01 RX ADMIN — SEVELAMER CARBONATE 1600 MILLIGRAM(S): 2400 POWDER, FOR SUSPENSION ORAL at 16:52

## 2019-10-01 RX ADMIN — Medication 3 MILLILITER(S): at 09:49

## 2019-10-01 RX ADMIN — Medication 12: at 21:46

## 2019-10-01 RX ADMIN — Medication 325 MILLIGRAM(S): at 17:11

## 2019-10-01 RX ADMIN — Medication 6 UNIT(S): at 11:52

## 2019-10-01 RX ADMIN — HEPARIN SODIUM 5000 UNIT(S): 5000 INJECTION INTRAVENOUS; SUBCUTANEOUS at 21:44

## 2019-10-01 RX ADMIN — CARVEDILOL PHOSPHATE 6.25 MILLIGRAM(S): 80 CAPSULE, EXTENDED RELEASE ORAL at 06:13

## 2019-10-01 RX ADMIN — Medication 40 MILLIGRAM(S): at 12:14

## 2019-10-01 RX ADMIN — Medication 81 MILLIGRAM(S): at 11:52

## 2019-10-01 RX ADMIN — SENNA PLUS 2 TABLET(S): 8.6 TABLET ORAL at 21:44

## 2019-10-01 RX ADMIN — CARVEDILOL PHOSPHATE 6.25 MILLIGRAM(S): 80 CAPSULE, EXTENDED RELEASE ORAL at 17:11

## 2019-10-01 RX ADMIN — Medication 3 MILLILITER(S): at 21:13

## 2019-10-01 RX ADMIN — Medication 3 MILLILITER(S): at 15:12

## 2019-10-01 RX ADMIN — Medication 1000 UNIT(S): at 11:52

## 2019-10-01 RX ADMIN — SEVELAMER CARBONATE 1600 MILLIGRAM(S): 2400 POWDER, FOR SUSPENSION ORAL at 11:52

## 2019-10-01 RX ADMIN — Medication 10: at 16:52

## 2019-10-01 RX ADMIN — Medication 325 MILLIGRAM(S): at 06:13

## 2019-10-01 RX ADMIN — HEPARIN SODIUM 5000 UNIT(S): 5000 INJECTION INTRAVENOUS; SUBCUTANEOUS at 13:05

## 2019-10-01 RX ADMIN — Medication 40 MILLIGRAM(S): at 17:11

## 2019-10-01 RX ADMIN — ISOSORBIDE DINITRATE 10 MILLIGRAM(S): 5 TABLET ORAL at 13:05

## 2019-10-01 RX ADMIN — HEPARIN SODIUM 5000 UNIT(S): 5000 INJECTION INTRAVENOUS; SUBCUTANEOUS at 06:14

## 2019-10-01 RX ADMIN — PANTOPRAZOLE SODIUM 40 MILLIGRAM(S): 20 TABLET, DELAYED RELEASE ORAL at 06:13

## 2019-10-01 RX ADMIN — Medication 6 UNIT(S): at 08:13

## 2019-10-01 NOTE — PROGRESS NOTE ADULT - PROBLEM SELECTOR PLAN 2
Acute hypercapnic and hypoxic respiratory failure.  Plan: Patient came with SOB, productive cough with B/l lower Ext swelling - RESOLVING  afebrile, Hd stable,   pt sitting comforatble with no distress  ABG 7.39/56/37 on NC 3L, BIPAP AT NIGHT for hypercapnic RF  respiratory Failure likely due to CHF exacerbation Vs COPD   IV METHYPREDNISONE 40mg iv q6

## 2019-10-01 NOTE — PROGRESS NOTE ADULT - ASSESSMENT
53 [facility]  [facility] H[facility] %, s/p AICD, HTN, B/l LE edema, DM, Gout, CAD, HLD, COPD  presented to ED with SOB. As per patient she had fever, chills, shortness of breath, dizzy, loss of appetite, weight loss, muscle aches on Tuesday and she was diagnosed with Flu. She was given antibiotics and treatment for flu. But she took medications for 2 days. She was saturating 77% on admission and gasping for breath. In ED she was on NRB and saturated 97%. She had sick contacts at her place diagnosed with Flu. Patient denies cough, sputum production, fevers/chills/nausea/vomiting. No diarrhea, abdominal pain.  Pt was admitted for CHF exacerbation.

## 2019-10-01 NOTE — PROGRESS NOTE ADULT - PROBLEM SELECTOR PLAN 3
patient is on glipizide XL 5 mg daily   BLOOD SUGAR ranging between   hba1c- 10.5  Pt started on Insulin NPH 70/30  sliding scale

## 2019-10-01 NOTE — PROGRESS NOTE ADULT - PROBLEM SELECTOR PLAN 6
came with elevated BUN and creatinine level 77/3.43   and Bseline is 27/1.43  creatinine- 2/71  likely due to CHF   continue with Diuresis   avoid nephrotoxic drugs.  PTH - 222   f/u Urine lytes and ultrasound of kidney  Phosphorous 6.1--> renvela dose doubled today---> Ph 4.1  Dr Ledesma

## 2019-10-01 NOTE — PROGRESS NOTE ADULT - SUBJECTIVE AND OBJECTIVE BOX
Patient denies CP, SOB Review of systems otherwise (-)    ALBUTerol    90 MICROgram(s) HFA Inhaler 2 Puff(s) Inhalation every 6 hours  ALBUTerol/ipratropium for Nebulization 3 milliLiter(s) Nebulizer every 6 hours  allopurinol 100 milliGRAM(s) Oral daily  aspirin  chewable 81 milliGRAM(s) Oral daily  atorvastatin 40 milliGRAM(s) Oral at bedtime  azithromycin  IVPB 500 milliGRAM(s) IV Intermittent every 24 hours  azithromycin  IVPB      carvedilol 6.25 milliGRAM(s) Oral every 12 hours  cefTRIAXone   IVPB 1000 milliGRAM(s) IV Intermittent every 24 hours  cefTRIAXone   IVPB      cholecalciferol 1000 Unit(s) Oral daily  cyanocobalamin 1000 MICROGram(s) Oral daily  dextrose 40% Gel 15 Gram(s) Oral once PRN  dextrose 5%. 1000 milliLiter(s) IV Continuous <Continuous>  dextrose 50% Injectable 12.5 Gram(s) IV Push once  dextrose 50% Injectable 25 Gram(s) IV Push once  dextrose 50% Injectable 25 Gram(s) IV Push once  docusate sodium 100 milliGRAM(s) Oral daily  ferrous    sulfate 325 milliGRAM(s) Oral two times a day  furosemide    Tablet 40 milliGRAM(s) Oral two times a day  glucagon  Injectable 1 milliGRAM(s) IntraMuscular once PRN  heparin  Injectable 5000 Unit(s) SubCutaneous every 8 hours  influenza   Vaccine 0.5 milliLiter(s) IntraMuscular once  insulin lispro (HumaLOG) corrective regimen sliding scale   SubCutaneous Before meals and at bedtime  insulin NPH/regular 70/30 Injectable 25 Unit(s) SubCutaneous before breakfast  insulin NPH/regular 70/30 Injectable 18 Unit(s) SubCutaneous before dinner  isosorbide   dinitrate Tablet (ISORDIL) 10 milliGRAM(s) Oral three times a day  methylPREDNISolone sodium succinate Injectable 40 milliGRAM(s) IV Push every 6 hours  pantoprazole    Tablet 40 milliGRAM(s) Oral before breakfast  senna 2 Tablet(s) Oral at bedtime  sevelamer carbonate 1600 milliGRAM(s) Oral three times a day with meals  spironolactone 50 milliGRAM(s) Oral daily                            11.1   11.68 )-----------( 343      ( 01 Oct 2019 07:20 )             36.3       Hemoglobin: 11.1 g/dL (10-01 @ 07:20)  Hemoglobin: 10.4 g/dL (09-30 @ 07:33)  Hemoglobin: 9.9 g/dL (09-29 @ 08:05)  Hemoglobin: 10.0 g/dL (09-28 @ 10:22)  Hemoglobin: 10.1 g/dL (09-27 @ 16:25)      10-01    140  |  94<L>  |  106<H>  ----------------------------<  97  3.3<L>   |  40<H>  |  2.71<H>    Ca    8.6      01 Oct 2019 07:20  Phos  4.4     10-01  Mg     2.9     10-01      Creatinine Trend: 2.71<--, 3.23<--, 3.46<--, 3.22<--, 3.43<--    COAGS:           T(C): 36.7 (10-01-19 @ 12:00), Max: 36.9 (09-30-19 @ 20:04)  HR: 61 (10-01-19 @ 12:00) (61 - 72)  BP: 117/65 (10-01-19 @ 12:00) (114/70 - 139/62)  RR: 18 (10-01-19 @ 12:00) (18 - 20)  SpO2: 100% (10-01-19 @ 12:00) (91% - 100%)  Wt(kg): --    I&O's Summary    30 Sep 2019 07:01  -  01 Oct 2019 07:00  --------------------------------------------------------  IN: 50 mL / OUT: 300 mL / NET: -250 mL        Gen: Appears well in NAD  HEENT:  (-)icterus (-)pallor  CV: N S1 S2 1/6 RAYMOND (+)2 Pulses B/l  Resp:  Clear to ausculatation B/L, normal effort  GI: (+) BS Soft, NT, ND  Lymph:  (-)Edema, (-)obvious lymphadenopathy  Skin: Warm to touch, Normal turgor  Psych: Appropriate mood and affect    TELE: Sinus    ASSESSMENT/PLAN:  53 [facility]  [facility]  HFrEF 25 %, s/p ICD, HTN, B/l LE edema, DM, Gout, CAD, HLD, COPD  presented to ED with SOB/ CHF found with M. Pneumonia     - Echo with no significant interval changes  -  Tolerating PO lasix and isordil  - cont Coreg  - Hope to add hydralazine if BP tolerated  - Abx per med  - No need for further inpatient cardiac work up.      Manuel Gold MD, FACC  BEEPER [phone]

## 2019-10-01 NOTE — PROGRESS NOTE ADULT - PROBLEM SELECTOR PLAN 5
patient came with productive cough and h/o low grade fever at NH   c/o cough  WBC - WNL  Diagnosed with Mycoplasma pnuemonia  on air borne precaution for flu  c/w Rocephin and Azithromycin   f/u procal, strep Ag, Legionell Ag

## 2019-10-01 NOTE — PROGRESS NOTE ADULT - SUBJECTIVE AND OBJECTIVE BOX
note is incomplete  pt seen and examined.pts current chart reviewed and case discussed with resident covering.    SUBJECTIVE:  pt feels fine and denies cp,sob,gi or gu/uremic  symptons    REVIEW OF SYSTEMS:  CONSTITUTIONAL: No weakness, fevers or chills  EYES/ENT: No visual changes;  No vertigo or throat pain   NECK: No pain or stiffness  RESPIRATORY: No cough, wheezing, hemoptysis; No shortness of breath  CARDIOVASCULAR: No chest pain or palpitations  GASTROINTESTINAL: No abdominal or epigastric pain. No nausea, vomiting, or hematemesis; No diarrhea or constipation. No melena or hematochezia.  GENITOURINARY: No dysuria, frequency , hematuria, flank pain or nocturia  NEUROLOGICAL: No numbness or weakness  SKIN: No itching, burning, rashes, or lesions   All other review of systems is negative unless indicated above    Current meds:    ALBUTerol    90 MICROgram(s) HFA Inhaler 2 Puff(s) Inhalation every 6 hours  ALBUTerol/ipratropium for Nebulization 3 milliLiter(s) Nebulizer every 6 hours  allopurinol 100 milliGRAM(s) Oral daily  aspirin  chewable 81 milliGRAM(s) Oral daily  atorvastatin 40 milliGRAM(s) Oral at bedtime  azithromycin  IVPB 500 milliGRAM(s) IV Intermittent every 24 hours  azithromycin  IVPB      carvedilol 6.25 milliGRAM(s) Oral every 12 hours  cefTRIAXone   IVPB 1000 milliGRAM(s) IV Intermittent every 24 hours  cefTRIAXone   IVPB      cholecalciferol 1000 Unit(s) Oral daily  cyanocobalamin 1000 MICROGram(s) Oral daily  docusate sodium 100 milliGRAM(s) Oral daily  ferrous    sulfate 325 milliGRAM(s) Oral two times a day  furosemide    Tablet 40 milliGRAM(s) Oral two times a day  heparin  Injectable 5000 Unit(s) SubCutaneous every 8 hours  influenza   Vaccine 0.5 milliLiter(s) IntraMuscular once  insulin glargine Injectable (LANTUS) 30 Unit(s) SubCutaneous at bedtime  insulin lispro (HumaLOG) corrective regimen sliding scale   SubCutaneous Before meals and at bedtime  insulin lispro Injectable (HumaLOG) 6 Unit(s) SubCutaneous three times a day before meals  isosorbide   dinitrate Tablet (ISORDIL) 10 milliGRAM(s) Oral three times a day  pantoprazole    Tablet 40 milliGRAM(s) Oral before breakfast  potassium chloride   Powder 40 milliEquivalent(s) Oral two times a day  predniSONE   Tablet   Oral   predniSONE   Tablet 20 milliGRAM(s) Oral daily  senna 2 Tablet(s) Oral at bedtime  sevelamer carbonate 1600 milliGRAM(s) Oral three times a day with meals  spironolactone 50 milliGRAM(s) Oral daily      Vital Signs    T(F): 98 (10-01-19 @ 08:00), Max: 98.5 (19 @ 20:04)  HR: 62 (10-01-19 @ 09:15) (61 - 72)  BP: 127/65 (10-01-19 @ 08:00) (114/70 - 139/62)  ABP: --  RR: 18 (10-01-19 @ 08:00) (18 - 20)  SpO2: 91% (10-01-19 @ 09:15) (91% - 98%)  Wt(kg): --  CVP(cm H2O): --  CO: --  PCWP: --    I and O's:     @ 07:01  -   @ 07:00  --------------------------------------------------------  IN:    Oral Fluid: 118 mL  Total IN: 118 mL    OUT:    Voided: 700 mL  Total OUT: 700 mL    Total NET: -582 mL       @ 07:01  -  10-01 @ 07:00  --------------------------------------------------------  IN:    Oral Fluid: 50 mL  Total IN: 50 mL    OUT:    Voided: 300 mL  Total OUT: 300 mL    Total NET: -250 mL        Daily     Daily Weight in k.8 (01 Oct 2019 04:56)    PHYSICAL EXAM:  Constitutional: well developed, well nourished  and in nad  HEENT: PERRLA,  no icteric sclera and mild pallor of conjunctiva noted  Neck: No JVD, thyromegaly or adenopathy  Respiratory: reduced air entry lower lungs with no rales, wheezing or rhonchi  Cardiovascular: S1 and S2 normally heard  Gastrointestinal: soft, nondistended, nontender and normal bowel sounds heard  Extremities: No peripheral edema or cyanosis  Neurological: A/O x 3, no focal deficits  : No flank or cva tenderness palpated.  Skin: No rashes      LABS:    CBC:                          11.1   11.68 )-----------( 343      ( 01 Oct 2019 07:20 )             36.3           BMP:    10-01    140  |  94<L>  |  106<H>  ----------------------------<  97  3.3<L>   |  40<H>  |  2.71<H>      135  |  91<L>  |  115<H>  ----------------------------<  372<H>  3.7   |  36<H>  |  3.23<H>      134<L>  |  89<L>  |  107<H>  ----------------------------<  326<H>  3.4<L>   |  38<H>  |  3.46<H>    Ca    8.6      01 Oct 2019 07:20  Ca    8.5      30 Sep 2019 07:33  Ca    8.6      29 Sep 2019 08:05  Phos  4.4     10-01  Phos  6.1       Phos  5.8       Mg     2.9     10-01  Mg     3.0       Mg     2.9             Intact PTH: 222 pg/mL ( @ 11:39)  Vitamin D, 1, 25-Dihydroxy: 76.2 pg/mL ( @ 15:50)      URINE STUDIES:        Creatinine, Random Urine: 50 mg/dL ( @ 21:23)  Osmolality, Random Urine: 372 mos/kg ( @ 09:19)  Sodium, Random Urine: 61 mmol/L ( @ 09:19)  Creatinine, Random Urine: 62 mg/dL ( @ 09:19)                  RADIOLOGY & ADDITIONAL STUDIES: pt seen and examined.    SUBJECTIVE:  pt feels fine and denies cp,sob,gi or gu  symptons  pt is voiding well  Current meds:    ALBUTerol    90 MICROgram(s) HFA Inhaler 2 Puff(s) Inhalation every 6 hours  ALBUTerol/ipratropium for Nebulization 3 milliLiter(s) Nebulizer every 6 hours  allopurinol 100 milliGRAM(s) Oral daily  aspirin  chewable 81 milliGRAM(s) Oral daily  atorvastatin 40 milliGRAM(s) Oral at bedtime  azithromycin  IVPB 500 milliGRAM(s) IV Intermittent every 24 hours  azithromycin  IVPB      carvedilol 6.25 milliGRAM(s) Oral every 12 hours  cefTRIAXone   IVPB 1000 milliGRAM(s) IV Intermittent every 24 hours  cefTRIAXone   IVPB      cholecalciferol 1000 Unit(s) Oral daily  cyanocobalamin 1000 MICROGram(s) Oral daily  docusate sodium 100 milliGRAM(s) Oral daily  ferrous    sulfate 325 milliGRAM(s) Oral two times a day  furosemide    Tablet 40 milliGRAM(s) Oral two times a day  heparin  Injectable 5000 Unit(s) SubCutaneous every 8 hours  influenza   Vaccine 0.5 milliLiter(s) IntraMuscular once  insulin glargine Injectable (LANTUS) 30 Unit(s) SubCutaneous at bedtime  insulin lispro (HumaLOG) corrective regimen sliding scale   SubCutaneous Before meals and at bedtime  insulin lispro Injectable (HumaLOG) 6 Unit(s) SubCutaneous three times a day before meals  isosorbide   dinitrate Tablet (ISORDIL) 10 milliGRAM(s) Oral three times a day  pantoprazole    Tablet 40 milliGRAM(s) Oral before breakfast  potassium chloride   Powder 40 milliEquivalent(s) Oral two times a day  predniSONE   Tablet   Oral   predniSONE   Tablet 20 milliGRAM(s) Oral daily  senna 2 Tablet(s) Oral at bedtime  sevelamer carbonate 1600 milliGRAM(s) Oral three times a day with meals  spironolactone 50 milliGRAM(s) Oral daily      Vital Signs    T(F): 98 (10-01-19 @ 08:00), Max: 98.5 (19 @ 20:04)  HR: 62 (10-01-19 @ 09:15) (61 - 72)  BP: 127/65 (10-01-19 @ 08:00) (114/70 - 139/62)  ABP: --  RR: 18 (10-01-19 @ 08:00) (18 - 20)  SpO2: 91% (10-01-19 @ 09:15) (91% - 98%)  Wt(kg): --  CVP(cm H2O): --  CO: --  PCWP: --    I and O's:     @ 07:01  -   @ 07:00  --------------------------------------------------------  IN:    Oral Fluid: 118 mL  Total IN: 118 mL    OUT:    Voided: 700 mL  Total OUT: 700 mL    Total NET: -582 mL       @ 07:01  -  10-01 @ 07:00  --------------------------------------------------------  IN:    Oral Fluid: 50 mL  Total IN: 50 mL    OUT:    Voided: 300 mL  Total OUT: 300 mL    Total NET: -250 mL        Daily     Daily Weight in k.8 (01 Oct 2019 04:56)    PHYSICAL EXAM:  Constitutional: well developed, obese  and in nad  HEENT: PERRLA,  no icteric sclera and mild pallor of conjunctiva noted  Neck: No JVD, thyromegaly or adenopathy  Respiratory: reduced air entry lower lungs with few  wheezing and  rhonchi  Cardiovascular: S1 and S2 normally heard  Gastrointestinal: soft, nondistended, nontender and normal bowel sounds heard  +rt lower abdominal large hernia , s/p cholecystectomy  Extremities: Trace peripheral edema noted in both LE s  Neurological: A/O x 3, no focal deficits  Skin: No rashes    LABS:    CBC:                          11.1   11.68 )-----------( 343      ( 01 Oct 2019 07:20 )             36.3           BMP:    10-01    140  |  94<L>  |  106<H>  ----------------------------<  97  3.3<L>   |  40<H>  |  2.71<H>      135  |  91<L>  |  115<H>  ----------------------------<  372<H>  3.7   |  36<H>  |  3.23<H>      134<L>  |  89<L>  |  107<H>  ----------------------------<  326<H>  3.4<L>   |  38<H>  |  3.46<H>    Ca    8.6      01 Oct 2019 07:20  Ca    8.5      30 Sep 2019 07:33  Ca    8.6      29 Sep 2019 08:05  Phos  4.4     10-01  Phos  6.1       Phos  5.8       Mg     2.9     10-01  Mg     3.0       Mg     2.9             Intact PTH: 222 pg/mL ( @ 11:39)  Vitamin D, 1, 25-Dihydroxy: 76.2 pg/mL ( @ 15:50)

## 2019-10-01 NOTE — PROGRESS NOTE ADULT - SUBJECTIVE AND OBJECTIVE BOX
PGY 1 Note discussed with supervising resident and primary attending    Patient is a 54y old  Female who presents with a chief complaint of shortness of breath (01 Oct 2019 15:04)      INTERVAL HPI/OVERNIGHT EVENTS: offers no new complaints; current symptoms resolving    MEDICATIONS  (STANDING):  ALBUTerol    90 MICROgram(s) HFA Inhaler 2 Puff(s) Inhalation every 6 hours  ALBUTerol/ipratropium for Nebulization 3 milliLiter(s) Nebulizer every 6 hours  allopurinol 100 milliGRAM(s) Oral daily  aspirin  chewable 81 milliGRAM(s) Oral daily  atorvastatin 40 milliGRAM(s) Oral at bedtime  azithromycin  IVPB 500 milliGRAM(s) IV Intermittent every 24 hours  azithromycin  IVPB      carvedilol 6.25 milliGRAM(s) Oral every 12 hours  cefTRIAXone   IVPB 1000 milliGRAM(s) IV Intermittent every 24 hours  cefTRIAXone   IVPB      cholecalciferol 1000 Unit(s) Oral daily  cyanocobalamin 1000 MICROGram(s) Oral daily  dextrose 5%. 1000 milliLiter(s) (50 mL/Hr) IV Continuous <Continuous>  dextrose 50% Injectable 12.5 Gram(s) IV Push once  dextrose 50% Injectable 25 Gram(s) IV Push once  dextrose 50% Injectable 25 Gram(s) IV Push once  docusate sodium 100 milliGRAM(s) Oral daily  ferrous    sulfate 325 milliGRAM(s) Oral two times a day  heparin  Injectable 5000 Unit(s) SubCutaneous every 8 hours  influenza   Vaccine 0.5 milliLiter(s) IntraMuscular once  insulin lispro (HumaLOG) corrective regimen sliding scale   SubCutaneous Before meals and at bedtime  insulin NPH/regular 70/30 Injectable 25 Unit(s) SubCutaneous before breakfast  insulin NPH/regular 70/30 Injectable 18 Unit(s) SubCutaneous before dinner  isosorbide   dinitrate Tablet (ISORDIL) 10 milliGRAM(s) Oral three times a day  methylPREDNISolone sodium succinate Injectable 40 milliGRAM(s) IV Push every 6 hours  pantoprazole    Tablet 40 milliGRAM(s) Oral before breakfast  senna 2 Tablet(s) Oral at bedtime  sevelamer carbonate 1600 milliGRAM(s) Oral three times a day with meals  spironolactone 50 milliGRAM(s) Oral daily    MEDICATIONS  (PRN):  dextrose 40% Gel 15 Gram(s) Oral once PRN Blood Glucose LESS THAN 70 milliGRAM(s)/deciLiter  glucagon  Injectable 1 milliGRAM(s) IntraMuscular once PRN Glucose <70 milliGRAM(s)/deciLiter      __________________________________________________  REVIEW OF SYSTEMS:    CONSTITUTIONAL: No fever,   EYES: no acute visual disturbances  NECK: No pain or stiffness  RESPIRATORY: No cough; No shortness of breath  CARDIOVASCULAR: No chest pain, no palpitations, sob DECREASED  GASTROINTESTINAL: No pain. No nausea or vomiting; No diarrhea   NEUROLOGICAL: No headache or numbness, no tremors  MUSCULOSKELETAL: No joint pain, no muscle pain  GENITOURINARY: no dysuria, no frequency, no hesitancy  PSYCHIATRY: no depression , no anxiety  ALL OTHER  ROS negative        Vital Signs Last 24 Hrs  T(C): 36.7 (01 Oct 2019 12:00), Max: 36.9 (30 Sep 2019 20:04)  T(F): 98.1 (01 Oct 2019 12:00), Max: 98.5 (30 Sep 2019 20:04)  HR: 61 (01 Oct 2019 12:00) (61 - 72)  BP: 117/65 (01 Oct 2019 12:00) (117/65 - 139/62)  BP(mean): --  RR: 18 (01 Oct 2019 12:00) (18 - 20)  SpO2: 100% (01 Oct 2019 12:00) (91% - 100%)    ________________________________________________  PHYSICAL EXAM:  GENERAL: NAD  HEENT: Normocephalic;  conjunctivae and sclerae clear; moist mucous membranes;   NECK : supple  CHEST/LUNG: Clear to auscultation bilaterally with good air entry, MILD WHEEZING  HEART: S1 S2  regular; no murmurs, gallops or rubs  ABDOMEN: Soft, Nontender, Nondistended; Bowel sounds present  EXTREMITIES: no cyanosis; no edema; no calf tenderness  SKIN: warm and dry; no rash  NERVOUS SYSTEM:  Awake and alert; Oriented  to place, person and time ; no new deficits    _________________________________________________  LABS:                        11.1   11.68 )-----------( 343      ( 01 Oct 2019 07:20 )             36.3     10-01    140  |  94<L>  |  106<H>  ----------------------------<  97  3.3<L>   |  40<H>  |  2.71<H>    Ca    8.6      01 Oct 2019 07:20  Phos  4.4     10-01  Mg     2.9     10-01          CAPILLARY BLOOD GLUCOSE      POCT Blood Glucose.: 272 mg/dL (01 Oct 2019 11:32)  POCT Blood Glucose.: 120 mg/dL (01 Oct 2019 07:47)  POCT Blood Glucose.: 79 mg/dL (30 Sep 2019 21:11)  POCT Blood Glucose.: 156 mg/dL (30 Sep 2019 16:50)        RADIOLOGY & ADDITIONAL TESTS:    Imaging Personally Reviewed:  YES/NO    Consultant(s) Notes Reviewed:   YES/ No    Care Discussed with Consultants :     Plan of care was discussed with patient and /or primary care giver; all questions and concerns were addressed and care was aligned with patient's wishes.

## 2019-10-01 NOTE — PROGRESS NOTE ADULT - ASSESSMENT
A/P:  1.ANJU ON CKD(stage 3): R/O ANJU secondary to pre-renal azotemia ,secondary to chf plus pulmonary infection, less likley other cause like obstructive uropathy/ATN  -pts renal function is stable last 24 hrs but above her baseline(1.4 )  -suggest to f/u  renal us  -moniter I/Os daily  CKD stage 3 , with non nephrotic proteinuria ,due to DM/HTN/obesity, less likley primary GN   -Keep patient euvolemic and Diabetic renal diet  -Avoid Nephrotoxic Meds/ Agents such as (NSAIDs, IV contrast, Aminoglycosides such as gentamicin, -Gadolinium contrast, Phosphate containing enemas, etc..)  -Adjust Medications according to eGFR  -f/u bmp daily  2.HTN: bp is controlled  -goal bp >110/70 and <130/80  -low salt diet  3.ANEMIA: mild, r/o secondary to ckd plus infection plus  iron deficiency  -continue  po feso4 325mg bid prior to meals  -f/u Hb daily  4.RESPIRATORY ACIDOISS ;with renal compensation   -f/u co2 daily  -Suggest Pulmonary consult  5.ALKALOSIS: stable   -suggest to continue  Aldactone 50 mg daily to keep k normal and to avoid further alkalosis   -f/u k an dco2 daily  6.S/P HYPOKALEMIA: now k is normal  -f/u k level daily  -keep k>4 and <5  7.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 A/P:  1.ANJU ON CKD(stage 3): R/O ANJU secondary to pre-renal azotemia ,secondary to chf plus pulmonary infection, less likley other cause like obstructive uropathy/ATN  -pts renal function is improving (cr- 2.7)  last 24 hrs but above her baseline(1.4 )  -suggest to f/u  renal us  -moniter I/Os daily  CKD stage 3 , with non nephrotic proteinuria ,due to DM/HTN/obesity, less likley primary GN   -Keep patient euvolemic and Diabetic renal diet  -Avoid Nephrotoxic Meds/ Agents such as (NSAIDs, IV contrast, Aminoglycosides such as gentamicin, -Gadolinium contrast, Phosphate containing enemas, etc..)  -Adjust Medications according to eGFR  -f/u bmp daily  2.HTN: bp is controlled  -goal bp >110/70 and <130/80  -low salt diet  3.ANEMIA: mild, r/o secondary to ckd plus infection plus  iron deficiency  -continue  po feso4 325mg bid prior to meals  -f/u Hb daily  4.RESPIRATORY ACIDOISS ;with renal compensation   -f/u co2 daily  -Suggest Pulmonary consult  5.ALKALOSIS: mildly worsening   -suggest to continue  Aldactone 50 mg daily to keep k normal and to avoid further alkalosis   -f/u k an dco2 daily  6.S/P HYPOKALEMIA: mild   -replace kcl po prn   -f/u k level daily  -keep k>4 and <5  7.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

## 2019-10-01 NOTE — PROGRESS NOTE ADULT - PROBLEM SELECTOR PLAN 1
Patient has h/o CHF HFrEf 25% as of 2018  Started On lasix 80mg iv bd in ED  trop T1 0.04> 0.04>0.01, ekg-NSR    ECHO EF-35%, Severe LV dysfunction, moderate PAH  s/p morales  monitor daily I/o's. output today 10/01-> -250    aldactone 50mg, isordil 10mg TID.  LASIX HELD  cardio consult Dr Gold.

## 2019-10-02 LAB
ANION GAP SERPL CALC-SCNC: 5 MMOL/L — SIGNIFICANT CHANGE UP (ref 5–17)
BUN SERPL-MCNC: 117 MG/DL — HIGH (ref 7–18)
CALCIUM SERPL-MCNC: 8.7 MG/DL — SIGNIFICANT CHANGE UP (ref 8.4–10.5)
CHLORIDE SERPL-SCNC: 91 MMOL/L — LOW (ref 96–108)
CO2 SERPL-SCNC: 41 MMOL/L — HIGH (ref 22–31)
CREAT SERPL-MCNC: 2.78 MG/DL — HIGH (ref 0.5–1.3)
GLUCOSE BLDC GLUCOMTR-MCNC: 311 MG/DL — HIGH (ref 70–99)
GLUCOSE BLDC GLUCOMTR-MCNC: 383 MG/DL — HIGH (ref 70–99)
GLUCOSE BLDC GLUCOMTR-MCNC: 436 MG/DL — HIGH (ref 70–99)
GLUCOSE BLDC GLUCOMTR-MCNC: 451 MG/DL — CRITICAL HIGH (ref 70–99)
GLUCOSE SERPL-MCNC: 331 MG/DL — HIGH (ref 70–99)
HCT VFR BLD CALC: 35.6 % — SIGNIFICANT CHANGE UP (ref 34.5–45)
HGB BLD-MCNC: 10.7 G/DL — LOW (ref 11.5–15.5)
MAGNESIUM SERPL-MCNC: 2.9 MG/DL — HIGH (ref 1.6–2.6)
MCHC RBC-ENTMCNC: 29.2 PG — SIGNIFICANT CHANGE UP (ref 27–34)
MCHC RBC-ENTMCNC: 30.1 GM/DL — LOW (ref 32–36)
MCV RBC AUTO: 97.3 FL — SIGNIFICANT CHANGE UP (ref 80–100)
NRBC # BLD: 0 /100 WBCS — SIGNIFICANT CHANGE UP (ref 0–0)
PHOSPHATE SERPL-MCNC: 5.2 MG/DL — HIGH (ref 2.5–4.5)
PLATELET # BLD AUTO: 328 K/UL — SIGNIFICANT CHANGE UP (ref 150–400)
POTASSIUM SERPL-MCNC: 4 MMOL/L — SIGNIFICANT CHANGE UP (ref 3.5–5.3)
POTASSIUM SERPL-SCNC: 4 MMOL/L — SIGNIFICANT CHANGE UP (ref 3.5–5.3)
RBC # BLD: 3.66 M/UL — LOW (ref 3.8–5.2)
RBC # FLD: 13.6 % — SIGNIFICANT CHANGE UP (ref 10.3–14.5)
SODIUM SERPL-SCNC: 137 MMOL/L — SIGNIFICANT CHANGE UP (ref 135–145)
WBC # BLD: 13.42 K/UL — HIGH (ref 3.8–10.5)
WBC # FLD AUTO: 13.42 K/UL — HIGH (ref 3.8–10.5)

## 2019-10-02 PROCEDURE — 76770 US EXAM ABDO BACK WALL COMP: CPT | Mod: 26

## 2019-10-02 RX ORDER — DEXTROSE 50 % IN WATER 50 %
15 SYRINGE (ML) INTRAVENOUS ONCE
Refills: 0 | Status: DISCONTINUED | OUTPATIENT
Start: 2019-10-02 | End: 2019-10-04

## 2019-10-02 RX ORDER — SODIUM CHLORIDE 9 MG/ML
1000 INJECTION, SOLUTION INTRAVENOUS
Refills: 0 | Status: DISCONTINUED | OUTPATIENT
Start: 2019-10-02 | End: 2019-10-04

## 2019-10-02 RX ORDER — DEXTROSE 50 % IN WATER 50 %
25 SYRINGE (ML) INTRAVENOUS ONCE
Refills: 0 | Status: DISCONTINUED | OUTPATIENT
Start: 2019-10-02 | End: 2019-10-04

## 2019-10-02 RX ORDER — INSULIN NPH HUM/REG INSULIN HM 70-30/ML
20 VIAL (ML) SUBCUTANEOUS
Refills: 0 | Status: DISCONTINUED | OUTPATIENT
Start: 2019-10-02 | End: 2019-10-04

## 2019-10-02 RX ORDER — INSULIN NPH HUM/REG INSULIN HM 70-30/ML
30 VIAL (ML) SUBCUTANEOUS
Refills: 0 | Status: DISCONTINUED | OUTPATIENT
Start: 2019-10-02 | End: 2019-10-04

## 2019-10-02 RX ORDER — DEXTROSE 50 % IN WATER 50 %
12.5 SYRINGE (ML) INTRAVENOUS ONCE
Refills: 0 | Status: DISCONTINUED | OUTPATIENT
Start: 2019-10-02 | End: 2019-10-04

## 2019-10-02 RX ORDER — GLUCAGON INJECTION, SOLUTION 0.5 MG/.1ML
1 INJECTION, SOLUTION SUBCUTANEOUS ONCE
Refills: 0 | Status: DISCONTINUED | OUTPATIENT
Start: 2019-10-02 | End: 2019-10-04

## 2019-10-02 RX ADMIN — Medication 8: at 08:01

## 2019-10-02 RX ADMIN — Medication 40 MILLIGRAM(S): at 13:08

## 2019-10-02 RX ADMIN — Medication 325 MILLIGRAM(S): at 17:01

## 2019-10-02 RX ADMIN — Medication 12: at 17:01

## 2019-10-02 RX ADMIN — PANTOPRAZOLE SODIUM 40 MILLIGRAM(S): 20 TABLET, DELAYED RELEASE ORAL at 05:58

## 2019-10-02 RX ADMIN — ISOSORBIDE DINITRATE 10 MILLIGRAM(S): 5 TABLET ORAL at 05:57

## 2019-10-02 RX ADMIN — Medication 1000 UNIT(S): at 13:08

## 2019-10-02 RX ADMIN — Medication 10: at 21:22

## 2019-10-02 RX ADMIN — CALCITRIOL 0.25 MICROGRAM(S): 0.5 CAPSULE ORAL at 13:08

## 2019-10-02 RX ADMIN — HEPARIN SODIUM 5000 UNIT(S): 5000 INJECTION INTRAVENOUS; SUBCUTANEOUS at 05:58

## 2019-10-02 RX ADMIN — CEFTRIAXONE 100 MILLIGRAM(S): 500 INJECTION, POWDER, FOR SOLUTION INTRAMUSCULAR; INTRAVENOUS at 20:28

## 2019-10-02 RX ADMIN — ISOSORBIDE DINITRATE 10 MILLIGRAM(S): 5 TABLET ORAL at 21:19

## 2019-10-02 RX ADMIN — CARVEDILOL PHOSPHATE 6.25 MILLIGRAM(S): 80 CAPSULE, EXTENDED RELEASE ORAL at 05:57

## 2019-10-02 RX ADMIN — PREGABALIN 1000 MICROGRAM(S): 225 CAPSULE ORAL at 13:08

## 2019-10-02 RX ADMIN — SENNA PLUS 2 TABLET(S): 8.6 TABLET ORAL at 21:19

## 2019-10-02 RX ADMIN — Medication 12: at 11:53

## 2019-10-02 RX ADMIN — Medication 25 UNIT(S): at 08:02

## 2019-10-02 RX ADMIN — Medication 20 UNIT(S): at 17:01

## 2019-10-02 RX ADMIN — SEVELAMER CARBONATE 1600 MILLIGRAM(S): 2400 POWDER, FOR SUSPENSION ORAL at 13:08

## 2019-10-02 RX ADMIN — Medication 100 MILLIGRAM(S): at 13:08

## 2019-10-02 RX ADMIN — Medication 40 MILLIGRAM(S): at 05:57

## 2019-10-02 RX ADMIN — ATORVASTATIN CALCIUM 40 MILLIGRAM(S): 80 TABLET, FILM COATED ORAL at 21:19

## 2019-10-02 RX ADMIN — ISOSORBIDE DINITRATE 10 MILLIGRAM(S): 5 TABLET ORAL at 13:09

## 2019-10-02 RX ADMIN — SEVELAMER CARBONATE 1600 MILLIGRAM(S): 2400 POWDER, FOR SUSPENSION ORAL at 17:01

## 2019-10-02 RX ADMIN — HEPARIN SODIUM 5000 UNIT(S): 5000 INJECTION INTRAVENOUS; SUBCUTANEOUS at 21:19

## 2019-10-02 RX ADMIN — Medication 81 MILLIGRAM(S): at 13:08

## 2019-10-02 RX ADMIN — Medication 3 MILLILITER(S): at 20:33

## 2019-10-02 RX ADMIN — AZITHROMYCIN 255 MILLIGRAM(S): 500 TABLET, FILM COATED ORAL at 20:27

## 2019-10-02 RX ADMIN — SPIRONOLACTONE 50 MILLIGRAM(S): 25 TABLET, FILM COATED ORAL at 05:57

## 2019-10-02 RX ADMIN — Medication 325 MILLIGRAM(S): at 05:58

## 2019-10-02 RX ADMIN — HEPARIN SODIUM 5000 UNIT(S): 5000 INJECTION INTRAVENOUS; SUBCUTANEOUS at 13:09

## 2019-10-02 RX ADMIN — SEVELAMER CARBONATE 1600 MILLIGRAM(S): 2400 POWDER, FOR SUSPENSION ORAL at 08:01

## 2019-10-02 RX ADMIN — CARVEDILOL PHOSPHATE 6.25 MILLIGRAM(S): 80 CAPSULE, EXTENDED RELEASE ORAL at 17:01

## 2019-10-02 RX ADMIN — Medication 3 MILLILITER(S): at 08:18

## 2019-10-02 RX ADMIN — Medication 40 MILLIGRAM(S): at 01:26

## 2019-10-02 NOTE — PROGRESS NOTE ADULT - ASSESSMENT
53 y[facility]  [facility] HFr[facility] 25 %, s/p AICD, HTN, B/l LE edema, DM, Gout, CAD, HLD, COPD  presented to ED with SOB. As per patient she had fever, chills, shortness of breath, dizzy, loss of appetite, weight loss, muscle aches on Tuesday and she was diagnosed with Flu. She was given antibiotics and treatment for flu. But she took medications for 2 days. She was saturating 77% on admission and gasping for breath. In ED she was on NRB and saturated 97%. She had sick contacts at her place diagnosed with Flu. Patient denies cough, sputum production, fevers/chills/nausea/vomiting. No diarrhea, abdominal pain.  Pt was admitted for CHF exacerbation.

## 2019-10-02 NOTE — DIETITIAN INITIAL EVALUATION ADULT. - PERTINENT LABORATORY DATA
10-02 Na137 mmol/L Glu 331 mg/dL<H> K+ 4.0 mmol/L Cr  2.78 mg/dL<H>  mg/dL<H>   10-02 Phos 5.2 mg/dL<H>   09-27 Alb 3.0 g/dL<L>   09-28 PAB 16 mg/dL<L>   09-28 [id number] 10.5 %<H>  Finger stick range=79 to 461

## 2019-10-02 NOTE — DIETITIAN INITIAL EVALUATION ADULT. - OTHER INFO
Pt alert, oriented, well-communicated, from Pt from assisted living facility, alert, oriented, visited in isolation room, well-communicated, but very weak, coughing at times, limited information obtained at present; appetite good reported, but 50% intake at times per flow sheets, denied GI distress, chewing or swallowing problem at present, no specific food choices, fluctuated wts PTA; Pt not interested in diet education/nutrition information at present; Discussed with RN

## 2019-10-02 NOTE — DIETITIAN INITIAL EVALUATION ADULT. - NS FNS WEIGHT USED FOR CALC
ideal/Ht=5' 5"   MEY=484 lb + 10%=137.5 lb    Admission bh=140 lb   BMI=43.3    Current wk=620.6 lb 9/28/19,  263.6 lb 10/2/19   ? changes, may due to scale/fluid variance

## 2019-10-02 NOTE — PROGRESS NOTE ADULT - SUBJECTIVE AND OBJECTIVE BOX
Patient denies CP, SOB better Review of systems otherwise (-)    ALBUTerol    90 MICROgram(s) HFA Inhaler 2 Puff(s) Inhalation every 6 hours  ALBUTerol/ipratropium for Nebulization 3 milliLiter(s) Nebulizer every 6 hours  allopurinol 100 milliGRAM(s) Oral daily  aspirin  chewable 81 milliGRAM(s) Oral daily  atorvastatin 40 milliGRAM(s) Oral at bedtime  azithromycin  IVPB 500 milliGRAM(s) IV Intermittent every 24 hours  azithromycin  IVPB      calcitriol   Capsule 0.25 MICROGram(s) Oral daily  carvedilol 6.25 milliGRAM(s) Oral every 12 hours  cefTRIAXone   IVPB 1000 milliGRAM(s) IV Intermittent every 24 hours  cefTRIAXone   IVPB      cholecalciferol 1000 Unit(s) Oral daily  cyanocobalamin 1000 MICROGram(s) Oral daily  dextrose 40% Gel 15 Gram(s) Oral once PRN  dextrose 5%. 1000 milliLiter(s) IV Continuous <Continuous>  dextrose 50% Injectable 12.5 Gram(s) IV Push once  dextrose 50% Injectable 25 Gram(s) IV Push once  dextrose 50% Injectable 25 Gram(s) IV Push once  docusate sodium 100 milliGRAM(s) Oral daily  ferrous    sulfate 325 milliGRAM(s) Oral two times a day  glucagon  Injectable 1 milliGRAM(s) IntraMuscular once PRN  heparin  Injectable 5000 Unit(s) SubCutaneous every 8 hours  influenza   Vaccine 0.5 milliLiter(s) IntraMuscular once  insulin lispro (HumaLOG) corrective regimen sliding scale   SubCutaneous Before meals and at bedtime  insulin NPH/regular 70/30 Injectable 25 Unit(s) SubCutaneous before breakfast  insulin NPH/regular 70/30 Injectable 18 Unit(s) SubCutaneous before dinner  isosorbide   dinitrate Tablet (ISORDIL) 10 milliGRAM(s) Oral three times a day  methylPREDNISolone sodium succinate Injectable 40 milliGRAM(s) IV Push every 6 hours  pantoprazole    Tablet 40 milliGRAM(s) Oral before breakfast  senna 2 Tablet(s) Oral at bedtime  sevelamer carbonate 1600 milliGRAM(s) Oral three times a day with meals  spironolactone 50 milliGRAM(s) Oral daily                            10.7   13.42 )-----------( 328      ( 02 Oct 2019 07:18 )             35.6       Hemoglobin: 10.7 g/dL (10-02 @ 07:18)  Hemoglobin: 11.1 g/dL (10-01 @ 07:20)  Hemoglobin: 10.4 g/dL (09-30 @ 07:33)  Hemoglobin: 9.9 g/dL (09-29 @ 08:05)  Hemoglobin: 10.0 g/dL (09-28 @ 10:22)      10-02    137  |  91<L>  |  117<H>  ----------------------------<  331<H>  4.0   |  41<H>  |  2.78<H>    Ca    8.7      02 Oct 2019 07:18  Phos  5.2     10-02  Mg     2.9     10-02      Creatinine Trend: 2.78<--, 2.71<--, 3.23<--, 3.46<--, 3.22<--, 3.43<--    COAGS:           T(C): 36.4 (10-02-19 @ 07:59), Max: 37.1 (10-01-19 @ 15:21)  HR: 70 (10-02-19 @ 07:59) (61 - 76)  BP: 139/70 (10-02-19 @ 07:59) (100/50 - 139/70)  RR: 18 (10-02-19 @ 07:59) (18 - 19)  SpO2: 99% (10-02-19 @ 07:59) (93% - 100%)  Wt(kg): --    I&O's Summary    01 Oct 2019 07:01  -  02 Oct 2019 07:00  --------------------------------------------------------  IN: 75 mL / OUT: 300 mL / NET: -225 mL          Gen: Appears well in NAD  HEENT:  (-)icterus (-)pallor  CV: N S1 S2 1/6 RAYMOND (+)2 Pulses B/l  Resp:  Clear to ausculatation B/L, normal effort  GI: (+) BS Soft, NT, ND  Lymph:  (-)Edema, (-)obvious lymphadenopathy  Skin: Warm to touch, Normal turgor  Psych: Appropriate mood and affect    TELE: off    ASSESSMENT/PLAN:  53 y/o  Female from [facility] PMH of Obesity, CHF NICM (cathed 11/18)  HFrEF 25 %, s/p ICD, HTN, B/l LE edema, DM, Gout, CAD, HLD, COPD  presented to ED with SOB/ CHF found with M. Pneumonia     - Echo with no significant interval changes  -  Tolerating PO lasix and isordil  - cont Coreg  -  add hydralazine 10 mg PO TID  - Abx per med  - No need for further inpatient cardiac work up.      Manuel Gold MD, [facility]  BEEPER [phone]

## 2019-10-02 NOTE — PROGRESS NOTE ADULT - PROBLEM SELECTOR PLAN 5
patient came with productive cough and h/o low grade fever at NH   c/o cough  WBC - WNL  Diagnosed with Mycoplasma pnuemonia  oN DROPLET PRECAUTION  c/w Rocephin and Azithromycin   f/u procal, strep Ag, Legionell Ag

## 2019-10-02 NOTE — PROGRESS NOTE ADULT - SUBJECTIVE AND OBJECTIVE BOX
PGY 1 Note discussed with supervising resident and primary attending    Patient is a 54y old  Female who presents with a chief complaint of shortness of breath (02 Oct 2019 14:24)      INTERVAL HPI/OVERNIGHT EVENTS: SOB getting better    MEDICATIONS  (STANDING):  ALBUTerol    90 MICROgram(s) HFA Inhaler 2 Puff(s) Inhalation every 6 hours  ALBUTerol/ipratropium for Nebulization 3 milliLiter(s) Nebulizer every 6 hours  allopurinol 100 milliGRAM(s) Oral daily  aspirin  chewable 81 milliGRAM(s) Oral daily  atorvastatin 40 milliGRAM(s) Oral at bedtime  azithromycin  IVPB 500 milliGRAM(s) IV Intermittent every 24 hours  azithromycin  IVPB      calcitriol   Capsule 0.25 MICROGram(s) Oral daily  carvedilol 6.25 milliGRAM(s) Oral every 12 hours  cefTRIAXone   IVPB 1000 milliGRAM(s) IV Intermittent every 24 hours  cefTRIAXone   IVPB      cholecalciferol 1000 Unit(s) Oral daily  cyanocobalamin 1000 MICROGram(s) Oral daily  dextrose 5%. 1000 milliLiter(s) (50 mL/Hr) IV Continuous <Continuous>  dextrose 50% Injectable 12.5 Gram(s) IV Push once  dextrose 50% Injectable 25 Gram(s) IV Push once  dextrose 50% Injectable 25 Gram(s) IV Push once  docusate sodium 100 milliGRAM(s) Oral daily  ferrous    sulfate 325 milliGRAM(s) Oral two times a day  heparin  Injectable 5000 Unit(s) SubCutaneous every 8 hours  influenza   Vaccine 0.5 milliLiter(s) IntraMuscular once  insulin lispro (HumaLOG) corrective regimen sliding scale   SubCutaneous Before meals and at bedtime  insulin NPH/regular 70/30 Injectable 25 Unit(s) SubCutaneous before breakfast  insulin NPH/regular 70/30 Injectable 18 Unit(s) SubCutaneous before dinner  isosorbide   dinitrate Tablet (ISORDIL) 10 milliGRAM(s) Oral three times a day  methylPREDNISolone sodium succinate Injectable 40 milliGRAM(s) IV Push every 6 hours  pantoprazole    Tablet 40 milliGRAM(s) Oral before breakfast  senna 2 Tablet(s) Oral at bedtime  sevelamer carbonate 1600 milliGRAM(s) Oral three times a day with meals  spironolactone 50 milliGRAM(s) Oral daily    MEDICATIONS  (PRN):  dextrose 40% Gel 15 Gram(s) Oral once PRN Blood Glucose LESS THAN 70 milliGRAM(s)/deciLiter  glucagon  Injectable 1 milliGRAM(s) IntraMuscular once PRN Glucose <70 milliGRAM(s)/deciLiter      __________________________________________________  REVIEW OF SYSTEMS:    CONSTITUTIONAL: No fever,   EYES: no acute visual disturbances  NECK: No pain or stiffness  RESPIRATORY: No cough; No shortness of breath  CARDIOVASCULAR: No chest pain, no palpitations  GASTROINTESTINAL: No pain. No nausea or vomiting; No diarrhea   NEUROLOGICAL: No headache or numbness, no tremors  MUSCULOSKELETAL: No joint pain, no muscle pain  GENITOURINARY: no dysuria, no frequency, no hesitancy  PSYCHIATRY: no depression , no anxiety  ALL OTHER  ROS negative        Vital Signs Last 24 Hrs  T(C): 36.3 (02 Oct 2019 11:50), Max: 37.1 (01 Oct 2019 15:21)  T(F): 97.4 (02 Oct 2019 11:50), Max: 98.7 (01 Oct 2019 15:21)  HR: 70 (02 Oct 2019 11:50) (66 - 76)  BP: 109/50 (02 Oct 2019 11:50) (100/50 - 139/70)  BP(mean): 100 (01 Oct 2019 23:14) (100 - 100)  RR: 18 (02 Oct 2019 11:50) (18 - 19)  SpO2: 96% (02 Oct 2019 11:50) (93% - 99%)    ________________________________________________  PHYSICAL EXAM:  GENERAL: NAD  HEENT: Normocephalic;  conjunctivae and sclerae clear; moist mucous membranes;   NECK : supple  CHEST/LUNG: Clear to auscultation bilaterally with good air entry   HEART: S1 S2  regular; no murmurs, gallops or rubs  ABDOMEN: Soft, Nontender, Nondistended; Bowel sounds present  EXTREMITIES: no cyanosis; no edema; no calf tenderness  SKIN: warm and dry; no rash  NERVOUS SYSTEM:  Awake and alert; Oriented  to place, person and time ; no new deficits    _________________________________________________  LABS:                        10.7   13.42 )-----------( 328      ( 02 Oct 2019 07:18 )             35.6     10-02    137  |  91<L>  |  117<H>  ----------------------------<  331<H>  4.0   |  41<H>  |  2.78<H>    Ca    8.7      02 Oct 2019 07:18  Phos  5.2     10-02  Mg     2.9     10-02          CAPILLARY BLOOD GLUCOSE      POCT Blood Glucose.: 451 mg/dL (02 Oct 2019 11:44)  POCT Blood Glucose.: 311 mg/dL (02 Oct 2019 07:40)  POCT Blood Glucose.: 461 mg/dL (01 Oct 2019 21:01)  POCT Blood Glucose.: 373 mg/dL (01 Oct 2019 16:46)        RADIOLOGY & ADDITIONAL TESTS:  < from: US Kidney and Bladder (10.02.19 @ 12:54) >    IMPRESSION:     No hydronephrosis.    2.1 cm right midpole renal hypodensity cannot be adequately assessed due   to excessive bowel gas. Recommend pre and postcontrast CT to exclude   neoplasm..      < end of copied text >    Imaging Personally Reviewed:  YES/NO    Consultant(s) Notes Reviewed:   YES/ No    Care Discussed with Consultants :     Plan of care was discussed with patient and /or primary care giver; all questions and concerns were addressed and care was aligned with patient's wishes.

## 2019-10-02 NOTE — PROGRESS NOTE ADULT - PROBLEM SELECTOR PLAN 6
came with elevated BUN and creatinine level 77/3.43   and Bseline is 27/1.43  creatinine- 2.78 10/02.  US Kidney- 2.1 cm R mid pole mass  Dr. Marino consulted  avoid nephrotoxic drugs.  PTH - 222   Phosphorous 6.1--> renvela dose doubled---> Ph 5.2  Dr Ledesma

## 2019-10-02 NOTE — DIETITIAN INITIAL EVALUATION ADULT. - PERTINENT MEDS FT
MEDICATIONS  (STANDING):  ALBUTerol    90 MICROgram(s) HFA Inhaler 2 Puff(s) Inhalation every 6 hours  ALBUTerol/ipratropium for Nebulization 3 milliLiter(s) Nebulizer every 6 hours  allopurinol 100 milliGRAM(s) Oral daily  aspirin  chewable 81 milliGRAM(s) Oral daily  atorvastatin 40 milliGRAM(s) Oral at bedtime  azithromycin  IVPB 500 milliGRAM(s) IV Intermittent every 24 hours  azithromycin  IVPB      calcitriol   Capsule 0.25 MICROGram(s) Oral daily  carvedilol 6.25 milliGRAM(s) Oral every 12 hours  cefTRIAXone   IVPB 1000 milliGRAM(s) IV Intermittent every 24 hours  cefTRIAXone   IVPB      cholecalciferol 1000 Unit(s) Oral daily  cyanocobalamin 1000 MICROGram(s) Oral daily  dextrose 5%. 1000 milliLiter(s) (50 mL/Hr) IV Continuous <Continuous>  dextrose 50% Injectable 12.5 Gram(s) IV Push once  dextrose 50% Injectable 25 Gram(s) IV Push once  dextrose 50% Injectable 25 Gram(s) IV Push once  docusate sodium 100 milliGRAM(s) Oral daily  ferrous    sulfate 325 milliGRAM(s) Oral two times a day  heparin  Injectable 5000 Unit(s) SubCutaneous every 8 hours  influenza   Vaccine 0.5 milliLiter(s) IntraMuscular once  insulin lispro (HumaLOG) corrective regimen sliding scale   SubCutaneous Before meals and at bedtime  insulin NPH/regular 70/30 Injectable 25 Unit(s) SubCutaneous before breakfast  insulin NPH/regular 70/30 Injectable 18 Unit(s) SubCutaneous before dinner  isosorbide   dinitrate Tablet (ISORDIL) 10 milliGRAM(s) Oral three times a day  methylPREDNISolone sodium succinate Injectable 40 milliGRAM(s) IV Push every 6 hours  pantoprazole    Tablet 40 milliGRAM(s) Oral before breakfast  senna 2 Tablet(s) Oral at bedtime  sevelamer carbonate 1600 milliGRAM(s) Oral three times a day with meals  spironolactone 50 milliGRAM(s) Oral daily

## 2019-10-02 NOTE — CHART NOTE - NSCHARTNOTEFT_GEN_A_CORE
Upon Nutritional Assessment by the Registered Dietitian your patient was determined to meet criteria / has evidence of the following diagnosis/diagnoses:          [ ]  Mild Protein Calorie Malnutrition        [ ]  Moderate Protein Calorie Malnutrition        [ ] Severe Protein Calorie Malnutrition        [ ] Unspecified Protein Calorie Malnutrition        [ ] Underweight / BMI <19        [ X ] Morbid Obesity / BMI > 40      Findings as based on:  •  Comprehensive nutrition assessment and consultation  •  Calorie counts (nutrient intake analysis)  •  Food acceptance and intake status from observations by staff  •  Follow up  •  Patient education  •  Intervention secondary to interdisciplinary rounds  •   concerns      Treatment:    The following diet has been recommended: change diet to DASH, Consistent CHO diet as medically feasible       PROVIDER Section:     By signing this assessment you are acknowledging and agree with the diagnosis/diagnoses assigned by the Registered Dietitian    Comments:

## 2019-10-02 NOTE — PROGRESS NOTE ADULT - PROBLEM SELECTOR PLAN 1
Patient has h/o CHF HFrEf 25% as of 2018, Resolving  Started On lasix 80mg iv bd in ED  trop T1 0.04> 0.04>0.01, ekg-NSR    ECHO EF-35%, Severe LV dysfunction, moderate PAH  s/p morales  monitor daily I/o's. output today 10/02-> -225(pt off lasix now)   aldactone 50mg, isordil 10mg TID.9   LASIX HELD  cardio consult Dr Gold.

## 2019-10-02 NOTE — DIETITIAN INITIAL EVALUATION ADULT. - DIET TYPE
diet Rx at assisted living facility: not available consistent carbohydrate (no snacks)/DASH/TLC (sodium and cholesterol restricted diet)

## 2019-10-02 NOTE — PROGRESS NOTE ADULT - ASSESSMENT
A/P:  1.ANJU ON CKD(stage 3): R/O ANJU secondary to pre-renal azotemia ,secondary to chf plus pulmonary infection, less likley other cause like obstructive uropathy/ATN  -pts renal function is improving (cr- 2.7)  last 24 hrs but above her baseline(1.4 )  -suggest to f/u  renal us  -moniter I/Os daily  CKD stage 3 , with non nephrotic proteinuria ,due to DM/HTN/obesity, less likley primary GN   -Keep patient euvolemic and Diabetic renal diet  -Avoid Nephrotoxic Meds/ Agents such as (NSAIDs, IV contrast, Aminoglycosides such as gentamicin, -Gadolinium contrast, Phosphate containing enemas, etc..)  -Adjust Medications according to eGFR  -f/u bmp daily  2.HTN: bp is controlled  -goal bp >110/70 and <130/80  -low salt diet  3.ANEMIA: mild, r/o secondary to ckd plus infection plus  iron deficiency  -continue  po feso4 325mg bid prior to meals  -f/u Hb daily  4.RESPIRATORY ACIDOISS ;with renal compensation   -f/u co2 daily  -Suggest Pulmonary consult  5.ALKALOSIS: mildly worsening   -suggest to continue  Aldactone 50 mg daily to keep k normal and to avoid further alkalosis   -f/u k an dco2 daily  6.S/P HYPOKALEMIA: mild   -replace kcl po prn   -f/u k level daily  -keep k>4 and <5  7.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 A/P:  1.ANJU ON CKD(stage 3): R/O ANJU secondary to pre-renal azotemia ,secondary to chf plus pulmonary infection,   -pts cr is 2.78   ,  stable  last 24 hrs but  above her baseline(1.4 )  -moniter I/Os daily  CKD stage 3 , with non nephrotic proteinuria ,due to DM/HTN/obesity, less likley primary GN   -Keep patient euvolemic and Diabetic renal diet  -Avoid Nephrotoxic Meds/ Agents such as (NSAIDs, IV contrast, Aminoglycosides such as gentamicin, -Gadolinium contrast, Phosphate containing enemas, etc..)  -Adjust Medications according to eGFR  -f/u bmp daily  2.HTN: bp is controlled  -goal bp >110/70 and <130/80  -low salt diet  3.ANEMIA: mild, r/o secondary to ckd plus infection plus  iron deficiency  -continue  po feso4 325mg bid prior to meals  -f/u Hb daily  4.RESPIRATORY ACIDOISS ;with renal compensation   -f/u co2 daily  5.ALKALOSIS: multifactorial like chronic respiratory acisosis induced plus diuretics induced  -suggest to continue  Aldactone 50 mg daily to keep k normal and to avoid further alkalosis   -f/u k and co2 daily  6.S/P HYPOKALEMIA: now k is normal  -f/u k level daily  -keep k>4 and <5  7.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  8.RT RENAL COMPLEX CYST VS MASS: suggest urology consult  -pt can not get ct with iv contrast or MRI with Gadolinium as pt has stage 4 ckd with egfr <25%

## 2019-10-02 NOTE — PROGRESS NOTE ADULT - SUBJECTIVE AND OBJECTIVE BOX
note is incomplete  pt seen and examined.pts current chart reviewed and case discussed with resident covering.    SUBJECTIVE:  pt feels fine and denies cp,sob,gi or gu/uremic  symptons    REVIEW OF SYSTEMS:  CONSTITUTIONAL: No weakness, fevers or chills  EYES/ENT: No visual changes;  No vertigo or throat pain   NECK: No pain or stiffness  RESPIRATORY: No cough, wheezing, hemoptysis; No shortness of breath  CARDIOVASCULAR: No chest pain or palpitations  GASTROINTESTINAL: No abdominal or epigastric pain. No nausea, vomiting, or hematemesis; No diarrhea or constipation. No melena or hematochezia.  GENITOURINARY: No dysuria, frequency , hematuria, flank pain or nocturia  NEUROLOGICAL: No numbness or weakness  SKIN: No itching, burning, rashes, or lesions   All other review of systems is negative unless indicated above    Current meds:    ALBUTerol    90 MICROgram(s) HFA Inhaler 2 Puff(s) Inhalation every 6 hours  ALBUTerol/ipratropium for Nebulization 3 milliLiter(s) Nebulizer every 6 hours  allopurinol 100 milliGRAM(s) Oral daily  aspirin  chewable 81 milliGRAM(s) Oral daily  atorvastatin 40 milliGRAM(s) Oral at bedtime  azithromycin  IVPB 500 milliGRAM(s) IV Intermittent every 24 hours  azithromycin  IVPB      calcitriol   Capsule 0.25 MICROGram(s) Oral daily  carvedilol 6.25 milliGRAM(s) Oral every 12 hours  cefTRIAXone   IVPB 1000 milliGRAM(s) IV Intermittent every 24 hours  cefTRIAXone   IVPB      cholecalciferol 1000 Unit(s) Oral daily  cyanocobalamin 1000 MICROGram(s) Oral daily  dextrose 40% Gel 15 Gram(s) Oral once PRN  dextrose 5%. 1000 milliLiter(s) IV Continuous <Continuous>  dextrose 50% Injectable 12.5 Gram(s) IV Push once  dextrose 50% Injectable 25 Gram(s) IV Push once  dextrose 50% Injectable 25 Gram(s) IV Push once  docusate sodium 100 milliGRAM(s) Oral daily  ferrous    sulfate 325 milliGRAM(s) Oral two times a day  glucagon  Injectable 1 milliGRAM(s) IntraMuscular once PRN  heparin  Injectable 5000 Unit(s) SubCutaneous every 8 hours  influenza   Vaccine 0.5 milliLiter(s) IntraMuscular once  insulin lispro (HumaLOG) corrective regimen sliding scale   SubCutaneous Before meals and at bedtime  insulin NPH/regular 70/30 Injectable 25 Unit(s) SubCutaneous before breakfast  insulin NPH/regular 70/30 Injectable 18 Unit(s) SubCutaneous before dinner  isosorbide   dinitrate Tablet (ISORDIL) 10 milliGRAM(s) Oral three times a day  methylPREDNISolone sodium succinate Injectable 40 milliGRAM(s) IV Push every 6 hours  pantoprazole    Tablet 40 milliGRAM(s) Oral before breakfast  senna 2 Tablet(s) Oral at bedtime  sevelamer carbonate 1600 milliGRAM(s) Oral three times a day with meals  spironolactone 50 milliGRAM(s) Oral daily      Vital Signs    T(F): 97.4 (10-02-19 @ 11:50), Max: 98.7 (10-01-19 @ 15:21)  HR: 70 (10-02-19 @ 11:50) (66 - 76)  BP: 109/50 (10-02-19 @ 11:50) (100/50 - 139/70)  ABP: --  RR: 18 (10-02-19 @ 11:50) (18 - 19)  SpO2: 96% (10-02-19 @ 11:50) (93% - 99%)  Wt(kg): --  CVP(cm H2O): --  CO: --  PCWP: --    I and O's:     @ 07:01  -  10-01 @ 07:00  --------------------------------------------------------  IN:    Oral Fluid: 50 mL  Total IN: 50 mL    OUT:    Voided: 300 mL  Total OUT: 300 mL    Total NET: -250 mL      10-01 @ 07:01  -  10-02 @ 07:00  --------------------------------------------------------  IN:    Oral Fluid: 75 mL  Total IN: 75 mL    OUT:    Voided: 300 mL  Total OUT: 300 mL    Total NET: -225 mL        Daily     Daily Weight in k.6 (02 Oct 2019 04:39)    PHYSICAL EXAM:  Constitutional: well developed, well nourished  and in nad  HEENT: PERRLA,  no icteric sclera and mild pallor of conjunctiva noted  Neck: No JVD, thyromegaly or adenopathy  Respiratory: reduced air entry lower lungs with no rales, wheezing or rhonchi  Cardiovascular: S1 and S2 normally heard  Gastrointestinal: soft, nondistended, nontender and normal bowel sounds heard  Extremities: No peripheral edema or cyanosis  Neurological: A/O x 3, no focal deficits  : No flank or cva tenderness palpated.  Skin: No rashes      LABS:    CBC:                          10.7   13.42 )-----------( 328      ( 02 Oct 2019 07:18 )             35.6           BMP:    10-02    137  |  91<L>  |  117<H>  ----------------------------<  331<H>  4.0   |  41<H>  |  2.78<H>  10-01    140  |  94<L>  |  106<H>  ----------------------------<  97  3.3<L>   |  40<H>  |  2.71<H>      135  |  91<L>  |  115<H>  ----------------------------<  372<H>  3.7   |  36<H>  |  3.23<H>    Ca    8.7      02 Oct 2019 07:18  Ca    8.6      01 Oct 2019 07:20  Ca    8.5      30 Sep 2019 07:33  Phos  5.2     10-02  Phos  4.4     10-01  Phos  6.1       Mg     2.9     10-02  Mg     2.9     10-01  Mg     3.0                 URINE STUDIES:        Creatinine, Random Urine: 50 mg/dL ( @ 21:23)  Osmolality, Random Urine: 372 mos/kg ( @ 09:19)  Sodium, Random Urine: 61 mmol/L ( @ 09:19)  Creatinine, Random Urine: 62 mg/dL (09-28 @ 09:19)                  RADIOLOGY & ADDITIONAL STUDIES: pt seen and examined.    SUBJECTIVE:  pt feels fine and denies cp,sob,gi or gu  symptons  pt is voiding normally  Current meds:    ALBUTerol    90 MICROgram(s) HFA Inhaler 2 Puff(s) Inhalation every 6 hours  ALBUTerol/ipratropium for Nebulization 3 milliLiter(s) Nebulizer every 6 hours  allopurinol 100 milliGRAM(s) Oral daily  aspirin  chewable 81 milliGRAM(s) Oral daily  atorvastatin 40 milliGRAM(s) Oral at bedtime  azithromycin  IVPB 500 milliGRAM(s) IV Intermittent every 24 hours  azithromycin  IVPB      calcitriol   Capsule 0.25 MICROGram(s) Oral daily  carvedilol 6.25 milliGRAM(s) Oral every 12 hours  cefTRIAXone   IVPB 1000 milliGRAM(s) IV Intermittent every 24 hours  cefTRIAXone   IVPB      cholecalciferol 1000 Unit(s) Oral daily  cyanocobalamin 1000 MICROGram(s) Oral daily  dextrose 40% Gel 15 Gram(s) Oral once PRN  dextrose 5%. 1000 milliLiter(s) IV Continuous <Continuous>  dextrose 50% Injectable 12.5 Gram(s) IV Push once  dextrose 50% Injectable 25 Gram(s) IV Push once  dextrose 50% Injectable 25 Gram(s) IV Push once  docusate sodium 100 milliGRAM(s) Oral daily  ferrous    sulfate 325 milliGRAM(s) Oral two times a day  glucagon  Injectable 1 milliGRAM(s) IntraMuscular once PRN  heparin  Injectable 5000 Unit(s) SubCutaneous every 8 hours  influenza   Vaccine 0.5 milliLiter(s) IntraMuscular once  insulin lispro (HumaLOG) corrective regimen sliding scale   SubCutaneous Before meals and at bedtime  insulin NPH/regular 70/30 Injectable 25 Unit(s) SubCutaneous before breakfast  insulin NPH/regular 70/30 Injectable 18 Unit(s) SubCutaneous before dinner  isosorbide   dinitrate Tablet (ISORDIL) 10 milliGRAM(s) Oral three times a day  methylPREDNISolone sodium succinate Injectable 40 milliGRAM(s) IV Push every 6 hours  pantoprazole    Tablet 40 milliGRAM(s) Oral before breakfast  senna 2 Tablet(s) Oral at bedtime  sevelamer carbonate 1600 milliGRAM(s) Oral three times a day with meals  spironolactone 50 milliGRAM(s) Oral daily      Vital Signs    T(F): 97.4 (10-02-19 @ 11:50), Max: 98.7 (10-01-19 @ 15:21)  HR: 70 (10-02-19 @ 11:50) (66 - 76)  BP: 109/50 (10-02-19 @ 11:50) (100/50 - 139/70)  ABP: --  RR: 18 (10-02-19 @ 11:50) (18 - 19)  SpO2: 96% (10-02-19 @ 11:50) (93% - 99%)  Wt(kg): --  CVP(cm H2O): --  CO: --  PCWP: --    I and O's:     @ 07:01  -  10-01 @ 07:00  --------------------------------------------------------  IN:    Oral Fluid: 50 mL  Total IN: 50 mL    OUT:    Voided: 300 mL  Total OUT: 300 mL    Total NET: -250 mL      10-01 @ 07:01  -  10-02 @ 07:00  --------------------------------------------------------  IN:    Oral Fluid: 75 mL  Total IN: 75 mL    OUT:    Voided: 300 mL  Total OUT: 300 mL    Total NET: -225 mL        Daily     Daily Weight in k.6 (02 Oct 2019 04:39)    PHYSICAL EXAM:  Constitutional: well developed, obese  and in nad  HEENT: PERRLA,  no icteric sclera and mild pallor of conjunctiva noted  Neck: No JVD, thyromegaly or adenopathy  Respiratory: reduced air entry lower lungs with few  wheezing and  rhonchi  Cardiovascular: S1 and S2 normally heard  Gastrointestinal: soft, nondistended, nontender and normal bowel sounds heard  +rt lower abdominal large hernia , s/p cholecystectomy  Extremities: Trace peripheral edema noted in both LE s  Neurological: A/O x 3, no focal deficits  Skin: No rashes      LABS:    CBC:                    10.7   13.42 )-----------( 328      ( 02 Oct 2019 07:18 )             35.6     BMP:    10-02    137  |  91<L>  |  117<H>  ----------------------------<  331<H>  4.0   |  41<H>  |  2.78<H>  10-01    140  |  94<L>  |  106<H>  ----------------------------<  97  3.3<L>   |  40<H>  |  2.71<H>      135  |  91<L>  |  115<H>  ----------------------------<  372<H>  3.7   |  36<H>  |  3.23<H>    Ca    8.7      02 Oct 2019 07:18  Ca    8.6      01 Oct 2019 07:20  Ca    8.5      30 Sep 2019 07:33  Phos  5.2     10-  Phos  4.4     10-01  Phos  6.1     -  Mg     2.9     10-02  Mg     2.9     10-01  Mg     3.0                                   RADIOLOGY & ADDITIONAL STUDIES:    < from: US Kidney and Bladder (10.02.19 @ 12:54) >  EXAM:  US KIDNEYS AND BLADDER                            PROCEDURE DATE:  10/02/2019          INTERPRETATION:  CLINICAL INDICATION: 54 years  Female with r/o urinary   obstruction.     COMPARISON: None available.    TECHNIQUE: Sonography of the kidneys and bladder. Extensive bowel gas and   its evaluation.    FINDINGS:    Right kidney:  13.9 cm. No hydronephrosis. 2.1 x 2.0 x 2.2 cm mid pole   hypodensity.    Left kidney:  11.7 cm. No hydronephrosis    Urinary bladder: Survey image is unremarkable.    IMPRESSION:     No hydronephrosis.    2.1 cm right midpole renal hypodensity cannot be adequately assessed due   to excessive bowel gas. Recommend pre and postcontrast CT to exclude   neoplasm..      < end of copied text >

## 2019-10-02 NOTE — PROGRESS NOTE ADULT - PROBLEM SELECTOR PLAN 2
Acute hypercapnic and hypoxic respiratory failure.  Plan: Patient came with SOB, productive cough with B/l lower Ext swelling - RESOLVING  afebrile, Hd stable,   pt sitting comforatble with no distress  ABG 7.39/56/37 on NC 3L, BIPAP AT NIGHT for hypercapnic RF  respiratory Failure likely due to CHF exacerbation Vs COPD   Pt was on IV METHYPREDNISONE 40mg iv q6---> taperred to Prednisone 40mg PO od.

## 2019-10-03 ENCOUNTER — TRANSCRIPTION ENCOUNTER (OUTPATIENT)
Age: 54
End: 2019-10-03

## 2019-10-03 DIAGNOSIS — N28.9 DISORDER OF KIDNEY AND URETER, UNSPECIFIED: ICD-10-CM

## 2019-10-03 LAB
ANION GAP SERPL CALC-SCNC: 2 MMOL/L — LOW (ref 5–17)
BUN SERPL-MCNC: 99 MG/DL — HIGH (ref 7–18)
CALCIUM SERPL-MCNC: 8.9 MG/DL — SIGNIFICANT CHANGE UP (ref 8.4–10.5)
CHLORIDE SERPL-SCNC: 96 MMOL/L — SIGNIFICANT CHANGE UP (ref 96–108)
CO2 SERPL-SCNC: 42 MMOL/L — HIGH (ref 22–31)
CREAT SERPL-MCNC: 2.12 MG/DL — HIGH (ref 0.5–1.3)
CULTURE RESULTS: SIGNIFICANT CHANGE UP
CULTURE RESULTS: SIGNIFICANT CHANGE UP
GLUCOSE BLDC GLUCOMTR-MCNC: 214 MG/DL — HIGH (ref 70–99)
GLUCOSE BLDC GLUCOMTR-MCNC: 270 MG/DL — HIGH (ref 70–99)
GLUCOSE BLDC GLUCOMTR-MCNC: 304 MG/DL — HIGH (ref 70–99)
GLUCOSE BLDC GLUCOMTR-MCNC: 333 MG/DL — HIGH (ref 70–99)
GLUCOSE SERPL-MCNC: 265 MG/DL — HIGH (ref 70–99)
HCT VFR BLD CALC: 35.5 % — SIGNIFICANT CHANGE UP (ref 34.5–45)
HGB BLD-MCNC: 10.6 G/DL — LOW (ref 11.5–15.5)
MAGNESIUM SERPL-MCNC: 3 MG/DL — HIGH (ref 1.6–2.6)
MCHC RBC-ENTMCNC: 29.5 PG — SIGNIFICANT CHANGE UP (ref 27–34)
MCHC RBC-ENTMCNC: 29.9 GM/DL — LOW (ref 32–36)
MCV RBC AUTO: 98.9 FL — SIGNIFICANT CHANGE UP (ref 80–100)
NRBC # BLD: 0 /100 WBCS — SIGNIFICANT CHANGE UP (ref 0–0)
PHOSPHATE SERPL-MCNC: 4 MG/DL — SIGNIFICANT CHANGE UP (ref 2.5–4.5)
PLATELET # BLD AUTO: 340 K/UL — SIGNIFICANT CHANGE UP (ref 150–400)
POTASSIUM SERPL-MCNC: 3.7 MMOL/L — SIGNIFICANT CHANGE UP (ref 3.5–5.3)
POTASSIUM SERPL-SCNC: 3.7 MMOL/L — SIGNIFICANT CHANGE UP (ref 3.5–5.3)
RBC # BLD: 3.59 M/UL — LOW (ref 3.8–5.2)
RBC # FLD: 13.7 % — SIGNIFICANT CHANGE UP (ref 10.3–14.5)
SODIUM SERPL-SCNC: 140 MMOL/L — SIGNIFICANT CHANGE UP (ref 135–145)
SPECIMEN SOURCE: SIGNIFICANT CHANGE UP
SPECIMEN SOURCE: SIGNIFICANT CHANGE UP
WBC # BLD: 14.7 K/UL — HIGH (ref 3.8–10.5)
WBC # FLD AUTO: 14.7 K/UL — HIGH (ref 3.8–10.5)

## 2019-10-03 RX ORDER — INSULIN LISPRO 100/ML
6 VIAL (ML) SUBCUTANEOUS
Qty: 1 | Refills: 0
Start: 2019-10-03 | End: 2019-11-01

## 2019-10-03 RX ORDER — ASPIRIN/CALCIUM CARB/MAGNESIUM 324 MG
1 TABLET ORAL
Qty: 0 | Refills: 0 | DISCHARGE
Start: 2019-10-03

## 2019-10-03 RX ORDER — SPIRONOLACTONE 25 MG/1
2 TABLET, FILM COATED ORAL
Qty: 0 | Refills: 0 | DISCHARGE
Start: 2019-10-03

## 2019-10-03 RX ORDER — FUROSEMIDE 40 MG
1 TABLET ORAL
Qty: 0 | Refills: 0 | DISCHARGE

## 2019-10-03 RX ORDER — ALLOPURINOL 300 MG
1 TABLET ORAL
Qty: 0 | Refills: 0 | DISCHARGE
Start: 2019-10-03

## 2019-10-03 RX ORDER — ISOSORBIDE DINITRATE 5 MG/1
1 TABLET ORAL
Qty: 0 | Refills: 0 | DISCHARGE
Start: 2019-10-03

## 2019-10-03 RX ORDER — ALLOPURINOL 300 MG
1 TABLET ORAL
Qty: 0 | Refills: 0 | DISCHARGE

## 2019-10-03 RX ORDER — INSULIN NPH HUM/REG INSULIN HM 70-30/ML
30 VIAL (ML) SUBCUTANEOUS
Qty: 0 | Refills: 0 | DISCHARGE
Start: 2019-10-03

## 2019-10-03 RX ORDER — CARVEDILOL PHOSPHATE 80 MG/1
1 CAPSULE, EXTENDED RELEASE ORAL
Qty: 0 | Refills: 0 | DISCHARGE

## 2019-10-03 RX ORDER — INSULIN NPH HUM/REG INSULIN HM 70-30/ML
20 VIAL (ML) SUBCUTANEOUS
Qty: 0 | Refills: 0 | DISCHARGE
Start: 2019-10-03

## 2019-10-03 RX ORDER — CEFUROXIME AXETIL 250 MG
1 TABLET ORAL
Qty: 14 | Refills: 0
Start: 2019-10-03 | End: 2019-10-09

## 2019-10-03 RX ORDER — CEFUROXIME AXETIL 250 MG
500 TABLET ORAL EVERY 12 HOURS
Refills: 0 | Status: DISCONTINUED | OUTPATIENT
Start: 2019-10-03 | End: 2019-10-04

## 2019-10-03 RX ORDER — IPRATROPIUM/ALBUTEROL SULFATE 18-103MCG
3 AEROSOL WITH ADAPTER (GRAM) INHALATION
Qty: 0 | Refills: 0 | DISCHARGE
Start: 2019-10-03

## 2019-10-03 RX ORDER — ATORVASTATIN CALCIUM 80 MG/1
1 TABLET, FILM COATED ORAL
Qty: 0 | Refills: 0 | DISCHARGE
Start: 2019-10-03

## 2019-10-03 RX ORDER — CALCITRIOL 0.5 UG/1
1 CAPSULE ORAL
Qty: 0 | Refills: 0 | DISCHARGE
Start: 2019-10-03

## 2019-10-03 RX ORDER — ALBUTEROL 90 UG/1
2 AEROSOL, METERED ORAL
Qty: 0 | Refills: 0 | DISCHARGE

## 2019-10-03 RX ORDER — SEVELAMER CARBONATE 2400 MG/1
2 POWDER, FOR SUSPENSION ORAL
Qty: 0 | Refills: 0 | DISCHARGE
Start: 2019-10-03

## 2019-10-03 RX ADMIN — Medication 325 MILLIGRAM(S): at 05:57

## 2019-10-03 RX ADMIN — ISOSORBIDE DINITRATE 10 MILLIGRAM(S): 5 TABLET ORAL at 13:56

## 2019-10-03 RX ADMIN — SENNA PLUS 2 TABLET(S): 8.6 TABLET ORAL at 21:47

## 2019-10-03 RX ADMIN — PANTOPRAZOLE SODIUM 40 MILLIGRAM(S): 20 TABLET, DELAYED RELEASE ORAL at 05:57

## 2019-10-03 RX ADMIN — SEVELAMER CARBONATE 1600 MILLIGRAM(S): 2400 POWDER, FOR SUSPENSION ORAL at 17:25

## 2019-10-03 RX ADMIN — Medication 4: at 21:47

## 2019-10-03 RX ADMIN — CALCITRIOL 0.25 MICROGRAM(S): 0.5 CAPSULE ORAL at 12:19

## 2019-10-03 RX ADMIN — Medication 6: at 08:34

## 2019-10-03 RX ADMIN — SEVELAMER CARBONATE 1600 MILLIGRAM(S): 2400 POWDER, FOR SUSPENSION ORAL at 12:20

## 2019-10-03 RX ADMIN — Medication 3 MILLILITER(S): at 20:24

## 2019-10-03 RX ADMIN — Medication 325 MILLIGRAM(S): at 17:26

## 2019-10-03 RX ADMIN — CARVEDILOL PHOSPHATE 6.25 MILLIGRAM(S): 80 CAPSULE, EXTENDED RELEASE ORAL at 05:57

## 2019-10-03 RX ADMIN — SPIRONOLACTONE 50 MILLIGRAM(S): 25 TABLET, FILM COATED ORAL at 05:57

## 2019-10-03 RX ADMIN — Medication 8: at 12:17

## 2019-10-03 RX ADMIN — Medication 81 MILLIGRAM(S): at 12:20

## 2019-10-03 RX ADMIN — ISOSORBIDE DINITRATE 10 MILLIGRAM(S): 5 TABLET ORAL at 05:57

## 2019-10-03 RX ADMIN — ISOSORBIDE DINITRATE 10 MILLIGRAM(S): 5 TABLET ORAL at 21:46

## 2019-10-03 RX ADMIN — SEVELAMER CARBONATE 1600 MILLIGRAM(S): 2400 POWDER, FOR SUSPENSION ORAL at 08:35

## 2019-10-03 RX ADMIN — Medication 3 MILLILITER(S): at 09:31

## 2019-10-03 RX ADMIN — Medication 8: at 17:24

## 2019-10-03 RX ADMIN — Medication 3 MILLILITER(S): at 03:47

## 2019-10-03 RX ADMIN — CARVEDILOL PHOSPHATE 6.25 MILLIGRAM(S): 80 CAPSULE, EXTENDED RELEASE ORAL at 17:26

## 2019-10-03 RX ADMIN — HEPARIN SODIUM 5000 UNIT(S): 5000 INJECTION INTRAVENOUS; SUBCUTANEOUS at 06:01

## 2019-10-03 RX ADMIN — Medication 1000 UNIT(S): at 12:20

## 2019-10-03 RX ADMIN — ATORVASTATIN CALCIUM 40 MILLIGRAM(S): 80 TABLET, FILM COATED ORAL at 21:46

## 2019-10-03 RX ADMIN — Medication 40 MILLIGRAM(S): at 05:57

## 2019-10-03 RX ADMIN — HEPARIN SODIUM 5000 UNIT(S): 5000 INJECTION INTRAVENOUS; SUBCUTANEOUS at 21:47

## 2019-10-03 RX ADMIN — Medication 100 MILLIGRAM(S): at 12:20

## 2019-10-03 RX ADMIN — Medication 30 UNIT(S): at 08:34

## 2019-10-03 RX ADMIN — Medication 100 MILLIGRAM(S): at 12:19

## 2019-10-03 RX ADMIN — Medication 20 UNIT(S): at 17:25

## 2019-10-03 RX ADMIN — HEPARIN SODIUM 5000 UNIT(S): 5000 INJECTION INTRAVENOUS; SUBCUTANEOUS at 13:56

## 2019-10-03 RX ADMIN — PREGABALIN 1000 MICROGRAM(S): 225 CAPSULE ORAL at 12:19

## 2019-10-03 NOTE — DISCHARGE NOTE PROVIDER - PROVIDER TOKENS
PROVIDER:[TOKEN:[[id number]]],PROVIDER:[TOKEN:[[id number]]] PROVIDER:[TOKEN:[[id number]]],PROVIDER:[TOKEN:[[id number]]],PROVIDER:[TOKEN:[[id number]]]

## 2019-10-03 NOTE — DISCHARGE NOTE PROVIDER - NSDCCPCAREPLAN_GEN_ALL_CORE_FT
PRINCIPAL DISCHARGE DIAGNOSIS  Diagnosis: CHF exacerbation  Assessment and Plan of Treatment: You had come in with acute shortness of breath, fever and chiils in the ED. You hadacquired flu 2 days before the admisison. You ahd a detailed cardiac evaluation for your symptoms. You had a history of Chronic heart failure in the past (EF- 25%). You had a repat ECHO- severe global systolic dysfunction and EF- 30-35%. You were started on Iv lasix, aldactone for your symtoms that improved your symtoms. You are advised to continue with Aldactone 50mg, isordil 10mg three times a day and your home medication as prescribed. Please follow up with your PCP within a week from discharge.      SECONDARY DISCHARGE DIAGNOSES  Diagnosis: COPD exacerbation  Assessment and Plan of Treatment: You had come in with acute Shortness of breath with fever and chills. You were seen and followed by the Pulmonologist for your symtoms and were diagnosed with COPD exacerbation and  Acute hypercapnic and hypoxic respiratory failure.  ABG  was performed that showed - 7.39/56/37. You received supplement oxygen via nasal cannula and BIPAP on night as needed for hypercapnic RF. Also you were started on  IV METHYPREDNISONE 40mg  that was slowly taperred to Prednisone 40mg PO. You are advised to take prednisone tablet as prescribed.    Diagnosis: ANJU (acute kidney injury)  Assessment and Plan of Treatment: On admission your creatine levels were elevated, You were followed the Nephrologist here. [place] due to lasix so lasix was held Your creatine levels stabalized during the hospital stay. Please repeat your BMP within a week. Ultrasound Kidney was performed that showed 2.1cm mass  R kidney mid pole. Please follow up with the Nephrologist OP.    Diagnosis: Mycoplasma pneumonia  Assessment and Plan of Treatment: You were also  diagnosed with Mycoplasma Pneumonia and were maintained on Droplet precautions. You were started on IV antibiotics. You are advised to take Tab Ceftin 500mg two times a day x 1 week.    Diagnosis: DM (diabetes mellitus)  Assessment and Plan of Treatment: You have a known history of DM. You had taken Glucotrol XL at home. Here you were managed with Insulin NPH 70/30. You are  davised to take 30U before BF and 20U before dinner, Please monitor your RBS regularly and also get your BMP checked within a week from discharge

## 2019-10-03 NOTE — DISCHARGE NOTE NURSING/CASE MANAGEMENT/SOCIAL WORK - NSPROEXTENSIONSOFSELF_GEN_A_NUR
Returned call, spoke with patient and assisted with rescheduling her apts and scheduling her labs at the [place] office a few days prior   none

## 2019-10-03 NOTE — DISCHARGE NOTE PROVIDER - CARE PROVIDER_API CALL
Cruz Ledesma)  Internal Medicine; Nephrology  [address] [place] [address]  Phone: [phone]  Fax: [phone]  Follow Up Time:     Ramana Barajas)  Medicine  [address] [place] [address]  Phone: [phone]  Fax: [phone]  Follow Up Time: Ramana Barajas)  Medicine  [address] [place] [address]  Phone: [phone]  Fax: [phone]  Follow Up Time:     Meet Galdamez (MD)  Nephrology  [address] [place] [address]  Phone: [phone]  Fax: [phone]  Follow Up Time:     Marlene Marino; MPH)  Urology  [address] [facility], [place] [address]  [place] [address]  Phone: [phone]  Fax: [phone]  Follow Up Time:

## 2019-10-03 NOTE — PROGRESS NOTE ADULT - ASSESSMENT
A/P:  1.ANJU ON CKD(stage 3): R/O ANJU secondary to pre-renal azotemia ,secondary to chf plus pulmonary infection,   -pts cr is 2.78   ,  stable  last 24 hrs but  above her baseline(1.4 )  -moniter I/Os daily  CKD stage 3 , with non nephrotic proteinuria ,due to DM/HTN/obesity, less likley primary GN   -Keep patient euvolemic and Diabetic renal diet  -Avoid Nephrotoxic Meds/ Agents such as (NSAIDs, IV contrast, Aminoglycosides such as gentamicin, -Gadolinium contrast, Phosphate containing enemas, etc..)  -Adjust Medications according to eGFR  -f/u bmp daily  2.HTN: bp is controlled  -goal bp >110/70 and <130/80  -low salt diet  3.ANEMIA: mild, r/o secondary to ckd plus infection plus  iron deficiency  -continue  po feso4 325mg bid prior to meals  -f/u Hb daily  4.RESPIRATORY ACIDOISS ;with renal compensation   -f/u co2 daily  5.ALKALOSIS: multifactorial like chronic respiratory acisosis induced plus diuretics induced  -suggest to continue  Aldactone 50 mg daily to keep k normal and to avoid further alkalosis   -f/u k and co2 daily  6.S/P HYPOKALEMIA: now k is normal  -f/u k level daily  -keep k>4 and <5  7.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  8.RT RENAL COMPLEX CYST VS MASS: suggest urology consult  -pt can not get ct with iv contrast or MRI with Gadolinium as pt has stage 4 ckd with egfr <25% A/P:  1.ANJU ON CKD(stage 3): R/O ANJU secondary to pre-renal azotemia ,secondary to chf plus pulmonary infection,   -pts cr is 2-12  , has been improving last 2 to 3 days  but still  above her baseline(1.4 )  -moniter I/Os daily  CKD stage 3 , with non nephrotic proteinuria ,due to DM/HTN/obesity, less likley primary GN   -Keep patient euvolemic and Diabetic renal diet  -Avoid Nephrotoxic Meds/ Agents such as (NSAIDs, IV contrast, Aminoglycosides such as gentamicin, -Gadolinium contrast, Phosphate containing enemas, etc..)  -Adjust Medications according to eGFR  -f/u bmp daily  -pt needs outpatient renal f/u with Dr Rudolph Dsouza  2.HTN: bp is controlled  -goal bp >110/70 and <130/80  -low salt diet  3.ANEMIA: mild, r/o secondary to ckd plus infection plus  iron deficiency  -continue  po feso4 325mg bid prior to meals  -f/u Hb daily  4.RESPIRATORY ACIDOISS ;with renal compensation   -f/u co2 daily  5.ALKALOSIS: multifactorial like chronic respiratory acisosis induced plus diuretics induced  -suggest to continue  Aldactone 50 mg daily to keep k normal and to avoid further alkalosis   -f/u k and co2 daily  6.S/P HYPOKALEMIA: now k is normal  -f/u k level daily  -keep k>4 and <5  7.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  8.RT RENAL COMPLEX CYST VS MASS: suggest urology consult now   -pt can not get ct with iv contrast or MRI with Gadolinium as pt has stage 4 ckd with egfr <25%  -suggest to f/u with urology   -d/w pt to f/u with urology post discharge

## 2019-10-03 NOTE — PROGRESS NOTE ADULT - NSHPATTENDINGPLANDISCUSS_GEN_ALL_CORE
pt and resident covering
D/W STAFF AND PATIENT

## 2019-10-03 NOTE — DISCHARGE NOTE NURSING/CASE MANAGEMENT/SOCIAL WORK - PATIENT PORTAL LINK FT
You can access the FollowMyHealth Patient Portal offered by [facility] by registering at the following website: [email or web address]. By joining [facility]’s FollowMyHealth portal, you will also be able to view your health information using other applications (apps) compatible with our system.

## 2019-10-03 NOTE — DISCHARGE NOTE NURSING/CASE MANAGEMENT/SOCIAL WORK - NSDCPEPT PROEDHF_GEN_ALL_CORE
Call primary care provider for follow up after discharge/Report signs and symptoms to primary care provider/Monitor weight daily/Activities as tolerated/Low salt diet

## 2019-10-03 NOTE — PROGRESS NOTE ADULT - SUBJECTIVE AND OBJECTIVE BOX
pt seen and examined.pts current chart reviewed and case discussed with resident covering.    SUBJECTIVE:  pt feels fine and denies cp,sob,gi or gu/uremic  symptons    REVIEW OF SYSTEMS:  CONSTITUTIONAL: No weakness, fevers or chills  EYES/ENT: No visual changes;  No vertigo or throat pain   NECK: No pain or stiffness  RESPIRATORY: No cough, wheezing, hemoptysis; No shortness of breath  CARDIOVASCULAR: No chest pain or palpitations  GASTROINTESTINAL: No abdominal or epigastric pain. No nausea, vomiting, or hematemesis; No diarrhea or constipation. No melena or hematochezia.  GENITOURINARY: No dysuria, frequency , hematuria, flank pain or nocturia  NEUROLOGICAL: No numbness or weakness  SKIN: No itching, burning, rashes, or lesions   All other review of systems is negative unless indicated above    Current meds:    ALBUTerol    90 MICROgram(s) HFA Inhaler 2 Puff(s) Inhalation every 6 hours  ALBUTerol/ipratropium for Nebulization 3 milliLiter(s) Nebulizer every 6 hours  allopurinol 100 milliGRAM(s) Oral daily  aspirin  chewable 81 milliGRAM(s) Oral daily  atorvastatin 40 milliGRAM(s) Oral at bedtime  calcitriol   Capsule 0.25 MICROGram(s) Oral daily  carvedilol 6.25 milliGRAM(s) Oral every 12 hours  cholecalciferol 1000 Unit(s) Oral daily  cyanocobalamin 1000 MICROGram(s) Oral daily  dextrose 40% Gel 15 Gram(s) Oral once PRN  dextrose 5%. 1000 milliLiter(s) IV Continuous <Continuous>  dextrose 50% Injectable 12.5 Gram(s) IV Push once  dextrose 50% Injectable 25 Gram(s) IV Push once  dextrose 50% Injectable 25 Gram(s) IV Push once  docusate sodium 100 milliGRAM(s) Oral daily  ferrous    sulfate 325 milliGRAM(s) Oral two times a day  glucagon  Injectable 1 milliGRAM(s) IntraMuscular once PRN  heparin  Injectable 5000 Unit(s) SubCutaneous every 8 hours  insulin lispro (HumaLOG) corrective regimen sliding scale   SubCutaneous Before meals and at bedtime  insulin NPH/regular 70/30 Injectable 30 Unit(s) SubCutaneous before breakfast  insulin NPH/regular 70/30 Injectable 20 Unit(s) SubCutaneous before dinner  isosorbide   dinitrate Tablet (ISORDIL) 10 milliGRAM(s) Oral three times a day  pantoprazole    Tablet 40 milliGRAM(s) Oral before breakfast  predniSONE   Tablet 40 milliGRAM(s) Oral daily  senna 2 Tablet(s) Oral at bedtime  sevelamer carbonate 1600 milliGRAM(s) Oral three times a day with meals  spironolactone 50 milliGRAM(s) Oral daily      Vital Signs    T(F): 98.3 (10-03-19 @ 11:20), Max: 98.3 (10-03-19 @ 11:20)  HR: 63 (10-03-19 @ 11:20) (60 - 86)  BP: 127/73 (10-03-19 @ 11:20) (120/67 - 150/66)  ABP: --  RR: 18 (10-03-19 @ 11:20) (18 - 18)  SpO2: 100% (10-03-19 @ 11:20) (92% - 100%)  Wt(kg): --  CVP(cm H2O): --  CO: --  PCWP: --    I and O's:    10-01 @ 07:  -  10-02 @ 07:00  --------------------------------------------------------  IN:    Oral Fluid: 75 mL  Total IN: 75 mL    OUT:    Voided: 300 mL  Total OUT: 300 mL    Total NET: -225 mL      10-02 @ 07:  -  10-03 @ 07:00  --------------------------------------------------------  IN:    IV PiggyBack: 300 mL  Total IN: 300 mL    OUT:  Total OUT: 0 mL    Total NET: 300 mL        Daily     Daily Weight in k.1 (03 Oct 2019 04:38)    PHYSICAL EXAM:  Constitutional: well developed, well nourished  and in nad  HEENT: PERRLA,  no icteric sclera and mild pallor of conjunctiva noted  Neck: No JVD, thyromegaly or adenopathy  Respiratory: reduced air entry lower lungs with no rales, wheezing or rhonchi  Cardiovascular: S1 and S2 normally heard  Gastrointestinal: soft, nondistended, nontender and normal bowel sounds heard  Extremities: No peripheral edema or cyanosis  Neurological: A/O x 3, no focal deficits  : No flank or cva tenderness palpated.  Skin: No rashes      LABS:    CBC:                          10.6   14.70 )-----------( 340      ( 03 Oct 2019 07:41 )             35.5           BMP:    10-03    140  |  96  |  99<H>  ----------------------------<  265<H>  3.7   |  42<H>  |  2.12<H>  10-02    137  |  91<L>  |  117<H>  ----------------------------<  331<H>  4.0   |  41<H>  |  2.78<H>  10-01    140  |  94<L>  |  106<H>  ----------------------------<  97  3.3<L>   |  40<H>  |  2.71<H>    Ca    8.9      03 Oct 2019 07:41  Ca    8.7      02 Oct 2019 07:18  Ca    8.6      01 Oct 2019 07:20  Phos  4.0     10-03  Phos  5.2     10-02  Phos  4.4     10-  Mg     3.0     10-03  Mg     2.9     10-02  Mg     2.9     10-01            URINE STUDIES:        Creatinine, Random Urine: 50 mg/dL ( @ 21:23)  Osmolality, Random Urine: 372 mos/kg ( @ 09:19)  Sodium, Random Urine: 61 mmol/L ( @ 09:19)  Creatinine, Random Urine: 62 mg/dL ( @ 09:19)                  RADIOLOGY & ADDITIONAL STUDIES: pt seen and examined.    SUBJECTIVE:  pt feels fine and denies cp,sob,gi or gu  symptons  pt is voiding normally    Current meds:    ALBUTerol    90 MICROgram(s) HFA Inhaler 2 Puff(s) Inhalation every 6 hours  ALBUTerol/ipratropium for Nebulization 3 milliLiter(s) Nebulizer every 6 hours  allopurinol 100 milliGRAM(s) Oral daily  aspirin  chewable 81 milliGRAM(s) Oral daily  atorvastatin 40 milliGRAM(s) Oral at bedtime  calcitriol   Capsule 0.25 MICROGram(s) Oral daily  carvedilol 6.25 milliGRAM(s) Oral every 12 hours  cholecalciferol 1000 Unit(s) Oral daily  cyanocobalamin 1000 MICROGram(s) Oral daily  dextrose 40% Gel 15 Gram(s) Oral once PRN  dextrose 5%. 1000 milliLiter(s) IV Continuous <Continuous>  dextrose 50% Injectable 12.5 Gram(s) IV Push once  dextrose 50% Injectable 25 Gram(s) IV Push once  dextrose 50% Injectable 25 Gram(s) IV Push once  docusate sodium 100 milliGRAM(s) Oral daily  ferrous    sulfate 325 milliGRAM(s) Oral two times a day  glucagon  Injectable 1 milliGRAM(s) IntraMuscular once PRN  heparin  Injectable 5000 Unit(s) SubCutaneous every 8 hours  insulin lispro (HumaLOG) corrective regimen sliding scale   SubCutaneous Before meals and at bedtime  insulin NPH/regular 70/30 Injectable 30 Unit(s) SubCutaneous before breakfast  insulin NPH/regular 70/30 Injectable 20 Unit(s) SubCutaneous before dinner  isosorbide   dinitrate Tablet (ISORDIL) 10 milliGRAM(s) Oral three times a day  pantoprazole    Tablet 40 milliGRAM(s) Oral before breakfast  predniSONE   Tablet 40 milliGRAM(s) Oral daily  senna 2 Tablet(s) Oral at bedtime  sevelamer carbonate 1600 milliGRAM(s) Oral three times a day with meals  spironolactone 50 milliGRAM(s) Oral daily      Vital Signs    T(F): 98.3 (10-03-19 @ 11:20), Max: 98.3 (10-03-19 @ 11:20)  HR: 63 (10-03-19 @ 11:20) (60 - 86)  BP: 127/73 (10-03-19 @ 11:20) (120/67 - 150/66)  ABP: --  RR: 18 (10-03-19 @ 11:20) (18 - 18)  SpO2: 100% (10-03-19 @ 11:20) (92% - 100%)  Wt(kg): --  CVP(cm H2O): --  CO: --  PCWP: --    I and O's:    10-01 @ 07:01  -  10-02 @ 07:00  --------------------------------------------------------  IN:    Oral Fluid: 75 mL  Total IN: 75 mL    OUT:    Voided: 300 mL  Total OUT: 300 mL    Total NET: -225 mL      10-02 @ 07:01  -  10-03 @ 07:00  --------------------------------------------------------  IN:    IV PiggyBack: 300 mL  Total IN: 300 mL    OUT:  Total OUT: 0 mL    Total NET: 300 mL        Daily     Daily Weight in k.1 (03 Oct 2019 04:38)    PHYSICAL EXAM:  Constitutional: well developed, obese  and in nad  HEENT: PERRLA,  no icteric sclera and mild pallor of conjunctiva noted  Neck: No JVD, thyromegaly or adenopathy  Respiratory: reduced air entry lower lungs with few  wheezing and  rhonchi  Cardiovascular: S1 and S2 normally heard  Gastrointestinal: soft, nondistended, nontender and normal bowel sounds heard  +rt lower abdominal large hernia , s/p cholecystectomy  Extremities: Trace peripheral edema noted in both LE s  Neurological: A/O x 3, no focal deficits  Skin: No rashes    LABS:    CBC:                          10.6   14.70 )-----------( 340      ( 03 Oct 2019 07:41 )             35.5           BMP:    10-03    140  |  96  |  99<H>  ----------------------------<  265<H>  3.7   |  42<H>  |  2.12<H>  10-02    137  |  91<L>  |  117<H>  ----------------------------<  331<H>  4.0   |  41<H>  |  2.78<H>  10-01    140  |  94<L>  |  106<H>  ----------------------------<  97  3.3<L>   |  40<H>  |  2.71<H>    Ca    8.9      03 Oct 2019 07:41  Ca    8.7      02 Oct 2019 07:18  Ca    8.6      01 Oct 2019 07:20  Phos  4.0     10-  Phos  5.2     10  Phos  4.4     10  Mg     3.0     10  Mg     2.9     10-  Mg     2.9     10-01            )

## 2019-10-03 NOTE — DISCHARGE NOTE PROVIDER - HOSPITAL COURSE
HPI:    53 [facility]  [facility] H[facility] 25 %, s/p AICD, HTN, B/l LE edema, DM, Gout, CAD, HLD, COPD  presented to ED with SOB. As per patient she had fever, chills, shortness of breath, dizzy, loss of appetite, weight loss, muscle aches on Tuesday and she was diagnosed with Flu. She was given antibiotics and treatment for flu. But she took medications for 2 days. She was saturating 77% on admission and gasping for breath. In ED she was on NRB and saturated 97%. She had sick contacts at her place diagnosed with Flu. Patient denies cough, sputum production, fevers/chills/nausea/vomiting. No diarrhea, abdominal pain.    [facility] FUll CODE .    She was seen and evaluated by the cardiologist for her symptoms. , Her symptoms resolved during the hospital stay. She was started on lasix 80mg iv bd in ED.     Trop T1 0.04> 0.04>0.01, EKG - showed NSR. ECHO EF-35%, Severe LV dysfunction, moderate PAH. She was monitored daily for I/o's. Also she was started on aldactone 50mg, isordil 10mg TID.    She was also evaluated by the Pulmonologist for her symtpt HPI:    55 y/o  Female from [facility] PMH of Obesity, CHF HFrEF 25 %, s/p AICD, HTN, B/l LE edema, DM, Gout, CAD, HLD, COPD  presented to ED with SOB. As per patient she had fever, chills, shortness of breath, dizzy, loss of appetite, weight loss, muscle aches on Tuesday and she was diagnosed with Flu. She was given antibiotics and treatment for flu. But she took medications for 2 days. She was saturating 77% on admission and gasping for breath. In ED she was on NRB and saturated 97%. She had sick contacts at her place diagnosed with Flu. Patient denies cough, sputum production, fevers/chills/nausea/vomiting. No diarrhea, abdominal pain.    [facility] FUll CODE .    She was seen and evaluated by the cardiologist for her symptoms. , Her symptoms resolved during the hospital stay. She was started on lasix 80mg iv bd in ED.     Trop T1 0.04> 0.04>0.01, EKG - showed NSR. ECHO EF-35%, Severe LV dysfunction, moderate PAH. She was monitored daily for I/o's. Also she was started on aldactone 50mg, isordil 10mg TID.    She was also evaluated by the Pulmonologist for her symptoms  and was diagnosed with COPD exacerbatio and  Acute hypercapnic and hypoxic respiratory failure.    ABG  was performed that showed - 7.39/56/37 and she was put on  NC 3L, BIPAP AT NIGHT for hypercapnic RF. Also she was started on  IV METHYPREDNISONE 40mg iv q6---> taperred to Prednisone 40mg PO. She will be sent on tapered dose of steroids.     Also she was diagnosed with Mycoplasma Pneumonia and was maintained on Droplet precautions. She was started on IV Ceftriaxone, to be sent of PO ceftin 500mg Bd.    Her blood sugar levels elevated here which was likely 2/2 iv steroids. She is been discharged on Insulin NPH. Strict monitoring and sliding scale advised at the [facility]. HPI:    53 y/o  Female from [facility] PMH of Obesity, CHF HFrEF 25 %, s/p AICD, HTN, B/l LE edema, DM, Gout, CAD, HLD, COPD  presented to ED with SOB. As per patient she had fever, chills, shortness of breath, dizzy, loss of appetite, weight loss, muscle aches on Tuesday and she was diagnosed with Flu. She was given antibiotics and treatment for flu. But she took medications for 2 days. She was saturating 77% on admission and gasping for breath. In ED she was on NRB and saturated 97%. She had sick contacts at her place diagnosed with Flu. Patient denies cough, sputum production, fevers/chills/nausea/vomiting. No diarrhea, abdominal pain.    [facility] FUll CODE .    She was seen and evaluated by the cardiologist for her symptoms. , Her symptoms resolved during the hospital stay. She was started on lasix 80mg iv bd in ED.     Trop T1 0.04> 0.04>0.01, EKG - showed NSR. ECHO EF-35%, Severe LV dysfunction, moderate PAH. She was monitored daily for I/o's. Also she was started on aldactone 50mg, isordil 10mg TID.    She was also evaluated by the Pulmonologist for her symptoms  and was diagnosed with COPD exacerbation and  Acute hypercapnic and hypoxic respiratory failure.    ABG  was performed that showed - 7.39/56/37 and she was put on  NC 3L, BIPAP AT NIGHT for hypercapnic RF. Also she was started on  IV METHYPREDNISONE 40mg iv q6---> taperred to Prednisone 40mg PO. She will be sent on tapered dose of steroids.     She has uncontrolled Blood sugar, Uncontrolled Hypereglycemia   Hyper osmolarity mostly 2/2 iv steroids.     Also she was diagnosed with Mycoplasma Pneumonia and was maintained on Droplet precautions. She was started on IV Ceftriaxone, to be sent of PO ceftin 500mg Bd.    Her blood sugar levels elevated here which was likely 2/2 iv steroids. She is been discharged on Insulin NPH. Strict monitoring and sliding scale advised at the [facility].

## 2019-10-03 NOTE — PROGRESS NOTE ADULT - SUBJECTIVE AND OBJECTIVE BOX
pt seen and examined, no complaints, ROS - .          ALBUTerol    90 MICROgram(s) HFA Inhaler 2 Puff(s) Inhalation every 6 hours  ALBUTerol/ipratropium for Nebulization 3 milliLiter(s) Nebulizer every 6 hours  allopurinol 100 milliGRAM(s) Oral daily  aspirin  chewable 81 milliGRAM(s) Oral daily  atorvastatin 40 milliGRAM(s) Oral at bedtime  calcitriol   Capsule 0.25 MICROGram(s) Oral daily  carvedilol 6.25 milliGRAM(s) Oral every 12 hours  cholecalciferol 1000 Unit(s) Oral daily  cyanocobalamin 1000 MICROGram(s) Oral daily  dextrose 40% Gel 15 Gram(s) Oral once PRN  dextrose 5%. 1000 milliLiter(s) IV Continuous <Continuous>  dextrose 50% Injectable 12.5 Gram(s) IV Push once  dextrose 50% Injectable 25 Gram(s) IV Push once  dextrose 50% Injectable 25 Gram(s) IV Push once  docusate sodium 100 milliGRAM(s) Oral daily  ferrous    sulfate 325 milliGRAM(s) Oral two times a day  glucagon  Injectable 1 milliGRAM(s) IntraMuscular once PRN  heparin  Injectable 5000 Unit(s) SubCutaneous every 8 hours  influenza   Vaccine 0.5 milliLiter(s) IntraMuscular once  insulin lispro (HumaLOG) corrective regimen sliding scale   SubCutaneous Before meals and at bedtime  insulin NPH/regular 70/30 Injectable 30 Unit(s) SubCutaneous before breakfast  insulin NPH/regular 70/30 Injectable 20 Unit(s) SubCutaneous before dinner  isosorbide   dinitrate Tablet (ISORDIL) 10 milliGRAM(s) Oral three times a day  pantoprazole    Tablet 40 milliGRAM(s) Oral before breakfast  predniSONE   Tablet 40 milliGRAM(s) Oral daily  senna 2 Tablet(s) Oral at bedtime  sevelamer carbonate 1600 milliGRAM(s) Oral three times a day with meals  spironolactone 50 milliGRAM(s) Oral daily                            10.7   13.42 )-----------( 328      ( 02 Oct 2019 07:18 )             35.6       Hemoglobin: 10.7 g/dL (10-02 @ 07:18)  Hemoglobin: 11.1 g/dL (10-01 @ 07:20)  Hemoglobin: 10.4 g/dL (09-30 @ 07:33)  Hemoglobin: 9.9 g/dL (09-29 @ 08:05)  Hemoglobin: 10.0 g/dL (09-28 @ 10:22)      10-02    137  |  91<L>  |  117<H>  ----------------------------<  331<H>  4.0   |  41<H>  |  2.78<H>    Ca    8.7      02 Oct 2019 07:18  Phos  5.2     10-02  Mg     2.9     10-02      Creatinine Trend: 2.78<--, 2.71<--, 3.23<--, 3.46<--, 3.22<--, 3.43<--    COAGS:           T(C): 36.3 (10-03-19 @ 04:38), Max: 36.6 (10-02-19 @ 15:59)  HR: 65 (10-03-19 @ 06:29) (60 - 86)  BP: 134/57 (10-03-19 @ 04:38) (109/50 - 150/66)  RR: 18 (10-03-19 @ 04:38) (18 - 18)  SpO2: 92% (10-03-19 @ 06:29) (92% - 99%)  Wt(kg): --    I&O's Summary    01 Oct 2019 07:01  -  02 Oct 2019 07:00  --------------------------------------------------------  IN: 75 mL / OUT: 300 mL / NET: -225 mL    02 Oct 2019 07:01  -  03 Oct 2019 06:56  --------------------------------------------------------  IN: 300 mL / OUT: 0 mL / NET: 300 mL            Gen: Appears well in NAD  HEENT:  (-)icterus (-)pallor  CV: N S1 S2 1/6 RAYMOND (+)2 Pulses B/l  Resp:  Clear to ausculatation B/L, normal effort  GI: (+) BS Soft, NT, ND  Lymph:  (-)Edema, (-)obvious lymphadenopathy  Skin: Warm to touch, Normal turgor  Psych: Appropriate mood and affect    TELE: off    ASSESSMENT/PLAN:  55 [facility]  [facility]cathed 11/[facility]  HFrEF 25 %, s/p ICD, HTN, B/l LE edema, DM, Gout, CAD, HLD, COPD  presented to ED with SOB/ CHF found with M. Pneumonia     - Echo with no significant interval changes  -  Tolerating PO lasix/ aldactone and isordil  - cont Coreg  - would start some afterload reduction with hydralizine 10 mg q 8 if BP allows   - No need for further inpatient cardiac work up.

## 2019-10-03 NOTE — DISCHARGE NOTE PROVIDER - CARE PROVIDERS DIRECT ADDRESSES
,DirectAddress_Unknown,DirectAddress_Unknown ,DirectAddress_Unknown,DirectAddress_Unknown,[email or web address]

## 2019-10-03 NOTE — PROGRESS NOTE ADULT - SUBJECTIVE AND OBJECTIVE BOX
Patient is a 54y old  Female who presents with a chief complaint of shortness of breath (03 Oct 2019 12:50)    PATIENT IS SEEN AND EXAMINED IN MEDICAL FLOOR.    ALLERGIES:  codeine (Urticaria)     Daily Weight in k.1 (03 Oct 2019 04:38)    VITALS:    Vital Signs Last 24 Hrs  T(C): 36.4 (03 Oct 2019 20:23), Max: 36.8 (03 Oct 2019 11:20)  T(F): 97.5 (03 Oct 2019 20:23), Max: 98.3 (03 Oct 2019 11:20)  HR: 62 (03 Oct 2019 20:23) (60 - 86)  BP: 122/66 (03 Oct 2019 20:23) (122/66 - 135/55)  BP(mean): --  RR: 18 (03 Oct 2019 20:23) (18 - 18)  SpO2: 99% (03 Oct 2019 20:23) (92% - 100%)    LABS:    CBC Full  -  ( 03 Oct 2019 07:41 )  WBC Count : 14.70 K/uL  RBC Count : 3.59 M/uL  Hemoglobin : 10.6 g/dL  Hematocrit : 35.5 %  Platelet Count - Automated : 340 K/uL  Mean Cell Volume : 98.9 fl  Mean Cell Hemoglobin : 29.5 pg  Mean Cell Hemoglobin Concentration : 29.9 gm/dL  Auto Neutrophil # : x  Auto Lymphocyte # : x  Auto Monocyte # : x  Auto Eosinophil # : x  Auto Basophil # : x  Auto Neutrophil % : x  Auto Lymphocyte % : x  Auto Monocyte % : x  Auto Eosinophil % : x  Auto Basophil % : x      10-03    140  |  96  |  99<H>  ----------------------------<  265<H>  3.7   |  42<H>  |  2.12<H>    Ca    8.9      03 Oct 2019 07:41  Phos  4.0     10-03  Mg     3.0     10-03      CAPILLARY BLOOD GLUCOSE    POCT Blood Glucose.: 214 mg/dL (03 Oct 2019 21:05)  POCT Blood Glucose.: 304 mg/dL (03 Oct 2019 16:44)  POCT Blood Glucose.: 333 mg/dL (03 Oct 2019 11:34)  POCT Blood Glucose.: 270 mg/dL (03 Oct 2019 08:04)  POCT Blood Glucose.: 383 mg/dL (02 Oct 2019 21:20)    Creatinine Trend: 2.12<--, 2.78<--, 2.71<--, 3.23<--, 3.46<--, 3.22<--  I&O's Summary    02 Oct 2019 07:01  -  03 Oct 2019 07:00  --------------------------------------------------------  IN: 300 mL / OUT: 0 mL / NET: 300 mL      .Blood   @ 01:46   No growth at 5 days.  --  --          MEDICATIONS:    MEDICATIONS  (STANDING):  ALBUTerol    90 MICROgram(s) HFA Inhaler 2 Puff(s) Inhalation every 6 hours  ALBUTerol/ipratropium for Nebulization 3 milliLiter(s) Nebulizer every 6 hours  allopurinol 100 milliGRAM(s) Oral daily  aspirin  chewable 81 milliGRAM(s) Oral daily  atorvastatin 40 milliGRAM(s) Oral at bedtime  calcitriol   Capsule 0.25 MICROGram(s) Oral daily  carvedilol 6.25 milliGRAM(s) Oral every 12 hours  cefuroxime   Tablet 500 milliGRAM(s) Oral every 12 hours  cholecalciferol 1000 Unit(s) Oral daily  cyanocobalamin 1000 MICROGram(s) Oral daily  dextrose 5%. 1000 milliLiter(s) (50 mL/Hr) IV Continuous <Continuous>  dextrose 50% Injectable 12.5 Gram(s) IV Push once  dextrose 50% Injectable 25 Gram(s) IV Push once  dextrose 50% Injectable 25 Gram(s) IV Push once  docusate sodium 100 milliGRAM(s) Oral daily  ferrous    sulfate 325 milliGRAM(s) Oral two times a day  heparin  Injectable 5000 Unit(s) SubCutaneous every 8 hours  insulin lispro (HumaLOG) corrective regimen sliding scale   SubCutaneous Before meals and at bedtime  insulin NPH/regular 70/30 Injectable 30 Unit(s) SubCutaneous before breakfast  insulin NPH/regular 70/30 Injectable 20 Unit(s) SubCutaneous before dinner  isosorbide   dinitrate Tablet (ISORDIL) 10 milliGRAM(s) Oral three times a day  pantoprazole    Tablet 40 milliGRAM(s) Oral before breakfast  predniSONE   Tablet 40 milliGRAM(s) Oral daily  predniSONE   Tablet 40 milliGRAM(s) Oral once  senna 2 Tablet(s) Oral at bedtime  sevelamer carbonate 1600 milliGRAM(s) Oral three times a day with meals  spironolactone 50 milliGRAM(s) Oral daily      MEDICATIONS  (PRN):  dextrose 40% Gel 15 Gram(s) Oral once PRN Blood Glucose LESS THAN 70 milliGRAM(s)/deciLiter  glucagon  Injectable 1 milliGRAM(s) IntraMuscular once PRN Glucose <70 milliGRAM(s)/deciLiter      REVIEW OF SYSTEMS:                           ALL ROS DONE [ X   ]    CONSTITUTIONAL:  LETHARGIC [   ], FEVER [   ], UNRESPONSIVE [   ]  CVS:  CP  [   ], SOB, [   ], PALPITATIONS [   ], DIZZYNESS [   ]  RS: COUGH [   ], SPUTUM [   ]  GI: ABDOMINAL PAIN [   ], NAUSEA [   ], VOMITINGS [   ], DIARRHEA [   ], CONSTIPATION [   ]  :  DYSURIA [   ], NOCTURIA [   ], INCREASED FREQUENCY [   ], DRIBLING [   ],  SKELETAL: PAINFUL JOINTS [   ], SWOLLEN JOINTS [   ], NECK ACHE [   ], LOW BACK ACHE [   ],  SKIN : ULCERS [   ], RASH [   ], ITCHING [   ]  CNS: HEAD ACHE [   ], DOUBLE VISION [   ], BLURRED VISION [   ], AMS / CONFUSION [   ], SEIZURES [   ], WEAKNESS [   ],TINGLING / NUMBNESS [   ]    PHYSICAL EXAMINATION:  GENERAL APPEARANCE: NO DISTRESS  HEENT:  NO PALLOR, NO  JVD,  NO   NODES, NECK SUPPLE  CVS: S1 +, S2 +,   RS: AEEB,  OCCASIONAL  RALES +,   B/L RONCHI  ABD: SOFT, NT, NO, BS +  EXT: PE ++  SKIN: WARM,   SKELETAL:  ROM ACCEPTABLE  CNS:  AAO X  2  , NO  DEFICITS    RADIOLOGY :    < from: Xray Chest 1 View AP/PA (19 @ 17:09) >  IMPRESSION: Increased pulmonary vascular congestion noted. No gross   consolidation is seen. Pacemaker present.    < end of copied text >      ASSESSMENT :     Hypoxemia  GERD (gastroesophageal reflux disease)  CHF (congestive heart failure)  CAD (coronary artery disease)  AICD (automatic cardioverter/defibrillator) present  COPD (chronic obstructive pulmonary disease)  RA (rheumatoid arthritis)  DM (diabetes mellitus)  HTN (hypertension)  ICD (implantable cardioverter-defibrillator) in place  History of cholecystectomy      PLAN:  HPI:  53 y/o  Female from [facility] PMH of Obesity, CHF HFrEF 25 %, s/p AICD, HTN, B/l LE edema, DM, Gout, CAD, HLD, COPD  presented to ED with SOB. As per patient she had fever, chills, shortness of breath, dizzy, loss of appetite, weight loss, muscle aches on Tuesday and she was diagnosed with Flu. She was given antibiotics and treatment for flu. But she took medications for 2 days. She was saturating 77% on admission and gasping for breath. In ED she was on NRB and saturated 97%. She had sick contacts at her place diagnosed with Flu. Patient denies cough, sputum production, fevers/chills/nausea/vomiting. No diarrhea, abdominal pain.  [facility] FUll CODE (27 Sep 2019 19:52)    - DC PLAN IN AM BACK TO [facility]  - REACTIVE LEUCOCYTOSIS DUE TO STEROIDS, GLUCOSE INTOLERANCE DUE TO STEROIDS, UNCONTROLLED DM. INSULIN DOSE IS BEING TITRATED. CHANGE STEROIDS TO ORAL PREDNISONE 40 MG Q DAILY ( WILL TAPER AT [facility] ), CHANGE IV. ROCEPHIN TO ORAL CEFTIN 500 MG BID TO COMPLETE 1 WEEK OF THE COURSE. WILL MONITOR BMP , RENAL AND PULMONARY FUNCTION AS AN OUT PATIENT  - COPD EXACERBATION , VIRAL URTI, MYCOPLASMA PNEUMONIAE POSITIVE SWAB ON IV STEROIDS , IV ROCEPHIN, AZITHROMYCIN  - ACUTE ON CHRONIC CHF ON LASIX, ALDACTONE, O2 SUPPLEMENTS  - CHRONIC HYPOXIA AND HYPERCARBIA, OBESITY WITH OBSTRUCTIVE SLEEP APNOEA ON HOME O2  - ARF, CKD STAGE 4, ANEMIA OF CKD   - GI AND DVT PROPHYLAXIS  - DR. NATARAJAN .

## 2019-10-04 VITALS
HEART RATE: 68 BPM | OXYGEN SATURATION: 100 % | RESPIRATION RATE: 20 BRPM | DIASTOLIC BLOOD PRESSURE: 62 MMHG | SYSTOLIC BLOOD PRESSURE: 130 MMHG | TEMPERATURE: 98 F

## 2019-10-04 LAB — GLUCOSE BLDC GLUCOMTR-MCNC: 202 MG/DL — HIGH (ref 70–99)

## 2019-10-04 PROCEDURE — 83605 ASSAY OF LACTIC ACID: CPT

## 2019-10-04 PROCEDURE — 76770 US EXAM ABDO BACK WALL COMP: CPT

## 2019-10-04 PROCEDURE — 87486 CHLMYD PNEUM DNA AMP PROBE: CPT

## 2019-10-04 PROCEDURE — 84134 ASSAY OF PREALBUMIN: CPT

## 2019-10-04 PROCEDURE — 86706 HEP B SURFACE ANTIBODY: CPT

## 2019-10-04 PROCEDURE — 85610 PROTHROMBIN TIME: CPT

## 2019-10-04 PROCEDURE — 82962 GLUCOSE BLOOD TEST: CPT

## 2019-10-04 PROCEDURE — 86803 HEPATITIS C AB TEST: CPT

## 2019-10-04 PROCEDURE — 83550 IRON BINDING TEST: CPT

## 2019-10-04 PROCEDURE — 87449 NOS EACH ORGANISM AG IA: CPT

## 2019-10-04 PROCEDURE — 84560 ASSAY OF URINE/URIC ACID: CPT

## 2019-10-04 PROCEDURE — 84300 ASSAY OF URINE SODIUM: CPT

## 2019-10-04 PROCEDURE — 87899 AGENT NOS ASSAY W/OPTIC: CPT

## 2019-10-04 PROCEDURE — 82728 ASSAY OF FERRITIN: CPT

## 2019-10-04 PROCEDURE — 83735 ASSAY OF MAGNESIUM: CPT

## 2019-10-04 PROCEDURE — 82570 ASSAY OF URINE CREATININE: CPT

## 2019-10-04 PROCEDURE — 84540 ASSAY OF URINE/UREA-N: CPT

## 2019-10-04 PROCEDURE — 82746 ASSAY OF FOLIC ACID SERUM: CPT

## 2019-10-04 PROCEDURE — 97162 PT EVAL MOD COMPLEX 30 MIN: CPT

## 2019-10-04 PROCEDURE — 82553 CREATINE MB FRACTION: CPT

## 2019-10-04 PROCEDURE — 85027 COMPLETE CBC AUTOMATED: CPT

## 2019-10-04 PROCEDURE — 87340 HEPATITIS B SURFACE AG IA: CPT

## 2019-10-04 PROCEDURE — 82009 KETONE BODYS QUAL: CPT

## 2019-10-04 PROCEDURE — 94640 AIRWAY INHALATION TREATMENT: CPT

## 2019-10-04 PROCEDURE — 93005 ELECTROCARDIOGRAM TRACING: CPT

## 2019-10-04 PROCEDURE — 84156 ASSAY OF PROTEIN URINE: CPT

## 2019-10-04 PROCEDURE — 82652 VIT D 1 25-DIHYDROXY: CPT

## 2019-10-04 PROCEDURE — 83970 ASSAY OF PARATHORMONE: CPT

## 2019-10-04 PROCEDURE — 82310 ASSAY OF CALCIUM: CPT

## 2019-10-04 PROCEDURE — 84443 ASSAY THYROID STIM HORMONE: CPT

## 2019-10-04 PROCEDURE — 83880 ASSAY OF NATRIURETIC PEPTIDE: CPT

## 2019-10-04 PROCEDURE — 94660 CPAP INITIATION&MGMT: CPT

## 2019-10-04 PROCEDURE — 80053 COMPREHEN METABOLIC PANEL: CPT

## 2019-10-04 PROCEDURE — 84100 ASSAY OF PHOSPHORUS: CPT

## 2019-10-04 PROCEDURE — 83930 ASSAY OF BLOOD OSMOLALITY: CPT

## 2019-10-04 PROCEDURE — 82550 ASSAY OF CK (CPK): CPT

## 2019-10-04 PROCEDURE — 71045 X-RAY EXAM CHEST 1 VIEW: CPT

## 2019-10-04 PROCEDURE — 99291 CRITICAL CARE FIRST HOUR: CPT | Mod: 25

## 2019-10-04 PROCEDURE — 87798 DETECT AGENT NOS DNA AMP: CPT

## 2019-10-04 PROCEDURE — 87040 BLOOD CULTURE FOR BACTERIA: CPT

## 2019-10-04 PROCEDURE — 84145 PROCALCITONIN (PCT): CPT

## 2019-10-04 PROCEDURE — 85730 THROMBOPLASTIN TIME PARTIAL: CPT

## 2019-10-04 PROCEDURE — 87633 RESP VIRUS 12-25 TARGETS: CPT

## 2019-10-04 PROCEDURE — 93306 TTE W/DOPPLER COMPLETE: CPT

## 2019-10-04 PROCEDURE — 85652 RBC SED RATE AUTOMATED: CPT

## 2019-10-04 PROCEDURE — 83540 ASSAY OF IRON: CPT

## 2019-10-04 PROCEDURE — 83935 ASSAY OF URINE OSMOLALITY: CPT

## 2019-10-04 PROCEDURE — 87581 M.PNEUMON DNA AMP PROBE: CPT

## 2019-10-04 PROCEDURE — 81001 URINALYSIS AUTO W/SCOPE: CPT

## 2019-10-04 PROCEDURE — 84484 ASSAY OF TROPONIN QUANT: CPT

## 2019-10-04 PROCEDURE — 84466 ASSAY OF TRANSFERRIN: CPT

## 2019-10-04 PROCEDURE — 86704 HEP B CORE ANTIBODY TOTAL: CPT

## 2019-10-04 PROCEDURE — 80074 ACUTE HEPATITIS PANEL: CPT

## 2019-10-04 PROCEDURE — 82803 BLOOD GASES ANY COMBINATION: CPT

## 2019-10-04 PROCEDURE — 82607 VITAMIN B-12: CPT

## 2019-10-04 PROCEDURE — 80048 BASIC METABOLIC PNL TOTAL CA: CPT

## 2019-10-04 PROCEDURE — 94760 N-INVAS EAR/PLS OXIMETRY 1: CPT

## 2019-10-04 PROCEDURE — 36415 COLL VENOUS BLD VENIPUNCTURE: CPT

## 2019-10-04 PROCEDURE — 83036 HEMOGLOBIN GLYCOSYLATED A1C: CPT

## 2019-10-04 RX ADMIN — Medication 100 MILLIGRAM(S): at 10:07

## 2019-10-04 RX ADMIN — PANTOPRAZOLE SODIUM 40 MILLIGRAM(S): 20 TABLET, DELAYED RELEASE ORAL at 06:12

## 2019-10-04 RX ADMIN — PREGABALIN 1000 MICROGRAM(S): 225 CAPSULE ORAL at 10:07

## 2019-10-04 RX ADMIN — Medication 40 MILLIGRAM(S): at 10:07

## 2019-10-04 RX ADMIN — Medication 30 UNIT(S): at 08:44

## 2019-10-04 RX ADMIN — CALCITRIOL 0.25 MICROGRAM(S): 0.5 CAPSULE ORAL at 10:07

## 2019-10-04 RX ADMIN — ISOSORBIDE DINITRATE 10 MILLIGRAM(S): 5 TABLET ORAL at 06:13

## 2019-10-04 RX ADMIN — Medication 4: at 08:42

## 2019-10-04 RX ADMIN — SEVELAMER CARBONATE 1600 MILLIGRAM(S): 2400 POWDER, FOR SUSPENSION ORAL at 08:48

## 2019-10-04 RX ADMIN — HEPARIN SODIUM 5000 UNIT(S): 5000 INJECTION INTRAVENOUS; SUBCUTANEOUS at 06:12

## 2019-10-04 RX ADMIN — Medication 3 MILLILITER(S): at 09:13

## 2019-10-04 RX ADMIN — Medication 81 MILLIGRAM(S): at 10:07

## 2019-10-04 RX ADMIN — Medication 325 MILLIGRAM(S): at 06:12

## 2019-10-04 RX ADMIN — SPIRONOLACTONE 50 MILLIGRAM(S): 25 TABLET, FILM COATED ORAL at 06:12

## 2019-10-04 RX ADMIN — Medication 500 MILLIGRAM(S): at 06:12

## 2019-10-04 RX ADMIN — Medication 40 MILLIGRAM(S): at 06:13

## 2019-10-04 RX ADMIN — CARVEDILOL PHOSPHATE 6.25 MILLIGRAM(S): 80 CAPSULE, EXTENDED RELEASE ORAL at 06:13

## 2019-10-04 RX ADMIN — Medication 3 MILLILITER(S): at 03:50

## 2019-10-04 RX ADMIN — Medication 100 MILLIGRAM(S): at 10:06

## 2019-10-04 RX ADMIN — Medication 1000 UNIT(S): at 10:07

## 2019-10-04 NOTE — PROGRESS NOTE ADULT - SUBJECTIVE AND OBJECTIVE BOX
pt seen and examined, no complaints, ROS - .          ALBUTerol    90 MICROgram(s) HFA Inhaler 2 Puff(s) Inhalation every 6 hours  ALBUTerol/ipratropium for Nebulization 3 milliLiter(s) Nebulizer every 6 hours  allopurinol 100 milliGRAM(s) Oral daily  aspirin  chewable 81 milliGRAM(s) Oral daily  atorvastatin 40 milliGRAM(s) Oral at bedtime  calcitriol   Capsule 0.25 MICROGram(s) Oral daily  carvedilol 6.25 milliGRAM(s) Oral every 12 hours  cefuroxime   Tablet 500 milliGRAM(s) Oral every 12 hours  cholecalciferol 1000 Unit(s) Oral daily  cyanocobalamin 1000 MICROGram(s) Oral daily  dextrose 40% Gel 15 Gram(s) Oral once PRN  dextrose 5%. 1000 milliLiter(s) IV Continuous <Continuous>  dextrose 50% Injectable 12.5 Gram(s) IV Push once  dextrose 50% Injectable 25 Gram(s) IV Push once  dextrose 50% Injectable 25 Gram(s) IV Push once  docusate sodium 100 milliGRAM(s) Oral daily  ferrous    sulfate 325 milliGRAM(s) Oral two times a day  glucagon  Injectable 1 milliGRAM(s) IntraMuscular once PRN  heparin  Injectable 5000 Unit(s) SubCutaneous every 8 hours  insulin lispro (HumaLOG) corrective regimen sliding scale   SubCutaneous Before meals and at bedtime  insulin NPH/regular 70/30 Injectable 30 Unit(s) SubCutaneous before breakfast  insulin NPH/regular 70/30 Injectable 20 Unit(s) SubCutaneous before dinner  isosorbide   dinitrate Tablet (ISORDIL) 10 milliGRAM(s) Oral three times a day  pantoprazole    Tablet 40 milliGRAM(s) Oral before breakfast  predniSONE   Tablet 40 milliGRAM(s) Oral daily  predniSONE   Tablet 40 milliGRAM(s) Oral once  senna 2 Tablet(s) Oral at bedtime  sevelamer carbonate 1600 milliGRAM(s) Oral three times a day with meals  spironolactone 50 milliGRAM(s) Oral daily                            10.6   14.70 )-----------( 340      ( 03 Oct 2019 07:41 )             35.5       Hemoglobin: 10.6 g/dL (10-03 @ 07:41)  Hemoglobin: 10.7 g/dL (10-02 @ 07:18)  Hemoglobin: 11.1 g/dL (10-01 @ 07:20)  Hemoglobin: 10.4 g/dL (09-30 @ 07:33)  Hemoglobin: 9.9 g/dL (09-29 @ 08:05)      10-03    140  |  96  |  99<H>  ----------------------------<  265<H>  3.7   |  42<H>  |  2.12<H>    Ca    8.9      03 Oct 2019 07:41  Phos  4.0     10-03  Mg     3.0     10-03      Creatinine Trend: 2.12<--, 2.78<--, 2.71<--, 3.23<--, 3.46<--, 3.22<--    COAGS:           T(C): 36.4 (10-04-19 @ 00:08), Max: 36.8 (10-03-19 @ 11:20)  HR: 75 (10-04-19 @ 00:14) (60 - 75)  BP: 135/64 (10-04-19 @ 00:08) (122/66 - 135/64)  RR: 18 (10-04-19 @ 00:08) (18 - 18)  SpO2: 96% (10-04-19 @ 00:14) (92% - 100%)  Wt(kg): --    I&O's Summary    02 Oct 2019 07:01  -  03 Oct 2019 07:00  --------------------------------------------------------  IN: 300 mL / OUT: 0 mL / NET: 300 mL      Gen: Appears well in NAD  HEENT:  (-)icterus (-)pallor  CV: N S1 S2 1/6 RAYMOND (+)2 Pulses B/l  Resp:  Clear to ausculatation B/L, normal effort  GI: (+) BS Soft, NT, ND  Lymph:  (-)Edema, (-)obvious lymphadenopathy  Skin: Warm to touch, Normal turgor  Psych: Appropriate mood and affect    TELE: off    ASSESSMENT/PLAN:  55 y/o  Female from [place] PMH of Obesity, CHF NICM (cathed 11/18)  HFrEF 25 %, s/p ICD, HTN, B/l LE edema, DM, Gout, CAD, HLD, COPD  presented to ED with SOB/ CHF found with M. Pneumonia     - Echo with no significant interval changes  -  Tolerating PO lasix/ aldactone , creatine stable   - cont Coreg , isordil   - would start some afterload reduction with hydralizine 10 mg q 8 if BP allows   - No need for further inpatient cardiac work up.  * medical management for CM .

## 2019-10-04 NOTE — PROGRESS NOTE ADULT - REASON FOR ADMISSION
shortness of breath

## 2019-10-04 NOTE — PROGRESS NOTE ADULT - PROVIDER SPECIALTY LIST ADULT
Cardiology
Internal Medicine
Nephrology
Internal Medicine
Internal Medicine
Nephrology

## 2019-10-04 NOTE — PROGRESS NOTE ADULT - ATTENDING COMMENTS
Agree with above  No further inpatient cardiac workup needed at this time.   Continue with medical therapy of known NICM  Continue with coreg, spironolactone  Continue with isordil; can add hydralazine when BP allows    Camilo Polk MD
Agree with above assessment and plan as outlined above.    - STRICT I+O's  - keep net negative  - f/u echo    Manuel Gold MD, [facility]  BEEPER [phone]
Patient seen and examined.  Agree with above.   -Admitted with dyspnea secondary to M. Pneumonia, being seen for management of cardiomyopathy  -TTE with no interval changes  -No further cardiac workup needed at this time  -Recommend medical management of known cardiomyopathy - continue with coreg, isordil  -Can add hydralazine 10mg PO tid when BP allows    Camilo Polk MD
PATIENT WAS SEEN AND EXAMINED  DC PLAN IN AM BACK TO [facility]  - REACTIVE LEUCOCYTOSIS DUE TO STEROIDS, GLUCOSE INTOLERANCE DUE TO STEROIDS, UNCONTROLLED DM. INSULIN DOSE IS BEING TITRATED. CHANGE STEROIDS TO ORAL PREDNISONE 40 MG Q DAILY ( WILL TAPER AT [facility] ), CHANGE IV. ROCEPHIN TO ORAL CEFTIN 500 MG BID TO COMPLETE 1 WEEK OF THE COURSE. WILL MONITOR BMP , RENAL AND PULMONARY FUNCTION AS AN OUT PATIENT  - COPD EXACERBATION , VIRAL URTI, MYCOPLASMA PNEUMONIAE POSITIVE SWAB ON IV STEROIDS , IV ROCEPHIN, AZITHROMYCIN  - ACUTE ON CHRONIC CHF ON LASIX, ALDACTONE, O2 SUPPLEMENTS  - CHRONIC HYPOXIA AND HYPERCARBIA, OBESITY WITH OBSTRUCTIVE SLEEP APNOEA ON HOME O2  - ARF, CKD STAGE 4, ANEMIA OF CKD   - GI AND DVT PROPHYLAXIS  - DR. NATARAJAN

## 2020-01-08 NOTE — DIETITIAN INITIAL EVALUATION ADULT. - NUTRITIONGOAL OUTCOME1
Recent PHQ 2/9 Score    PHQ 2:  Date Adult PHQ 2 Score   1/8/2020 2       PHQ 9:  Date Adult PHQ 9 Score   1/8/2020 5      To meet Nutrient needs

## 2020-01-30 ENCOUNTER — APPOINTMENT (OUTPATIENT)
Dept: SURGERY | Facility: CLINIC | Age: 55
End: 2020-01-30
Payer: MEDICARE

## 2020-01-30 VITALS
OXYGEN SATURATION: 89 % | SYSTOLIC BLOOD PRESSURE: 184 MMHG | BODY MASS INDEX: 48.82 KG/M2 | WEIGHT: 293 LBS | HEIGHT: 65 IN | TEMPERATURE: 98.5 F | HEART RATE: 100 BPM | DIASTOLIC BLOOD PRESSURE: 74 MMHG

## 2020-01-30 DIAGNOSIS — Z80.0 FAMILY HISTORY OF MALIGNANT NEOPLASM OF DIGESTIVE ORGANS: ICD-10-CM

## 2020-01-30 DIAGNOSIS — Z78.9 OTHER SPECIFIED HEALTH STATUS: ICD-10-CM

## 2020-01-30 PROCEDURE — 99203 OFFICE O/P NEW LOW 30 MIN: CPT

## 2020-01-30 RX ORDER — CHOLECALCIFEROL (VITAMIN D3) 25 MCG
TABLET ORAL
Refills: 0 | Status: ACTIVE | COMMUNITY

## 2020-01-30 RX ORDER — ASPIRIN 325 MG/1
TABLET, FILM COATED ORAL
Refills: 0 | Status: ACTIVE | COMMUNITY

## 2020-01-30 RX ORDER — PANTOPRAZOLE SODIUM 40 MG/1
40 GRANULE, DELAYED RELEASE ORAL
Refills: 0 | Status: ACTIVE | COMMUNITY

## 2020-01-30 RX ORDER — ATORVASTATIN CALCIUM 80 MG/1
TABLET, FILM COATED ORAL
Refills: 0 | Status: ACTIVE | COMMUNITY

## 2020-01-30 RX ORDER — DOCUSATE SODIUM 100 MG/1
CAPSULE ORAL
Refills: 0 | Status: ACTIVE | COMMUNITY

## 2020-01-30 RX ORDER — HUMAN INSULIN 100 [IU]/ML
INJECTION, SOLUTION SUBCUTANEOUS
Refills: 0 | Status: ACTIVE | COMMUNITY

## 2020-01-30 RX ORDER — ALBUTEROL 90 MCG
90 AEROSOL (GRAM) INHALATION
Refills: 0 | Status: ACTIVE | COMMUNITY

## 2020-01-30 RX ORDER — ALLOPURINOL 200 MG/1
TABLET ORAL
Refills: 0 | Status: ACTIVE | COMMUNITY

## 2020-01-30 RX ORDER — CARVEDILOL 25 MG/1
TABLET, FILM COATED ORAL
Refills: 0 | Status: ACTIVE | COMMUNITY

## 2020-01-30 RX ORDER — FUROSEMIDE 80 MG/1
80 TABLET ORAL
Refills: 0 | Status: ACTIVE | COMMUNITY

## 2020-01-30 RX ORDER — SPIRONOLACTONE 50 MG/1
TABLET, FILM COATED ORAL
Refills: 0 | Status: ACTIVE | COMMUNITY

## 2020-01-30 RX ORDER — MULTIVITAMIN
TABLET ORAL
Refills: 0 | Status: ACTIVE | COMMUNITY

## 2020-01-30 RX ORDER — SENNOSIDES 8.6 MG TABLETS 8.6 MG/1
TABLET ORAL
Refills: 0 | Status: ACTIVE | COMMUNITY

## 2020-01-30 RX ORDER — CHOLECALCIFEROL (VITAMIN D3) 125 MCG
TABLET ORAL
Refills: 0 | Status: ACTIVE | COMMUNITY

## 2020-01-30 RX ORDER — METOLAZONE 5 MG
TABLET ORAL
Refills: 0 | Status: ACTIVE | COMMUNITY

## 2020-01-30 RX ORDER — LOSARTAN POTASSIUM 100 MG/1
TABLET, FILM COATED ORAL
Refills: 0 | Status: ACTIVE | COMMUNITY

## 2020-01-30 NOTE — PLAN
[FreeTextEntry1] : Ms. MCDONNELL  is presenting  today for an evaluation .  she is doing well and offers no complaints.  Results of  her recent  imaging and physical examination findings were discussed in details.   She  was advised to have           Left         breast US guided biopsy and return after the tests. Importance of monthly self-breast examination was reinforced.  patient was also found to have incarcerated incisional hernia.          Ms. MCDONNELL  was told significance of findings, options, risks and benefits were explained.   All surgical options were discussed including non-surgical treatments.   Patient advised to seek immediate medical attention with any acute change in symptoms or with the development of any new or worsening symptoms.  Patient's questions and concerns addressed to patient's satisfaction, and patient verbalized an understanding of the information discussed.\par \par

## 2020-01-30 NOTE — PHYSICAL EXAM
[Alert] : alert [Oriented to Person] : oriented to person [Oriented to Place] : oriented to place [Calm] : calm [Oriented to Time] : oriented to time [de-identified] : She  is alert, well-groomed,  obese  [de-identified] :   anicteric.  Nasal mucosa pink, septum midline. Oral mucosa pink.  Tongue midline, Pharynx without exudates.\par   [de-identified] : No chest deformity. Breast are symmetric, Normal contours. No nodules, masses, tenderness, or axillary or supraclavicular  adenopathy. No nipple discharge. no skin retraction \par   [de-identified] :  Neck supple. Trachea midline. Thyroid isthmus barely palpable, lobes not felt.\par   [de-identified] : large incarcerated incisional hernia. open cholecystectomy scar

## 2020-01-30 NOTE — HISTORY OF PRESENT ILLNESS
[de-identified] : Patient is a 54 year  year-old female  who was referred by Dr. Jenn Boles with the chief complaint of having a Left breast mass.  She  denies any trauma and She has no nipple discharge. She  denies any fever, night sweats or loss of appetite.    There is no family history of breast carcinoma.   Menarche at age 6 -1 para-1 and her last menstrual period was last year. she states she sees blood on her underwear from time to time. Patient is on no hormonal replacement therapy.   Patient  had a BL breast US and  mammogram on 2020 that was deemed a BIRADS 4  .  \par

## 2020-01-30 NOTE — CONSULT LETTER
[Dear  ___] : Dear  [unfilled], [Consult Letter:] : I had the pleasure of evaluating your patient, [unfilled]. [Consult Closing:] : Thank you very much for allowing me to participate in the care of this patient.  If you have any questions, please do not hesitate to contact me. [Please see my note below.] : Please see my note below. [Sincerely,] : Sincerely, [FreeTextEntry3] : Quang Sauceda MD, FACS

## 2020-02-25 ENCOUNTER — RESULT REVIEW (OUTPATIENT)
Age: 55
End: 2020-02-25

## 2020-04-02 ENCOUNTER — INPATIENT (INPATIENT)
Facility: HOSPITAL | Age: 55
LOS: 7 days | Discharge: NURSING, REHAB OR LONG-TERM CARE | DRG: 871 | End: 2020-04-10
Attending: INTERNAL MEDICINE | Admitting: INTERNAL MEDICINE
Payer: MEDICARE

## 2020-04-02 VITALS
RESPIRATION RATE: 18 BRPM | DIASTOLIC BLOOD PRESSURE: 69 MMHG | WEIGHT: 289.91 LBS | HEART RATE: 90 BPM | HEIGHT: 65 IN | TEMPERATURE: 103 F | SYSTOLIC BLOOD PRESSURE: 126 MMHG | OXYGEN SATURATION: 97 %

## 2020-04-02 DIAGNOSIS — Z98.89 OTHER SPECIFIED POSTPROCEDURAL STATES: Chronic | ICD-10-CM

## 2020-04-02 DIAGNOSIS — Z95.810 PRESENCE OF AUTOMATIC (IMPLANTABLE) CARDIAC DEFIBRILLATOR: Chronic | ICD-10-CM

## 2020-04-02 DIAGNOSIS — B34.2 CORONAVIRUS INFECTION, UNSPECIFIED: ICD-10-CM

## 2020-04-02 LAB
ALBUMIN SERPL ELPH-MCNC: 3 G/DL — LOW (ref 3.5–5)
ALP SERPL-CCNC: 52 U/L — SIGNIFICANT CHANGE UP (ref 40–120)
ALT FLD-CCNC: 14 U/L DA — SIGNIFICANT CHANGE UP (ref 10–60)
ANION GAP SERPL CALC-SCNC: 8 MMOL/L — SIGNIFICANT CHANGE UP (ref 5–17)
AST SERPL-CCNC: 21 U/L — SIGNIFICANT CHANGE UP (ref 10–40)
BASE EXCESS BLDV CALC-SCNC: -3.2 MMOL/L — LOW (ref -2–2)
BASOPHILS # BLD AUTO: 0.01 K/UL — SIGNIFICANT CHANGE UP (ref 0–0.2)
BASOPHILS NFR BLD AUTO: 0.1 % — SIGNIFICANT CHANGE UP (ref 0–2)
BILIRUB SERPL-MCNC: 0.2 MG/DL — SIGNIFICANT CHANGE UP (ref 0.2–1.2)
BUN SERPL-MCNC: 79 MG/DL — HIGH (ref 7–18)
CALCIUM SERPL-MCNC: 8.6 MG/DL — SIGNIFICANT CHANGE UP (ref 8.4–10.5)
CHLORIDE SERPL-SCNC: 108 MMOL/L — SIGNIFICANT CHANGE UP (ref 96–108)
CO2 SERPL-SCNC: 24 MMOL/L — SIGNIFICANT CHANGE UP (ref 22–31)
CREAT SERPL-MCNC: 3.77 MG/DL — HIGH (ref 0.5–1.3)
EOSINOPHIL # BLD AUTO: 0 K/UL — SIGNIFICANT CHANGE UP (ref 0–0.5)
EOSINOPHIL NFR BLD AUTO: 0 % — SIGNIFICANT CHANGE UP (ref 0–6)
GLUCOSE SERPL-MCNC: 104 MG/DL — HIGH (ref 70–99)
HCO3 BLDV-SCNC: 24 MMOL/L — SIGNIFICANT CHANGE UP (ref 21–29)
HCT VFR BLD CALC: 30.6 % — LOW (ref 34.5–45)
HGB BLD-MCNC: 9.6 G/DL — LOW (ref 11.5–15.5)
HOROWITZ INDEX BLDV+IHG-RTO: 21 — SIGNIFICANT CHANGE UP
IMM GRANULOCYTES NFR BLD AUTO: 0.9 % — SIGNIFICANT CHANGE UP (ref 0–1.5)
LYMPHOCYTES # BLD AUTO: 1.41 K/UL — SIGNIFICANT CHANGE UP (ref 1–3.3)
LYMPHOCYTES # BLD AUTO: 20.4 % — SIGNIFICANT CHANGE UP (ref 13–44)
MCHC RBC-ENTMCNC: 30.4 PG — SIGNIFICANT CHANGE UP (ref 27–34)
MCHC RBC-ENTMCNC: 31.4 GM/DL — LOW (ref 32–36)
MCV RBC AUTO: 96.8 FL — SIGNIFICANT CHANGE UP (ref 80–100)
MONOCYTES # BLD AUTO: 0.72 K/UL — SIGNIFICANT CHANGE UP (ref 0–0.9)
MONOCYTES NFR BLD AUTO: 10.4 % — SIGNIFICANT CHANGE UP (ref 2–14)
NEUTROPHILS # BLD AUTO: 4.72 K/UL — SIGNIFICANT CHANGE UP (ref 1.8–7.4)
NEUTROPHILS NFR BLD AUTO: 68.2 % — SIGNIFICANT CHANGE UP (ref 43–77)
NRBC # BLD: 0 /100 WBCS — SIGNIFICANT CHANGE UP (ref 0–0)
PCO2 BLDV: 50 MMHG — SIGNIFICANT CHANGE UP (ref 35–50)
PH BLDV: 7.3 — LOW (ref 7.35–7.45)
PLATELET # BLD AUTO: 207 K/UL — SIGNIFICANT CHANGE UP (ref 150–400)
PO2 BLDV: 39 MMHG — SIGNIFICANT CHANGE UP (ref 25–45)
POTASSIUM SERPL-MCNC: 5 MMOL/L — SIGNIFICANT CHANGE UP (ref 3.5–5.3)
POTASSIUM SERPL-SCNC: 5 MMOL/L — SIGNIFICANT CHANGE UP (ref 3.5–5.3)
PROT SERPL-MCNC: 6.8 G/DL — SIGNIFICANT CHANGE UP (ref 6–8.3)
RBC # BLD: 3.16 M/UL — LOW (ref 3.8–5.2)
RBC # FLD: 14.5 % — SIGNIFICANT CHANGE UP (ref 10.3–14.5)
SAO2 % BLDV: 63 % — LOW (ref 67–88)
SODIUM SERPL-SCNC: 140 MMOL/L — SIGNIFICANT CHANGE UP (ref 135–145)
WBC # BLD: 6.92 K/UL — SIGNIFICANT CHANGE UP (ref 3.8–10.5)
WBC # FLD AUTO: 6.92 K/UL — SIGNIFICANT CHANGE UP (ref 3.8–10.5)

## 2020-04-02 PROCEDURE — 71045 X-RAY EXAM CHEST 1 VIEW: CPT | Mod: 26

## 2020-04-02 PROCEDURE — 99285 EMERGENCY DEPT VISIT HI MDM: CPT | Mod: CS

## 2020-04-02 RX ORDER — ACETAMINOPHEN 500 MG
975 TABLET ORAL ONCE
Refills: 0 | Status: COMPLETED | OUTPATIENT
Start: 2020-04-02 | End: 2020-04-02

## 2020-04-02 RX ORDER — HEPARIN SODIUM 5000 [USP'U]/ML
5000 INJECTION INTRAVENOUS; SUBCUTANEOUS EVERY 12 HOURS
Refills: 0 | Status: DISCONTINUED | OUTPATIENT
Start: 2020-04-02 | End: 2020-04-10

## 2020-04-02 RX ADMIN — Medication 975 MILLIGRAM(S): at 17:45

## 2020-04-02 NOTE — ED ADULT NURSE NOTE - NSIMPLEMENTINTERV_GEN_ALL_ED
Implemented All Universal Safety Interventions:  [place] to call system. Call bell, personal items and telephone within reach. Instruct patient to call for assistance. Room bathroom lighting operational. Non-slip footwear when patient is off stretcher. Physically safe environment: no spills, clutter or unnecessary equipment. Stretcher in lowest position, wheels locked, appropriate side rails in place.

## 2020-04-02 NOTE — ED PROVIDER NOTE - OBJECTIVE STATEMENT
55 y.o with a PMhx of HTN, HLD, CAD, CHF, COPD, GERD, RA presents to the ED presented to the ED complaining of having fever and chills along with bodyaches over the past few days. Pt states that she had a xray earlier today which she had bilateral infiltrates. Pt states that she has been having a non-productive cough. Pt admits to having associated SOB however denies having any nausea, vomiting, diarrhea, CP, headaches, dizziness, nausea, vomiting, diarrhea.

## 2020-04-02 NOTE — H&P ADULT - NSHPLABSRESULTS_GEN_ALL_CORE
9.6    6.92  )-----------( 207      ( 02 Apr 2020 17:51 )             30.6       04-02    140  |  108  |  79<H>  ----------------------------<  104<H>  5.0   |  24  |  3.77<H>    Ca    8.6      02 Apr 2020 17:51    TPro  6.8  /  Alb  3.0<L>  /  TBili  0.2  /  DBili  x   /  AST  21  /  ALT  14  /  AlkPhos  52  04-02

## 2020-04-02 NOTE — H&P ADULT - NSHPSOCIALHISTORY_GEN_ALL_CORE
Pt lives at [facility]. Pt is a former smoker. She used to smoke cigarettes during teenager. Denies drinking alcohol.

## 2020-04-02 NOTE — ED PROVIDER NOTE - CLINICAL SUMMARY MEDICAL DECISION MAKING FREE TEXT BOX
55 y.o with a PMhx of HTN, HLD, CAD, CHF, COPD, GERD, RA presents to the ED presented to the ED complaining of having fever and chills along with bodyaches over the past few days. URI-likely Covid-chest xray, labs, ekg, admit for workup

## 2020-04-02 NOTE — H&P ADULT - PROBLEM SELECTOR PLAN 3
Cr/ BUN: 3.77/79  Likely ANJU on CKD  Avoid nephrotoxic medications   Monitor BMP  F/u urine electrolytes

## 2020-04-02 NOTE — H&P ADULT - PROBLEM SELECTOR PLAN 1
Pt saturating 98% on nasal canula  Likely secondary to viral pneumonia  F/u D-dimer  Continue oxygen supplementation as needed

## 2020-04-02 NOTE — H&P ADULT - HISTORY OF PRESENT ILLNESS
Pt is a 56 y/o F  from [facility] with PMH of Obesity, CHF HFrEF 25 %, s/p AICD, HTN, B/l LE edema, DM, Gout, CAD, HLD, COPD  who presented with cough since 5 days , shortness of breath since 2days and fever since 1 day. According to the pt, since 5 days she has developed dry cough. Since last two days she has developed mild shortness of breath which is worse on exertion. She has some chest tightness and fever with chills since today. She denied recent travel, sick contact, nausea, vomiting, abdominal pain and other complains.

## 2020-04-02 NOTE — H&P ADULT - PROBLEM SELECTOR PLAN 2
Chest x-ray showed right sided infiltrate  Suspected COVID-19  F/u COVID-19 pcr  Isolation precaution  Continue ceftriaxone, azithromycin  F/u Thiamin, vitamin c  F/u blood culture  F/u D-dimer, CRP, LDH, ferritin, creatine kinase

## 2020-04-02 NOTE — H&P ADULT - ASSESSMENT
Pt is a 54 y/o F  from [facility] with PMH of Obesity, CHF HFrEF 25 %, s/p AICD, HTN, B/l LE edema, DM, Gout, CAD, HLD, COPD  who presented with cough since 5 days , shortness of breath since 2days and fever since 1 day.  Pt is admitted for respiratory failure secondary to viral pneumonia. Suspected COVID-19.

## 2020-04-02 NOTE — H&P ADULT - PROBLEM SELECTOR PLAN 5
No signs of fluid overload  Continue Metolazone, spironolactone , carvedilol  Strict is and os   Daily weight

## 2020-04-02 NOTE — ED PROVIDER NOTE - CADM POA URETHRAL CATHETER
Patient remains on Rebif and tolerating well  He continues to recover from his fall in Sept 2018  He finished PT and OT  He has not been as consistent with his home exercise program   He continues to have some issues with going up the stairs  Our  assisted him with paperwork for a stair glide  He denies any new symptoms at this time  Labs were done in Oct 2018 with normal CBC and LFTs  MRI brain from June 2018 was stable  Exam is stable today  Discussed he should maintain a regular exercise program   Will see him back in 4 months or sooner if needed 
Remains stable on keppra 500mg BID, no reported breakthrough seizures
No

## 2020-04-02 NOTE — H&P ADULT - NEGATIVE OPHTHALMOLOGIC SYMPTOMS
no lacrimation L/no lacrimation R/no blurred vision L/no blurred vision R/no diplopia/no photophobia

## 2020-04-02 NOTE — H&P ADULT - PROBLEM SELECTOR PLAN 6
Pt takes Novolin 70/30 34 U at am and 20 U at pm  Continue sliding scale Humalog  Continue Lantus 10 U BID for now  Monitor blood Glucose  F/u HBA1C

## 2020-04-02 NOTE — H&P ADULT - ATTENDING COMMENTS
PATIENT IS A 56YO F FROM [facility] WITH PMHX SIGNIFICANT FOR AMPARO/OHS ON CPAP, HFREF [25%], PVD, DM, CAD, HLD, COPD AND GOUT WHO WAS TRANSFERRED FOR EVALUATION OF COUGH/DYSPNEA/FEVER.    # ACUTE HYPOXIC RESPIRATORY FAILURE S/T COVID19 PNEUMONIA AND SECONDARY BACTERIAL PNEUMONIA  # ANJU ON CKD4 - AVOID NEPHROTOXIC MEDICATION; NEPHROLOGY CONSULTED  # NORMOCYTIC ANEMIA - LIKELY AOCKD  # GI AND DVT PPX    LUANN NATARAJAN M.D. COVERING FOR DONTAE NATARAJAN M.D. PATIENT IS A 54YO F FROM [facility] WITH PMHX SIGNIFICANT FOR AMPARO/OHS ON CPAP, HFREF [25%], PVD, DM, CAD, HLD, COPD AND GOUT WHO WAS TRANSFERRED FOR EVALUATION OF COUGH/DYSPNEA/FEVER.    # ACUTE HYPOXIC RESPIRATORY FAILURE S/T COVID19 PNEUMONIA AND SECONDARY BACTERIAL PNEUMONIA - NOTED PROLONGED QTC, WILL HOLD PLAQUENIL  # ANJU ON CKD4 - AVOID NEPHROTOXIC MEDICATION; NEPHROLOGY CONSULTED  # NORMOCYTIC ANEMIA - LIKELY AOCKD  # GI AND DVT PPX    LUANN NATARAJAN M.D. COVERING FOR DONTAE NATARAJAN M.D. PATIENT IS A 56YO F FROM [facility] WITH PMHX SIGNIFICANT FOR AMPARO/OHS ON CPAP, HFREF [25%], PVD, DM, CAD, HLD, COPD AND GOUT WHO WAS TRANSFERRED FOR EVALUATION OF COUGH/DYSPNEA/FEVER.    # ACUTE HYPOXIC RESPIRATORY FAILURE S/T COVID19 PNEUMONIA, SEPSIS AND SUSPECTED SECONDARY BACTERIAL PNEUMONIA - NOTED PROLONGED QTC, WILL HOLD PLAQUENIL  # MORBID OBESITY, RESTRICTIVE LUNG DISEASE, OBSTRUCTIVE SLEEP APNEA ON CPAP  # SYSTOLIC CHF S/P AICD, ON CHRONIC O2 SUPPLEMENT  # ANJU ON CKD4 - AVOID NEPHROTOXIC MEDICATION; NEPHROLOGY CONSULTED  # NORMOCYTIC ANEMIA - SUSPECT ANEMIA OF CKD  # GI AND DVT PPX    LUANN NATARAJAN M.D. COVERING FOR DONTAE NATARAJAN M.D.

## 2020-04-02 NOTE — H&P ADULT - NEUROLOGICAL DETAILS
responds to verbal commands/deep reflexes intact/normal strength/alert and oriented x 3/sensation intact

## 2020-04-03 DIAGNOSIS — N17.9 ACUTE KIDNEY FAILURE, UNSPECIFIED: ICD-10-CM

## 2020-04-03 DIAGNOSIS — Z86.79 PERSONAL HISTORY OF OTHER DISEASES OF THE CIRCULATORY SYSTEM: ICD-10-CM

## 2020-04-03 DIAGNOSIS — I10 ESSENTIAL (PRIMARY) HYPERTENSION: ICD-10-CM

## 2020-04-03 DIAGNOSIS — Z29.9 ENCOUNTER FOR PROPHYLACTIC MEASURES, UNSPECIFIED: ICD-10-CM

## 2020-04-03 DIAGNOSIS — J44.9 CHRONIC OBSTRUCTIVE PULMONARY DISEASE, UNSPECIFIED: ICD-10-CM

## 2020-04-03 DIAGNOSIS — J12.9 VIRAL PNEUMONIA, UNSPECIFIED: ICD-10-CM

## 2020-04-03 DIAGNOSIS — E11.9 TYPE 2 DIABETES MELLITUS WITHOUT COMPLICATIONS: ICD-10-CM

## 2020-04-03 DIAGNOSIS — J96.01 ACUTE RESPIRATORY FAILURE WITH HYPOXIA: ICD-10-CM

## 2020-04-03 DIAGNOSIS — I50.9 HEART FAILURE, UNSPECIFIED: ICD-10-CM

## 2020-04-03 LAB
4/8 RATIO: 3.21 RATIO — SIGNIFICANT CHANGE UP (ref 0.9–3.6)
ABS CD8: 146 /UL — SIGNIFICANT CHANGE UP (ref 142–740)
ALBUMIN SERPL ELPH-MCNC: 2.9 G/DL — LOW (ref 3.5–5)
ALP SERPL-CCNC: 53 U/L — SIGNIFICANT CHANGE UP (ref 40–120)
ALT FLD-CCNC: 14 U/L DA — SIGNIFICANT CHANGE UP (ref 10–60)
ANION GAP SERPL CALC-SCNC: 7 MMOL/L — SIGNIFICANT CHANGE UP (ref 5–17)
AST SERPL-CCNC: 20 U/L — SIGNIFICANT CHANGE UP (ref 10–40)
BILIRUB SERPL-MCNC: 0.2 MG/DL — SIGNIFICANT CHANGE UP (ref 0.2–1.2)
BUN SERPL-MCNC: 84 MG/DL — HIGH (ref 7–18)
CALCIUM SERPL-MCNC: 8.2 MG/DL — LOW (ref 8.4–10.5)
CD3 BLASTS SPEC-ACNC: 637 /UL — LOW (ref 672–1870)
CD3 BLASTS SPEC-ACNC: 66 % — SIGNIFICANT CHANGE UP (ref 59–83)
CD4 %: 48 % — SIGNIFICANT CHANGE UP (ref 30–62)
CD8 %: 15 % — SIGNIFICANT CHANGE UP (ref 12–36)
CHLORIDE SERPL-SCNC: 107 MMOL/L — SIGNIFICANT CHANGE UP (ref 96–108)
CHOLEST SERPL-MCNC: 209 MG/DL — HIGH (ref 10–199)
CO2 SERPL-SCNC: 24 MMOL/L — SIGNIFICANT CHANGE UP (ref 22–31)
CREAT SERPL-MCNC: 4.29 MG/DL — HIGH (ref 0.5–1.3)
CRP SERPL-MCNC: 5.74 MG/DL — HIGH (ref 0–0.4)
D DIMER BLD IA.RAPID-MCNC: 262 NG/ML DDU — HIGH
FERRITIN SERPL-MCNC: 291 NG/ML — HIGH (ref 15–150)
GLUCOSE BLDC GLUCOMTR-MCNC: 109 MG/DL — HIGH (ref 70–99)
GLUCOSE BLDC GLUCOMTR-MCNC: 155 MG/DL — HIGH (ref 70–99)
GLUCOSE SERPL-MCNC: 158 MG/DL — HIGH (ref 70–99)
HBA1C BLD-MCNC: 8.6 % — HIGH (ref 4–5.6)
HCT VFR BLD CALC: 32.2 % — LOW (ref 34.5–45)
HDLC SERPL-MCNC: 29 MG/DL — LOW
HGB BLD-MCNC: 9.6 G/DL — LOW (ref 11.5–15.5)
LDH SERPL L TO P-CCNC: 357 U/L — HIGH (ref 120–225)
LIPID PNL WITH DIRECT LDL SERPL: 112 MG/DL — SIGNIFICANT CHANGE UP
MAGNESIUM SERPL-MCNC: 2.4 MG/DL — SIGNIFICANT CHANGE UP (ref 1.6–2.6)
MCHC RBC-ENTMCNC: 29 PG — SIGNIFICANT CHANGE UP (ref 27–34)
MCHC RBC-ENTMCNC: 29.8 GM/DL — LOW (ref 32–36)
MCV RBC AUTO: 97.3 FL — SIGNIFICANT CHANGE UP (ref 80–100)
NRBC # BLD: 0 /100 WBCS — SIGNIFICANT CHANGE UP (ref 0–0)
PHOSPHATE SERPL-MCNC: 6.2 MG/DL — HIGH (ref 2.5–4.5)
PLATELET # BLD AUTO: 217 K/UL — SIGNIFICANT CHANGE UP (ref 150–400)
POTASSIUM SERPL-MCNC: 5.1 MMOL/L — SIGNIFICANT CHANGE UP (ref 3.5–5.3)
POTASSIUM SERPL-SCNC: 5.1 MMOL/L — SIGNIFICANT CHANGE UP (ref 3.5–5.3)
PROCALCITONIN SERPL-MCNC: 0.17 NG/ML — HIGH (ref 0.02–0.1)
PROT SERPL-MCNC: 7.2 G/DL — SIGNIFICANT CHANGE UP (ref 6–8.3)
RBC # BLD: 3.31 M/UL — LOW (ref 3.8–5.2)
RBC # FLD: 14.6 % — HIGH (ref 10.3–14.5)
SARS-COV-2 RNA SPEC QL NAA+PROBE: DETECTED
SODIUM SERPL-SCNC: 138 MMOL/L — SIGNIFICANT CHANGE UP (ref 135–145)
T-CELL CD4 SUBSET PNL BLD: 468 /UL — LOW (ref 489–1457)
TOTAL CHOLESTEROL/HDL RATIO MEASUREMENT: 7.2 RATIO — HIGH (ref 3.3–7.1)
TRIGL SERPL-MCNC: 339 MG/DL — HIGH (ref 10–149)
TSH SERPL-MCNC: 1.54 UU/ML — SIGNIFICANT CHANGE UP (ref 0.34–4.82)
VIT B12 SERPL-MCNC: >2000 PG/ML — HIGH (ref 232–1245)
WBC # BLD: 6.47 K/UL — SIGNIFICANT CHANGE UP (ref 3.8–10.5)
WBC # FLD AUTO: 6.47 K/UL — SIGNIFICANT CHANGE UP (ref 3.8–10.5)

## 2020-04-03 RX ORDER — CARVEDILOL PHOSPHATE 80 MG/1
6.25 CAPSULE, EXTENDED RELEASE ORAL EVERY 12 HOURS
Refills: 0 | Status: DISCONTINUED | OUTPATIENT
Start: 2020-04-03 | End: 2020-04-10

## 2020-04-03 RX ORDER — SPIRONOLACTONE 25 MG/1
50 TABLET, FILM COATED ORAL DAILY
Refills: 0 | Status: DISCONTINUED | OUTPATIENT
Start: 2020-04-03 | End: 2020-04-05

## 2020-04-03 RX ORDER — PREGABALIN 225 MG/1
1 CAPSULE ORAL
Qty: 0 | Refills: 0 | DISCHARGE

## 2020-04-03 RX ORDER — FERROUS SULFATE 325(65) MG
1 TABLET ORAL
Qty: 0 | Refills: 0 | DISCHARGE

## 2020-04-03 RX ORDER — ALBUTEROL 90 UG/1
2 AEROSOL, METERED ORAL EVERY 6 HOURS
Refills: 0 | Status: DISCONTINUED | OUTPATIENT
Start: 2020-04-03 | End: 2020-04-10

## 2020-04-03 RX ORDER — PANTOPRAZOLE SODIUM 20 MG/1
40 TABLET, DELAYED RELEASE ORAL
Refills: 0 | Status: DISCONTINUED | OUTPATIENT
Start: 2020-04-03 | End: 2020-04-10

## 2020-04-03 RX ORDER — AZITHROMYCIN 500 MG/1
500 TABLET, FILM COATED ORAL ONCE
Refills: 0 | Status: COMPLETED | OUTPATIENT
Start: 2020-04-03 | End: 2020-04-03

## 2020-04-03 RX ORDER — THIAMINE MONONITRATE (VIT B1) 100 MG
200 TABLET ORAL DAILY
Refills: 0 | Status: DISCONTINUED | OUTPATIENT
Start: 2020-04-03 | End: 2020-04-03

## 2020-04-03 RX ORDER — DEXTROSE 50 % IN WATER 50 %
25 SYRINGE (ML) INTRAVENOUS ONCE
Refills: 0 | Status: DISCONTINUED | OUTPATIENT
Start: 2020-04-03 | End: 2020-04-10

## 2020-04-03 RX ORDER — GLUCAGON INJECTION, SOLUTION 0.5 MG/.1ML
1 INJECTION, SOLUTION SUBCUTANEOUS ONCE
Refills: 0 | Status: DISCONTINUED | OUTPATIENT
Start: 2020-04-03 | End: 2020-04-10

## 2020-04-03 RX ORDER — HYDROXYCHLOROQUINE SULFATE 200 MG
200 TABLET ORAL EVERY 12 HOURS
Refills: 0 | Status: DISCONTINUED | OUTPATIENT
Start: 2020-04-04 | End: 2020-04-04

## 2020-04-03 RX ORDER — AZITHROMYCIN 500 MG/1
250 TABLET, FILM COATED ORAL DAILY
Refills: 0 | Status: DISCONTINUED | OUTPATIENT
Start: 2020-04-04 | End: 2020-04-07

## 2020-04-03 RX ORDER — DEXTROSE 50 % IN WATER 50 %
12.5 SYRINGE (ML) INTRAVENOUS ONCE
Refills: 0 | Status: DISCONTINUED | OUTPATIENT
Start: 2020-04-03 | End: 2020-04-10

## 2020-04-03 RX ORDER — ACETAMINOPHEN 500 MG
650 TABLET ORAL EVERY 6 HOURS
Refills: 0 | Status: DISCONTINUED | OUTPATIENT
Start: 2020-04-03 | End: 2020-04-10

## 2020-04-03 RX ORDER — HYDROXYCHLOROQUINE SULFATE 200 MG
400 TABLET ORAL EVERY 12 HOURS
Refills: 0 | Status: COMPLETED | OUTPATIENT
Start: 2020-04-03 | End: 2020-04-03

## 2020-04-03 RX ORDER — INSULIN GLARGINE 100 [IU]/ML
10 INJECTION, SOLUTION SUBCUTANEOUS
Refills: 0 | Status: DISCONTINUED | OUTPATIENT
Start: 2020-04-03 | End: 2020-04-10

## 2020-04-03 RX ORDER — INSULIN LISPRO 100/ML
VIAL (ML) SUBCUTANEOUS
Refills: 0 | Status: DISCONTINUED | OUTPATIENT
Start: 2020-04-03 | End: 2020-04-10

## 2020-04-03 RX ORDER — HYDROXYCHLOROQUINE SULFATE 200 MG
TABLET ORAL
Refills: 0 | Status: DISCONTINUED | OUTPATIENT
Start: 2020-04-03 | End: 2020-04-04

## 2020-04-03 RX ORDER — ASPIRIN/CALCIUM CARB/MAGNESIUM 324 MG
81 TABLET ORAL DAILY
Refills: 0 | Status: DISCONTINUED | OUTPATIENT
Start: 2020-04-03 | End: 2020-04-10

## 2020-04-03 RX ORDER — DEXTROSE 50 % IN WATER 50 %
15 SYRINGE (ML) INTRAVENOUS ONCE
Refills: 0 | Status: DISCONTINUED | OUTPATIENT
Start: 2020-04-03 | End: 2020-04-10

## 2020-04-03 RX ORDER — CEFTRIAXONE 500 MG/1
1000 INJECTION, POWDER, FOR SOLUTION INTRAMUSCULAR; INTRAVENOUS EVERY 24 HOURS
Refills: 0 | Status: DISCONTINUED | OUTPATIENT
Start: 2020-04-03 | End: 2020-04-03

## 2020-04-03 RX ORDER — SODIUM CHLORIDE 9 MG/ML
1000 INJECTION, SOLUTION INTRAVENOUS
Refills: 0 | Status: DISCONTINUED | OUTPATIENT
Start: 2020-04-03 | End: 2020-04-10

## 2020-04-03 RX ORDER — ATORVASTATIN CALCIUM 80 MG/1
40 TABLET, FILM COATED ORAL AT BEDTIME
Refills: 0 | Status: DISCONTINUED | OUTPATIENT
Start: 2020-04-03 | End: 2020-04-10

## 2020-04-03 RX ORDER — ASCORBIC ACID 60 MG
1000 TABLET,CHEWABLE ORAL
Refills: 0 | Status: DISCONTINUED | OUTPATIENT
Start: 2020-04-03 | End: 2020-04-03

## 2020-04-03 RX ORDER — FOLIC ACID 0.8 MG
1 TABLET ORAL
Qty: 0 | Refills: 0 | DISCHARGE

## 2020-04-03 RX ADMIN — AZITHROMYCIN 500 MILLIGRAM(S): 500 TABLET, FILM COATED ORAL at 13:16

## 2020-04-03 RX ADMIN — Medication 200 MILLIGRAM(S): at 12:30

## 2020-04-03 RX ADMIN — INSULIN GLARGINE 10 UNIT(S): 100 INJECTION, SOLUTION SUBCUTANEOUS at 22:11

## 2020-04-03 RX ADMIN — HEPARIN SODIUM 5000 UNIT(S): 5000 INJECTION INTRAVENOUS; SUBCUTANEOUS at 05:06

## 2020-04-03 RX ADMIN — CARVEDILOL PHOSPHATE 6.25 MILLIGRAM(S): 80 CAPSULE, EXTENDED RELEASE ORAL at 05:06

## 2020-04-03 RX ADMIN — INSULIN GLARGINE 10 UNIT(S): 100 INJECTION, SOLUTION SUBCUTANEOUS at 08:53

## 2020-04-03 RX ADMIN — CARVEDILOL PHOSPHATE 6.25 MILLIGRAM(S): 80 CAPSULE, EXTENDED RELEASE ORAL at 16:43

## 2020-04-03 RX ADMIN — CEFTRIAXONE 100 MILLIGRAM(S): 500 INJECTION, POWDER, FOR SOLUTION INTRAMUSCULAR; INTRAVENOUS at 13:16

## 2020-04-03 RX ADMIN — Medication 400 MILLIGRAM(S): at 22:07

## 2020-04-03 RX ADMIN — Medication 650 MILLIGRAM(S): at 19:42

## 2020-04-03 RX ADMIN — Medication 1000 MILLIGRAM(S): at 16:44

## 2020-04-03 RX ADMIN — PANTOPRAZOLE SODIUM 40 MILLIGRAM(S): 20 TABLET, DELAYED RELEASE ORAL at 06:08

## 2020-04-03 RX ADMIN — Medication 1000 MILLIGRAM(S): at 12:22

## 2020-04-03 RX ADMIN — SPIRONOLACTONE 50 MILLIGRAM(S): 25 TABLET, FILM COATED ORAL at 05:07

## 2020-04-03 RX ADMIN — ATORVASTATIN CALCIUM 40 MILLIGRAM(S): 80 TABLET, FILM COATED ORAL at 22:07

## 2020-04-03 RX ADMIN — HEPARIN SODIUM 5000 UNIT(S): 5000 INJECTION INTRAVENOUS; SUBCUTANEOUS at 16:43

## 2020-04-03 RX ADMIN — Medication 81 MILLIGRAM(S): at 11:25

## 2020-04-03 NOTE — PROGRESS NOTE ADULT - SUBJECTIVE AND OBJECTIVE BOX
Chart reviewed.  Patient seen and examined. Plan d/w attending    CC: cough    HPI: 55 year old Woman with hx of Obesity, HFrEF 25 %, s/p AICD, HTN, DM, Gout, CAD, HLD, COPD  who on 4/2/2020 presented from [facility] to [facility] ED for c/o 5 day hx of dry cough and 2 day onset of shortness of breath and fever.   In ED Tmax 103, O2 sat 97% 4L NC. Pt admitted for further eval and found to be COVID-19 positive and CXR with RLL infiltrate.    Subjective: Feels "ok"; no complaints offered, breathing is ok with O2 3L    MEDICATIONS  (STANDING):  ascorbic acid 1000 milliGRAM(s) Oral four times a day  aspirin  chewable 81 milliGRAM(s) Oral daily  atorvastatin 40 milliGRAM(s) Oral at bedtime  carvedilol 6.25 milliGRAM(s) Oral every 12 hours  cefTRIAXone   IVPB 1000 milliGRAM(s) IV Intermittent every 24 hours  dextrose 5%. 1000 milliLiter(s) (50 mL/Hr) IV Continuous <Continuous>  dextrose 50% Injectable 12.5 Gram(s) IV Push once  dextrose 50% Injectable 25 Gram(s) IV Push once  dextrose 50% Injectable 25 Gram(s) IV Push once  heparin  Injectable 5000 Unit(s) SubCutaneous every 12 hours  hydroxychloroquine   Oral   hydroxychloroquine 400 milliGRAM(s) Oral every 12 hours  insulin glargine Injectable (LANTUS) 10 Unit(s) SubCutaneous two times a day  insulin lispro (HumaLOG) corrective regimen sliding scale   SubCutaneous three times a day before meals  metolazone 5 milliGRAM(s) Oral <User Schedule>  pantoprazole    Tablet 40 milliGRAM(s) Oral before breakfast  spironolactone 50 milliGRAM(s) Oral daily  thiamine 200 milliGRAM(s) Oral daily    MEDICATIONS  (PRN):  ALBUTerol    90 MICROgram(s) HFA Inhaler 2 Puff(s) Inhalation every 6 hours PRN Shortness of Breath and/or Wheezing  dextrose 40% Gel 15 Gram(s) Oral once PRN Blood Glucose LESS THAN 70 milliGRAM(s)/deciliter  glucagon  Injectable 1 milliGRAM(s) IntraMuscular once PRN Glucose LESS THAN 70 milligrams/deciliter      VITALS:  Vital Signs Last 24 Hrs  T(C): 37.4 (03 Apr 2020 13:36), Max: 37.4 (03 Apr 2020 13:36)  T(F): 99.4 (03 Apr 2020 13:36), Max: 99.4 (03 Apr 2020 13:36)  HR: 76 (03 Apr 2020 13:36) (74 - 83)  BP: 138/67 (03 Apr 2020 13:36) (106/42 - 148/77)  BP(mean): --  RR: 20 (03 Apr 2020 13:36) (18 - 20)  SpO2: 99% (03 Apr 2020 15:58) (96% - 100%)    PHYSICAL EXAM:  HEENT:  pupils equal and reactive, EOMI, no oropharyngeal lesions, erythema, exudates, oral thrush  NECK:   supple, no carotid bruits, no palpable lymph nodes, no thyromegaly  CV:  +S1, +S2, regular, no murmurs or rubs  RESP:   lungs clear to auscultation bilaterally, no wheezing, rales, rhonchi, good air entry bilaterally  GI:  abdomen soft, non-tender, non-distended, normal BS, no bruits, no abdominal masses, no palpable masses  MSK:   normal muscle tone, no atrophy, no rigidity, no contractions  EXT:   no clubbing, no cyanosis, no edema, no calf pain, swelling or erythema  VASCULAR:  pulses equal and symmetric in the upper and lower extremities  NEURO:  AAOX3, no focal neurological deficits, follows all commands, able to move extremities spontaneously  SKIN:  no ulcers, lesions or rashes    LABS:                      9.6    6.47  )-----------( 217      ( 03 Apr 2020 12:23 )             32.2     04-03    138  |  107  |  84<H>  ----------------------------<  158<H>  5.1   |  24  |  4.29<H>    Ca    8.2<L>      03 Apr 2020 12:23  Phos  6.2     04-03  Mg     2.4     04-03    TPro  7.2  /  Alb  2.9<L>  /  TBili  0.2  /  DBili  x   /  AST  20  /  ALT  14  /  AlkPhos  53  04-03  LIVER FUNCTIONS - ( 03 Apr 2020 12:23 )  Alb: 2.9 g/dL / Pro: 7.2 g/dL / ALK PHOS: 53 U/L / ALT: 14 U/L DA / AST: 20 U/L / GGT: x

## 2020-04-03 NOTE — ACUTE INTERFACILITY TRANSFER NOTE - PLAN OF CARE
Maintain Saturation greater than 92% Continue with Azithromycin, Plaquenil and Ceftriaxone  Supplemental Oxygen as needed Maintain saturation >92% Continue with Antibiotics Maintain SBP <120 Continue with Carvedilol, Aldactone

## 2020-04-03 NOTE — PROGRESS NOTE ADULT - PROBLEM SELECTOR PLAN 4
No signs of fluid overload  Continue Metolazone, spironolactone , carvedilol  consider holding metolazone if Scr continues to uptrend

## 2020-04-03 NOTE — PROGRESS NOTE ADULT - ATTENDING COMMENTS
PATIENT IS A 56YO F FROM [facility] WITH PMHX SIGNIFICANT FOR AMPARO/OHS ON CPAP, HFREF [25%], PVD, DM, CAD, HLD, COPD AND GOUT WHO WAS TRANSFERRED FOR EVALUATION OF COUGH/DYSPNEA/FEVER.    # ACUTE HYPOXIC RESPIRATORY FAILURE S/T COVID19 PNEUMONIA AND SECONDARY BACTERIAL PNEUMONIA  # ANJU ON CKD4 - AVOID NEPHROTOXIC MEDICATION; NEPHROLOGY CONSULTED  # NORMOCYTIC ANEMIA - LIKELY AOCKD  # GI AND DVT PPX    LUANN NATARAJAN M.D. COVERING FOR DONTAE NATARAJAN M.D. PATIENT IS A 56YO F FROM [facility] WITH PMHX SIGNIFICANT FOR AMPARO/OHS ON CPAP, HFREF [25%], PVD, DM, CAD, HLD, COPD AND GOUT WHO WAS TRANSFERRED FOR EVALUATION OF COUGH/DYSPNEA/FEVER.    # ACUTE HYPOXIC RESPIRATORY FAILURE S/T COVID19 PNEUMONIA AND SECONDARY BACTERIAL PNEUMONIA- NOTED PROLONGED QTC, WILL HOLD PLAQUENIL  # ANJU ON CKD4 - AVOID NEPHROTOXIC MEDICATION; NEPHROLOGY CONSULTED  # NORMOCYTIC ANEMIA - LIKELY AOCKD  # GI AND DVT PPX    LUANN NATARAJAN M.D. COVERING FOR DONTAE NATARAJAN M.D.

## 2020-04-03 NOTE — ACUTE INTERFACILITY TRANSFER NOTE - HOSPITAL COURSE
Pt is a 56 y/o F  from [facility] with PMH of Obesity, CHF HFrEF 25 %, s/p AICD, HTN, B/l LE edema, DM, Gout, CAD, HLD, COPD  who presented with cough since 5 days , shortness of breath since 2 days and fever and chills X 1 days. Chest X ray showed RLL infiltrate.COVID-19 PCR turned out to  be positive on 4/3/2020. Being treated with Plaquenil, Ceftriaxone and Azithromycin. Patient maintaining a saturation of 100 % on 3 L nasal canula

## 2020-04-03 NOTE — PROGRESS NOTE ADULT - PROBLEM SELECTOR PLAN 1
Acute hypoxemic respiratory failure likely due to viral pneumonia r/t COVID-19 and possible superimposed bacterial infection  continue empiric antibiotics   dc hydroxychloroquine due to prolonged QTc as per attending  continue to monitor closely  continue supplemental oxygen as need (titrated down to 2L NC from 3L)  Supportive measures  Tylenol PRN for fever  Continue Isolation precautions based on COVID-19 infection control protocol.

## 2020-04-03 NOTE — PROGRESS NOTE ADULT - PROBLEM SELECTOR PLAN 2
Scr uptrend  Creatinine, Serum: 4.29 mg/dL (04.03.20 @ 12:23)  Creatinine, Serum: 3.77 mg/dL (04.02.20 @ 17:51)  Creatinine, Serum: 2.12 mg/dL (10.03.19)  Likely ANJU on CKD superimposed  Avoid nephrotoxic agents were possible   Monitor BMP

## 2020-04-04 LAB
GLUCOSE BLDC GLUCOMTR-MCNC: 109 MG/DL — HIGH (ref 70–99)
GLUCOSE BLDC GLUCOMTR-MCNC: 127 MG/DL — HIGH (ref 70–99)
GLUCOSE BLDC GLUCOMTR-MCNC: 152 MG/DL — HIGH (ref 70–99)
GLUCOSE BLDC GLUCOMTR-MCNC: 161 MG/DL — HIGH (ref 70–99)

## 2020-04-04 RX ORDER — LANOLIN ALCOHOL/MO/W.PET/CERES
3 CREAM (GRAM) TOPICAL AT BEDTIME
Refills: 0 | Status: DISCONTINUED | OUTPATIENT
Start: 2020-04-04 | End: 2020-04-10

## 2020-04-04 RX ADMIN — SPIRONOLACTONE 50 MILLIGRAM(S): 25 TABLET, FILM COATED ORAL at 06:57

## 2020-04-04 RX ADMIN — ATORVASTATIN CALCIUM 40 MILLIGRAM(S): 80 TABLET, FILM COATED ORAL at 23:47

## 2020-04-04 RX ADMIN — HEPARIN SODIUM 5000 UNIT(S): 5000 INJECTION INTRAVENOUS; SUBCUTANEOUS at 18:02

## 2020-04-04 RX ADMIN — PANTOPRAZOLE SODIUM 40 MILLIGRAM(S): 20 TABLET, DELAYED RELEASE ORAL at 06:57

## 2020-04-04 RX ADMIN — Medication 81 MILLIGRAM(S): at 11:46

## 2020-04-04 RX ADMIN — INSULIN GLARGINE 10 UNIT(S): 100 INJECTION, SOLUTION SUBCUTANEOUS at 23:48

## 2020-04-04 RX ADMIN — Medication 2: at 13:12

## 2020-04-04 RX ADMIN — CARVEDILOL PHOSPHATE 6.25 MILLIGRAM(S): 80 CAPSULE, EXTENDED RELEASE ORAL at 06:56

## 2020-04-04 RX ADMIN — AZITHROMYCIN 250 MILLIGRAM(S): 500 TABLET, FILM COATED ORAL at 11:47

## 2020-04-04 RX ADMIN — HEPARIN SODIUM 5000 UNIT(S): 5000 INJECTION INTRAVENOUS; SUBCUTANEOUS at 06:56

## 2020-04-04 RX ADMIN — CARVEDILOL PHOSPHATE 6.25 MILLIGRAM(S): 80 CAPSULE, EXTENDED RELEASE ORAL at 18:02

## 2020-04-04 RX ADMIN — INSULIN GLARGINE 10 UNIT(S): 100 INJECTION, SOLUTION SUBCUTANEOUS at 09:48

## 2020-04-04 NOTE — PROGRESS NOTE ADULT - SUBJECTIVE AND OBJECTIVE BOX
Patient is a 55y old  Female who presents with a chief complaint of Fever, body ache (03 Apr 2020 17:07)    PATIENT IS SEEN AND EXAMINED IN MEDICAL FLOOR.    ALLERGIES:  codeine (Urticaria)      VITALS:    Vital Signs Last 24 Hrs  T(C): 37.3 (04 Apr 2020 17:05), Max: 37.8 (04 Apr 2020 09:46)  T(F): 99.2 (04 Apr 2020 17:05), Max: 100.1 (04 Apr 2020 09:46)  HR: 68 (04 Apr 2020 17:05) (68 - 76)  BP: 135/68 (04 Apr 2020 17:05) (133/82 - 141/65)  BP(mean): --  RR: 20 (04 Apr 2020 17:05) (20 - 34)  SpO2: 93% (04 Apr 2020 17:05) (92% - 94%)    LABS:    CBC Full  -  ( 03 Apr 2020 12:23 )  WBC Count : 6.47 K/uL  RBC Count : 3.31 M/uL  Hemoglobin : 9.6 g/dL  Hematocrit : 32.2 %  Platelet Count - Automated : 217 K/uL  Mean Cell Volume : 97.3 fl  Mean Cell Hemoglobin : 29.0 pg  Mean Cell Hemoglobin Concentration : 29.8 gm/dL  Auto Neutrophil # : x  Auto Lymphocyte # : x  Auto Monocyte # : x  Auto Eosinophil # : x  Auto Basophil # : x  Auto Neutrophil % : x  Auto Lymphocyte % : x  Auto Monocyte % : x  Auto Eosinophil % : x  Auto Basophil % : x      04-03    138  |  107  |  84<H>  ----------------------------<  158<H>  5.1   |  24  |  4.29<H>    Ca    8.2<L>      03 Apr 2020 12:23  Phos  6.2     04-03  Mg     2.4     04-03    TPro  7.2  /  Alb  2.9<L>  /  TBili  0.2  /  DBili  x   /  AST  20  /  ALT  14  /  AlkPhos  53  04-03    CAPILLARY BLOOD GLUCOSE      POCT Blood Glucose.: 127 mg/dL (04 Apr 2020 16:21)  POCT Blood Glucose.: 161 mg/dL (04 Apr 2020 13:03)  POCT Blood Glucose.: 109 mg/dL (04 Apr 2020 09:45)  POCT Blood Glucose.: 155 mg/dL (03 Apr 2020 22:23)        LIVER FUNCTIONS - ( 03 Apr 2020 12:23 )  Alb: 2.9 g/dL / Pro: 7.2 g/dL / ALK PHOS: 53 U/L / ALT: 14 U/L DA / AST: 20 U/L / GGT: x           Creatinine Trend: 4.29<--, 3.77<--  I&O's Summary    MEDICATIONS:    MEDICATIONS  (STANDING):  aspirin  chewable 81 milliGRAM(s) Oral daily  atorvastatin 40 milliGRAM(s) Oral at bedtime  azithromycin   Tablet 250 milliGRAM(s) Oral daily  carvedilol 6.25 milliGRAM(s) Oral every 12 hours  dextrose 5%. 1000 milliLiter(s) (50 mL/Hr) IV Continuous <Continuous>  dextrose 50% Injectable 12.5 Gram(s) IV Push once  dextrose 50% Injectable 25 Gram(s) IV Push once  dextrose 50% Injectable 25 Gram(s) IV Push once  heparin  Injectable 5000 Unit(s) SubCutaneous every 12 hours  insulin glargine Injectable (LANTUS) 10 Unit(s) SubCutaneous two times a day  insulin lispro (HumaLOG) corrective regimen sliding scale   SubCutaneous three times a day before meals  metolazone 5 milliGRAM(s) Oral <User Schedule>  pantoprazole    Tablet 40 milliGRAM(s) Oral before breakfast  spironolactone 50 milliGRAM(s) Oral daily      MEDICATIONS  (PRN):  acetaminophen   Tablet .. 650 milliGRAM(s) Oral every 6 hours PRN Temp greater or equal to 38C (100.4F)  ALBUTerol    90 MICROgram(s) HFA Inhaler 2 Puff(s) Inhalation every 6 hours PRN Shortness of Breath and/or Wheezing  dextrose 40% Gel 15 Gram(s) Oral once PRN Blood Glucose LESS THAN 70 milliGRAM(s)/deciliter  glucagon  Injectable 1 milliGRAM(s) IntraMuscular once PRN Glucose LESS THAN 70 milligrams/deciliter  guaiFENesin   Syrup  (Sugar-Free) 100 milliGRAM(s) Oral every 6 hours PRN Cough  melatonin 3 milliGRAM(s) Oral at bedtime PRN Sleep      REVIEW OF SYSTEMS:                           ALL ROS DONE [ X   ]    CONSTITUTIONAL:  LETHARGIC [   ], FEVER [   ], UNRESPONSIVE [   ]  CVS:  CP  [   ], SOB, [   ], PALPITATIONS [   ], DIZZYNESS [   ]  RS: COUGH [   ], SPUTUM [   ]  GI: ABDOMINAL PAIN [   ], NAUSEA [   ], VOMITINGS [   ], DIARRHEA [   ], CONSTIPATION [   ]  :  DYSURIA [   ], NOCTURIA [   ], INCREASED FREQUENCY [   ], DRIBLING [   ],  SKELETAL: PAINFUL JOINTS [   ], SWOLLEN JOINTS [   ], NECK ACHE [   ], LOW BACK ACHE [   ],  SKIN : ULCERS [   ], RASH [   ], ITCHING [   ]  CNS: HEAD ACHE [   ], DOUBLE VISION [   ], BLURRED VISION [   ], AMS / CONFUSION [   ], SEIZURES [   ], WEAKNESS [   ],TINGLING / NUMBNESS [   ]    PHYSICAL EXAMINATION:  GENERAL APPEARANCE: NO DISTRESS, MORBIDLY OBESE  HEENT:  NO PALLOR, NO  JVD,  NO   NODES, NECK SUPPLE  CVS: S1 +, S2 +,   RS: AEEB,  OCCASIONAL  RALES +,   MILD B/L RHONCHI +  ABD: SOFT, NT, NO, BS +  EXT: PE +  SKIN: WARM,          AICD+  SKELETAL:  ROM ACCEPTABLE  CNS:  AAO X  3 ,   DEFICITS    RADIOLOGY :  < from: Xray Chest 1 View- PORTABLE-Urgent (04.02.20 @ 18:58) >  IMPRESSION:  Right infiltrate.    < end of copied text >      ASSESSMENT :     Coronavirus infection  GERD (gastroesophageal reflux disease)  CHF (congestive heart failure)  CAD (coronary artery disease)  AICD (automatic cardioverter/defibrillator) present  COPD (chronic obstructive pulmonary disease)  RA (rheumatoid arthritis)  DM (diabetes mellitus)  HTN (hypertension)  ICD (implantable cardioverter-defibrillator) in place  History of cholecystectomy      PLAN:  HPI:  Pt is a 56 y/o F  from [facility] with PMH of Obesity, CHF HFrEF 25 %, s/p AICD, HTN, B/l LE edema, DM, Gout, CAD, HLD, COPD  who presented with cough since 5 days , shortness of breath since 2days and fever since 1 day. According to the pt, since 5 days she has developed dry cough. Since last two days she has developed mild shortness of breath which is worse on exertion. She has some chest tightness and fever with chills since today. She denied recent travel, sick contact, nausea, vomiting, abdominal pain and other complains. (02 Apr 2020 20:45)    PATIENT IS A 54YO F FROM [facility] WITH PMHX SIGNIFICANT FOR AMPARO/OHS ON CPAP, HFREF [25%], PVD, DM, CAD, HLD, COPD AND GOUT WHO WAS TRANSFERRED FOR EVALUATION OF COUGH/DYSPNEA/FEVER.    # ACUTE HYPOXIC RESPIRATORY FAILURE S/T COVID19 PNEUMONIA, SEPSIS AND SUSPECTED SECONDARY BACTERIAL PNEUMONIA - NOTED PROLONGED QTC, WILL HOLD PLAQUENIL  # MORBID OBESITY, RESTRICTIVE LUNG DISEASE, OBSTRUCTIVE SLEEP APNEA ON CPAP, COPD  # SYSTOLIC CHF S/P AICD, ON CHRONIC O2 SUPPLEMENT  # ANJU ON CKD4 - AVOID NEPHROTOXIC MEDICATION; NEPHROLOGY CONSULTED  # NORMOCYTIC ANEMIA - SUSPECT ANEMIA OF CKD  # D/C TO [facility] REH[facility] WHEN MEDICALLY STABLE AS [facility] ASSISTED LIVING DOES NOT HAVE AN ISOLATION  # GI AND DVT PPX    LUANN NATARAJAN M.D. COVERING FOR DONTAE NATARAJAN M.D.

## 2020-04-04 NOTE — PROGRESS NOTE ADULT - PROBLEM SELECTOR PLAN 3
patient is on glipizide XL 5 mg daily   BLOOD SUGAR ranging between 331-461  hba1c- 10.5  Pt started on Insulin NPH 70/30 increased to 30U BF, 20U dinner.  f/u RBS  sliding scale yes

## 2020-04-05 LAB
ALBUMIN SERPL ELPH-MCNC: 2.9 G/DL — LOW (ref 3.5–5)
ALP SERPL-CCNC: 53 U/L — SIGNIFICANT CHANGE UP (ref 40–120)
ALT FLD-CCNC: 16 U/L DA — SIGNIFICANT CHANGE UP (ref 10–60)
ANION GAP SERPL CALC-SCNC: 10 MMOL/L — SIGNIFICANT CHANGE UP (ref 5–17)
ANISOCYTOSIS BLD QL: SLIGHT — SIGNIFICANT CHANGE UP
AST SERPL-CCNC: 22 U/L — SIGNIFICANT CHANGE UP (ref 10–40)
BASOPHILS # BLD AUTO: 0 K/UL — SIGNIFICANT CHANGE UP (ref 0–0.2)
BASOPHILS NFR BLD AUTO: 0 % — SIGNIFICANT CHANGE UP (ref 0–2)
BILIRUB SERPL-MCNC: 0.3 MG/DL — SIGNIFICANT CHANGE UP (ref 0.2–1.2)
BUN SERPL-MCNC: 106 MG/DL — HIGH (ref 7–18)
CALCIUM SERPL-MCNC: 8.5 MG/DL — SIGNIFICANT CHANGE UP (ref 8.4–10.5)
CHLORIDE SERPL-SCNC: 106 MMOL/L — SIGNIFICANT CHANGE UP (ref 96–108)
CO2 SERPL-SCNC: 22 MMOL/L — SIGNIFICANT CHANGE UP (ref 22–31)
CREAT SERPL-MCNC: 4.8 MG/DL — HIGH (ref 0.5–1.3)
EOSINOPHIL # BLD AUTO: 0 K/UL — SIGNIFICANT CHANGE UP (ref 0–0.5)
EOSINOPHIL NFR BLD AUTO: 0 % — SIGNIFICANT CHANGE UP (ref 0–6)
G6PD RBC-CCNC: 19.5 U/G HGB — SIGNIFICANT CHANGE UP (ref 7–20.5)
GLUCOSE BLDC GLUCOMTR-MCNC: 110 MG/DL — HIGH (ref 70–99)
GLUCOSE BLDC GLUCOMTR-MCNC: 121 MG/DL — HIGH (ref 70–99)
GLUCOSE BLDC GLUCOMTR-MCNC: 183 MG/DL — HIGH (ref 70–99)
GLUCOSE BLDC GLUCOMTR-MCNC: 95 MG/DL — SIGNIFICANT CHANGE UP (ref 70–99)
GLUCOSE SERPL-MCNC: 177 MG/DL — HIGH (ref 70–99)
HCT VFR BLD CALC: 33.1 % — LOW (ref 34.5–45)
HGB BLD-MCNC: 10 G/DL — LOW (ref 11.5–15.5)
HYPOCHROMIA BLD QL: SLIGHT — SIGNIFICANT CHANGE UP
LYMPHOCYTES # BLD AUTO: 1.18 K/UL — SIGNIFICANT CHANGE UP (ref 1–3.3)
LYMPHOCYTES # BLD AUTO: 18 % — SIGNIFICANT CHANGE UP (ref 13–44)
MAGNESIUM SERPL-MCNC: 2.7 MG/DL — HIGH (ref 1.6–2.6)
MANUAL SMEAR VERIFICATION: SIGNIFICANT CHANGE UP
MCHC RBC-ENTMCNC: 29.3 PG — SIGNIFICANT CHANGE UP (ref 27–34)
MCHC RBC-ENTMCNC: 30.2 GM/DL — LOW (ref 32–36)
MCV RBC AUTO: 97.1 FL — SIGNIFICANT CHANGE UP (ref 80–100)
MONOCYTES # BLD AUTO: 0.33 K/UL — SIGNIFICANT CHANGE UP (ref 0–0.9)
MONOCYTES NFR BLD AUTO: 5 % — SIGNIFICANT CHANGE UP (ref 2–14)
NEUTROPHILS # BLD AUTO: 5.07 K/UL — SIGNIFICANT CHANGE UP (ref 1.8–7.4)
NEUTROPHILS NFR BLD AUTO: 77 % — SIGNIFICANT CHANGE UP (ref 43–77)
NRBC # BLD: 0 /100 — SIGNIFICANT CHANGE UP (ref 0–0)
PHOSPHATE SERPL-MCNC: 5.1 MG/DL — HIGH (ref 2.5–4.5)
PLAT MORPH BLD: NORMAL — SIGNIFICANT CHANGE UP
PLATELET # BLD AUTO: 256 K/UL — SIGNIFICANT CHANGE UP (ref 150–400)
PLATELET COUNT - ESTIMATE: NORMAL — SIGNIFICANT CHANGE UP
POTASSIUM SERPL-MCNC: 5.6 MMOL/L — HIGH (ref 3.5–5.3)
POTASSIUM SERPL-SCNC: 5.6 MMOL/L — HIGH (ref 3.5–5.3)
PROT SERPL-MCNC: 7.4 G/DL — SIGNIFICANT CHANGE UP (ref 6–8.3)
RBC # BLD: 3.41 M/UL — LOW (ref 3.8–5.2)
RBC # FLD: 14.4 % — SIGNIFICANT CHANGE UP (ref 10.3–14.5)
RBC BLD AUTO: ABNORMAL
SODIUM SERPL-SCNC: 138 MMOL/L — SIGNIFICANT CHANGE UP (ref 135–145)
WBC # BLD: 6.58 K/UL — SIGNIFICANT CHANGE UP (ref 3.8–10.5)
WBC # FLD AUTO: 6.58 K/UL — SIGNIFICANT CHANGE UP (ref 3.8–10.5)

## 2020-04-05 RX ORDER — SODIUM POLYSTYRENE SULFONATE 4.1 MEQ/G
15 POWDER, FOR SUSPENSION ORAL ONCE
Refills: 0 | Status: DISCONTINUED | OUTPATIENT
Start: 2020-04-05 | End: 2020-04-05

## 2020-04-05 RX ORDER — SEVELAMER CARBONATE 2400 MG/1
800 POWDER, FOR SUSPENSION ORAL THREE TIMES A DAY
Refills: 0 | Status: DISCONTINUED | OUTPATIENT
Start: 2020-04-05 | End: 2020-04-07

## 2020-04-05 RX ORDER — SODIUM ZIRCONIUM CYCLOSILICATE 10 G/10G
10 POWDER, FOR SUSPENSION ORAL ONCE
Refills: 0 | Status: COMPLETED | OUTPATIENT
Start: 2020-04-05 | End: 2020-04-06

## 2020-04-05 RX ADMIN — SEVELAMER CARBONATE 800 MILLIGRAM(S): 2400 POWDER, FOR SUSPENSION ORAL at 22:57

## 2020-04-05 RX ADMIN — AZITHROMYCIN 250 MILLIGRAM(S): 500 TABLET, FILM COATED ORAL at 13:55

## 2020-04-05 RX ADMIN — INSULIN GLARGINE 10 UNIT(S): 100 INJECTION, SOLUTION SUBCUTANEOUS at 10:53

## 2020-04-05 RX ADMIN — PANTOPRAZOLE SODIUM 40 MILLIGRAM(S): 20 TABLET, DELAYED RELEASE ORAL at 06:51

## 2020-04-05 RX ADMIN — HEPARIN SODIUM 5000 UNIT(S): 5000 INJECTION INTRAVENOUS; SUBCUTANEOUS at 18:42

## 2020-04-05 RX ADMIN — CARVEDILOL PHOSPHATE 6.25 MILLIGRAM(S): 80 CAPSULE, EXTENDED RELEASE ORAL at 06:50

## 2020-04-05 RX ADMIN — SPIRONOLACTONE 50 MILLIGRAM(S): 25 TABLET, FILM COATED ORAL at 06:51

## 2020-04-05 RX ADMIN — Medication 2: at 13:54

## 2020-04-05 RX ADMIN — ATORVASTATIN CALCIUM 40 MILLIGRAM(S): 80 TABLET, FILM COATED ORAL at 22:57

## 2020-04-05 RX ADMIN — Medication 81 MILLIGRAM(S): at 10:55

## 2020-04-05 RX ADMIN — CARVEDILOL PHOSPHATE 6.25 MILLIGRAM(S): 80 CAPSULE, EXTENDED RELEASE ORAL at 18:43

## 2020-04-05 RX ADMIN — HEPARIN SODIUM 5000 UNIT(S): 5000 INJECTION INTRAVENOUS; SUBCUTANEOUS at 06:50

## 2020-04-05 NOTE — PROGRESS NOTE ADULT - SUBJECTIVE AND OBJECTIVE BOX
Patient is a 55y old  Female who presents with a chief complaint of Fever, body ache (05 Apr 2020 15:56)    PATIENT IS SEEN AND EXAMINED IN MEDICAL FLOOR.    ALLERGIES:  codeine (Urticaria)    VITALS:    Vital Signs Last 24 Hrs  T(C): 37.1 (05 Apr 2020 15:01), Max: 38.8 (04 Apr 2020 22:07)  T(F): 98.7 (05 Apr 2020 15:01), Max: 101.8 (04 Apr 2020 22:07)  HR: 72 (05 Apr 2020 15:01) (72 - 80)  BP: 136/71 (05 Apr 2020 15:01) (115/70 - 136/71)  BP(mean): --  RR: 20 (05 Apr 2020 15:01) (20 - 20)  SpO2: 93% (05 Apr 2020 15:01) (92% - 96%)    LABS:    CBC Full  -  ( 05 Apr 2020 12:56 )  WBC Count : 6.58 K/uL  RBC Count : 3.41 M/uL  Hemoglobin : 10.0 g/dL  Hematocrit : 33.1 %  Platelet Count - Automated : 256 K/uL  Mean Cell Volume : 97.1 fl  Mean Cell Hemoglobin : 29.3 pg  Mean Cell Hemoglobin Concentration : 30.2 gm/dL  Auto Neutrophil # : 5.07 K/uL  Auto Lymphocyte # : 1.18 K/uL  Auto Monocyte # : 0.33 K/uL  Auto Eosinophil # : 0.00 K/uL  Auto Basophil # : 0.00 K/uL  Auto Neutrophil % : 77.0 %  Auto Lymphocyte % : 18.0 %  Auto Monocyte % : 5.0 %  Auto Eosinophil % : 0.0 %  Auto Basophil % : 0.0 %      04-05    138  |  106  |  106<H>  ----------------------------<  177<H>  5.6<H>   |  22  |  4.80<H>    Ca    8.5      05 Apr 2020 12:56  Phos  5.1     04-05  Mg     2.7     04-05    TPro  7.4  /  Alb  2.9<L>  /  TBili  0.3  /  DBili  x   /  AST  22  /  ALT  16  /  AlkPhos  53  04-05    CAPILLARY BLOOD GLUCOSE      POCT Blood Glucose.: 121 mg/dL (05 Apr 2020 17:05)  POCT Blood Glucose.: 183 mg/dL (05 Apr 2020 13:11)  POCT Blood Glucose.: 110 mg/dL (05 Apr 2020 08:44)  POCT Blood Glucose.: 152 mg/dL (04 Apr 2020 22:29)    LIVER FUNCTIONS - ( 05 Apr 2020 12:56 )  Alb: 2.9 g/dL / Pro: 7.4 g/dL / ALK PHOS: 53 U/L / ALT: 16 U/L DA / AST: 22 U/L / GGT: x           Creatinine Trend: 4.80<--, 4.29<--, 3.77<--  I&O's Summary    MEDICATIONS:    MEDICATIONS  (STANDING):  aspirin  chewable 81 milliGRAM(s) Oral daily  atorvastatin 40 milliGRAM(s) Oral at bedtime  azithromycin   Tablet 250 milliGRAM(s) Oral daily  carvedilol 6.25 milliGRAM(s) Oral every 12 hours  dextrose 5%. 1000 milliLiter(s) (50 mL/Hr) IV Continuous <Continuous>  dextrose 50% Injectable 12.5 Gram(s) IV Push once  dextrose 50% Injectable 25 Gram(s) IV Push once  dextrose 50% Injectable 25 Gram(s) IV Push once  heparin  Injectable 5000 Unit(s) SubCutaneous every 12 hours  insulin glargine Injectable (LANTUS) 10 Unit(s) SubCutaneous two times a day  insulin lispro (HumaLOG) corrective regimen sliding scale   SubCutaneous three times a day before meals  pantoprazole    Tablet 40 milliGRAM(s) Oral before breakfast  sevelamer carbonate 800 milliGRAM(s) Oral three times a day  sodium zirconium cyclosilicate 10 Gram(s) Oral once      MEDICATIONS  (PRN):  acetaminophen   Tablet .. 650 milliGRAM(s) Oral every 6 hours PRN Temp greater or equal to 38C (100.4F)  ALBUTerol    90 MICROgram(s) HFA Inhaler 2 Puff(s) Inhalation every 6 hours PRN Shortness of Breath and/or Wheezing  dextrose 40% Gel 15 Gram(s) Oral once PRN Blood Glucose LESS THAN 70 milliGRAM(s)/deciliter  glucagon  Injectable 1 milliGRAM(s) IntraMuscular once PRN Glucose LESS THAN 70 milligrams/deciliter  guaiFENesin   Syrup  (Sugar-Free) 100 milliGRAM(s) Oral every 6 hours PRN Cough  melatonin 3 milliGRAM(s) Oral at bedtime PRN Sleep      REVIEW OF SYSTEMS:                           ALL ROS DONE [ X   ]    CONSTITUTIONAL:  LETHARGIC [   ], FEVER [   ], UNRESPONSIVE [   ]  CVS:  CP  [   ], SOB, [   ], PALPITATIONS [   ], DIZZYNESS [   ]  RS: COUGH [   ], SPUTUM [   ]  GI: ABDOMINAL PAIN [   ], NAUSEA [   ], VOMITINGS [   ], DIARRHEA [   ], CONSTIPATION [   ]  :  DYSURIA [   ], NOCTURIA [   ], INCREASED FREQUENCY [   ], DRIBLING [   ],  SKELETAL: PAINFUL JOINTS [   ], SWOLLEN JOINTS [   ], NECK ACHE [   ], LOW BACK ACHE [   ],  SKIN : ULCERS [   ], RASH [   ], ITCHING [   ]  CNS: HEAD ACHE [   ], DOUBLE VISION [   ], BLURRED VISION [   ], AMS / CONFUSION [   ], SEIZURES [   ], WEAKNESS [   ],TINGLING / NUMBNESS [   ]    PHYSICAL EXAMINATION:  GENERAL APPEARANCE: NO DISTRESS, MORBIDLY OBESE  HEENT:  NO PALLOR, NO  JVD,  NO   NODES, NECK SUPPLE  CVS: S1 +, S2 +,   RS: AEEB,  OCCASIONAL  RALES +,   MILD B/L RHONCHI +  ABD: SOFT, NT, NO, BS +  EXT: PE +  SKIN: WARM,          AICD+  SKELETAL:  ROM ACCEPTABLE  CNS:  AAO X  3 ,   DEFICITS    RADIOLOGY :  < from: Xray Chest 1 View- PORTABLE-Urgent (04.02.20 @ 18:58) >  IMPRESSION:  Right infiltrate.    < end of copied text >      ASSESSMENT :     Coronavirus infection  GERD (gastroesophageal reflux disease)  CHF (congestive heart failure)  CAD (coronary artery disease)  AICD (automatic cardioverter/defibrillator) present  COPD (chronic obstructive pulmonary disease)  RA (rheumatoid arthritis)  DM (diabetes mellitus)  HTN (hypertension)  ICD (implantable cardioverter-defibrillator) in place  History of cholecystectomy      PLAN:  HPI:  Pt is a 54 y/o F  from [facility] with PMH of Obesity, CHF HFrEF 25 %, s/p AICD, HTN, B/l LE edema, DM, Gout, CAD, HLD, COPD  who presented with cough since 5 days , shortness of breath since 2days and fever since 1 day. According to the pt, since 5 days she has developed dry cough. Since last two days she has developed mild shortness of breath which is worse on exertion. She has some chest tightness and fever with chills since today. She denied recent travel, sick contact, nausea, vomiting, abdominal pain and other complains. (02 Apr 2020 20:45)    PATIENT IS A 54YO F FROM [facility] WITH PMHX SIGNIFICANT FOR AMPARO/OHS ON CPAP, HFREF [25%], PVD, DM, CAD, HLD, COPD AND GOUT WHO WAS TRANSFERRED FOR EVALUATION OF COUGH/DYSPNEA/FEVER.    # ACUTE HYPOXIC RESPIRATORY FAILURE S/T COVID19 PNEUMONIA, SEPSIS AND SUSPECTED SECONDARY BACTERIAL PNEUMONIA - NOTED PROLONGED QTC, WILL HOLD PLAQUENIL  # MORBID OBESITY, RESTRICTIVE LUNG DISEASE, OBSTRUCTIVE SLEEP APNEA ON CPAP, COPD  # SYSTOLIC CHF S/P AICD, ON CHRONIC O2 SUPPLEMENT  # ANJU ON CKD4 - AVOID NEPHROTOXIC MEDICATION; NEPHROLOGY CONSULTED - SUSPECT PRE-RENAL AZOTEMIA FROM DIURETICS VS. ATN - DIURETICS HELD  # HYPERKALEMIA S/T ANJU - LOKELMA  # NORMOCYTIC ANEMIA - SUSPECT ANEMIA OF CKD  # MINERAL BONE DISEASE  # D/C TO [facility] REHAB WHEN MEDICALLY STABLE AS [facility] ASSISTED [facility]IVING DOES NOT HAVE AN ISOLATION  # GI AND DVT PPX    LUANN NATARAJAN M.D. COVERING FOR DONTAE NATARAJAN M.D.

## 2020-04-05 NOTE — CONSULT NOTE ADULT - ASSESSMENT
1.ANJU due to diuretic induced vs r/o ATN from COVID related.    -will hold diuretics and avoid IVFs for now due severe HF. order urine lytes. If scr continues to worsen then will need to consider hemodialysis.  -unable to perform renal sono due to COVID+   -moniter I/Os daily  2. CKD stage 3 , with non nephrotic proteinuria ,due to DM/HTN/obesity  -Baseline scr 2.0mg/dL in oct 2019, will try to obtain most recent kidney function, there could CKD progression.  -Keep patient euvolemic and Diabetic renal diet  -Avoid Nephrotoxic Meds/ Agents such as (NSAIDs, IV contrast, Aminoglycosides such as gentamicin, -Gadolinium contrast, Phosphate containing enemas, etc..)  -Adjust Medications according to eGFR  -f/u bmp daily  3.HTN: bp is controlled  -goal bp >110/70 and <130/80  -low salt diet  4.ANEMIA: mild, r/o secondary to ckd   -check iron studies.  -f/u Hb daily  5. Hyperkalemia due to anju on ckd with spirolactone  -hold spirolactone and give loklemia.  -start low K diet.   -Keep pt euvolemic. Avoid ACE inh/ ARBs, NSAIDs, and Aldactone or potassium sparing diuretics. Monitor K+ daily.  6. MBD: secondary to ckd  -elevated phos and check PTH in am. pt was taking renvela before and will restart.  7. COVID +ve Pneumonia: plan as per medical team.

## 2020-04-05 NOTE — CONSULT NOTE ADULT - SUBJECTIVE AND OBJECTIVE BOX
NEPHROLOGY MEDICAL CARE, [facility] - Dr. Linden Jacob/ Dr. Cruz Ledesma/ Dr. Meet Galdamez/ Dr. Jannie Recinos    Patient was seen and examined at bedside.    HPI:  Pt is a 56 y/o F  from [facility] with PMH of Obesity, CHF HFrEF 25 %, s/p AICD, HTN, B/l LE edema, DM, Gout, CAD, HLD, COPD  who presented with cough since 5 days , shortness of breath since 2days and fever since 1 day. Renal consulted for montse on ckd. Pt is known to have ckd and pt was admitted with scr around 3.7mg/dL and scr slowly increased to 4.8mg/dl. Pt's baseline scr was around 2.0mg/dl in oct 2019. Patient takes spirolactone and metolazone for heart failure. Patient didn't receive IVFs upon ER arrival. According to the pt, since 5 days she has developed dry cough. Since last two days she has developed mild shortness of breath which is worse on exertion. She has some chest tightness and fever with chills since today. She denied recent travel, sick contact, nausea, vomiting, abdominal pain and other complains. (02 Apr 2020 20:45)      PMH:   GERD (gastroesophageal reflux disease)  CHF (congestive heart failure)  CAD (coronary artery disease)  AICD (automatic cardioverter/defibrillator) present  COPD (chronic obstructive pulmonary disease)  RA (rheumatoid arthritis)  DM (diabetes mellitus)  HTN (hypertension)      PSH:   ICD (implantable cardioverter-defibrillator) in place  History of cholecystectomy      FAMILY HISTORY:  Family history of hypertension in mother      Social History:  non-smoker/ non-alcoholic     Home Meds:  MEDICATIONS  (STANDING):  aspirin  chewable 81 milliGRAM(s) Oral daily  atorvastatin 40 milliGRAM(s) Oral at bedtime  azithromycin   Tablet 250 milliGRAM(s) Oral daily  carvedilol 6.25 milliGRAM(s) Oral every 12 hours  dextrose 5%. 1000 milliLiter(s) (50 mL/Hr) IV Continuous <Continuous>  dextrose 50% Injectable 12.5 Gram(s) IV Push once  dextrose 50% Injectable 25 Gram(s) IV Push once  dextrose 50% Injectable 25 Gram(s) IV Push once  heparin  Injectable 5000 Unit(s) SubCutaneous every 12 hours  insulin glargine Injectable (LANTUS) 10 Unit(s) SubCutaneous two times a day  insulin lispro (HumaLOG) corrective regimen sliding scale   SubCutaneous three times a day before meals  pantoprazole    Tablet 40 milliGRAM(s) Oral before breakfast  sodium zirconium cyclosilicate 10 Gram(s) Oral once    MEDICATIONS  (PRN):  acetaminophen   Tablet .. 650 milliGRAM(s) Oral every 6 hours PRN Temp greater or equal to 38C (100.4F)  ALBUTerol    90 MICROgram(s) HFA Inhaler 2 Puff(s) Inhalation every 6 hours PRN Shortness of Breath and/or Wheezing  dextrose 40% Gel 15 Gram(s) Oral once PRN Blood Glucose LESS THAN 70 milliGRAM(s)/deciliter  glucagon  Injectable 1 milliGRAM(s) IntraMuscular once PRN Glucose LESS THAN 70 milligrams/deciliter  guaiFENesin   Syrup  (Sugar-Free) 100 milliGRAM(s) Oral every 6 hours PRN Cough  melatonin 3 milliGRAM(s) Oral at bedtime PRN Sleep      Allergies:  Allergies    codeine (Urticaria)    Intolerances        REVIEW OF SYSTEMS:  CONSTITUTIONAL: No fever; No weight loss; No fatigue  EYES: No eye pain; No visual disturbances; No discharge  ENMT:  No difficulty hearing; No tinnitus;  No vertigo; No sinus; No throat pain  NECK: No pain; No stiffness  BREASTS: No pain; No masses; No nipple discharge  RESPIRATORY: No cough; No wheezing; No chills; No hemoptysis;  shortness of breath  CARDIOVASCULAR: chest pain; No palpitations; No dizziness; No leg swelling  GASTROINTESTINAL: No abdominal pain; No epigastric pain; No nausea; No vomiting; No hematemesis; No diarrhea; No constipation. No melena   GENITOURINARY: No dysuria No frequency; No hematuria; No incontinence  NEUROLOGICAL: No headaches; No memory loss; No loss of strength; No numbness; No tremors  SKIN: No itching; No burning; No rashes  ENDOCRINE: No heat or cold intolerance; No hair loss  MUSCULOSKELETAL: No joint pain or swelling; No muscle, back, or extremity pain  PSYCHIATRIC: No depression; No anxiety; No mood swings; No difficulty sleeping  HEME/LYMPH: No easy bruising; No bleeding gums  ALLERY AND IMMUNOLOGIC: No hives or eczema    Vital Signs Last 24 Hrs  T(C): 37.7 (04 Apr 2020 23:44), Max: 38.8 (04 Apr 2020 22:07)  T(F): 99.8 (04 Apr 2020 23:44), Max: 101.8 (04 Apr 2020 22:07)  HR: 80 (05 Apr 2020 06:37) (68 - 80)  BP: 117/63 (05 Apr 2020 06:37) (115/70 - 135/68)  BP(mean): --  RR: 20 (05 Apr 2020 06:37) (20 - 20)  SpO2: 92% (05 Apr 2020 06:37) (92% - 96%)      PHYSICAL EXAM:  GENERAL: No acute respiratory distress on NC; obese  EYES: conjunctiva and sclera clear  ENMT: Atraumatic, Normocephalic, supple, No JVD present. Moist mucous membranes  RESPIRATORY: b/l poor air entry; No rales, rhonchi, wheezing  CARDIOVASCULAR: S1S2+; no m/r/g  GASTROINTESTINAL: Soft, Non-tender, Nondistended; Bowel sounds present, no hepatosplenomegaly.   CENTRAL NERVOUS SYSTEM:  Awake, Alert & Oriented X3, No focal deficits present.   EXTREMITIES:  2+ Peripheral Pulses and No edema, No Cyanosis  SKIN: No rashes      LABS:                        10.0   6.58  )-----------( 256      ( 05 Apr 2020 12:56 )             33.1     04-05    138  |  106  |  106<H>  ----------------------------<  177<H>  5.6<H>   |  22  |  4.80<H>    Ca    8.5      05 Apr 2020 12:56  Phos  5.1     04-05  Mg     2.7     04-05    TPro  7.4  /  Alb  2.9<L>  /  TBili  0.3  /  DBili  x   /  AST  22  /  ALT  16  /  AlkPhos  53  04-05        Magnesium, Serum: 2.7 mg/dL <H> (04-05 @ 12:56)  Phosphorus Level, Serum: 5.1 mg/dL <H> (04-05 @ 12:56)    Urine studies      Medications:  acetaminophen   Tablet .. 650 milliGRAM(s) Oral every 6 hours PRN  ALBUTerol    90 MICROgram(s) HFA Inhaler 2 Puff(s) Inhalation every 6 hours PRN  aspirin  chewable 81 milliGRAM(s) Oral daily  atorvastatin 40 milliGRAM(s) Oral at bedtime  azithromycin   Tablet 250 milliGRAM(s) Oral daily  carvedilol 6.25 milliGRAM(s) Oral every 12 hours  dextrose 40% Gel 15 Gram(s) Oral once PRN  dextrose 5%. 1000 milliLiter(s) IV Continuous <Continuous>  dextrose 50% Injectable 12.5 Gram(s) IV Push once  dextrose 50% Injectable 25 Gram(s) IV Push once  dextrose 50% Injectable 25 Gram(s) IV Push once  glucagon  Injectable 1 milliGRAM(s) IntraMuscular once PRN  guaiFENesin   Syrup  (Sugar-Free) 100 milliGRAM(s) Oral every 6 hours PRN  heparin  Injectable 5000 Unit(s) SubCutaneous every 12 hours  insulin glargine Injectable (LANTUS) 10 Unit(s) SubCutaneous two times a day  insulin lispro (HumaLOG) corrective regimen sliding scale   SubCutaneous three times a day before meals  melatonin 3 milliGRAM(s) Oral at bedtime PRN  pantoprazole    Tablet 40 milliGRAM(s) Oral before breakfast  sodium zirconium cyclosilicate 10 Gram(s) Oral once      RADIOLOGY & ADDITIONAL TESTS:

## 2020-04-06 ENCOUNTER — TRANSCRIPTION ENCOUNTER (OUTPATIENT)
Age: 55
End: 2020-04-06

## 2020-04-06 LAB
ALBUMIN SERPL ELPH-MCNC: 2.6 G/DL — LOW (ref 3.5–5)
ALP SERPL-CCNC: 50 U/L — SIGNIFICANT CHANGE UP (ref 40–120)
ALT FLD-CCNC: 14 U/L DA — SIGNIFICANT CHANGE UP (ref 10–60)
ANION GAP SERPL CALC-SCNC: 7 MMOL/L — SIGNIFICANT CHANGE UP (ref 5–17)
ANISOCYTOSIS BLD QL: SLIGHT — SIGNIFICANT CHANGE UP
AST SERPL-CCNC: 21 U/L — SIGNIFICANT CHANGE UP (ref 10–40)
BASOPHILS # BLD AUTO: 0 K/UL — SIGNIFICANT CHANGE UP (ref 0–0.2)
BASOPHILS NFR BLD AUTO: 0 % — SIGNIFICANT CHANGE UP (ref 0–2)
BILIRUB SERPL-MCNC: 0.3 MG/DL — SIGNIFICANT CHANGE UP (ref 0.2–1.2)
BUN SERPL-MCNC: 114 MG/DL — HIGH (ref 7–18)
CALCIUM SERPL-MCNC: 8.1 MG/DL — LOW (ref 8.4–10.5)
CHLORIDE SERPL-SCNC: 107 MMOL/L — SIGNIFICANT CHANGE UP (ref 96–108)
CO2 SERPL-SCNC: 24 MMOL/L — SIGNIFICANT CHANGE UP (ref 22–31)
CREAT SERPL-MCNC: 4.67 MG/DL — HIGH (ref 0.5–1.3)
EOSINOPHIL # BLD AUTO: 0 K/UL — SIGNIFICANT CHANGE UP (ref 0–0.5)
EOSINOPHIL NFR BLD AUTO: 0 % — SIGNIFICANT CHANGE UP (ref 0–6)
GLUCOSE BLDC GLUCOMTR-MCNC: 137 MG/DL — HIGH (ref 70–99)
GLUCOSE BLDC GLUCOMTR-MCNC: 149 MG/DL — HIGH (ref 70–99)
GLUCOSE BLDC GLUCOMTR-MCNC: 96 MG/DL — SIGNIFICANT CHANGE UP (ref 70–99)
GLUCOSE BLDC GLUCOMTR-MCNC: 97 MG/DL — SIGNIFICANT CHANGE UP (ref 70–99)
GLUCOSE SERPL-MCNC: 96 MG/DL — SIGNIFICANT CHANGE UP (ref 70–99)
HCT VFR BLD CALC: 30.4 % — LOW (ref 34.5–45)
HGB BLD-MCNC: 9.2 G/DL — LOW (ref 11.5–15.5)
HYPOCHROMIA BLD QL: SLIGHT — SIGNIFICANT CHANGE UP
INR BLD: 1.05 RATIO — SIGNIFICANT CHANGE UP (ref 0.88–1.16)
IRON SATN MFR SERPL: 19 % — SIGNIFICANT CHANGE UP (ref 15–50)
IRON SATN MFR SERPL: 35 UG/DL — LOW (ref 40–150)
LYMPHOCYTES # BLD AUTO: 1.2 K/UL — SIGNIFICANT CHANGE UP (ref 1–3.3)
LYMPHOCYTES # BLD AUTO: 23 % — SIGNIFICANT CHANGE UP (ref 13–44)
MANUAL SMEAR VERIFICATION: SIGNIFICANT CHANGE UP
MCHC RBC-ENTMCNC: 29.3 PG — SIGNIFICANT CHANGE UP (ref 27–34)
MCHC RBC-ENTMCNC: 30.3 GM/DL — LOW (ref 32–36)
MCV RBC AUTO: 96.8 FL — SIGNIFICANT CHANGE UP (ref 80–100)
MONOCYTES # BLD AUTO: 0.52 K/UL — SIGNIFICANT CHANGE UP (ref 0–0.9)
MONOCYTES NFR BLD AUTO: 10 % — SIGNIFICANT CHANGE UP (ref 2–14)
MYELOCYTES NFR BLD: 2 % — HIGH (ref 0–0)
NEUTROPHILS # BLD AUTO: 3.39 K/UL — SIGNIFICANT CHANGE UP (ref 1.8–7.4)
NEUTROPHILS NFR BLD AUTO: 65 % — SIGNIFICANT CHANGE UP (ref 43–77)
NRBC # BLD: 0 /100 — SIGNIFICANT CHANGE UP (ref 0–0)
PHOSPHATE SERPL-MCNC: 5.6 MG/DL — HIGH (ref 2.5–4.5)
PLAT MORPH BLD: NORMAL — SIGNIFICANT CHANGE UP
PLATELET # BLD AUTO: 294 K/UL — SIGNIFICANT CHANGE UP (ref 150–400)
PLATELET COUNT - ESTIMATE: NORMAL — SIGNIFICANT CHANGE UP
POIKILOCYTOSIS BLD QL AUTO: SLIGHT — SIGNIFICANT CHANGE UP
POLYCHROMASIA BLD QL SMEAR: SLIGHT — SIGNIFICANT CHANGE UP
POTASSIUM SERPL-MCNC: 5.9 MMOL/L — HIGH (ref 3.5–5.3)
POTASSIUM SERPL-SCNC: 5.9 MMOL/L — HIGH (ref 3.5–5.3)
PROT SERPL-MCNC: 7.2 G/DL — SIGNIFICANT CHANGE UP (ref 6–8.3)
PROTHROM AB SERPL-ACNC: 11.9 SEC — SIGNIFICANT CHANGE UP (ref 10–12.9)
RBC # BLD: 3.14 M/UL — LOW (ref 3.8–5.2)
RBC # FLD: 14.3 % — SIGNIFICANT CHANGE UP (ref 10.3–14.5)
RBC BLD AUTO: ABNORMAL
SODIUM SERPL-SCNC: 138 MMOL/L — SIGNIFICANT CHANGE UP (ref 135–145)
TIBC SERPL-MCNC: 186 UG/DL — LOW (ref 250–450)
UIBC SERPL-MCNC: 151 UG/DL — SIGNIFICANT CHANGE UP (ref 110–370)
WBC # BLD: 5.21 K/UL — SIGNIFICANT CHANGE UP (ref 3.8–10.5)
WBC # FLD AUTO: 5.21 K/UL — SIGNIFICANT CHANGE UP (ref 3.8–10.5)

## 2020-04-06 RX ORDER — SODIUM ZIRCONIUM CYCLOSILICATE 10 G/10G
10 POWDER, FOR SUSPENSION ORAL THREE TIMES A DAY
Refills: 0 | Status: DISCONTINUED | OUTPATIENT
Start: 2020-04-06 | End: 2020-04-08

## 2020-04-06 RX ORDER — FERROUS SULFATE 325(65) MG
325 TABLET ORAL DAILY
Refills: 0 | Status: DISCONTINUED | OUTPATIENT
Start: 2020-04-06 | End: 2020-04-10

## 2020-04-06 RX ADMIN — INSULIN GLARGINE 10 UNIT(S): 100 INJECTION, SOLUTION SUBCUTANEOUS at 09:00

## 2020-04-06 RX ADMIN — INSULIN GLARGINE 10 UNIT(S): 100 INJECTION, SOLUTION SUBCUTANEOUS at 22:20

## 2020-04-06 RX ADMIN — CARVEDILOL PHOSPHATE 6.25 MILLIGRAM(S): 80 CAPSULE, EXTENDED RELEASE ORAL at 06:03

## 2020-04-06 RX ADMIN — PANTOPRAZOLE SODIUM 40 MILLIGRAM(S): 20 TABLET, DELAYED RELEASE ORAL at 06:04

## 2020-04-06 RX ADMIN — SODIUM ZIRCONIUM CYCLOSILICATE 10 GRAM(S): 10 POWDER, FOR SUSPENSION ORAL at 04:04

## 2020-04-06 RX ADMIN — AZITHROMYCIN 250 MILLIGRAM(S): 500 TABLET, FILM COATED ORAL at 15:13

## 2020-04-06 RX ADMIN — SEVELAMER CARBONATE 800 MILLIGRAM(S): 2400 POWDER, FOR SUSPENSION ORAL at 06:04

## 2020-04-06 RX ADMIN — HEPARIN SODIUM 5000 UNIT(S): 5000 INJECTION INTRAVENOUS; SUBCUTANEOUS at 17:20

## 2020-04-06 RX ADMIN — SODIUM ZIRCONIUM CYCLOSILICATE 10 GRAM(S): 10 POWDER, FOR SUSPENSION ORAL at 22:20

## 2020-04-06 RX ADMIN — SODIUM ZIRCONIUM CYCLOSILICATE 10 GRAM(S): 10 POWDER, FOR SUSPENSION ORAL at 17:20

## 2020-04-06 RX ADMIN — HEPARIN SODIUM 5000 UNIT(S): 5000 INJECTION INTRAVENOUS; SUBCUTANEOUS at 06:04

## 2020-04-06 RX ADMIN — CARVEDILOL PHOSPHATE 6.25 MILLIGRAM(S): 80 CAPSULE, EXTENDED RELEASE ORAL at 17:20

## 2020-04-06 RX ADMIN — Medication 81 MILLIGRAM(S): at 15:12

## 2020-04-06 RX ADMIN — SEVELAMER CARBONATE 800 MILLIGRAM(S): 2400 POWDER, FOR SUSPENSION ORAL at 22:20

## 2020-04-06 RX ADMIN — SEVELAMER CARBONATE 800 MILLIGRAM(S): 2400 POWDER, FOR SUSPENSION ORAL at 15:13

## 2020-04-06 RX ADMIN — ATORVASTATIN CALCIUM 40 MILLIGRAM(S): 80 TABLET, FILM COATED ORAL at 23:34

## 2020-04-06 NOTE — PROGRESS NOTE ADULT - SUBJECTIVE AND OBJECTIVE BOX
Patient is a 55y old  Female who presents with a chief complaint of Fever, body ache (06 Apr 2020 13:57)    PATIENT IS SEEN AND EXAMINED IN MEDICAL FLOOR.  NGT [    ]    AUSTIN [   ]      GT [   ]    ALLERGIES:  codeine (Urticaria)      Daily     Daily     VITALS:    Vital Signs Last 24 Hrs  T(C): 36.8 (06 Apr 2020 16:30), Max: 36.8 (06 Apr 2020 16:30)  T(F): 98.3 (06 Apr 2020 16:30), Max: 98.3 (06 Apr 2020 16:30)  HR: 65 (06 Apr 2020 16:30) (65 - 89)  BP: 113/69 (06 Apr 2020 16:30) (99/63 - 139/65)  BP(mean): --  RR: 20 (06 Apr 2020 16:30) (20 - 20)  SpO2: 96% (06 Apr 2020 16:30) (92% - 97%)    LABS:    CBC Full  -  ( 06 Apr 2020 15:25 )  WBC Count : 5.21 K/uL  RBC Count : 3.14 M/uL  Hemoglobin : 9.2 g/dL  Hematocrit : 30.4 %  Platelet Count - Automated : 294 K/uL  Mean Cell Volume : 96.8 fl  Mean Cell Hemoglobin : 29.3 pg  Mean Cell Hemoglobin Concentration : 30.3 gm/dL  Auto Neutrophil # : 3.39 K/uL  Auto Lymphocyte # : 1.20 K/uL  Auto Monocyte # : 0.52 K/uL  Auto Eosinophil # : 0.00 K/uL  Auto Basophil # : 0.00 K/uL  Auto Neutrophil % : 65.0 %  Auto Lymphocyte % : 23.0 %  Auto Monocyte % : 10.0 %  Auto Eosinophil % : 0.0 %  Auto Basophil % : 0.0 %    PT/INR - ( 06 Apr 2020 15:23 )   PT: 11.9 sec;   INR: 1.05 ratio           04-06    138  |  107  |  114<H>  ----------------------------<  96  5.9<H>   |  24  |  4.67<H>    Ca    8.1<L>      06 Apr 2020 15:22  Phos  5.6     04-06  Mg     2.7     04-05    TPro  7.2  /  Alb  2.6<L>  /  TBili  0.3  /  DBili  x   /  AST  21  /  ALT  14  /  AlkPhos  50  04-06    CAPILLARY BLOOD GLUCOSE      POCT Blood Glucose.: 97 mg/dL (06 Apr 2020 17:38)  POCT Blood Glucose.: 137 mg/dL (06 Apr 2020 11:57)  POCT Blood Glucose.: 96 mg/dL (06 Apr 2020 08:13)  POCT Blood Glucose.: 95 mg/dL (05 Apr 2020 22:25)        LIVER FUNCTIONS - ( 06 Apr 2020 15:22 )  Alb: 2.6 g/dL / Pro: 7.2 g/dL / ALK PHOS: 50 U/L / ALT: 14 U/L DA / AST: 21 U/L / GGT: x           Creatinine Trend: 4.67<--, 4.80<--, 4.29<--, 3.77<--  I&O's Summary              MEDICATIONS:    MEDICATIONS  (STANDING):  aspirin  chewable 81 milliGRAM(s) Oral daily  atorvastatin 40 milliGRAM(s) Oral at bedtime  azithromycin   Tablet 250 milliGRAM(s) Oral daily  carvedilol 6.25 milliGRAM(s) Oral every 12 hours  dextrose 5%. 1000 milliLiter(s) (50 mL/Hr) IV Continuous <Continuous>  dextrose 50% Injectable 12.5 Gram(s) IV Push once  dextrose 50% Injectable 25 Gram(s) IV Push once  dextrose 50% Injectable 25 Gram(s) IV Push once  ferrous    sulfate 325 milliGRAM(s) Oral daily  heparin  Injectable 5000 Unit(s) SubCutaneous every 12 hours  insulin glargine Injectable (LANTUS) 10 Unit(s) SubCutaneous two times a day  insulin lispro (HumaLOG) corrective regimen sliding scale   SubCutaneous three times a day before meals  pantoprazole    Tablet 40 milliGRAM(s) Oral before breakfast  sevelamer carbonate 800 milliGRAM(s) Oral three times a day  sodium zirconium cyclosilicate 10 Gram(s) Oral three times a day      MEDICATIONS  (PRN):  acetaminophen   Tablet .. 650 milliGRAM(s) Oral every 6 hours PRN Temp greater or equal to 38C (100.4F)  ALBUTerol    90 MICROgram(s) HFA Inhaler 2 Puff(s) Inhalation every 6 hours PRN Shortness of Breath and/or Wheezing  dextrose 40% Gel 15 Gram(s) Oral once PRN Blood Glucose LESS THAN 70 milliGRAM(s)/deciliter  glucagon  Injectable 1 milliGRAM(s) IntraMuscular once PRN Glucose LESS THAN 70 milligrams/deciliter  guaiFENesin   Syrup  (Sugar-Free) 100 milliGRAM(s) Oral every 6 hours PRN Cough  melatonin 3 milliGRAM(s) Oral at bedtime PRN Sleep      REVIEW OF SYSTEMS:                           ALL ROS DONE [ X   ]    CONSTITUTIONAL:  LETHARGIC [   ], FEVER [   ], UNRESPONSIVE [   ]  CVS:  CP  [   ], SOB, [   ], PALPITATIONS [   ], DIZZYNESS [   ]  RS: COUGH [   ], SPUTUM [   ]  GI: ABDOMINAL PAIN [   ], NAUSEA [   ], VOMITINGS [   ], DIARRHEA [   ], CONSTIPATION [   ]  :  DYSURIA [   ], NOCTURIA [   ], INCREASED FREQUENCY [   ], DRIBLING [   ],  SKELETAL: PAINFUL JOINTS [   ], SWOLLEN JOINTS [   ], NECK ACHE [   ], LOW BACK ACHE [   ],  SKIN : ULCERS [   ], RASH [   ], ITCHING [   ]  CNS: HEAD ACHE [   ], DOUBLE VISION [   ], BLURRED VISION [   ], AMS / CONFUSION [   ], SEIZURES [   ], WEAKNESS [   ],TINGLING / NUMBNESS [   ]    PHYSICAL EXAMINATION:  GENERAL APPEARANCE: NO DISTRESS, MORBIDLY OBESE  HEENT:  NO PALLOR, NO  JVD,  NO   NODES, NECK SUPPLE  CVS: S1 +, S2 +,   RS: AEEB,  OCCASIONAL  RALES +,   MILD B/L RHONCHI +  ABD: SOFT, NT, NO, BS +  EXT: PE +  SKIN: WARM,          AICD+  SKELETAL:  ROM ACCEPTABLE  CNS:  AAO X  3 ,   DEFICITS    RADIOLOGY :  < from: Xray Chest 1 View- PORTABLE-Urgent (04.02.20 @ 18:58) >  IMPRESSION:  Right infiltrate.    < end of copied text >      ASSESSMENT :     Coronavirus infection  GERD (gastroesophageal reflux disease)  CHF (congestive heart failure)  CAD (coronary artery disease)  AICD (automatic cardioverter/defibrillator) present  COPD (chronic obstructive pulmonary disease)  RA (rheumatoid arthritis)  DM (diabetes mellitus)  HTN (hypertension)  ICD (implantable cardioverter-defibrillator) in place  History of cholecystectomy      PLAN:  HPI:  Pt is a 56 y/o F  from [facility] with PMH of Obesity, CHF HFrEF 25 %, s/p AICD, HTN, B/l LE edema, DM, Gout, CAD, HLD, COPD  who presented with cough since 5 days , shortness of breath since 2days and fever since 1 day. According to the pt, since 5 days she has developed dry cough. Since last two days she has developed mild shortness of breath which is worse on exertion. She has some chest tightness and fever with chills since today. She denied recent travel, sick contact, nausea, vomiting, abdominal pain and other complains. (02 Apr 2020 20:45)    PATIENT IS A 54YO F FROM [facility] WITH PMHX SIGNIFICANT FOR AMPARO/OHS ON CPAP, HFREF [25%], PVD, DM, CAD, HLD, COPD AND GOUT WHO WAS TRANSFERRED FOR EVALUATION OF COUGH/DYSPNEA/FEVER.    # ACUTE HYPOXIC RESPIRATORY FAILURE S/T COVID19 PNEUMONIA, SEPSIS AND SUSPECTED SECONDARY BACTERIAL PNEUMONIA - NOTED PROLONGED QTC, WILL HOLD PLAQUENIL  # MORBID OBESITY, RESTRICTIVE LUNG DISEASE, OBSTRUCTIVE SLEEP APNEA ON CPAP, COPD  # SYSTOLIC CHF S/P AICD, ON CHRONIC O2 SUPPLEMENT  # ANJU ON CKD4 - AVOID NEPHROTOXIC MEDICATION; NEPHROLOGY CONSULTED - SUSPECT PRE-RENAL AZOTEMIA FROM DIURETICS VS. ATN - DIURETICS HELD  # HYPERKALEMIA S/T ANJU - ON LOKELMA  # NORMOCYTIC ANEMIA - IRON DEFICIENCY ANEMIA + ANEMIA OF CKD - PLACED ON FESO4  # MINERAL BONE DISEASE  # D/C TO [facility] WHEN MEDICALLY STABLE AS [facility] DOES NOT HAVE AN ISOLATION  # GI AND DVT PPX    LUANN NATARAJAN M.D. COVERING FOR DONTAE NATARAJAN M.D.

## 2020-04-06 NOTE — PROGRESS NOTE ADULT - SUBJECTIVE AND OBJECTIVE BOX
Note is incomplete  pt seen and examined.pts current chart reviewed and case discussed with resident covering.    SUBJECTIVE:  pt feels fine and denies cp,sob,gi or gu/uremic  symptons    REVIEW OF SYSTEMS:  CONSTITUTIONAL: No weakness, fevers or chills  EYES/ENT: No visual changes;  No vertigo or throat pain   NECK: No pain or stiffness  RESPIRATORY: No cough, wheezing, hemoptysis; No shortness of breath  CARDIOVASCULAR: No chest pain or palpitations  GASTROINTESTINAL: No abdominal or epigastric pain. No nausea, vomiting, or hematemesis; No diarrhea or constipation. No melena or hematochezia.  GENITOURINARY: No dysuria, frequency , hematuria, flank pain or nocturia  NEUROLOGICAL: No numbness or weakness  SKIN: No itching, burning, rashes, or lesions   All other review of systems is negative unless indicated above    Current meds:    acetaminophen   Tablet .. 650 milliGRAM(s) Oral every 6 hours PRN  ALBUTerol    90 MICROgram(s) HFA Inhaler 2 Puff(s) Inhalation every 6 hours PRN  aspirin  chewable 81 milliGRAM(s) Oral daily  atorvastatin 40 milliGRAM(s) Oral at bedtime  azithromycin   Tablet 250 milliGRAM(s) Oral daily  carvedilol 6.25 milliGRAM(s) Oral every 12 hours  dextrose 40% Gel 15 Gram(s) Oral once PRN  dextrose 5%. 1000 milliLiter(s) IV Continuous <Continuous>  dextrose 50% Injectable 12.5 Gram(s) IV Push once  dextrose 50% Injectable 25 Gram(s) IV Push once  dextrose 50% Injectable 25 Gram(s) IV Push once  glucagon  Injectable 1 milliGRAM(s) IntraMuscular once PRN  guaiFENesin   Syrup  (Sugar-Free) 100 milliGRAM(s) Oral every 6 hours PRN  heparin  Injectable 5000 Unit(s) SubCutaneous every 12 hours  insulin glargine Injectable (LANTUS) 10 Unit(s) SubCutaneous two times a day  insulin lispro (HumaLOG) corrective regimen sliding scale   SubCutaneous three times a day before meals  melatonin 3 milliGRAM(s) Oral at bedtime PRN  pantoprazole    Tablet 40 milliGRAM(s) Oral before breakfast  sevelamer carbonate 800 milliGRAM(s) Oral three times a day      Vital Signs    T(F): 98 (04-06-20 @ 05:22), Max: 98.7 (04-05-20 @ 15:01)  HR: 79 (04-06-20 @ 05:22) (72 - 89)  BP: 121/74 (04-06-20 @ 05:22) (121/74 - 139/65)  ABP: --  RR: 20 (04-06-20 @ 05:22) (20 - 20)  SpO2: 97% (04-06-20 @ 05:22) (92% - 97%)  Wt(kg): --  CVP(cm H2O): --  CO: --  PCWP: --    I and O's:    Daily     Daily     PHYSICAL EXAM:  Constitutional: well developed, well nourished  and in nad  HEENT: PERRLA,  no icteric sclera and mild pallor of conjunctiva noted  Neck: No JVD, thyromegaly or adenopathy  Respiratory: reduced air entry lower lungs with no rales, wheezing or rhonchi  Cardiovascular: S1 and S2 normally heard  Gastrointestinal: soft, nondistended, nontender and normal bowel sounds heard  Extremities: No peripheral edema or cyanosis  Neurological: A/O x 3, no focal deficits  : No flank or cva tenderness palpated.  Skin: No rashes      LABS:    CBC:                          10.0   6.58  )-----------( 256      ( 05 Apr 2020 12:56 )             33.1           BMP:    04-05    138  |  106  |  106<H>  ----------------------------<  177<H>  5.6<H>   |  22  |  4.80<H>    Ca    8.5      05 Apr 2020 12:56  Phos  5.1     04-05  Mg     2.7     04-05    TPro  7.4  /  Alb  2.9<L>  /  TBili  0.3  /  DBili  x   /  AST  22  /  ALT  16  /  AlkPhos  53  04-05          URINE STUDIES:                        RADIOLOGY & ADDITIONAL STUDIES: pt seen and examined.pts current chart reviewed and case discussed with NP covering    SUBJECTIVE:  pt  denies cp,sob,gi or gu  symptoms  pt is voiding but u/o is unknown    REVIEW OF SYSTEMS:  CONSTITUTIONAL: No weakness, fevers or chills  RESPIRATORY: No cough, wheezing, hemoptysis; No shortness of breath  CARDIOVASCULAR: No chest pain or palpitations  GASTROINTESTINAL: No abdominal or epigastric pain. No nausea, vomiting, or hematemesis; No diarrhea or constipation. No melena or hematochezia.  GENITOURINARY: No dysuria, frequency , hematuria, flank pain or nocturia  NEUROLOGICAL: No numbness or weakness  SKIN: No itching, burning, rashes, or lesions   All other review of systems is negative unless indicated above    Current meds:    acetaminophen   Tablet .. 650 milliGRAM(s) Oral every 6 hours PRN  ALBUTerol    90 MICROgram(s) HFA Inhaler 2 Puff(s) Inhalation every 6 hours PRN  aspirin  chewable 81 milliGRAM(s) Oral daily  atorvastatin 40 milliGRAM(s) Oral at bedtime  azithromycin   Tablet 250 milliGRAM(s) Oral daily  carvedilol 6.25 milliGRAM(s) Oral every 12 hours  dextrose 40% Gel 15 Gram(s) Oral once PRN  dextrose 5%. 1000 milliLiter(s) IV Continuous <Continuous>  dextrose 50% Injectable 12.5 Gram(s) IV Push once  dextrose 50% Injectable 25 Gram(s) IV Push once  dextrose 50% Injectable 25 Gram(s) IV Push once  glucagon  Injectable 1 milliGRAM(s) IntraMuscular once PRN  guaiFENesin   Syrup  (Sugar-Free) 100 milliGRAM(s) Oral every 6 hours PRN  heparin  Injectable 5000 Unit(s) SubCutaneous every 12 hours  insulin glargine Injectable (LANTUS) 10 Unit(s) SubCutaneous two times a day  insulin lispro (HumaLOG) corrective regimen sliding scale   SubCutaneous three times a day before meals  melatonin 3 milliGRAM(s) Oral at bedtime PRN  pantoprazole    Tablet 40 milliGRAM(s) Oral before breakfast  sevelamer carbonate 800 milliGRAM(s) Oral three times a day      Vital Signs    T(F): 98 (04-06-20 @ 05:22), Max: 98.7 (04-05-20 @ 15:01)  HR: 79 (04-06-20 @ 05:22) (72 - 89)  BP: 121/74 (04-06-20 @ 05:22) (121/74 - 139/65)  ABP: --  RR: 20 (04-06-20 @ 05:22) (20 - 20)  SpO2: 97% (04-06-20 @ 05:22) (92% - 97%)  Wt(kg): --  CVP(cm H2O): --  CO: --  PCWP: --    I and O's:    Daily     Daily     PHYSICAL EXAM:  GENERAL: No acute respiratory distress on NC; obese  EYES: conjunctiva and sclera clear  ENMT: Atraumatic, Normocephalic, supple, No JVD present. Moist mucous membranes  RESPIRATORY: b/l poor air entry; No rales, rhonchi, wheezing  CARDIOVASCULAR: S1S2+; no m/r/g  GASTROINTESTINAL: Soft, Non-tender, Nondistended; Bowel sounds present, no hepatosplenomegaly.   CENTRAL NERVOUS SYSTEM:  Awake, Alert & Oriented X3, No focal deficits present.   EXTREMITIES:   No edema, No Cyanosis  SKIN: No rashes      LABS:    CBC:                          10.0   6.58  )-----------( 256      ( 05 Apr 2020 12:56 )             33.1           BMP:    Phosphorus Level, Serum (04.06.20 @ 15:22)    Phosphorus Level, Serum: 5.6 mg/dL  Iron with Total Binding Capacity in AM (04.06.20 @ 15:23)    Iron - Total Binding Capacity.: 186 ug/dL    % Saturation, Iron: 19 %    Iron Total, Serum: 35 ug/dL    Unsaturated Iron Binding Capacity: 151 ug/dL      Comprehensive Metabolic Panel (04.06.20 @ 15:22)    Sodium, Serum: 138 mmol/L    Potassium, Serum: 5.9 mmol/L    Chloride, Serum: 107 mmol/L    Carbon Dioxide, Serum: 24 mmol/L    Anion Gap, Serum: 7 mmol/L    Blood Urea Nitrogen, Serum: 114 mg/dL    Creatinine, Serum: 4.67 mg/dL    Glucose, Serum: 96 mg/dL    Calcium, Total Serum: 8.1 mg/dL    Protein Total, Serum: 7.2 g/dL    Albumin, Serum: 2.6 g/dL    Bilirubin Total, Serum: 0.3 mg/dL    Alkaline Phosphatase, Serum: 50 U/L    Aspartate Aminotransferase (AST/SGOT): 21 U/L    Alanine Aminotransferase (ALT/SGPT): 14 U/L DA    eGFR if Non : 10: Interpretative comment  in patients with unstable creatinine levels. mL/min/1.73M2    eGFR if African American: 11 mL/min/1.73M2      138  |  106  |  106<H>  ----------------------------<  177<H>  5.6<H>   |  22  |  4.80<H>    Ca    8.5      05 Apr 2020 12:56  Phos  5.1     04-05  Mg     2.7     04-05    TPro  7.4  /  Alb  2.9<L>  /  TBili  0.3  /  DBili  x   /  AST  22  /  ALT  16  /  AlkPhos  53  04-05          URINE STUDIES:  none available for this admission

## 2020-04-06 NOTE — DISCHARGE NOTE PROVIDER - NSDCFUADDINST_GEN_ALL_CORE_FT
CORONAVIRUS INSTRUCTIONS:   Based on your current clinical status and stability, it has been determined that you no longer need hospitalization and can recover while remaining in self-quarantine at home. You should follow the prevention steps below until a healthcare provider or local or state health department says you can return to your normal activities.     1. You should restrict activities outside your home, except for getting medical care.   2. Do not go to work, school, or public areas.   3. Avoid using public transportation, ride-sharing, or taxis.   4. Separate yourself from other people and animals in your home as much as possible.  When you are around other people (e.g., sharing a room or vehicle) you should wear a facemask.  5. Wash your hands often with soap and water for at least 20 seconds, especially after blowing your nose, coughing, or sneezing; going to the bathroom; and before eating or preparing food.  6. Cover your mouth and nose with a tissue when you cough or sneeze. Throw used tissues in a lined trash can. Immediately wash your hands with soap and water for at least 20 seconds  7. High touch surfaces include counters, tabletops, doorknobs, bathroom fixtures, toilets, phones, keyboards, tablets, and bedside tables.  8. Avoid sharing dishes, drinking glasses, cups, eating utensils, towels, or bedding with other people or pets in your home. After using these items, they should be washed thoroughly with soap and water.  You are strongly advised to seek prompt medical attention if your illness worsens or you develop new symptoms like fever or difficulty breathing.   Please call  [phone] to speak with your discharging physician if you have any questions.

## 2020-04-06 NOTE — DISCHARGE NOTE PROVIDER - HOSPITAL COURSE
Pt is a 54 y/o F  from [facility] with PMH of Obesity, CHF HFrEF 25 %, s/p AICD, HTN, B/l LE edema, DM, Gout, CAD, HLD, COPD  who presented with cough since 5 days , shortness of breath since 2days and fever since 1 day. CXR with right sided infiltrate. Pt is admitted for respiratory failure secondary to viral pneumonia. Positive COVID-19. Pt also with suspected bacterial pneumonia. Plaquenil held for prolonged QTC.         NOT COMPLETE         CORONAVIRUS INSTRUCTIONS:     Based on your current clinical status and stability, it has been determined that you no longer need hospitalization and can recover while remaining in self-quarantine at home. You should follow the prevention steps below until a healthcare provider or local or state health department says you can return to your normal activities.         1. You should restrict activities outside your home, except for getting medical care.     2. Do not go to work, school, or public areas.     3. Avoid using public transportation, ride-sharing, or taxis.     4. Separate yourself from other people and animals in your home as much as possible.  When you are around other people (e.g., sharing a room or vehicle) you should wear a facemask.    5. Wash your hands often with soap and water for at least 20 seconds, especially after blowing your nose, coughing, or sneezing; going to the bathroom; and before eating or preparing food.    6. Cover your mouth and nose with a tissue when you cough or sneeze. Throw used tissues in a lined trash can. Immediately wash your hands with soap and water for at least 20 seconds    7. High touch surfaces include counters, tabletops, doorknobs, bathroom fixtures, toilets, phones, keyboards, tablets, and bedside tables.    8. Avoid sharing dishes, drinking glasses, cups, eating utensils, towels, or bedding with other people or pets in your home. After using these items, they should be washed thoroughly with soap and water.    You are strongly advised to seek prompt medical attention if your illness worsens or you develop new symptoms like fever or difficulty breathing.     Please call   to speak with your discharging physician if you have any questions. Pt is a 54 y/o F  from [facility] with PMH of Obesity, CHF HFrEF 25 %, s/p AICD, HTN, B/l LE edema, DM, Gout, CAD, HLD, COPD  who presented with cough since 5 days , shortness of breath since 2days and fever since 1 day. CXR with right sided infiltrate. Pt is admitted for respiratory failure secondary to viral pneumonia. Positive COVID-19. Pt also with suspected bacterial pneumonia. Pt treated with  Azithromycin and Ceftriaxone. Plaquenil held for prolonged QTC. Pt to be discharged to [facility] today, 4/10.              CORONAVIRUS INSTRUCTIONS:     Based on your current clinical status and stability, it has been determined that you no longer need hospitalization and can recover while remaining in self-quarantine at home. You should follow the prevention steps below until a healthcare provider or local or state health department says you can return to your normal activities.         1. You should restrict activities outside your home, except for getting medical care.     2. Do not go to work, school, or public areas.     3. Avoid using public transportation, ride-sharing, or taxis.     4. Separate yourself from other people and animals in your home as much as possible.  When you are around other people (e.g., sharing a room or vehicle) you should wear a facemask.    5. Wash your hands often with soap and water for at least 20 seconds, especially after blowing your nose, coughing, or sneezing; going to the bathroom; and before eating or preparing food.    6. Cover your mouth and nose with a tissue when you cough or sneeze. Throw used tissues in a lined trash can. Immediately wash your hands with soap and water for at least 20 seconds    7. High touch surfaces include counters, tabletops, doorknobs, bathroom fixtures, toilets, phones, keyboards, tablets, and bedside tables.    8. Avoid sharing dishes, drinking glasses, cups, eating utensils, towels, or bedding with other people or pets in your home. After using these items, they should be washed thoroughly with soap and water.    You are strongly advised to seek prompt medical attention if your illness worsens or you develop new symptoms like fever or difficulty breathing.     Please call   to speak with your discharging physician if you have any questions. Pt is a 56 y/o F  from [facility] with PMH of Obesity, CHF HFrEF 25 %, s/p AICD, HTN, B/l LE edema, DM, Gout, CAD, HLD, COPD  who presented with cough since 5 days , shortness of breath since 2days and fever since 1 day. CXR with right sided infiltrate. Pt is admitted for respiratory failure secondary to viral pneumonia. Positive COVID-19. Pt also with suspected bacterial pneumonia. Pt treated with  Azithromycin and Ceftriaxone. Plaquenil held for prolonged QTC. Pt to be discharged to [facility] today, 4/10. Potassium improved from 5.6 to 5.3. Pending to be discharged to nursing home. Per Gagandeep ,  scheduled for 18:00.              CORONAVIRUS INSTRUCTIONS:     Based on your current clinical status and stability, it has been determined that you no longer need hospitalization and can recover while remaining in self-quarantine at home. You should follow the prevention steps below until a healthcare provider or local or state health department says you can return to your normal activities.         1. You should restrict activities outside your home, except for getting medical care.     2. Do not go to work, school, or public areas.     3. Avoid using public transportation, ride-sharing, or taxis.     4. Separate yourself from other people and animals in your home as much as possible.  When you are around other people (e.g., sharing a room or vehicle) you should wear a facemask.    5. Wash your hands often with soap and water for at least 20 seconds, especially after blowing your nose, coughing, or sneezing; going to the bathroom; and before eating or preparing food.    6. Cover your mouth and nose with a tissue when you cough or sneeze. Throw used tissues in a lined trash can. Immediately wash your hands with soap and water for at least 20 seconds    7. High touch surfaces include counters, tabletops, doorknobs, bathroom fixtures, toilets, phones, keyboards, tablets, and bedside tables.    8. Avoid sharing dishes, drinking glasses, cups, eating utensils, towels, or bedding with other people or pets in your home. After using these items, they should be washed thoroughly with soap and water.    You are strongly advised to seek prompt medical attention if your illness worsens or you develop new symptoms like fever or difficulty breathing.     Please call  [phone] to speak with your discharging physician if you have any questions.

## 2020-04-06 NOTE — DISCHARGE NOTE PROVIDER - NSDCMRMEDTOKEN_GEN_ALL_CORE_FT
allopurinol 100 mg oral tablet: 1 tab(s) orally once a day  aspirin 81 mg oral tablet, chewable: 1 tab(s) orally once a day  atorvastatin 40 mg oral tablet: 1 tab(s) orally once a day (at bedtime)  Coreg 6.25 mg oral tablet: 1 tab(s) orally 2 times a day  docusate sodium 100 mg oral capsule: 1 cap(s) orally once a day  HumaLOG KwikPen 200 units/mL (Concentrated) subcutaneous solution: 6 unit(s) subcutaneous every 8 hours  sliding scale as needed   Lasix 40 mg oral tablet: 1 tab(s) orally once a day  losartan 25 mg oral tablet: 1 tab(s) orally once a day  multivitamin: 1 tab(s) orally once a day  NovoLIN 70/30 subcutaneous suspension: 34 unit(s) subcutaneous once a day (in the morning)  NovoLIN 70/30 subcutaneous suspension: 20 unit(s) subcutaneous once a day (in the evening)  pantoprazole 40 mg oral delayed release tablet: 1 tab(s) orally once a day (before a meal)  senna oral tablet: 2 tab(s) orally once a day (at bedtime)  spironolactone 25 mg oral tablet: 2 tab(s) orally once a day  Ventolin HFA 90 mcg/inh inhalation aerosol: 2 puff(s) inhaled every 6 hours  Vitamin D3 1000 intl units oral capsule: 1 cap(s) orally once a day  Zaroxolyn 5 mg oral tablet: 1 tab(s) orally Monday, Wednesday, and Friday acetaminophen 325 mg oral tablet: 2 tab(s) orally every 6 hours, As needed, Temp greater or equal to 38C (100.4F)  allopurinol 100 mg oral tablet: 1 tab(s) orally once a day  aspirin 81 mg oral tablet, chewable: 1 tab(s) orally once a day  atorvastatin 40 mg oral tablet: 1 tab(s) orally once a day (at bedtime)  Coreg 6.25 mg oral tablet: 1 tab(s) orally 2 times a day  docusate sodium 100 mg oral capsule: 1 cap(s) orally once a day  guaiFENesin 100 mg/5 mL oral liquid: 5 milliliter(s) orally every 6 hours, As needed, Cough  HumaLOG KwikPen 200 units/mL (Concentrated) subcutaneous solution: 6 unit(s) subcutaneous every 8 hours  sliding scale as needed   Lasix 40 mg oral tablet: 1 tab(s) orally once a day  losartan 25 mg oral tablet: 1 tab(s) orally once a day  melatonin 3 mg oral tablet: 1 tab(s) orally once a day (at bedtime), As needed, Sleep  multivitamin: 1 tab(s) orally once a day  NovoLIN 70/30 subcutaneous suspension: 34 unit(s) subcutaneous once a day (in the morning)  NovoLIN 70/30 subcutaneous suspension: 20 unit(s) subcutaneous once a day (in the evening)  pantoprazole 40 mg oral delayed release tablet: 1 tab(s) orally once a day (before a meal)  senna oral tablet: 2 tab(s) orally once a day (at bedtime)  sevelamer carbonate 800 mg oral tablet: 2 tab(s) orally 3 times a day (with meals)  spironolactone 25 mg oral tablet: 2 tab(s) orally once a day  Ventolin HFA 90 mcg/inh inhalation aerosol: 2 puff(s) inhaled every 6 hours  Vitamin D3 1000 intl units oral capsule: 1 cap(s) orally once a day  Zaroxolyn 5 mg oral tablet: 1 tab(s) orally Monday, Wednesday, and Friday

## 2020-04-06 NOTE — DISCHARGE NOTE PROVIDER - NSDCCPCAREPLAN_GEN_ALL_CORE_FT
PRINCIPAL DISCHARGE DIAGNOSIS  Diagnosis: Coronavirus infection  Assessment and Plan of Treatment:       SECONDARY DISCHARGE DIAGNOSES  Diagnosis: HTN (hypertension)  Assessment and Plan of Treatment:     Diagnosis: Viral pneumonia  Assessment and Plan of Treatment: Viral pneumonia

## 2020-04-07 LAB
ALBUMIN SERPL ELPH-MCNC: 2.7 G/DL — LOW (ref 3.5–5)
ALP SERPL-CCNC: 51 U/L — SIGNIFICANT CHANGE UP (ref 40–120)
ALT FLD-CCNC: 15 U/L DA — SIGNIFICANT CHANGE UP (ref 10–60)
ANION GAP SERPL CALC-SCNC: 7 MMOL/L — SIGNIFICANT CHANGE UP (ref 5–17)
AST SERPL-CCNC: 18 U/L — SIGNIFICANT CHANGE UP (ref 10–40)
BILIRUB SERPL-MCNC: 0.3 MG/DL — SIGNIFICANT CHANGE UP (ref 0.2–1.2)
BUN SERPL-MCNC: 111 MG/DL — HIGH (ref 7–18)
CALCIUM SERPL-MCNC: 8 MG/DL — LOW (ref 8.4–10.5)
CALCIUM SERPL-MCNC: 8.5 MG/DL — SIGNIFICANT CHANGE UP (ref 8.4–10.5)
CHLORIDE SERPL-SCNC: 105 MMOL/L — SIGNIFICANT CHANGE UP (ref 96–108)
CO2 SERPL-SCNC: 25 MMOL/L — SIGNIFICANT CHANGE UP (ref 22–31)
CREAT SERPL-MCNC: 4.6 MG/DL — HIGH (ref 0.5–1.3)
CRP SERPL-MCNC: 3.44 MG/DL — HIGH (ref 0–0.4)
FERRITIN SERPL-MCNC: 334 NG/ML — HIGH (ref 15–150)
GLUCOSE BLDC GLUCOMTR-MCNC: 111 MG/DL — HIGH (ref 70–99)
GLUCOSE BLDC GLUCOMTR-MCNC: 112 MG/DL — HIGH (ref 70–99)
GLUCOSE BLDC GLUCOMTR-MCNC: 120 MG/DL — HIGH (ref 70–99)
GLUCOSE BLDC GLUCOMTR-MCNC: 134 MG/DL — HIGH (ref 70–99)
GLUCOSE SERPL-MCNC: 117 MG/DL — HIGH (ref 70–99)
HAV IGM SER-ACNC: SIGNIFICANT CHANGE UP
HBV CORE IGM SER-ACNC: SIGNIFICANT CHANGE UP
HBV SURFACE AG SER-ACNC: SIGNIFICANT CHANGE UP
HCT VFR BLD CALC: 30.8 % — LOW (ref 34.5–45)
HCV AB S/CO SERPL IA: 0.18 S/CO — SIGNIFICANT CHANGE UP (ref 0–0.99)
HCV AB SERPL-IMP: SIGNIFICANT CHANGE UP
HGB BLD-MCNC: 9.3 G/DL — LOW (ref 11.5–15.5)
MCHC RBC-ENTMCNC: 29.3 PG — SIGNIFICANT CHANGE UP (ref 27–34)
MCHC RBC-ENTMCNC: 30.2 GM/DL — LOW (ref 32–36)
MCV RBC AUTO: 97.2 FL — SIGNIFICANT CHANGE UP (ref 80–100)
NRBC # BLD: 0 /100 WBCS — SIGNIFICANT CHANGE UP (ref 0–0)
PHOSPHATE SERPL-MCNC: 6.5 MG/DL — HIGH (ref 2.5–4.5)
PLATELET # BLD AUTO: 342 K/UL — SIGNIFICANT CHANGE UP (ref 150–400)
POTASSIUM SERPL-MCNC: 5.3 MMOL/L — SIGNIFICANT CHANGE UP (ref 3.5–5.3)
POTASSIUM SERPL-SCNC: 5.3 MMOL/L — SIGNIFICANT CHANGE UP (ref 3.5–5.3)
PROT SERPL-MCNC: 7.2 G/DL — SIGNIFICANT CHANGE UP (ref 6–8.3)
PTH-INTACT FLD-MCNC: 82 PG/ML — HIGH (ref 15–65)
RBC # BLD: 3.17 M/UL — LOW (ref 3.8–5.2)
RBC # FLD: 14.1 % — SIGNIFICANT CHANGE UP (ref 10.3–14.5)
SODIUM SERPL-SCNC: 137 MMOL/L — SIGNIFICANT CHANGE UP (ref 135–145)
WBC # BLD: 5.18 K/UL — SIGNIFICANT CHANGE UP (ref 3.8–10.5)
WBC # FLD AUTO: 5.18 K/UL — SIGNIFICANT CHANGE UP (ref 3.8–10.5)

## 2020-04-07 RX ORDER — SEVELAMER CARBONATE 2400 MG/1
1600 POWDER, FOR SUSPENSION ORAL
Refills: 0 | Status: DISCONTINUED | OUTPATIENT
Start: 2020-04-07 | End: 2020-04-10

## 2020-04-07 RX ADMIN — SEVELAMER CARBONATE 800 MILLIGRAM(S): 2400 POWDER, FOR SUSPENSION ORAL at 13:24

## 2020-04-07 RX ADMIN — SODIUM ZIRCONIUM CYCLOSILICATE 10 GRAM(S): 10 POWDER, FOR SUSPENSION ORAL at 22:38

## 2020-04-07 RX ADMIN — HEPARIN SODIUM 5000 UNIT(S): 5000 INJECTION INTRAVENOUS; SUBCUTANEOUS at 17:25

## 2020-04-07 RX ADMIN — SEVELAMER CARBONATE 1600 MILLIGRAM(S): 2400 POWDER, FOR SUSPENSION ORAL at 17:25

## 2020-04-07 RX ADMIN — Medication 81 MILLIGRAM(S): at 11:59

## 2020-04-07 RX ADMIN — CARVEDILOL PHOSPHATE 6.25 MILLIGRAM(S): 80 CAPSULE, EXTENDED RELEASE ORAL at 06:09

## 2020-04-07 RX ADMIN — INSULIN GLARGINE 10 UNIT(S): 100 INJECTION, SOLUTION SUBCUTANEOUS at 08:57

## 2020-04-07 RX ADMIN — SODIUM ZIRCONIUM CYCLOSILICATE 10 GRAM(S): 10 POWDER, FOR SUSPENSION ORAL at 06:10

## 2020-04-07 RX ADMIN — SEVELAMER CARBONATE 800 MILLIGRAM(S): 2400 POWDER, FOR SUSPENSION ORAL at 06:10

## 2020-04-07 RX ADMIN — CARVEDILOL PHOSPHATE 6.25 MILLIGRAM(S): 80 CAPSULE, EXTENDED RELEASE ORAL at 17:25

## 2020-04-07 RX ADMIN — PANTOPRAZOLE SODIUM 40 MILLIGRAM(S): 20 TABLET, DELAYED RELEASE ORAL at 06:10

## 2020-04-07 RX ADMIN — Medication 325 MILLIGRAM(S): at 11:59

## 2020-04-07 RX ADMIN — HEPARIN SODIUM 5000 UNIT(S): 5000 INJECTION INTRAVENOUS; SUBCUTANEOUS at 06:10

## 2020-04-07 RX ADMIN — INSULIN GLARGINE 10 UNIT(S): 100 INJECTION, SOLUTION SUBCUTANEOUS at 22:39

## 2020-04-08 LAB
ANION GAP SERPL CALC-SCNC: 7 MMOL/L — SIGNIFICANT CHANGE UP (ref 5–17)
BUN SERPL-MCNC: 117 MG/DL — HIGH (ref 7–18)
CALCIUM SERPL-MCNC: 8 MG/DL — LOW (ref 8.4–10.5)
CHLORIDE SERPL-SCNC: 104 MMOL/L — SIGNIFICANT CHANGE UP (ref 96–108)
CO2 SERPL-SCNC: 25 MMOL/L — SIGNIFICANT CHANGE UP (ref 22–31)
CREAT SERPL-MCNC: 4.24 MG/DL — HIGH (ref 0.5–1.3)
GLUCOSE BLDC GLUCOMTR-MCNC: 110 MG/DL — HIGH (ref 70–99)
GLUCOSE BLDC GLUCOMTR-MCNC: 124 MG/DL — HIGH (ref 70–99)
GLUCOSE BLDC GLUCOMTR-MCNC: 175 MG/DL — HIGH (ref 70–99)
GLUCOSE BLDC GLUCOMTR-MCNC: 92 MG/DL — SIGNIFICANT CHANGE UP (ref 70–99)
GLUCOSE SERPL-MCNC: 102 MG/DL — HIGH (ref 70–99)
PHOSPHATE SERPL-MCNC: 6.3 MG/DL — HIGH (ref 2.5–4.5)
POTASSIUM SERPL-MCNC: 5.1 MMOL/L — SIGNIFICANT CHANGE UP (ref 3.5–5.3)
POTASSIUM SERPL-SCNC: 5.1 MMOL/L — SIGNIFICANT CHANGE UP (ref 3.5–5.3)
SODIUM SERPL-SCNC: 136 MMOL/L — SIGNIFICANT CHANGE UP (ref 135–145)

## 2020-04-08 RX ORDER — SODIUM ZIRCONIUM CYCLOSILICATE 10 G/10G
10 POWDER, FOR SUSPENSION ORAL THREE TIMES A DAY
Refills: 0 | Status: COMPLETED | OUTPATIENT
Start: 2020-04-08 | End: 2020-04-08

## 2020-04-08 RX ADMIN — CARVEDILOL PHOSPHATE 6.25 MILLIGRAM(S): 80 CAPSULE, EXTENDED RELEASE ORAL at 17:00

## 2020-04-08 RX ADMIN — INSULIN GLARGINE 10 UNIT(S): 100 INJECTION, SOLUTION SUBCUTANEOUS at 08:32

## 2020-04-08 RX ADMIN — Medication 81 MILLIGRAM(S): at 10:58

## 2020-04-08 RX ADMIN — INSULIN GLARGINE 10 UNIT(S): 100 INJECTION, SOLUTION SUBCUTANEOUS at 22:08

## 2020-04-08 RX ADMIN — SODIUM ZIRCONIUM CYCLOSILICATE 10 GRAM(S): 10 POWDER, FOR SUSPENSION ORAL at 16:08

## 2020-04-08 RX ADMIN — SEVELAMER CARBONATE 1600 MILLIGRAM(S): 2400 POWDER, FOR SUSPENSION ORAL at 16:08

## 2020-04-08 RX ADMIN — CARVEDILOL PHOSPHATE 6.25 MILLIGRAM(S): 80 CAPSULE, EXTENDED RELEASE ORAL at 06:21

## 2020-04-08 RX ADMIN — Medication 325 MILLIGRAM(S): at 10:58

## 2020-04-08 RX ADMIN — SODIUM ZIRCONIUM CYCLOSILICATE 10 GRAM(S): 10 POWDER, FOR SUSPENSION ORAL at 22:09

## 2020-04-08 RX ADMIN — SEVELAMER CARBONATE 1600 MILLIGRAM(S): 2400 POWDER, FOR SUSPENSION ORAL at 08:32

## 2020-04-08 RX ADMIN — PANTOPRAZOLE SODIUM 40 MILLIGRAM(S): 20 TABLET, DELAYED RELEASE ORAL at 06:22

## 2020-04-08 RX ADMIN — HEPARIN SODIUM 5000 UNIT(S): 5000 INJECTION INTRAVENOUS; SUBCUTANEOUS at 16:09

## 2020-04-08 RX ADMIN — HEPARIN SODIUM 5000 UNIT(S): 5000 INJECTION INTRAVENOUS; SUBCUTANEOUS at 06:22

## 2020-04-08 RX ADMIN — ATORVASTATIN CALCIUM 40 MILLIGRAM(S): 80 TABLET, FILM COATED ORAL at 22:11

## 2020-04-08 RX ADMIN — SEVELAMER CARBONATE 1600 MILLIGRAM(S): 2400 POWDER, FOR SUSPENSION ORAL at 10:59

## 2020-04-08 NOTE — PROGRESS NOTE ADULT - SUBJECTIVE AND OBJECTIVE BOX
Note is incomplete  pt seen and examined.pts current chart reviewed and case discussed with resident covering.    SUBJECTIVE:  pt feels fine and denies cp,sob,gi or gu/uremic  symptons    REVIEW OF SYSTEMS:  CONSTITUTIONAL: No weakness, fevers or chills  EYES/ENT: No visual changes;  No vertigo or throat pain   NECK: No pain or stiffness  RESPIRATORY: No cough, wheezing, hemoptysis; No shortness of breath  CARDIOVASCULAR: No chest pain or palpitations  GASTROINTESTINAL: No abdominal or epigastric pain. No nausea, vomiting, or hematemesis; No diarrhea or constipation. No melena or hematochezia.  GENITOURINARY: No dysuria, frequency , hematuria, flank pain or nocturia  NEUROLOGICAL: No numbness or weakness  SKIN: No itching, burning, rashes, or lesions   All other review of systems is negative unless indicated above    Current meds:    acetaminophen   Tablet .. 650 milliGRAM(s) Oral every 6 hours PRN  ALBUTerol    90 MICROgram(s) HFA Inhaler 2 Puff(s) Inhalation every 6 hours PRN  aspirin  chewable 81 milliGRAM(s) Oral daily  atorvastatin 40 milliGRAM(s) Oral at bedtime  carvedilol 6.25 milliGRAM(s) Oral every 12 hours  dextrose 40% Gel 15 Gram(s) Oral once PRN  dextrose 5%. 1000 milliLiter(s) IV Continuous <Continuous>  dextrose 50% Injectable 12.5 Gram(s) IV Push once  dextrose 50% Injectable 25 Gram(s) IV Push once  dextrose 50% Injectable 25 Gram(s) IV Push once  ferrous    sulfate 325 milliGRAM(s) Oral daily  glucagon  Injectable 1 milliGRAM(s) IntraMuscular once PRN  guaiFENesin   Syrup  (Sugar-Free) 100 milliGRAM(s) Oral every 6 hours PRN  heparin  Injectable 5000 Unit(s) SubCutaneous every 12 hours  insulin glargine Injectable (LANTUS) 10 Unit(s) SubCutaneous two times a day  insulin lispro (HumaLOG) corrective regimen sliding scale   SubCutaneous three times a day before meals  melatonin 3 milliGRAM(s) Oral at bedtime PRN  pantoprazole    Tablet 40 milliGRAM(s) Oral before breakfast  sevelamer carbonate 1600 milliGRAM(s) Oral three times a day with meals      Vital Signs    T(F): 97.6 (04-08-20 @ 14:38), Max: 97.8 (04-08-20 @ 05:10)  HR: 62 (04-08-20 @ 14:38) (62 - 82)  BP: 102/61 (04-08-20 @ 14:38) (102/61 - 123/77)  ABP: --  RR: 16 (04-08-20 @ 14:38) (16 - 18)  SpO2: 95% (04-08-20 @ 14:38) (93% - 100%)  Wt(kg): --  CVP(cm H2O): --  CO: --  PCWP: --    I and O's:    Daily     Daily     PHYSICAL EXAM:  Constitutional: well developed, well nourished  and in nad  HEENT: PERRLA,  no icteric sclera and mild pallor of conjunctiva noted  Neck: No JVD, thyromegaly or adenopathy  Respiratory: reduced air entry lower lungs with no rales, wheezing or rhonchi  Cardiovascular: S1 and S2 normally heard  Gastrointestinal: soft, nondistended, nontender and normal bowel sounds heard  Extremities: No peripheral edema or cyanosis  Neurological: A/O x 3, no focal deficits  : No flank or cva tenderness palpated.  Skin: No rashes      LABS:    CBC:                          9.3    5.18  )-----------( 342      ( 07 Apr 2020 13:10 )             30.8           BMP:    04-08    136  |  104  |  117<H>  ----------------------------<  102<H>  5.1   |  25  |  4.24<H>  04-07    137  |  105  |  111<H>  ----------------------------<  117<H>  5.3   |  25  |  4.60<H>  04-06    138  |  107  |  114<H>  ----------------------------<  96  5.9<H>   |  24  |  4.67<H>    Ca    8.0<L>      08 Apr 2020 14:30  Ca    8.0<L>      07 Apr 2020 13:10  Ca    8.1<L>      06 Apr 2020 15:22  Phos  6.3     04-08  Phos  6.5     04-07  Phos  5.6     04-06    TPro  7.2  /  Alb  2.7<L>  /  TBili  0.3  /  DBili  x   /  AST  18  /  ALT  15  /  AlkPhos  51  04-07  TPro  7.2  /  Alb  2.6<L>  /  TBili  0.3  /  DBili  x   /  AST  21  /  ALT  14  /  AlkPhos  50  04-06      Intact PTH: 82 pg/mL (04-07 @ 00:17)      URINE STUDIES:                        RADIOLOGY & ADDITIONAL STUDIES: Note is incomplete  pt seen and examined.pts current chart reviewed and case discussed with resident covering.    SUBJECTIVE:  pt  denies cp,sob,gi or gu  symptoms  pt is voiding but u/o is unknown    Current meds:    acetaminophen   Tablet .. 650 milliGRAM(s) Oral every 6 hours PRN  ALBUTerol    90 MICROgram(s) HFA Inhaler 2 Puff(s) Inhalation every 6 hours PRN  aspirin  chewable 81 milliGRAM(s) Oral daily  atorvastatin 40 milliGRAM(s) Oral at bedtime  carvedilol 6.25 milliGRAM(s) Oral every 12 hours  dextrose 40% Gel 15 Gram(s) Oral once PRN  dextrose 5%. 1000 milliLiter(s) IV Continuous <Continuous>  dextrose 50% Injectable 12.5 Gram(s) IV Push once  dextrose 50% Injectable 25 Gram(s) IV Push once  dextrose 50% Injectable 25 Gram(s) IV Push once  ferrous    sulfate 325 milliGRAM(s) Oral daily  glucagon  Injectable 1 milliGRAM(s) IntraMuscular once PRN  guaiFENesin   Syrup  (Sugar-Free) 100 milliGRAM(s) Oral every 6 hours PRN  heparin  Injectable 5000 Unit(s) SubCutaneous every 12 hours  insulin glargine Injectable (LANTUS) 10 Unit(s) SubCutaneous two times a day  insulin lispro (HumaLOG) corrective regimen sliding scale   SubCutaneous three times a day before meals  melatonin 3 milliGRAM(s) Oral at bedtime PRN  pantoprazole    Tablet 40 milliGRAM(s) Oral before breakfast  sevelamer carbonate 1600 milliGRAM(s) Oral three times a day with meals      Vital Signs    T(F): 97.6 (04-08-20 @ 14:38), Max: 97.8 (04-08-20 @ 05:10)  HR: 62 (04-08-20 @ 14:38) (62 - 82)  BP: 102/61 (04-08-20 @ 14:38) (102/61 - 123/77)  ABP: --  RR: 16 (04-08-20 @ 14:38) (16 - 18)  SpO2: 95% (04-08-20 @ 14:38) (93% - 100%)  Wt(kg): --  CVP(cm H2O): --  CO: --  PCWP: --    I and O's:    Daily     Daily     PHYSICAL EXAM:  GENERAL: No acute respiratory distress on NC; obese  EYES: conjunctiva and sclera clear  ENMT: Atraumatic, Normocephalic, supple, No JVD present. Moist mucous membranes  RESPIRATORY: b/l poor air entry; No rales, rhonchi, wheezing  CARDIOVASCULAR: S1S2+; no m/r/g  GASTROINTESTINAL: Soft, Non-tender, Nondistended; Bowel sounds present, no hepatosplenomegaly.   CENTRAL NERVOUS SYSTEM:  Awake, Alert & Oriented X3, No focal deficits present.   EXTREMITIES:   No edema, No Cyanosis  SKIN: No rashes      LABS:    CBC:                          9.3    5.18  )-----------( 342      ( 07 Apr 2020 13:10 )             30.8           BMP:    04-08    136  |  104  |  117<H>  ----------------------------<  102<H>  5.1   |  25  |  4.24<H>  04-07    137  |  105  |  111<H>  ----------------------------<  117<H>  5.3   |  25  |  4.60<H>  04-06    138  |  107  |  114<H>  ----------------------------<  96  5.9<H>   |  24  |  4.67<H>    Ca    8.0<L>      08 Apr 2020 14:30  Ca    8.0<L>      07 Apr 2020 13:10  Ca    8.1<L>      06 Apr 2020 15:22  Phos  6.3     04-08  Phos  6.5     04-07  Phos  5.6     04-06    TPro  7.2  /  Alb  2.7<L>  /  TBili  0.3  /  DBili  x   /  AST  18  /  ALT  15  /  AlkPhos  51  04-07  TPro  7.2  /  Alb  2.6<L>  /  TBili  0.3  /  DBili  x   /  AST  21  /  ALT  14  /  AlkPhos  50  04-06      Intact PTH: 82 pg/mL (04-07 @ 00:17)      U

## 2020-04-08 NOTE — PROGRESS NOTE ADULT - SUBJECTIVE AND OBJECTIVE BOX
Patient is a 55y old  Female who presents with a chief complaint of Fever, body ache (08 Apr 2020 15:06)    PATIENT IS SEEN AND EXAMINED IN MEDICAL FLOOR.  NGT [    ]    [place] [   ]      GT [   ]    ALLERGIES:  codeine (Urticaria)      Daily     Daily     VITALS:    Vital Signs Last 24 Hrs  T(C): 36.4 (08 Apr 2020 14:38), Max: 36.6 (08 Apr 2020 05:10)  T(F): 97.6 (08 Apr 2020 14:38), Max: 97.8 (08 Apr 2020 05:10)  HR: 62 (08 Apr 2020 14:38) (62 - 82)  BP: 102/61 (08 Apr 2020 14:38) (102/61 - 123/77)  BP(mean): --  RR: 16 (08 Apr 2020 14:38) (16 - 18)  SpO2: 95% (08 Apr 2020 14:38) (93% - 100%)    LABS:    CBC Full  -  ( 07 Apr 2020 13:10 )  WBC Count : 5.18 K/uL  RBC Count : 3.17 M/uL  Hemoglobin : 9.3 g/dL  Hematocrit : 30.8 %  Platelet Count - Automated : 342 K/uL  Mean Cell Volume : 97.2 fl  Mean Cell Hemoglobin : 29.3 pg  Mean Cell Hemoglobin Concentration : 30.2 gm/dL  Auto Neutrophil # : x  Auto Lymphocyte # : x  Auto Monocyte # : x  Auto Eosinophil # : x  Auto Basophil # : x  Auto Neutrophil % : x  Auto Lymphocyte % : x  Auto Monocyte % : x  Auto Eosinophil % : x  Auto Basophil % : x      04-08    136  |  104  |  117<H>  ----------------------------<  102<H>  5.1   |  25  |  4.24<H>    Ca    8.0<L>      08 Apr 2020 14:30  Phos  6.3     04-08    TPro  7.2  /  Alb  2.7<L>  /  TBili  0.3  /  DBili  x   /  AST  18  /  ALT  15  /  AlkPhos  51  04-07    CAPILLARY BLOOD GLUCOSE      POCT Blood Glucose.: 110 mg/dL (08 Apr 2020 17:03)  POCT Blood Glucose.: 124 mg/dL (08 Apr 2020 11:01)  POCT Blood Glucose.: 92 mg/dL (08 Apr 2020 08:24)  POCT Blood Glucose.: 120 mg/dL (07 Apr 2020 22:12)        LIVER FUNCTIONS - ( 07 Apr 2020 13:10 )  Alb: 2.7 g/dL / Pro: 7.2 g/dL / ALK PHOS: 51 U/L / ALT: 15 U/L DA / AST: 18 U/L / GGT: x           Creatinine Trend: 4.24<--, 4.60<--, 4.67<--, 4.80<--, 4.29<--, 3.77<--  I&O's Summary              MEDICATIONS:    MEDICATIONS  (STANDING):  aspirin  chewable 81 milliGRAM(s) Oral daily  atorvastatin 40 milliGRAM(s) Oral at bedtime  carvedilol 6.25 milliGRAM(s) Oral every 12 hours  dextrose 5%. 1000 milliLiter(s) (50 mL/Hr) IV Continuous <Continuous>  dextrose 50% Injectable 12.5 Gram(s) IV Push once  dextrose 50% Injectable 25 Gram(s) IV Push once  dextrose 50% Injectable 25 Gram(s) IV Push once  ferrous    sulfate 325 milliGRAM(s) Oral daily  heparin  Injectable 5000 Unit(s) SubCutaneous every 12 hours  insulin glargine Injectable (LANTUS) 10 Unit(s) SubCutaneous two times a day  insulin lispro (HumaLOG) corrective regimen sliding scale   SubCutaneous three times a day before meals  pantoprazole    Tablet 40 milliGRAM(s) Oral before breakfast  sevelamer carbonate 1600 milliGRAM(s) Oral three times a day with meals  sodium zirconium cyclosilicate 10 Gram(s) Oral three times a day      MEDICATIONS  (PRN):  acetaminophen   Tablet .. 650 milliGRAM(s) Oral every 6 hours PRN Temp greater or equal to 38C (100.4F)  ALBUTerol    90 MICROgram(s) HFA Inhaler 2 Puff(s) Inhalation every 6 hours PRN Shortness of Breath and/or Wheezing  dextrose 40% Gel 15 Gram(s) Oral once PRN Blood Glucose LESS THAN 70 milliGRAM(s)/deciliter  glucagon  Injectable 1 milliGRAM(s) IntraMuscular once PRN Glucose LESS THAN 70 milligrams/deciliter  guaiFENesin   Syrup  (Sugar-Free) 100 milliGRAM(s) Oral every 6 hours PRN Cough  melatonin 3 milliGRAM(s) Oral at bedtime PRN Sleep      REVIEW OF SYSTEMS:                           ALL ROS DONE [ X   ]    CONSTITUTIONAL:  LETHARGIC [   ], FEVER [   ], UNRESPONSIVE [   ]  CVS:  CP  [   ], SOB, [   ], PALPITATIONS [   ], DIZZYNESS [   ]  RS: COUGH [   ], SPUTUM [   ]  GI: ABDOMINAL PAIN [   ], NAUSEA [   ], VOMITINGS [   ], DIARRHEA [   ], CONSTIPATION [   ]  :  DYSURIA [   ], NOCTURIA [   ], INCREASED FREQUENCY [   ], DRIBLING [   ],  SKELETAL: PAINFUL JOINTS [   ], SWOLLEN JOINTS [   ], NECK ACHE [   ], LOW BACK ACHE [   ],  SKIN : ULCERS [   ], RASH [   ], ITCHING [   ]  CNS: HEAD ACHE [   ], DOUBLE VISION [   ], BLURRED VISION [   ], AMS / CONFUSION [   ], SEIZURES [   ], WEAKNESS [   ],TINGLING / NUMBNESS [   ]    PHYSICAL EXAMINATION:  GENERAL APPEARANCE: NO DISTRESS, MORBIDLY OBESE  HEENT:  NO PALLOR, NO  JVD,  NO   NODES, NECK SUPPLE  CVS: S1 +, S2 +,   RS: AEEB,  OCCASIONAL  RALES +,   MILD B/L RHONCHI +  ABD: SOFT, NT, NO, BS +  EXT: PE +  SKIN: WARM,          AICD+  SKELETAL:  ROM ACCEPTABLE  CNS:  AAO X  3 ,   DEFICITS    RADIOLOGY :  < from: Xray Chest 1 View- PORTABLE-Urgent (04.02.20 @ 18:58) >  IMPRESSION:  Right infiltrate.    < end of copied text >      ASSESSMENT :     Coronavirus infection  GERD (gastroesophageal reflux disease)  CHF (congestive heart failure)  CAD (coronary artery disease)  AICD (automatic cardioverter/defibrillator) present  COPD (chronic obstructive pulmonary disease)  RA (rheumatoid arthritis)  DM (diabetes mellitus)  HTN (hypertension)  ICD (implantable cardioverter-defibrillator) in place  History of cholecystectomy      PLAN:  HPI:  Pt is a 56 y/o F  from [facility] with PMH of Obesity, CHF HFrEF 25 %, s/p AICD, HTN, B/l LE edema, DM, Gout, CAD, HLD, COPD  who presented with cough since 5 days , shortness of breath since 2days and fever since 1 day. According to the pt, since 5 days she has developed dry cough. Since last two days she has developed mild shortness of breath which is worse on exertion. She has some chest tightness and fever with chills since today. She denied recent travel, sick contact, nausea, vomiting, abdominal pain and other complains. (02 Apr 2020 20:45)    PATIENT IS A 54YO F FROM [facility] WITH PMHX SIGNIFICANT FOR AMPARO/OHS ON CPAP, HFREF [25%], PVD, DM, CAD, HLD, COPD AND GOUT WHO WAS TRANSFERRED FOR EVALUATION OF COUGH/DYSPNEA/FEVER.    # ACUTE HYPOXIC RESPIRATORY FAILURE S/T COVID19 PNEUMONIA, SEPSIS AND SUSPECTED SECONDARY BACTERIAL PNEUMONIA - NOTED PROLONGED QTC, WILL HOLD PLAQUENIL  # MORBID OBESITY, RESTRICTIVE LUNG DISEASE, OBSTRUCTIVE SLEEP APNEA ON CPAP, COPD  # SYSTOLIC CHF S/P AICD, ON CHRONIC O2 SUPPLEMENT  # ANJU ON CKD4 - AVOID NEPHROTOXIC MEDICATION; NEPHROLOGY CONSULTED - SUSPECT ATN - DIURETICS HELD, STRICT IS AND OS  # HYPERKALEMIA S/T ANJU - RESOLVED ON LOKELMA  # NORMOCYTIC ANEMIA - IRON DEFICIENCY ANEMIA + ANEMIA OF CKD - PLACED ON FESO4  # MINERAL BONE DISEASE - ON PHOSPHATE BINDERS  # D/C TO [facility] WHEN MEDICALLY STABLE AS [facility] DOES NOT HAVE AN ISOLATION  # GI AND DVT PPX    LUANN NATARAJAN M.D. COVERING FOR DONTAE NATARAJAN M.D.

## 2020-04-09 LAB
ALBUMIN SERPL ELPH-MCNC: 2.8 G/DL — LOW (ref 3.5–5)
ALP SERPL-CCNC: 50 U/L — SIGNIFICANT CHANGE UP (ref 40–120)
ALT FLD-CCNC: 17 U/L DA — SIGNIFICANT CHANGE UP (ref 10–60)
ANION GAP SERPL CALC-SCNC: 6 MMOL/L — SIGNIFICANT CHANGE UP (ref 5–17)
AST SERPL-CCNC: 20 U/L — SIGNIFICANT CHANGE UP (ref 10–40)
BILIRUB SERPL-MCNC: 0.3 MG/DL — SIGNIFICANT CHANGE UP (ref 0.2–1.2)
BUN SERPL-MCNC: 116 MG/DL — HIGH (ref 7–18)
CALCIUM SERPL-MCNC: 7.7 MG/DL — LOW (ref 8.4–10.5)
CHLORIDE SERPL-SCNC: 107 MMOL/L — SIGNIFICANT CHANGE UP (ref 96–108)
CO2 SERPL-SCNC: 25 MMOL/L — SIGNIFICANT CHANGE UP (ref 22–31)
CREAT SERPL-MCNC: 4.07 MG/DL — HIGH (ref 0.5–1.3)
GLUCOSE BLDC GLUCOMTR-MCNC: 105 MG/DL — HIGH (ref 70–99)
GLUCOSE BLDC GLUCOMTR-MCNC: 114 MG/DL — HIGH (ref 70–99)
GLUCOSE BLDC GLUCOMTR-MCNC: 99 MG/DL — SIGNIFICANT CHANGE UP (ref 70–99)
GLUCOSE SERPL-MCNC: 119 MG/DL — HIGH (ref 70–99)
HCT VFR BLD CALC: 31.1 % — LOW (ref 34.5–45)
HGB BLD-MCNC: 9.4 G/DL — LOW (ref 11.5–15.5)
MCHC RBC-ENTMCNC: 29.4 PG — SIGNIFICANT CHANGE UP (ref 27–34)
MCHC RBC-ENTMCNC: 30.2 GM/DL — LOW (ref 32–36)
MCV RBC AUTO: 97.2 FL — SIGNIFICANT CHANGE UP (ref 80–100)
NRBC # BLD: 0 /100 WBCS — SIGNIFICANT CHANGE UP (ref 0–0)
PLATELET # BLD AUTO: 397 K/UL — SIGNIFICANT CHANGE UP (ref 150–400)
POTASSIUM SERPL-MCNC: 5.6 MMOL/L — HIGH (ref 3.5–5.3)
POTASSIUM SERPL-SCNC: 5.6 MMOL/L — HIGH (ref 3.5–5.3)
PROT SERPL-MCNC: 7.4 G/DL — SIGNIFICANT CHANGE UP (ref 6–8.3)
RBC # BLD: 3.2 M/UL — LOW (ref 3.8–5.2)
RBC # FLD: 13.9 % — SIGNIFICANT CHANGE UP (ref 10.3–14.5)
SODIUM SERPL-SCNC: 138 MMOL/L — SIGNIFICANT CHANGE UP (ref 135–145)
WBC # BLD: 5.69 K/UL — SIGNIFICANT CHANGE UP (ref 3.8–10.5)
WBC # FLD AUTO: 5.69 K/UL — SIGNIFICANT CHANGE UP (ref 3.8–10.5)

## 2020-04-09 RX ORDER — SODIUM ZIRCONIUM CYCLOSILICATE 10 G/10G
10 POWDER, FOR SUSPENSION ORAL ONCE
Refills: 0 | Status: COMPLETED | OUTPATIENT
Start: 2020-04-09 | End: 2020-04-10

## 2020-04-09 RX ORDER — FUROSEMIDE 40 MG
60 TABLET ORAL ONCE
Refills: 0 | Status: COMPLETED | OUTPATIENT
Start: 2020-04-09 | End: 2020-04-09

## 2020-04-09 RX ORDER — ERYTHROPOIETIN 10000 [IU]/ML
6000 INJECTION, SOLUTION INTRAVENOUS; SUBCUTANEOUS
Refills: 0 | Status: DISCONTINUED | OUTPATIENT
Start: 2020-04-10 | End: 2020-04-10

## 2020-04-09 RX ADMIN — CARVEDILOL PHOSPHATE 6.25 MILLIGRAM(S): 80 CAPSULE, EXTENDED RELEASE ORAL at 05:22

## 2020-04-09 RX ADMIN — HEPARIN SODIUM 5000 UNIT(S): 5000 INJECTION INTRAVENOUS; SUBCUTANEOUS at 16:50

## 2020-04-09 RX ADMIN — INSULIN GLARGINE 10 UNIT(S): 100 INJECTION, SOLUTION SUBCUTANEOUS at 10:42

## 2020-04-09 RX ADMIN — SEVELAMER CARBONATE 1600 MILLIGRAM(S): 2400 POWDER, FOR SUSPENSION ORAL at 13:09

## 2020-04-09 RX ADMIN — HEPARIN SODIUM 5000 UNIT(S): 5000 INJECTION INTRAVENOUS; SUBCUTANEOUS at 05:23

## 2020-04-09 RX ADMIN — CARVEDILOL PHOSPHATE 6.25 MILLIGRAM(S): 80 CAPSULE, EXTENDED RELEASE ORAL at 16:50

## 2020-04-09 RX ADMIN — PANTOPRAZOLE SODIUM 40 MILLIGRAM(S): 20 TABLET, DELAYED RELEASE ORAL at 05:23

## 2020-04-09 RX ADMIN — Medication 325 MILLIGRAM(S): at 10:45

## 2020-04-09 RX ADMIN — SEVELAMER CARBONATE 1600 MILLIGRAM(S): 2400 POWDER, FOR SUSPENSION ORAL at 10:42

## 2020-04-09 RX ADMIN — SEVELAMER CARBONATE 1600 MILLIGRAM(S): 2400 POWDER, FOR SUSPENSION ORAL at 16:51

## 2020-04-09 RX ADMIN — Medication 60 MILLIGRAM(S): at 17:05

## 2020-04-09 RX ADMIN — Medication 81 MILLIGRAM(S): at 10:45

## 2020-04-09 NOTE — DIETITIAN INITIAL EVALUATION ADULT. - PERTINENT MEDS FT
MEDICATIONS  (STANDING):  aspirin  chewable 81 milliGRAM(s) Oral daily  atorvastatin 40 milliGRAM(s) Oral at bedtime  carvedilol 6.25 milliGRAM(s) Oral every 12 hours  dextrose 5%. 1000 milliLiter(s) (50 mL/Hr) IV Continuous <Continuous>  dextrose 50% Injectable 12.5 Gram(s) IV Push once  dextrose 50% Injectable 25 Gram(s) IV Push once  dextrose 50% Injectable 25 Gram(s) IV Push once  ferrous    sulfate 325 milliGRAM(s) Oral daily  heparin  Injectable 5000 Unit(s) SubCutaneous every 12 hours  insulin glargine Injectable (LANTUS) 10 Unit(s) SubCutaneous two times a day  insulin lispro (HumaLOG) corrective regimen sliding scale   SubCutaneous three times a day before meals  pantoprazole    Tablet 40 milliGRAM(s) Oral before breakfast  sevelamer carbonate 1600 milliGRAM(s) Oral three times a day with meals    MEDICATIONS  (PRN):  acetaminophen   Tablet .. 650 milliGRAM(s) Oral every 6 hours PRN Temp greater or equal to 38C (100.4F)  ALBUTerol    90 MICROgram(s) HFA Inhaler 2 Puff(s) Inhalation every 6 hours PRN Shortness of Breath and/or Wheezing  dextrose 40% Gel 15 Gram(s) Oral once PRN Blood Glucose LESS THAN 70 milliGRAM(s)/deciliter  glucagon  Injectable 1 milliGRAM(s) IntraMuscular once PRN Glucose LESS THAN 70 milligrams/deciliter  guaiFENesin   Syrup  (Sugar-Free) 100 milliGRAM(s) Oral every 6 hours PRN Cough  melatonin 3 milliGRAM(s) Oral at bedtime PRN Sleep

## 2020-04-09 NOTE — PROGRESS NOTE ADULT - SUBJECTIVE AND OBJECTIVE BOX
Patient is a 55y old  Female who presents with a chief complaint of Fever, body ache (09 Apr 2020 16:46)    PATIENT IS SEEN AND EXAMINED IN MEDICAL FLOOR.  NGT [    ]    [place]USTIN [   ]      GT [   ]    ALLERGIES:  codeine (Urticaria)      Daily     Daily     VITALS:    Vital Signs Last 24 Hrs  T(C): 36.4 (09 Apr 2020 17:20), Max: 36.6 (09 Apr 2020 12:59)  T(F): 97.5 (09 Apr 2020 17:20), Max: 97.9 (09 Apr 2020 12:59)  HR: 69 (09 Apr 2020 17:20) (63 - 75)  BP: 132/81 (09 Apr 2020 17:20) (100/64 - 142/82)  BP(mean): --  RR: 18 (09 Apr 2020 17:20) (18 - 18)  SpO2: 100% (09 Apr 2020 17:20) (97% - 100%)    LABS:    CBC Full  -  ( 09 Apr 2020 15:25 )  WBC Count : 5.69 K/uL  RBC Count : 3.20 M/uL  Hemoglobin : 9.4 g/dL  Hematocrit : 31.1 %  Platelet Count - Automated : 397 K/uL  Mean Cell Volume : 97.2 fl  Mean Cell Hemoglobin : 29.4 pg  Mean Cell Hemoglobin Concentration : 30.2 gm/dL  Auto Neutrophil # : x  Auto Lymphocyte # : x  Auto Monocyte # : x  Auto Eosinophil # : x  Auto Basophil # : x  Auto Neutrophil % : x  Auto Lymphocyte % : x  Auto Monocyte % : x  Auto Eosinophil % : x  Auto Basophil % : x      04-09    138  |  107  |  116<H>  ----------------------------<  119<H>  5.6<H>   |  25  |  4.07<H>    Ca    7.7<L>      09 Apr 2020 15:25  Phos  6.3     04-08    TPro  7.4  /  Alb  2.8<L>  /  TBili  0.3  /  DBili  x   /  AST  20  /  ALT  17  /  AlkPhos  50  04-09    CAPILLARY BLOOD GLUCOSE      POCT Blood Glucose.: 105 mg/dL (09 Apr 2020 17:17)  POCT Blood Glucose.: 114 mg/dL (09 Apr 2020 12:06)  POCT Blood Glucose.: 99 mg/dL (09 Apr 2020 08:51)  POCT Blood Glucose.: 175 mg/dL (08 Apr 2020 21:21)        LIVER FUNCTIONS - ( 09 Apr 2020 15:25 )  Alb: 2.8 g/dL / Pro: 7.4 g/dL / ALK PHOS: 50 U/L / ALT: 17 U/L DA / AST: 20 U/L / GGT: x           Creatinine Trend: 4.07<--, 4.24<--, 4.60<--, 4.67<--, 4.80<--, 4.29<--  I&O's Summary              MEDICATIONS:    MEDICATIONS  (STANDING):  aspirin  chewable 81 milliGRAM(s) Oral daily  atorvastatin 40 milliGRAM(s) Oral at bedtime  carvedilol 6.25 milliGRAM(s) Oral every 12 hours  dextrose 5%. 1000 milliLiter(s) (50 mL/Hr) IV Continuous <Continuous>  dextrose 50% Injectable 12.5 Gram(s) IV Push once  dextrose 50% Injectable 25 Gram(s) IV Push once  dextrose 50% Injectable 25 Gram(s) IV Push once  ferrous    sulfate 325 milliGRAM(s) Oral daily  heparin  Injectable 5000 Unit(s) SubCutaneous every 12 hours  insulin glargine Injectable (LANTUS) 10 Unit(s) SubCutaneous two times a day  insulin lispro (HumaLOG) corrective regimen sliding scale   SubCutaneous three times a day before meals  pantoprazole    Tablet 40 milliGRAM(s) Oral before breakfast  sevelamer carbonate 1600 milliGRAM(s) Oral three times a day with meals  sodium zirconium cyclosilicate 10 Gram(s) Oral once      MEDICATIONS  (PRN):  acetaminophen   Tablet .. 650 milliGRAM(s) Oral every 6 hours PRN Temp greater or equal to 38C (100.4F)  ALBUTerol    90 MICROgram(s) HFA Inhaler 2 Puff(s) Inhalation every 6 hours PRN Shortness of Breath and/or Wheezing  dextrose 40% Gel 15 Gram(s) Oral once PRN Blood Glucose LESS THAN 70 milliGRAM(s)/deciliter  glucagon  Injectable 1 milliGRAM(s) IntraMuscular once PRN Glucose LESS THAN 70 milligrams/deciliter  guaiFENesin   Syrup  (Sugar-Free) 100 milliGRAM(s) Oral every 6 hours PRN Cough  melatonin 3 milliGRAM(s) Oral at bedtime PRN Sleep      REVIEW OF SYSTEMS:                           ALL ROS DONE [ X   ]    CONSTITUTIONAL:  LETHARGIC [   ], FEVER [   ], UNRESPONSIVE [   ]  CVS:  CP  [   ], SOB, [   ], PALPITATIONS [   ], DIZZYNESS [   ]  RS: COUGH [   ], SPUTUM [   ]  GI: ABDOMINAL PAIN [   ], NAUSEA [   ], VOMITINGS [   ], DIARRHEA [   ], CONSTIPATION [   ]  :  DYSURIA [   ], NOCTURIA [   ], INCREASED FREQUENCY [   ], DRIBLING [   ],  SKELETAL: PAINFUL JOINTS [   ], SWOLLEN JOINTS [   ], NECK ACHE [   ], LOW BACK ACHE [   ],  SKIN : ULCERS [   ], RASH [   ], ITCHING [   ]  CNS: HEAD ACHE [   ], DOUBLE VISION [   ], BLURRED VISION [   ], AMS / CONFUSION [   ], SEIZURES [   ], WEAKNESS [   ],TINGLING / NUMBNESS [   ]    PHYSICAL EXAMINATION:  GENERAL APPEARANCE: NO DISTRESS, MORBIDLY OBESE  HEENT:  NO PALLOR, NO  JVD,  NO   NODES, NECK SUPPLE  CVS: S1 +, S2 +,   RS: AEEB,  OCCASIONAL  RALES +,   MILD B/L RHONCHI +  ABD: SOFT, NT, NO, BS +  EXT: PE +  SKIN: WARM,          AICD+  SKELETAL:  ROM ACCEPTABLE  CNS:  AAO X  3 ,   DEFICITS    RADIOLOGY :  < from: Xray Chest 1 View- PORTABLE-Urgent (04.02.20 @ 18:58) >  IMPRESSION:  Right infiltrate.    < end of copied text >      ASSESSMENT :     Coronavirus infection  GERD (gastroesophageal reflux disease)  CHF (congestive heart failure)  CAD (coronary artery disease)  AICD (automatic cardioverter/defibrillator) present  COPD (chronic obstructive pulmonary disease)  RA (rheumatoid arthritis)  DM (diabetes mellitus)  HTN (hypertension)  ICD (implantable cardioverter-defibrillator) in place  History of cholecystectomy      PLAN:  HPI:  Pt is a 56 y/o F  from [facility] with PMH of Obesity, CHF HFrEF 25 %, s/p AICD, HTN, B/l LE edema, DM, Gout, CAD, HLD, COPD  who presented with cough since 5 days , shortness of breath since 2days and fever since 1 day. According to the pt, since 5 days she has developed dry cough. Since last two days she has developed mild shortness of breath which is worse on exertion. She has some chest tightness and fever with chills since today. She denied recent travel, sick contact, nausea, vomiting, abdominal pain and other complains. (02 Apr 2020 20:45)    PATIENT IS A 54YO F FROM [facility] WITH PMHX SIGNIFICANT FOR AMPARO/OHS ON CPAP, HFREF [25%], PVD, DM, CAD, HLD, COPD AND GOUT WHO WAS TRANSFERRED FOR EVALUATION OF COUGH/DYSPNEA/FEVER.    # ACUTE HYPOXIC RESPIRATORY FAILURE S/T COVID19 PNEUMONIA, SEPSIS AND SUSPECTED SECONDARY BACTERIAL PNEUMONIA - NOTED PROLONGED QTC, WILL HOLD PLAQUENIL  # MORBID OBESITY, RESTRICTIVE LUNG DISEASE, OBSTRUCTIVE SLEEP APNEA ON CPAP, COPD  # SYSTOLIC CHF S/P AICD, ON CHRONIC O2 SUPPLEMENT - TRIAL OF LASIX TODAY  # ANJU ON CKD4 - AVOID NEPHROTOXIC MEDICATION; NEPHROLOGY CONSULTED - SUSPECT ATN - STRICT IS AND OS  -- HAVE DISCUSSED WITH NEPHROLOGY - IF HYPERKALEMIA RESOLVED RENAL FUNCTION WILL BE MONITORED OUTPATIENT   # HYPERKALEMIA S/T ANJU - GIVEN LOKELMA, LASIX  # NORMOCYTIC ANEMIA - IRON DEFICIENCY ANEMIA + ANEMIA OF CKD - PLACED ON FESO4, GIVEN EPOGEN  # MINERAL BONE DISEASE - ON PHOSPHATE BINDERS  # D/C TO [facility] WHEN MEDICALLY STABLE AS [facility] DOES NOT HAVE AN ISOLATION  # GI AND DVT PPX    LUANN NATARAJAN M.D. COVERING FOR DONTAE NATARAJAN M.D.

## 2020-04-09 NOTE — PROGRESS NOTE ADULT - SUBJECTIVE AND OBJECTIVE BOX
Note is incomplete  pt seen and examined.pts current chart reviewed and case discussed with resident covering.    SUBJECTIVE:  pt feels fine and denies cp,sob,gi or gu/uremic  symptons    REVIEW OF SYSTEMS:  CONSTITUTIONAL: No weakness, fevers or chills  EYES/ENT: No visual changes;  No vertigo or throat pain   NECK: No pain or stiffness  RESPIRATORY: No cough, wheezing, hemoptysis; No shortness of breath  CARDIOVASCULAR: No chest pain or palpitations  GASTROINTESTINAL: No abdominal or epigastric pain. No nausea, vomiting, or hematemesis; No diarrhea or constipation. No melena or hematochezia.  GENITOURINARY: No dysuria, frequency , hematuria, flank pain or nocturia  NEUROLOGICAL: No numbness or weakness  SKIN: No itching, burning, rashes, or lesions   All other review of systems is negative unless indicated above    Current meds:    acetaminophen   Tablet .. 650 milliGRAM(s) Oral every 6 hours PRN  ALBUTerol    90 MICROgram(s) HFA Inhaler 2 Puff(s) Inhalation every 6 hours PRN  aspirin  chewable 81 milliGRAM(s) Oral daily  atorvastatin 40 milliGRAM(s) Oral at bedtime  carvedilol 6.25 milliGRAM(s) Oral every 12 hours  dextrose 40% Gel 15 Gram(s) Oral once PRN  dextrose 5%. 1000 milliLiter(s) IV Continuous <Continuous>  dextrose 50% Injectable 12.5 Gram(s) IV Push once  dextrose 50% Injectable 25 Gram(s) IV Push once  dextrose 50% Injectable 25 Gram(s) IV Push once  ferrous    sulfate 325 milliGRAM(s) Oral daily  furosemide   Injectable 60 milliGRAM(s) IV Push once  glucagon  Injectable 1 milliGRAM(s) IntraMuscular once PRN  guaiFENesin   Syrup  (Sugar-Free) 100 milliGRAM(s) Oral every 6 hours PRN  heparin  Injectable 5000 Unit(s) SubCutaneous every 12 hours  insulin glargine Injectable (LANTUS) 10 Unit(s) SubCutaneous two times a day  insulin lispro (HumaLOG) corrective regimen sliding scale   SubCutaneous three times a day before meals  melatonin 3 milliGRAM(s) Oral at bedtime PRN  pantoprazole    Tablet 40 milliGRAM(s) Oral before breakfast  sevelamer carbonate 1600 milliGRAM(s) Oral three times a day with meals  sodium zirconium cyclosilicate 10 Gram(s) Oral once      Vital Signs    T(F): 97.9 (04-09-20 @ 12:59), Max: 97.9 (04-09-20 @ 12:59)  HR: 63 (04-09-20 @ 12:59) (63 - 76)  BP: 142/82 (04-09-20 @ 12:59) (100/64 - 144/73)  ABP: --  RR: 18 (04-09-20 @ 12:59) (17 - 18)  SpO2: 100% (04-09-20 @ 12:59) (97% - 100%)  Wt(kg): --  CVP(cm H2O): --  CO: --  PCWP: --    I and O's:    Daily     Daily     PHYSICAL EXAM:  Constitutional: well developed, well nourished  and in nad  HEENT: PERRLA,  no icteric sclera and mild pallor of conjunctiva noted  Neck: No JVD, thyromegaly or adenopathy  Respiratory: reduced air entry lower lungs with no rales, wheezing or rhonchi  Cardiovascular: S1 and S2 normally heard  Gastrointestinal: soft, nondistended, nontender and normal bowel sounds heard  Extremities: No peripheral edema or cyanosis  Neurological: A/O x 3, no focal deficits  : No flank or cva tenderness palpated.  Skin: No rashes      LABS:    CBC:                          9.4    5.69  )-----------( 397      ( 09 Apr 2020 15:25 )             31.1           BMP:    04-09    138  |  107  |  116<H>  ----------------------------<  119<H>  5.6<H>   |  25  |  4.07<H>  04-08    136  |  104  |  117<H>  ----------------------------<  102<H>  5.1   |  25  |  4.24<H>  04-07    137  |  105  |  111<H>  ----------------------------<  117<H>  5.3   |  25  |  4.60<H>    Ca    7.7<L>      09 Apr 2020 15:25  Ca    8.0<L>      08 Apr 2020 14:30  Ca    8.0<L>      07 Apr 2020 13:10  Phos  6.3     04-08  Phos  6.5     04-07    TPro  7.4  /  Alb  2.8<L>  /  TBili  0.3  /  DBili  x   /  AST  20  /  ALT  17  /  AlkPhos  50  04-09  TPro  7.2  /  Alb  2.7<L>  /  TBili  0.3  /  DBili  x   /  AST  18  /  ALT  15  /  AlkPhos  51  04-07      Intact PTH: 82 pg/mL (04-07 @ 00:17)      URINE STUDIES:                        RADIOLOGY & ADDITIONAL STUDIES: pt seen and examined.    SUBJECTIVE:  pt  denies cp,sob,gi or gu  symptoms  pt is requesting covid 19 test to be repeated ,will inform medcial team  pt is voiding ok  pt continue to have mild high k ,d/w pt for low k diet compliance  Current meds:    acetaminophen   Tablet .. 650 milliGRAM(s) Oral every 6 hours PRN  ALBUTerol    90 MICROgram(s) HFA Inhaler 2 Puff(s) Inhalation every 6 hours PRN  aspirin  chewable 81 milliGRAM(s) Oral daily  atorvastatin 40 milliGRAM(s) Oral at bedtime  carvedilol 6.25 milliGRAM(s) Oral every 12 hours  dextrose 40% Gel 15 Gram(s) Oral once PRN  dextrose 5%. 1000 milliLiter(s) IV Continuous <Continuous>  dextrose 50% Injectable 12.5 Gram(s) IV Push once  dextrose 50% Injectable 25 Gram(s) IV Push once  dextrose 50% Injectable 25 Gram(s) IV Push once  ferrous    sulfate 325 milliGRAM(s) Oral daily  furosemide   Injectable 60 milliGRAM(s) IV Push once  glucagon  Injectable 1 milliGRAM(s) IntraMuscular once PRN  guaiFENesin   Syrup  (Sugar-Free) 100 milliGRAM(s) Oral every 6 hours PRN  heparin  Injectable 5000 Unit(s) SubCutaneous every 12 hours  insulin glargine Injectable (LANTUS) 10 Unit(s) SubCutaneous two times a day  insulin lispro (HumaLOG) corrective regimen sliding scale   SubCutaneous three times a day before meals  melatonin 3 milliGRAM(s) Oral at bedtime PRN  pantoprazole    Tablet 40 milliGRAM(s) Oral before breakfast  sevelamer carbonate 1600 milliGRAM(s) Oral three times a day with meals  sodium zirconium cyclosilicate 10 Gram(s) Oral once      Vital Signs    T(F): 97.9 (04-09-20 @ 12:59), Max: 97.9 (04-09-20 @ 12:59)  HR: 63 (04-09-20 @ 12:59) (63 - 76)  BP: 142/82 (04-09-20 @ 12:59) (100/64 - 144/73)  ABP: --  RR: 18 (04-09-20 @ 12:59) (17 - 18)  SpO2: 100% (04-09-20 @ 12:59) (97% - 100%)  Wt(kg): --  CVP(cm H2O): --  CO: --  PCWP: --    I and O's:    Daily     Daily       LABS:    CBC:                          9.4    5.69  )-----------( 397      ( 09 Apr 2020 15:25 )             31.1           BMP:    04-09    138  |  107  |  116<H>  ----------------------------<  119<H>  5.6<H>   |  25  |  4.07<H>  04-08    136  |  104  |  117<H>  ----------------------------<  102<H>  5.1   |  25  |  4.24<H>  04-07    137  |  105  |  111<H>  ----------------------------<  117<H>  5.3   |  25  |  4.60<H>    Ca    7.7<L>      09 Apr 2020 15:25  Ca    8.0<L>      08 Apr 2020 14:30  Ca    8.0<L>      07 Apr 2020 13:10  Phos  6.3     04-08  Phos  6.5     04-07    TPro  7.4  /  Alb  2.8<L>  /  TBili  0.3  /  DBili  x   /  AST  20  /  ALT  17  /  AlkPhos  50  04-09  TPro  7.2  /  Alb  2.7<L>  /  TBili  0.3  /  DBili  x   /  AST  18  /  ALT  15  /  AlkPhos  51  04-07      Intact PTH: 82 pg/mL (04-07 @ 00:17)

## 2020-04-10 ENCOUNTER — TRANSCRIPTION ENCOUNTER (OUTPATIENT)
Age: 55
End: 2020-04-10

## 2020-04-10 VITALS
TEMPERATURE: 98 F | RESPIRATION RATE: 20 BRPM | DIASTOLIC BLOOD PRESSURE: 72 MMHG | OXYGEN SATURATION: 98 % | HEART RATE: 82 BPM | SYSTOLIC BLOOD PRESSURE: 146 MMHG

## 2020-04-10 LAB
ALBUMIN SERPL ELPH-MCNC: 2.8 G/DL — LOW (ref 3.5–5)
ALP SERPL-CCNC: 53 U/L — SIGNIFICANT CHANGE UP (ref 40–120)
ALT FLD-CCNC: 20 U/L DA — SIGNIFICANT CHANGE UP (ref 10–60)
ANION GAP SERPL CALC-SCNC: 5 MMOL/L — SIGNIFICANT CHANGE UP (ref 5–17)
AST SERPL-CCNC: 21 U/L — SIGNIFICANT CHANGE UP (ref 10–40)
BILIRUB SERPL-MCNC: 0.3 MG/DL — SIGNIFICANT CHANGE UP (ref 0.2–1.2)
BUN SERPL-MCNC: 113 MG/DL — HIGH (ref 7–18)
CALCIUM SERPL-MCNC: 7.8 MG/DL — LOW (ref 8.4–10.5)
CHLORIDE SERPL-SCNC: 107 MMOL/L — SIGNIFICANT CHANGE UP (ref 96–108)
CO2 SERPL-SCNC: 27 MMOL/L — SIGNIFICANT CHANGE UP (ref 22–31)
CREAT SERPL-MCNC: 3.8 MG/DL — HIGH (ref 0.5–1.3)
CRP SERPL-MCNC: 1.57 MG/DL — HIGH (ref 0–0.4)
GLUCOSE BLDC GLUCOMTR-MCNC: 116 MG/DL — HIGH (ref 70–99)
GLUCOSE BLDC GLUCOMTR-MCNC: 124 MG/DL — HIGH (ref 70–99)
GLUCOSE BLDC GLUCOMTR-MCNC: 138 MG/DL — HIGH (ref 70–99)
GLUCOSE BLDC GLUCOMTR-MCNC: 97 MG/DL — SIGNIFICANT CHANGE UP (ref 70–99)
GLUCOSE BLDC GLUCOMTR-MCNC: 99 MG/DL — SIGNIFICANT CHANGE UP (ref 70–99)
GLUCOSE SERPL-MCNC: 125 MG/DL — HIGH (ref 70–99)
HCT VFR BLD CALC: 31 % — LOW (ref 34.5–45)
HGB BLD-MCNC: 9.4 G/DL — LOW (ref 11.5–15.5)
MAGNESIUM SERPL-MCNC: 2.6 MG/DL — SIGNIFICANT CHANGE UP (ref 1.6–2.6)
MCHC RBC-ENTMCNC: 29.2 PG — SIGNIFICANT CHANGE UP (ref 27–34)
MCHC RBC-ENTMCNC: 30.3 GM/DL — LOW (ref 32–36)
MCV RBC AUTO: 96.3 FL — SIGNIFICANT CHANGE UP (ref 80–100)
NRBC # BLD: 0 /100 WBCS — SIGNIFICANT CHANGE UP (ref 0–0)
PHOSPHATE SERPL-MCNC: 4.8 MG/DL — HIGH (ref 2.5–4.5)
PLATELET # BLD AUTO: 441 K/UL — HIGH (ref 150–400)
POTASSIUM SERPL-MCNC: 5.3 MMOL/L — SIGNIFICANT CHANGE UP (ref 3.5–5.3)
POTASSIUM SERPL-MCNC: 5.3 MMOL/L — SIGNIFICANT CHANGE UP (ref 3.5–5.3)
POTASSIUM SERPL-SCNC: 5.3 MMOL/L — SIGNIFICANT CHANGE UP (ref 3.5–5.3)
POTASSIUM SERPL-SCNC: 5.3 MMOL/L — SIGNIFICANT CHANGE UP (ref 3.5–5.3)
PROT SERPL-MCNC: 7.3 G/DL — SIGNIFICANT CHANGE UP (ref 6–8.3)
RBC # BLD: 3.22 M/UL — LOW (ref 3.8–5.2)
RBC # FLD: 14.1 % — SIGNIFICANT CHANGE UP (ref 10.3–14.5)
SODIUM SERPL-SCNC: 139 MMOL/L — SIGNIFICANT CHANGE UP (ref 135–145)
WBC # BLD: 5.8 K/UL — SIGNIFICANT CHANGE UP (ref 3.8–10.5)
WBC # FLD AUTO: 5.8 K/UL — SIGNIFICANT CHANGE UP (ref 3.8–10.5)

## 2020-04-10 PROCEDURE — 80053 COMPREHEN METABOLIC PANEL: CPT

## 2020-04-10 PROCEDURE — 87635 SARS-COV-2 COVID-19 AMP PRB: CPT

## 2020-04-10 PROCEDURE — 83550 IRON BINDING TEST: CPT

## 2020-04-10 PROCEDURE — 85610 PROTHROMBIN TIME: CPT

## 2020-04-10 PROCEDURE — 82962 GLUCOSE BLOOD TEST: CPT

## 2020-04-10 PROCEDURE — 93005 ELECTROCARDIOGRAM TRACING: CPT

## 2020-04-10 PROCEDURE — 71045 X-RAY EXAM CHEST 1 VIEW: CPT

## 2020-04-10 PROCEDURE — 80048 BASIC METABOLIC PNL TOTAL CA: CPT

## 2020-04-10 PROCEDURE — 99238 HOSP IP/OBS DSCHRG MGMT 30/<: CPT | Mod: CS

## 2020-04-10 PROCEDURE — 83036 HEMOGLOBIN GLYCOSYLATED A1C: CPT

## 2020-04-10 PROCEDURE — 83970 ASSAY OF PARATHORMONE: CPT

## 2020-04-10 PROCEDURE — 99285 EMERGENCY DEPT VISIT HI MDM: CPT | Mod: 25

## 2020-04-10 PROCEDURE — 85027 COMPLETE CBC AUTOMATED: CPT

## 2020-04-10 PROCEDURE — 86360 T CELL ABSOLUTE COUNT/RATIO: CPT

## 2020-04-10 PROCEDURE — 80061 LIPID PANEL: CPT

## 2020-04-10 PROCEDURE — 83615 LACTATE (LD) (LDH) ENZYME: CPT

## 2020-04-10 PROCEDURE — 82803 BLOOD GASES ANY COMBINATION: CPT

## 2020-04-10 PROCEDURE — 84100 ASSAY OF PHOSPHORUS: CPT

## 2020-04-10 PROCEDURE — 83735 ASSAY OF MAGNESIUM: CPT

## 2020-04-10 PROCEDURE — 82607 VITAMIN B-12: CPT

## 2020-04-10 PROCEDURE — 80074 ACUTE HEPATITIS PANEL: CPT

## 2020-04-10 PROCEDURE — 82728 ASSAY OF FERRITIN: CPT

## 2020-04-10 PROCEDURE — 36415 COLL VENOUS BLD VENIPUNCTURE: CPT

## 2020-04-10 PROCEDURE — 82955 ASSAY OF G6PD ENZYME: CPT

## 2020-04-10 PROCEDURE — 86140 C-REACTIVE PROTEIN: CPT

## 2020-04-10 PROCEDURE — 84145 PROCALCITONIN (PCT): CPT

## 2020-04-10 PROCEDURE — 84443 ASSAY THYROID STIM HORMONE: CPT

## 2020-04-10 PROCEDURE — 83540 ASSAY OF IRON: CPT

## 2020-04-10 PROCEDURE — 84132 ASSAY OF SERUM POTASSIUM: CPT

## 2020-04-10 PROCEDURE — 85379 FIBRIN DEGRADATION QUANT: CPT

## 2020-04-10 PROCEDURE — 82310 ASSAY OF CALCIUM: CPT

## 2020-04-10 RX ORDER — ACETAMINOPHEN 500 MG
2 TABLET ORAL
Qty: 0 | Refills: 0 | DISCHARGE
Start: 2020-04-10

## 2020-04-10 RX ORDER — SEVELAMER CARBONATE 2400 MG/1
2 POWDER, FOR SUSPENSION ORAL
Qty: 0 | Refills: 0 | DISCHARGE
Start: 2020-04-10

## 2020-04-10 RX ORDER — LANOLIN ALCOHOL/MO/W.PET/CERES
1 CREAM (GRAM) TOPICAL
Qty: 0 | Refills: 0 | DISCHARGE
Start: 2020-04-10

## 2020-04-10 RX ORDER — FERROUS SULFATE 325(65) MG
1 TABLET ORAL
Qty: 7 | Refills: 0
Start: 2020-04-10 | End: 2020-04-16

## 2020-04-10 RX ADMIN — HEPARIN SODIUM 5000 UNIT(S): 5000 INJECTION INTRAVENOUS; SUBCUTANEOUS at 06:48

## 2020-04-10 RX ADMIN — Medication 81 MILLIGRAM(S): at 13:23

## 2020-04-10 RX ADMIN — INSULIN GLARGINE 10 UNIT(S): 100 INJECTION, SOLUTION SUBCUTANEOUS at 00:50

## 2020-04-10 RX ADMIN — PANTOPRAZOLE SODIUM 40 MILLIGRAM(S): 20 TABLET, DELAYED RELEASE ORAL at 06:45

## 2020-04-10 RX ADMIN — ATORVASTATIN CALCIUM 40 MILLIGRAM(S): 80 TABLET, FILM COATED ORAL at 00:19

## 2020-04-10 RX ADMIN — SODIUM ZIRCONIUM CYCLOSILICATE 10 GRAM(S): 10 POWDER, FOR SUSPENSION ORAL at 00:51

## 2020-04-10 RX ADMIN — SEVELAMER CARBONATE 1600 MILLIGRAM(S): 2400 POWDER, FOR SUSPENSION ORAL at 15:58

## 2020-04-10 RX ADMIN — SEVELAMER CARBONATE 1600 MILLIGRAM(S): 2400 POWDER, FOR SUSPENSION ORAL at 09:45

## 2020-04-10 RX ADMIN — INSULIN GLARGINE 10 UNIT(S): 100 INJECTION, SOLUTION SUBCUTANEOUS at 09:45

## 2020-04-10 RX ADMIN — CARVEDILOL PHOSPHATE 6.25 MILLIGRAM(S): 80 CAPSULE, EXTENDED RELEASE ORAL at 16:16

## 2020-04-10 RX ADMIN — CARVEDILOL PHOSPHATE 6.25 MILLIGRAM(S): 80 CAPSULE, EXTENDED RELEASE ORAL at 06:48

## 2020-04-10 RX ADMIN — SEVELAMER CARBONATE 1600 MILLIGRAM(S): 2400 POWDER, FOR SUSPENSION ORAL at 13:24

## 2020-04-10 RX ADMIN — HEPARIN SODIUM 5000 UNIT(S): 5000 INJECTION INTRAVENOUS; SUBCUTANEOUS at 16:16

## 2020-04-10 RX ADMIN — Medication 325 MILLIGRAM(S): at 13:23

## 2020-04-10 NOTE — CHART NOTE - NSCHARTNOTEFT_GEN_A_CORE
Potassium improved from 5.6 to 5.3. Pending to be discharged to nursing home. Per Gagandeep ,  scheduled for 18:00. Potassium improved from 5.6 to 5.3. Pending to be discharged to [facility]. Per Gagandeep ,  scheduled for 18:00.

## 2020-04-10 NOTE — PROGRESS NOTE ADULT - SUBJECTIVE AND OBJECTIVE BOX
[facility] - Dr. Linden Jacob/ Dr. Cruz Ledesma/ Dr. Meet Galdamez/ Dr. Jannie Recinos    Patient was seen and examined at bedside.    CC: patient is not sob and oxygenating well.    Vital Signs Last 24 Hrs  T(C): 36.4 (10 Apr 2020 13:32), Max: 37.2 (09 Apr 2020 23:13)  T(F): 97.5 (10 Apr 2020 13:32), Max: 98.9 (09 Apr 2020 23:13)  HR: 82 (10 Apr 2020 13:32) (69 - 82)  BP: 146/72 (10 Apr 2020 13:32) (113/68 - 146/72)  BP(mean): --  RR: 20 (10 Apr 2020 13:32) (18 - 20)  SpO2: 98% (10 Apr 2020 13:32) (96% - 100%)    04-10 @ 07:01  -  04-10 @ 16:32  --------------------------------------------------------  IN: 225 mL / OUT: 300 mL / NET: -75 mL        PHYSICAL EXAM:  GENERAL: No acute respiratory distress on NC; obese  EYES: conjunctiva and sclera clear  ENMT: Atraumatic, Normocephalic, supple, No JVD present.   RESPIRATORY: b/l poor air entry; No rales, rhonchi, wheezing  CARDIOVASCULAR: S1S2+; no m/r/g  GASTROINTESTINAL: Soft, Non-tender, Nondistended; Bowel sounds present,  CENTRAL NERVOUS SYSTEM:  Awake, Alert & Oriented X3  EXTREMITIES:   No edema, No Cyanosis  SKIN: No rashes      MEDICATIONS:  acetaminophen   Tablet .. 650 milliGRAM(s) Oral every 6 hours PRN  ALBUTerol    90 MICROgram(s) HFA Inhaler 2 Puff(s) Inhalation every 6 hours PRN  aspirin  chewable 81 milliGRAM(s) Oral daily  atorvastatin 40 milliGRAM(s) Oral at bedtime  carvedilol 6.25 milliGRAM(s) Oral every 12 hours  dextrose 40% Gel 15 Gram(s) Oral once PRN  dextrose 5%. 1000 milliLiter(s) IV Continuous <Continuous>  dextrose 50% Injectable 12.5 Gram(s) IV Push once  dextrose 50% Injectable 25 Gram(s) IV Push once  dextrose 50% Injectable 25 Gram(s) IV Push once  epoetin-harley-epbx (RETACRIT) Injectable 6000 Unit(s) SubCutaneous <User Schedule>  ferrous    sulfate 325 milliGRAM(s) Oral daily  glucagon  Injectable 1 milliGRAM(s) IntraMuscular once PRN  guaiFENesin   Syrup  (Sugar-Free) 100 milliGRAM(s) Oral every 6 hours PRN  heparin  Injectable 5000 Unit(s) SubCutaneous every 12 hours  insulin glargine Injectable (LANTUS) 10 Unit(s) SubCutaneous two times a day  insulin lispro (HumaLOG) corrective regimen sliding scale   SubCutaneous three times a day before meals  melatonin 3 milliGRAM(s) Oral at bedtime PRN  pantoprazole    Tablet 40 milliGRAM(s) Oral before breakfast  sevelamer carbonate 1600 milliGRAM(s) Oral three times a day with meals      LABS:                        9.4    5.69  )-----------( 397      ( 09 Apr 2020 15:25 )             31.1     04-09    138  |  107  |  116<H>  ----------------------------<  119<H>  5.6<H>   |  25  |  4.07<H>    Ca    7.7<L>      09 Apr 2020 15:25    TPro  7.4  /  Alb  2.8<L>  /  TBili  0.3  /  DBili  x   /  AST  20  /  ALT  17  /  AlkPhos  50  04-09          Urine studies    PTH and Vit D:

## 2020-04-10 NOTE — PROGRESS NOTE ADULT - ASSESSMENT
1.ANJU due to diuretic induced vs r/o ATN from COVID related.   -No new labs. resent. pts renal function is mildly better from yesterday possible recovery phase. No urgent need for HD    -will hold diuretics and avoid IVFs as pt has hx  severe HF and pt is clinically evolemic  -unable to perform renal sono due to COVID+   -moniter I/Os daily  2. CKD stage 3 , with non nephrotic proteinuria ,due to DM/HTN/obesity  -Baseline scr 2.0mg/dL in oct 2019, will try to obtain most recent kidney function, there could CKD progression.  -Keep patient euvolemic and Diabetic renal diet  -Avoid Nephrotoxic Meds/ Agents such as (NSAIDs, IV contrast, Aminoglycosides such as gentamicin, -Gadolinium contrast, Phosphate containing enemas, etc..)  -Adjust Medications according to eGFR  -f/u bmp daily  3.HTN: bp is controlled  -goal bp >110/70 and <130/80  -low salt diet  4.ANEMIA: mild, r/o secondary to ckd plus mild iron deficiency anemia  -continue po feso4 325mg daily  -f/u Hb in am  -f/u Hb daily  5. Hyperkalemia ;improved  -continue  strict low k diet plus PO Lokelma 10 gm po tid  -f/u k level daily  -Keep pt euvolemic. Avoid ACE inh/ ARBs, NSAIDs, and Aldactone or potassium sparing diuretics. Monitor K+ daily.  6. MBD: secondary to ckd with mild high phos level   -continue renvela and  low phos diet  -pth level is high normal -no intervention needed at this time  7. COVID +ve Pneumonia: plan as per medical team.
1.ANJU due to diuretic induced vs r/o ATN from COVID related.   -pts renal function is stable last 24 hrs ?in recovery phase   -will hold diuretics and avoid IVFs for now due severe HF. order urine lytes. If scr continues to worsen then will need to consider hemodialysis.  -unable to perform renal sono due to COVID+   -moniter I/Os daily  2. CKD stage 3 , with non nephrotic proteinuria ,due to DM/HTN/obesity  -Baseline scr 2.0mg/dL in oct 2019, will try to obtain most recent kidney function, there could CKD progression.  -Keep patient euvolemic and Diabetic renal diet  -Avoid Nephrotoxic Meds/ Agents such as (NSAIDs, IV contrast, Aminoglycosides such as gentamicin, -Gadolinium contrast, Phosphate containing enemas, etc..)  -Adjust Medications according to eGFR  -f/u bmp daily  3.HTN: bp is controlled  -goal bp >110/70 and <130/80  -low salt diet  4.ANEMIA: mild, r/o secondary to ckd plus mild iron deficiency anemia  -add po feso4 325mg daily  -add epogen if Hb <10 gm  -f/u Hb daily  5. Hyperkalemia due to anju on ckd woth high k diet?  add strict low k diet plus PO Lokelma 10 gm po tid  -f/u k level daily  -Keep pt euvolemic. Avoid ACE inh/ ARBs, NSAIDs, and Aldactone or potassium sparing diuretics. Monitor K+ daily.  6. MBD: secondary to ckd with mild high phos level   -continue renvela and add low phos diet  7. COVID +ve Pneumonia: plan as per medical team.
1.ANJU due to diuretic induced vs r/o ATN from COVID related.   -pts renal function is mildly better  last 24 hrs ?in recovery phase   -will hold diuretics and avoid IVFs as pt has hx  severe HF and pt is clinically evolemic  -unable to perform renal sono due to COVID+   -moniter I/Os daily  2. CKD stage 3 , with non nephrotic proteinuria ,due to DM/HTN/obesity  -Baseline scr 2.0mg/dL in oct 2019, will try to obtain most recent kidney function, there could CKD progression.  -Keep patient euvolemic and Diabetic renal diet  -Avoid Nephrotoxic Meds/ Agents such as (NSAIDs, IV contrast, Aminoglycosides such as gentamicin, -Gadolinium contrast, Phosphate containing enemas, etc..)  -Adjust Medications according to eGFR  -f/u bmp daily  3.HTN: bp is controlled  -goal bp >110/70 and <130/80  -low salt diet  4.ANEMIA: mild, r/o secondary to ckd plus mild iron deficiency anemia  -continue po feso4 325mg daily  -f/u Hb in am  -f/u Hb daily  5. Hyperkalemia ;improved  -continue  strict low k diet plus PO Lokelma 10 gm po tid  -f/u k level daily  -Keep pt euvolemic. Avoid ACE inh/ ARBs, NSAIDs, and Aldactone or potassium sparing diuretics. Monitor K+ daily.  6. MBD: secondary to ckd with mild high phos level   -continue renvela and  low phos diet  -pth level is high normal -no intervention needed at this time  7. COVID +ve Pneumonia: plan as per medical team.
1.ANJU due to diuretic induced vs r/o ATN from COVID related.   -pts renal function is mildly better last 24 hrs but still above her baseline  -ok for discharge if k is ok in am and serum cr is stable or improving   -unable to perform renal sono due to COVID+   -moniter I/Os daily  -we will need bmp in 3 days as outpatient   2. CKD stage 3 , with non nephrotic proteinuria ,due to DM/HTN/obesity  -Baseline scr 2.0mg/dL in oct 2019, will try to obtain most recent kidney function, there could CKD progression.  -Keep patient euvolemic and Diabetic renal diet  -Avoid Nephrotoxic Meds/ Agents such as (NSAIDs, IV contrast, Aminoglycosides such as gentamicin, -Gadolinium contrast, Phosphate containing enemas, etc..)  -Adjust Medications according to eGFR  -f/u bmp daily  -renal f/u as outpatient with Dr Linden Jacob in 2 weeks  3.HTN: bp is controlled  -goal bp >110/70 and <130/80  -low salt diet  4.ANEMIA: mild, r/o secondary to ckd plus mild iron deficiency anemia  -continue po feso4 325mg daily  -add epogen for anemia of ckd as ordered  -f/u Hb daily  5. Hyperkalemia :mild   -continue  strict low k diet plus PO Lokelma 10 gm plus 1 dose of Lasix 60mg if sbp>110  -f/u k level daily  -Keep pt euvolemic. Avoid ACE inh/ ARBs, NSAIDs, and Aldactone or potassium sparing diuretics. Monitor K+ daily.  6. MBD: secondary to ckd with  high phos level   -continue renvela and  low phos diet  -pth level is high normal -no intervention needed at this time  7. COVID +ve Pneumonia: plan as per medical team.

## 2020-04-10 NOTE — PROGRESS NOTE ADULT - REASON FOR ADMISSION
Fever, body ache

## 2020-04-10 NOTE — PROGRESS NOTE ADULT - SUBJECTIVE AND OBJECTIVE BOX
Patient is a 55y old  Female who presents with a chief complaint of Fever, body ache (10 Apr 2020 16:32)    PATIENT IS SEEN AND EXAMINED IN MEDICAL FLOOR.  NGT [    ]    [place] [   ]      GT [   ]    ALLERGIES:  codeine (Urticaria)      Daily     Daily     VITALS:    Vital Signs Last 24 Hrs  T(C): 36.4 (10 Apr 2020 13:32), Max: 37.2 (09 Apr 2020 23:13)  T(F): 97.5 (10 Apr 2020 13:32), Max: 98.9 (09 Apr 2020 23:13)  HR: 82 (10 Apr 2020 13:32) (73 - 82)  BP: 146/72 (10 Apr 2020 13:32) (113/68 - 146/72)  BP(mean): --  RR: 20 (10 Apr 2020 13:32) (18 - 20)  SpO2: 98% (10 Apr 2020 13:32) (96% - 99%)    LABS:    CBC Full  -  ( 10 Apr 2020 16:45 )  WBC Count : 5.80 K/uL  RBC Count : 3.22 M/uL  Hemoglobin : 9.4 g/dL  Hematocrit : 31.0 %  Platelet Count - Automated : 441 K/uL  Mean Cell Volume : 96.3 fl  Mean Cell Hemoglobin : 29.2 pg  Mean Cell Hemoglobin Concentration : 30.3 gm/dL  Auto Neutrophil # : x  Auto Lymphocyte # : x  Auto Monocyte # : x  Auto Eosinophil # : x  Auto Basophil # : x  Auto Neutrophil % : x  Auto Lymphocyte % : x  Auto Monocyte % : x  Auto Eosinophil % : x  Auto Basophil % : x      04-10    139  |  107  |  113<H>  ----------------------------<  125<H>  5.3   |  27  |  3.80<H>    Ca    7.8<L>      10 Apr 2020 16:45  Phos  4.8     04-10  Mg     2.6     04-10    TPro  7.3  /  Alb  2.8<L>  /  TBili  0.3  /  DBili  x   /  AST  21  /  ALT  20  /  AlkPhos  53  04-10    CAPILLARY BLOOD GLUCOSE      POCT Blood Glucose.: 124 mg/dL (10 Apr 2020 16:42)  POCT Blood Glucose.: 138 mg/dL (10 Apr 2020 12:21)  POCT Blood Glucose.: 99 mg/dL (10 Apr 2020 08:48)  POCT Blood Glucose.: 116 mg/dL (10 Apr 2020 05:48)  POCT Blood Glucose.: 97 mg/dL (10 Apr 2020 00:24)        LIVER FUNCTIONS - ( 10 Apr 2020 16:45 )  Alb: 2.8 g/dL / Pro: 7.3 g/dL / ALK PHOS: 53 U/L / ALT: 20 U/L DA / AST: 21 U/L / GGT: x           Creatinine Trend: 3.80<--, 4.07<--, 4.24<--, 4.60<--, 4.67<--, 4.80<--  I&O's Summary    10 Apr 2020 07:01  -  10 Apr 2020 18:18  --------------------------------------------------------  IN: 225 mL / OUT: 300 mL / NET: -75 mL                MEDICATIONS:    MEDICATIONS  (STANDING):  aspirin  chewable 81 milliGRAM(s) Oral daily  atorvastatin 40 milliGRAM(s) Oral at bedtime  carvedilol 6.25 milliGRAM(s) Oral every 12 hours  dextrose 5%. 1000 milliLiter(s) (50 mL/Hr) IV Continuous <Continuous>  dextrose 50% Injectable 12.5 Gram(s) IV Push once  dextrose 50% Injectable 25 Gram(s) IV Push once  dextrose 50% Injectable 25 Gram(s) IV Push once  epoetin-harley-epbx (RETACRIT) Injectable 6000 Unit(s) SubCutaneous <User Schedule>  ferrous    sulfate 325 milliGRAM(s) Oral daily  heparin  Injectable 5000 Unit(s) SubCutaneous every 12 hours  insulin glargine Injectable (LANTUS) 10 Unit(s) SubCutaneous two times a day  insulin lispro (HumaLOG) corrective regimen sliding scale   SubCutaneous three times a day before meals  pantoprazole    Tablet 40 milliGRAM(s) Oral before breakfast  sevelamer carbonate 1600 milliGRAM(s) Oral three times a day with meals      MEDICATIONS  (PRN):  acetaminophen   Tablet .. 650 milliGRAM(s) Oral every 6 hours PRN Temp greater or equal to 38C (100.4F)  ALBUTerol    90 MICROgram(s) HFA Inhaler 2 Puff(s) Inhalation every 6 hours PRN Shortness of Breath and/or Wheezing  dextrose 40% Gel 15 Gram(s) Oral once PRN Blood Glucose LESS THAN 70 milliGRAM(s)/deciliter  glucagon  Injectable 1 milliGRAM(s) IntraMuscular once PRN Glucose LESS THAN 70 milligrams/deciliter  guaiFENesin   Syrup  (Sugar-Free) 100 milliGRAM(s) Oral every 6 hours PRN Cough  melatonin 3 milliGRAM(s) Oral at bedtime PRN Sleep      REVIEW OF SYSTEMS:                           ALL ROS DONE [ X   ]    CONSTITUTIONAL:  LETHARGIC [   ], FEVER [   ], UNRESPONSIVE [   ]  CVS:  CP  [   ], SOB, [   ], PALPITATIONS [   ], DIZZYNESS [   ]  RS: COUGH [   ], SPUTUM [   ]  GI: ABDOMINAL PAIN [   ], NAUSEA [   ], VOMITINGS [   ], DIARRHEA [   ], CONSTIPATION [   ]  :  DYSURIA [   ], NOCTURIA [   ], INCREASED FREQUENCY [   ], DRIBLING [   ],  SKELETAL: PAINFUL JOINTS [   ], SWOLLEN JOINTS [   ], NECK ACHE [   ], LOW BACK ACHE [   ],  SKIN : ULCERS [   ], RASH [   ], ITCHING [   ]  CNS: HEAD ACHE [   ], DOUBLE VISION [   ], BLURRED VISION [   ], AMS / CONFUSION [   ], SEIZURES [   ], WEAKNESS [   ],TINGLING / NUMBNESS [   ]      PHYSICAL EXAMINATION:  GENERAL APPEARANCE: NO DISTRESS, MORBIDLY OBESE  HEENT:  NO PALLOR, NO  JVD,  NO   NODES, NECK SUPPLE  CVS: S1 +, S2 +,   RS: AEEB,  OCCASIONAL  RALES +,   MILD B/L RHONCHI +  ABD: SOFT, NT, NO, BS +  EXT: PE +  SKIN: WARM,          AICD+  SKELETAL:  ROM ACCEPTABLE  CNS:  AAO X  3 ,   DEFICITS    RADIOLOGY :  < from: Xray Chest 1 View- PORTABLE-Urgent (04.02.20 @ 18:58) >  IMPRESSION:  Right infiltrate.    < end of copied text >      ASSESSMENT :     Coronavirus infection  GERD (gastroesophageal reflux disease)  CHF (congestive heart failure)  CAD (coronary artery disease)  AICD (automatic cardioverter/defibrillator) present  COPD (chronic obstructive pulmonary disease)  RA (rheumatoid arthritis)  DM (diabetes mellitus)  HTN (hypertension)  ICD (implantable cardioverter-defibrillator) in place  History of cholecystectomy      PLAN:  HPI:  Pt is a 54 y/o F  from [facility] with PMH of Obesity, CHF HFrEF 25 %, s/p AICD, HTN, B/l LE edema, DM, Gout, CAD, HLD, COPD  who presented with cough since 5 days , shortness of breath since 2days and fever since 1 day. According to the pt, since 5 days she has developed dry cough. Since last two days she has developed mild shortness of breath which is worse on exertion. She has some chest tightness and fever with chills since today. She denied recent travel, sick contact, nausea, vomiting, abdominal pain and other complains. (02 Apr 2020 20:45)    PATIENT IS A 54YO F FROM [facility] WITH PMHX SIGNIFICANT FOR AMPARO/OHS ON CPAP, HFREF [25%], PVD, DM, CAD, HLD, COPD AND GOUT WHO WAS TRANSFERRED FOR EVALUATION OF COUGH/DYSPNEA/FEVER.    # ACUTE HYPOXIC RESPIRATORY FAILURE S/T COVID19 PNEUMONIA, SEPSIS AND SUSPECTED SECONDARY BACTERIAL PNEUMONIA - NOTED PROLONGED QTC, WILL HOLD PLAQUENIL  # MORBID OBESITY, RESTRICTIVE LUNG DISEASE, OBSTRUCTIVE SLEEP APNEA ON CPAP, COPD  # SYSTOLIC CHF S/P AICD, ON CHRONIC O2 SUPPLEMENT - TRIAL OF LASIX YESTERDAY  # ANJU ON CKD4 - AVOID NEPHROTOXIC MEDICATION; NEPHROLOGY CONSULTED - SUSPECT ATN - STRICT IS AND OS  -- HAVE DISCUSSED WITH NEPHROLOGY - IF HYPERKALEMIA RESOLVED RENAL FUNCTION WILL BE MONITORED OUTPATIENT   # HYPERKALEMIA S/T ANJU - GIVEN LOKELMA, LASIX YESTERDAY; AWAITING BLOODWORK TODAY  # NORMOCYTIC ANEMIA - IRON DEFICIENCY ANEMIA + ANEMIA OF CKD - PLACED ON FESO4, GIVEN EPOGEN  # MINERAL BONE DISEASE - ON PHOSPHATE BINDERS  # D/C TO [facility] WHEN MEDICALLY STABLE AS [facility] DOES NOT HAVE AN ISOLATION  # GI AND DVT PPX    LUANN NATARAJAN M.D. COVERING FOR DONTAE NATARAJAN M.D.

## 2020-04-10 NOTE — PROGRESS NOTE ADULT - NSHPATTENDINGPLANDISCUSS_GEN_ALL_CORE
FLOOR STAFF
STAFF
STAFF
patient
patient and NP
STAFF

## 2020-04-10 NOTE — DISCHARGE NOTE NURSING/CASE MANAGEMENT/SOCIAL WORK - PATIENT PORTAL LINK FT
You can access the FollowMyHealth Patient Portal offered by [facility] by registering at the following website: [email or web address]. By joining [employer]’s FollowMyHealth portal, you will also be able to view your health information using other applications (apps) compatible with our system.

## 2020-07-06 NOTE — PATIENT PROFILE ADULT. - FUNCTIONAL LEVEL PRIOR: TOILETING
Problem List Items Addressed This Visit        Other    Mixed hyperlipidemia     Check fasting lipid panel which is due in March  Patient is not taking Pravachol  Primary osteoarthritis of both knees    Foot pain, bilateral - Primary     Patient has seen multiple specialists and is now scheduled for an MRI this Friday of her low back for further evaluation  I am hoping patient get me a copy of this dictation and not the actual disc as I cannot technically read the radiology imaging  (2) assistive person

## 2021-01-29 ENCOUNTER — INPATIENT (INPATIENT)
Facility: HOSPITAL | Age: 56
LOS: 5 days | Discharge: NURSING, REHAB OR LONG-TERM CARE | DRG: 744 | End: 2021-02-04
Attending: INTERNAL MEDICINE | Admitting: INTERNAL MEDICINE
Payer: MEDICARE

## 2021-01-29 VITALS
TEMPERATURE: 98 F | SYSTOLIC BLOOD PRESSURE: 151 MMHG | DIASTOLIC BLOOD PRESSURE: 60 MMHG | WEIGHT: 293 LBS | HEART RATE: 84 BPM | HEIGHT: 65 IN | OXYGEN SATURATION: 100 % | RESPIRATION RATE: 18 BRPM

## 2021-01-29 DIAGNOSIS — Z95.810 PRESENCE OF AUTOMATIC (IMPLANTABLE) CARDIAC DEFIBRILLATOR: Chronic | ICD-10-CM

## 2021-01-29 DIAGNOSIS — Z98.89 OTHER SPECIFIED POSTPROCEDURAL STATES: Chronic | ICD-10-CM

## 2021-01-29 DIAGNOSIS — D64.9 ANEMIA, UNSPECIFIED: ICD-10-CM

## 2021-01-29 LAB
ALBUMIN SERPL ELPH-MCNC: 3 G/DL — LOW (ref 3.5–5)
ALP SERPL-CCNC: 61 U/L — SIGNIFICANT CHANGE UP (ref 40–120)
ALT FLD-CCNC: 13 U/L DA — SIGNIFICANT CHANGE UP (ref 10–60)
ANION GAP SERPL CALC-SCNC: 8 MMOL/L — SIGNIFICANT CHANGE UP (ref 5–17)
APTT BLD: 27.7 SEC — SIGNIFICANT CHANGE UP (ref 27.5–35.5)
AST SERPL-CCNC: 6 U/L — LOW (ref 10–40)
BASOPHILS # BLD AUTO: 0.02 K/UL — SIGNIFICANT CHANGE UP (ref 0–0.2)
BASOPHILS NFR BLD AUTO: 0.2 % — SIGNIFICANT CHANGE UP (ref 0–2)
BILIRUB SERPL-MCNC: 0.2 MG/DL — SIGNIFICANT CHANGE UP (ref 0.2–1.2)
BUN SERPL-MCNC: 98 MG/DL — HIGH (ref 7–18)
CALCIUM SERPL-MCNC: 8 MG/DL — LOW (ref 8.4–10.5)
CHLORIDE SERPL-SCNC: 108 MMOL/L — SIGNIFICANT CHANGE UP (ref 96–108)
CO2 SERPL-SCNC: 24 MMOL/L — SIGNIFICANT CHANGE UP (ref 22–31)
CREAT SERPL-MCNC: 3.82 MG/DL — HIGH (ref 0.5–1.3)
EOSINOPHIL # BLD AUTO: 0.02 K/UL — SIGNIFICANT CHANGE UP (ref 0–0.5)
EOSINOPHIL NFR BLD AUTO: 0.2 % — SIGNIFICANT CHANGE UP (ref 0–6)
GLUCOSE SERPL-MCNC: 232 MG/DL — HIGH (ref 70–99)
HCT VFR BLD CALC: 13.1 % — CRITICAL LOW (ref 34.5–45)
HGB BLD-MCNC: 3.6 G/DL — CRITICAL LOW (ref 11.5–15.5)
IMM GRANULOCYTES NFR BLD AUTO: 1.3 % — SIGNIFICANT CHANGE UP (ref 0–1.5)
INR BLD: 1.03 RATIO — SIGNIFICANT CHANGE UP (ref 0.88–1.16)
LIDOCAIN IGE QN: 299 U/L — SIGNIFICANT CHANGE UP (ref 73–393)
LYMPHOCYTES # BLD AUTO: 1.8 K/UL — SIGNIFICANT CHANGE UP (ref 1–3.3)
LYMPHOCYTES # BLD AUTO: 15 % — SIGNIFICANT CHANGE UP (ref 13–44)
MCHC RBC-ENTMCNC: 23.2 PG — LOW (ref 27–34)
MCHC RBC-ENTMCNC: 27.5 GM/DL — LOW (ref 32–36)
MCV RBC AUTO: 84.5 FL — SIGNIFICANT CHANGE UP (ref 80–100)
MONOCYTES # BLD AUTO: 1.01 K/UL — HIGH (ref 0–0.9)
MONOCYTES NFR BLD AUTO: 8.4 % — SIGNIFICANT CHANGE UP (ref 2–14)
NEUTROPHILS # BLD AUTO: 8.97 K/UL — HIGH (ref 1.8–7.4)
NEUTROPHILS NFR BLD AUTO: 74.9 % — SIGNIFICANT CHANGE UP (ref 43–77)
NRBC # BLD: 2 /100 WBCS — HIGH (ref 0–0)
PLATELET # BLD AUTO: 350 K/UL — SIGNIFICANT CHANGE UP (ref 150–400)
POTASSIUM SERPL-MCNC: 5.1 MMOL/L — SIGNIFICANT CHANGE UP (ref 3.5–5.3)
POTASSIUM SERPL-SCNC: 5.1 MMOL/L — SIGNIFICANT CHANGE UP (ref 3.5–5.3)
PROT SERPL-MCNC: 6.6 G/DL — SIGNIFICANT CHANGE UP (ref 6–8.3)
PROTHROM AB SERPL-ACNC: 12.2 SEC — SIGNIFICANT CHANGE UP (ref 10.6–13.6)
RBC # BLD: 1.55 M/UL — LOW (ref 3.8–5.2)
RBC # FLD: 18.5 % — HIGH (ref 10.3–14.5)
SODIUM SERPL-SCNC: 140 MMOL/L — SIGNIFICANT CHANGE UP (ref 135–145)
WBC # BLD: 11.98 K/UL — HIGH (ref 3.8–10.5)
WBC # FLD AUTO: 11.98 K/UL — HIGH (ref 3.8–10.5)

## 2021-01-29 PROCEDURE — 99285 EMERGENCY DEPT VISIT HI MDM: CPT

## 2021-01-29 RX ORDER — SODIUM CHLORIDE 9 MG/ML
3 INJECTION INTRAMUSCULAR; INTRAVENOUS; SUBCUTANEOUS EVERY 8 HOURS
Refills: 0 | Status: DISCONTINUED | OUTPATIENT
Start: 2021-01-29 | End: 2021-02-02

## 2021-01-29 RX ADMIN — SODIUM CHLORIDE 3 MILLILITER(S): 9 INJECTION INTRAMUSCULAR; INTRAVENOUS; SUBCUTANEOUS at 23:39

## 2021-01-29 NOTE — CONSULT NOTE ADULT - PROBLEM SELECTOR RECOMMENDATION 9
Obtain pelvic sono to evaluate uterus  Medicine management of comorbities  Gyn to follow with any other concerns, but no vaginal bleeding noted on speculum exam

## 2021-01-29 NOTE — ED ADULT TRIAGE NOTE - CHIEF COMPLAINT QUOTE
patient states " I have blood in urine, when I pee since January seven. " patient also c/o pain on right lower back .  Denies contact to person with known corona virus. Had ist dose vaccine.  Patient using CPAP at bedtime

## 2021-01-29 NOTE — CONSULT NOTE ADULT - SUBJECTIVE AND OBJECTIVE BOX
Patient is a 55 year old female with multiple comorbidities presents to ER with complaint of seeing blood when she urinates since Jan 7th.  Patient reports that she doesnt have any dysuria, but does have some flank pain.  Reports that she has seen clots in her underwear also.  Currently incontinent and wears diapers.  Reports no menses since approx 2006, but reports over the years she has had some occasional spotting.  Saw her gynecologist in March 2020, and reports has an appointment in Feb with him.  He is affialiated with [facility].  Reports that she has been told that she has fibroids, and a history of an abnormal pap, but no cysts, stds, or any other concerns.  Was told that everything was OK in March.      Med/Surg history:  Hx Coronavirus infection in April  GERD (gastroesophageal reflux disease)  CHF (congestive heart failure)  CAD (coronary artery disease)  AICD (automatic cardioverter/defibrillator) present  COPD (chronic obstructive pulmonary disease)  RA (rheumatoid arthritis)  DM (diabetes mellitus)  HTN (hypertension)  ICD (implantable cardioverter-defibrillator) in place  History of cholecystectomy    Physical exam:    Abdomen: Obese, large hernia, nontender, +BS  Gyn: Diaper with 3cm clot and soaked with pink tinted urine, speculum exam shows no blood in vault, some ?urine in vault    Labs:                         3.6    11.98 )-----------( 350      ( 29 Jan 2021 22:35 )             13.1

## 2021-01-29 NOTE — CONSULT NOTE ADULT - ASSESSMENT
55 year old female with multiple comorbidities now with severe anemia, ?vaginal bleeding    Discussed with Dr Quigley

## 2021-01-30 DIAGNOSIS — N18.9 CHRONIC KIDNEY DISEASE, UNSPECIFIED: ICD-10-CM

## 2021-01-30 DIAGNOSIS — D25.9 LEIOMYOMA OF UTERUS, UNSPECIFIED: ICD-10-CM

## 2021-01-30 DIAGNOSIS — N95.0 POSTMENOPAUSAL BLEEDING: ICD-10-CM

## 2021-01-30 DIAGNOSIS — Z29.9 ENCOUNTER FOR PROPHYLACTIC MEASURES, UNSPECIFIED: ICD-10-CM

## 2021-01-30 DIAGNOSIS — I10 ESSENTIAL (PRIMARY) HYPERTENSION: ICD-10-CM

## 2021-01-30 DIAGNOSIS — E11.9 TYPE 2 DIABETES MELLITUS WITHOUT COMPLICATIONS: ICD-10-CM

## 2021-01-30 DIAGNOSIS — I25.10 ATHEROSCLEROTIC HEART DISEASE OF NATIVE CORONARY ARTERY WITHOUT ANGINA PECTORIS: ICD-10-CM

## 2021-01-30 DIAGNOSIS — D64.9 ANEMIA, UNSPECIFIED: ICD-10-CM

## 2021-01-30 LAB
A1C WITH ESTIMATED AVERAGE GLUCOSE RESULT: 7.1 % — HIGH (ref 4–5.6)
ALBUMIN SERPL ELPH-MCNC: 3.2 G/DL — LOW (ref 3.5–5)
ALP SERPL-CCNC: 68 U/L — SIGNIFICANT CHANGE UP (ref 40–120)
ALT FLD-CCNC: 13 U/L DA — SIGNIFICANT CHANGE UP (ref 10–60)
ANION GAP SERPL CALC-SCNC: 7 MMOL/L — SIGNIFICANT CHANGE UP (ref 5–17)
ANISOCYTOSIS BLD QL: SIGNIFICANT CHANGE UP
AST SERPL-CCNC: 4 U/L — LOW (ref 10–40)
BASOPHILS # BLD AUTO: 0.04 K/UL — SIGNIFICANT CHANGE UP (ref 0–0.2)
BASOPHILS NFR BLD AUTO: 0.3 % — SIGNIFICANT CHANGE UP (ref 0–2)
BILIRUB SERPL-MCNC: 0.6 MG/DL — SIGNIFICANT CHANGE UP (ref 0.2–1.2)
BLD GP AB SCN SERPL QL: SIGNIFICANT CHANGE UP
BUN SERPL-MCNC: 91 MG/DL — HIGH (ref 7–18)
CALCIUM SERPL-MCNC: 8.6 MG/DL — SIGNIFICANT CHANGE UP (ref 8.4–10.5)
CHLORIDE SERPL-SCNC: 110 MMOL/L — HIGH (ref 96–108)
CHOLEST SERPL-MCNC: 119 MG/DL — SIGNIFICANT CHANGE UP
CO2 SERPL-SCNC: 26 MMOL/L — SIGNIFICANT CHANGE UP (ref 22–31)
CREAT SERPL-MCNC: 3.24 MG/DL — HIGH (ref 0.5–1.3)
DACRYOCYTES BLD QL SMEAR: SLIGHT — SIGNIFICANT CHANGE UP
ELLIPTOCYTES BLD QL SMEAR: SLIGHT — SIGNIFICANT CHANGE UP
EOSINOPHIL # BLD AUTO: 0.04 K/UL — SIGNIFICANT CHANGE UP (ref 0–0.5)
EOSINOPHIL NFR BLD AUTO: 0.3 % — SIGNIFICANT CHANGE UP (ref 0–6)
ESTIMATED AVERAGE GLUCOSE: 157 MG/DL — HIGH (ref 68–114)
FERRITIN SERPL-MCNC: 10 NG/ML — LOW (ref 15–150)
FOLATE SERPL-MCNC: >20 NG/ML — SIGNIFICANT CHANGE UP
GLUCOSE BLDC GLUCOMTR-MCNC: 170 MG/DL — HIGH (ref 70–99)
GLUCOSE BLDC GLUCOMTR-MCNC: 185 MG/DL — HIGH (ref 70–99)
GLUCOSE BLDC GLUCOMTR-MCNC: 219 MG/DL — HIGH (ref 70–99)
GLUCOSE SERPL-MCNC: 157 MG/DL — HIGH (ref 70–99)
HCT VFR BLD CALC: 19.9 % — CRITICAL LOW (ref 34.5–45)
HCT VFR BLD CALC: 20 % — CRITICAL LOW (ref 34.5–45)
HDLC SERPL-MCNC: 33 MG/DL — LOW
HGB BLD-MCNC: 6.1 G/DL — CRITICAL LOW (ref 11.5–15.5)
HGB BLD-MCNC: 6.1 G/DL — CRITICAL LOW (ref 11.5–15.5)
HYPOCHROMIA BLD QL: SIGNIFICANT CHANGE UP
IMM GRANULOCYTES NFR BLD AUTO: 1 % — SIGNIFICANT CHANGE UP (ref 0–1.5)
IRON SATN MFR SERPL: 11 % — LOW (ref 15–50)
IRON SATN MFR SERPL: 43 UG/DL — SIGNIFICANT CHANGE UP (ref 40–150)
LDH SERPL L TO P-CCNC: 204 U/L — SIGNIFICANT CHANGE UP (ref 120–225)
LIPID PNL WITH DIRECT LDL SERPL: 41 MG/DL — SIGNIFICANT CHANGE UP
LYMPHOCYTES # BLD AUTO: 1.73 K/UL — SIGNIFICANT CHANGE UP (ref 1–3.3)
LYMPHOCYTES # BLD AUTO: 14 % — SIGNIFICANT CHANGE UP (ref 13–44)
MAGNESIUM SERPL-MCNC: 2.4 MG/DL — SIGNIFICANT CHANGE UP (ref 1.6–2.6)
MANUAL SMEAR VERIFICATION: SIGNIFICANT CHANGE UP
MCHC RBC-ENTMCNC: 25.2 PG — LOW (ref 27–34)
MCHC RBC-ENTMCNC: 25.2 PG — LOW (ref 27–34)
MCHC RBC-ENTMCNC: 30.5 GM/DL — LOW (ref 32–36)
MCHC RBC-ENTMCNC: 30.7 GM/DL — LOW (ref 32–36)
MCV RBC AUTO: 82.2 FL — SIGNIFICANT CHANGE UP (ref 80–100)
MCV RBC AUTO: 82.6 FL — SIGNIFICANT CHANGE UP (ref 80–100)
MICROCYTES BLD QL: SIGNIFICANT CHANGE UP
MONOCYTES # BLD AUTO: 1.21 K/UL — HIGH (ref 0–0.9)
MONOCYTES NFR BLD AUTO: 9.8 % — SIGNIFICANT CHANGE UP (ref 2–14)
NEUTROPHILS # BLD AUTO: 9.21 K/UL — HIGH (ref 1.8–7.4)
NEUTROPHILS NFR BLD AUTO: 74.6 % — SIGNIFICANT CHANGE UP (ref 43–77)
NON HDL CHOLESTEROL: 86 MG/DL — SIGNIFICANT CHANGE UP
NRBC # BLD: 2 /100 WBCS — HIGH (ref 0–0)
NRBC # BLD: 2 /100 WBCS — HIGH (ref 0–0)
PHOSPHATE SERPL-MCNC: 4.8 MG/DL — HIGH (ref 2.5–4.5)
PLAT MORPH BLD: NORMAL — SIGNIFICANT CHANGE UP
PLATELET # BLD AUTO: 335 K/UL — SIGNIFICANT CHANGE UP (ref 150–400)
PLATELET # BLD AUTO: 353 K/UL — SIGNIFICANT CHANGE UP (ref 150–400)
POIKILOCYTOSIS BLD QL AUTO: SIGNIFICANT CHANGE UP
POLYCHROMASIA BLD QL SMEAR: SIGNIFICANT CHANGE UP
POTASSIUM SERPL-MCNC: 4.7 MMOL/L — SIGNIFICANT CHANGE UP (ref 3.5–5.3)
POTASSIUM SERPL-SCNC: 4.7 MMOL/L — SIGNIFICANT CHANGE UP (ref 3.5–5.3)
POTASSIUM UR-SCNC: 20 MMOL/L — SIGNIFICANT CHANGE UP
PROT SERPL-MCNC: 6.6 G/DL — SIGNIFICANT CHANGE UP (ref 6–8.3)
RBC # BLD: 2.42 M/UL — LOW (ref 3.8–5.2)
RBC # FLD: 16.3 % — HIGH (ref 10.3–14.5)
RBC # FLD: 16.7 % — HIGH (ref 10.3–14.5)
RBC BLD AUTO: ABNORMAL
RETICS #: 23.9 K/UL — LOW (ref 25–125)
RETICS/RBC NFR: 1 % — SIGNIFICANT CHANGE UP (ref 0.5–2.5)
SARS-COV-2 IGG SERPL QL IA: POSITIVE
SARS-COV-2 IGM SERPL IA-ACNC: 9.24 INDEX — HIGH
SARS-COV-2 RNA SPEC QL NAA+PROBE: SIGNIFICANT CHANGE UP
SCHISTOCYTES BLD QL AUTO: SIGNIFICANT CHANGE UP
SODIUM SERPL-SCNC: 143 MMOL/L — SIGNIFICANT CHANGE UP (ref 135–145)
SODIUM UR-SCNC: 98 MMOL/L — SIGNIFICANT CHANGE UP
SPHEROCYTES BLD QL SMEAR: SLIGHT — SIGNIFICANT CHANGE UP
TIBC SERPL-MCNC: 402 UG/DL — SIGNIFICANT CHANGE UP (ref 250–450)
TRANSFERRIN SERPL-MCNC: 336 MG/DL — SIGNIFICANT CHANGE UP (ref 200–360)
TRIGL SERPL-MCNC: 224 MG/DL — HIGH
TSH SERPL-MCNC: 1.7 UU/ML — SIGNIFICANT CHANGE UP (ref 0.34–4.82)
UIBC SERPL-MCNC: 359 UG/DL — SIGNIFICANT CHANGE UP (ref 110–370)
VIT B12 SERPL-MCNC: 1210 PG/ML — SIGNIFICANT CHANGE UP (ref 232–1245)
WBC # BLD: 11.6 K/UL — HIGH (ref 3.8–10.5)
WBC # BLD: 12.35 K/UL — HIGH (ref 3.8–10.5)
WBC # FLD AUTO: 11.6 K/UL — HIGH (ref 3.8–10.5)
WBC # FLD AUTO: 12.35 K/UL — HIGH (ref 3.8–10.5)

## 2021-01-30 PROCEDURE — 74176 CT ABD & PELVIS W/O CONTRAST: CPT | Mod: 26

## 2021-01-30 PROCEDURE — ZZZZZ: CPT

## 2021-01-30 PROCEDURE — 58100 BIOPSY OF UTERUS LINING: CPT | Mod: 53

## 2021-01-30 RX ORDER — INSULIN NPH HUM/REG INSULIN HM 70-30/ML
34 VIAL (ML) SUBCUTANEOUS
Qty: 0 | Refills: 0 | DISCHARGE

## 2021-01-30 RX ORDER — ALLOPURINOL 300 MG
100 TABLET ORAL DAILY
Refills: 0 | Status: DISCONTINUED | OUTPATIENT
Start: 2021-01-30 | End: 2021-02-02

## 2021-01-30 RX ORDER — FUROSEMIDE 40 MG
40 TABLET ORAL
Refills: 0 | Status: DISCONTINUED | OUTPATIENT
Start: 2021-01-30 | End: 2021-02-02

## 2021-01-30 RX ORDER — ALBUTEROL 90 UG/1
2 AEROSOL, METERED ORAL EVERY 6 HOURS
Refills: 0 | Status: DISCONTINUED | OUTPATIENT
Start: 2021-01-30 | End: 2021-02-02

## 2021-01-30 RX ORDER — ASPIRIN/CALCIUM CARB/MAGNESIUM 324 MG
81 TABLET ORAL DAILY
Refills: 0 | Status: DISCONTINUED | OUTPATIENT
Start: 2021-01-30 | End: 2021-02-01

## 2021-01-30 RX ORDER — LOSARTAN POTASSIUM 100 MG/1
1 TABLET, FILM COATED ORAL
Qty: 0 | Refills: 0 | DISCHARGE

## 2021-01-30 RX ORDER — GABAPENTIN 400 MG/1
100 CAPSULE ORAL THREE TIMES A DAY
Refills: 0 | Status: DISCONTINUED | OUTPATIENT
Start: 2021-01-30 | End: 2021-02-02

## 2021-01-30 RX ORDER — SEVELAMER CARBONATE 2400 MG/1
800 POWDER, FOR SUSPENSION ORAL
Refills: 0 | Status: DISCONTINUED | OUTPATIENT
Start: 2021-01-30 | End: 2021-02-02

## 2021-01-30 RX ORDER — FUROSEMIDE 40 MG
40 TABLET ORAL ONCE
Refills: 0 | Status: COMPLETED | OUTPATIENT
Start: 2021-01-30 | End: 2021-01-30

## 2021-01-30 RX ORDER — CHOLECALCIFEROL (VITAMIN D3) 125 MCG
1000 CAPSULE ORAL DAILY
Refills: 0 | Status: DISCONTINUED | OUTPATIENT
Start: 2021-01-30 | End: 2021-02-02

## 2021-01-30 RX ORDER — INSULIN NPH HUM/REG INSULIN HM 70-30/ML
20 VIAL (ML) SUBCUTANEOUS
Qty: 0 | Refills: 0 | DISCHARGE

## 2021-01-30 RX ORDER — FUROSEMIDE 40 MG
1 TABLET ORAL
Qty: 0 | Refills: 0 | DISCHARGE

## 2021-01-30 RX ORDER — INSULIN LISPRO 100/ML
VIAL (ML) SUBCUTANEOUS
Refills: 0 | Status: DISCONTINUED | OUTPATIENT
Start: 2021-01-30 | End: 2021-02-02

## 2021-01-30 RX ORDER — DEXTROSE 50 % IN WATER 50 %
15 SYRINGE (ML) INTRAVENOUS ONCE
Refills: 0 | Status: DISCONTINUED | OUTPATIENT
Start: 2021-01-30 | End: 2021-02-02

## 2021-01-30 RX ORDER — DEXTROSE 50 % IN WATER 50 %
25 SYRINGE (ML) INTRAVENOUS ONCE
Refills: 0 | Status: DISCONTINUED | OUTPATIENT
Start: 2021-01-30 | End: 2021-02-02

## 2021-01-30 RX ORDER — GLUCAGON INJECTION, SOLUTION 0.5 MG/.1ML
1 INJECTION, SOLUTION SUBCUTANEOUS ONCE
Refills: 0 | Status: DISCONTINUED | OUTPATIENT
Start: 2021-01-30 | End: 2021-02-02

## 2021-01-30 RX ORDER — CARVEDILOL PHOSPHATE 80 MG/1
6.25 CAPSULE, EXTENDED RELEASE ORAL EVERY 12 HOURS
Refills: 0 | Status: DISCONTINUED | OUTPATIENT
Start: 2021-01-30 | End: 2021-02-02

## 2021-01-30 RX ORDER — SPIRONOLACTONE 25 MG/1
50 TABLET, FILM COATED ORAL DAILY
Refills: 0 | Status: DISCONTINUED | OUTPATIENT
Start: 2021-01-30 | End: 2021-02-02

## 2021-01-30 RX ORDER — SENNA PLUS 8.6 MG/1
2 TABLET ORAL AT BEDTIME
Refills: 0 | Status: DISCONTINUED | OUTPATIENT
Start: 2021-01-30 | End: 2021-02-02

## 2021-01-30 RX ORDER — INSULIN LISPRO 100/ML
VIAL (ML) SUBCUTANEOUS AT BEDTIME
Refills: 0 | Status: DISCONTINUED | OUTPATIENT
Start: 2021-01-30 | End: 2021-02-02

## 2021-01-30 RX ORDER — DEXTROSE 50 % IN WATER 50 %
12.5 SYRINGE (ML) INTRAVENOUS ONCE
Refills: 0 | Status: DISCONTINUED | OUTPATIENT
Start: 2021-01-30 | End: 2021-02-02

## 2021-01-30 RX ORDER — SODIUM CHLORIDE 9 MG/ML
1000 INJECTION, SOLUTION INTRAVENOUS
Refills: 0 | Status: DISCONTINUED | OUTPATIENT
Start: 2021-01-30 | End: 2021-02-02

## 2021-01-30 RX ORDER — PANTOPRAZOLE SODIUM 20 MG/1
40 TABLET, DELAYED RELEASE ORAL
Refills: 0 | Status: DISCONTINUED | OUTPATIENT
Start: 2021-01-30 | End: 2021-02-02

## 2021-01-30 RX ORDER — ATORVASTATIN CALCIUM 80 MG/1
40 TABLET, FILM COATED ORAL AT BEDTIME
Refills: 0 | Status: DISCONTINUED | OUTPATIENT
Start: 2021-01-30 | End: 2021-02-02

## 2021-01-30 RX ADMIN — SPIRONOLACTONE 50 MILLIGRAM(S): 25 TABLET, FILM COATED ORAL at 11:25

## 2021-01-30 RX ADMIN — PANTOPRAZOLE SODIUM 40 MILLIGRAM(S): 20 TABLET, DELAYED RELEASE ORAL at 08:18

## 2021-01-30 RX ADMIN — GABAPENTIN 100 MILLIGRAM(S): 400 CAPSULE ORAL at 07:24

## 2021-01-30 RX ADMIN — SODIUM CHLORIDE 3 MILLILITER(S): 9 INJECTION INTRAMUSCULAR; INTRAVENOUS; SUBCUTANEOUS at 21:28

## 2021-01-30 RX ADMIN — Medication 100 MILLIGRAM(S): at 11:34

## 2021-01-30 RX ADMIN — SENNA PLUS 2 TABLET(S): 8.6 TABLET ORAL at 21:26

## 2021-01-30 RX ADMIN — CARVEDILOL PHOSPHATE 6.25 MILLIGRAM(S): 80 CAPSULE, EXTENDED RELEASE ORAL at 07:24

## 2021-01-30 RX ADMIN — Medication 40 MILLIGRAM(S): at 23:25

## 2021-01-30 RX ADMIN — SODIUM CHLORIDE 3 MILLILITER(S): 9 INJECTION INTRAMUSCULAR; INTRAVENOUS; SUBCUTANEOUS at 07:20

## 2021-01-30 RX ADMIN — ATORVASTATIN CALCIUM 40 MILLIGRAM(S): 80 TABLET, FILM COATED ORAL at 21:26

## 2021-01-30 RX ADMIN — Medication 1: at 17:03

## 2021-01-30 RX ADMIN — Medication 40 MILLIGRAM(S): at 07:24

## 2021-01-30 RX ADMIN — CARVEDILOL PHOSPHATE 6.25 MILLIGRAM(S): 80 CAPSULE, EXTENDED RELEASE ORAL at 17:04

## 2021-01-30 RX ADMIN — Medication 81 MILLIGRAM(S): at 11:35

## 2021-01-30 RX ADMIN — SODIUM CHLORIDE 3 MILLILITER(S): 9 INJECTION INTRAMUSCULAR; INTRAVENOUS; SUBCUTANEOUS at 14:22

## 2021-01-30 RX ADMIN — GABAPENTIN 100 MILLIGRAM(S): 400 CAPSULE ORAL at 21:26

## 2021-01-30 RX ADMIN — Medication 1000 UNIT(S): at 11:35

## 2021-01-30 RX ADMIN — SEVELAMER CARBONATE 800 MILLIGRAM(S): 2400 POWDER, FOR SUSPENSION ORAL at 17:04

## 2021-01-30 RX ADMIN — Medication 40 MILLIGRAM(S): at 19:14

## 2021-01-30 RX ADMIN — GABAPENTIN 100 MILLIGRAM(S): 400 CAPSULE ORAL at 15:14

## 2021-01-30 NOTE — ED PROVIDER NOTE - PHYSICAL EXAMINATION
Vital Signs Reviewed  GEN: Comfortable, Conversant in full sentences   HEENT: Conjunctival pallor  RESP: CTAB, No rales/rhonchi/wheezing  CV: RRR, S1S2, No murmurs  ABD: No TTP, ND, No masses, No CVA Tenderness  : Scant blood in diaper, No active bleeding   Extrem/Skin: Equal pulses bilat, No cyanosis/rashes, 1+ pitting edema in the lower extremities   Neuro: No focal deficits

## 2021-01-30 NOTE — CONSULT NOTE ADULT - SUBJECTIVE AND OBJECTIVE BOX
HISTORY OF PRESENT ILLNESS:     55 year old female from [facility], walks with walker with PMHx of CHF (last EF of 25% with a ICD), DM,HTN, CAD (no stents placed), gout, COPD, GERD, and RA and remote PSHx of a cholecystectomy presents to the ED with complaints of vaginal bleeding over the past three weeks and right lower back pain. Patient additionally endorses some dysuria and lightheadedness. Patient denies having any history of malignancy or weight loss. Patient otherwise denies any fevers, abdominal pain, nausea, vomiting, diarrhea, constipation, chest pain, shortness of breath, syncope, and all other acute complaints.   Pt noted to have Hb 3.6 in ED      PAST MEDICAL & SURGICAL HISTORY:  GERD (gastroesophageal reflux disease)    CHF (congestive heart failure)    CAD (coronary artery disease)    AICD (automatic cardioverter/defibrillator) present    COPD (chronic obstructive pulmonary disease)    RA (rheumatoid arthritis)    DM (diabetes mellitus)    HTN (hypertension)    ICD (implantable cardioverter-defibrillator) in place    History of cholecystectomy    PREVIOUS DIAGNOSTIC TESTING:    [ ] Echocardiogram: eho< from: Transthoracic Echocardiogram (09.29.19 @ 09:13) >  CONCLUSIONS:  1. Tethered mitral valve leaflets. Mild to moderate mitral  regurgitation.  2.  Mild aorticregurgitation.  3. Severely dilated left atrium.  LA volume index = 57  cc/m2.  4. Severe left ventricular enlargement.  5. Severe global left ventricular systolic dysfunction.  6. Normal right ventricular size and function.   A device  lead is visualized in the right heart.  7. RV systolic pressure is 54 mm Hg. Moderate pulmonary  hypertension.    ------------------------------------------------------------------------  Confirmed on  9/30/2019 - 10:24:57 by Manuel Gold MD  ------------------------    < end of copied text >    [ ]  Catheterization:  [ ] Stress Test:  	    MEDICATIONS:  aspirin  chewable 81 milliGRAM(s) Oral daily  carvedilol 6.25 milliGRAM(s) Oral every 12 hours  furosemide    Tablet 40 milliGRAM(s) Oral two times a day  spironolactone 50 milliGRAM(s) Oral daily      ALBUTerol    90 MICROgram(s) HFA Inhaler 2 Puff(s) Inhalation every 6 hours PRN    gabapentin 100 milliGRAM(s) Oral three times a day    pantoprazole    Tablet 40 milliGRAM(s) Oral before breakfast  senna 2 Tablet(s) Oral at bedtime    allopurinol 100 milliGRAM(s) Oral daily  atorvastatin 40 milliGRAM(s) Oral at bedtime    cholecalciferol 1000 Unit(s) Oral daily  sodium chloride 0.9% lock flush 3 milliLiter(s) IV Push every 8 hours      Allergies    codeine (Urticaria)    Intolerances        FAMILY HISTORY:  Family history of hypertension in mother        SOCIAL HISTORY:    [ ] Non-smoker  [ ] Smoker  [ ] Alcohol      REVIEW OF SYSTEMS:  [ ]chest pain  [  ]shortness of breath  [  ]palpitations  [  ]syncope  [ ]near syncope [  ]diplopia  [  ]altered mental status   [  ]fevers  [ ]chills [ ]nausea  [ ]vomitting  [ ]abdominal pain  [ ]melena  [ ]BRBPR  [  ]epistaxis  [  ]rash  [  ]lower extremity edema      CONSTITUTIONAL: No fever, weight loss, or fatigue  EYES: No eye pain, visual disturbances, or discharge  ENMT:  No difficulty hearing, tinnitus, vertigo; No sinus or throat pain  NECK: No pain or stiffness  RESPIRATORY: No cough, wheezing, chills or hemoptysis; No Shortness of Breath  CARDIOVASCULAR: No chest pain, palpitations, passing out, dizziness, or leg swelling  GASTROINTESTINAL: No abdominal or epigastric pain. No nausea, vomiting, or hematemesis; No diarrhea or constipation. No melena or hematochezia.  GENITOURINARY: No dysuria, frequency, hematuria, or incontinence  NEUROLOGICAL: No headaches, memory loss, loss of strength, numbness, or tremors  SKIN: No itching, burning, rashes, or lesions   LYMPH Nodes: No enlarged glands  ENDOCRINE: No heat or cold intolerance; No hair loss  MUSCULOSKELETAL: No joint pain or swelling; No muscle, back, or extremity pain  PSYCHIATRIC: No depression, anxiety, mood swings, or difficulty sleeping  HEME/LYMPH: No easy bruising, or bleeding gums  ALLERY AND IMMUNOLOGIC: No hives or eczema	    [ ] All others negative	  [ ] Unable to obtain    PHYSICAL EXAM:  T(C): 37.1 (01-30-21 @ 09:03), Max: 37.1 (01-30-21 @ 09:03)  HR: 71 (01-30-21 @ 08:29) (69 - 84)  BP: 127/51 (01-30-21 @ 09:03) (110/65 - 151/60)  RR: 18 (01-30-21 @ 09:03) (17 - 18)  SpO2: 100% (01-30-21 @ 09:03) (99% - 100%)  Wt(kg): --  I&O's Summary      Appearance: Normal	  HEENT:   Normal oral mucosa, PERRL, EOMI	  Lymphatic: No lymphadenopathy  Cardiovascular: Normal S1 S2, No JVD, No murmurs, No edema  Respiratory: Lungs clear to auscultation	  Psychiatry: A & O x 3, Mood & affect appropriate  Gastrointestinal:  Soft, Non-tender, + BS	  Skin: No rashes, No ecchymoses, No cyanosis	  Neurologic: Non-focal  Extremities: Normal range of motion, No clubbing, cyanosis or edema  Vascular: Peripheral pulses palpable 2+ bilaterally    TELEMETRY: 	    ECG:  	 NSR : NAD, no acute ischemia   RADIOLOGY:  OTHER: 	  	  LABS:	 	    CARDIAC MARKERS:                          3.6    11.98 )-----------( 350      ( 29 Jan 2021 22:35 )             13.1     01-29    140  |  108  |  98<H>  ----------------------------<  232<H>  5.1   |  24  |  3.82<H>    Ca    8.0<L>      29 Jan 2021 22:35    TPro  6.6  /  Alb  3.0<L>  /  TBili  0.2  /  DBili  x   /  AST  6<L>  /  ALT  13  /  AlkPhos  61  01-29    proBNP:   Lipid Profile:   HgA1c:   TSH:       CT scan: t< from: CT Abdomen and Pelvis No Cont (01.30.21 @ 02:08) >  IMPRESSION:  Indeterminate right renal mass is incompletely characterized without intravenous contrast. This likely corresponds to a solid appearing lesion on prior CT and may reflect renal cell carcinoma. Further evaluation with contrast-enhanced MRI or renal mass protocol CT is recommended.    Fibroid uterus.    Cardiomegaly.            HELEN OLSON MD; Attending Radiologist  This document has     < end of copied text >    ASSESSMENT/PLAN: 	55 year old female from [facility], walks with walker with PMHx of CHF (last EF of 25% with a ICD), DM,HTN, CAD (no stents placed), gout, COPD, GERD, and RA and remote PSHx of a cholecystectomy presents to the ED with complaints of vaginal bleeding over the past three weeks and right lower back pain, cardiology consulted for CAD/ CM     trend h/h , s/p PRBC    GI / DVT prophylaxis.   keep K>4, mag >2.0  ECG with no acute ischemia   ECHO noted from 2019   Cont coreg, statin , Asa  GYN follow up   cont Aldactone/ lasix ,  D/W Dr Peterson

## 2021-01-30 NOTE — CHART NOTE - NSCHARTNOTEFT_GEN_A_CORE
EVENT: Hemoglobin: 6.1  DATE/TIME CALLED: 01/30/2021 22:30:25 EST  CALLED TO: _QAMAR JUAREZ  READ BACK (2 Patient Identifiers)(Y/N): _Y  READ BACK VALUES (Y/N): _Y  CALLED BY: AJITH 01/30/2021 22:33:19 EST  Test repeated. g/dL (01.30.21 @ 22:13)      OBJECTIVE:  Vital Signs Last 24 Hrs  T(C): 36.8 (30 Jan 2021 20:15), Max: 37.1 (30 Jan 2021 09:03)  T(F): 98.2 (30 Jan 2021 20:15), Max: 98.8 (30 Jan 2021 09:03)  HR: 95 (30 Jan 2021 20:15) (67 - 95)  BP: 126/52 (30 Jan 2021 20:15) (110/65 - 149/71)  BP(mean): --  RR: 18 (30 Jan 2021 20:15) (17 - 18)  SpO2: 100% (30 Jan 2021 20:15) (99% - 100%)    FOCUSED PHYSICAL EXAM:    LABS:                        6.1    11.60 )-----------( 335      ( 30 Jan 2021 22:13 )             19.9     01-30    143  |  110<H>  |  91<H>  ----------------------------<  157<H>  4.7   |  26  |  3.24<H>    Ca    8.6      30 Jan 2021 16:14  Phos  4.8     01-30  Mg     2.4     01-30    TPro  6.6  /  Alb  3.2<L>  /  TBili  0.6  /  DBili  x   /  AST  4<L>  /  ALT  13  /  AlkPhos  68  01-30      EKG:   IMGAGING:    ASSESSMENT:  HPI:  55 year old female from [facility], walks with walker with PMHx of CHF (last EF of 25% with a ICD), DM,HTN, CAD (no stents placed), gout, COPD, GERD, and RA and remote PSHx of a cholecystectomy presents to the ED with complaints of vaginal bleeding over the past three weeks and right lower back pain. Patient additionally endorses some dysuria and lightheadedness. Patient denies having any history of malignancy or weight loss. Patient otherwise denies any fevers, abdominal pain, nausea, vomiting, diarrhea, constipation, chest pain, shortness of breath, syncope, and all other acute complaints.   Pt noted to have Hb 3.6 in ED    In ED, /60, HR 84 (30 Jan 2021 05:15)      PLAN:     FOLLOW UP / RESULT: EVENT: Hemoglobin: 6.1 s/p 2 units PRBC    BRIEF HPI:55 year old female from [facility], walks with walker with PMHx of CHF (last EF of 25% with a ICD), DM,HTN, CAD (no stents placed), gout, COPD, GERD, and RA and remote PSHx of a cholecystectomy presents to the ED with complaints of vaginal bleeding over the past three weeks and right lower back pain. Patient additionally endorses some dysuria and lightheadedness. Endometrial biopsy unable to perform endometrial biopsy due to difficulty reaching cervix (unable to visualize). Now hemoglobin remains low despite 2 units PRBC.    OBJECTIVE:  Vital Signs Last 24 Hrs  T(C): 36.8 (30 Jan 2021 20:15), Max: 37.1 (30 Jan 2021 09:03)  T(F): 98.2 (30 Jan 2021 20:15), Max: 98.8 (30 Jan 2021 09:03)  HR: 95 (30 Jan 2021 20:15) (67 - 95)  BP: 126/52 (30 Jan 2021 20:15) (110/65 - 149/71)  BP(mean): --  RR: 18 (30 Jan 2021 20:15) (17 - 18)  SpO2: 100% (30 Jan 2021 20:15) (99% - 100%)    FOCUSED PHYSICAL EXAM:    LABS:                        6.1    11.60 )-----------( 335      ( 30 Jan 2021 22:13 )             19.9     01-30    143  |  110<H>  |  91<H>  ----------------------------<  157<H>  4.7   |  26  |  3.24<H>    Ca    8.6      30 Jan 2021 16:14  Phos  4.8     01-30  Mg     2.4     01-30    TPro  6.6  /  Alb  3.2<L>  /  TBili  0.6  /  DBili  x   /  AST  4<L>  /  ALT  13  /  AlkPhos  68  01-30      EKG:   IMGAGING:    ASSESSMENT:  HPI:  55 year old female from [facility], walks with walker with PMHx of CHF (last EF of 25% with a ICD), DM,HTN, CAD (no stents placed), gout, COPD, GERD, and RA and remote PSHx of a cholecystectomy presents to the ED with complaints of vaginal bleeding over the past three weeks and right lower back pain. Patient additionally endorses some dysuria and lightheadedness. Patient denies having any history of malignancy or weight loss. Patient otherwise denies any fevers, abdominal pain, nausea, vomiting, diarrhea, constipation, chest pain, shortness of breath, syncope, and all other acute complaints.   Pt noted to have Hb 3.6 in ED    In ED, /60, HR 84 (30 Jan 2021 05:15)      PLAN:   1. Lasix 40 mg now  2. Transfuse 2 units PRBC over  hours   MEDICATIONS  (STANDING):  allopurinol 100 milliGRAM(s) Oral daily  aspirin  chewable 81 milliGRAM(s) Oral daily  atorvastatin 40 milliGRAM(s) Oral at bedtime  carvedilol 6.25 milliGRAM(s) Oral every 12 hours  cholecalciferol 1000 Unit(s) Oral daily  dextrose 40% Gel 15 Gram(s) Oral once  dextrose 5%. 1000 milliLiter(s) (50 mL/Hr) IV Continuous <Continuous>  dextrose 5%. 1000 milliLiter(s) (100 mL/Hr) IV Continuous <Continuous>  dextrose 50% Injectable 25 Gram(s) IV Push once  dextrose 50% Injectable 12.5 Gram(s) IV Push once  dextrose 50% Injectable 25 Gram(s) IV Push once  furosemide    Tablet 40 milliGRAM(s) Oral two times a day  gabapentin 100 milliGRAM(s) Oral three times a day  glucagon  Injectable 1 milliGRAM(s) IntraMuscular once  insulin lispro (ADMELOG) corrective regimen sliding scale   SubCutaneous three times a day before meals  insulin lispro (ADMELOG) corrective regimen sliding scale   SubCutaneous at bedtime  pantoprazole    Tablet 40 milliGRAM(s) Oral before breakfast  senna 2 Tablet(s) Oral at bedtime  sevelamer carbonate 800 milliGRAM(s) Oral three times a day with meals  sodium chloride 0.9% lock flush 3 milliLiter(s) IV Push every 8 hours  spironolactone 50 milliGRAM(s) Oral daily    MEDICATIONS  (PRN):  ALBUTerol    90 MICROgram(s) HFA Inhaler 2 Puff(s) Inhalation every 6 hours PRN Shortness of Breath and/or Wheezing    FOLLOW UP / RESULT: Post transfusion CBC in AM    Discussed with Dr Barajas EVENT: Hemoglobin: 6.1 s/p 2 units PRBC    BRIEF HPI:55 year old female from [facility], walks with walker with PMHx of CHF (last EF of 25% with a ICD), DM,HTN, CAD (no stents placed), gout, COPD, GERD, and RA and remote PSHx of a cholecystectomy presents to the ED with complaints of vaginal bleeding over the past three weeks and right lower back pain. Patient additionally endorses some dysuria and lightheadedness. Endometrial biopsy unable to perform endometrial biopsy due to difficulty reaching cervix (unable to visualize). Now hemoglobin remains low despite 2 units PRBC.    OBJECTIVE:  Vital Signs Last 24 Hrs  T(C): 36.8 (30 Jan 2021 20:15), Max: 37.1 (30 Jan 2021 09:03)  T(F): 98.2 (30 Jan 2021 20:15), Max: 98.8 (30 Jan 2021 09:03)  HR: 95 (30 Jan 2021 20:15) (67 - 95)  BP: 126/52 (30 Jan 2021 20:15) (110/65 - 149/71)  BP(mean): --  RR: 18 (30 Jan 2021 20:15) (17 - 18)  SpO2: 100% (30 Jan 2021 20:15) (99% - 100%)    FOCUSED PHYSICAL EXAM:  RESP:    LABS:                        6.1    11.60 )-----------( 335      ( 30 Jan 2021 22:13 )             19.9     01-30    143  |  110<H>  |  91<H>  ----------------------------<  157<H>  4.7   |  26  |  3.24<H>    Ca    8.6      30 Jan 2021 16:14  Phos  4.8     01-30  Mg     2.4     01-30    TPro  6.6  /  Alb  3.2<L>  /  TBili  0.6  /  DBili  x   /  AST  4<L>  /  ALT  13  /  AlkPhos  68  01-30      EKG:   IMGAGING:    ASSESSMENT:  HPI:  55 year old female from [facility], walks with walker with PMHx of CHF (last EF of 25% with a ICD), DM,HTN, CAD (no stents placed), gout, COPD, GERD, and RA and remote PSHx of a cholecystectomy presents to the ED with complaints of vaginal bleeding over the past three weeks and right lower back pain. Patient additionally endorses some dysuria and lightheadedness. Patient denies having any history of malignancy or weight loss. Patient otherwise denies any fevers, abdominal pain, nausea, vomiting, diarrhea, constipation, chest pain, shortness of breath, syncope, and all other acute complaints.   Pt noted to have Hb 3.6 in ED    In ED, /60, HR 84 (30 Jan 2021 05:15)      PLAN:   1. Lasix 40 mg now  2. Transfuse 2 units PRBC over  hours   MEDICATIONS  (STANDING):  allopurinol 100 milliGRAM(s) Oral daily  aspirin  chewable 81 milliGRAM(s) Oral daily  atorvastatin 40 milliGRAM(s) Oral at bedtime  carvedilol 6.25 milliGRAM(s) Oral every 12 hours  cholecalciferol 1000 Unit(s) Oral daily  dextrose 40% Gel 15 Gram(s) Oral once  dextrose 5%. 1000 milliLiter(s) (50 mL/Hr) IV Continuous <Continuous>  dextrose 5%. 1000 milliLiter(s) (100 mL/Hr) IV Continuous <Continuous>  dextrose 50% Injectable 25 Gram(s) IV Push once  dextrose 50% Injectable 12.5 Gram(s) IV Push once  dextrose 50% Injectable 25 Gram(s) IV Push once  furosemide    Tablet 40 milliGRAM(s) Oral two times a day  gabapentin 100 milliGRAM(s) Oral three times a day  glucagon  Injectable 1 milliGRAM(s) IntraMuscular once  insulin lispro (ADMELOG) corrective regimen sliding scale   SubCutaneous three times a day before meals  insulin lispro (ADMELOG) corrective regimen sliding scale   SubCutaneous at bedtime  pantoprazole    Tablet 40 milliGRAM(s) Oral before breakfast  senna 2 Tablet(s) Oral at bedtime  sevelamer carbonate 800 milliGRAM(s) Oral three times a day with meals  sodium chloride 0.9% lock flush 3 milliLiter(s) IV Push every 8 hours  spironolactone 50 milliGRAM(s) Oral daily    MEDICATIONS  (PRN):  ALBUTerol    90 MICROgram(s) HFA Inhaler 2 Puff(s) Inhalation every 6 hours PRN Shortness of Breath and/or Wheezing    FOLLOW UP / RESULT: Post transfusion CBC in AM    Discussed with Dr Barajas EVENT: Hemoglobin: 6.1;  s/p 2 units PRBC    BRIEF HPI:55 year old female from [facility], walks with walker with PMHx of CHF (last EF of 25% with a ICD), DM,HTN, CAD (no stents placed), gout, COPD, GERD, and RA and remote PSHx of a cholecystectomy presents to the ED with complaints of vaginal bleeding over the past three weeks and right lower back pain. Patient additionally endorses some dysuria and lightheadedness. Endometrial biopsy unable to perform endometrial biopsy due to difficulty reaching cervix (unable to visualize).     OBJECTIVE:  Vital Signs Last 24 Hrs  T(C): 36.8 (30 Jan 2021 20:15), Max: 37.1 (30 Jan 2021 09:03)  T(F): 98.2 (30 Jan 2021 20:15), Max: 98.8 (30 Jan 2021 09:03)  HR: 95 (30 Jan 2021 20:15) (67 - 95)  BP: 126/52 (30 Jan 2021 20:15) (110/65 - 149/71)  BP(mean): --  RR: 18 (30 Jan 2021 20:15) (17 - 18)  SpO2: 100% (30 Jan 2021 20:15) (99% - 100%)    FOCUSED PHYSICAL EXAM:  RESP: Even unlabored, clear, symmetrical  CV: S1 S2, regular rate  ABD: Obese, rounded, soft, ventral  hernia  : Pinkish discharge vaginally noted    LABS:                        6.1    11.60 )-----------( 335      ( 30 Jan 2021 22:13 )             19.9     01-30    143  |  110<H>  |  91<H>  ----------------------------<  157<H>  4.7   |  26  |  3.24<H>    Ca    8.6      30 Jan 2021 16:14  Phos  4.8     01-30  Mg     2.4     01-30    TPro  6.6  /  Alb  3.2<L>  /  TBili  0.6  /  DBili  x   /  AST  4<L>  /  ALT  13  /  AlkPhos  68  01-30    COVID-19 PCR: NotDetec (30 Jan 2021 00:20)    IMAGING:  EXAM:  CT ABDOMEN AND PELVIS                        PROCEDURE DATE:  01/30/2021    INTERPRETATION:  CLINICAL INFORMATION: Vaginal bleeding  COMPARISON: CT abdomen pelvis 11/6/2012  PROCEDURE:  CT of the Abdomen and Pelvis was performed without intravenous contrast.  Intravenous contrast: None.  Oral contrast: None.  Sagittal and coronal reformats were performed.  FINDINGS:  LOWER CHEST: Cardiomegaly. Pacemaker lead in the right ventricle. Hypoattenuation of the blood flow compatible with anemia.  LIVER: Within normal limits.  BILE DUCTS: Normal caliber.  GALLBLADDER: Cholecystectomy.  SPLEEN: Within normal limits.  PANCREAS: Within normal limits.  ADRENALS: Stable 3 cm left adrenal nodule. Nodular thickening of the right adrenal gland, stable.  KIDNEYS/URETERS: Bilateral renal atrophy. Indeterminant hypodense right renal lesion measures 2.6 cm, likely corresponding to a 1 cm solid-appearing lesion on prior exam. A 1 cm right upper pole hyperdense lesion is indeterminate. Subcentimeter hyperdense left renal lesion. No nephroureterolithiasis or hydronephrosis.  BLADDER: Within normal limits.  REPRODUCTIVE ORGANS: Enlarged fibroid uterus with a dominant right fundal subserosal fibroid measuring 6.6 cm.  BOWEL: No bowel obstruction. Appendix is normal. Scattered colonic diverticuli.  PERITONEUM: No ascites.  VESSELS: Within normal limits.  RETROPERITONEUM/LYMPH NODES: No lymphadenopathy.  ABDOMINAL WALL: Large wide mouth ventral hernia containing unobstructed small bowel.  BONES: Degenerative changes.  IMPRESSION:  Indeterminate right renal mass is incompletely characterized without intravenous contrast. This likely corresponds to a solid appearing lesion on prior CT and may reflect renal cell carcinoma. Further evaluation with contrast-enhanced MRI or renal mass protocol CT is recommended.  Fibroid uterus.  Cardiomegaly.    ASSESSMENT: 55 year old female with PMHx of CHF (last EF of 25% with a ICD), DM,HTN, CAD (no stents placed), gout, COPD, GERD, and RA and remote PSHx of a cholecystectomy presents to the ED with complaints of vaginal bleeding over the past three weeks and right lower back pain. Now Hgb 6.1 despite 2 units PRBC.    PROBLEM: Anaemia probably due to prolong vaginal bleed from fibroid uterus.  PLAN:   1. Lasix 40 mg IV now  2. Transfuse 2 units PRBC over 3  hours X 2  3. Cont furosemide Tablet 40 jessie GRAM(s) Oral two times a day    FOLLOW UP / RESULT: Post transfusion CBC in AM    Discussed with Dr Barajas

## 2021-01-30 NOTE — PROGRESS NOTE ADULT - SUBJECTIVE AND OBJECTIVE BOX
patient was seen and examined at bedside. Reports feeling better since started getting transfusion.    I spoke to patient in length regarding her history and it is as follows.  she is 56yo P1 LMP 2006 or 2007, she had intermittent vaginal spotting since menopause but starting 1/7/2021 she started having very heavy bleeding, constant, and passing of large size clots. she had an appt with her gyn 1/7/2021 but wasn't able to f/up due to heavy bleeding. pt presented to ED last night due to feeling tired and dizzy. pt reports she saw gyn several months ago and had pelvic US. she has the US report with her which was reviewed which showed myomatous uterus w/ EML 7.4mm (this was done 11/23/2020). prior to this patient unsure when her last follow up with gyn was.     I was able to speak to Dr. Supa Keene ([phone]) who is the gynecologist she saw last. Dr. Keene reports to me that he saw her last 4/2020. pap/hpv was done which was negative and endometrial biopsy at time showed proliferative endometrium.       on exam today,   Vital Signs Last 24 Hrs  T(C): 37.1 (30 Jan 2021 09:03), Max: 37.1 (30 Jan 2021 09:03)  T(F): 98.8 (30 Jan 2021 09:03), Max: 98.8 (30 Jan 2021 09:03)  HR: 78 (30 Jan 2021 11:36) (69 - 84)  BP: 131/55 (30 Jan 2021 11:36) (110/65 - 151/60)  BP(mean): --  RR: 18 (30 Jan 2021 09:03) (17 - 18)  SpO2: 100% (30 Jan 2021 09:03) (99% - 100%)      Gen: A&O x3, appearing lethargic   abd: soft, obese, not tender, ~8cm ventral hernia,   : bimanual exam was done, unable to reach cervix due to narrow and long vagina, myomatous uterus plapated, small amount of dark watery blood in vagina.   ext: NTBL

## 2021-01-30 NOTE — H&P ADULT - ASSESSMENT
55 year old female from [facility], walks with walker with PMHx of CHF (last EF of 25% with a ICD), DM,HTN, CAD (no stents placed), gout, COPD, GERD, and RA and remote PSHx of a cholecystectomy presents to the ED with complaints of vaginal bleeding over the past three weeks and right lower back pain. Patient additionally endorses some dysuria and lightheadedness. Patient denies having any history of malignancy or weight loss. Patient otherwise denies any fevers, abdominal pain, nausea, vomiting, diarrhea, constipation, chest pain, shortness of breath, syncope, and all other acute complaints.   Pt noted to have Hb 3.6 in ED    In ED, /60, HR 84      Pt will be admitted for Vaginal Bleed/Hematuria with concern for Renal Cell Carcinoma

## 2021-01-30 NOTE — ED ADULT NURSE NOTE - OBJECTIVE STATEMENT
Pt stated she has right lower back pain and she noticed blood in her urine that started on Jan 7, and feeling weak.

## 2021-01-30 NOTE — ED PROVIDER NOTE - CLINICAL SUMMARY MEDICAL DECISION MAKING FREE TEXT BOX
Patient presents to the ED with complaints of three weeks of vaginal bleeding with symptoms of anemia. Hemoglobin level of 2.6. Obstetrician has seen patient and confirmed that she has no active bleeding. Creatine and BUN levels significantly elevated. Will obtain CT for concern for malignancy. Patient consented for blood work, waiting for type and screen. Patient is stable, will reassess. Patient presents to the ED with complaints of three weeks of vaginal bleeding with symptoms of anemia. Hemoglobin level of 3.6. Obstetrician has seen patient and confirmed that she has no active bleeding and recs IM admission. Creatine and BUN levels significantly elevated. Will obtain CT for concern for malignancy. Patient consented for blood work, waiting for type and screen. Patient is stable, will reassess.    Ct showing concern for renal cell CA and fibroids. On reassessment pt continues to be HD stable without new symptoms. Blood transfusion starting soon. Admitting to Dr Black service. Pt stable and endorsed to MAR.

## 2021-01-30 NOTE — H&P ADULT - PROBLEM SELECTOR PLAN 2
CT A/P without contrast shows Fibroid Uterus  Obgyn recommended US Pelvis and did not notice active vaginal bleed on Speculum examination  f/u US Pelvis

## 2021-01-30 NOTE — ED PROVIDER NOTE - CHPI ED SYMPTOMS NEG
no weight loss, abdominal pain, constipation, chest pain, shortness of breath, syncope/no diarrhea/no fever/no nausea/no vomiting

## 2021-01-30 NOTE — H&P ADULT - HISTORY OF PRESENT ILLNESS
55 year old female from [facility], walks with walker with PMHx of CHF (last EF of 25% with a ICD), DM,HTN, CAD (no stents placed), gout, COPD, GERD, and RA and remote PSHx of a cholecystectomy presents to the ED with complaints of vaginal bleeding over the past three weeks and right lower back pain. Patient additionally endorses some dysuria and lightheadedness. Patient denies having any history of malignancy or weight loss. Patient otherwise denies any fevers, abdominal pain, nausea, vomiting, diarrhea, constipation, chest pain, shortness of breath, syncope, and all other acute complaints.   Pt noted to have Hb 3.6 in ED    In ED, /60, HR 84

## 2021-01-30 NOTE — ED PROVIDER NOTE - OBJECTIVE STATEMENT
55 year old female with PMHx of CHF (last EF of 25% with a ICD), diabetes, hypertension, CAD (no stents placed), gout, COPD, GERD, and RA and remote PSHx of a cholecystectomy presents to the ED with complaints of vaginal bleeding over the past three weeks and right lower back pain. Patient additionally endorses some dysuria and lightheadedness. Patient denies having any history of malignancy or weight loss. Patient otherwise denies any fevers, abdominal pain, nausea, vomiting, diarrhea, constipation, chest pain, shortness of breath, syncope, and all other acute complaints. Patient denies having any other recent illness or hospitalization.   Allergies: Codeine (urticaria)

## 2021-01-30 NOTE — PROGRESS NOTE ADULT - PROBLEM SELECTOR PLAN 1
failed bedside endometrial biopsy (please see procedure note).  will need D&C for endometrial sampling.   will need medical clearance.  plan explained to patient and all questions answered.   Unique MANCIA

## 2021-01-30 NOTE — PROCEDURE NOTE - ADDITIONAL PROCEDURE DETAILS
unable to perform endometrial biopsy due to difficulty reaching cervix (unable to visualize).  small amount of dark, watery blood noted in vagina.

## 2021-01-30 NOTE — ED ADULT NURSE REASSESSMENT NOTE - NS ED NURSE REASSESS COMMENT FT1
received Patient alert and verbally responsive, breathing unlabored, V/S WNL denies any discomfort at present, 2nd unit RBCs started as ordered. admitted to ed hand off given to Inez FOOTE in 6S, safety and comfort maintained.

## 2021-01-30 NOTE — H&P ADULT - NSHPPHYSICALEXAM_GEN_ALL_CORE
General - NAD  Eyes - PERRLA, EOM intact  ENT - Moist mucous membranes. Conjunctivae appears pale  Neck - No noticeable or palpable swelling, redness or rash around throat or on face  Lymph Nodes - No lymphadenopathy  Cardiovascular - RRR no m/r/g, no JVD, no carotid bruits  Lungs - Clear to ascultation, no use of accessory muscles, no crackles or wheezes.  Skin - No rashes, skin warm and dry, no erythematous areas  Abdomen - Normal bowel sounds, abdomen soft and nontender, no hepatosplenomegaly.  Extremities - No edema, cyanosis or clubbing  MusculoSkeletal - 5/5 strength, normal range of motion, no swollen or erythematous  joints.  Neurological – Alert and oriented x 3, CN 2-12 grossly intact.;

## 2021-01-30 NOTE — H&P ADULT - PROBLEM SELECTOR PLAN 7
RISK                                                          Points  [  ] Previous VTE                                                3  [  ] Thrombophilia                                             2  [  ] Lower limb paralysis                                   2        (unable to hold up >15 seconds)    [  ] Current Cancer                                             2         (within 6 months)  [ x ] Immobilization > 24 hrs                              1  [  ] ICU/CCU stay > 24 hours                             1  [  ] Age > 60                                                         1    IMPROVE VTE Score: 1  not on chemoppx due to anemia  SCD boots for now

## 2021-01-30 NOTE — PROCEDURE NOTE - GENERAL PROCEDURE DETAILS
patient was positioned. speculum was used, unable to visualize cervix. mora retractors used, unable to visualize cervix.

## 2021-01-30 NOTE — PROVIDER CONTACT NOTE (CRITICAL VALUE NOTIFICATION) - ACTION/TREATMENT ORDERED:
PREMA Miranda will call back after contacting the provider regarding the blood transfusion. Will continue to monitor.

## 2021-01-30 NOTE — PROGRESS NOTE ADULT - SUBJECTIVE AND OBJECTIVE BOX
Patient is a 55y old  Female who presents with a chief complaint of Hematuria and vaginal bleed (30 Jan 2021 12:27)    PATIENT IS SEEN AND EXAMINED IN MEDICAL FLOOR.      ALLERGIES:  codeine (Urticaria)       VITALS:    Vital Signs Last 24 Hrs  T(C): 36.8 (30 Jan 2021 20:15), Max: 37.1 (30 Jan 2021 09:03)  T(F): 98.2 (30 Jan 2021 20:15), Max: 98.8 (30 Jan 2021 09:03)  HR: 95 (30 Jan 2021 20:15) (67 - 95)  BP: 126/52 (30 Jan 2021 20:15) (110/65 - 149/71)  BP(mean): --  RR: 18 (30 Jan 2021 20:15) (17 - 18)  SpO2: 100% (30 Jan 2021 20:15) (99% - 100%)    LABS:    CBC Full  -  ( 30 Jan 2021 22:13 )  WBC Count : 11.60 K/uL  RBC Count : 2.42 M/uL  Hemoglobin : 6.1 g/dL  Hematocrit : 19.9 %  Platelet Count - Automated : 335 K/uL  Mean Cell Volume : 82.2 fl  Mean Cell Hemoglobin : 25.2 pg  Mean Cell Hemoglobin Concentration : 30.7 gm/dL  Auto Neutrophil # : x  Auto Lymphocyte # : x  Auto Monocyte # : x  Auto Eosinophil # : x  Auto Basophil # : x  Auto Neutrophil % : x  Auto Lymphocyte % : x  Auto Monocyte % : x  Auto Eosinophil % : x  Auto Basophil % : x    PT/INR - ( 29 Jan 2021 22:35 )   PT: 12.2 sec;   INR: 1.03 ratio         PTT - ( 29 Jan 2021 22:35 )  PTT:27.7 sec  01-30    143  |  110<H>  |  91<H>  ----------------------------<  157<H>  4.7   |  26  |  3.24<H>    Ca    8.6      30 Jan 2021 16:14  Phos  4.8     01-30  Mg     2.4     01-30    TPro  6.6  /  Alb  3.2<L>  /  TBili  0.6  /  DBili  x   /  AST  4<L>  /  ALT  13  /  AlkPhos  68  01-30    CAPILLARY BLOOD GLUCOSE      POCT Blood Glucose.: 219 mg/dL (30 Jan 2021 21:07)  POCT Blood Glucose.: 170 mg/dL (30 Jan 2021 16:56)  POCT Blood Glucose.: 185 mg/dL (30 Jan 2021 11:58)        LIVER FUNCTIONS - ( 30 Jan 2021 16:14 )  Alb: 3.2 g/dL / Pro: 6.6 g/dL / ALK PHOS: 68 U/L / ALT: 13 U/L DA / AST: 4 U/L / GGT: x           Creatinine Trend: 3.24<--, 3.82<--  I&O's Summary              MEDICATIONS:    MEDICATIONS  (STANDING):  allopurinol 100 milliGRAM(s) Oral daily  aspirin  chewable 81 milliGRAM(s) Oral daily  atorvastatin 40 milliGRAM(s) Oral at bedtime  carvedilol 6.25 milliGRAM(s) Oral every 12 hours  cholecalciferol 1000 Unit(s) Oral daily  dextrose 40% Gel 15 Gram(s) Oral once  dextrose 5%. 1000 milliLiter(s) (50 mL/Hr) IV Continuous <Continuous>  dextrose 5%. 1000 milliLiter(s) (100 mL/Hr) IV Continuous <Continuous>  dextrose 50% Injectable 25 Gram(s) IV Push once  dextrose 50% Injectable 12.5 Gram(s) IV Push once  dextrose 50% Injectable 25 Gram(s) IV Push once  furosemide    Tablet 40 milliGRAM(s) Oral two times a day  furosemide   Injectable 40 milliGRAM(s) IV Push once  gabapentin 100 milliGRAM(s) Oral three times a day  glucagon  Injectable 1 milliGRAM(s) IntraMuscular once  insulin lispro (ADMELOG) corrective regimen sliding scale   SubCutaneous three times a day before meals  insulin lispro (ADMELOG) corrective regimen sliding scale   SubCutaneous at bedtime  pantoprazole    Tablet 40 milliGRAM(s) Oral before breakfast  senna 2 Tablet(s) Oral at bedtime  sevelamer carbonate 800 milliGRAM(s) Oral three times a day with meals  sodium chloride 0.9% lock flush 3 milliLiter(s) IV Push every 8 hours  spironolactone 50 milliGRAM(s) Oral daily      MEDICATIONS  (PRN):  ALBUTerol    90 MICROgram(s) HFA Inhaler 2 Puff(s) Inhalation every 6 hours PRN Shortness of Breath and/or Wheezing        REVIEW OF SYSTEMS:                           ALL ROS DONE [ X   ]      CONSTITUTIONAL:  LETHARGIC [   ], FEVER [   ], UNRESPONSIVE [   ]  CVS:  CP  [   ], SOB, [   ], PALPITATIONS [   ], DIZZYNESS [   ]  RS: COUGH [   ], SPUTUM [   ]  GI: ABDOMINAL PAIN [   ], NAUSEA [   ], VOMITINGS [   ], DIARRHEA [   ], CONSTIPATION [   ]  :  DYSURIA [   ], NOCTURIA [   ], INCREASED FREQUENCY [   ], DRIBLING [   ],  SKELETAL: PAINFUL JOINTS [   ], SWOLLEN JOINTS [   ], NECK ACHE [   ], LOW BACK ACHE [   ],  SKIN : ULCERS [   ], RASH [   ], ITCHING [   ]  CNS: HEAD ACHE [   ], DOUBLE VISION [   ], BLURRED VISION [   ], AMS / CONFUSION [   ], SEIZURES [   ], WEAKNESS [   ],TINGLING / NUMBNESS [   ]      PHYSICAL EXAMINATION:    GENERAL APPEARANCE: NO DISTRESS  HEENT:  NO PALLOR, NO  JVD,  NO   NODES, NECK SUPPLE  CVS: S1 +, S2 +,   RS: AEEB,  OCCASIONAL  RALES +,   NO RONCHI  ABD: SOFT, NT, NO, BS +  EXT: PE +  SKIN: WARM,   SKELETAL:  ROM ACCEPTABLE  CNS:  AAO X 3   , NO  DEFICITS        RADIOLOGY :    < from: CT Abdomen and Pelvis No Cont (01.30.21 @ 02:08) >    IMPRESSION:  Indeterminate right renal mass is incompletely characterized without intravenous contrast. This likely corresponds to a solid appearing lesion on prior CT and may reflect renal cell carcinoma. Further evaluation with contrast-enhanced MRI or renal mass protocol CT is recommended.    Fibroid uterus.    Cardiomegaly.    < end of copied text >      < from: US Kidney and Bladder (10.02.19 @ 12:54) >    IMPRESSION:     No hydronephrosis.    2.1 cm right midpole renal hypodensity cannot be adequately assessed due   to excessive bowel gas. Recommend pre and postcontrast CT to exclude   neoplasm..    < end of copied text >      ASSESSMENT :     Anemia    GERD (gastroesophageal reflux disease)    CHF (congestive heart failure)    CAD (coronary artery disease)    AICD (automatic cardioverter/defibrillator) present    COPD (chronic obstructive pulmonary disease)    RA (rheumatoid arthritis)    DM (diabetes mellitus)    HTN (hypertension)    ICD (implantable cardioverter-defibrillator) in place    History of cholecystectomy        PLAN:  HPI:  55 year old female from [facility], walks with walker with PMHx of CHF (last EF of 25% with a ICD), DM,HTN, CAD (no stents placed), gout, COPD, GERD, and RA and remote PSHx of a cholecystectomy presents to the ED with complaints of vaginal bleeding over the past three weeks and right lower back pain. Patient additionally endorses some dysuria and lightheadedness. Patient denies having any history of malignancy or weight loss. Patient otherwise denies any fevers, abdominal pain, nausea, vomiting, diarrhea, constipation, chest pain, shortness of breath, syncope, and all other acute complaints.   Pt noted to have Hb 3.6 in ED    In ED, /60, HR 84 (30 Jan 2021 05:15)    -  NORMOCYTIC ANEMIA , PROFUSE POST MENOPAUSAL VAGINAL BLEEDING - S/P PRBC, AS NEEDED TO KEEP Hb > 8 GMS, GYN F/UP IS IN PROGRESS - PLANNED EUA   -  SUSPECTED RIGHT RENAL CELL CANCER   -  SYSTOLIC CHF , DM, HTN, CADCOPD, MORBIDLY OBESITY, GERD, RA, GOUT   -  GI AND DVT PROPHYLAXIS  - DR. VICKIE NATARAJAN     Warm

## 2021-01-31 LAB
ALBUMIN SERPL ELPH-MCNC: 3.4 G/DL — LOW (ref 3.5–5)
ALP SERPL-CCNC: 72 U/L — SIGNIFICANT CHANGE UP (ref 40–120)
ALT FLD-CCNC: 15 U/L DA — SIGNIFICANT CHANGE UP (ref 10–60)
ANION GAP SERPL CALC-SCNC: 9 MMOL/L — SIGNIFICANT CHANGE UP (ref 5–17)
AST SERPL-CCNC: 18 U/L — SIGNIFICANT CHANGE UP (ref 10–40)
BILIRUB SERPL-MCNC: 0.6 MG/DL — SIGNIFICANT CHANGE UP (ref 0.2–1.2)
BUN SERPL-MCNC: 90 MG/DL — HIGH (ref 7–18)
CALCIUM SERPL-MCNC: 8.6 MG/DL — SIGNIFICANT CHANGE UP (ref 8.4–10.5)
CHLORIDE SERPL-SCNC: 108 MMOL/L — SIGNIFICANT CHANGE UP (ref 96–108)
CO2 SERPL-SCNC: 24 MMOL/L — SIGNIFICANT CHANGE UP (ref 22–31)
CREAT ?TM UR-MCNC: 42 MG/DL — SIGNIFICANT CHANGE UP
CREAT SERPL-MCNC: 3.36 MG/DL — HIGH (ref 0.5–1.3)
GLUCOSE BLDC GLUCOMTR-MCNC: 156 MG/DL — HIGH (ref 70–99)
GLUCOSE BLDC GLUCOMTR-MCNC: 160 MG/DL — HIGH (ref 70–99)
GLUCOSE BLDC GLUCOMTR-MCNC: 213 MG/DL — HIGH (ref 70–99)
GLUCOSE BLDC GLUCOMTR-MCNC: 225 MG/DL — HIGH (ref 70–99)
GLUCOSE SERPL-MCNC: 141 MG/DL — HIGH (ref 70–99)
HCT VFR BLD CALC: 26.2 % — LOW (ref 34.5–45)
HGB BLD-MCNC: 8.2 G/DL — LOW (ref 11.5–15.5)
MAGNESIUM SERPL-MCNC: 2.2 MG/DL — SIGNIFICANT CHANGE UP (ref 1.6–2.6)
MCHC RBC-ENTMCNC: 26.1 PG — LOW (ref 27–34)
MCHC RBC-ENTMCNC: 31.3 GM/DL — LOW (ref 32–36)
MCV RBC AUTO: 83.4 FL — SIGNIFICANT CHANGE UP (ref 80–100)
MICROALBUMIN UR-MCNC: 14.2 MG/DL — SIGNIFICANT CHANGE UP
MICROALBUMIN/CREAT UR-RTO: 341 MG/G — HIGH (ref 0–30)
NRBC # BLD: 1 /100 WBCS — HIGH (ref 0–0)
PHOSPHATE SERPL-MCNC: 4.8 MG/DL — HIGH (ref 2.5–4.5)
PLATELET # BLD AUTO: 353 K/UL — SIGNIFICANT CHANGE UP (ref 150–400)
POTASSIUM SERPL-MCNC: 5 MMOL/L — SIGNIFICANT CHANGE UP (ref 3.5–5.3)
POTASSIUM SERPL-SCNC: 5 MMOL/L — SIGNIFICANT CHANGE UP (ref 3.5–5.3)
PROT SERPL-MCNC: 7.2 G/DL — SIGNIFICANT CHANGE UP (ref 6–8.3)
RBC # BLD: 3.14 M/UL — LOW (ref 3.8–5.2)
RBC # FLD: 16.7 % — HIGH (ref 10.3–14.5)
SODIUM SERPL-SCNC: 141 MMOL/L — SIGNIFICANT CHANGE UP (ref 135–145)
WBC # BLD: 16.04 K/UL — HIGH (ref 3.8–10.5)
WBC # FLD AUTO: 16.04 K/UL — HIGH (ref 3.8–10.5)

## 2021-01-31 RX ADMIN — GABAPENTIN 100 MILLIGRAM(S): 400 CAPSULE ORAL at 16:12

## 2021-01-31 RX ADMIN — SODIUM CHLORIDE 3 MILLILITER(S): 9 INJECTION INTRAMUSCULAR; INTRAVENOUS; SUBCUTANEOUS at 05:11

## 2021-01-31 RX ADMIN — Medication 2: at 17:18

## 2021-01-31 RX ADMIN — SODIUM CHLORIDE 3 MILLILITER(S): 9 INJECTION INTRAMUSCULAR; INTRAVENOUS; SUBCUTANEOUS at 12:07

## 2021-01-31 RX ADMIN — Medication 40 MILLIGRAM(S): at 05:25

## 2021-01-31 RX ADMIN — Medication 1000 UNIT(S): at 12:06

## 2021-01-31 RX ADMIN — CARVEDILOL PHOSPHATE 6.25 MILLIGRAM(S): 80 CAPSULE, EXTENDED RELEASE ORAL at 05:25

## 2021-01-31 RX ADMIN — PANTOPRAZOLE SODIUM 40 MILLIGRAM(S): 20 TABLET, DELAYED RELEASE ORAL at 05:25

## 2021-01-31 RX ADMIN — SPIRONOLACTONE 50 MILLIGRAM(S): 25 TABLET, FILM COATED ORAL at 05:25

## 2021-01-31 RX ADMIN — Medication 40 MILLIGRAM(S): at 18:47

## 2021-01-31 RX ADMIN — SEVELAMER CARBONATE 800 MILLIGRAM(S): 2400 POWDER, FOR SUSPENSION ORAL at 08:09

## 2021-01-31 RX ADMIN — Medication 100 MILLIGRAM(S): at 12:06

## 2021-01-31 RX ADMIN — GABAPENTIN 100 MILLIGRAM(S): 400 CAPSULE ORAL at 22:23

## 2021-01-31 RX ADMIN — CARVEDILOL PHOSPHATE 6.25 MILLIGRAM(S): 80 CAPSULE, EXTENDED RELEASE ORAL at 17:29

## 2021-01-31 RX ADMIN — SEVELAMER CARBONATE 800 MILLIGRAM(S): 2400 POWDER, FOR SUSPENSION ORAL at 12:06

## 2021-01-31 RX ADMIN — ATORVASTATIN CALCIUM 40 MILLIGRAM(S): 80 TABLET, FILM COATED ORAL at 22:23

## 2021-01-31 RX ADMIN — SENNA PLUS 2 TABLET(S): 8.6 TABLET ORAL at 22:23

## 2021-01-31 RX ADMIN — Medication 2: at 12:05

## 2021-01-31 RX ADMIN — SEVELAMER CARBONATE 800 MILLIGRAM(S): 2400 POWDER, FOR SUSPENSION ORAL at 17:29

## 2021-01-31 RX ADMIN — Medication 81 MILLIGRAM(S): at 12:06

## 2021-01-31 RX ADMIN — Medication 1: at 08:09

## 2021-01-31 RX ADMIN — GABAPENTIN 100 MILLIGRAM(S): 400 CAPSULE ORAL at 05:25

## 2021-01-31 NOTE — PROGRESS NOTE ADULT - ATTENDING COMMENTS
CARDIOLOGY ATTENDING    Agree with above. In brief she is a 54 y/o female PMH CAD with severe LV dysfunction s/p a single chamber st fer ICD implanted in Feb 2011 in [place], GERD, gout, hypertension, DM and COPD a/w severe anemia. Workup of anemia is in progress, but she is stable from a cardiac perspective.     -continue current medical therapy for stable CAD with severe LV dysfunction  -st fer ICD interrogation Monday to check battery life  -no need for repeat echo nor ischemia evaluation  -final cardiac reccs pending the above

## 2021-01-31 NOTE — PROGRESS NOTE ADULT - SUBJECTIVE AND OBJECTIVE BOX
pt seen and examined, no complaints, ROS - .      ALBUTerol    90 MICROgram(s) HFA Inhaler 2 Puff(s) Inhalation every 6 hours PRN  allopurinol 100 milliGRAM(s) Oral daily  aspirin  chewable 81 milliGRAM(s) Oral daily  atorvastatin 40 milliGRAM(s) Oral at bedtime  carvedilol 6.25 milliGRAM(s) Oral every 12 hours  cholecalciferol 1000 Unit(s) Oral daily  dextrose 40% Gel 15 Gram(s) Oral once  dextrose 5%. 1000 milliLiter(s) IV Continuous <Continuous>  dextrose 5%. 1000 milliLiter(s) IV Continuous <Continuous>  dextrose 50% Injectable 25 Gram(s) IV Push once  dextrose 50% Injectable 12.5 Gram(s) IV Push once  dextrose 50% Injectable 25 Gram(s) IV Push once  furosemide    Tablet 40 milliGRAM(s) Oral two times a day  gabapentin 100 milliGRAM(s) Oral three times a day  glucagon  Injectable 1 milliGRAM(s) IntraMuscular once  insulin lispro (ADMELOG) corrective regimen sliding scale   SubCutaneous three times a day before meals  insulin lispro (ADMELOG) corrective regimen sliding scale   SubCutaneous at bedtime  pantoprazole    Tablet 40 milliGRAM(s) Oral before breakfast  senna 2 Tablet(s) Oral at bedtime  sevelamer carbonate 800 milliGRAM(s) Oral three times a day with meals  sodium chloride 0.9% lock flush 3 milliLiter(s) IV Push every 8 hours  spironolactone 50 milliGRAM(s) Oral daily                            6.1    11.60 )-----------( 335      ( 30 Jan 2021 22:13 )             19.9       Hemoglobin: 6.1 g/dL (01-30 @ 22:13)  Hemoglobin: 6.1 g/dL (01-30 @ 16:15)  Hemoglobin: 3.6 g/dL (01-29 @ 22:35)      01-31    141  |  108  |  90<H>  ----------------------------<  141<H>  x    |  24  |  3.36<H>    Ca    8.6      31 Jan 2021 07:35  Phos  4.8     01-31  Mg     2.4     01-30    TPro  7.2  /  Alb  3.4<L>  /  TBili  0.6  /  DBili  x   /  AST  x   /  ALT  15  /  AlkPhos  72  01-31    Creatinine Trend: 3.36<--, 3.24<--, 3.82<--    COAGS:           T(C): 36.7 (01-31-21 @ 05:17), Max: 37.2 (01-31-21 @ 03:35)  HR: 77 (01-31-21 @ 05:17) (67 - 95)  BP: 117/57 (01-31-21 @ 05:17) (114/69 - 135/58)  RR: 18 (01-31-21 @ 05:17) (17 - 18)  SpO2: 100% (01-31-21 @ 05:17) (98% - 100%)  Wt(kg): --    I&O's Summary      Appearance: Normal	  HEENT:   Normal oral mucosa, PERRL, EOMI	  Lymphatic: No lymphadenopathy  Cardiovascular: Normal S1 S2, No JVD, No murmurs, No edema  Respiratory: Lungs clear to auscultation	  Psychiatry: A & O x 3, Mood & affect appropriate  Gastrointestinal:  Soft, Non-tender, + BS	  Skin: No rashes, No ecchymoses, No cyanosis	  Neurologic: Non-focal  Extremities: Normal range of motion, No clubbing, cyanosis or edema  Vascular: Peripheral pulses palpable 2+ bilaterally    TELEMETRY: 	    ECG:  	 NSR : NAD, no acute ischemia   RADIOLOGY:     CT scan: t< from: CT Abdomen and Pelvis No Cont (01.30.21 @ 02:08) >  IMPRESSION:  Indeterminate right renal mass is incompletely characterized without intravenous contrast. This likely corresponds to a solid appearing lesion on prior CT and may reflect renal cell carcinoma. Further evaluation with contrast-enhanced MRI or renal mass protocol CT is recommended.    Fibroid uterus.    Cardiomegaly.    HELEN OLSON MD; Attending Radiologist  This document has     < end of copied text >    ASSESSMENT/PLAN: 	55 year old female from [facility], walks with walker with PMHx of CHF (last EF of 25% with a ICD), DM,HTN, CAD (no stents placed), gout, COPD, GERD, and RA and remote PSHx of a cholecystectomy presents to the ED with complaints of vaginal bleeding over the past three weeks and right lower back pain, cardiology consulted for CAD/ CM     trend h/h -post pRBC   interrogation of PPM tomorrow    GI / DVT prophylaxis.   keep K>4, mag >2.0  ECG with no acute ischemia   ECHO noted from 2019   Cont coreg, statin , Asa  GYN follow up   cont Aldactone/ lasix ,  D/W Dr Peterson        pt seen and examined, no complaints, ROS - .      ALBUTerol    90 MICROgram(s) HFA Inhaler 2 Puff(s) Inhalation every 6 hours PRN  allopurinol 100 milliGRAM(s) Oral daily  aspirin  chewable 81 milliGRAM(s) Oral daily  atorvastatin 40 milliGRAM(s) Oral at bedtime  carvedilol 6.25 milliGRAM(s) Oral every 12 hours  cholecalciferol 1000 Unit(s) Oral daily  dextrose 40% Gel 15 Gram(s) Oral once  dextrose 5%. 1000 milliLiter(s) IV Continuous <Continuous>  dextrose 5%. 1000 milliLiter(s) IV Continuous <Continuous>  dextrose 50% Injectable 25 Gram(s) IV Push once  dextrose 50% Injectable 12.5 Gram(s) IV Push once  dextrose 50% Injectable 25 Gram(s) IV Push once  furosemide    Tablet 40 milliGRAM(s) Oral two times a day  gabapentin 100 milliGRAM(s) Oral three times a day  glucagon  Injectable 1 milliGRAM(s) IntraMuscular once  insulin lispro (ADMELOG) corrective regimen sliding scale   SubCutaneous three times a day before meals  insulin lispro (ADMELOG) corrective regimen sliding scale   SubCutaneous at bedtime  pantoprazole    Tablet 40 milliGRAM(s) Oral before breakfast  senna 2 Tablet(s) Oral at bedtime  sevelamer carbonate 800 milliGRAM(s) Oral three times a day with meals  sodium chloride 0.9% lock flush 3 milliLiter(s) IV Push every 8 hours  spironolactone 50 milliGRAM(s) Oral daily                            6.1    11.60 )-----------( 335      ( 30 Jan 2021 22:13 )             19.9       Hemoglobin: 6.1 g/dL (01-30 @ 22:13)  Hemoglobin: 6.1 g/dL (01-30 @ 16:15)  Hemoglobin: 3.6 g/dL (01-29 @ 22:35)      01-31    141  |  108  |  90<H>  ----------------------------<  141<H>  x    |  24  |  3.36<H>    Ca    8.6      31 Jan 2021 07:35  Phos  4.8     01-31  Mg     2.4     01-30    TPro  7.2  /  Alb  3.4<L>  /  TBili  0.6  /  DBili  x   /  AST  x   /  ALT  15  /  AlkPhos  72  01-31    Creatinine Trend: 3.36<--, 3.24<--, 3.82<--    COAGS:           T(C): 36.7 (01-31-21 @ 05:17), Max: 37.2 (01-31-21 @ 03:35)  HR: 77 (01-31-21 @ 05:17) (67 - 95)  BP: 117/57 (01-31-21 @ 05:17) (114/69 - 135/58)  RR: 18 (01-31-21 @ 05:17) (17 - 18)  SpO2: 100% (01-31-21 @ 05:17) (98% - 100%)  Wt(kg): --    I&O's Summary      Appearance: Normal	  HEENT:   Normal oral mucosa, PERRL, EOMI	  Lymphatic: No lymphadenopathy  Cardiovascular: Normal S1 S2, No JVD, No murmurs, No edema  Respiratory: Lungs clear to auscultation	  Psychiatry: A & O x 3, Mood & affect appropriate  Gastrointestinal:  Soft, Non-tender, + BS	  Skin: No rashes, No ecchymoses, No cyanosis	  Neurologic: Non-focal  Extremities: Normal range of motion, No clubbing, cyanosis or edema  Vascular: Peripheral pulses palpable 2+ bilaterally    TELEMETRY: 	    ECG:  	 NSR : NAD, no acute ischemia   RADIOLOGY:     CT scan: t< from: CT Abdomen and Pelvis No Cont (01.30.21 @ 02:08) >  IMPRESSION:  Indeterminate right renal mass is incompletely characterized without intravenous contrast. This likely corresponds to a solid appearing lesion on prior CT and may reflect renal cell carcinoma. Further evaluation with contrast-enhanced MRI or renal mass protocol CT is recommended.    Fibroid uterus.    Cardiomegaly.    HELEN OLSON MD; Attending Radiologist  This document has     < end of copied text >    ASSESSMENT/PLAN: 	55 year old female from [facility], walks with walker with PMHx of CHF (last EF of 25% with a ICD), DM,HTN, CAD (no stents placed), gout, COPD, GERD, and RA and remote PSHx of a cholecystectomy presents to the ED with complaints of vaginal bleeding over the past three weeks and right lower back pain, cardiology consulted for CAD/ CM     trend h/h -post pRBC   interrogation of ICD tomorrow    GI / DVT prophylaxis.   keep K>4, mag >2.0  ECG with no acute ischemia   ECHO noted from 2019   Cont coreg, statin , Asa  GYN follow up   cont Aldactone/ lasix ,  D/W Dr Peterson

## 2021-01-31 NOTE — PROGRESS NOTE ADULT - SUBJECTIVE AND OBJECTIVE BOX
Patient is a 55y old  Female who presents with a chief complaint of Hematuria and vaginal bleed (31 Jan 2021 08:09)    PATIENT IS SEEN AND EXAMINED IN MEDICAL FLOOR.      ALLERGIES:  codeine (Urticaria)       VITALS:    Vital Signs Last 24 Hrs  T(C): 36.6 (31 Jan 2021 21:03), Max: 37.3 (31 Jan 2021 14:25)  T(F): 97.9 (31 Jan 2021 21:03), Max: 99.2 (31 Jan 2021 14:25)  HR: 74 (31 Jan 2021 21:03) (71 - 95)  BP: 145/51 (31 Jan 2021 21:03) (117/57 - 149/85)  BP(mean): --  RR: 18 (31 Jan 2021 21:03) (18 - 18)  SpO2: 100% (31 Jan 2021 21:03) (97% - 100%)    LABS:    CBC Full  -  ( 31 Jan 2021 10:29 )  WBC Count : 16.04 K/uL  RBC Count : 3.14 M/uL  Hemoglobin : 8.2 g/dL  Hematocrit : 26.2 %  Platelet Count - Automated : 353 K/uL  Mean Cell Volume : 83.4 fl  Mean Cell Hemoglobin : 26.1 pg  Mean Cell Hemoglobin Concentration : 31.3 gm/dL  Auto Neutrophil # : x  Auto Lymphocyte # : x  Auto Monocyte # : x  Auto Eosinophil # : x  Auto Basophil # : x  Auto Neutrophil % : x  Auto Lymphocyte % : x  Auto Monocyte % : x  Auto Eosinophil % : x  Auto Basophil % : x      01-31    141  |  108  |  90<H>  ----------------------------<  141<H>  5.0   |  24  |  3.36<H>    Ca    8.6      31 Jan 2021 07:35  Phos  4.8     01-31  Mg     2.2     01-31    TPro  7.2  /  Alb  3.4<L>  /  TBili  0.6  /  DBili  x   /  AST  18  /  ALT  15  /  AlkPhos  72  01-31      CAPILLARY BLOOD GLUCOSE    POCT Blood Glucose.: 160 mg/dL (31 Jan 2021 21:29)  POCT Blood Glucose.: 225 mg/dL (31 Jan 2021 16:38)  POCT Blood Glucose.: 213 mg/dL (31 Jan 2021 11:16)  POCT Blood Glucose.: 156 mg/dL (31 Jan 2021 07:50)      LIVER FUNCTIONS - ( 31 Jan 2021 07:35 )  Alb: 3.4 g/dL / Pro: 7.2 g/dL / ALK PHOS: 72 U/L / ALT: 15 U/L DA / AST: 18 U/L / GGT: x           Creatinine Trend: 3.36<--, 3.24<--, 3.82<--  I&O's Summary      MEDICATIONS:    MEDICATIONS  (STANDING):  allopurinol 100 milliGRAM(s) Oral daily  aspirin  chewable 81 milliGRAM(s) Oral daily  atorvastatin 40 milliGRAM(s) Oral at bedtime  carvedilol 6.25 milliGRAM(s) Oral every 12 hours  cholecalciferol 1000 Unit(s) Oral daily  dextrose 40% Gel 15 Gram(s) Oral once  dextrose 5%. 1000 milliLiter(s) (50 mL/Hr) IV Continuous <Continuous>  dextrose 5%. 1000 milliLiter(s) (100 mL/Hr) IV Continuous <Continuous>  dextrose 50% Injectable 12.5 Gram(s) IV Push once  dextrose 50% Injectable 25 Gram(s) IV Push once  dextrose 50% Injectable 25 Gram(s) IV Push once  furosemide    Tablet 40 milliGRAM(s) Oral two times a day  gabapentin 100 milliGRAM(s) Oral three times a day  glucagon  Injectable 1 milliGRAM(s) IntraMuscular once  insulin lispro (ADMELOG) corrective regimen sliding scale   SubCutaneous three times a day before meals  insulin lispro (ADMELOG) corrective regimen sliding scale   SubCutaneous at bedtime  pantoprazole    Tablet 40 milliGRAM(s) Oral before breakfast  senna 2 Tablet(s) Oral at bedtime  sevelamer carbonate 800 milliGRAM(s) Oral three times a day with meals  sodium chloride 0.9% lock flush 3 milliLiter(s) IV Push every 8 hours  spironolactone 50 milliGRAM(s) Oral daily      MEDICATIONS  (PRN):  ALBUTerol    90 MICROgram(s) HFA Inhaler 2 Puff(s) Inhalation every 6 hours PRN Shortness of Breath and/or Wheezing        REVIEW OF SYSTEMS:                           ALL ROS DONE [ X   ]      CONSTITUTIONAL:  LETHARGIC [   ], FEVER [   ], UNRESPONSIVE [   ]  CVS:  CP  [   ], SOB, [   ], PALPITATIONS [   ], DIZZYNESS [   ]  RS: COUGH [   ], SPUTUM [   ]  GI: ABDOMINAL PAIN [   ], NAUSEA [   ], VOMITINGS [   ], DIARRHEA [   ], CONSTIPATION [   ]  :  DYSURIA [   ], NOCTURIA [   ], INCREASED FREQUENCY [   ], DRIBLING [   ],  SKELETAL: PAINFUL JOINTS [   ], SWOLLEN JOINTS [   ], NECK ACHE [   ], LOW BACK ACHE [   ],  SKIN : ULCERS [   ], RASH [   ], ITCHING [   ]  CNS: HEAD ACHE [   ], DOUBLE VISION [   ], BLURRED VISION [   ], AMS / CONFUSION [   ], SEIZURES [   ], WEAKNESS [   ],TINGLING / NUMBNESS [   ]      PHYSICAL EXAMINATION:    GENERAL APPEARANCE: NO DISTRESS  HEENT:  NO PALLOR, NO  JVD,  NO   NODES, NECK SUPPLE  CVS: S1 +, S2 +,   RS: AEEB,  OCCASIONAL  RALES +,   NO RONCHI  ABD: SOFT, NT, NO, BS +  EXT: PE +  SKIN: WARM,   SKELETAL:  ROM ACCEPTABLE  CNS:  AAO X 3   , NO  DEFICITS        RADIOLOGY :    < from: CT Abdomen and Pelvis No Cont (01.30.21 @ 02:08) >    IMPRESSION:  Indeterminate right renal mass is incompletely characterized without intravenous contrast. This likely corresponds to a solid appearing lesion on prior CT and may reflect renal cell carcinoma. Further evaluation with contrast-enhanced MRI or renal mass protocol CT is recommended.    Fibroid uterus.    Cardiomegaly.    < end of copied text >      < from: US Kidney and Bladder (10.02.19 @ 12:54) >    IMPRESSION:     No hydronephrosis.    2.1 cm right midpole renal hypodensity cannot be adequately assessed due   to excessive bowel gas. Recommend pre and postcontrast CT to exclude   neoplasm..    < end of copied text >      ASSESSMENT :     Anemia    GERD (gastroesophageal reflux disease)    CHF (congestive heart failure)    CAD (coronary artery disease)    AICD (automatic cardioverter/defibrillator) present    COPD (chronic obstructive pulmonary disease)    RA (rheumatoid arthritis)    DM (diabetes mellitus)    HTN (hypertension)    ICD (implantable cardioverter-defibrillator) in place    History of cholecystectomy        PLAN:  HPI:  55 year old female from [facility], walks with walker with PMHx of CHF (last EF of 25% with a ICD), DM,HTN, CAD (no stents placed), gout, COPD, GERD, and RA and remote PSHx of a cholecystectomy presents to the ED with complaints of vaginal bleeding over the past three weeks and right lower back pain. Patient additionally endorses some dysuria and lightheadedness. Patient denies having any history of malignancy or weight loss. Patient otherwise denies any fevers, abdominal pain, nausea, vomiting, diarrhea, constipation, chest pain, shortness of breath, syncope, and all other acute complaints.   Pt noted to have Hb 3.6 in ED    In ED, /60, HR 84 (30 Jan 2021 05:15)    -  NORMOCYTIC ANEMIA , PROFUSE POST MENOPAUSAL VAGINAL BLEEDING - S/P PRBC, AS NEEDED TO KEEP Hb > 8 GMS, GYN F/UP IS IN PROGRESS - PLANNED EUA ( EXAMINATION UNDER ANESTHESIA- PELVIC  )  -  SUSPECTED RIGHT RENAL CELL CANCER - TO CALL UROLOGY CONSULT - DR. LUIS ALBERTO PICHARDO IN AM ON MONDAY  -  SYSTOLIC CHF , DM, HTN, CAD, COPD, MORBIDLY OBESITY, GERD, RA, GOUT   -  GI AND DVT PROPHYLAXIS    - DR. VICKIE NATARAJAN

## 2021-02-01 ENCOUNTER — TRANSCRIPTION ENCOUNTER (OUTPATIENT)
Age: 56
End: 2021-02-01

## 2021-02-01 DIAGNOSIS — Z02.9 ENCOUNTER FOR ADMINISTRATIVE EXAMINATIONS, UNSPECIFIED: ICD-10-CM

## 2021-02-01 DIAGNOSIS — I50.9 HEART FAILURE, UNSPECIFIED: ICD-10-CM

## 2021-02-01 DIAGNOSIS — N28.89 OTHER SPECIFIED DISORDERS OF KIDNEY AND URETER: ICD-10-CM

## 2021-02-01 DIAGNOSIS — Z95.810 PRESENCE OF AUTOMATIC (IMPLANTABLE) CARDIAC DEFIBRILLATOR: ICD-10-CM

## 2021-02-01 LAB
ALBUMIN SERPL ELPH-MCNC: 3.2 G/DL — LOW (ref 3.5–5)
ALP SERPL-CCNC: 71 U/L — SIGNIFICANT CHANGE UP (ref 40–120)
ALT FLD-CCNC: 13 U/L DA — SIGNIFICANT CHANGE UP (ref 10–60)
ANION GAP SERPL CALC-SCNC: 8 MMOL/L — SIGNIFICANT CHANGE UP (ref 5–17)
AST SERPL-CCNC: 3 U/L — LOW (ref 10–40)
BASOPHILS # BLD AUTO: 0.05 K/UL — SIGNIFICANT CHANGE UP (ref 0–0.2)
BASOPHILS NFR BLD AUTO: 0.4 % — SIGNIFICANT CHANGE UP (ref 0–2)
BILIRUB SERPL-MCNC: 0.4 MG/DL — SIGNIFICANT CHANGE UP (ref 0.2–1.2)
BUN SERPL-MCNC: 90 MG/DL — HIGH (ref 7–18)
CALCIUM SERPL-MCNC: 8.4 MG/DL — SIGNIFICANT CHANGE UP (ref 8.4–10.5)
CHLORIDE SERPL-SCNC: 106 MMOL/L — SIGNIFICANT CHANGE UP (ref 96–108)
CO2 SERPL-SCNC: 25 MMOL/L — SIGNIFICANT CHANGE UP (ref 22–31)
CREAT SERPL-MCNC: 3.48 MG/DL — HIGH (ref 0.5–1.3)
EOSINOPHIL # BLD AUTO: 0.16 K/UL — SIGNIFICANT CHANGE UP (ref 0–0.5)
EOSINOPHIL NFR BLD AUTO: 1.4 % — SIGNIFICANT CHANGE UP (ref 0–6)
GLUCOSE BLDC GLUCOMTR-MCNC: 159 MG/DL — HIGH (ref 70–99)
GLUCOSE BLDC GLUCOMTR-MCNC: 177 MG/DL — HIGH (ref 70–99)
GLUCOSE BLDC GLUCOMTR-MCNC: 182 MG/DL — HIGH (ref 70–99)
GLUCOSE BLDC GLUCOMTR-MCNC: 183 MG/DL — HIGH (ref 70–99)
GLUCOSE SERPL-MCNC: 158 MG/DL — HIGH (ref 70–99)
HCT VFR BLD CALC: 27.5 % — LOW (ref 34.5–45)
HGB BLD-MCNC: 8.5 G/DL — LOW (ref 11.5–15.5)
IMM GRANULOCYTES NFR BLD AUTO: 0.8 % — SIGNIFICANT CHANGE UP (ref 0–1.5)
LYMPHOCYTES # BLD AUTO: 1.93 K/UL — SIGNIFICANT CHANGE UP (ref 1–3.3)
LYMPHOCYTES # BLD AUTO: 17.1 % — SIGNIFICANT CHANGE UP (ref 13–44)
MAGNESIUM SERPL-MCNC: 2.4 MG/DL — SIGNIFICANT CHANGE UP (ref 1.6–2.6)
MCHC RBC-ENTMCNC: 25.8 PG — LOW (ref 27–34)
MCHC RBC-ENTMCNC: 30.9 GM/DL — LOW (ref 32–36)
MCV RBC AUTO: 83.6 FL — SIGNIFICANT CHANGE UP (ref 80–100)
MONOCYTES # BLD AUTO: 1.25 K/UL — HIGH (ref 0–0.9)
MONOCYTES NFR BLD AUTO: 11.1 % — SIGNIFICANT CHANGE UP (ref 2–14)
NEUTROPHILS # BLD AUTO: 7.83 K/UL — HIGH (ref 1.8–7.4)
NEUTROPHILS NFR BLD AUTO: 69.2 % — SIGNIFICANT CHANGE UP (ref 43–77)
NRBC # BLD: 1 /100 WBCS — HIGH (ref 0–0)
PHOSPHATE SERPL-MCNC: 5.1 MG/DL — HIGH (ref 2.5–4.5)
PLATELET # BLD AUTO: 341 K/UL — SIGNIFICANT CHANGE UP (ref 150–400)
POTASSIUM SERPL-MCNC: 4.4 MMOL/L — SIGNIFICANT CHANGE UP (ref 3.5–5.3)
POTASSIUM SERPL-SCNC: 4.4 MMOL/L — SIGNIFICANT CHANGE UP (ref 3.5–5.3)
PROT SERPL-MCNC: 7.1 G/DL — SIGNIFICANT CHANGE UP (ref 6–8.3)
RBC # BLD: 3.29 M/UL — LOW (ref 3.8–5.2)
RBC # FLD: 17 % — HIGH (ref 10.3–14.5)
SODIUM SERPL-SCNC: 139 MMOL/L — SIGNIFICANT CHANGE UP (ref 135–145)
WBC # BLD: 11.31 K/UL — HIGH (ref 3.8–10.5)
WBC # FLD AUTO: 11.31 K/UL — HIGH (ref 3.8–10.5)

## 2021-02-01 RX ADMIN — Medication 1: at 17:28

## 2021-02-01 RX ADMIN — SODIUM CHLORIDE 3 MILLILITER(S): 9 INJECTION INTRAMUSCULAR; INTRAVENOUS; SUBCUTANEOUS at 13:20

## 2021-02-01 RX ADMIN — SPIRONOLACTONE 50 MILLIGRAM(S): 25 TABLET, FILM COATED ORAL at 05:33

## 2021-02-01 RX ADMIN — SODIUM CHLORIDE 3 MILLILITER(S): 9 INJECTION INTRAMUSCULAR; INTRAVENOUS; SUBCUTANEOUS at 06:25

## 2021-02-01 RX ADMIN — Medication 81 MILLIGRAM(S): at 11:39

## 2021-02-01 RX ADMIN — Medication 40 MILLIGRAM(S): at 17:27

## 2021-02-01 RX ADMIN — Medication 1000 UNIT(S): at 11:39

## 2021-02-01 RX ADMIN — ATORVASTATIN CALCIUM 40 MILLIGRAM(S): 80 TABLET, FILM COATED ORAL at 21:42

## 2021-02-01 RX ADMIN — GABAPENTIN 100 MILLIGRAM(S): 400 CAPSULE ORAL at 05:33

## 2021-02-01 RX ADMIN — Medication 40 MILLIGRAM(S): at 05:33

## 2021-02-01 RX ADMIN — Medication 1: at 11:38

## 2021-02-01 RX ADMIN — Medication 1: at 08:20

## 2021-02-01 RX ADMIN — PANTOPRAZOLE SODIUM 40 MILLIGRAM(S): 20 TABLET, DELAYED RELEASE ORAL at 05:33

## 2021-02-01 RX ADMIN — SEVELAMER CARBONATE 800 MILLIGRAM(S): 2400 POWDER, FOR SUSPENSION ORAL at 11:39

## 2021-02-01 RX ADMIN — CARVEDILOL PHOSPHATE 6.25 MILLIGRAM(S): 80 CAPSULE, EXTENDED RELEASE ORAL at 05:33

## 2021-02-01 RX ADMIN — SODIUM CHLORIDE 3 MILLILITER(S): 9 INJECTION INTRAMUSCULAR; INTRAVENOUS; SUBCUTANEOUS at 21:36

## 2021-02-01 RX ADMIN — SEVELAMER CARBONATE 800 MILLIGRAM(S): 2400 POWDER, FOR SUSPENSION ORAL at 17:27

## 2021-02-01 RX ADMIN — CARVEDILOL PHOSPHATE 6.25 MILLIGRAM(S): 80 CAPSULE, EXTENDED RELEASE ORAL at 17:27

## 2021-02-01 RX ADMIN — GABAPENTIN 100 MILLIGRAM(S): 400 CAPSULE ORAL at 21:42

## 2021-02-01 RX ADMIN — SEVELAMER CARBONATE 800 MILLIGRAM(S): 2400 POWDER, FOR SUSPENSION ORAL at 08:20

## 2021-02-01 RX ADMIN — Medication 100 MILLIGRAM(S): at 11:39

## 2021-02-01 RX ADMIN — GABAPENTIN 100 MILLIGRAM(S): 400 CAPSULE ORAL at 13:23

## 2021-02-01 NOTE — CONSULT NOTE ADULT - ASSESSMENT
55 year old female from [facility], walks with walker with PMHx of CHF (last EF of 25% with a ICD), DM,HTN, CAD (no stents placed), gout, COPD, GERD, and RA and remote PSHx of a cholecystectomy presents to the ED with complaints of vaginal bleeding over the past three weeks, incidentally found to have 2.5cm renal mass on right side.    Recommendations  -further characterization needed with CT or MRI with Renal mass protocol (w/ contrast), this may be performed as outpatient, please have patient follow with Dr. Marino [phone].     55 year old female from [facility], walks with walker with PMHx of CHF (last EF of 25% with a ICD), DM, HTN, CAD (no stents placed), gout, COPD, GERD, CKD, and RA and remote PSHx of a cholecystectomy presents to the ED with complaints of vaginal bleeding over the past three weeks, incidentally found to have 2.5cm renal mass on right side.    Recommendations  -further characterization needed with MRI with Renal mass protocol (w/ contrast), this may be performed as outpatient, please have patient follow with Dr. Marino [phone].      D/w Dr. Marino

## 2021-02-01 NOTE — PROGRESS NOTE ADULT - PROBLEM SELECTOR PLAN 4
-7.1  -home medication Humalog and Novolog 70/30  -Insulin sliding scale  -accu check ACHS and Q6h when NPO -CKD 4  -Creatinine 3.48 (baseline 3.8-4 on previous admission)  -c/w renvela  -avoid nephrotoxic medication

## 2021-02-01 NOTE — PROGRESS NOTE ADULT - SUBJECTIVE AND OBJECTIVE BOX
Patient denies CP, SOB Review of systems otherwise (-)    ALBUTerol    90 MICROgram(s) HFA Inhaler 2 Puff(s) Inhalation every 6 hours PRN  allopurinol 100 milliGRAM(s) Oral daily  atorvastatin 40 milliGRAM(s) Oral at bedtime  carvedilol 6.25 milliGRAM(s) Oral every 12 hours  cholecalciferol 1000 Unit(s) Oral daily  dextrose 40% Gel 15 Gram(s) Oral once  dextrose 5%. 1000 milliLiter(s) IV Continuous <Continuous>  dextrose 5%. 1000 milliLiter(s) IV Continuous <Continuous>  dextrose 50% Injectable 25 Gram(s) IV Push once  dextrose 50% Injectable 12.5 Gram(s) IV Push once  dextrose 50% Injectable 25 Gram(s) IV Push once  furosemide    Tablet 40 milliGRAM(s) Oral two times a day  gabapentin 100 milliGRAM(s) Oral three times a day  glucagon  Injectable 1 milliGRAM(s) IntraMuscular once  insulin lispro (ADMELOG) corrective regimen sliding scale   SubCutaneous three times a day before meals  insulin lispro (ADMELOG) corrective regimen sliding scale   SubCutaneous at bedtime  pantoprazole    Tablet 40 milliGRAM(s) Oral before breakfast  senna 2 Tablet(s) Oral at bedtime  sevelamer carbonate 800 milliGRAM(s) Oral three times a day with meals  sodium chloride 0.9% lock flush 3 milliLiter(s) IV Push every 8 hours  spironolactone 50 milliGRAM(s) Oral daily                            8.5    11.31 )-----------( 341      ( 01 Feb 2021 08:09 )             27.5       Hemoglobin: 8.5 g/dL (02-01 @ 08:09)  Hemoglobin: 8.2 g/dL (01-31 @ 10:29)  Hemoglobin: 6.1 g/dL (01-30 @ 22:13)  Hemoglobin: 6.1 g/dL (01-30 @ 16:15)  Hemoglobin: 3.6 g/dL (01-29 @ 22:35)      02-01    139  |  106  |  90<H>  ----------------------------<  158<H>  4.4   |  25  |  3.48<H>    Ca    8.4      01 Feb 2021 08:09  Phos  5.1     02-01  Mg     2.4     02-01    TPro  7.1  /  Alb  3.2<L>  /  TBili  0.4  /  DBili  x   /  AST  3<L>  /  ALT  13  /  AlkPhos  71  02-01    Creatinine Trend: 3.48<--, 3.36<--, 3.24<--, 3.82<--    COAGS:           T(C): 36.6 (02-01-21 @ 13:58), Max: 36.6 (01-31-21 @ 21:03)  HR: 70 (02-01-21 @ 13:58) (70 - 95)  BP: 140/74 (02-01-21 @ 13:58) (114/51 - 149/85)  RR: 18 (02-01-21 @ 13:58) (18 - 18)  SpO2: 99% (02-01-21 @ 13:58) (96% - 100%)  Wt(kg): --    I&O's Summary      Appearance: Normal	  HEENT:   Normal oral mucosa, PERRL, EOMI	  Lymphatic: No lymphadenopathy  Cardiovascular: Normal S1 S2, No JVD, No murmurs, No edema  Respiratory: Lungs clear to auscultation	  Psychiatry: A & O x 3, Mood & affect appropriate  Gastrointestinal:  Soft, Non-tender, + BS	  Skin: No rashes, No ecchymoses, No cyanosis	  Neurologic: Non-focal  Extremities: Normal range of motion, No clubbing, cyanosis or edema  Vascular: Peripheral pulses palpable 2+ bilaterally    TELEMETRY: 	    ECG:  	 NSR : NAD, no acute ischemia   RADIOLOGY:     CT scan: t< from: CT Abdomen and Pelvis No Cont (01.30.21 @ 02:08) >  IMPRESSION:  Indeterminate right renal mass is incompletely characterized without intravenous contrast. This likely corresponds to a solid appearing lesion on prior CT and may reflect renal cell carcinoma. Further evaluation with contrast-enhanced MRI or renal mass protocol CT is recommended.    Fibroid uterus.    Cardiomegaly.    HELEN OLSON MD; Attending Radiologist  This document has     < end of copied text >    ASSESSMENT/PLAN: 	55 year old female from [facility], walks with walker with PMHx of NICM, CHF (last EF of 25% with a ICD), DM,HTN, nonobstructive CAD gout, COPD, GERD, and RA and remote PSHx of a cholecystectomy presents to the ED with complaints of vaginal bleeding over the past three weeks and right lower back pain, cardiology consulted for CAD/ CM     - Well compensated from a CHF prospective  - No clinical CHF or unstable cardiac syndromes  -  By virtue of having a severe NICM ,DM, CKD she is high cardiac risk for surgical procedures but otimized.  - cont periop BB    Manuel Gold MD, [facility]  BEEPER [phone]

## 2021-02-01 NOTE — PROGRESS NOTE ADULT - SUBJECTIVE AND OBJECTIVE BOX
NP Note discussed with  Primary Attending    Patient is a 55y old  Female who presents with a chief complaint of Hematuria and vaginal bleed (01 Feb 2021 11:09)      INTERVAL HPI/OVERNIGHT EVENTS: Patient seen and examined at bedside. Patient comfortable in bed, denies any further vaginal bleeding, fever, chest pain.    MEDICATIONS  (STANDING):  allopurinol 100 milliGRAM(s) Oral daily  atorvastatin 40 milliGRAM(s) Oral at bedtime  carvedilol 6.25 milliGRAM(s) Oral every 12 hours  cholecalciferol 1000 Unit(s) Oral daily  dextrose 40% Gel 15 Gram(s) Oral once  dextrose 5%. 1000 milliLiter(s) (50 mL/Hr) IV Continuous <Continuous>  dextrose 5%. 1000 milliLiter(s) (100 mL/Hr) IV Continuous <Continuous>  dextrose 50% Injectable 25 Gram(s) IV Push once  dextrose 50% Injectable 12.5 Gram(s) IV Push once  dextrose 50% Injectable 25 Gram(s) IV Push once  furosemide    Tablet 40 milliGRAM(s) Oral two times a day  gabapentin 100 milliGRAM(s) Oral three times a day  glucagon  Injectable 1 milliGRAM(s) IntraMuscular once  insulin lispro (ADMELOG) corrective regimen sliding scale   SubCutaneous three times a day before meals  insulin lispro (ADMELOG) corrective regimen sliding scale   SubCutaneous at bedtime  pantoprazole    Tablet 40 milliGRAM(s) Oral before breakfast  senna 2 Tablet(s) Oral at bedtime  sevelamer carbonate 800 milliGRAM(s) Oral three times a day with meals  sodium chloride 0.9% lock flush 3 milliLiter(s) IV Push every 8 hours  spironolactone 50 milliGRAM(s) Oral daily    MEDICATIONS  (PRN):  ALBUTerol    90 MICROgram(s) HFA Inhaler 2 Puff(s) Inhalation every 6 hours PRN Shortness of Breath and/or Wheezing      __________________________________________________  REVIEW OF SYSTEMS:    CONSTITUTIONAL: No fever,   EYES: no acute visual disturbances  NECK: No pain or stiffness  RESPIRATORY: No cough; No shortness of breath  CARDIOVASCULAR: No chest pain, no palpitations  GASTROINTESTINAL: No pain. No nausea or vomiting; No diarrhea   NEUROLOGICAL: No headache or numbness, no tremors  MUSCULOSKELETAL: No joint pain, no muscle pain  GENITOURINARY: no dysuria, no frequency, no hesitancy  PSYCHIATRY: no depression , no anxiety  ALL OTHER  ROS negative        Vital Signs Last 24 Hrs  T(C): 36.6 (01 Feb 2021 13:58), Max: 36.6 (31 Jan 2021 21:03)  T(F): 97.8 (01 Feb 2021 13:58), Max: 97.9 (31 Jan 2021 21:03)  HR: 70 (01 Feb 2021 13:58) (70 - 95)  BP: 140/74 (01 Feb 2021 13:58) (114/51 - 149/85)  BP(mean): --  RR: 18 (01 Feb 2021 13:58) (18 - 18)  SpO2: 99% (01 Feb 2021 13:58) (96% - 100%)    ________________________________________________  PHYSICAL EXAM:  GENERAL: NAD  HEENT: Normocephalic;  conjunctivae and sclerae clear; moist mucous membranes;   NECK : supple  CHEST/LUNG: Clear to auscultation bilaterally with good air entry   HEART: S1 S2  regular; no murmurs, gallops or rubs  ABDOMEN: Soft, Nontender, Nondistended; Bowel sounds present  EXTREMITIES: no cyanosis; no edema; no calf tenderness  SKIN: warm and dry; no rash  NERVOUS SYSTEM:  Awake and alert; Oriented  to place, person and time ; no new deficits    _________________________________________________  LABS:                        8.5    11.31 )-----------( 341      ( 01 Feb 2021 08:09 )             27.5     02-01    139  |  106  |  90<H>  ----------------------------<  158<H>  4.4   |  25  |  3.48<H>    Ca    8.4      01 Feb 2021 08:09  Phos  5.1     02-01  Mg     2.4     02-01    TPro  7.1  /  Alb  3.2<L>  /  TBili  0.4  /  DBili  x   /  AST  3<L>  /  ALT  13  /  AlkPhos  71  02-01        CAPILLARY BLOOD GLUCOSE      POCT Blood Glucose.: 159 mg/dL (01 Feb 2021 11:34)  POCT Blood Glucose.: 177 mg/dL (01 Feb 2021 08:07)  POCT Blood Glucose.: 160 mg/dL (31 Jan 2021 21:29)  POCT Blood Glucose.: 225 mg/dL (31 Jan 2021 16:38)    RADIOLOGY & ADDITIONAL TESTS:  < from: CT Abdomen and Pelvis No Cont (01.30.21 @ 02:08) >  EXAM:  CT ABDOMEN AND PELVIS                            PROCEDURE DATE:  01/30/2021          INTERPRETATION:  CLINICAL INFORMATION: Vaginal bleeding    COMPARISON: CT abdomen pelvis 11/6/2012    PROCEDURE:  CT of the Abdomen and Pelvis was performed without intravenous contrast.  Intravenous contrast: None.  Oral contrast: None.  Sagittal and coronal reformats were performed.    FINDINGS:  LOWER CHEST: Cardiomegaly. Pacemaker lead in the right ventricle. Hypoattenuation of the blood flow compatible with anemia.    LIVER: Within normal limits.  BILE DUCTS: Normal caliber.  GALLBLADDER: Cholecystectomy.  SPLEEN: Within normal limits.  PANCREAS: Within normal limits.  ADRENALS: Stable 3 cm left adrenal nodule. Nodular thickening of the right adrenal gland, stable.  KIDNEYS/URETERS: Bilateral renal atrophy. Indeterminant hypodense right renal lesion measures 2.6 cm, likely corresponding to a 1 cm solid-appearing lesion on prior exam. A 1 cm right upper pole hyperdense lesion is indeterminate. Subcentimeter hyperdense left renal lesion. No nephroureterolithiasis or hydronephrosis.    BLADDER: Within normal limits.  REPRODUCTIVE ORGANS: Enlarged fibroid uterus with a dominant right fundal subserosal fibroid measuring 6.6 cm.    BOWEL: No bowel obstruction. Appendix is normal. Scattered colonic diverticuli.  PERITONEUM: No ascites.  VESSELS: Within normal limits.  RETROPERITONEUM/LYMPH NODES: No lymphadenopathy.  ABDOMINAL WALL: Large [place] ventral hernia containing unobstructed small bowel.  BONES: Degenerative changes.    IMPRESSION:  Indeterminate right renal mass is incompletely characterized without intravenous contrast. This likely corresponds to a solid appearing lesion on prior CT and may reflect renal cell carcinoma. Further evaluation with contrast-enhanced MRI or renal mass protocol CT is recommended.    Fibroid uterus.    Cardiomegaly.      < end of copied text >    Imaging  Reviewed:  YES    Consultant(s) Notes Reviewed:   YES      Plan of care was discussed with patient and /or primary care giver; all questions and concerns were addressed

## 2021-02-01 NOTE — PROGRESS NOTE ADULT - PROBLEM SELECTOR PLAN 3
-st fer ICD interrogation to check battery life Echo in Sep 2019 showed EF 30-35%.   -c/w spironolactone, furosemide, coreg  -Cardiology following

## 2021-02-01 NOTE — CONSULT NOTE ADULT - SUBJECTIVE AND OBJECTIVE BOX
HPI  55 year old female from [facility], walks with walker with PMHx of CHF (last EF of 25% with a ICD), DM,HTN, CAD (no stents placed), gout, COPD, GERD, and RA and remote PSHx of a cholecystectomy presents to the ED with complaints of vaginal bleeding over the past three weeks and right lower back pain.  CT revealed 2.5 renal mass on right side.     PAST MEDICAL & SURGICAL HISTORY:  GERD (gastroesophageal reflux disease)    CHF (congestive heart failure)    CAD (coronary artery disease)    AICD (automatic cardioverter/defibrillator) present    COPD (chronic obstructive pulmonary disease)    RA (rheumatoid arthritis)    DM (diabetes mellitus)    HTN (hypertension)    ICD (implantable cardioverter-defibrillator) in place    History of cholecystectomy        MEDICATIONS  (STANDING):  allopurinol 100 milliGRAM(s) Oral daily  aspirin  chewable 81 milliGRAM(s) Oral daily  atorvastatin 40 milliGRAM(s) Oral at bedtime  carvedilol 6.25 milliGRAM(s) Oral every 12 hours  cholecalciferol 1000 Unit(s) Oral daily  dextrose 40% Gel 15 Gram(s) Oral once  dextrose 5%. 1000 milliLiter(s) (50 mL/Hr) IV Continuous <Continuous>  dextrose 5%. 1000 milliLiter(s) (100 mL/Hr) IV Continuous <Continuous>  dextrose 50% Injectable 25 Gram(s) IV Push once  dextrose 50% Injectable 12.5 Gram(s) IV Push once  dextrose 50% Injectable 25 Gram(s) IV Push once  furosemide    Tablet 40 milliGRAM(s) Oral two times a day  gabapentin 100 milliGRAM(s) Oral three times a day  glucagon  Injectable 1 milliGRAM(s) IntraMuscular once  insulin lispro (ADMELOG) corrective regimen sliding scale   SubCutaneous three times a day before meals  insulin lispro (ADMELOG) corrective regimen sliding scale   SubCutaneous at bedtime  pantoprazole    Tablet 40 milliGRAM(s) Oral before breakfast  senna 2 Tablet(s) Oral at bedtime  sevelamer carbonate 800 milliGRAM(s) Oral three times a day with meals  sodium chloride 0.9% lock flush 3 milliLiter(s) IV Push every 8 hours  spironolactone 50 milliGRAM(s) Oral daily    MEDICATIONS  (PRN):  ALBUTerol    90 MICROgram(s) HFA Inhaler 2 Puff(s) Inhalation every 6 hours PRN Shortness of Breath and/or Wheezing      FAMILY HISTORY:  Family history of hypertension in mother        Allergies    codeine (Urticaria)    Intolerances        SOCIAL HISTORY:    REVIEW OF SYSTEMS: Otherwise negative as stated in HPI    Physical Exam  Vital signs  T(C): 36.6 (02-01-21 @ 05:25), Max: 37.3 (01-31-21 @ 14:25)  HR: 81 (02-01-21 @ 05:25)  BP: 114/51 (02-01-21 @ 05:25)  SpO2: 96% (02-01-21 @ 05:25)  Wt(kg): --    Output      Gen:  NAD    Pulm:  No respiratory distress  	  CV:  RRR    GI:  S/ND/NT    :      MSK:      LABS:      02-01 @ 08:09    WBC 11.31 / Hct 27.5  / SCr 3.48     01-31 @ 10:29    WBC 16.04 / Hct 26.2  / SCr --       02-01    139  |  106  |  90<H>  ----------------------------<  158<H>  4.4   |  25  |  3.48<H>    Ca    8.4      01 Feb 2021 08:09  Phos  5.1     02-01  Mg     2.4     02-01    TPro  7.1  /  Alb  3.2<L>  /  TBili  0.4  /  DBili  x   /  AST  3<L>  /  ALT  13  /  AlkPhos  71  02-01          Urine Cx:  Blood Cx:    RADIOLOGY:     HPI  55 year old female from [facility], walks with walker with PMHx of CHF (last EF of 25% with a ICD), DM,HTN, CKD, CAD (no stents placed), gout, COPD, GERD, and RA and remote PSHx of open cholecystectomy & C/S presents to the ED with complaints of vaginal bleeding over the past three weeks and right lower back pain.  CT revealed 2.5 renal mass on right side. pt states she had never noticed bleeding before, no history of smoking, states mother had a  malignancy, but unsure of what is was.     PAST MEDICAL & SURGICAL HISTORY:  GERD (gastroesophageal reflux disease)    CHF (congestive heart failure)    CAD (coronary artery disease)    AICD (automatic cardioverter/defibrillator) present    COPD (chronic obstructive pulmonary disease)    RA (rheumatoid arthritis)    DM (diabetes mellitus)    HTN (hypertension)    ICD (implantable cardioverter-defibrillator) in place    History of cholecystectomy        MEDICATIONS  (STANDING):  allopurinol 100 milliGRAM(s) Oral daily  aspirin  chewable 81 milliGRAM(s) Oral daily  atorvastatin 40 milliGRAM(s) Oral at bedtime  carvedilol 6.25 milliGRAM(s) Oral every 12 hours  cholecalciferol 1000 Unit(s) Oral daily  dextrose 40% Gel 15 Gram(s) Oral once  dextrose 5%. 1000 milliLiter(s) (50 mL/Hr) IV Continuous <Continuous>  dextrose 5%. 1000 milliLiter(s) (100 mL/Hr) IV Continuous <Continuous>  dextrose 50% Injectable 25 Gram(s) IV Push once  dextrose 50% Injectable 12.5 Gram(s) IV Push once  dextrose 50% Injectable 25 Gram(s) IV Push once  furosemide    Tablet 40 milliGRAM(s) Oral two times a day  gabapentin 100 milliGRAM(s) Oral three times a day  glucagon  Injectable 1 milliGRAM(s) IntraMuscular once  insulin lispro (ADMELOG) corrective regimen sliding scale   SubCutaneous three times a day before meals  insulin lispro (ADMELOG) corrective regimen sliding scale   SubCutaneous at bedtime  pantoprazole    Tablet 40 milliGRAM(s) Oral before breakfast  senna 2 Tablet(s) Oral at bedtime  sevelamer carbonate 800 milliGRAM(s) Oral three times a day with meals  sodium chloride 0.9% lock flush 3 milliLiter(s) IV Push every 8 hours  spironolactone 50 milliGRAM(s) Oral daily    MEDICATIONS  (PRN):  ALBUTerol    90 MICROgram(s) HFA Inhaler 2 Puff(s) Inhalation every 6 hours PRN Shortness of Breath and/or Wheezing      FAMILY HISTORY:  Family history of hypertension in mother        Allergies    codeine (Urticaria)    Intolerances        SOCIAL HISTORY:    REVIEW OF SYSTEMS: Otherwise negative as stated in HPI    Physical Exam  Vital signs  T(C): 36.6 (02-01-21 @ 05:25), Max: 37.3 (01-31-21 @ 14:25)  HR: 81 (02-01-21 @ 05:25)  BP: 114/51 (02-01-21 @ 05:25)  SpO2: 96% (02-01-21 @ 05:25)  Wt(kg): --    Output      Gen:  NAD    Pulm:  No respiratory distress  	  CV:  RRR    GI:  S/ND/NT, no CVA    :      MSK:      LABS:      02-01 @ 08:09    WBC 11.31 / Hct 27.5  / SCr 3.48     01-31 @ 10:29    WBC 16.04 / Hct 26.2  / SCr --       02-01    139  |  106  |  90<H>  ----------------------------<  158<H>  4.4   |  25  |  3.48<H>    Ca    8.4      01 Feb 2021 08:09  Phos  5.1     02-01  Mg     2.4     02-01    TPro  7.1  /  Alb  3.2<L>  /  TBili  0.4  /  DBili  x   /  AST  3<L>  /  ALT  13  /  AlkPhos  71  02-01          Urine Cx:  Blood Cx:    RADIOLOGY:

## 2021-02-01 NOTE — PROGRESS NOTE ADULT - SUBJECTIVE AND OBJECTIVE BOX
Patient is a 55y old  Female who presents with a chief complaint of Hematuria and vaginal bleed (2021 15:33)    PATIENT IS SEEN AND EXAMINED IN MEDICAL FLOOR.    ALLERGIES:  codeine (Urticaria)    Daily     Daily Weight in k.7 (2021 05:25)    VITALS:    Vital Signs Last 24 Hrs  T(C): 36.6 (2021 20:54), Max: 36.6 (2021 21:03)  T(F): 97.9 (2021 20:54), Max: 97.9 (2021 21:03)  HR: 66 (2021 20:54) (66 - 81)  BP: 129/61 (2021 20:54) (114/51 - 145/51)  BP(mean): --  RR: 18 (2021 20:54) (18 - 18)  SpO2: 96% (2021 20:54) (96% - 100%)    LABS:    CBC Full  -  ( 2021 08:09 )  WBC Count : 11.31 K/uL  RBC Count : 3.29 M/uL  Hemoglobin : 8.5 g/dL  Hematocrit : 27.5 %  Platelet Count - Automated : 341 K/uL  Mean Cell Volume : 83.6 fl  Mean Cell Hemoglobin : 25.8 pg  Mean Cell Hemoglobin Concentration : 30.9 gm/dL  Auto Neutrophil # : 7.83 K/uL  Auto Lymphocyte # : 1.93 K/uL  Auto Monocyte # : 1.25 K/uL  Auto Eosinophil # : 0.16 K/uL  Auto Basophil # : 0.05 K/uL  Auto Neutrophil % : 69.2 %  Auto Lymphocyte % : 17.1 %  Auto Monocyte % : 11.1 %  Auto Eosinophil % : 1.4 %  Auto Basophil % : 0.4 %      02-    139  |  106  |  90<H>  ----------------------------<  158<H>  4.4   |  25  |  3.48<H>    Ca    8.4      2021 08:09  Phos  5.1     02-01  Mg     2.4     02-    TPro  7.1  /  Alb  3.2<L>  /  TBili  0.4  /  DBili  x   /  AST  3<L>  /  ALT  13  /  AlkPhos  71  -    CAPILLARY BLOOD GLUCOSE      POCT Blood Glucose.: 183 mg/dL (2021 16:42)  POCT Blood Glucose.: 159 mg/dL (2021 11:34)  POCT Blood Glucose.: 177 mg/dL (2021 08:07)  POCT Blood Glucose.: 160 mg/dL (2021 21:29)      LIVER FUNCTIONS - ( 2021 08:09 )  Alb: 3.2 g/dL / Pro: 7.1 g/dL / ALK PHOS: 71 U/L / ALT: 13 U/L DA / AST: 3 U/L / GGT: x           Creatinine Trend: 3.48<--, 3.36<--, 3.24<--, 3.82<--  I&O's Summary      MEDICATIONS:    MEDICATIONS  (STANDING):  allopurinol 100 milliGRAM(s) Oral daily  atorvastatin 40 milliGRAM(s) Oral at bedtime  carvedilol 6.25 milliGRAM(s) Oral every 12 hours  cholecalciferol 1000 Unit(s) Oral daily  dextrose 40% Gel 15 Gram(s) Oral once  dextrose 5%. 1000 milliLiter(s) (50 mL/Hr) IV Continuous <Continuous>  dextrose 5%. 1000 milliLiter(s) (100 mL/Hr) IV Continuous <Continuous>  dextrose 50% Injectable 25 Gram(s) IV Push once  dextrose 50% Injectable 12.5 Gram(s) IV Push once  dextrose 50% Injectable 25 Gram(s) IV Push once  furosemide    Tablet 40 milliGRAM(s) Oral two times a day  gabapentin 100 milliGRAM(s) Oral three times a day  glucagon  Injectable 1 milliGRAM(s) IntraMuscular once  insulin lispro (ADMELOG) corrective regimen sliding scale   SubCutaneous three times a day before meals  insulin lispro (ADMELOG) corrective regimen sliding scale   SubCutaneous at bedtime  pantoprazole    Tablet 40 milliGRAM(s) Oral before breakfast  senna 2 Tablet(s) Oral at bedtime  sevelamer carbonate 800 milliGRAM(s) Oral three times a day with meals  sodium chloride 0.9% lock flush 3 milliLiter(s) IV Push every 8 hours  spironolactone 50 milliGRAM(s) Oral daily      MEDICATIONS  (PRN):  ALBUTerol    90 MICROgram(s) HFA Inhaler 2 Puff(s) Inhalation every 6 hours PRN Shortness of Breath and/or Wheezing      REVIEW OF SYSTEMS:                           ALL ROS DONE [ X   ]    CONSTITUTIONAL:  LETHARGIC [   ], FEVER [   ], UNRESPONSIVE [   ]  CVS:  CP  [   ], SOB, [   ], PALPITATIONS [   ], DIZZYNESS [   ]  RS: COUGH [   ], SPUTUM [   ]  GI: ABDOMINAL PAIN [   ], NAUSEA [   ], VOMITINGS [   ], DIARRHEA [   ], CONSTIPATION [   ]  :  DYSURIA [   ], NOCTURIA [   ], INCREASED FREQUENCY [   ], DRIBLING [   ],  SKELETAL: PAINFUL JOINTS [   ], SWOLLEN JOINTS [   ], NECK ACHE [   ], LOW BACK ACHE [   ],  SKIN : ULCERS [   ], RASH [   ], ITCHING [   ]  CNS: HEAD ACHE [   ], DOUBLE VISION [   ], BLURRED VISION [   ], AMS / CONFUSION [   ], SEIZURES [   ], WEAKNESS [   ],TINGLING / NUMBNESS [   ]      PHYSICAL EXAMINATION:    GENERAL APPEARANCE: NO DISTRESS  HEENT:  NO PALLOR, NO  JVD,  NO   NODES, NECK SUPPLE  CVS: S1 +, S2 +,   RS: AEEB,  OCCASIONAL  RALES +,   NO RONCHI  ABD: SOFT, NT, NO, BS +  EXT: PE +  SKIN: WARM,   SKELETAL:  ROM ACCEPTABLE  CNS:  AAO X 3   , NO  DEFICITS        RADIOLOGY :    < from: CT Abdomen and Pelvis No Cont (21 @ 02:08) >    IMPRESSION:  Indeterminate right renal mass is incompletely characterized without intravenous contrast. This likely corresponds to a solid appearing lesion on prior CT and may reflect renal cell carcinoma. Further evaluation with contrast-enhanced MRI or renal mass protocol CT is recommended.    Fibroid uterus.    Cardiomegaly.    < end of copied text >      < from: US Kidney and Bladder (10.02.19 @ 12:54) >    IMPRESSION:     No hydronephrosis.    2.1 cm right midpole renal hypodensity cannot be adequately assessed due   to excessive bowel gas. Recommend pre and postcontrast CT to exclude   neoplasm..    < end of copied text >      ASSESSMENT :     Anemia    GERD (gastroesophageal reflux disease)    CHF (congestive heart failure)    CAD (coronary artery disease)    AICD (automatic cardioverter/defibrillator) present    COPD (chronic obstructive pulmonary disease)    RA (rheumatoid arthritis)    DM (diabetes mellitus)    HTN (hypertension)    ICD (implantable cardioverter-defibrillator) in place    History of cholecystectomy        PLAN:  HPI:  55 year old female from [facility], walks with walker with PMHx of CHF (last EF of 25% with a ICD), DM,HTN, CAD (no stents placed), gout, COPD, GERD, and RA and remote PSHx of a cholecystectomy presents to the ED with complaints of vaginal bleeding over the past three weeks and right lower back pain. Patient additionally endorses some dysuria and lightheadedness. Patient denies having any history of malignancy or weight loss. Patient otherwise denies any fevers, abdominal pain, nausea, vomiting, diarrhea, constipation, chest pain, shortness of breath, syncope, and all other acute complaints.   Pt noted to have Hb 3.6 in ED    In ED, /60, HR 84 (2021 05:15)    # NORMOCYTIC ANEMIA , PROFUSE POST MENOPAUSAL VAGINAL BLEEDING - S/P PRBC, AS NEEDED TO KEEP Hb > 8 GMS, GYN F/UP IS IN PROGRESS - PLANNED EUA ( EXAMINATION UNDER ANESTHESIA- PELVIC  ) & D&C  # MEDICAL CLEARANCE FOR SURGERY - RCRI - 3 - 15% 30 DAY RISK OF DEATH, MI OR CARDIAC ARREST ; CARDIOLOGY CLEARANCE NOTED  #  SUSPECTED RIGHT RENAL CELL CANCER - TO CALL UROLOGY CONSULT - DR. LUIS ALBERTO PICHARDO IN PROGRESS  #  SYSTOLIC CHF , DM, HTN, CAD, COPD, MORBIDLY OBESITY, GERD, RA, GOUT , CKD - CARDIOLOGY CONSULT IN PROGRESS  #  GI AND DVT PROPHYLAXIS    LUANN NATARAJAN MD COVERING DONTAE NATARAJAN MD

## 2021-02-01 NOTE — PROGRESS NOTE ADULT - PROBLEM SELECTOR PLAN 5
-DVT PPX: SCD  -GI PPX: PPI -st fer ICD interrogation to check battery life -st fer ICD interrogation to check battery life- pending due to mechanical issues

## 2021-02-01 NOTE — PROGRESS NOTE ADULT - PROBLEM SELECTOR PLAN 1
-secondary to vaginal bleeding  -plan for OR 2/2/21 for D&C  -H/H: 8.5/27.5- stable  -NPO after midnight -secondary to vaginal bleeding  -plan for OR 2/2/21 for D&C  -H/H: 8.5/27.5- stable  -NPO after midnight  -GYN following

## 2021-02-01 NOTE — INPATIENT CERTIFICATION FOR MEDICARE PATIENTS - THE SEVERITY OF SIGNS/SYMPTOMS. (SEE ED/ADMIT DOCUMENTS)
1. The severity of signs/symptoms.(See ED/admit documents)
1. The severity of signs/symptoms.(See ED/admit documents)

## 2021-02-01 NOTE — PROGRESS NOTE ADULT - PROBLEM SELECTOR PLAN 2
-CKD 4  -Creatinine 3.48 (baseline 3.8-4 on previous admission)  -c/w renvela  -avoid nephrotoxic medication -CT abdomen/Pelvis showing right renal lesion 2.6  -Patient can follow up outpatient with MRI with renal mass protocol (w/ contrast), with Dr. Marino [phone]  -Urology Following

## 2021-02-01 NOTE — CONSULT NOTE ADULT - ATTENDING COMMENTS
CARDIOLOGY ATTENDING    Agree with above. In brief she is a 54 y/o female PMH CAD with severe LV dysfunction s/p a single chamber st fer ICD implanted in Feb 2011 in [place], GERD, gout, hypertension, DM and COPD a/w severe anemia. Workup of anemia is in progress, but she is stable from a cardiac perspective.     -continue current medical therapy for stable CAD with severe LV dysfunction  -st fer ICD interrogation Monday to check battery life  -no need for repeat echo nor ischemia evaluation  -final cardiac reccs pending the above
Pt seen and examined. Agree with note as written above.    - MRI Abd with and without contrast  - Follow-up as outpt

## 2021-02-01 NOTE — INPATIENT CERTIFICATION FOR MEDICARE PATIENTS - IN ORDER TO MEET MEDICARE REQUIREMENTS.
In order to meet Medicare requirements, the clinical documentation must support the information cited in the admission order.  Please be sure to provide detailed and clear documentation about the following in the admitting note/history and physical:
In order to meet Medicare requirements, the clinical documentation must support the information cited in the admission order.  Please be sure to provide detailed and clear documentation about the following in the admitting note/history and physical:

## 2021-02-02 ENCOUNTER — RESULT REVIEW (OUTPATIENT)
Age: 56
End: 2021-02-02

## 2021-02-02 LAB
ALBUMIN SERPL ELPH-MCNC: 3.3 G/DL — LOW (ref 3.5–5)
ALP SERPL-CCNC: 72 U/L — SIGNIFICANT CHANGE UP (ref 40–120)
ALT FLD-CCNC: 13 U/L DA — SIGNIFICANT CHANGE UP (ref 10–60)
ANION GAP SERPL CALC-SCNC: 8 MMOL/L — SIGNIFICANT CHANGE UP (ref 5–17)
APTT BLD: 28.6 SEC — SIGNIFICANT CHANGE UP (ref 27.5–35.5)
AST SERPL-CCNC: <3 U/L — LOW (ref 10–40)
BASOPHILS # BLD AUTO: 0.05 K/UL — SIGNIFICANT CHANGE UP (ref 0–0.2)
BASOPHILS NFR BLD AUTO: 0.4 % — SIGNIFICANT CHANGE UP (ref 0–2)
BILIRUB SERPL-MCNC: 0.4 MG/DL — SIGNIFICANT CHANGE UP (ref 0.2–1.2)
BUN SERPL-MCNC: 100 MG/DL — HIGH (ref 7–18)
CALCIUM SERPL-MCNC: 8.9 MG/DL — SIGNIFICANT CHANGE UP (ref 8.4–10.5)
CHLORIDE SERPL-SCNC: 108 MMOL/L — SIGNIFICANT CHANGE UP (ref 96–108)
CO2 SERPL-SCNC: 24 MMOL/L — SIGNIFICANT CHANGE UP (ref 22–31)
CREAT SERPL-MCNC: 3.57 MG/DL — HIGH (ref 0.5–1.3)
EOSINOPHIL # BLD AUTO: 0.18 K/UL — SIGNIFICANT CHANGE UP (ref 0–0.5)
EOSINOPHIL NFR BLD AUTO: 1.6 % — SIGNIFICANT CHANGE UP (ref 0–6)
GLUCOSE BLDC GLUCOMTR-MCNC: 129 MG/DL — HIGH (ref 70–99)
GLUCOSE BLDC GLUCOMTR-MCNC: 148 MG/DL — HIGH (ref 70–99)
GLUCOSE BLDC GLUCOMTR-MCNC: 168 MG/DL — HIGH (ref 70–99)
GLUCOSE BLDC GLUCOMTR-MCNC: 204 MG/DL — HIGH (ref 70–99)
GLUCOSE SERPL-MCNC: 148 MG/DL — HIGH (ref 70–99)
HCT VFR BLD CALC: 30 % — LOW (ref 34.5–45)
HGB BLD-MCNC: 9.1 G/DL — LOW (ref 11.5–15.5)
IMM GRANULOCYTES NFR BLD AUTO: 0.5 % — SIGNIFICANT CHANGE UP (ref 0–1.5)
INR BLD: 0.97 RATIO — SIGNIFICANT CHANGE UP (ref 0.88–1.16)
LYMPHOCYTES # BLD AUTO: 1.77 K/UL — SIGNIFICANT CHANGE UP (ref 1–3.3)
LYMPHOCYTES # BLD AUTO: 15.6 % — SIGNIFICANT CHANGE UP (ref 13–44)
MAGNESIUM SERPL-MCNC: 2.5 MG/DL — SIGNIFICANT CHANGE UP (ref 1.6–2.6)
MCHC RBC-ENTMCNC: 25.5 PG — LOW (ref 27–34)
MCHC RBC-ENTMCNC: 30.3 GM/DL — LOW (ref 32–36)
MCV RBC AUTO: 84 FL — SIGNIFICANT CHANGE UP (ref 80–100)
MONOCYTES # BLD AUTO: 0.98 K/UL — HIGH (ref 0–0.9)
MONOCYTES NFR BLD AUTO: 8.6 % — SIGNIFICANT CHANGE UP (ref 2–14)
NEUTROPHILS # BLD AUTO: 8.33 K/UL — HIGH (ref 1.8–7.4)
NEUTROPHILS NFR BLD AUTO: 73.3 % — SIGNIFICANT CHANGE UP (ref 43–77)
NRBC # BLD: 0 /100 WBCS — SIGNIFICANT CHANGE UP (ref 0–0)
PHOSPHATE SERPL-MCNC: 5.1 MG/DL — HIGH (ref 2.5–4.5)
PLATELET # BLD AUTO: 329 K/UL — SIGNIFICANT CHANGE UP (ref 150–400)
POTASSIUM SERPL-MCNC: 4.8 MMOL/L — SIGNIFICANT CHANGE UP (ref 3.5–5.3)
POTASSIUM SERPL-SCNC: 4.8 MMOL/L — SIGNIFICANT CHANGE UP (ref 3.5–5.3)
PROT SERPL-MCNC: 7.3 G/DL — SIGNIFICANT CHANGE UP (ref 6–8.3)
PROTHROM AB SERPL-ACNC: 11.6 SEC — SIGNIFICANT CHANGE UP (ref 10.6–13.6)
RBC # BLD: 3.57 M/UL — LOW (ref 3.8–5.2)
RBC # FLD: 17.1 % — HIGH (ref 10.3–14.5)
SODIUM SERPL-SCNC: 140 MMOL/L — SIGNIFICANT CHANGE UP (ref 135–145)
WBC # BLD: 11.37 K/UL — HIGH (ref 3.8–10.5)
WBC # FLD AUTO: 11.37 K/UL — HIGH (ref 3.8–10.5)

## 2021-02-02 PROCEDURE — 88305 TISSUE EXAM BY PATHOLOGIST: CPT | Mod: 26

## 2021-02-02 RX ORDER — FENTANYL CITRATE 50 UG/ML
25 INJECTION INTRAVENOUS
Refills: 0 | Status: DISCONTINUED | OUTPATIENT
Start: 2021-02-02 | End: 2021-02-02

## 2021-02-02 RX ORDER — GABAPENTIN 400 MG/1
100 CAPSULE ORAL THREE TIMES A DAY
Refills: 0 | Status: DISCONTINUED | OUTPATIENT
Start: 2021-02-02 | End: 2021-02-04

## 2021-02-02 RX ORDER — INSULIN LISPRO 100/ML
VIAL (ML) SUBCUTANEOUS AT BEDTIME
Refills: 0 | Status: DISCONTINUED | OUTPATIENT
Start: 2021-02-02 | End: 2021-02-04

## 2021-02-02 RX ORDER — DEXTROSE 50 % IN WATER 50 %
25 SYRINGE (ML) INTRAVENOUS ONCE
Refills: 0 | Status: DISCONTINUED | OUTPATIENT
Start: 2021-02-02 | End: 2021-02-04

## 2021-02-02 RX ORDER — ASPIRIN/CALCIUM CARB/MAGNESIUM 324 MG
81 TABLET ORAL DAILY
Refills: 0 | Status: DISCONTINUED | OUTPATIENT
Start: 2021-02-02 | End: 2021-02-04

## 2021-02-02 RX ORDER — DEXTROSE 50 % IN WATER 50 %
15 SYRINGE (ML) INTRAVENOUS ONCE
Refills: 0 | Status: DISCONTINUED | OUTPATIENT
Start: 2021-02-02 | End: 2021-02-04

## 2021-02-02 RX ORDER — ALBUTEROL 90 UG/1
2 AEROSOL, METERED ORAL EVERY 6 HOURS
Refills: 0 | Status: DISCONTINUED | OUTPATIENT
Start: 2021-02-02 | End: 2021-02-04

## 2021-02-02 RX ORDER — CARVEDILOL PHOSPHATE 80 MG/1
6.25 CAPSULE, EXTENDED RELEASE ORAL EVERY 12 HOURS
Refills: 0 | Status: DISCONTINUED | OUTPATIENT
Start: 2021-02-02 | End: 2021-02-04

## 2021-02-02 RX ORDER — ATORVASTATIN CALCIUM 80 MG/1
40 TABLET, FILM COATED ORAL AT BEDTIME
Refills: 0 | Status: DISCONTINUED | OUTPATIENT
Start: 2021-02-02 | End: 2021-02-04

## 2021-02-02 RX ORDER — SODIUM CHLORIDE 9 MG/ML
1000 INJECTION, SOLUTION INTRAVENOUS
Refills: 0 | Status: DISCONTINUED | OUTPATIENT
Start: 2021-02-02 | End: 2021-02-04

## 2021-02-02 RX ORDER — PANTOPRAZOLE SODIUM 20 MG/1
40 TABLET, DELAYED RELEASE ORAL
Refills: 0 | Status: DISCONTINUED | OUTPATIENT
Start: 2021-02-02 | End: 2021-02-04

## 2021-02-02 RX ORDER — INSULIN LISPRO 100/ML
VIAL (ML) SUBCUTANEOUS
Refills: 0 | Status: DISCONTINUED | OUTPATIENT
Start: 2021-02-02 | End: 2021-02-04

## 2021-02-02 RX ORDER — SEVELAMER CARBONATE 2400 MG/1
800 POWDER, FOR SUSPENSION ORAL
Refills: 0 | Status: DISCONTINUED | OUTPATIENT
Start: 2021-02-02 | End: 2021-02-04

## 2021-02-02 RX ORDER — ALLOPURINOL 300 MG
100 TABLET ORAL DAILY
Refills: 0 | Status: DISCONTINUED | OUTPATIENT
Start: 2021-02-02 | End: 2021-02-04

## 2021-02-02 RX ORDER — GLUCAGON INJECTION, SOLUTION 0.5 MG/.1ML
1 INJECTION, SOLUTION SUBCUTANEOUS ONCE
Refills: 0 | Status: DISCONTINUED | OUTPATIENT
Start: 2021-02-02 | End: 2021-02-04

## 2021-02-02 RX ORDER — SENNA PLUS 8.6 MG/1
2 TABLET ORAL AT BEDTIME
Refills: 0 | Status: DISCONTINUED | OUTPATIENT
Start: 2021-02-02 | End: 2021-02-04

## 2021-02-02 RX ORDER — FUROSEMIDE 40 MG
40 TABLET ORAL
Refills: 0 | Status: DISCONTINUED | OUTPATIENT
Start: 2021-02-02 | End: 2021-02-04

## 2021-02-02 RX ORDER — ACETAMINOPHEN 500 MG
975 TABLET ORAL EVERY 6 HOURS
Refills: 0 | Status: DISCONTINUED | OUTPATIENT
Start: 2021-02-02 | End: 2021-02-04

## 2021-02-02 RX ORDER — SODIUM CHLORIDE 9 MG/ML
3 INJECTION INTRAMUSCULAR; INTRAVENOUS; SUBCUTANEOUS EVERY 8 HOURS
Refills: 0 | Status: DISCONTINUED | OUTPATIENT
Start: 2021-02-02 | End: 2021-02-04

## 2021-02-02 RX ORDER — DEXTROSE 50 % IN WATER 50 %
12.5 SYRINGE (ML) INTRAVENOUS ONCE
Refills: 0 | Status: DISCONTINUED | OUTPATIENT
Start: 2021-02-02 | End: 2021-02-04

## 2021-02-02 RX ORDER — CHOLECALCIFEROL (VITAMIN D3) 125 MCG
1000 CAPSULE ORAL DAILY
Refills: 0 | Status: DISCONTINUED | OUTPATIENT
Start: 2021-02-02 | End: 2021-02-04

## 2021-02-02 RX ORDER — PROGESTERONE 200 MG/1
200 CAPSULE, LIQUID FILLED ORAL AT BEDTIME
Refills: 0 | Status: DISCONTINUED | OUTPATIENT
Start: 2021-02-02 | End: 2021-02-04

## 2021-02-02 RX ORDER — SPIRONOLACTONE 25 MG/1
50 TABLET, FILM COATED ORAL DAILY
Refills: 0 | Status: DISCONTINUED | OUTPATIENT
Start: 2021-02-02 | End: 2021-02-04

## 2021-02-02 RX ADMIN — CARVEDILOL PHOSPHATE 6.25 MILLIGRAM(S): 80 CAPSULE, EXTENDED RELEASE ORAL at 16:53

## 2021-02-02 RX ADMIN — GABAPENTIN 100 MILLIGRAM(S): 400 CAPSULE ORAL at 21:45

## 2021-02-02 RX ADMIN — SENNA PLUS 2 TABLET(S): 8.6 TABLET ORAL at 21:46

## 2021-02-02 RX ADMIN — SODIUM CHLORIDE 3 MILLILITER(S): 9 INJECTION INTRAMUSCULAR; INTRAVENOUS; SUBCUTANEOUS at 05:51

## 2021-02-02 RX ADMIN — PROGESTERONE 200 MILLIGRAM(S): 200 CAPSULE, LIQUID FILLED ORAL at 21:46

## 2021-02-02 RX ADMIN — Medication 100 MILLIGRAM(S): at 13:10

## 2021-02-02 RX ADMIN — CARVEDILOL PHOSPHATE 6.25 MILLIGRAM(S): 80 CAPSULE, EXTENDED RELEASE ORAL at 05:55

## 2021-02-02 RX ADMIN — Medication 1000 UNIT(S): at 13:10

## 2021-02-02 RX ADMIN — SODIUM CHLORIDE 3 MILLILITER(S): 9 INJECTION INTRAMUSCULAR; INTRAVENOUS; SUBCUTANEOUS at 21:46

## 2021-02-02 RX ADMIN — SEVELAMER CARBONATE 800 MILLIGRAM(S): 2400 POWDER, FOR SUSPENSION ORAL at 13:10

## 2021-02-02 RX ADMIN — Medication 975 MILLIGRAM(S): at 13:09

## 2021-02-02 RX ADMIN — GABAPENTIN 100 MILLIGRAM(S): 400 CAPSULE ORAL at 13:09

## 2021-02-02 RX ADMIN — ATORVASTATIN CALCIUM 40 MILLIGRAM(S): 80 TABLET, FILM COATED ORAL at 21:45

## 2021-02-02 RX ADMIN — GABAPENTIN 100 MILLIGRAM(S): 400 CAPSULE ORAL at 05:54

## 2021-02-02 RX ADMIN — Medication 40 MILLIGRAM(S): at 16:53

## 2021-02-02 RX ADMIN — Medication 40 MILLIGRAM(S): at 05:54

## 2021-02-02 RX ADMIN — SEVELAMER CARBONATE 800 MILLIGRAM(S): 2400 POWDER, FOR SUSPENSION ORAL at 16:53

## 2021-02-02 RX ADMIN — Medication 2: at 16:53

## 2021-02-02 RX ADMIN — SODIUM CHLORIDE 3 MILLILITER(S): 9 INJECTION INTRAMUSCULAR; INTRAVENOUS; SUBCUTANEOUS at 12:48

## 2021-02-02 RX ADMIN — SPIRONOLACTONE 50 MILLIGRAM(S): 25 TABLET, FILM COATED ORAL at 05:54

## 2021-02-02 NOTE — PROGRESS NOTE ADULT - PROBLEM SELECTOR PLAN 4
- CKD 4  - Creatinine 3.57 (baseline 3.8-4 on previous admission)  - Appreciate Nephro Dr Nnamdi hamm c/w Hilary, avoid nephrotoxic medication

## 2021-02-02 NOTE — PROGRESS NOTE ADULT - SUBJECTIVE AND OBJECTIVE BOX
Patient denies CP, SOB Review of systems otherwise (-)    acetaminophen   Tablet .. 975 milliGRAM(s) Oral every 6 hours PRN  ALBUTerol    90 MICROgram(s) HFA Inhaler 2 Puff(s) Inhalation every 6 hours PRN  allopurinol 100 milliGRAM(s) Oral daily  atorvastatin 40 milliGRAM(s) Oral at bedtime  carvedilol 6.25 milliGRAM(s) Oral every 12 hours  cholecalciferol 1000 Unit(s) Oral daily  dextrose 40% Gel 15 Gram(s) Oral once  dextrose 5%. 1000 milliLiter(s) IV Continuous <Continuous>  dextrose 5%. 1000 milliLiter(s) IV Continuous <Continuous>  dextrose 50% Injectable 25 Gram(s) IV Push once  dextrose 50% Injectable 12.5 Gram(s) IV Push once  dextrose 50% Injectable 25 Gram(s) IV Push once  furosemide    Tablet 40 milliGRAM(s) Oral two times a day  gabapentin 100 milliGRAM(s) Oral three times a day  glucagon  Injectable 1 milliGRAM(s) IntraMuscular once  insulin lispro (ADMELOG) corrective regimen sliding scale   SubCutaneous three times a day before meals  insulin lispro (ADMELOG) corrective regimen sliding scale   SubCutaneous at bedtime  pantoprazole    Tablet 40 milliGRAM(s) Oral before breakfast  senna 2 Tablet(s) Oral at bedtime  sevelamer carbonate 800 milliGRAM(s) Oral three times a day with meals  sodium chloride 0.9% lock flush 3 milliLiter(s) IV Push every 8 hours  spironolactone 50 milliGRAM(s) Oral daily                            9.1    11.37 )-----------( 329      ( 02 Feb 2021 06:38 )             30.0       Hemoglobin: 9.1 g/dL (02-02 @ 06:38)  Hemoglobin: 8.5 g/dL (02-01 @ 08:09)  Hemoglobin: 8.2 g/dL (01-31 @ 10:29)  Hemoglobin: 6.1 g/dL (01-30 @ 22:13)  Hemoglobin: 6.1 g/dL (01-30 @ 16:15)      02-02    140  |  108  |  100<H>  ----------------------------<  148<H>  4.8   |  24  |  3.57<H>    Ca    8.9      02 Feb 2021 06:38  Phos  5.1     02-02  Mg     2.5     02-02    TPro  7.3  /  Alb  3.3<L>  /  TBili  0.4  /  DBili  x   /  AST  <3<L>  /  ALT  13  /  AlkPhos  72  02-02    Creatinine Trend: 3.57<--, 3.48<--, 3.36<--, 3.24<--, 3.82<--    COAGS: PT/INR - ( 02 Feb 2021 06:38 )   PT: 11.6 sec;   INR: 0.97 ratio         PTT - ( 02 Feb 2021 06:38 )  PTT:28.6 sec          T(C): 36.9 (02-02-21 @ 11:04), Max: 36.9 (02-02-21 @ 11:04)  HR: 70 (02-02-21 @ 12:00) (66 - 77)  BP: 145/61 (02-02-21 @ 12:00) (129/61 - 148/67)  RR: 16 (02-02-21 @ 12:00) (16 - 20)  SpO2: 96% (02-02-21 @ 12:00) (96% - 100%)  Wt(kg): --    I&O's Summary    Appearance: Normal	  HEENT:   Normal oral mucosa, PERRL, EOMI	  Lymphatic: No lymphadenopathy  Cardiovascular: Normal S1 S2, No JVD, No murmurs, No edema  Respiratory: Lungs clear to auscultation	  Psychiatry: A & O x 3, Mood & affect appropriate  Gastrointestinal:  Soft, Non-tender, + BS	  Skin: No rashes, No ecchymoses, No cyanosis	  Neurologic: Non-focal  Extremities: Normal range of motion, No clubbing, cyanosis or edema  Vascular: Peripheral pulses palpable 2+ bilaterally    TELEMETRY: 	    ECG:  	 NSR : NAD, no acute ischemia   RADIOLOGY:     CT scan: t< from: CT Abdomen and Pelvis No Cont (01.30.21 @ 02:08) >  IMPRESSION:  Indeterminate right renal mass is incompletely characterized without intravenous contrast. This likely corresponds to a solid appearing lesion on prior CT and may reflect renal cell carcinoma. Further evaluation with contrast-enhanced MRI or renal mass protocol CT is recommended.    Fibroid uterus.    Cardiomegaly.    HELEN OLSON MD; Attending Radiologist  This document has     < end of copied text >    ASSESSMENT/PLAN: 	55 year old female from [facility], walks with walker with PMHx of NICM, CHF (last EF of 25% with a ICD), DM,HTN, nonobstructive CAD gout, COPD, GERD, and RA and remote PSHx of a cholecystectomy presents to the ED with complaints of vaginal bleeding over the past three weeks and right lower back pain, cardiology consulted for CAD/ CM     - Well compensated from a CHF prospective  - No clinical CHF or unstable cardiac syndromes  -  By virtue of having a severe NICM ,DM, CKD she is high cardiac risk for surgical procedures but optimized.  - cont periop BB    Manuel Gold MD, [facility]  BEEPER [phone]

## 2021-02-02 NOTE — PROGRESS NOTE ADULT - PROBLEM SELECTOR PLAN 1
- secondary to vaginal bleeding  - H/H 9.1 / 30.0- stable today  - GYN following - s/p OR 2/2/21 for D&C

## 2021-02-02 NOTE — PROGRESS NOTE ADULT - SUBJECTIVE AND OBJECTIVE BOX
Patient is a 55y old  Female who presents with a chief complaint of Hematuria and vaginal bleed (02 Feb 2021 12:33)    PATIENT IS SEEN AND EXAMINED IN MEDICAL FLOOR.  NGT [    ]    AVILA [   ]      GT [   ]    ALLERGIES:  codeine (Urticaria)      Daily Height in cm: 165.1 (02 Feb 2021 08:47)    Daily     VITALS:    Vital Signs Last 24 Hrs  T(C): 36.7 (02 Feb 2021 14:11), Max: 36.9 (02 Feb 2021 11:04)  T(F): 98 (02 Feb 2021 14:11), Max: 98.4 (02 Feb 2021 11:04)  HR: 79 (02 Feb 2021 14:11) (66 - 79)  BP: 136/60 (02 Feb 2021 14:11) (129/61 - 148/67)  BP(mean): 77 (02 Feb 2021 12:00) (75 - 85)  RR: 18 (02 Feb 2021 14:11) (16 - 20)  SpO2: 98% (02 Feb 2021 14:11) (96% - 100%)    LABS:    CBC Full  -  ( 02 Feb 2021 06:38 )  WBC Count : 11.37 K/uL  RBC Count : 3.57 M/uL  Hemoglobin : 9.1 g/dL  Hematocrit : 30.0 %  Platelet Count - Automated : 329 K/uL  Mean Cell Volume : 84.0 fl  Mean Cell Hemoglobin : 25.5 pg  Mean Cell Hemoglobin Concentration : 30.3 gm/dL  Auto Neutrophil # : 8.33 K/uL  Auto Lymphocyte # : 1.77 K/uL  Auto Monocyte # : 0.98 K/uL  Auto Eosinophil # : 0.18 K/uL  Auto Basophil # : 0.05 K/uL  Auto Neutrophil % : 73.3 %  Auto Lymphocyte % : 15.6 %  Auto Monocyte % : 8.6 %  Auto Eosinophil % : 1.6 %  Auto Basophil % : 0.4 %    PT/INR - ( 02 Feb 2021 06:38 )   PT: 11.6 sec;   INR: 0.97 ratio         PTT - ( 02 Feb 2021 06:38 )  PTT:28.6 sec  02-02    140  |  108  |  100<H>  ----------------------------<  148<H>  4.8   |  24  |  3.57<H>    Ca    8.9      02 Feb 2021 06:38  Phos  5.1     02-02  Mg     2.5     02-02    TPro  7.3  /  Alb  3.3<L>  /  TBili  0.4  /  DBili  x   /  AST  <3<L>  /  ALT  13  /  AlkPhos  72  02-02    CAPILLARY BLOOD GLUCOSE      POCT Blood Glucose.: 168 mg/dL (02 Feb 2021 12:30)  POCT Blood Glucose.: 148 mg/dL (02 Feb 2021 07:37)  POCT Blood Glucose.: 182 mg/dL (01 Feb 2021 21:04)  POCT Blood Glucose.: 183 mg/dL (01 Feb 2021 16:42)        LIVER FUNCTIONS - ( 02 Feb 2021 06:38 )  Alb: 3.3 g/dL / Pro: 7.3 g/dL / ALK PHOS: 72 U/L / ALT: 13 U/L DA / AST: <3 U/L / GGT: x           Creatinine Trend: 3.57<--, 3.48<--, 3.36<--, 3.24<--, 3.82<--  I&O's Summary              MEDICATIONS:    MEDICATIONS  (STANDING):  allopurinol 100 milliGRAM(s) Oral daily  atorvastatin 40 milliGRAM(s) Oral at bedtime  carvedilol 6.25 milliGRAM(s) Oral every 12 hours  cholecalciferol 1000 Unit(s) Oral daily  dextrose 40% Gel 15 Gram(s) Oral once  dextrose 5%. 1000 milliLiter(s) (50 mL/Hr) IV Continuous <Continuous>  dextrose 5%. 1000 milliLiter(s) (100 mL/Hr) IV Continuous <Continuous>  dextrose 50% Injectable 25 Gram(s) IV Push once  dextrose 50% Injectable 12.5 Gram(s) IV Push once  dextrose 50% Injectable 25 Gram(s) IV Push once  furosemide    Tablet 40 milliGRAM(s) Oral two times a day  gabapentin 100 milliGRAM(s) Oral three times a day  glucagon  Injectable 1 milliGRAM(s) IntraMuscular once  insulin lispro (ADMELOG) corrective regimen sliding scale   SubCutaneous three times a day before meals  insulin lispro (ADMELOG) corrective regimen sliding scale   SubCutaneous at bedtime  pantoprazole    Tablet 40 milliGRAM(s) Oral before breakfast  progesterone 200 milliGRAM(s) Oral at bedtime  senna 2 Tablet(s) Oral at bedtime  sevelamer carbonate 800 milliGRAM(s) Oral three times a day with meals  sodium chloride 0.9% lock flush 3 milliLiter(s) IV Push every 8 hours  spironolactone 50 milliGRAM(s) Oral daily      MEDICATIONS  (PRN):  acetaminophen   Tablet .. 975 milliGRAM(s) Oral every 6 hours PRN Temp greater or equal to 38C (100.4F), Mild Pain (1 - 3)  ALBUTerol    90 MICROgram(s) HFA Inhaler 2 Puff(s) Inhalation every 6 hours PRN Shortness of Breath and/or Wheezing      REVIEW OF SYSTEMS:                           ALL ROS DONE [ X   ]    CONSTITUTIONAL:  LETHARGIC [   ], FEVER [   ], UNRESPONSIVE [   ]  CVS:  CP  [   ], SOB, [   ], PALPITATIONS [   ], DIZZYNESS [   ]  RS: COUGH [   ], SPUTUM [   ]  GI: ABDOMINAL PAIN [   ], NAUSEA [   ], VOMITINGS [   ], DIARRHEA [   ], CONSTIPATION [   ]  :  DYSURIA [   ], NOCTURIA [   ], INCREASED FREQUENCY [   ], DRIBLING [   ],  SKELETAL: PAINFUL JOINTS [   ], SWOLLEN JOINTS [   ], NECK ACHE [   ], LOW BACK ACHE [   ],  SKIN : ULCERS [   ], RASH [   ], ITCHING [   ]  CNS: HEAD ACHE [   ], DOUBLE VISION [   ], BLURRED VISION [   ], AMS / CONFUSION [   ], SEIZURES [   ], WEAKNESS [   ],TINGLING / NUMBNESS [   ]      PHYSICAL EXAMINATION:    GENERAL APPEARANCE: NO DISTRESS  HEENT:  NO PALLOR, NO  JVD,  NO   NODES, NECK SUPPLE  CVS: S1 +, S2 +,   RS: AEEB,  OCCASIONAL  RALES +,   NO RONCHI  ABD: SOFT, NT, NO, BS +  EXT: PE +  SKIN: WARM,   SKELETAL:  ROM ACCEPTABLE  CNS:  AAO X 3   , NO  DEFICITS        RADIOLOGY :    < from: CT Abdomen and Pelvis No Cont (01.30.21 @ 02:08) >    IMPRESSION:  Indeterminate right renal mass is incompletely characterized without intravenous contrast. This likely corresponds to a solid appearing lesion on prior CT and may reflect renal cell carcinoma. Further evaluation with contrast-enhanced MRI or renal mass protocol CT is recommended.    Fibroid uterus.    Cardiomegaly.    < end of copied text >      < from: US Kidney and Bladder (10.02.19 @ 12:54) >    IMPRESSION:     No hydronephrosis.    2.1 cm right midpole renal hypodensity cannot be adequately assessed due   to excessive bowel gas. Recommend pre and postcontrast CT to exclude   neoplasm..    < end of copied text >      ASSESSMENT :     Anemia    GERD (gastroesophageal reflux disease)    CHF (congestive heart failure)    CAD (coronary artery disease)    AICD (automatic cardioverter/defibrillator) present    COPD (chronic obstructive pulmonary disease)    RA (rheumatoid arthritis)    DM (diabetes mellitus)    HTN (hypertension)    ICD (implantable cardioverter-defibrillator) in place    History of cholecystectomy        PLAN:  HPI:  55 year old female from [facility], walks with walker with PMHx of CHF (last EF of 25% with a ICD), DM,HTN, CAD (no stents placed), gout, COPD, GERD, and RA and remote PSHx of a cholecystectomy presents to the ED with complaints of vaginal bleeding over the past three weeks and right lower back pain. Patient additionally endorses some dysuria and lightheadedness. Patient denies having any history of malignancy or weight loss. Patient otherwise denies any fevers, abdominal pain, nausea, vomiting, diarrhea, constipation, chest pain, shortness of breath, syncope, and all other acute complaints.   Pt noted to have Hb 3.6 in ED    In ED, /60, HR 84 (30 Jan 2021 05:15)    # NORMOCYTIC ANEMIA , PROFUSE POST MENOPAUSAL VAGINAL BLEEDING - S/P PRBC, AS NEEDED TO KEEP Hb > 8 GMS, GYN F/UP IS IN PROGRESS - PLANNED EUA ( EXAMINATION UNDER ANESTHESIA- PELVIC  ) & D&C  # MEDICAL CLEARANCE FOR SURGERY - RCRI - 3 - 15% 30 DAY RISK OF DEATH, MI OR CARDIAC ARREST ; CARDIOLOGY CLEARANCE NOTED  # SUSPECTED RIGHT RENAL CELL CANCER - TO CALL UROLOGY CONSULT - DR. LUIS ALBERTO PICHARDO IN PROGRESS  # SYSTOLIC CHF , DM, HTN, CAD, COPD, MORBIDLY OBESITY, GERD, RA, GOUT , CKD - CARDIOLOGY CONSULT IN PROGRESS  # GI AND DVT PROPHYLAXIS    LUANN NATARAJAN MD COVERING DONTAE NATARAJAN MD   Patient is a 55y old  Female who presents with a chief complaint of Hematuria and vaginal bleed (02 Feb 2021 12:33)    PATIENT IS SEEN AND EXAMINED IN MEDICAL FLOOR.    ALLERGIES:  codeine (Urticaria)      Daily Height in cm: 165.1 (02 Feb 2021 08:47)    Daily     VITALS:    Vital Signs Last 24 Hrs  T(C): 36.7 (02 Feb 2021 14:11), Max: 36.9 (02 Feb 2021 11:04)  T(F): 98 (02 Feb 2021 14:11), Max: 98.4 (02 Feb 2021 11:04)  HR: 79 (02 Feb 2021 14:11) (66 - 79)  BP: 136/60 (02 Feb 2021 14:11) (129/61 - 148/67)  BP(mean): 77 (02 Feb 2021 12:00) (75 - 85)  RR: 18 (02 Feb 2021 14:11) (16 - 20)  SpO2: 98% (02 Feb 2021 14:11) (96% - 100%)    LABS:    CBC Full  -  ( 02 Feb 2021 06:38 )  WBC Count : 11.37 K/uL  RBC Count : 3.57 M/uL  Hemoglobin : 9.1 g/dL  Hematocrit : 30.0 %  Platelet Count - Automated : 329 K/uL  Mean Cell Volume : 84.0 fl  Mean Cell Hemoglobin : 25.5 pg  Mean Cell Hemoglobin Concentration : 30.3 gm/dL  Auto Neutrophil # : 8.33 K/uL  Auto Lymphocyte # : 1.77 K/uL  Auto Monocyte # : 0.98 K/uL  Auto Eosinophil # : 0.18 K/uL  Auto Basophil # : 0.05 K/uL  Auto Neutrophil % : 73.3 %  Auto Lymphocyte % : 15.6 %  Auto Monocyte % : 8.6 %  Auto Eosinophil % : 1.6 %  Auto Basophil % : 0.4 %    PT/INR - ( 02 Feb 2021 06:38 )   PT: 11.6 sec;   INR: 0.97 ratio         PTT - ( 02 Feb 2021 06:38 )  PTT:28.6 sec  02-02    140  |  108  |  100<H>  ----------------------------<  148<H>  4.8   |  24  |  3.57<H>    Ca    8.9      02 Feb 2021 06:38  Phos  5.1     02-02  Mg     2.5     02-02    TPro  7.3  /  Alb  3.3<L>  /  TBili  0.4  /  DBili  x   /  AST  <3<L>  /  ALT  13  /  AlkPhos  72  02-02    CAPILLARY BLOOD GLUCOSE      POCT Blood Glucose.: 168 mg/dL (02 Feb 2021 12:30)  POCT Blood Glucose.: 148 mg/dL (02 Feb 2021 07:37)  POCT Blood Glucose.: 182 mg/dL (01 Feb 2021 21:04)  POCT Blood Glucose.: 183 mg/dL (01 Feb 2021 16:42)        LIVER FUNCTIONS - ( 02 Feb 2021 06:38 )  Alb: 3.3 g/dL / Pro: 7.3 g/dL / ALK PHOS: 72 U/L / ALT: 13 U/L DA / AST: <3 U/L / GGT: x           Creatinine Trend: 3.57<--, 3.48<--, 3.36<--, 3.24<--, 3.82<--  I&O's Summary              MEDICATIONS:    MEDICATIONS  (STANDING):  allopurinol 100 milliGRAM(s) Oral daily  atorvastatin 40 milliGRAM(s) Oral at bedtime  carvedilol 6.25 milliGRAM(s) Oral every 12 hours  cholecalciferol 1000 Unit(s) Oral daily  dextrose 40% Gel 15 Gram(s) Oral once  dextrose 5%. 1000 milliLiter(s) (50 mL/Hr) IV Continuous <Continuous>  dextrose 5%. 1000 milliLiter(s) (100 mL/Hr) IV Continuous <Continuous>  dextrose 50% Injectable 25 Gram(s) IV Push once  dextrose 50% Injectable 12.5 Gram(s) IV Push once  dextrose 50% Injectable 25 Gram(s) IV Push once  furosemide    Tablet 40 milliGRAM(s) Oral two times a day  gabapentin 100 milliGRAM(s) Oral three times a day  glucagon  Injectable 1 milliGRAM(s) IntraMuscular once  insulin lispro (ADMELOG) corrective regimen sliding scale   SubCutaneous three times a day before meals  insulin lispro (ADMELOG) corrective regimen sliding scale   SubCutaneous at bedtime  pantoprazole    Tablet 40 milliGRAM(s) Oral before breakfast  progesterone 200 milliGRAM(s) Oral at bedtime  senna 2 Tablet(s) Oral at bedtime  sevelamer carbonate 800 milliGRAM(s) Oral three times a day with meals  sodium chloride 0.9% lock flush 3 milliLiter(s) IV Push every 8 hours  spironolactone 50 milliGRAM(s) Oral daily      MEDICATIONS  (PRN):  acetaminophen   Tablet .. 975 milliGRAM(s) Oral every 6 hours PRN Temp greater or equal to 38C (100.4F), Mild Pain (1 - 3)  ALBUTerol    90 MICROgram(s) HFA Inhaler 2 Puff(s) Inhalation every 6 hours PRN Shortness of Breath and/or Wheezing      REVIEW OF SYSTEMS:                           ALL ROS DONE [ X   ]    CONSTITUTIONAL:  LETHARGIC [   ], FEVER [   ], UNRESPONSIVE [   ]  CVS:  CP  [   ], SOB, [   ], PALPITATIONS [   ], DIZZYNESS [   ]  RS: COUGH [   ], SPUTUM [   ]  GI: ABDOMINAL PAIN [   ], NAUSEA [   ], VOMITINGS [   ], DIARRHEA [   ], CONSTIPATION [   ]  :  DYSURIA [   ], NOCTURIA [   ], INCREASED FREQUENCY [   ], DRIBLING [   ],  SKELETAL: PAINFUL JOINTS [   ], SWOLLEN JOINTS [   ], NECK ACHE [   ], LOW BACK ACHE [   ],  SKIN : ULCERS [   ], RASH [   ], ITCHING [   ]  CNS: HEAD ACHE [   ], DOUBLE VISION [   ], BLURRED VISION [   ], AMS / CONFUSION [   ], SEIZURES [   ], WEAKNESS [   ],TINGLING / NUMBNESS [   ]      PHYSICAL EXAMINATION:    GENERAL APPEARANCE: NO DISTRESS  HEENT:  NO PALLOR, NO  JVD,  NO   NODES, NECK SUPPLE  CVS: S1 +, S2 +,   RS: AEEB,  OCCASIONAL  RALES +,   NO RONCHI  ABD: SOFT, NT, NO, BS +  EXT: PE +  SKIN: WARM,   SKELETAL:  ROM ACCEPTABLE  CNS:  AAO X 3   , NO  DEFICITS        RADIOLOGY :    < from: CT Abdomen and Pelvis No Cont (01.30.21 @ 02:08) >    IMPRESSION:  Indeterminate right renal mass is incompletely characterized without intravenous contrast. This likely corresponds to a solid appearing lesion on prior CT and may reflect renal cell carcinoma. Further evaluation with contrast-enhanced MRI or renal mass protocol CT is recommended.    Fibroid uterus.    Cardiomegaly.    < end of copied text >      < from: US Kidney and Bladder (10.02.19 @ 12:54) >    IMPRESSION:     No hydronephrosis.    2.1 cm right midpole renal hypodensity cannot be adequately assessed due   to excessive bowel gas. Recommend pre and postcontrast CT to exclude   neoplasm..    < end of copied text >      ASSESSMENT :     Anemia    GERD (gastroesophageal reflux disease)    CHF (congestive heart failure)    CAD (coronary artery disease)    AICD (automatic cardioverter/defibrillator) present    COPD (chronic obstructive pulmonary disease)    RA (rheumatoid arthritis)    DM (diabetes mellitus)    HTN (hypertension)    ICD (implantable cardioverter-defibrillator) in place    History of cholecystectomy        PLAN:  HPI:  55 year old female from [facility], walks with walker with PMHx of CHF (last EF of 25% with a ICD), DM,HTN, CAD (no stents placed), gout, COPD, GERD, and RA and remote PSHx of a cholecystectomy presents to the ED with complaints of vaginal bleeding over the past three weeks and right lower back pain. Patient additionally endorses some dysuria and lightheadedness. Patient denies having any history of malignancy or weight loss. Patient otherwise denies any fevers, abdominal pain, nausea, vomiting, diarrhea, constipation, chest pain, shortness of breath, syncope, and all other acute complaints.   Pt noted to have Hb 3.6 in ED    In ED, /60, HR 84 (30 Jan 2021 05:15)    # NORMOCYTIC ANEMIA , PROFUSE POST MENOPAUSAL VAGINAL BLEEDING W/ UTERINE FIBROID - S/P PRBC, AS NEEDED TO KEEP Hb > 8 GMS, GYN F/UP IS IN PROGRESS - NOW S/P D&C ON 2/02  # MEDICAL CLEARANCE FOR SURGERY - RCRI - 3 - 15% 30 DAY RISK OF DEATH, MI OR CARDIAC ARREST ; CARDIOLOGY CLEARANCE NOTED  # SUSPECTED RIGHT RENAL CELL CANCER - UROLOGY CONSULT - DR. LUIS ALBERTO PICHARDO IN PROGRESS - RECOMMENDED OUTPATIENT F/U  # SYSTOLIC CHF , DM, HTN, CAD, COPD, MORBIDLY OBESITY, GERD, RA, GOUT , CKD - CARDIOLOGY CONSULT IN PROGRESS  # D/C IN A.M. TO [facility] IF MEDICALLY STABLE  # GI AND DVT PROPHYLAXIS    LUANN ANTARAJAN MD COVERING DONTAE NATARAJAN MD

## 2021-02-02 NOTE — PROVIDER CONTACT NOTE (OTHER) - REASON
Pt had mild spotting on the pad. Pt is s/p D&C today.
Pt noted to have some blood clot while changing

## 2021-02-02 NOTE — PROGRESS NOTE ADULT - PROBLEM SELECTOR PLAN 3
- Echo in Sep 2019 showed EF 30-35%.   - Appreciate Card Dr Asif hamm -c/w Spironolactone, Furosemide, Coreg  - c/w daily weights, strict I&Os  - Well compensated from a CHF prospective  - No clinical CHF or unstable cardiac syndromes

## 2021-02-02 NOTE — PROGRESS NOTE ADULT - PROBLEM SELECTOR PLAN 2
- CT abdomen/Pelvis showing right renal lesion 2.6. This likely corresponds to a solid appearing lesion on prior CT and may reflect renal cell carcinoma. Further evaluation with contrast-enhanced MRI or renal mass protocol CT is recommended  - please note, pt has ICD that was interrogated (papers in the chart)  - Urology Following - Patient can follow up outpatient with MRI with renal mass protocol (w/ contrast), with Dr Marino [phone]

## 2021-02-02 NOTE — PRE PROCEDURE NOTE - ATTENDING COMMENTS
55 p1 C/S  ADMITTED TO [facility] W SEVERE ANEMIA AND TRANSFUSED  MULTIPLE MEDICAL COMORBIDITIES  PT HAS LIFELONG HX OF ANOVULATORY LIKE BLEEDING PATTERN PLACING HER AT HIGH RISK FOR ENDOMETRIAL HYPERPLASIA-CA.  DR DUFFY RECENTLY ATTEMPTED EMB BUT UNABLE TO PERFORM.  GYN SERVICE HAS SCHEDULED HER FOR D&C TODAY.  I DISCUSSED FINDINGS SIGNIFICANCE MGMT OPTIONS AND RESPECTIVE PROS CONS RISKS/ALTERNATIVES. INFORMED CONSENT FOR D&C OBTAINED AFTER ALL HER QUESTIONS ANSWERED.  I DISCUSSED CASE W ANESTHESIA(DR GONZALES) AND EXPRESSED MY DESIRE TO PERFORM THIS PROCEDURE UNDER LOCAL-SEDATION AND ATTEMPT TO AVOID GENERAL ANESTHESIA DUE TO MEDICAL RISK FACTORS.  CHLEAP SERVICE ATTENDING [facility] 2-2-21

## 2021-02-02 NOTE — PROGRESS NOTE ADULT - SUBJECTIVE AND OBJECTIVE BOX
Patient is a 55y old  Female who presents with a chief complaint of Hematuria and vaginal bleed (02 Feb 2021 15:27)      SUBJECTIVE / OVERNIGHT EVENTS: No overnight events, was c/o some pain after procedure which got relieved with Tylenol    MEDICATIONS  (STANDING):  allopurinol 100 milliGRAM(s) Oral daily  atorvastatin 40 milliGRAM(s) Oral at bedtime  carvedilol 6.25 milliGRAM(s) Oral every 12 hours  cholecalciferol 1000 Unit(s) Oral daily  dextrose 40% Gel 15 Gram(s) Oral once  dextrose 5%. 1000 milliLiter(s) (50 mL/Hr) IV Continuous <Continuous>  dextrose 5%. 1000 milliLiter(s) (100 mL/Hr) IV Continuous <Continuous>  dextrose 50% Injectable 25 Gram(s) IV Push once  dextrose 50% Injectable 12.5 Gram(s) IV Push once  dextrose 50% Injectable 25 Gram(s) IV Push once  furosemide    Tablet 40 milliGRAM(s) Oral two times a day  gabapentin 100 milliGRAM(s) Oral three times a day  glucagon  Injectable 1 milliGRAM(s) IntraMuscular once  insulin lispro (ADMELOG) corrective regimen sliding scale   SubCutaneous three times a day before meals  insulin lispro (ADMELOG) corrective regimen sliding scale   SubCutaneous at bedtime  pantoprazole    Tablet 40 milliGRAM(s) Oral before breakfast  progesterone 200 milliGRAM(s) Oral at bedtime  senna 2 Tablet(s) Oral at bedtime  sevelamer carbonate 800 milliGRAM(s) Oral three times a day with meals  sodium chloride 0.9% lock flush 3 milliLiter(s) IV Push every 8 hours  spironolactone 50 milliGRAM(s) Oral daily    MEDICATIONS  (PRN):  acetaminophen   Tablet .. 975 milliGRAM(s) Oral every 6 hours PRN Temp greater or equal to 38C (100.4F), Mild Pain (1 - 3)  ALBUTerol    90 MICROgram(s) HFA Inhaler 2 Puff(s) Inhalation every 6 hours PRN Shortness of Breath and/or Wheezing        CAPILLARY BLOOD GLUCOSE      POCT Blood Glucose.: 204 mg/dL (02 Feb 2021 16:40)  POCT Blood Glucose.: 168 mg/dL (02 Feb 2021 12:30)  POCT Blood Glucose.: 148 mg/dL (02 Feb 2021 07:37)  POCT Blood Glucose.: 182 mg/dL (01 Feb 2021 21:04)    I&O's Summary      PHYSICAL EXAM:  GENERAL: NAD, well-developed, obese  HEAD:  Atraumatic, Normocephalic  EYES: EOMI, PERRLA, conjunctiva and sclera clear  NECK: Supple, No JVD  CHEST/LUNG: Clear to auscultation bilaterally; No wheeze, no cough  HEART: Regular rate and rhythm; No murmurs, rubs, or gallops, S1, S2 present  ABDOMEN: Soft, Nontender, Nondistended; Bowel sounds present  EXTREMITIES:  2+ Peripheral Pulses, No clubbing, cyanosis, or edema  PSYCH: AAOx3  NEUROLOGY: non-focal  SKIN: No rashes or lesions    LABS:                        9.1    11.37 )-----------( 329      ( 02 Feb 2021 06:38 )             30.0     02-02    140  |  108  |  100<H>  ----------------------------<  148<H>  4.8   |  24  |  3.57<H>    Ca    8.9      02 Feb 2021 06:38  Phos  5.1     02-02  Mg     2.5     02-02    TPro  7.3  /  Alb  3.3<L>  /  TBili  0.4  /  DBili  x   /  AST  <3<L>  /  ALT  13  /  AlkPhos  72  02-02    PT/INR - ( 02 Feb 2021 06:38 )   PT: 11.6 sec;   INR: 0.97 ratio         PTT - ( 02 Feb 2021 06:38 )  PTT:28.6 sec          RADIOLOGY & ADDITIONAL TESTS:    < from: CT Abdomen and Pelvis No Cont (01.30.21 @ 02:08) >  IMPRESSION:  Indeterminate right renal mass is incompletely characterized without intravenous contrast. This likely corresponds to a solid appearing lesion on prior CT and may reflect renal cell carcinoma. Further evaluation with contrast-enhanced MRI or renal mass protocol CT is recommended.  Fibroid uterus. Cardiomegaly    < end of copied text >    Imaging Personally Reviewed:    Consultant(s) Notes Reviewed:      Care Discussed with Consultants/Other Providers:

## 2021-02-03 ENCOUNTER — TRANSCRIPTION ENCOUNTER (OUTPATIENT)
Age: 56
End: 2021-02-03

## 2021-02-03 LAB
ANION GAP SERPL CALC-SCNC: 9 MMOL/L — SIGNIFICANT CHANGE UP (ref 5–17)
BUN SERPL-MCNC: 115 MG/DL — HIGH (ref 7–18)
CALCIUM SERPL-MCNC: 9.2 MG/DL — SIGNIFICANT CHANGE UP (ref 8.4–10.5)
CHLORIDE SERPL-SCNC: 107 MMOL/L — SIGNIFICANT CHANGE UP (ref 96–108)
CO2 SERPL-SCNC: 24 MMOL/L — SIGNIFICANT CHANGE UP (ref 22–31)
CREAT SERPL-MCNC: 3.51 MG/DL — HIGH (ref 0.5–1.3)
GLUCOSE BLDC GLUCOMTR-MCNC: 144 MG/DL — HIGH (ref 70–99)
GLUCOSE BLDC GLUCOMTR-MCNC: 153 MG/DL — HIGH (ref 70–99)
GLUCOSE BLDC GLUCOMTR-MCNC: 203 MG/DL — HIGH (ref 70–99)
GLUCOSE BLDC GLUCOMTR-MCNC: 258 MG/DL — HIGH (ref 70–99)
GLUCOSE SERPL-MCNC: 147 MG/DL — HIGH (ref 70–99)
HCT VFR BLD CALC: 29.6 % — LOW (ref 34.5–45)
HGB BLD-MCNC: 8.7 G/DL — LOW (ref 11.5–15.5)
MCHC RBC-ENTMCNC: 25.1 PG — LOW (ref 27–34)
MCHC RBC-ENTMCNC: 29.4 GM/DL — LOW (ref 32–36)
MCV RBC AUTO: 85.5 FL — SIGNIFICANT CHANGE UP (ref 80–100)
NRBC # BLD: 0 /100 WBCS — SIGNIFICANT CHANGE UP (ref 0–0)
PLATELET # BLD AUTO: 303 K/UL — SIGNIFICANT CHANGE UP (ref 150–400)
POTASSIUM SERPL-MCNC: 4.5 MMOL/L — SIGNIFICANT CHANGE UP (ref 3.5–5.3)
POTASSIUM SERPL-SCNC: 4.5 MMOL/L — SIGNIFICANT CHANGE UP (ref 3.5–5.3)
RBC # BLD: 3.46 M/UL — LOW (ref 3.8–5.2)
RBC # FLD: 17.6 % — HIGH (ref 10.3–14.5)
SARS-COV-2 RNA SPEC QL NAA+PROBE: SIGNIFICANT CHANGE UP
SODIUM SERPL-SCNC: 140 MMOL/L — SIGNIFICANT CHANGE UP (ref 135–145)
SURGICAL PATHOLOGY STUDY: SIGNIFICANT CHANGE UP
WBC # BLD: 10.4 K/UL — SIGNIFICANT CHANGE UP (ref 3.8–10.5)
WBC # FLD AUTO: 10.4 K/UL — SIGNIFICANT CHANGE UP (ref 3.8–10.5)

## 2021-02-03 PROCEDURE — 99222 1ST HOSP IP/OBS MODERATE 55: CPT

## 2021-02-03 RX ADMIN — Medication 1000 UNIT(S): at 11:09

## 2021-02-03 RX ADMIN — GABAPENTIN 100 MILLIGRAM(S): 400 CAPSULE ORAL at 06:23

## 2021-02-03 RX ADMIN — SEVELAMER CARBONATE 800 MILLIGRAM(S): 2400 POWDER, FOR SUSPENSION ORAL at 17:31

## 2021-02-03 RX ADMIN — SODIUM CHLORIDE 3 MILLILITER(S): 9 INJECTION INTRAMUSCULAR; INTRAVENOUS; SUBCUTANEOUS at 06:00

## 2021-02-03 RX ADMIN — SPIRONOLACTONE 50 MILLIGRAM(S): 25 TABLET, FILM COATED ORAL at 06:23

## 2021-02-03 RX ADMIN — SEVELAMER CARBONATE 800 MILLIGRAM(S): 2400 POWDER, FOR SUSPENSION ORAL at 11:09

## 2021-02-03 RX ADMIN — Medication 3: at 11:29

## 2021-02-03 RX ADMIN — ATORVASTATIN CALCIUM 40 MILLIGRAM(S): 80 TABLET, FILM COATED ORAL at 23:06

## 2021-02-03 RX ADMIN — SENNA PLUS 2 TABLET(S): 8.6 TABLET ORAL at 23:06

## 2021-02-03 RX ADMIN — PANTOPRAZOLE SODIUM 40 MILLIGRAM(S): 20 TABLET, DELAYED RELEASE ORAL at 06:23

## 2021-02-03 RX ADMIN — Medication 40 MILLIGRAM(S): at 06:23

## 2021-02-03 RX ADMIN — Medication 100 MILLIGRAM(S): at 11:09

## 2021-02-03 RX ADMIN — SODIUM CHLORIDE 3 MILLILITER(S): 9 INJECTION INTRAMUSCULAR; INTRAVENOUS; SUBCUTANEOUS at 12:47

## 2021-02-03 RX ADMIN — CARVEDILOL PHOSPHATE 6.25 MILLIGRAM(S): 80 CAPSULE, EXTENDED RELEASE ORAL at 06:23

## 2021-02-03 RX ADMIN — Medication 1: at 17:30

## 2021-02-03 RX ADMIN — SEVELAMER CARBONATE 800 MILLIGRAM(S): 2400 POWDER, FOR SUSPENSION ORAL at 08:47

## 2021-02-03 RX ADMIN — GABAPENTIN 100 MILLIGRAM(S): 400 CAPSULE ORAL at 23:06

## 2021-02-03 RX ADMIN — PROGESTERONE 200 MILLIGRAM(S): 200 CAPSULE, LIQUID FILLED ORAL at 23:06

## 2021-02-03 RX ADMIN — SODIUM CHLORIDE 3 MILLILITER(S): 9 INJECTION INTRAMUSCULAR; INTRAVENOUS; SUBCUTANEOUS at 23:08

## 2021-02-03 RX ADMIN — GABAPENTIN 100 MILLIGRAM(S): 400 CAPSULE ORAL at 13:12

## 2021-02-03 RX ADMIN — Medication 81 MILLIGRAM(S): at 11:09

## 2021-02-03 NOTE — PROGRESS NOTE ADULT - PROBLEM SELECTOR PLAN 8
- Monitor BP, Low sodium diet  - c/w Coreg po BID  - BP stable
- Monitor BP, Low sodium diet  - c/w Coreg po BID  - BP stable
-c/w coreg

## 2021-02-03 NOTE — DISCHARGE NOTE PROVIDER - PROVIDER TOKENS
PROVIDER:[TOKEN:[[id number]],FOLLOWUP:[1 week]],PROVIDER:[TOKEN:[[id number]],FOLLOWUP:[1 week]]

## 2021-02-03 NOTE — DISCHARGE NOTE PROVIDER - HOSPITAL COURSE
Patient is a 55 year old female from [facility], walks with walker with PMHx of CHF (last EF of 25% with a ICD), DM, HTN, CAD (no stents placed), gout, COPD, GERD, CKD 4 and RA. PSHx of a cholecystectomy presents to the ED with complaints of vaginal bleeding over the past three weeks and right lower back pain, associated with dysuria, hematuria and lightheadedness. Patient admitted to medicine for anemia secondary to vaginal bleeding. Patient underwent a D&C with GYN on 2/2/21, biopsy was collected and patient can follow up outpatient for results. CT abdomen/Pelvis showing right renal lesion 2.6. This likely corresponds to a solid appearing lesion on prior CT and may reflect renal cell carcinoma. Further evaluation with contrast-enhanced MRI or renal mass protocol CT is recommended as outpatient management,- Urology Following - Patient can follow up outpatient with MRI with renal mass protocol (w/ contrast), with Dr Marino. AICD - [employer] ICD interrogated to check battery life- done on 2/1/21 suspected remaining battery life 2.4 years.      Please note that this a brief summary of hospital course please refer to daily progress notes and consult notes for full course and events. Patient seen and examined at bedside, discussed with medical attending. Patient medically cleared for discharge to.     Incomplete 2/3   Patient is a 55 year old female from [facility], walks with walker with PMHx of CHF (last EF of 25% with a ICD), DM, HTN, CAD (no stents placed), gout, COPD, GERD, CKD 4 and RA. PSHx of a cholecystectomy presents to the ED with complaints of vaginal bleeding over the past three weeks and right lower back pain, associated with dysuria, hematuria and lightheadedness. Patient admitted to medicine for anemia secondary to vaginal bleeding. Patient underwent a D&C with GYN on 2/2/21, biopsy was collected and patient can follow up outpatient for results. CT abdomen/Pelvis showing right renal lesion 2.6. This likely corresponds to a solid appearing lesion on prior CT and may reflect renal cell carcinoma. Further evaluation with contrast-enhanced MRI or renal mass protocol CT is recommended as outpatient management,- Urology Following - Patient can follow up outpatient with MRI with renal mass protocol (w/ contrast), with Dr Marino. AICD - [employer] ICD interrogated to check battery life- done on 2/1/21 suspected remaining battery life 2.4 years.      Please note that this a brief summary of hospital course please refer to daily progress notes and consult notes for full course and events. Patient seen and examined at bedside, discussed with medical attending. Patient medically cleared for discharge to facility.     Incomplete 2/3   Patient is a 55 year old female from [facility], walks with walker with PMHx of CHF (last EF of 25% with a ICD), DM, HTN, CAD (no stents placed), gout, COPD, GERD, CKD 4 and RA. PSHx of a cholecystectomy presents to the ED with complaints of vaginal bleeding over the past three weeks and right lower back pain, associated with dysuria, hematuria and lightheadedness. Patient admitted to medicine for anemia secondary to vaginal bleeding. Patient underwent a D&C with GYN on 2/2/21, biopsy was collected and patient can follow up outpatient for results. CT abdomen/Pelvis showing right renal lesion 2.6. This likely corresponds to a solid appearing lesion on prior CT and may reflect renal cell carcinoma. Further evaluation with contrast-enhanced MRI or renal mass protocol CT is recommended as outpatient management,- Urology Following - Patient can follow up outpatient with MRI with renal mass protocol (w/ contrast), with Dr Marino. AICD - [employer] ICD interrogated to check battery life- done on 2/1/21 suspected remaining battery life 2.4 years.      Please note that this a brief summary of hospital course please refer to daily progress notes and consult notes for full course and events. Patient seen and examined at bedside, discussed with medical attending. Patient medically cleared for discharge to facility to [facility].

## 2021-02-03 NOTE — PROGRESS NOTE ADULT - ASSESSMENT
postmenopausal bleeding most likely cause for severe anemia.   
Patient is a 55 year old female from [facility], walks with walker with PMHx of CHF (last EF of 25% with a ICD), DM, HTN, CAD (no stents placed), gout, COPD, GERD, CKD 4 and RA. PSHx of a cholecystectomy presents to the ED with complaints of vaginal bleeding over the past three weeks and right lower back pain, associated with dysuria, hematuria and lightheadedness. Patient admitted to medicine for anemia secondary to vaginal bleeding.   
Patient is a 55 year old female from [facility], walks with walker with PMHx of CHF (last EF of 25% with a ICD), DM, HTN, CAD (no stents placed), gout, COPD, GERD, CKD 4 and RA. PSHx of a cholecystectomy presents to the ED with complaints of vaginal bleeding over the past three weeks and right lower back pain, associated with dysuria, hematuria and lightheadedness. Patient admitted to medicine for anemia secondary to vaginal bleeding.   
Patient is a 55 year old female from [facility], walks with walker with PMHx of CHF (last EF of 25% with a ICD), DM, HTN, CAD (no stents placed), gout, COPD, GERD, CKD 4 and RA. PSHx of a cholecystectomy presents to the ED with complaints of vaginal bleeding over the past three weeks and right lower back pain, associated with dysuria, hematuria and lightheadedness. Patient admitted to medicine for anemia secondary to vaginal bleeding.

## 2021-02-03 NOTE — PROGRESS NOTE ADULT - PROBLEM SELECTOR PLAN 7
- Aspirin was held for OR on 2/2, resumed
-Aspirin held for OR tomorrow, resume as necessary
- Aspirin was held for OR on 2/2, resumed

## 2021-02-03 NOTE — PROGRESS NOTE ADULT - PROBLEM SELECTOR PLAN 10
- PT consult pending  - Dispo back to [facility] when medically stable
-OR tomorrow for D&C with GYN  -PT consult pending
- PT recommending no skill facility  - discharge tomorrow in AM

## 2021-02-03 NOTE — DISCHARGE NOTE PROVIDER - NSDCCAREPROVSEEN_GEN_ALL_CORE_FT
Ramana Barajas # ACUTE BLOOD LOSS ANEMIA, NORMOCYTIC ANEMIA , PROFUSE POST MENOPAUSAL VAGINAL BLEEDING W/ UTERINE FIBROID - S/P PRBC, AS NEEDED TO KEEP Hb > 8 GMS, GYN F/UP IS IN PROGRESS - NOW S/P D&C ON 2/02  - STARTED ON PROGESTERONE X 5 DAYS  # MEDICAL CLEARANCE FOR SURGERY - RCRI - 3 - 15% 30 DAY RISK OF DEATH, MI OR CARDIAC ARREST ; CARDIOLOGY CLEARANCE NOTED  # SUSPECTED RIGHT RENAL CELL CANCER - UROLOGY CONSULT - DR. LUIS ALBERTO PICHARDO IN PROGRESS - RECOMMENDED OUTPATIENT F/U  # SYSTOLIC CHF , DM, HTN, CAD, COPD, MORBIDLY OBESITY, GERD, RA, GOUT , CKD - CARDIOLOGY CONSULT IN PROGRESS    LUANN NATARAJAN MD COVERING DONTAE NATARAJAN MD   # ACUTE BLOOD LOSS ANEMIA, NORMOCYTIC ANEMIA , PROFUSE POST MENOPAUSAL VAGINAL BLEEDING W/ UTERINE FIBROID - S/P PRBC, AS NEEDED TO KEEP Hb > 8 GMS, GYN F/UP IS IN PROGRESS - NOW S/P D&C ON 2/02  - STARTED ON PROGESTERONE X 5 DAYS  # MEDICAL CLEARANCE FOR SURGERY - RCRI - 3 - 15% 30 DAY RISK OF DEATH, MI OR CARDIAC ARREST ; CARDIOLOGY CLEARANCE NOTED  # SUSPECTED RIGHT RENAL CELL CANCER - UROLOGY CONSULT - DR. LUIS ALBERTO PICHARDO IN PROGRESS - RECOMMENDED OUTPATIENT F/U  # SYSTOLIC CHF , DM, HTN, CAD, COPD, MORBIDLY OBESITY, GERD, RA, GOUT , CKD - CARDIOLOGY CONSULT IN PROGRESS    LUANN NATARAJAN MD COVERING DONTAE NATARAJAN MD

## 2021-02-03 NOTE — PROGRESS NOTE ADULT - PROBLEM SELECTOR PLAN 6
- Hga1C -7.1%  - home medication Humalog and Novolog 70/30  - monitor FS, Insulin sliding scale, CC diet
-7.1  -home medication Humalog and Novolog 70/30  -Insulin sliding scale  -accu check ACHS and Q6h when NPO
- Hga1C -7.1%  - home medication Humalog and Novolog 70/30  - monitor FS, Insulin sliding scale, CC diet

## 2021-02-03 NOTE — PROGRESS NOTE ADULT - SUBJECTIVE AND OBJECTIVE BOX
Patient denies CP, SOB Review of systems otherwise (-)    acetaminophen   Tablet .. 975 milliGRAM(s) Oral every 6 hours PRN  ALBUTerol    90 MICROgram(s) HFA Inhaler 2 Puff(s) Inhalation every 6 hours PRN  allopurinol 100 milliGRAM(s) Oral daily  aspirin  chewable 81 milliGRAM(s) Oral daily  atorvastatin 40 milliGRAM(s) Oral at bedtime  carvedilol 6.25 milliGRAM(s) Oral every 12 hours  cholecalciferol 1000 Unit(s) Oral daily  dextrose 40% Gel 15 Gram(s) Oral once  dextrose 5%. 1000 milliLiter(s) IV Continuous <Continuous>  dextrose 5%. 1000 milliLiter(s) IV Continuous <Continuous>  dextrose 50% Injectable 25 Gram(s) IV Push once  dextrose 50% Injectable 12.5 Gram(s) IV Push once  dextrose 50% Injectable 25 Gram(s) IV Push once  furosemide    Tablet 40 milliGRAM(s) Oral two times a day  gabapentin 100 milliGRAM(s) Oral three times a day  glucagon  Injectable 1 milliGRAM(s) IntraMuscular once  insulin lispro (ADMELOG) corrective regimen sliding scale   SubCutaneous three times a day before meals  insulin lispro (ADMELOG) corrective regimen sliding scale   SubCutaneous at bedtime  pantoprazole    Tablet 40 milliGRAM(s) Oral before breakfast  progesterone 200 milliGRAM(s) Oral at bedtime  senna 2 Tablet(s) Oral at bedtime  sevelamer carbonate 800 milliGRAM(s) Oral three times a day with meals  sodium chloride 0.9% lock flush 3 milliLiter(s) IV Push every 8 hours  spironolactone 50 milliGRAM(s) Oral daily                            8.7    10.40 )-----------( 303      ( 03 Feb 2021 08:07 )             29.6       Hemoglobin: 8.7 g/dL (02-03 @ 08:07)  Hemoglobin: 9.1 g/dL (02-02 @ 06:38)  Hemoglobin: 8.5 g/dL (02-01 @ 08:09)  Hemoglobin: 8.2 g/dL (01-31 @ 10:29)  Hemoglobin: 6.1 g/dL (01-30 @ 22:13)      02-03    140  |  107  |  115<H>  ----------------------------<  147<H>  4.5   |  24  |  3.51<H>    Ca    9.2      03 Feb 2021 08:07  Phos  5.1     02-02  Mg     2.5     02-02    TPro  7.3  /  Alb  3.3<L>  /  TBili  0.4  /  DBili  x   /  AST  <3<L>  /  ALT  13  /  AlkPhos  72  02-02    Creatinine Trend: 3.51<--, 3.57<--, 3.48<--, 3.36<--, 3.24<--, 3.82<--    COAGS:           T(C): 36.6 (02-03-21 @ 05:45), Max: 36.7 (02-02-21 @ 14:11)  HR: 73 (02-03-21 @ 05:45) (73 - 79)  BP: 109/68 (02-03-21 @ 05:45) (109/68 - 136/60)  RR: 17 (02-03-21 @ 05:45) (17 - 18)  SpO2: 99% (02-03-21 @ 05:45) (96% - 99%)  Wt(kg): --    I&O's Summary    Appearance: Normal	  HEENT:   Normal oral mucosa, PERRL, EOMI	  Lymphatic: No lymphadenopathy  Cardiovascular: Normal S1 S2, No JVD, No murmurs, No edema  Respiratory: Lungs clear to auscultation	  Psychiatry: A & O x 3, Mood & affect appropriate  Gastrointestinal:  Soft, Non-tender, + BS	  Skin: No rashes, No ecchymoses, No cyanosis	  Neurologic: Non-focal  Extremities: Normal range of motion, No clubbing, cyanosis or edema  Vascular: Peripheral pulses palpable 2+ bilaterally    TELEMETRY: 	    ECG:  	 NSR : NAD, no acute ischemia   RADIOLOGY:     CT scan: t< from: CT Abdomen and Pelvis No Cont (01.30.21 @ 02:08) >  IMPRESSION:  Indeterminate right renal mass is incompletely characterized without intravenous contrast. This likely corresponds to a solid appearing lesion on prior CT and may reflect renal cell carcinoma. Further evaluation with contrast-enhanced MRI or renal mass protocol CT is recommended.    Fibroid uterus.    Cardiomegaly.    HELEN OLSON MD; Attending Radiologist  This document has     < end of copied text >    ASSESSMENT/PLAN: 	55 year old female from [facility], walks with walker with PMHx of NICM, CHF (last EF of 25% with a ICD), DM,HTN, nonobstructive CAD gout, COPD, GERD, and RA and remote PSHx of a cholecystectomy presents to the ED with complaints of vaginal bleeding over the past three weeks and right lower back pain, cardiology consulted for CAD/ CM     - Well compensated from a CHF prospective  - No clinical CHF or unstable cardiac syndromes  -  tolerated D&C  - urology f/u  - No need for further inpatient cardiac work up.  - D/C planning per medical team    Manuel Gold MD, [facility]  BEEPER [phone]

## 2021-02-03 NOTE — PROGRESS NOTE ADULT - REASON FOR ADMISSION
Hematuria and vaginal bleed

## 2021-02-03 NOTE — PROGRESS NOTE ADULT - SUBJECTIVE AND OBJECTIVE BOX
Patient is a 55y old  Female who presents with a chief complaint of Hematuria and vaginal bleed (2021 17:15)    PATIENT IS SEEN AND EXAMINED IN MEDICAL FLOOR.    ALLERGIES:  codeine (Urticaria)    Daily     Daily Weight in k (2021 05:45)    VITALS:    Vital Signs Last 24 Hrs  T(C): 36.7 (2021 14:35), Max: 36.7 (2021 21:02)  T(F): 98 (2021 14:35), Max: 98 (2021 21:02)  HR: 89 (2021 17:32) (73 - 89)  BP: 109/55 (2021 17:32) (109/55 - 133/65)  BP(mean): 65 (2021 21:02) (65 - 65)  RR: 18 (2021 14:35) (17 - 18)  SpO2: 100% (2021 14:35) (96% - 100%)    LABS:    CBC Full  -  ( 2021 08:07 )  WBC Count : 10.40 K/uL  RBC Count : 3.46 M/uL  Hemoglobin : 8.7 g/dL  Hematocrit : 29.6 %  Platelet Count - Automated : 303 K/uL  Mean Cell Volume : 85.5 fl  Mean Cell Hemoglobin : 25.1 pg  Mean Cell Hemoglobin Concentration : 29.4 gm/dL  Auto Neutrophil # : x  Auto Lymphocyte # : x  Auto Monocyte # : x  Auto Eosinophil # : x  Auto Basophil # : x  Auto Neutrophil % : x  Auto Lymphocyte % : x  Auto Monocyte % : x  Auto Eosinophil % : x  Auto Basophil % : x    PT/INR - ( 2021 06:38 )   PT: 11.6 sec;   INR: 0.97 ratio         PTT - ( 2021 06:38 )  PTT:28.6 sec  -    140  |  107  |  115<H>  ----------------------------<  147<H>  4.5   |  24  |  3.51<H>    Ca    9.2      2021 08:07  Phos  5.1     02-02  Mg     2.5     -    TPro  7.3  /  Alb  3.3<L>  /  TBili  0.4  /  DBili  x   /  AST  <3<L>  /  ALT  13  /  AlkPhos  72      CAPILLARY BLOOD GLUCOSE      POCT Blood Glucose.: 153 mg/dL (2021 16:50)  POCT Blood Glucose.: 258 mg/dL (2021 11:09)  POCT Blood Glucose.: 144 mg/dL (2021 07:56)  POCT Blood Glucose.: 129 mg/dL (2021 21:33)        LIVER FUNCTIONS - ( 2021 06:38 )  Alb: 3.3 g/dL / Pro: 7.3 g/dL / ALK PHOS: 72 U/L / ALT: 13 U/L DA / AST: <3 U/L / GGT: x           Creatinine Trend: 3.51<--, 3.57<--, 3.48<--, 3.36<--, 3.24<--, 3.82<--  I&O's Summary      MEDICATIONS:    MEDICATIONS  (STANDING):  allopurinol 100 milliGRAM(s) Oral daily  aspirin  chewable 81 milliGRAM(s) Oral daily  atorvastatin 40 milliGRAM(s) Oral at bedtime  carvedilol 6.25 milliGRAM(s) Oral every 12 hours  cholecalciferol 1000 Unit(s) Oral daily  dextrose 40% Gel 15 Gram(s) Oral once  dextrose 5%. 1000 milliLiter(s) (50 mL/Hr) IV Continuous <Continuous>  dextrose 5%. 1000 milliLiter(s) (100 mL/Hr) IV Continuous <Continuous>  dextrose 50% Injectable 25 Gram(s) IV Push once  dextrose 50% Injectable 12.5 Gram(s) IV Push once  dextrose 50% Injectable 25 Gram(s) IV Push once  furosemide    Tablet 40 milliGRAM(s) Oral two times a day  gabapentin 100 milliGRAM(s) Oral three times a day  glucagon  Injectable 1 milliGRAM(s) IntraMuscular once  insulin lispro (ADMELOG) corrective regimen sliding scale   SubCutaneous three times a day before meals  insulin lispro (ADMELOG) corrective regimen sliding scale   SubCutaneous at bedtime  pantoprazole    Tablet 40 milliGRAM(s) Oral before breakfast  progesterone 200 milliGRAM(s) Oral at bedtime  senna 2 Tablet(s) Oral at bedtime  sevelamer carbonate 800 milliGRAM(s) Oral three times a day with meals  sodium chloride 0.9% lock flush 3 milliLiter(s) IV Push every 8 hours  spironolactone 50 milliGRAM(s) Oral daily      MEDICATIONS  (PRN):  acetaminophen   Tablet .. 975 milliGRAM(s) Oral every 6 hours PRN Temp greater or equal to 38C (100.4F), Mild Pain (1 - 3)  ALBUTerol    90 MICROgram(s) HFA Inhaler 2 Puff(s) Inhalation every 6 hours PRN Shortness of Breath and/or Wheezing      REVIEW OF SYSTEMS:                           ALL ROS DONE [ X   ]    CONSTITUTIONAL:  LETHARGIC [   ], FEVER [   ], UNRESPONSIVE [   ]  CVS:  CP  [   ], SOB, [   ], PALPITATIONS [   ], DIZZYNESS [   ]  RS: COUGH [   ], SPUTUM [   ]  GI: ABDOMINAL PAIN [   ], NAUSEA [   ], VOMITINGS [   ], DIARRHEA [   ], CONSTIPATION [   ]  :  DYSURIA [   ], NOCTURIA [   ], INCREASED FREQUENCY [   ], DRIBLING [   ],  SKELETAL: PAINFUL JOINTS [   ], SWOLLEN JOINTS [   ], NECK ACHE [   ], LOW BACK ACHE [   ],  SKIN : ULCERS [   ], RASH [   ], ITCHING [   ]  CNS: HEAD ACHE [   ], DOUBLE VISION [   ], BLURRED VISION [   ], AMS / CONFUSION [   ], SEIZURES [   ], WEAKNESS [   ],TINGLING / NUMBNESS [   ]    PHYSICAL EXAMINATION:    GENERAL APPEARANCE: NO DISTRESS  HEENT:  NO PALLOR, NO  JVD,  NO   NODES, NECK SUPPLE  CVS: S1 +, S2 +,   RS: AEEB,  OCCASIONAL  RALES +,   NO RONCHI  ABD: SOFT, NT, NO, BS +  EXT: PE +  SKIN: WARM,   SKELETAL:  ROM ACCEPTABLE  CNS:  AAO X 3   , NO  DEFICITS        RADIOLOGY :    < from: CT Abdomen and Pelvis No Cont (21 @ 02:08) >    IMPRESSION:  Indeterminate right renal mass is incompletely characterized without intravenous contrast. This likely corresponds to a solid appearing lesion on prior CT and may reflect renal cell carcinoma. Further evaluation with contrast-enhanced MRI or renal mass protocol CT is recommended.    Fibroid uterus.    Cardiomegaly.    < end of copied text >      < from: US Kidney and Bladder (10.02.19 @ 12:54) >    IMPRESSION:     No hydronephrosis.    2.1 cm right midpole renal hypodensity cannot be adequately assessed due   to excessive bowel gas. Recommend pre and postcontrast CT to exclude   neoplasm..    < end of copied text >      ASSESSMENT :     Anemia    GERD (gastroesophageal reflux disease)    CHF (congestive heart failure)    CAD (coronary artery disease)    AICD (automatic cardioverter/defibrillator) present    COPD (chronic obstructive pulmonary disease)    RA (rheumatoid arthritis)    DM (diabetes mellitus)    HTN (hypertension)    ICD (implantable cardioverter-defibrillator) in place    History of cholecystectomy        PLAN:  HPI:  55 year old female from [facility], walks with walker with PMHx of CHF (last EF of 25% with a ICD), DM,HTN, CAD (no stents placed), gout, COPD, GERD, and RA and remote PSHx of a cholecystectomy presents to the ED with complaints of vaginal bleeding over the past three weeks and right lower back pain. Patient additionally endorses some dysuria and lightheadedness. Patient denies having any history of malignancy or weight loss. Patient otherwise denies any fevers, abdominal pain, nausea, vomiting, diarrhea, constipation, chest pain, shortness of breath, syncope, and all other acute complaints.   Pt noted to have Hb 3.6 in ED    In ED, /60, HR 84 (2021 05:15)    # D/C PLAN WAS FOR TODAY UNFORTUNATELY THERE WAS A DELAY WITH TRANSPORTATION TO ASSISTED LIVING DUE TO INCLEMENT WEATHER - PLAN FOR D/C IN A.M. TO [facility] IF MEDICALLY STABLE  # NORMOCYTIC ANEMIA , PROFUSE POST MENOPAUSAL VAGINAL BLEEDING W/ UTERINE FIBROID - S/P PRBC, AS NEEDED TO KEEP Hb > 8 GMS, GYN F/UP IS IN PROGRESS - NOW S/P D&C ON   - STARTED ON PROGESTERONE X 5 DAYS  # MEDICAL CLEARANCE FOR SURGERY - RCRI - 3 - 15% 30 DAY RISK OF DEATH, MI OR CARDIAC ARREST ; CARDIOLOGY CLEARANCE NOTED  # SUSPECTED RIGHT RENAL CELL CANCER - UROLOGY CONSULT - DR. LUIS ALBERTO PICHARDO IN PROGRESS - RECOMMENDED OUTPATIENT F/U  # SYSTOLIC CHF , DM, HTN, CAD, COPD, MORBIDLY OBESITY, GERD, RA, GOUT , CKD - CARDIOLOGY CONSULT IN PROGRESS  # GI AND DVT PROPHYLAXIS    LUANN NATARAJAN MD COVERING DONTAE NATARAJAN MD

## 2021-02-03 NOTE — DISCHARGE NOTE PROVIDER - NSFOLLOWUPCLINICS_GEN_ALL_ED_FT
[facility]  [address]  [address] [place] [address]  Phone: [phone]  Fax: [phone]  Follow Up Time: 1 week

## 2021-02-03 NOTE — PHYSICAL THERAPY INITIAL EVALUATION ADULT - PERTINENT HX OF CURRENT PROBLEM, REHAB EVAL
55 year old female from [facility], walks with walker with PMHx of CHF (last EF of 25% with a ICD), DM,HTN, CAD (no stents placed), gout, COPD, GERD, and RA and remote PSHx of a cholecystectomy presents to the ED with complaints of vaginal bleeding over the past three weeks and right lower back pain.

## 2021-02-03 NOTE — DISCHARGE NOTE PROVIDER - NSDCCPCAREPLAN_GEN_ALL_CORE_FT
PRINCIPAL DISCHARGE DIAGNOSIS  Diagnosis: Anemia  Assessment and Plan of Treatment: Please note that this a brief summary of hospital course please refer to daily progress notes and consult notes for full course and events. Patient seen and examined at bedside, discussed with medical attending. Patient medically cleared for discharge to.      SECONDARY DISCHARGE DIAGNOSES  Diagnosis: Renal mass  Assessment and Plan of Treatment: Renal mass    Diagnosis: CHF (congestive heart failure)  Assessment and Plan of Treatment: CHF (congestive heart failure)    Diagnosis: HTN (hypertension)  Assessment and Plan of Treatment: HTN (hypertension)    Diagnosis: AICD (automatic cardioverter/defibrillator) present  Assessment and Plan of Treatment: AICD (automatic cardioverter/defibrillator) present     PRINCIPAL DISCHARGE DIAGNOSIS  Diagnosis: Postmenopausal bleeding  Assessment and Plan of Treatment: You had a D&C and biopsy done for further management. Please follow up in 2 week for Biopsy results with GYN. Pleasec ontinue to monitor for vaginal bleed. Please seek imediate medical help:  -if you have vaginal bleeding with large clots  -weakness, palpiation, Shortness of breath  -Chest pain  -confusion  Please follow up with your primary care physician in a week.      SECONDARY DISCHARGE DIAGNOSES  Diagnosis: Renal mass  Assessment and Plan of Treatment: Your CT imagining showed a right kidney lesion. You were evaluated by a Urologist, and recommends outpatient follow up with Dr. Marino in a week.    Diagnosis: CHF (congestive heart failure)  Assessment and Plan of Treatment: Please continue taking your medication as prescribed. Please follow up with your primary care physician.    Diagnosis: HTN (hypertension)  Assessment and Plan of Treatment: Please continue taking your medication as prescribed. Please follow up with your primary care physician.    Diagnosis: AICD (automatic cardioverter/defibrillator) present  Assessment and Plan of Treatment: AICD (automatic cardioverter/defibrillator) was interrogated for battery life. Your remaining battery life is 2.4 years. Please follow up with your cardiologist.

## 2021-02-03 NOTE — PROGRESS NOTE ADULT - PROBLEM SELECTOR PLAN 1
- secondary to vaginal bleeding  - H/H 9.1 / 30.0- stable today  - GYN following - s/p OR 2/2/21 for D&C  -c/w progesterone

## 2021-02-03 NOTE — DISCHARGE NOTE PROVIDER - NSDCMRMEDTOKEN_GEN_ALL_CORE_FT
acetaminophen 325 mg oral tablet: 2 tab(s) orally every 6 hours, As needed, Temp greater or equal to 38C (100.4F)  Aldactone 50 mg oral tablet: 1 tab(s) orally once a day  allopurinol 100 mg oral tablet: 1 tab(s) orally once a day  aspirin 81 mg oral tablet, chewable: 1 tab(s) orally once a day  atorvastatin 40 mg oral tablet: 1 tab(s) orally once a day  Coreg 6.25 mg oral tablet: 1 tab(s) orally 2 times a day  docusate sodium 100 mg oral capsule: 1 cap(s) orally once a day  gabapentin 100 mg oral capsule: 1 cap(s) orally 3 times a day  HumaLOG KwikPen 200 units/mL (Concentrated) subcutaneous solution: 6 unit(s) subcutaneous every 8 hours  sliding scale as needed   Lasix 40 mg oral tablet: 1 tab(s) orally 2 times a day  multivitamin: 1 tab(s) orally once a day  NovoLIN 70/30 subcutaneous suspension: 18 unit(s) subcutaneous once a day (in the morning)  NovoLIN 70/30 subcutaneous suspension: 6 unit(s) subcutaneous once a day (in the evening)  pantoprazole 40 mg oral delayed release tablet: 1 tab(s) orally once a day (before a meal)  senna oral tablet: 2 tab(s) orally once a day (at bedtime)  sevelamer carbonate 800 mg oral tablet: 2 tab(s) orally 3 times a day (with meals)  Ventolin HFA 90 mcg/inh inhalation aerosol: 2 puff(s) inhaled every 6 hours  Vitamin D3 1000 intl units oral capsule: 1 cap(s) orally once a day   acetaminophen 325 mg oral tablet: 2 tab(s) orally every 6 hours, As needed, Temp greater or equal to 38C (100.4F)  Aldactone 50 mg oral tablet: 1 tab(s) orally once a day  allopurinol 100 mg oral tablet: 1 tab(s) orally once a day  aspirin 81 mg oral tablet, chewable: 1 tab(s) orally once a day  atorvastatin 40 mg oral tablet: 1 tab(s) orally once a day  Coreg 6.25 mg oral tablet: 1 tab(s) orally 2 times a day  docusate sodium 100 mg oral capsule: 1 cap(s) orally once a day  gabapentin 100 mg oral capsule: 1 cap(s) orally 3 times a day  HumaLOG KwikPen 200 units/mL (Concentrated) subcutaneous solution: 6 unit(s) subcutaneous every 8 hours  sliding scale as needed   Lasix 40 mg oral tablet: 1 tab(s) orally 2 times a day  multivitamin: 1 tab(s) orally once a day  NovoLIN 70/30 subcutaneous suspension: 18 unit(s) subcutaneous once a day (in the morning)  NovoLIN 70/30 subcutaneous suspension: 6 unit(s) subcutaneous once a day (in the evening)  pantoprazole 40 mg oral delayed release tablet: 1 tab(s) orally once a day (before a meal)  progesterone 100 mg oral capsule: 2 cap(s) orally once a day (at bedtime)  senna oral tablet: 2 tab(s) orally once a day (at bedtime)  sevelamer carbonate 800 mg oral tablet: 2 tab(s) orally 3 times a day (with meals)  Ventolin HFA 90 mcg/inh inhalation aerosol: 2 puff(s) inhaled every 6 hours  Vitamin D3 1000 intl units oral capsule: 1 cap(s) orally once a day

## 2021-02-03 NOTE — PROGRESS NOTE ADULT - SUBJECTIVE AND OBJECTIVE BOX
NP Note discussed with  Primary Attending    Patient is a 55y old  Female who presents with a chief complaint of Hematuria and vaginal bleed (03 Feb 2021 12:37)      INTERVAL HPI/OVERNIGHT EVENTS: patient seen and examined at bedside, no new complaints    MEDICATIONS  (STANDING):  allopurinol 100 milliGRAM(s) Oral daily  aspirin  chewable 81 milliGRAM(s) Oral daily  atorvastatin 40 milliGRAM(s) Oral at bedtime  carvedilol 6.25 milliGRAM(s) Oral every 12 hours  cholecalciferol 1000 Unit(s) Oral daily  dextrose 40% Gel 15 Gram(s) Oral once  dextrose 5%. 1000 milliLiter(s) (50 mL/Hr) IV Continuous <Continuous>  dextrose 5%. 1000 milliLiter(s) (100 mL/Hr) IV Continuous <Continuous>  dextrose 50% Injectable 25 Gram(s) IV Push once  dextrose 50% Injectable 12.5 Gram(s) IV Push once  dextrose 50% Injectable 25 Gram(s) IV Push once  furosemide    Tablet 40 milliGRAM(s) Oral two times a day  gabapentin 100 milliGRAM(s) Oral three times a day  glucagon  Injectable 1 milliGRAM(s) IntraMuscular once  insulin lispro (ADMELOG) corrective regimen sliding scale   SubCutaneous three times a day before meals  insulin lispro (ADMELOG) corrective regimen sliding scale   SubCutaneous at bedtime  pantoprazole    Tablet 40 milliGRAM(s) Oral before breakfast  progesterone 200 milliGRAM(s) Oral at bedtime  senna 2 Tablet(s) Oral at bedtime  sevelamer carbonate 800 milliGRAM(s) Oral three times a day with meals  sodium chloride 0.9% lock flush 3 milliLiter(s) IV Push every 8 hours  spironolactone 50 milliGRAM(s) Oral daily    MEDICATIONS  (PRN):  acetaminophen   Tablet .. 975 milliGRAM(s) Oral every 6 hours PRN Temp greater or equal to 38C (100.4F), Mild Pain (1 - 3)  ALBUTerol    90 MICROgram(s) HFA Inhaler 2 Puff(s) Inhalation every 6 hours PRN Shortness of Breath and/or Wheezing      __________________________________________________  REVIEW OF SYSTEMS:    CONSTITUTIONAL: No fever,   EYES: no acute visual disturbances  NECK: No pain or stiffness  RESPIRATORY: No cough; No shortness of breath  CARDIOVASCULAR: No chest pain, no palpitations  GASTROINTESTINAL: No pain. No nausea or vomiting; No diarrhea   NEUROLOGICAL: No headache or numbness, no tremors  MUSCULOSKELETAL: No joint pain, no muscle pain  GENITOURINARY: no dysuria, no frequency, no hesitancy  PSYCHIATRY: no depression , no anxiety  ALL OTHER  ROS negative        Vital Signs Last 24 Hrs  T(C): 36.7 (03 Feb 2021 14:35), Max: 36.7 (02 Feb 2021 21:02)  T(F): 98 (03 Feb 2021 14:35), Max: 98 (02 Feb 2021 21:02)  HR: 84 (03 Feb 2021 14:35) (73 - 84)  BP: 123/70 (03 Feb 2021 14:35) (109/68 - 133/65)  BP(mean): 65 (02 Feb 2021 21:02) (65 - 65)  RR: 18 (03 Feb 2021 14:35) (17 - 18)  SpO2: 100% (03 Feb 2021 14:35) (96% - 100%)    ________________________________________________  PHYSICAL EXAM:  GENERAL: NAD  HEENT: Normocephalic;  conjunctivae and sclerae clear; moist mucous membranes;   NECK : supple  CHEST/LUNG: Clear to auscultation bilaterally with good air entry   HEART: S1 S2  regular; no murmurs, gallops or rubs  ABDOMEN: Soft, Nontender, Nondistended; Bowel sounds present  EXTREMITIES: weakness no cyanosis; no edema; no calf tenderness  SKIN: warm and dry; no rash  NERVOUS SYSTEM:  Awake and alert; Oriented  to place, person and time ; no new deficits    _________________________________________________  LABS:                        8.7    10.40 )-----------( 303      ( 03 Feb 2021 08:07 )             29.6     02-03    140  |  107  |  115<H>  ----------------------------<  147<H>  4.5   |  24  |  3.51<H>    Ca    9.2      03 Feb 2021 08:07  Phos  5.1     02-02  Mg     2.5     02-02    TPro  7.3  /  Alb  3.3<L>  /  TBili  0.4  /  DBili  x   /  AST  <3<L>  /  ALT  13  /  AlkPhos  72  02-02    PT/INR - ( 02 Feb 2021 06:38 )   PT: 11.6 sec;   INR: 0.97 ratio         PTT - ( 02 Feb 2021 06:38 )  PTT:28.6 sec    CAPILLARY BLOOD GLUCOSE      POCT Blood Glucose.: 153 mg/dL (03 Feb 2021 16:50)  POCT Blood Glucose.: 258 mg/dL (03 Feb 2021 11:09)  POCT Blood Glucose.: 144 mg/dL (03 Feb 2021 07:56)  POCT Blood Glucose.: 129 mg/dL (02 Feb 2021 21:33)    RADIOLOGY & ADDITIONAL TESTS:  < from: CT Abdomen and Pelvis No Cont (01.30.21 @ 02:08) >  EXAM:  CT ABDOMEN AND PELVIS                            PROCEDURE DATE:  01/30/2021          INTERPRETATION:  CLINICAL INFORMATION: Vaginal bleeding    COMPARISON: CT abdomen pelvis 11/6/2012    PROCEDURE:  CT of the Abdomen and Pelvis was performed without intravenous contrast.  Intravenous contrast: None.  Oral contrast: None.  Sagittal and coronal reformats were performed.    FINDINGS:  LOWER CHEST: Cardiomegaly. Pacemaker lead in the right ventricle. Hypoattenuation of the blood flow compatible with anemia.    LIVER: Within normal limits.  BILE DUCTS: Normal caliber.  GALLBLADDER: Cholecystectomy.  SPLEEN: Within normal limits.  PANCREAS: Within normal limits.  ADRENALS: Stable 3 cm left adrenal nodule. Nodular thickening of the right adrenal gland, stable.  KIDNEYS/URETERS: Bilateral renal atrophy. Indeterminant hypodense right renal lesion measures 2.6 cm, likely corresponding to a 1 cm solid-appearing lesion on prior exam. A 1 cm right upper pole hyperdense lesion is indeterminate. Subcentimeter hyperdense left renal lesion. No nephroureterolithiasis or hydronephrosis.    BLADDER: Within normal limits.  REPRODUCTIVE ORGANS: Enlarged fibroid uterus with a dominant right fundal subserosal fibroid measuring 6.6 cm.    BOWEL: No bowel obstruction. Appendix is normal. Scattered colonic diverticuli.  PERITONEUM: No ascites.  VESSELS: Within normal limits.  RETROPERITONEUM/LYMPH NODES: No lymphadenopathy.  ABDOMINAL WALL: Large wid[place]outh ventral hernia containing unobstructed small bowel.  BONES: Degenerative changes.    IMPRESSION:  Indeterminate right renal mass is incompletely characterized without intravenous contrast. This likely corresponds to a solid appearing lesion on prior CT and may reflect renal cell carcinoma. Further evaluation with contrast-enhanced MRI or renal mass protocol CT is recommended.    Fibroid uterus.    Cardiomegaly.    < end of copied text >    Imaging  Reviewed:  YES    Consultant(s) Notes Reviewed:   YES      Plan of care was discussed with patient and /or primary care giver; all questions and concerns were addressed

## 2021-02-03 NOTE — PROGRESS NOTE ADULT - PROBLEM SELECTOR PLAN 2
- CT abdomen/Pelvis showing right renal lesion 2.6. This likely corresponds to a solid appearing lesion on prior CT and may reflect renal cell carcinoma. Further evaluation with contrast-enhanced MRI or renal mass protocol CT is recommended  - please note, pt has ICD that was interrogated (papers in the chart)  - Urology Following - Patient can follow up outpatient with MRI with renal mass protocol (w/ contrast), with Dr Marino [phone]

## 2021-02-03 NOTE — DISCHARGE NOTE PROVIDER - CARE PROVIDER_API CALL
Ramana Barajas (MD)  Medicine  [address] [address] [place] [address]  Phone: [phone]  Fax: [phone]  Follow Up Time: 1 week    Marlene Marino; MPH)  Urology  [address] [place] [address] [address] [place] [address] [place] [address]  Phone: [phone]  Fax: [phone]  Follow Up Time: 1 week

## 2021-02-03 NOTE — PROGRESS NOTE ADULT - PROVIDER SPECIALTY LIST ADULT
Cardiology
Internal Medicine
Cardiology
Internal Medicine
GYN
Internal Medicine

## 2021-02-04 ENCOUNTER — TRANSCRIPTION ENCOUNTER (OUTPATIENT)
Age: 56
End: 2021-02-04

## 2021-02-04 VITALS
TEMPERATURE: 98 F | SYSTOLIC BLOOD PRESSURE: 154 MMHG | DIASTOLIC BLOOD PRESSURE: 66 MMHG | RESPIRATION RATE: 16 BRPM | HEART RATE: 92 BPM | OXYGEN SATURATION: 100 %

## 2021-02-04 LAB — GLUCOSE BLDC GLUCOMTR-MCNC: 139 MG/DL — HIGH (ref 70–99)

## 2021-02-04 RX ORDER — PROGESTERONE 200 MG/1
2 CAPSULE, LIQUID FILLED ORAL
Qty: 6 | Refills: 0
Start: 2021-02-04 | End: 2021-02-06

## 2021-02-04 RX ADMIN — PANTOPRAZOLE SODIUM 40 MILLIGRAM(S): 20 TABLET, DELAYED RELEASE ORAL at 06:36

## 2021-02-04 RX ADMIN — SODIUM CHLORIDE 3 MILLILITER(S): 9 INJECTION INTRAMUSCULAR; INTRAVENOUS; SUBCUTANEOUS at 06:37

## 2021-02-04 RX ADMIN — CARVEDILOL PHOSPHATE 6.25 MILLIGRAM(S): 80 CAPSULE, EXTENDED RELEASE ORAL at 06:36

## 2021-02-04 RX ADMIN — SEVELAMER CARBONATE 800 MILLIGRAM(S): 2400 POWDER, FOR SUSPENSION ORAL at 07:58

## 2021-02-04 RX ADMIN — GABAPENTIN 100 MILLIGRAM(S): 400 CAPSULE ORAL at 06:36

## 2021-02-04 RX ADMIN — Medication 40 MILLIGRAM(S): at 06:36

## 2021-02-04 RX ADMIN — SPIRONOLACTONE 50 MILLIGRAM(S): 25 TABLET, FILM COATED ORAL at 06:36

## 2021-02-04 NOTE — DISCHARGE NOTE NURSING/CASE MANAGEMENT/SOCIAL WORK - PATIENT PORTAL LINK FT
You can access the FollowMyHealth Patient Portal offered by [facility] by registering at the following website: [email or web address]. By joining [facility]’s FollowMyHealth portal, you will also be able to view your health information using other applications (apps) compatible with our system.

## 2021-02-10 ENCOUNTER — APPOINTMENT (OUTPATIENT)
Dept: UROLOGY | Facility: CLINIC | Age: 56
End: 2021-02-10
Payer: MEDICARE

## 2021-02-10 VITALS
HEART RATE: 90 BPM | WEIGHT: 293 LBS | BODY MASS INDEX: 48.82 KG/M2 | TEMPERATURE: 98 F | RESPIRATION RATE: 18 BRPM | SYSTOLIC BLOOD PRESSURE: 136 MMHG | DIASTOLIC BLOOD PRESSURE: 75 MMHG | OXYGEN SATURATION: 95 % | HEIGHT: 65 IN

## 2021-02-10 DIAGNOSIS — E66.9 OBESITY, UNSPECIFIED: ICD-10-CM

## 2021-02-10 DIAGNOSIS — I50.9 HEART FAILURE, UNSPECIFIED: ICD-10-CM

## 2021-02-10 DIAGNOSIS — E11.9 TYPE 2 DIABETES MELLITUS W/OUT COMPLICATIONS: ICD-10-CM

## 2021-02-10 PROCEDURE — 99214 OFFICE O/P EST MOD 30 MIN: CPT

## 2021-02-10 NOTE — REVIEW OF SYSTEMS
[Feeling Poorly] : feeling poorly [Feeling Tired] : feeling tired [SOB on Exertion] : shortness of breath during exertion [Arthralgias] : arthralgias [see HPI] : see HPI [Joint Stiffness] : joint stiffness [Difficulty Walking] : difficulty walking [Negative] : Heme/Lymph

## 2021-02-26 NOTE — PHYSICAL EXAM
[General Appearance - Well Developed] : well developed [General Appearance - Well Nourished] : well nourished [Well Groomed] : well groomed [Normal Appearance] : normal appearance [General Appearance - In No Acute Distress] : no acute distress [Abdomen Soft] : soft [Abdomen Tenderness] : non-tender [Costovertebral Angle Tenderness] : no ~M costovertebral angle tenderness [Urinary Bladder Findings] : the bladder was normal on palpation [Edema] : no peripheral edema [Respiration, Rhythm And Depth] : normal respiratory rhythm and effort [] : no respiratory distress [Exaggerated Use Of Accessory Muscles For Inspiration] : no accessory muscle use [Oriented To Time, Place, And Person] : oriented to person, place, and time [Affect] : the affect was normal [Mood] : the mood was normal [Not Anxious] : not anxious [No Focal Deficits] : no focal deficits [No Palpable Adenopathy] : no palpable adenopathy [FreeTextEntry1] : ambulates with walker

## 2021-02-26 NOTE — ASSESSMENT
[FreeTextEntry1] : 56 yo F with right renal mass, multiple comorbidities\par \par - Reviewed CT without contrast from recent hospitalization which showed 2.6cm mass\par - Reviewed all available labwork which showed renal insufficiency with a Cr of 3.5\par - Pt needs MRI given her insufficiency however has a history of an AICD. Will follow-up with radiology regarding options\par

## 2021-02-26 NOTE — HISTORY OF PRESENT ILLNESS
[FreeTextEntry1] : 56 yo F recently hospitalized for abnormal vaginal bleeding\par Workup revealed a 2.6 cm right renal mass on CT without contrast\par Pt denies any flank pain\par Denies any urinary issues\par No prior history of gross hematuria\par

## 2021-03-08 ENCOUNTER — APPOINTMENT (OUTPATIENT)
Age: 56
End: 2021-03-08
Payer: MEDICARE

## 2021-03-08 DIAGNOSIS — N28.89 OTHER SPECIFIED DISORDERS OF KIDNEY AND URETER: ICD-10-CM

## 2021-03-08 PROCEDURE — 76705 ECHO EXAM OF ABDOMEN: CPT

## 2021-03-14 PROBLEM — N28.89 RENAL MASS, RIGHT: Status: ACTIVE | Noted: 2021-02-10

## 2021-03-16 PROCEDURE — 84300 ASSAY OF URINE SODIUM: CPT

## 2021-03-16 PROCEDURE — 85730 THROMBOPLASTIN TIME PARTIAL: CPT

## 2021-03-16 PROCEDURE — 36430 TRANSFUSION BLD/BLD COMPNT: CPT

## 2021-03-16 PROCEDURE — 80053 COMPREHEN METABOLIC PANEL: CPT

## 2021-03-16 PROCEDURE — 74176 CT ABD & PELVIS W/O CONTRAST: CPT

## 2021-03-16 PROCEDURE — 86900 BLOOD TYPING SEROLOGIC ABO: CPT

## 2021-03-16 PROCEDURE — 85045 AUTOMATED RETICULOCYTE COUNT: CPT

## 2021-03-16 PROCEDURE — 82746 ASSAY OF FOLIC ACID SERUM: CPT

## 2021-03-16 PROCEDURE — 83615 LACTATE (LD) (LDH) ENZYME: CPT

## 2021-03-16 PROCEDURE — 99285 EMERGENCY DEPT VISIT HI MDM: CPT

## 2021-03-16 PROCEDURE — 84100 ASSAY OF PHOSPHORUS: CPT

## 2021-03-16 PROCEDURE — 82962 GLUCOSE BLOOD TEST: CPT

## 2021-03-16 PROCEDURE — 85027 COMPLETE CBC AUTOMATED: CPT

## 2021-03-16 PROCEDURE — P9040: CPT

## 2021-03-16 PROCEDURE — 86923 COMPATIBILITY TEST ELECTRIC: CPT

## 2021-03-16 PROCEDURE — 84133 ASSAY OF URINE POTASSIUM: CPT

## 2021-03-16 PROCEDURE — 84466 ASSAY OF TRANSFERRIN: CPT

## 2021-03-16 PROCEDURE — 88305 TISSUE EXAM BY PATHOLOGIST: CPT

## 2021-03-16 PROCEDURE — 82043 UR ALBUMIN QUANTITATIVE: CPT

## 2021-03-16 PROCEDURE — 80048 BASIC METABOLIC PNL TOTAL CA: CPT

## 2021-03-16 PROCEDURE — 83036 HEMOGLOBIN GLYCOSYLATED A1C: CPT

## 2021-03-16 PROCEDURE — 36415 COLL VENOUS BLD VENIPUNCTURE: CPT

## 2021-03-16 PROCEDURE — 83690 ASSAY OF LIPASE: CPT

## 2021-03-16 PROCEDURE — 82607 VITAMIN B-12: CPT

## 2021-03-16 PROCEDURE — 93005 ELECTROCARDIOGRAM TRACING: CPT

## 2021-03-16 PROCEDURE — 86850 RBC ANTIBODY SCREEN: CPT

## 2021-03-16 PROCEDURE — 86769 SARS-COV-2 COVID-19 ANTIBODY: CPT

## 2021-03-16 PROCEDURE — 82728 ASSAY OF FERRITIN: CPT

## 2021-03-16 PROCEDURE — 85025 COMPLETE CBC W/AUTO DIFF WBC: CPT

## 2021-03-16 PROCEDURE — 83550 IRON BINDING TEST: CPT

## 2021-03-16 PROCEDURE — 83540 ASSAY OF IRON: CPT

## 2021-03-16 PROCEDURE — 85610 PROTHROMBIN TIME: CPT

## 2021-03-16 PROCEDURE — 80061 LIPID PANEL: CPT

## 2021-03-16 PROCEDURE — U0005: CPT

## 2021-03-16 PROCEDURE — 83735 ASSAY OF MAGNESIUM: CPT

## 2021-03-16 PROCEDURE — 84443 ASSAY THYROID STIM HORMONE: CPT

## 2021-03-16 PROCEDURE — 86901 BLOOD TYPING SEROLOGIC RH(D): CPT

## 2021-03-16 PROCEDURE — 87635 SARS-COV-2 COVID-19 AMP PRB: CPT

## 2021-05-13 NOTE — CONSULT NOTE ADULT - CONSULT REQUESTED DATE/TIME
Progress Note      Patient Name: Holly Morin   Patient ID: [id number]   Sex: Female   YOB: 1971    Primary Care Provider: Julissa MICHAELS   Referring Provider: Julissa MICHAELS    Visit Date: November 23, 2020    Provider: ASAF Lang   [facility]: [facility]   Location Address: [address] [place] [address], [address]  [place], [place]  [id number]   Location Phone: [phone]          Chief Complaint     nausea, abdominal spasms       History Of Present Illness     Ms. Morin is a 50 y/o female who was last evaluated in July, 2019 for LUQ abdominal pain and constipation.  She presents today for f/u and states that she's been experiencing nausea and vomiting and worsening pain.      Reports experiencing severe LUQ pain in September.  States that pain was so severe that she stopped eating.  Nausea, vomiting, pain and anorexia lasted for about 2 weeks.  Admits hematemesis for about 24 hours.  States that she stopped eating and ate ice chips.  By the end of the second week, she could tolerate pudding.  Continues to experience nausea.  She is only eating small meals now.       Reports a 50 lb. weight loss in the past one year due to not being able to eat.  States that she's still taking creon and feels that it was helping some prior to most recent episode.      Continues to be followed by hematology for non hereditary hemochromatosis.       Past Medical History  Abdominal pain; Abscess; Allergic rhinitis, chronic; Anxiety; Arthritis; Bladder Disorder; Bladder wall thickening; Bowel Disease; Constipation; Degenerative Disc Disease ; GERD; Hemorrhoids; Leg pain; Limb Swelling; Mood disorder; Primary osteoarthritis of knees, bilateral; Primary osteoarthritis of right knee; Rectal bleeding; Seasonal allergies; Ulcer; Urinary hesitancy; Urinary urgency         Past Surgical History  Bladder Surgery; Breast Surgery; Cholecystecomy; Colonoscopy; endoscopy; 
30-Jan-2021 09:39
"Gallbladder; Hysterectomy         Medication List  Creon 36,000-114,000- 180,000 unit oral capsule,delayed release(DR/EC); Miralax 17 gram/dose oral powder; omeprazole 40 mg oral capsule,delayed release(DR/EC); tamsulosin 0.4 mg oral capsule; venlafaxine 150 mg oral tablet extended release 24hr; Ventolin HFA 90 mcg/actuation inhalation HFA aerosol inhaler; Vitamin D2 1,250 mcg (50,000 unit) oral capsule         Allergy List  PENICILLINS; SULFA (SULFONAMIDES)       Allergies Reconciled  Family Medical History  Pancreatic Neoplasm, Malignant; Mesothelioma; Family history of Arthritis; Family history of cancer; Family history of heart disease; Family history of diabetes mellitus         Social History  Alcohol (Never); Caffeine (Current every day); Homemaker; lives with spouse; ; Recreational Drug Use (Never); Second hand smoke exposure (Current some day); Tobacco (Never); Unemployed.         Review of Systems  · Constitutional  o Denies  o : chills, fever  · Cardiovascular  o Denies  o : chest pain, irregular heart beats  · Respiratory  o Denies  o : cough, shortness of breath  · Gastrointestinal  o Admits  o : see HPI   · Endocrine  o Denies  o : weight gain, weight loss      Vitals  Date Time BP Position Site L\R Cuff Size HR RR TEMP (F) WT  HT  BMI kg/m2 BSA m2 O2 Sat FR L/min FiO2 HC       11/23/2020 09:43 /65 Sitting      98.4 158lbs 4oz 5'  6\" 25.54 1.83             Physical Examination  · Constitutional  o Appearance  o : Healthy-appearing, awake and alert in no acute distress  · Head and Face  o Head  o : Normocephalic with no worriesome skin lesions  · Eyes  o Vision  o :   § Visual Fields  § : eyes move symmetrical in all directions  o Sclerae  o : sclerae anicteric  o Pupils and Irises  o : pupils equal and symmetrical  · Neck  o Inspection/Palpation  o : Trachea is midline, no adenopathy  · Respiratory  o Respiratory Effort  o : Breathing is unlabored.  o Inspection of Chest  o : normal "
appearance  o Auscultation of Lungs  o : Chest is clear to auscultation bilaterally.  · Cardiovascular  o Heart  o :   § Auscultation of Heart  § : no murmurs, rubs, or gallops  o Peripheral Vascular System  o :   § Extremities  § : no cyanosis, clubbing or edema;   · Gastrointestinal  o Abdominal Examination  o : Abdomen is soft, nontender to palpation, with normal active bowel sounds, no appreciable hepatosplenomegaly.  o Digital Rectal Exam  o : deferred  · Skin and Subcutaneous Tissue  o General Inspection  o : without focal lesions; turgor is normal  · Psychiatric  o General  o : Alert and oriented x3  o Mood and Affect  o : Mood and affect are appropriate to circumstances  · Extremities  o Extremities  o : No edema, no cyanosis          Assessment  · Abdominal Pain, LUQ     789.02/R10.12  · Nausea     787.02/R11.0  · Vomiting     787.03/R11.10  · Weight Loss     783.21/R63.4  · Family history of pancreatic cancer     V16.0/Z80.0      Plan  · Orders  o CT Abdomen and Pelvis with IV Contrast [facility]; suggest Oral Prep (01324) - - 11/23/2020  · Medications  o Zofran 4 mg oral tablet   SIG: take 1 tablet by oral route every 6 hours as needed nausea   DISP: (20) Tablet with 1 refills  Prescribed on 11/23/2020     o Creon 36,000-114,000- 180,000 unit oral capsule,delayed release(DR/EC)   SIG: take 2 capsules by oral route 3 times a day with first bite of food and snacks   DISP: (120) Capsule with 3 refills  Refilled on 11/23/2020     o omeprazole 40 mg oral capsule,delayed release(DR/EC)   SIG: take 1 capsule (40 mg) by oral route once daily before a meal   DISP: (30) Capsule with 3 refills  Refilled on 11/23/2020     o Medications have been Reconciled  o Transition of Care or Provider Policy  · Instructions  o Information given on current diagnoses. If CT normal, consider EGD.  · Disposition  o Follow up 2 months            Electronically Signed by: Penelope Britt APRN -Author on November 23, 2020 10:10:10 AM  
01-Feb-2021 11:10
29-Jan-2021 23:46

## 2021-05-20 ENCOUNTER — INPATIENT (INPATIENT)
Facility: HOSPITAL | Age: 56
LOS: 5 days | Discharge: NURSING, REHAB OR LONG-TERM CARE | DRG: 292 | End: 2021-05-26
Attending: INTERNAL MEDICINE | Admitting: INTERNAL MEDICINE
Payer: MEDICARE

## 2021-05-20 VITALS
RESPIRATION RATE: 18 BRPM | HEART RATE: 80 BPM | WEIGHT: 203.05 LBS | DIASTOLIC BLOOD PRESSURE: 77 MMHG | SYSTOLIC BLOOD PRESSURE: 163 MMHG | HEIGHT: 65 IN | OXYGEN SATURATION: 98 % | TEMPERATURE: 98 F

## 2021-05-20 DIAGNOSIS — Z95.810 PRESENCE OF AUTOMATIC (IMPLANTABLE) CARDIAC DEFIBRILLATOR: Chronic | ICD-10-CM

## 2021-05-20 DIAGNOSIS — Z98.89 OTHER SPECIFIED POSTPROCEDURAL STATES: Chronic | ICD-10-CM

## 2021-05-20 DIAGNOSIS — D64.9 ANEMIA, UNSPECIFIED: ICD-10-CM

## 2021-05-20 LAB
ALBUMIN SERPL ELPH-MCNC: 3.4 G/DL — LOW (ref 3.5–5)
ALP SERPL-CCNC: 87 U/L — SIGNIFICANT CHANGE UP (ref 40–120)
ALT FLD-CCNC: 12 U/L DA — SIGNIFICANT CHANGE UP (ref 10–60)
ANION GAP SERPL CALC-SCNC: 11 MMOL/L — SIGNIFICANT CHANGE UP (ref 5–17)
AST SERPL-CCNC: 13 U/L — SIGNIFICANT CHANGE UP (ref 10–40)
BASOPHILS # BLD AUTO: 0.04 K/UL — SIGNIFICANT CHANGE UP (ref 0–0.2)
BASOPHILS NFR BLD AUTO: 0.4 % — SIGNIFICANT CHANGE UP (ref 0–2)
BILIRUB SERPL-MCNC: 0.3 MG/DL — SIGNIFICANT CHANGE UP (ref 0.2–1.2)
BUN SERPL-MCNC: 99 MG/DL — HIGH (ref 7–18)
CALCIUM SERPL-MCNC: 8.6 MG/DL — SIGNIFICANT CHANGE UP (ref 8.4–10.5)
CHLORIDE SERPL-SCNC: 102 MMOL/L — SIGNIFICANT CHANGE UP (ref 96–108)
CO2 SERPL-SCNC: 23 MMOL/L — SIGNIFICANT CHANGE UP (ref 22–31)
CREAT SERPL-MCNC: 3.45 MG/DL — HIGH (ref 0.5–1.3)
EOSINOPHIL # BLD AUTO: 0.04 K/UL — SIGNIFICANT CHANGE UP (ref 0–0.5)
EOSINOPHIL NFR BLD AUTO: 0.4 % — SIGNIFICANT CHANGE UP (ref 0–6)
GLUCOSE SERPL-MCNC: 274 MG/DL — HIGH (ref 70–99)
HCT VFR BLD CALC: 18.6 % — CRITICAL LOW (ref 34.5–45)
HGB BLD-MCNC: 5.1 G/DL — CRITICAL LOW (ref 11.5–15.5)
IMM GRANULOCYTES NFR BLD AUTO: 1.5 % — SIGNIFICANT CHANGE UP (ref 0–1.5)
IRON SATN MFR SERPL: 28 UG/DL — LOW (ref 40–150)
IRON SATN MFR SERPL: 7 % — LOW (ref 15–50)
LDH SERPL L TO P-CCNC: 283 U/L — HIGH (ref 120–225)
LYMPHOCYTES # BLD AUTO: 1.49 K/UL — SIGNIFICANT CHANGE UP (ref 1–3.3)
LYMPHOCYTES # BLD AUTO: 15.1 % — SIGNIFICANT CHANGE UP (ref 13–44)
MCHC RBC-ENTMCNC: 21.7 PG — LOW (ref 27–34)
MCHC RBC-ENTMCNC: 27.4 GM/DL — LOW (ref 32–36)
MCV RBC AUTO: 79.1 FL — LOW (ref 80–100)
MONOCYTES # BLD AUTO: 1.2 K/UL — HIGH (ref 0–0.9)
MONOCYTES NFR BLD AUTO: 12.1 % — SIGNIFICANT CHANGE UP (ref 2–14)
NEUTROPHILS # BLD AUTO: 6.97 K/UL — SIGNIFICANT CHANGE UP (ref 1.8–7.4)
NEUTROPHILS NFR BLD AUTO: 70.5 % — SIGNIFICANT CHANGE UP (ref 43–77)
NRBC # BLD: 2 /100 WBCS — HIGH (ref 0–0)
OB PNL STL: NEGATIVE — SIGNIFICANT CHANGE UP
PLATELET # BLD AUTO: 402 K/UL — HIGH (ref 150–400)
POTASSIUM SERPL-MCNC: 5.2 MMOL/L — SIGNIFICANT CHANGE UP (ref 3.5–5.3)
POTASSIUM SERPL-SCNC: 5.2 MMOL/L — SIGNIFICANT CHANGE UP (ref 3.5–5.3)
PROT SERPL-MCNC: 7.5 G/DL — SIGNIFICANT CHANGE UP (ref 6–8.3)
RBC # BLD: 2.35 M/UL — LOW (ref 3.8–5.2)
RBC # FLD: 19.9 % — HIGH (ref 10.3–14.5)
SARS-COV-2 RNA SPEC QL NAA+PROBE: SIGNIFICANT CHANGE UP
SODIUM SERPL-SCNC: 136 MMOL/L — SIGNIFICANT CHANGE UP (ref 135–145)
TIBC SERPL-MCNC: 423 UG/DL — SIGNIFICANT CHANGE UP (ref 250–450)
UIBC SERPL-MCNC: 395 UG/DL — HIGH (ref 110–370)
WBC # BLD: 9.89 K/UL — SIGNIFICANT CHANGE UP (ref 3.8–10.5)
WBC # FLD AUTO: 9.89 K/UL — SIGNIFICANT CHANGE UP (ref 3.8–10.5)

## 2021-05-20 PROCEDURE — 93010 ELECTROCARDIOGRAM REPORT: CPT

## 2021-05-20 PROCEDURE — 99285 EMERGENCY DEPT VISIT HI MDM: CPT | Mod: CS

## 2021-05-20 NOTE — ED PROVIDER NOTE - CARE PLAN
Principal Discharge DX:	Anemia   Principal Discharge DX:	Anemia  Secondary Diagnosis:	ESRD (end stage renal disease)

## 2021-05-20 NOTE — ED PROVIDER NOTE - OBJECTIVE STATEMENT
56yoF with h/o CAD, AICD, CHF, COPD, DM, HTN, on ASA 81 and no other antiplt/coag agent, presents from [facility] with anemia Hb 5.2 on labs 5/19. Pt denies all symptoms including black or bloody stool, hematuria, vaginal bleeding, CP, SOB, dizziness, change in LE edema, fever, vomiting, abd pain. 56yoF with h/o CAD, AICD, CHF, COPD, DM, HTN, on ASA 81 and no other antiplt/coag agent, presents from [facility] with anemia Hb 5.2 on labs 5/19. States her last transfusion was a few months ago due to vaginal bleeding but has not had bleeding in 3 months. Pt denies all symptoms including black or bloody stool, hematuria, vaginal bleeding, CP, SOB, dizziness, change in LE edema, fever, vomiting, abd pain.

## 2021-05-20 NOTE — ED ADULT NURSE NOTE - ED STAT RN HANDOFF DETAILS
received pt.in bed at 1910 pt.is alert and oriented x3. denies pain. presents to ed with anemia.hgb 5.1 ,hematocrit 18.6 started 1st unit of PRBC at 9pm with no a/r noted. no active bleeding noted, transfer to holding area.report given to rn holding.not  in distress

## 2021-05-20 NOTE — ED PROVIDER NOTE - CLINICAL SUMMARY MEDICAL DECISION MAKING FREE TEXT BOX
No active bleeding source. Entirely asymptomatic and hemodynamically stable. No active bleeding source. Entirely asymptomatic and hemodynamically stable. Patient well appearing, hemodynamically stable. Will give 2u PRBC. Admitted to internal medicine for further monitoring, w/u, and care.

## 2021-05-20 NOTE — ED PROVIDER NOTE - INTERNATIONAL TRAVEL
Detail Level: Detailed
Add 07255 Cpt? (Important Note: In 2017 The Use Of 10302 Is Being Tracked By Cms To Determine Future Global Period Reimbursement For Global Periods): yes
No
Full Thickness Lip Wedge Repair (Flap) Text: Given the location of the defect and the proximity to free margins a full thickness wedge repair was deemed most appropriate.  Using a sterile surgical marker, the appropriate repair was drawn incorporating the defect and placing the expected incisions perpendicular to the vermilion border.  The vermilion border was also meticulously outlined to ensure appropriate reapproximation during the repair.  The area thus outlined was incised through and through with a #15 scalpel blade.  The muscularis and dermis were reaproximated with deep sutures following hemostasis. Care was taken to realign the vermilion border before proceeding with the superficial closure.  Once the vermilion was realigned the superfical and mucosal closure was finished.

## 2021-05-21 DIAGNOSIS — Z29.9 ENCOUNTER FOR PROPHYLACTIC MEASURES, UNSPECIFIED: ICD-10-CM

## 2021-05-21 DIAGNOSIS — N18.6 END STAGE RENAL DISEASE: ICD-10-CM

## 2021-05-21 DIAGNOSIS — I25.10 ATHEROSCLEROTIC HEART DISEASE OF NATIVE CORONARY ARTERY WITHOUT ANGINA PECTORIS: ICD-10-CM

## 2021-05-21 DIAGNOSIS — I10 ESSENTIAL (PRIMARY) HYPERTENSION: ICD-10-CM

## 2021-05-21 DIAGNOSIS — E11.9 TYPE 2 DIABETES MELLITUS WITHOUT COMPLICATIONS: ICD-10-CM

## 2021-05-21 DIAGNOSIS — D64.9 ANEMIA, UNSPECIFIED: ICD-10-CM

## 2021-05-21 LAB
A1C WITH ESTIMATED AVERAGE GLUCOSE RESULT: 7.7 % — HIGH (ref 4–5.6)
ANION GAP SERPL CALC-SCNC: 10 MMOL/L — SIGNIFICANT CHANGE UP (ref 5–17)
BUN SERPL-MCNC: 91 MG/DL — HIGH (ref 7–18)
CALCIUM SERPL-MCNC: 8.7 MG/DL — SIGNIFICANT CHANGE UP (ref 8.4–10.5)
CHLORIDE SERPL-SCNC: 106 MMOL/L — SIGNIFICANT CHANGE UP (ref 96–108)
CHOLEST SERPL-MCNC: 117 MG/DL — SIGNIFICANT CHANGE UP
CO2 SERPL-SCNC: 23 MMOL/L — SIGNIFICANT CHANGE UP (ref 22–31)
COVID-19 SPIKE DOMAIN AB INTERP: POSITIVE
COVID-19 SPIKE DOMAIN ANTIBODY RESULT: >250 U/ML — HIGH
CREAT SERPL-MCNC: 3.15 MG/DL — HIGH (ref 0.5–1.3)
ESTIMATED AVERAGE GLUCOSE: 174 MG/DL — HIGH (ref 68–114)
FERRITIN SERPL-MCNC: 10 NG/ML — LOW (ref 15–150)
FOLATE SERPL-MCNC: 17.2 NG/ML — SIGNIFICANT CHANGE UP
GLUCOSE SERPL-MCNC: 147 MG/DL — HIGH (ref 70–99)
HAPTOGLOB SERPL-MCNC: 204 MG/DL — HIGH (ref 34–200)
HCT VFR BLD CALC: 24.3 % — LOW (ref 34.5–45)
HCT VFR BLD CALC: 28.7 % — LOW (ref 34.5–45)
HDLC SERPL-MCNC: 30 MG/DL — LOW
HGB BLD-MCNC: 7.1 G/DL — LOW (ref 11.5–15.5)
HGB BLD-MCNC: 8.3 G/DL — LOW (ref 11.5–15.5)
IRON SATN MFR SERPL: 25 UG/DL — LOW (ref 40–150)
IRON SATN MFR SERPL: 6 % — LOW (ref 15–50)
LIPID PNL WITH DIRECT LDL SERPL: 54 MG/DL — SIGNIFICANT CHANGE UP
MAGNESIUM SERPL-MCNC: 2.6 MG/DL — SIGNIFICANT CHANGE UP (ref 1.6–2.6)
MCHC RBC-ENTMCNC: 23.4 PG — LOW (ref 27–34)
MCHC RBC-ENTMCNC: 23.5 PG — LOW (ref 27–34)
MCHC RBC-ENTMCNC: 28.9 GM/DL — LOW (ref 32–36)
MCHC RBC-ENTMCNC: 29.2 GM/DL — LOW (ref 32–36)
MCV RBC AUTO: 80.5 FL — SIGNIFICANT CHANGE UP (ref 80–100)
MCV RBC AUTO: 80.8 FL — SIGNIFICANT CHANGE UP (ref 80–100)
NON HDL CHOLESTEROL: 87 MG/DL — SIGNIFICANT CHANGE UP
NRBC # BLD: 0 /100 WBCS — SIGNIFICANT CHANGE UP (ref 0–0)
NRBC # BLD: 0 /100 WBCS — SIGNIFICANT CHANGE UP (ref 0–0)
PHOSPHATE SERPL-MCNC: 4.6 MG/DL — HIGH (ref 2.5–4.5)
PLATELET # BLD AUTO: 401 K/UL — HIGH (ref 150–400)
PLATELET # BLD AUTO: 413 K/UL — HIGH (ref 150–400)
POTASSIUM SERPL-MCNC: 4.9 MMOL/L — SIGNIFICANT CHANGE UP (ref 3.5–5.3)
POTASSIUM SERPL-SCNC: 4.9 MMOL/L — SIGNIFICANT CHANGE UP (ref 3.5–5.3)
RBC # BLD: 3.02 M/UL — LOW (ref 3.8–5.2)
RBC # BLD: 3.02 M/UL — LOW (ref 3.8–5.2)
RBC # BLD: 3.55 M/UL — LOW (ref 3.8–5.2)
RBC # FLD: 18.4 % — HIGH (ref 10.3–14.5)
RBC # FLD: 19.3 % — HIGH (ref 10.3–14.5)
RETICS #: 58 K/UL — SIGNIFICANT CHANGE UP (ref 25–125)
RETICS/RBC NFR: 1.9 % — SIGNIFICANT CHANGE UP (ref 0.5–2.5)
SARS-COV-2 IGG+IGM SERPL QL IA: >250 U/ML — HIGH
SARS-COV-2 IGG+IGM SERPL QL IA: POSITIVE
SODIUM SERPL-SCNC: 139 MMOL/L — SIGNIFICANT CHANGE UP (ref 135–145)
TIBC SERPL-MCNC: 393 UG/DL — SIGNIFICANT CHANGE UP (ref 250–450)
TRANSFERRIN SERPL-MCNC: 337 MG/DL — SIGNIFICANT CHANGE UP (ref 200–360)
TRIGL SERPL-MCNC: 166 MG/DL — HIGH
TSH SERPL-MCNC: 1.54 UU/ML — SIGNIFICANT CHANGE UP (ref 0.34–4.82)
UIBC SERPL-MCNC: 368 UG/DL — SIGNIFICANT CHANGE UP (ref 110–370)
VIT B12 SERPL-MCNC: 1513 PG/ML — HIGH (ref 232–1245)
WBC # BLD: 10.82 K/UL — HIGH (ref 3.8–10.5)
WBC # BLD: 12.92 K/UL — HIGH (ref 3.8–10.5)
WBC # FLD AUTO: 10.82 K/UL — HIGH (ref 3.8–10.5)
WBC # FLD AUTO: 12.92 K/UL — HIGH (ref 3.8–10.5)

## 2021-05-21 RX ORDER — CHOLECALCIFEROL (VITAMIN D3) 125 MCG
1000 CAPSULE ORAL DAILY
Refills: 0 | Status: DISCONTINUED | OUTPATIENT
Start: 2021-05-21 | End: 2021-05-26

## 2021-05-21 RX ORDER — DEXTROSE 50 % IN WATER 50 %
15 SYRINGE (ML) INTRAVENOUS ONCE
Refills: 0 | Status: DISCONTINUED | OUTPATIENT
Start: 2021-05-21 | End: 2021-05-26

## 2021-05-21 RX ORDER — ALLOPURINOL 300 MG
100 TABLET ORAL DAILY
Refills: 0 | Status: DISCONTINUED | OUTPATIENT
Start: 2021-05-21 | End: 2021-05-26

## 2021-05-21 RX ORDER — ASPIRIN/CALCIUM CARB/MAGNESIUM 324 MG
81 TABLET ORAL DAILY
Refills: 0 | Status: DISCONTINUED | OUTPATIENT
Start: 2021-05-21 | End: 2021-05-26

## 2021-05-21 RX ORDER — PANTOPRAZOLE SODIUM 20 MG/1
40 TABLET, DELAYED RELEASE ORAL
Refills: 0 | Status: DISCONTINUED | OUTPATIENT
Start: 2021-05-21 | End: 2021-05-26

## 2021-05-21 RX ORDER — GABAPENTIN 400 MG/1
100 CAPSULE ORAL THREE TIMES A DAY
Refills: 0 | Status: DISCONTINUED | OUTPATIENT
Start: 2021-05-21 | End: 2021-05-26

## 2021-05-21 RX ORDER — FUROSEMIDE 40 MG
40 TABLET ORAL
Refills: 0 | Status: DISCONTINUED | OUTPATIENT
Start: 2021-05-21 | End: 2021-05-26

## 2021-05-21 RX ORDER — INSULIN LISPRO 100/ML
VIAL (ML) SUBCUTANEOUS
Refills: 0 | Status: DISCONTINUED | OUTPATIENT
Start: 2021-05-21 | End: 2021-05-24

## 2021-05-21 RX ORDER — SPIRONOLACTONE 25 MG/1
50 TABLET, FILM COATED ORAL DAILY
Refills: 0 | Status: DISCONTINUED | OUTPATIENT
Start: 2021-05-21 | End: 2021-05-26

## 2021-05-21 RX ORDER — SEVELAMER CARBONATE 2400 MG/1
800 POWDER, FOR SUSPENSION ORAL
Refills: 0 | Status: DISCONTINUED | OUTPATIENT
Start: 2021-05-21 | End: 2021-05-26

## 2021-05-21 RX ORDER — ALBUTEROL 90 UG/1
2 AEROSOL, METERED ORAL EVERY 6 HOURS
Refills: 0 | Status: DISCONTINUED | OUTPATIENT
Start: 2021-05-21 | End: 2021-05-26

## 2021-05-21 RX ORDER — INSULIN NPH HUM/REG INSULIN HM 70-30/ML
18 VIAL (ML) SUBCUTANEOUS
Qty: 0 | Refills: 0 | DISCHARGE

## 2021-05-21 RX ORDER — ATORVASTATIN CALCIUM 80 MG/1
40 TABLET, FILM COATED ORAL AT BEDTIME
Refills: 0 | Status: DISCONTINUED | OUTPATIENT
Start: 2021-05-21 | End: 2021-05-26

## 2021-05-21 RX ORDER — CARVEDILOL PHOSPHATE 80 MG/1
6.25 CAPSULE, EXTENDED RELEASE ORAL EVERY 12 HOURS
Refills: 0 | Status: DISCONTINUED | OUTPATIENT
Start: 2021-05-21 | End: 2021-05-26

## 2021-05-21 RX ORDER — DEXTROSE 50 % IN WATER 50 %
25 SYRINGE (ML) INTRAVENOUS ONCE
Refills: 0 | Status: DISCONTINUED | OUTPATIENT
Start: 2021-05-21 | End: 2021-05-26

## 2021-05-21 RX ORDER — SENNA PLUS 8.6 MG/1
2 TABLET ORAL AT BEDTIME
Refills: 0 | Status: DISCONTINUED | OUTPATIENT
Start: 2021-05-21 | End: 2021-05-26

## 2021-05-21 RX ADMIN — SEVELAMER CARBONATE 800 MILLIGRAM(S): 2400 POWDER, FOR SUSPENSION ORAL at 08:30

## 2021-05-21 RX ADMIN — Medication 40 MILLIGRAM(S): at 17:42

## 2021-05-21 RX ADMIN — Medication 40 MILLIGRAM(S): at 05:41

## 2021-05-21 RX ADMIN — CARVEDILOL PHOSPHATE 6.25 MILLIGRAM(S): 80 CAPSULE, EXTENDED RELEASE ORAL at 05:41

## 2021-05-21 RX ADMIN — Medication 1 TABLET(S): at 11:49

## 2021-05-21 RX ADMIN — Medication 0: at 22:07

## 2021-05-21 RX ADMIN — Medication 1000 UNIT(S): at 11:48

## 2021-05-21 RX ADMIN — Medication 100 MILLIGRAM(S): at 11:49

## 2021-05-21 RX ADMIN — SEVELAMER CARBONATE 800 MILLIGRAM(S): 2400 POWDER, FOR SUSPENSION ORAL at 11:49

## 2021-05-21 RX ADMIN — Medication 2: at 17:43

## 2021-05-21 RX ADMIN — SENNA PLUS 2 TABLET(S): 8.6 TABLET ORAL at 22:06

## 2021-05-21 RX ADMIN — GABAPENTIN 100 MILLIGRAM(S): 400 CAPSULE ORAL at 22:06

## 2021-05-21 RX ADMIN — CARVEDILOL PHOSPHATE 6.25 MILLIGRAM(S): 80 CAPSULE, EXTENDED RELEASE ORAL at 17:42

## 2021-05-21 RX ADMIN — PANTOPRAZOLE SODIUM 40 MILLIGRAM(S): 20 TABLET, DELAYED RELEASE ORAL at 05:41

## 2021-05-21 RX ADMIN — Medication 1: at 11:49

## 2021-05-21 RX ADMIN — ATORVASTATIN CALCIUM 40 MILLIGRAM(S): 80 TABLET, FILM COATED ORAL at 22:06

## 2021-05-21 RX ADMIN — Medication 81 MILLIGRAM(S): at 11:48

## 2021-05-21 RX ADMIN — SEVELAMER CARBONATE 800 MILLIGRAM(S): 2400 POWDER, FOR SUSPENSION ORAL at 17:42

## 2021-05-21 RX ADMIN — GABAPENTIN 100 MILLIGRAM(S): 400 CAPSULE ORAL at 13:17

## 2021-05-21 RX ADMIN — GABAPENTIN 100 MILLIGRAM(S): 400 CAPSULE ORAL at 05:41

## 2021-05-21 RX ADMIN — SPIRONOLACTONE 50 MILLIGRAM(S): 25 TABLET, FILM COATED ORAL at 05:41

## 2021-05-21 NOTE — H&P ADULT - ASSESSMENT
55 yo F from [facility] assisted living with h/o CAD, AICD, CHF, COPD, DM, HTN presents with anemia Hb 5.2 on labs 5/19. Pt is admitted for blood transfusion

## 2021-05-21 NOTE — PATIENT PROFILE ADULT - FALL HARM RISK
Patient c/o blood pressure issues he has appointment on 11/21, but would like to know if he can take another lozapram to bring it down.   other

## 2021-05-21 NOTE — H&P ADULT - PROBLEM/PLAN-4
Addended by: Linda Hernández on: 7/27/2020 12:13 PM     Modules accepted: Orders
DISPLAY PLAN FREE TEXT

## 2021-05-21 NOTE — H&P ADULT - PROBLEM SELECTOR PLAN 6
IMPROVE VTE Individual Risk Assessment  RISK                                                         Points  [  ] Previous VTE                                      3  [  ] Thrombophilia                                   2  [  ] Lower limb paralysis                         2 (unable to hold up >15 seconds)    [  ] Current Cancer                                  2       (within 6 months)  [  ] Immobilization > 24 hrs                    1  [  ] ICU/CCU stay > 24 hrs                         1  [  ] Age > 60                                              1  cw scd for now

## 2021-05-21 NOTE — H&P ADULT - NSHPPHYSICALEXAM_GEN_ALL_CORE
Vital Signs Last 24 Hrs  T(C): 36.7 (21 May 2021 00:00), Max: 37.1 (20 May 2021 20:12)  T(F): 98.1 (21 May 2021 00:00), Max: 98.7 (20 May 2021 20:12)  HR: 67 (21 May 2021 00:00) (67 - 81)  BP: 119/72 (21 May 2021 00:00) (117/69 - 163/77)  BP(mean): --  RR: 15 (21 May 2021 00:00) (15 - 18)  SpO2: 98% (21 May 2021 00:00) (97% - 98%)    Physical exam:  GENERAL: NAD, lying in bed comfortably  HEAD:  Atraumatic, Normocephalic  EYES: EOMI, PERRLA, conjunctiva and sclera clear  ENT: Moist mucous membranes  NECK: Supple, No JVD  CHEST/LUNG: Clear to auscultation bilaterally; No rales, rhonchi, wheezing, or rubs.  HEART: Regular rate and rhythm; S1+ S2+   ABDOMEN: Bowel sounds present; Soft, Nontender, Nondistended   EXTREMITIES:  2+ Peripheral Pulses, brisk capillary refill. No clubbing, cyanosis, or edema  NERVOUS SYSTEM:  Alert & Oriented , speech clear   MSK: FROM all 4 extremities, full and equal strength  SKIN: No rashes or lesions

## 2021-05-21 NOTE — H&P ADULT - PROBLEM SELECTOR PLAN 1
Pt with hx of anemia pw hgb 5.1  occult negative in ED   no active bleeding   pt not on AC, at home on asa   will transfuse 2 u prbc in ED   will repeat cbc in am

## 2021-05-21 NOTE — H&P ADULT - HISTORY OF PRESENT ILLNESS
57 yo F from [facility] assisted living with h/o CAD, AICD, CHF, COPD, DM, HTN presents with anemia Hb 5.2 on labs 5/19. Pt states her last transfusion was a few months ago (earlier in year) due to vaginal bleeding. Her last MP bleeding was 1 month. Pt denies all symptoms including black or bloody stool, hematuria, vaginal bleeding, CP, SOB, dizziness, change in LE edema, fever, vomiting, abd pain or any other complains

## 2021-05-22 LAB
ALBUMIN SERPL ELPH-MCNC: 3.1 G/DL — LOW (ref 3.5–5)
ALP SERPL-CCNC: 83 U/L — SIGNIFICANT CHANGE UP (ref 40–120)
ALT FLD-CCNC: 11 U/L DA — SIGNIFICANT CHANGE UP (ref 10–60)
ANION GAP SERPL CALC-SCNC: 12 MMOL/L — SIGNIFICANT CHANGE UP (ref 5–17)
AST SERPL-CCNC: 8 U/L — LOW (ref 10–40)
BASOPHILS # BLD AUTO: 0.06 K/UL — SIGNIFICANT CHANGE UP (ref 0–0.2)
BASOPHILS NFR BLD AUTO: 0.5 % — SIGNIFICANT CHANGE UP (ref 0–2)
BILIRUB SERPL-MCNC: 0.3 MG/DL — SIGNIFICANT CHANGE UP (ref 0.2–1.2)
BUN SERPL-MCNC: 103 MG/DL — HIGH (ref 7–18)
CALCIUM SERPL-MCNC: 9 MG/DL — SIGNIFICANT CHANGE UP (ref 8.4–10.5)
CHLORIDE SERPL-SCNC: 105 MMOL/L — SIGNIFICANT CHANGE UP (ref 96–108)
CO2 SERPL-SCNC: 22 MMOL/L — SIGNIFICANT CHANGE UP (ref 22–31)
CREAT SERPL-MCNC: 3.84 MG/DL — HIGH (ref 0.5–1.3)
EOSINOPHIL # BLD AUTO: 0.09 K/UL — SIGNIFICANT CHANGE UP (ref 0–0.5)
EOSINOPHIL NFR BLD AUTO: 0.7 % — SIGNIFICANT CHANGE UP (ref 0–6)
GLUCOSE SERPL-MCNC: 180 MG/DL — HIGH (ref 70–99)
HCT VFR BLD CALC: 26.4 % — LOW (ref 34.5–45)
HCT VFR BLD CALC: 32.1 % — LOW (ref 34.5–45)
HGB BLD-MCNC: 7.7 G/DL — LOW (ref 11.5–15.5)
HGB BLD-MCNC: 9.5 G/DL — LOW (ref 11.5–15.5)
IMM GRANULOCYTES NFR BLD AUTO: 0.8 % — SIGNIFICANT CHANGE UP (ref 0–1.5)
LYMPHOCYTES # BLD AUTO: 1.37 K/UL — SIGNIFICANT CHANGE UP (ref 1–3.3)
LYMPHOCYTES # BLD AUTO: 11.1 % — LOW (ref 13–44)
MAGNESIUM SERPL-MCNC: 2.6 MG/DL — SIGNIFICANT CHANGE UP (ref 1.6–2.6)
MCHC RBC-ENTMCNC: 23.4 PG — LOW (ref 27–34)
MCHC RBC-ENTMCNC: 24.1 PG — LOW (ref 27–34)
MCHC RBC-ENTMCNC: 29.2 GM/DL — LOW (ref 32–36)
MCHC RBC-ENTMCNC: 29.6 GM/DL — LOW (ref 32–36)
MCV RBC AUTO: 80.2 FL — SIGNIFICANT CHANGE UP (ref 80–100)
MCV RBC AUTO: 81.3 FL — SIGNIFICANT CHANGE UP (ref 80–100)
MONOCYTES # BLD AUTO: 1.4 K/UL — HIGH (ref 0–0.9)
MONOCYTES NFR BLD AUTO: 11.3 % — SIGNIFICANT CHANGE UP (ref 2–14)
NEUTROPHILS # BLD AUTO: 9.36 K/UL — HIGH (ref 1.8–7.4)
NEUTROPHILS NFR BLD AUTO: 75.6 % — SIGNIFICANT CHANGE UP (ref 43–77)
NRBC # BLD: 0 /100 WBCS — SIGNIFICANT CHANGE UP (ref 0–0)
NRBC # BLD: 0 /100 WBCS — SIGNIFICANT CHANGE UP (ref 0–0)
PHOSPHATE SERPL-MCNC: 5 MG/DL — HIGH (ref 2.5–4.5)
PLATELET # BLD AUTO: 374 K/UL — SIGNIFICANT CHANGE UP (ref 150–400)
PLATELET # BLD AUTO: 405 K/UL — HIGH (ref 150–400)
POTASSIUM SERPL-MCNC: 4.7 MMOL/L — SIGNIFICANT CHANGE UP (ref 3.5–5.3)
POTASSIUM SERPL-SCNC: 4.7 MMOL/L — SIGNIFICANT CHANGE UP (ref 3.5–5.3)
PROT SERPL-MCNC: 7.3 G/DL — SIGNIFICANT CHANGE UP (ref 6–8.3)
RBC # BLD: 3.29 M/UL — LOW (ref 3.8–5.2)
RBC # BLD: 3.95 M/UL — SIGNIFICANT CHANGE UP (ref 3.8–5.2)
RBC # FLD: 18.2 % — HIGH (ref 10.3–14.5)
RBC # FLD: 18.6 % — HIGH (ref 10.3–14.5)
SODIUM SERPL-SCNC: 139 MMOL/L — SIGNIFICANT CHANGE UP (ref 135–145)
WBC # BLD: 12.33 K/UL — HIGH (ref 3.8–10.5)
WBC # BLD: 12.38 K/UL — HIGH (ref 3.8–10.5)
WBC # FLD AUTO: 12.33 K/UL — HIGH (ref 3.8–10.5)
WBC # FLD AUTO: 12.38 K/UL — HIGH (ref 3.8–10.5)

## 2021-05-22 PROCEDURE — 76830 TRANSVAGINAL US NON-OB: CPT | Mod: 26

## 2021-05-22 PROCEDURE — 76856 US EXAM PELVIC COMPLETE: CPT | Mod: 26

## 2021-05-22 RX ADMIN — SEVELAMER CARBONATE 800 MILLIGRAM(S): 2400 POWDER, FOR SUSPENSION ORAL at 11:51

## 2021-05-22 RX ADMIN — CARVEDILOL PHOSPHATE 6.25 MILLIGRAM(S): 80 CAPSULE, EXTENDED RELEASE ORAL at 05:42

## 2021-05-22 RX ADMIN — GABAPENTIN 100 MILLIGRAM(S): 400 CAPSULE ORAL at 05:43

## 2021-05-22 RX ADMIN — CARVEDILOL PHOSPHATE 6.25 MILLIGRAM(S): 80 CAPSULE, EXTENDED RELEASE ORAL at 17:21

## 2021-05-22 RX ADMIN — SEVELAMER CARBONATE 800 MILLIGRAM(S): 2400 POWDER, FOR SUSPENSION ORAL at 17:21

## 2021-05-22 RX ADMIN — SEVELAMER CARBONATE 800 MILLIGRAM(S): 2400 POWDER, FOR SUSPENSION ORAL at 08:13

## 2021-05-22 RX ADMIN — Medication 100 MILLIGRAM(S): at 11:51

## 2021-05-22 RX ADMIN — Medication 1: at 08:11

## 2021-05-22 RX ADMIN — SPIRONOLACTONE 50 MILLIGRAM(S): 25 TABLET, FILM COATED ORAL at 05:42

## 2021-05-22 RX ADMIN — GABAPENTIN 100 MILLIGRAM(S): 400 CAPSULE ORAL at 22:00

## 2021-05-22 RX ADMIN — Medication 2: at 17:21

## 2021-05-22 RX ADMIN — Medication 1 TABLET(S): at 11:51

## 2021-05-22 RX ADMIN — ATORVASTATIN CALCIUM 40 MILLIGRAM(S): 80 TABLET, FILM COATED ORAL at 21:59

## 2021-05-22 RX ADMIN — GABAPENTIN 100 MILLIGRAM(S): 400 CAPSULE ORAL at 13:37

## 2021-05-22 RX ADMIN — Medication 1: at 22:00

## 2021-05-22 RX ADMIN — Medication 81 MILLIGRAM(S): at 11:51

## 2021-05-22 RX ADMIN — Medication 1: at 12:09

## 2021-05-22 RX ADMIN — SENNA PLUS 2 TABLET(S): 8.6 TABLET ORAL at 21:59

## 2021-05-22 RX ADMIN — Medication 1000 UNIT(S): at 11:52

## 2021-05-22 RX ADMIN — Medication 40 MILLIGRAM(S): at 05:42

## 2021-05-22 RX ADMIN — Medication 40 MILLIGRAM(S): at 17:21

## 2021-05-22 RX ADMIN — PANTOPRAZOLE SODIUM 40 MILLIGRAM(S): 20 TABLET, DELAYED RELEASE ORAL at 08:24

## 2021-05-22 NOTE — CHART NOTE - NSCHARTNOTEFT_GEN_A_CORE
call team signed out to transfuse to keep Hb > over 8. As Hb 7.7 this AM, ordered 1 pRBC and f/u post transfusion CBC at 1pm

## 2021-05-23 LAB
ALBUMIN SERPL ELPH-MCNC: 3.6 G/DL — SIGNIFICANT CHANGE UP (ref 3.5–5)
ALP SERPL-CCNC: 78 U/L — SIGNIFICANT CHANGE UP (ref 40–120)
ALT FLD-CCNC: 10 U/L DA — SIGNIFICANT CHANGE UP (ref 10–60)
ANION GAP SERPL CALC-SCNC: 9 MMOL/L — SIGNIFICANT CHANGE UP (ref 5–17)
AST SERPL-CCNC: 11 U/L — SIGNIFICANT CHANGE UP (ref 10–40)
BASOPHILS # BLD AUTO: 0.06 K/UL — SIGNIFICANT CHANGE UP (ref 0–0.2)
BASOPHILS NFR BLD AUTO: 0.5 % — SIGNIFICANT CHANGE UP (ref 0–2)
BILIRUB SERPL-MCNC: 0.4 MG/DL — SIGNIFICANT CHANGE UP (ref 0.2–1.2)
BUN SERPL-MCNC: 116 MG/DL — HIGH (ref 7–18)
CALCIUM SERPL-MCNC: 9 MG/DL — SIGNIFICANT CHANGE UP (ref 8.4–10.5)
CHLORIDE SERPL-SCNC: 106 MMOL/L — SIGNIFICANT CHANGE UP (ref 96–108)
CO2 SERPL-SCNC: 23 MMOL/L — SIGNIFICANT CHANGE UP (ref 22–31)
CREAT SERPL-MCNC: 3.82 MG/DL — HIGH (ref 0.5–1.3)
EOSINOPHIL # BLD AUTO: 0.1 K/UL — SIGNIFICANT CHANGE UP (ref 0–0.5)
EOSINOPHIL NFR BLD AUTO: 0.8 % — SIGNIFICANT CHANGE UP (ref 0–6)
GLUCOSE SERPL-MCNC: 164 MG/DL — HIGH (ref 70–99)
HCT VFR BLD CALC: 30.1 % — LOW (ref 34.5–45)
HGB BLD-MCNC: 8.9 G/DL — LOW (ref 11.5–15.5)
IMM GRANULOCYTES NFR BLD AUTO: 0.7 % — SIGNIFICANT CHANGE UP (ref 0–1.5)
LDH SERPL L TO P-CCNC: 262 U/L — HIGH (ref 120–225)
LYMPHOCYTES # BLD AUTO: 1.49 K/UL — SIGNIFICANT CHANGE UP (ref 1–3.3)
LYMPHOCYTES # BLD AUTO: 12.4 % — LOW (ref 13–44)
MAGNESIUM SERPL-MCNC: 2.5 MG/DL — SIGNIFICANT CHANGE UP (ref 1.6–2.6)
MCHC RBC-ENTMCNC: 24.1 PG — LOW (ref 27–34)
MCHC RBC-ENTMCNC: 29.6 GM/DL — LOW (ref 32–36)
MCV RBC AUTO: 81.6 FL — SIGNIFICANT CHANGE UP (ref 80–100)
MONOCYTES # BLD AUTO: 1.39 K/UL — HIGH (ref 0–0.9)
MONOCYTES NFR BLD AUTO: 11.5 % — SIGNIFICANT CHANGE UP (ref 2–14)
NEUTROPHILS # BLD AUTO: 8.93 K/UL — HIGH (ref 1.8–7.4)
NEUTROPHILS NFR BLD AUTO: 74.1 % — SIGNIFICANT CHANGE UP (ref 43–77)
NRBC # BLD: 0 /100 WBCS — SIGNIFICANT CHANGE UP (ref 0–0)
PHOSPHATE SERPL-MCNC: 5.2 MG/DL — HIGH (ref 2.5–4.5)
PLATELET # BLD AUTO: 386 K/UL — SIGNIFICANT CHANGE UP (ref 150–400)
POTASSIUM SERPL-MCNC: 4.6 MMOL/L — SIGNIFICANT CHANGE UP (ref 3.5–5.3)
POTASSIUM SERPL-SCNC: 4.6 MMOL/L — SIGNIFICANT CHANGE UP (ref 3.5–5.3)
PROT SERPL-MCNC: 7.6 G/DL — SIGNIFICANT CHANGE UP (ref 6–8.3)
RBC # BLD: 3.69 M/UL — LOW (ref 3.8–5.2)
RBC # BLD: 3.69 M/UL — LOW (ref 3.8–5.2)
RBC # FLD: 18.6 % — HIGH (ref 10.3–14.5)
RETICS #: 81.8 K/UL — SIGNIFICANT CHANGE UP (ref 25–125)
RETICS/RBC NFR: 2.2 % — SIGNIFICANT CHANGE UP (ref 0.5–2.5)
SODIUM SERPL-SCNC: 138 MMOL/L — SIGNIFICANT CHANGE UP (ref 135–145)
WBC # BLD: 12.06 K/UL — HIGH (ref 3.8–10.5)
WBC # FLD AUTO: 12.06 K/UL — HIGH (ref 3.8–10.5)

## 2021-05-23 PROCEDURE — 83020 HEMOGLOBIN ELECTROPHORESIS: CPT | Mod: 26

## 2021-05-23 RX ADMIN — PANTOPRAZOLE SODIUM 40 MILLIGRAM(S): 20 TABLET, DELAYED RELEASE ORAL at 05:42

## 2021-05-23 RX ADMIN — GABAPENTIN 100 MILLIGRAM(S): 400 CAPSULE ORAL at 22:00

## 2021-05-23 RX ADMIN — Medication 1: at 16:55

## 2021-05-23 RX ADMIN — GABAPENTIN 100 MILLIGRAM(S): 400 CAPSULE ORAL at 05:42

## 2021-05-23 RX ADMIN — Medication 4: at 12:27

## 2021-05-23 RX ADMIN — Medication 81 MILLIGRAM(S): at 12:27

## 2021-05-23 RX ADMIN — Medication 40 MILLIGRAM(S): at 17:03

## 2021-05-23 RX ADMIN — SEVELAMER CARBONATE 800 MILLIGRAM(S): 2400 POWDER, FOR SUSPENSION ORAL at 16:55

## 2021-05-23 RX ADMIN — GABAPENTIN 100 MILLIGRAM(S): 400 CAPSULE ORAL at 13:32

## 2021-05-23 RX ADMIN — Medication 1: at 22:00

## 2021-05-23 RX ADMIN — SENNA PLUS 2 TABLET(S): 8.6 TABLET ORAL at 22:00

## 2021-05-23 RX ADMIN — CARVEDILOL PHOSPHATE 6.25 MILLIGRAM(S): 80 CAPSULE, EXTENDED RELEASE ORAL at 17:03

## 2021-05-23 RX ADMIN — SEVELAMER CARBONATE 800 MILLIGRAM(S): 2400 POWDER, FOR SUSPENSION ORAL at 12:27

## 2021-05-23 RX ADMIN — Medication 1: at 07:43

## 2021-05-23 RX ADMIN — ATORVASTATIN CALCIUM 40 MILLIGRAM(S): 80 TABLET, FILM COATED ORAL at 22:00

## 2021-05-23 RX ADMIN — Medication 100 MILLIGRAM(S): at 12:28

## 2021-05-23 RX ADMIN — SEVELAMER CARBONATE 800 MILLIGRAM(S): 2400 POWDER, FOR SUSPENSION ORAL at 07:43

## 2021-05-23 RX ADMIN — Medication 1 TABLET(S): at 12:27

## 2021-05-23 RX ADMIN — Medication 1000 UNIT(S): at 12:27

## 2021-05-24 ENCOUNTER — TRANSCRIPTION ENCOUNTER (OUTPATIENT)
Age: 56
End: 2021-05-24

## 2021-05-24 DIAGNOSIS — Z02.9 ENCOUNTER FOR ADMINISTRATIVE EXAMINATIONS, UNSPECIFIED: ICD-10-CM

## 2021-05-24 LAB
ALBUMIN SERPL ELPH-MCNC: 3.6 G/DL — SIGNIFICANT CHANGE UP (ref 3.5–5)
ALP SERPL-CCNC: 79 U/L — SIGNIFICANT CHANGE UP (ref 40–120)
ALT FLD-CCNC: 14 U/L DA — SIGNIFICANT CHANGE UP (ref 10–60)
ANION GAP SERPL CALC-SCNC: 9 MMOL/L — SIGNIFICANT CHANGE UP (ref 5–17)
AST SERPL-CCNC: 12 U/L — SIGNIFICANT CHANGE UP (ref 10–40)
BASOPHILS # BLD AUTO: 0.06 K/UL — SIGNIFICANT CHANGE UP (ref 0–0.2)
BASOPHILS NFR BLD AUTO: 0.5 % — SIGNIFICANT CHANGE UP (ref 0–2)
BILIRUB SERPL-MCNC: 0.4 MG/DL — SIGNIFICANT CHANGE UP (ref 0.2–1.2)
BUN SERPL-MCNC: 126 MG/DL — HIGH (ref 7–18)
CALCIUM SERPL-MCNC: 9.3 MG/DL — SIGNIFICANT CHANGE UP (ref 8.4–10.5)
CHLORIDE SERPL-SCNC: 107 MMOL/L — SIGNIFICANT CHANGE UP (ref 96–108)
CO2 SERPL-SCNC: 22 MMOL/L — SIGNIFICANT CHANGE UP (ref 22–31)
CREAT SERPL-MCNC: 3.92 MG/DL — HIGH (ref 0.5–1.3)
EOSINOPHIL # BLD AUTO: 0.11 K/UL — SIGNIFICANT CHANGE UP (ref 0–0.5)
EOSINOPHIL NFR BLD AUTO: 0.9 % — SIGNIFICANT CHANGE UP (ref 0–6)
GLUCOSE BLDC GLUCOMTR-MCNC: 145 MG/DL — HIGH (ref 70–99)
GLUCOSE BLDC GLUCOMTR-MCNC: 147 MG/DL — HIGH (ref 70–99)
GLUCOSE BLDC GLUCOMTR-MCNC: 150 MG/DL — HIGH (ref 70–99)
GLUCOSE BLDC GLUCOMTR-MCNC: 151 MG/DL — HIGH (ref 70–99)
GLUCOSE BLDC GLUCOMTR-MCNC: 153 MG/DL — HIGH (ref 70–99)
GLUCOSE BLDC GLUCOMTR-MCNC: 171 MG/DL — HIGH (ref 70–99)
GLUCOSE BLDC GLUCOMTR-MCNC: 173 MG/DL — HIGH (ref 70–99)
GLUCOSE BLDC GLUCOMTR-MCNC: 181 MG/DL — HIGH (ref 70–99)
GLUCOSE BLDC GLUCOMTR-MCNC: 185 MG/DL — HIGH (ref 70–99)
GLUCOSE BLDC GLUCOMTR-MCNC: 187 MG/DL — HIGH (ref 70–99)
GLUCOSE BLDC GLUCOMTR-MCNC: 188 MG/DL — HIGH (ref 70–99)
GLUCOSE BLDC GLUCOMTR-MCNC: 195 MG/DL — HIGH (ref 70–99)
GLUCOSE BLDC GLUCOMTR-MCNC: 198 MG/DL — HIGH (ref 70–99)
GLUCOSE BLDC GLUCOMTR-MCNC: 203 MG/DL — HIGH (ref 70–99)
GLUCOSE BLDC GLUCOMTR-MCNC: 214 MG/DL — HIGH (ref 70–99)
GLUCOSE BLDC GLUCOMTR-MCNC: 266 MG/DL — HIGH (ref 70–99)
GLUCOSE SERPL-MCNC: 159 MG/DL — HIGH (ref 70–99)
HCT VFR BLD CALC: 29.9 % — LOW (ref 34.5–45)
HEMOGLOBIN INTERPRETATION: SIGNIFICANT CHANGE UP
HGB A MFR BLD: 97.7 % — SIGNIFICANT CHANGE UP (ref 95.8–98)
HGB A2 MFR BLD: 2.3 % — SIGNIFICANT CHANGE UP (ref 2–3.2)
HGB BLD-MCNC: 8.7 G/DL — LOW (ref 11.5–15.5)
IMM GRANULOCYTES NFR BLD AUTO: 0.7 % — SIGNIFICANT CHANGE UP (ref 0–1.5)
LYMPHOCYTES # BLD AUTO: 1.73 K/UL — SIGNIFICANT CHANGE UP (ref 1–3.3)
LYMPHOCYTES # BLD AUTO: 14.9 % — SIGNIFICANT CHANGE UP (ref 13–44)
MAGNESIUM SERPL-MCNC: 2.6 MG/DL — SIGNIFICANT CHANGE UP (ref 1.6–2.6)
MCHC RBC-ENTMCNC: 24 PG — LOW (ref 27–34)
MCHC RBC-ENTMCNC: 29.1 GM/DL — LOW (ref 32–36)
MCV RBC AUTO: 82.4 FL — SIGNIFICANT CHANGE UP (ref 80–100)
MONOCYTES # BLD AUTO: 1.31 K/UL — HIGH (ref 0–0.9)
MONOCYTES NFR BLD AUTO: 11.3 % — SIGNIFICANT CHANGE UP (ref 2–14)
NEUTROPHILS # BLD AUTO: 8.31 K/UL — HIGH (ref 1.8–7.4)
NEUTROPHILS NFR BLD AUTO: 71.7 % — SIGNIFICANT CHANGE UP (ref 43–77)
NRBC # BLD: 0 /100 WBCS — SIGNIFICANT CHANGE UP (ref 0–0)
PHOSPHATE SERPL-MCNC: 5.2 MG/DL — HIGH (ref 2.5–4.5)
PLATELET # BLD AUTO: 377 K/UL — SIGNIFICANT CHANGE UP (ref 150–400)
POTASSIUM SERPL-MCNC: 4.6 MMOL/L — SIGNIFICANT CHANGE UP (ref 3.5–5.3)
POTASSIUM SERPL-SCNC: 4.6 MMOL/L — SIGNIFICANT CHANGE UP (ref 3.5–5.3)
PROT SERPL-MCNC: 7.6 G/DL — SIGNIFICANT CHANGE UP (ref 6–8.3)
RBC # BLD: 3.63 M/UL — LOW (ref 3.8–5.2)
RBC # FLD: 19.2 % — HIGH (ref 10.3–14.5)
SODIUM SERPL-SCNC: 138 MMOL/L — SIGNIFICANT CHANGE UP (ref 135–145)
WBC # BLD: 11.6 K/UL — HIGH (ref 3.8–10.5)
WBC # FLD AUTO: 11.6 K/UL — HIGH (ref 3.8–10.5)

## 2021-05-24 RX ORDER — INSULIN LISPRO 100/ML
VIAL (ML) SUBCUTANEOUS AT BEDTIME
Refills: 0 | Status: DISCONTINUED | OUTPATIENT
Start: 2021-05-24 | End: 2021-05-26

## 2021-05-24 RX ORDER — INSULIN GLARGINE 100 [IU]/ML
6 INJECTION, SOLUTION SUBCUTANEOUS AT BEDTIME
Refills: 0 | Status: DISCONTINUED | OUTPATIENT
Start: 2021-05-24 | End: 2021-05-26

## 2021-05-24 RX ORDER — GLUCAGON INJECTION, SOLUTION 0.5 MG/.1ML
1 INJECTION, SOLUTION SUBCUTANEOUS ONCE
Refills: 0 | Status: DISCONTINUED | OUTPATIENT
Start: 2021-05-24 | End: 2021-05-26

## 2021-05-24 RX ORDER — INSULIN LISPRO 100/ML
6 VIAL (ML) SUBCUTANEOUS
Refills: 0 | Status: DISCONTINUED | OUTPATIENT
Start: 2021-05-24 | End: 2021-05-26

## 2021-05-24 RX ORDER — INSULIN GLARGINE 100 [IU]/ML
18 INJECTION, SOLUTION SUBCUTANEOUS EVERY MORNING
Refills: 0 | Status: DISCONTINUED | OUTPATIENT
Start: 2021-05-24 | End: 2021-05-26

## 2021-05-24 RX ORDER — INSULIN LISPRO 100/ML
VIAL (ML) SUBCUTANEOUS
Refills: 0 | Status: DISCONTINUED | OUTPATIENT
Start: 2021-05-24 | End: 2021-05-26

## 2021-05-24 RX ORDER — SODIUM CHLORIDE 9 MG/ML
1000 INJECTION, SOLUTION INTRAVENOUS
Refills: 0 | Status: DISCONTINUED | OUTPATIENT
Start: 2021-05-24 | End: 2021-05-26

## 2021-05-24 RX ADMIN — Medication 1: at 08:21

## 2021-05-24 RX ADMIN — INSULIN GLARGINE 6 UNIT(S): 100 INJECTION, SOLUTION SUBCUTANEOUS at 21:48

## 2021-05-24 RX ADMIN — Medication 1000 UNIT(S): at 11:28

## 2021-05-24 RX ADMIN — Medication 40 MILLIGRAM(S): at 17:19

## 2021-05-24 RX ADMIN — GABAPENTIN 100 MILLIGRAM(S): 400 CAPSULE ORAL at 13:13

## 2021-05-24 RX ADMIN — CARVEDILOL PHOSPHATE 6.25 MILLIGRAM(S): 80 CAPSULE, EXTENDED RELEASE ORAL at 17:19

## 2021-05-24 RX ADMIN — SEVELAMER CARBONATE 800 MILLIGRAM(S): 2400 POWDER, FOR SUSPENSION ORAL at 17:20

## 2021-05-24 RX ADMIN — Medication 1 TABLET(S): at 11:28

## 2021-05-24 RX ADMIN — GABAPENTIN 100 MILLIGRAM(S): 400 CAPSULE ORAL at 05:55

## 2021-05-24 RX ADMIN — SEVELAMER CARBONATE 800 MILLIGRAM(S): 2400 POWDER, FOR SUSPENSION ORAL at 11:28

## 2021-05-24 RX ADMIN — PANTOPRAZOLE SODIUM 40 MILLIGRAM(S): 20 TABLET, DELAYED RELEASE ORAL at 05:55

## 2021-05-24 RX ADMIN — Medication 1: at 17:19

## 2021-05-24 RX ADMIN — SENNA PLUS 2 TABLET(S): 8.6 TABLET ORAL at 21:48

## 2021-05-24 RX ADMIN — ATORVASTATIN CALCIUM 40 MILLIGRAM(S): 80 TABLET, FILM COATED ORAL at 21:48

## 2021-05-24 RX ADMIN — Medication 81 MILLIGRAM(S): at 11:28

## 2021-05-24 RX ADMIN — SEVELAMER CARBONATE 800 MILLIGRAM(S): 2400 POWDER, FOR SUSPENSION ORAL at 08:21

## 2021-05-24 RX ADMIN — Medication 1: at 12:03

## 2021-05-24 RX ADMIN — GABAPENTIN 100 MILLIGRAM(S): 400 CAPSULE ORAL at 21:50

## 2021-05-24 RX ADMIN — Medication 100 MILLIGRAM(S): at 11:28

## 2021-05-24 RX ADMIN — Medication 6 UNIT(S): at 17:19

## 2021-05-24 NOTE — DISCHARGE NOTE PROVIDER - NSDCCPCAREPLAN_GEN_ALL_CORE_FT
PRINCIPAL DISCHARGE DIAGNOSIS  Diagnosis: Anemia  Assessment and Plan of Treatment: in setting of post menopausal vaginal bleeding/fibroid uterus , and chronic kidney disease.   You received blood transfusions.  Continue medications /supplements as prescribed by your doctor.   Follow-up with your primary care physician within 1 week. Call for appointment.  Report to your doctor or seek immediate medical attention if you develop any changes in your condition and or worsening signs/symptoms (such as and not limited :active bleeding, weakness, chest discomfort, shortness of breath)        SECONDARY DISCHARGE DIAGNOSES  Diagnosis: ESRD (end stage renal disease)  Assessment and Plan of Treatment: ``    Diagnosis: CAD (coronary artery disease)  Assessment and Plan of Treatment: Continue medications as prescribed by your doctor.  Follow-up with your primary care physician within 1 week. Call for appointment.  Report to your doctor or seek immediate medical attention if you develop any changes in your condition and or worsening signs/symptoms (such as and not limited : shortness of breath, chest discomfort).      Diagnosis: HTN (hypertension)  Assessment and Plan of Treatment: Low salt diet  Activity as tolerated.  Take all medication as prescribed.  Follow up with your medical doctor for routine blood pressure monitoring at your next visit.  Notify your doctor if you have any of the following symptoms:   Dizziness, Lightheadedness, Blurry vision, Headache, Chest pain, Shortness of breath      Diagnosis: DM (diabetes mellitus)  Assessment and Plan of Treatment: DM (diabetes mellitus)     PRINCIPAL DISCHARGE DIAGNOSIS  Diagnosis: Anemia  Assessment and Plan of Treatment: Due to iron deficiency, fibroid uterus/vaginal bleeding and CKD.   Iron deficiency anemia (DORIS) means you have low red blood cell and hemoglobin levels. Hemoglobin is part of red blood cells and helps carry oxygen to your body. Iron helps make hemoglobin. DORIS is caused by a lack of iron in the blood. Blood loss and not enough iron in the foods you eat are the most common causes of low iron.  You received blood transfusions, epoge and iron infusions for your anemia.  Eat foods rich in iron and protein: Nuts, meat, dark leafy green vegetables, and beans are high in iron and protein. Limit milk to 2 cups a day. The calcium in milk can interfere with how your body absorbs iron. Take the iron supplement with food or a drink that is high in vitamin C. This helps your body absorb the iron. You may need to meet with a dietitian to create the right food plan for you.  Continue medications /supplements as prescribed by your doctor.   Follow-up with your primary care physician within 1 week. Call for appointment.  Report to your doctor or seek immediate medical attention if you develop any changes in your condition and or worsening signs/symptoms (such as and not limited to :active bleeding, weakness, chest discomfort, shortness of breath)  Please follow up with a gynecologist for further evaluation of your uterine fibroids.         SECONDARY DISCHARGE DIAGNOSES  Diagnosis: CAD (coronary artery disease)  Assessment and Plan of Treatment: Continue medications as prescribed by your doctor.  Follow-up with your primary care physician within 1 week. Call for appointment.  Report to your doctor or seek immediate medical attention if you develop any changes in your condition and or you develop shortness of breath, chest pain or weakness.       Diagnosis: HTN (hypertension)  Assessment and Plan of Treatment: Low salt diet  Activity as tolerated.  Take all medication as prescribed.  Follow up with your medical doctor for routine blood pressure monitoring at your next visit.  Notify your doctor if you have any of the following symptoms:   Dizziness, Lightheadedness, Blurry vision, Headache, Chest pain, Shortness of breath      Diagnosis: Stage 4 chronic kidney disease  Assessment and Plan of Treatment: Your creatinine was elevated while you were in the hospital meaning that your kidneys aren't functioning at their fullest capacity. This is likely due to your diabetes.   Please avoid medications that can further harm your kidneys such as IV contrast and NSAIDs (ibuprofen/advil, aleve, etc.). Follow up with your primary care doctor for routine monitoring of your kidney function.        PRINCIPAL DISCHARGE DIAGNOSIS  Diagnosis: Anemia  Assessment and Plan of Treatment: Due to iron deficiency, fibroid uterus/vaginal bleeding and CKD.   Iron deficiency anemia (DORIS) means you have low red blood cell and hemoglobin levels. Hemoglobin is part of red blood cells and helps carry oxygen to your body. Iron helps make hemoglobin. DORIS is caused by a lack of iron in the blood. Blood loss and not enough iron in the foods you eat are the most common causes of low iron.  You received blood transfusions, epoge and iron infusions for your anemia.  Eat foods rich in iron and protein: Nuts, meat, dark leafy green vegetables, and beans are high in iron and protein. Limit milk to 2 cups a day. The calcium in milk can interfere with how your body absorbs iron. Take the iron supplement with food or a drink that is high in vitamin C. This helps your body absorb the iron. You may need to meet with a dietitian to create the right food plan for you.  Continue medications /supplements as prescribed by your doctor.   Follow-up with your primary care physician within 1 week. Call for appointment.  Report to your doctor or seek immediate medical attention if you develop any changes in your condition and or worsening signs/symptoms (such as and not limited to :active bleeding, weakness, chest discomfort, shortness of breath)  Please follow up with a gynecologist for further evaluation of your uterine fibroids.   Please follow up with [facility] for further management for Epogen injections. You were given Epogen as inpatient today.         SECONDARY DISCHARGE DIAGNOSES  Diagnosis: CAD (coronary artery disease)  Assessment and Plan of Treatment: Continue medications as prescribed by your doctor.  Follow-up with your primary care physician within 1 week. Call for appointment.  Report to your doctor or seek immediate medical attention if you develop any changes in your condition and or you develop shortness of breath, chest pain or weakness.       Diagnosis: HTN (hypertension)  Assessment and Plan of Treatment: Low salt diet  Activity as tolerated.  Take all medication as prescribed.  Follow up with your medical doctor for routine blood pressure monitoring at your next visit.  Notify your doctor if you have any of the following symptoms:   Dizziness, Lightheadedness, Blurry vision, Headache, Chest pain, Shortness of breath      Diagnosis: Stage 4 chronic kidney disease  Assessment and Plan of Treatment: Your creatinine was elevated while you were in the hospital meaning that your kidneys aren't functioning at their fullest capacity. This is likely due to your diabetes.   Please avoid medications that can further harm your kidneys such as IV contrast and NSAIDs (ibuprofen/advil, aleve, etc.). Follow up with your primary care doctor for routine monitoring of your kidney function.

## 2021-05-24 NOTE — DISCHARGE NOTE PROVIDER - HOSPITAL COURSE
55 yo F from [facility] assisted living with h/o CAD, AICD, CHF, COPD, DM, HTN  who presented with anemia Hb 5.2 on labs 5/19. Pt states her last transfusion was a few months ago (earlier in year) due to vaginal bleeding. Her last MP bleeding was 1 month. Pt admitted with symptomatic anemia in setting of post-menopausal vaginal bleeding, fibroid uterus. Pt required  blood transfusions, 4 unit PRBC thus far. Keep Hgb >8 in setting of CAD. FOBT negative. CBC monitored.   GI consulted       Heme/Onc consulted.     Nephrology consulted to evaluate for Epogen in setting of anemia of CKD Stage 4-5.         -------------incomplete-----------------   57 yo F from [facility] assisted living with h/o CAD, AICD, CHF, COPD, DM, HTN  who presented with anemia Hb 5.2 on labs 5/19. Pt states her last transfusion was a few months ago (earlier in year) due to vaginal bleeding. Her last MP bleeding was 1 month. Pt admitted with symptomatic anemia in setting of post-menopausal vaginal bleeding, fibroid uterus. Pt required  blood transfusions, venofer and epogen. Hemoglobin maintained above 8 in setting of CAD. FOBT negative. CBC monitored.  Noted to have Stage 4 CKD likely due to diabetic nephropathy and nephrology followed while in house.    Heme/Onc consulted.     Nephrology consulted to evaluate for Epogen in setting of anemia of CKD Stage 4-5.       This is a brief hospital course, please refer to daily notes for more in depth account.

## 2021-05-24 NOTE — PROGRESS NOTE ADULT - ASSESSMENT
Patient is a 56 year old Female from [facility] living with h/o CAD, AICD, CHF, COPD, DM, HTN. Patient presented with anemia  Hb 5.2 on labs 5/19. Pt states her last transfusion was a few months ago (earlier in year) due to vaginal bleeding. Her last MP bleeding was 1 month. Pt denies all symptoms including black or bloody stool, hematuria, vaginal bleeding, CP, SOB, dizziness, change in LE edema, fever, vomiting, abd pain or any other complains    Patient is a 56 year old Female from [facility] living with h/o CAD, AICD, CHF, COPD, DM, HTN. Patient presented with anemia  Hb 5.2 on labs 5/19. Patient states her last transfusion was a few months ago (earlier in year) due to vaginal bleeding. Her last MP bleeding was 1 month. Patient admitted to medicine due to anemia secondary CKD. Patient treated with 4 units PRBC. Hematology consulting.

## 2021-05-24 NOTE — PROGRESS NOTE ADULT - PROBLEM SELECTOR PLAN 2
Not on HD   will monitor electrolytes daily -GFR 14  -Not on HD   -consulting HemOnc Dr. Hughes for initiation of epogen

## 2021-05-24 NOTE — CONSULT NOTE ADULT - SUBJECTIVE AND OBJECTIVE BOX
HPI:  55 yo F from [facility] assisted living with h/o CAD, AICD, CHF, COPD, DM, HTN presents with anemia Hb 5.2 on labs 5/19. Pt states her last transfusion was a few months ago (earlier in year) due to vaginal bleeding. Her last MP bleeding was 1 month. Pt denies all symptoms including black or bloody stool, hematuria, vaginal bleeding, CP, SOB, dizziness, change in LE edema, fever, vomiting, abd pain or any other complains        PMH:   HTN (hypertension)  DM (diabetes mellitus)  RA (rheumatoid arthritis)  COPD (chronic obstructive pulmonary disease)  AICD (automatic cardioverter/defibrillator) present  CAD (coronary artery disease)  CHF (congestive heart failure)  GERD (gastroesophageal reflux disease)        PSH:   History of cholecystectomy    ICD (implantable cardioverter-defibrillator) in place        FAMILY HISTORY:  Family history of hypertension in mother        Social History:  non-smoker/ non-alcoholic     Home Meds:  MEDICATIONS  (STANDING):  allopurinol 100 milliGRAM(s) Oral daily  aspirin  chewable 81 milliGRAM(s) Oral daily  atorvastatin 40 milliGRAM(s) Oral at bedtime  carvedilol 6.25 milliGRAM(s) Oral every 12 hours  cholecalciferol 1000 Unit(s) Oral daily  dextrose 40% Gel 15 Gram(s) Oral once  dextrose 5%. 1000 milliLiter(s) (100 mL/Hr) IV Continuous <Continuous>  dextrose 50% Injectable 25 Gram(s) IV Push once  furosemide    Tablet 40 milliGRAM(s) Oral two times a day  gabapentin 100 milliGRAM(s) Oral three times a day  glucagon  Injectable 1 milliGRAM(s) IntraMuscular once  insulin glargine Injectable (LANTUS) 6 Unit(s) SubCutaneous at bedtime  insulin glargine Injectable (LANTUS) 18 Unit(s) SubCutaneous every morning  insulin lispro (ADMELOG) corrective regimen sliding scale   SubCutaneous Before meals and at bedtime  insulin lispro (ADMELOG) corrective regimen sliding scale   SubCutaneous at bedtime  insulin lispro Injectable (ADMELOG) 6 Unit(s) SubCutaneous three times a day before meals  multivitamin 1 Tablet(s) Oral daily  pantoprazole    Tablet 40 milliGRAM(s) Oral before breakfast  senna 2 Tablet(s) Oral at bedtime  sevelamer carbonate 800 milliGRAM(s) Oral three times a day with meals  spironolactone 50 milliGRAM(s) Oral daily    MEDICATIONS  (PRN):  ALBUTerol    90 MICROgram(s) HFA Inhaler 2 Puff(s) Inhalation every 6 hours PRN Shortness of Breath and/or Wheezing      Allergies:  codeine (Urticaria)      REVIEW OF SYSTEMS:    CONSTITUTIONAL: No fever or chills  EYES: No eye pain or discharge  ENMT:  No throat pain  NECK: No pain or stiffness  RESPIRATORY: No cough. No shortness of breath  CARDIOVASCULAR: No chest pain or palpitations  GASTROINTESTINAL: No abdominal or epigastric pain. No nausea, vomiting, or diarrhea  GENITOURINARY: No dysuria, frequency, hematuria, or incontinence  NEUROLOGICAL: No headaches  SKIN: No itching, burning or rashes      Vital Signs Last 24 Hrs  T(C): 36.8 (24 May 2021 19:22), Max: 36.8 (24 May 2021 19:22)  T(F): 98.2 (24 May 2021 19:22), Max: 98.2 (24 May 2021 19:22)  HR: 69 (24 May 2021 19:22) (64 - 79)  BP: 127/68 (24 May 2021 19:22) (99/57 - 148/77)  BP(mean): --  RR: 18 (24 May 2021 19:22) (17 - 18)  SpO2: 95% (24 May 2021 19:22) (94% - 97%)    PHYSICAL EXAM:    GENERAL: NAD, well nourished, well-developed  HEAD:  Atraumatic, Normocephalic  EYES: Sclera anicteric  ENMT: Moist mucous membranes  NECK: Supple, No JVD  NERVOUS SYSTEM:  Alert & Oriented X3  CHEST/LUNG: Clear to auscultation  HEART: Regular rate and rhythm; No murmurs  ABDOMEN: Soft, Nontender, Nondistended; Bowel sounds present  EXTREMITIES:  1+ edema  SKIN: Normal turgor    LABS:                        8.7    11.60 )-----------( 377      ( 24 May 2021 06:12 )             29.9     05-24    138  |  107  |  126<H>  ----------------------------<  159<H>  4.6   |  22  |  3.92<H>    Ca    9.3      24 May 2021 06:12  Phos  5.2     05-24  Mg     2.6     05-24    TPro  7.6  /  Alb  3.6  /  TBili  0.4  /  DBili  x   /  AST  12  /  ALT  14  /  AlkPhos  79  05-24    ASSESSMENT AND PLAN:  1. CKD stage 4 due to diabetic nephropathy is stable at baseline  2. Anemia: MF, likely CKD as well  3. HTN is controlled  4. CHF is stable  Keep patient euvolemic and renal diet  Avoid Nephrotoxic Meds/ Agents such as NSAIDs, Gadolinium contrast, Phosphate containing enemas, etc..)  Adjust Medications according to eGFR  Start Epogen 94794 Units SQ q week  Obtain PTH  Follow BMP, H/H  Many thanks      
Patient is a 56y old  Female who presents with a chief complaint of low hgb (24 May 2021 17:10)      HPI:  55 yo F from [facility] assisted living with h/o CAD, AICD, CHF, COPD, DM, HTN presents with anemia Hb 5.2 on labs 5/19. Pt states her last transfusion was a few months ago (earlier in year) due to vaginal bleeding. Her last MP bleeding was 1 month. Pt denies all symptoms including black or bloody stool, hematuria, vaginal bleeding, CP, SOB, dizziness, change in LE edema, fever, vomiting, abd pain or any other complains  (21 May 2021 00:00)       ROS:  Negative except for:    PAST MEDICAL & SURGICAL HISTORY:  HTN (hypertension)    DM (diabetes mellitus)    RA (rheumatoid arthritis)    COPD (chronic obstructive pulmonary disease)    AICD (automatic cardioverter/defibrillator) present    CAD (coronary artery disease)    CHF (congestive heart failure)    GERD (gastroesophageal reflux disease)    History of cholecystectomy    ICD (implantable cardioverter-defibrillator) in place        SOCIAL HISTORY:    FAMILY HISTORY:  Family history of hypertension in mother        MEDICATIONS  (STANDING):  allopurinol 100 milliGRAM(s) Oral daily  aspirin  chewable 81 milliGRAM(s) Oral daily  atorvastatin 40 milliGRAM(s) Oral at bedtime  carvedilol 6.25 milliGRAM(s) Oral every 12 hours  cholecalciferol 1000 Unit(s) Oral daily  dextrose 40% Gel 15 Gram(s) Oral once  dextrose 5%. 1000 milliLiter(s) (100 mL/Hr) IV Continuous <Continuous>  dextrose 50% Injectable 25 Gram(s) IV Push once  furosemide    Tablet 40 milliGRAM(s) Oral two times a day  gabapentin 100 milliGRAM(s) Oral three times a day  glucagon  Injectable 1 milliGRAM(s) IntraMuscular once  insulin glargine Injectable (LANTUS) 6 Unit(s) SubCutaneous at bedtime  insulin glargine Injectable (LANTUS) 18 Unit(s) SubCutaneous every morning  insulin lispro (ADMELOG) corrective regimen sliding scale   SubCutaneous Before meals and at bedtime  insulin lispro (ADMELOG) corrective regimen sliding scale   SubCutaneous at bedtime  insulin lispro Injectable (ADMELOG) 6 Unit(s) SubCutaneous three times a day before meals  multivitamin 1 Tablet(s) Oral daily  pantoprazole    Tablet 40 milliGRAM(s) Oral before breakfast  senna 2 Tablet(s) Oral at bedtime  sevelamer carbonate 800 milliGRAM(s) Oral three times a day with meals  spironolactone 50 milliGRAM(s) Oral daily    MEDICATIONS  (PRN):  ALBUTerol    90 MICROgram(s) HFA Inhaler 2 Puff(s) Inhalation every 6 hours PRN Shortness of Breath and/or Wheezing      Allergies    codeine (Urticaria)    Intolerances        Vital Signs Last 24 Hrs  T(C): 36.8 (24 May 2021 19:22), Max: 36.8 (24 May 2021 19:22)  T(F): 98.2 (24 May 2021 19:22), Max: 98.2 (24 May 2021 19:22)  HR: 69 (24 May 2021 19:22) (64 - 81)  BP: 127/68 (24 May 2021 19:22) (99/57 - 148/77)  BP(mean): --  RR: 18 (24 May 2021 19:22) (17 - 18)  SpO2: 95% (24 May 2021 19:22) (94% - 97%)    PHYSICAL EXAM  General: adult in NAD  HEENT: clear oropharynx, anicteric sclera, pink conjunctiva  Neck: supple  CV: normal S1/S2 with no murmur rubs or gallops  Lungs: positive air movement b/l ant lungs,clear to auscultation, no wheezes, no rales  Abdomen: soft non-tender non-distended, no hepatosplenomegaly  Ext: no clubbing cyanosis or edema  Skin: no rashes and no petechiae  Neuro: alert and oriented X 4, no focal deficits      LABS:                          8.7    11.60 )-----------( 377      ( 24 May 2021 06:12 )             29.9         Mean Cell Volume : 82.4 fl  Mean Cell Hemoglobin : 24.0 pg  Mean Cell Hemoglobin Concentration : 29.1 gm/dL  Auto Neutrophil # : 8.31 K/uL  Auto Lymphocyte # : 1.73 K/uL  Auto Monocyte # : 1.31 K/uL  Auto Eosinophil # : 0.11 K/uL  Auto Basophil # : 0.06 K/uL  Auto Neutrophil % : 71.7 %  Auto Lymphocyte % : 14.9 %  Auto Monocyte % : 11.3 %  Auto Eosinophil % : 0.9 %  Auto Basophil % : 0.5 %      Serial CBC's  05-24 @ 06:12  Hct-29.9 / Hgb-8.7 / Plat-377 / RBC-3.63 / WBC-11.60  Serial CBC's  05-23 @ 07:36  Hct-30.1 / Hgb-8.9 / Plat-386 / RBC-3.69 / WBC-12.06  Serial CBC's  05-22 @ 17:04  Hct-32.1 / Hgb-9.5 / Plat-405 / RBC-3.95 / WBC-12.33  Serial CBC's  05-22 @ 07:24  Hct-26.4 / Hgb-7.7 / Plat-374 / RBC-3.29 / WBC-12.38  Serial CBC's  05-21 @ 20:48  Hct-28.7 / Hgb-8.3 / Plat-413 / RBC-3.55 / WBC-12.92  Serial CBC's  05-21 @ 07:39  Hct-24.3 / Hgb-7.1 / Plat-401 / RBC-3.02 / WBC-10.82      05-24    138  |  107  |  126<H>  ----------------------------<  159<H>  4.6   |  22  |  3.92<H>    Ca    9.3      24 May 2021 06:12  Phos  5.2     05-24  Mg     2.6     05-24    TPro  7.6  /  Alb  3.6  /  TBili  0.4  /  DBili  x   /  AST  12  /  ALT  14  /  AlkPhos  79  05-24          Reticulocyte Percent: 2.2 % (05-23 @ 07:36)  Ferritin, Serum: 10 ng/mL (05-21 @ 09:41)  Folate, Serum: 17.2 ng/mL (05-21 @ 09:41)  Vitamin B12, Serum: 1513 pg/mL (05-21 @ 09:41)  Iron - Total Binding Capacity.: 393 ug/dL (05-21 @ 07:39)  Reticulocyte Percent: 1.9 % (05-21 @ 07:39)  Iron - Total Binding Capacity.: 423 ug/dL (05-20 @ 18:58)              BLOOD SMEAR INTERPRETATION:       RADIOLOGY & ADDITIONAL STUDIES:

## 2021-05-24 NOTE — CONSULT NOTE ADULT - ASSESSMENT
56 year old lady who had profuse vaginal bleeding 1 m ago.  the LMP was several years ago. she was admitted for anemia.    1. anemia,  from vaginal bleeding  s/p transfusion  check iron profile  will give venofer    2 CKD  ?from HTN  she may need procrit    3. vaginal bleeding  GYN consult    56 year old lady who had profuse vaginal bleeding 1 m ago.  the LMP was several years ago. she was admitted for anemia.    1. anemia,  from vaginal bleeding  s/p transfusion  check iron profile  ferritin is low  will give venofer    2 CKD  ?from HTN  she may need procrit    3. vaginal bleeding  GYN consult  endometrium is thick

## 2021-05-24 NOTE — PROGRESS NOTE ADULT - PROBLEM SELECTOR PLAN 6
IMPROVE VTE Individual Risk Assessment  RISK                                                         Points  [  ] Previous VTE                                      3  [  ] Thrombophilia                                   2  [  ] Lower limb paralysis                         2 (unable to hold up >15 seconds)    [  ] Current Cancer                                  2       (within 6 months)  [  ] Immobilization > 24 hrs                    1  [  ] ICU/CCU stay > 24 hrs                         1  [  ] Age > 60                                              1  cw scd for now -DVT PPX: SCD  -GI PPX: PPI

## 2021-05-24 NOTE — PROGRESS NOTE ADULT - PROBLEM SELECTOR PLAN 1
Pt with hx of anemia pw hgb 5.1  occult negative in ED   no active bleeding   pt not on AC, at home on asa   will transfuse 2 u prbc in ED   will repeat cbc in am -anemia of chronic disease secondary to CKD  -occult negative   -no active bleeding   -4 units PRBC  -Hemoglobin 8.7  -HemOnc Dr. Hughes

## 2021-05-24 NOTE — DISCHARGE NOTE PROVIDER - PROVIDER TOKENS
PROVIDER:[TOKEN:[[id number]]] PROVIDER:[TOKEN:[[id number]]],PROVIDER:[TOKEN:[[id number]],FOLLOWUP:[1 week]],PROVIDER:[TOKEN:[[id number]],FOLLOWUP:[2 weeks]]

## 2021-05-24 NOTE — DISCHARGE NOTE PROVIDER - CARE PROVIDER_API CALL
Ramana Barajas)  Medicine  [address] [place] [address]  Phone: [phone]  Fax: [phone]  Follow Up Time:    Ramana Barajas)  Medicine  [address] [place] [address]  Phone: [phone]  Fax: [phone]  Follow Up Time:     Jannie Recinos  INTERNAL MEDICINE  [address] [place] [address]  Phone: [phone]  Fax: [phone]  Follow Up Time: 1 week    Yulia Hughes  INTERNAL MEDICINE  [address] [place] [address]  Phone: [phone]  Fax: [phone]  Follow Up Time: 2 weeks

## 2021-05-24 NOTE — PROGRESS NOTE ADULT - PROBLEM SELECTOR PLAN 4
continue with home medications  cw HSS   Fu a1c -home medication novolog 70/30 18 units in AM and 6 units at pm. Humalog 6units TID  - Insulin Sliding Scale  - Accu Check ACHS  - a1c 7.7  -started on Lantus and admelog

## 2021-05-24 NOTE — DISCHARGE NOTE PROVIDER - NSDCMRMEDTOKEN_GEN_ALL_CORE_FT
acetaminophen 325 mg oral tablet: 2 tab(s) orally every 6 hours, As needed, Temp greater or equal to 38C (100.4F)  Aldactone 50 mg oral tablet: 1 tab(s) orally once a day  allopurinol 100 mg oral tablet: 1 tab(s) orally once a day  aspirin 81 mg oral tablet, chewable: 1 tab(s) orally once a day  atorvastatin 40 mg oral tablet: 1 tab(s) orally once a day  Coreg 6.25 mg oral tablet: 1 tab(s) orally 2 times a day  docusate sodium 100 mg oral capsule: 1 cap(s) orally once a day  gabapentin 100 mg oral capsule: 1 cap(s) orally 3 times a day  HumaLOG KwikPen 200 units/mL (Concentrated) subcutaneous solution: 6 unit(s) subcutaneous every 8 hours  sliding scale as needed   Lasix 40 mg oral tablet: 1 tab(s) orally 2 times a day  multivitamin: 1 tab(s) orally once a day  NovoLIN 70/30 subcutaneous suspension: 18 unit(s) subcutaneous once a day (in the morning)  NovoLIN 70/30 subcutaneous suspension: 6 unit(s) subcutaneous once a day (in the evening)  pantoprazole 40 mg oral delayed release tablet: 1 tab(s) orally once a day (before a meal)  senna oral tablet: 2 tab(s) orally once a day (at bedtime)  sevelamer carbonate 800 mg oral tablet: 2 tab(s) orally 3 times a day (with meals)  Ventolin HFA 90 mcg/inh inhalation aerosol: 2 puff(s) inhaled every 6 hours  Vitamin D3 1000 intl units oral capsule: 1 cap(s) orally once a day

## 2021-05-25 LAB
ANION GAP SERPL CALC-SCNC: 9 MMOL/L — SIGNIFICANT CHANGE UP (ref 5–17)
BUN SERPL-MCNC: 118 MG/DL — HIGH (ref 7–18)
CALCIUM SERPL-MCNC: 9.5 MG/DL — SIGNIFICANT CHANGE UP (ref 8.4–10.5)
CANCER AG125 SERPL-ACNC: 33 U/ML — SIGNIFICANT CHANGE UP
CHLORIDE SERPL-SCNC: 110 MMOL/L — HIGH (ref 96–108)
CO2 SERPL-SCNC: 22 MMOL/L — SIGNIFICANT CHANGE UP (ref 22–31)
CREAT SERPL-MCNC: 3.36 MG/DL — HIGH (ref 0.5–1.3)
FSH SERPL-MCNC: 68.1 IU/L — SIGNIFICANT CHANGE UP
GLUCOSE BLDC GLUCOMTR-MCNC: 155 MG/DL — HIGH (ref 70–99)
GLUCOSE BLDC GLUCOMTR-MCNC: 174 MG/DL — HIGH (ref 70–99)
GLUCOSE BLDC GLUCOMTR-MCNC: 181 MG/DL — HIGH (ref 70–99)
GLUCOSE BLDC GLUCOMTR-MCNC: 183 MG/DL — HIGH (ref 70–99)
GLUCOSE SERPL-MCNC: 146 MG/DL — HIGH (ref 70–99)
HCT VFR BLD CALC: 30.8 % — LOW (ref 34.5–45)
HGB BLD-MCNC: 8.9 G/DL — LOW (ref 11.5–15.5)
MCHC RBC-ENTMCNC: 24.1 PG — LOW (ref 27–34)
MCHC RBC-ENTMCNC: 28.9 GM/DL — LOW (ref 32–36)
MCV RBC AUTO: 83.5 FL — SIGNIFICANT CHANGE UP (ref 80–100)
NRBC # BLD: 0 /100 WBCS — SIGNIFICANT CHANGE UP (ref 0–0)
PLATELET # BLD AUTO: 339 K/UL — SIGNIFICANT CHANGE UP (ref 150–400)
POTASSIUM SERPL-MCNC: 4.7 MMOL/L — SIGNIFICANT CHANGE UP (ref 3.5–5.3)
POTASSIUM SERPL-SCNC: 4.7 MMOL/L — SIGNIFICANT CHANGE UP (ref 3.5–5.3)
RBC # BLD: 3.69 M/UL — LOW (ref 3.8–5.2)
RBC # FLD: 19.5 % — HIGH (ref 10.3–14.5)
SODIUM SERPL-SCNC: 141 MMOL/L — SIGNIFICANT CHANGE UP (ref 135–145)
WBC # BLD: 10.24 K/UL — SIGNIFICANT CHANGE UP (ref 3.8–10.5)
WBC # FLD AUTO: 10.24 K/UL — SIGNIFICANT CHANGE UP (ref 3.8–10.5)

## 2021-05-25 RX ORDER — IRON SUCROSE 20 MG/ML
100 INJECTION, SOLUTION INTRAVENOUS EVERY 24 HOURS
Refills: 0 | Status: DISCONTINUED | OUTPATIENT
Start: 2021-05-25 | End: 2021-05-25

## 2021-05-25 RX ORDER — ERYTHROPOIETIN 10000 [IU]/ML
4000 INJECTION, SOLUTION INTRAVENOUS; SUBCUTANEOUS ONCE
Refills: 0 | Status: COMPLETED | OUTPATIENT
Start: 2021-05-26 | End: 2021-05-26

## 2021-05-25 RX ORDER — IRON SUCROSE 20 MG/ML
300 INJECTION, SOLUTION INTRAVENOUS EVERY 24 HOURS
Refills: 0 | Status: COMPLETED | OUTPATIENT
Start: 2021-05-25 | End: 2021-05-26

## 2021-05-25 RX ADMIN — SEVELAMER CARBONATE 800 MILLIGRAM(S): 2400 POWDER, FOR SUSPENSION ORAL at 11:45

## 2021-05-25 RX ADMIN — Medication 1: at 17:14

## 2021-05-25 RX ADMIN — ATORVASTATIN CALCIUM 40 MILLIGRAM(S): 80 TABLET, FILM COATED ORAL at 21:33

## 2021-05-25 RX ADMIN — GABAPENTIN 100 MILLIGRAM(S): 400 CAPSULE ORAL at 13:46

## 2021-05-25 RX ADMIN — SPIRONOLACTONE 50 MILLIGRAM(S): 25 TABLET, FILM COATED ORAL at 05:36

## 2021-05-25 RX ADMIN — Medication 40 MILLIGRAM(S): at 17:45

## 2021-05-25 RX ADMIN — SEVELAMER CARBONATE 800 MILLIGRAM(S): 2400 POWDER, FOR SUSPENSION ORAL at 17:45

## 2021-05-25 RX ADMIN — GABAPENTIN 100 MILLIGRAM(S): 400 CAPSULE ORAL at 05:38

## 2021-05-25 RX ADMIN — Medication 40 MILLIGRAM(S): at 05:36

## 2021-05-25 RX ADMIN — Medication 6 UNIT(S): at 17:14

## 2021-05-25 RX ADMIN — CARVEDILOL PHOSPHATE 6.25 MILLIGRAM(S): 80 CAPSULE, EXTENDED RELEASE ORAL at 17:45

## 2021-05-25 RX ADMIN — SENNA PLUS 2 TABLET(S): 8.6 TABLET ORAL at 21:32

## 2021-05-25 RX ADMIN — IRON SUCROSE 210 MILLIGRAM(S): 20 INJECTION, SOLUTION INTRAVENOUS at 01:30

## 2021-05-25 RX ADMIN — IRON SUCROSE 176.67 MILLIGRAM(S): 20 INJECTION, SOLUTION INTRAVENOUS at 11:45

## 2021-05-25 RX ADMIN — Medication 1: at 08:04

## 2021-05-25 RX ADMIN — Medication 6 UNIT(S): at 08:04

## 2021-05-25 RX ADMIN — INSULIN GLARGINE 18 UNIT(S): 100 INJECTION, SOLUTION SUBCUTANEOUS at 09:17

## 2021-05-25 RX ADMIN — SEVELAMER CARBONATE 800 MILLIGRAM(S): 2400 POWDER, FOR SUSPENSION ORAL at 08:05

## 2021-05-25 RX ADMIN — GABAPENTIN 100 MILLIGRAM(S): 400 CAPSULE ORAL at 21:36

## 2021-05-25 RX ADMIN — Medication 1 TABLET(S): at 11:45

## 2021-05-25 RX ADMIN — Medication 100 MILLIGRAM(S): at 11:45

## 2021-05-25 RX ADMIN — Medication 6 UNIT(S): at 12:13

## 2021-05-25 RX ADMIN — INSULIN GLARGINE 6 UNIT(S): 100 INJECTION, SOLUTION SUBCUTANEOUS at 21:33

## 2021-05-25 RX ADMIN — Medication 1000 UNIT(S): at 11:45

## 2021-05-25 RX ADMIN — PANTOPRAZOLE SODIUM 40 MILLIGRAM(S): 20 TABLET, DELAYED RELEASE ORAL at 05:37

## 2021-05-25 RX ADMIN — Medication 81 MILLIGRAM(S): at 11:45

## 2021-05-25 RX ADMIN — CARVEDILOL PHOSPHATE 6.25 MILLIGRAM(S): 80 CAPSULE, EXTENDED RELEASE ORAL at 05:36

## 2021-05-25 RX ADMIN — Medication 1: at 12:13

## 2021-05-25 NOTE — PROGRESS NOTE ADULT - PROBLEM SELECTOR PLAN 4
-home medication novolog 70/30 18 units in AM and 6 units at pm. Humalog 6units TID  - Insulin Sliding Scale  - Accu Check ACHS  - a1c 7.7  -started on Lantus and admelog

## 2021-05-25 NOTE — PROGRESS NOTE ADULT - PROBLEM SELECTOR PLAN 1
-anemia of chronic disease secondary to CKD  -occult negative   -no active bleeding   -4 units PRBC  -Hemoglobin 8.9  -Venofer IV given today 400mg, then 300mg IV tomorrow  -will order first dose of Epogen 4000 units as discussed with Dr. Hughes  -HemOnc Dr. Hughes

## 2021-05-25 NOTE — PROGRESS NOTE ADULT - ASSESSMENT
Patient is a 56 year old Female from [facility] living with h/o CAD, AICD, CHF, COPD, DM, HTN. Patient presented with anemia  Hb 5.2 on labs 5/19. Patient states her last transfusion was a few months ago (earlier in year) due to vaginal bleeding. Her last MP bleeding was 1 month. Patient admitted to medicine due to anemia secondary CKD. Patient treated with 4 units PRBC. Hematology consulting.

## 2021-05-26 ENCOUNTER — EMERGENCY (EMERGENCY)
Facility: HOSPITAL | Age: 56
LOS: 1 days | Discharge: HOME | End: 2021-05-26
Attending: EMERGENCY MEDICINE
Payer: MEDICARE

## 2021-05-26 ENCOUNTER — TRANSCRIPTION ENCOUNTER (OUTPATIENT)
Age: 56
End: 2021-05-26

## 2021-05-26 VITALS
RESPIRATION RATE: 18 BRPM | SYSTOLIC BLOOD PRESSURE: 131 MMHG | HEART RATE: 69 BPM | DIASTOLIC BLOOD PRESSURE: 80 MMHG | OXYGEN SATURATION: 97 % | TEMPERATURE: 98 F

## 2021-05-26 VITALS
HEIGHT: 65 IN | RESPIRATION RATE: 18 BRPM | DIASTOLIC BLOOD PRESSURE: 81 MMHG | HEART RATE: 69 BPM | SYSTOLIC BLOOD PRESSURE: 153 MMHG | TEMPERATURE: 97 F | OXYGEN SATURATION: 96 %

## 2021-05-26 VITALS
SYSTOLIC BLOOD PRESSURE: 142 MMHG | OXYGEN SATURATION: 97 % | DIASTOLIC BLOOD PRESSURE: 76 MMHG | TEMPERATURE: 99 F | RESPIRATION RATE: 16 BRPM | HEART RATE: 98 BPM

## 2021-05-26 DIAGNOSIS — Z95.810 PRESENCE OF AUTOMATIC (IMPLANTABLE) CARDIAC DEFIBRILLATOR: Chronic | ICD-10-CM

## 2021-05-26 DIAGNOSIS — Z98.89 OTHER SPECIFIED POSTPROCEDURAL STATES: Chronic | ICD-10-CM

## 2021-05-26 LAB
ANION GAP SERPL CALC-SCNC: 10 MMOL/L — SIGNIFICANT CHANGE UP (ref 5–17)
BUN SERPL-MCNC: 114 MG/DL — HIGH (ref 7–18)
CALCIUM SERPL-MCNC: 9.4 MG/DL — SIGNIFICANT CHANGE UP (ref 8.4–10.5)
CHLORIDE SERPL-SCNC: 110 MMOL/L — HIGH (ref 96–108)
CO2 SERPL-SCNC: 22 MMOL/L — SIGNIFICANT CHANGE UP (ref 22–31)
CREAT SERPL-MCNC: 3.71 MG/DL — HIGH (ref 0.5–1.3)
GLUCOSE BLDC GLUCOMTR-MCNC: 173 MG/DL — HIGH (ref 70–99)
GLUCOSE BLDC GLUCOMTR-MCNC: 195 MG/DL — HIGH (ref 70–99)
GLUCOSE SERPL-MCNC: 148 MG/DL — HIGH (ref 70–99)
HCT VFR BLD CALC: 30.6 % — LOW (ref 34.5–45)
HGB BLD-MCNC: 8.9 G/DL — LOW (ref 11.5–15.5)
MCHC RBC-ENTMCNC: 24.3 PG — LOW (ref 27–34)
MCHC RBC-ENTMCNC: 29.1 GM/DL — LOW (ref 32–36)
MCV RBC AUTO: 83.6 FL — SIGNIFICANT CHANGE UP (ref 80–100)
NRBC # BLD: 0 /100 WBCS — SIGNIFICANT CHANGE UP (ref 0–0)
PLATELET # BLD AUTO: 339 K/UL — SIGNIFICANT CHANGE UP (ref 150–400)
POTASSIUM SERPL-MCNC: 5 MMOL/L — SIGNIFICANT CHANGE UP (ref 3.5–5.3)
POTASSIUM SERPL-SCNC: 5 MMOL/L — SIGNIFICANT CHANGE UP (ref 3.5–5.3)
RBC # BLD: 3.66 M/UL — LOW (ref 3.8–5.2)
RBC # FLD: 19.6 % — HIGH (ref 10.3–14.5)
SARS-COV-2 RNA SPEC QL NAA+PROBE: SIGNIFICANT CHANGE UP
SODIUM SERPL-SCNC: 142 MMOL/L — SIGNIFICANT CHANGE UP (ref 135–145)
WBC # BLD: 8.94 K/UL — SIGNIFICANT CHANGE UP (ref 3.8–10.5)
WBC # FLD AUTO: 8.94 K/UL — SIGNIFICANT CHANGE UP (ref 3.8–10.5)

## 2021-05-26 PROCEDURE — 99283 EMERGENCY DEPT VISIT LOW MDM: CPT

## 2021-05-26 PROCEDURE — 80053 COMPREHEN METABOLIC PANEL: CPT

## 2021-05-26 PROCEDURE — 83001 ASSAY OF GONADOTROPIN (FSH): CPT

## 2021-05-26 PROCEDURE — 83550 IRON BINDING TEST: CPT

## 2021-05-26 PROCEDURE — 86923 COMPATIBILITY TEST ELECTRIC: CPT

## 2021-05-26 PROCEDURE — 83010 ASSAY OF HAPTOGLOBIN QUANT: CPT

## 2021-05-26 PROCEDURE — 82607 VITAMIN B-12: CPT

## 2021-05-26 PROCEDURE — 82272 OCCULT BLD FECES 1-3 TESTS: CPT

## 2021-05-26 PROCEDURE — 83615 LACTATE (LD) (LDH) ENZYME: CPT

## 2021-05-26 PROCEDURE — 87635 SARS-COV-2 COVID-19 AMP PRB: CPT

## 2021-05-26 PROCEDURE — 83020 HEMOGLOBIN ELECTROPHORESIS: CPT

## 2021-05-26 PROCEDURE — 84443 ASSAY THYROID STIM HORMONE: CPT

## 2021-05-26 PROCEDURE — 85025 COMPLETE CBC W/AUTO DIFF WBC: CPT

## 2021-05-26 PROCEDURE — 36415 COLL VENOUS BLD VENIPUNCTURE: CPT

## 2021-05-26 PROCEDURE — 86901 BLOOD TYPING SEROLOGIC RH(D): CPT

## 2021-05-26 PROCEDURE — 80048 BASIC METABOLIC PNL TOTAL CA: CPT

## 2021-05-26 PROCEDURE — 86304 IMMUNOASSAY TUMOR CA 125: CPT

## 2021-05-26 PROCEDURE — 83540 ASSAY OF IRON: CPT

## 2021-05-26 PROCEDURE — 76830 TRANSVAGINAL US NON-OB: CPT

## 2021-05-26 PROCEDURE — 86769 SARS-COV-2 COVID-19 ANTIBODY: CPT

## 2021-05-26 PROCEDURE — P9040: CPT

## 2021-05-26 PROCEDURE — 99285 EMERGENCY DEPT VISIT HI MDM: CPT

## 2021-05-26 PROCEDURE — 83735 ASSAY OF MAGNESIUM: CPT

## 2021-05-26 PROCEDURE — 36430 TRANSFUSION BLD/BLD COMPNT: CPT

## 2021-05-26 PROCEDURE — 82728 ASSAY OF FERRITIN: CPT

## 2021-05-26 PROCEDURE — 93005 ELECTROCARDIOGRAM TRACING: CPT

## 2021-05-26 PROCEDURE — 86850 RBC ANTIBODY SCREEN: CPT

## 2021-05-26 PROCEDURE — 82746 ASSAY OF FOLIC ACID SERUM: CPT

## 2021-05-26 PROCEDURE — 82670 ASSAY OF TOTAL ESTRADIOL: CPT

## 2021-05-26 PROCEDURE — 83036 HEMOGLOBIN GLYCOSYLATED A1C: CPT

## 2021-05-26 PROCEDURE — 84466 ASSAY OF TRANSFERRIN: CPT

## 2021-05-26 PROCEDURE — 84100 ASSAY OF PHOSPHORUS: CPT

## 2021-05-26 PROCEDURE — 86900 BLOOD TYPING SEROLOGIC ABO: CPT

## 2021-05-26 PROCEDURE — 82962 GLUCOSE BLOOD TEST: CPT

## 2021-05-26 PROCEDURE — 99282 EMERGENCY DEPT VISIT SF MDM: CPT | Mod: CS

## 2021-05-26 PROCEDURE — 80061 LIPID PANEL: CPT

## 2021-05-26 PROCEDURE — 76856 US EXAM PELVIC COMPLETE: CPT

## 2021-05-26 PROCEDURE — 85027 COMPLETE CBC AUTOMATED: CPT

## 2021-05-26 PROCEDURE — 85045 AUTOMATED RETICULOCYTE COUNT: CPT

## 2021-05-26 RX ADMIN — Medication 81 MILLIGRAM(S): at 11:14

## 2021-05-26 RX ADMIN — Medication 1000 UNIT(S): at 11:15

## 2021-05-26 RX ADMIN — Medication 6 UNIT(S): at 08:14

## 2021-05-26 RX ADMIN — INSULIN GLARGINE 18 UNIT(S): 100 INJECTION, SOLUTION SUBCUTANEOUS at 08:15

## 2021-05-26 RX ADMIN — PANTOPRAZOLE SODIUM 40 MILLIGRAM(S): 20 TABLET, DELAYED RELEASE ORAL at 05:33

## 2021-05-26 RX ADMIN — ERYTHROPOIETIN 4000 UNIT(S): 10000 INJECTION, SOLUTION INTRAVENOUS; SUBCUTANEOUS at 10:34

## 2021-05-26 RX ADMIN — Medication 1 TABLET(S): at 11:14

## 2021-05-26 RX ADMIN — CARVEDILOL PHOSPHATE 6.25 MILLIGRAM(S): 80 CAPSULE, EXTENDED RELEASE ORAL at 05:34

## 2021-05-26 RX ADMIN — IRON SUCROSE 176.67 MILLIGRAM(S): 20 INJECTION, SOLUTION INTRAVENOUS at 09:55

## 2021-05-26 RX ADMIN — SEVELAMER CARBONATE 800 MILLIGRAM(S): 2400 POWDER, FOR SUSPENSION ORAL at 08:15

## 2021-05-26 RX ADMIN — GABAPENTIN 100 MILLIGRAM(S): 400 CAPSULE ORAL at 05:36

## 2021-05-26 RX ADMIN — Medication 100 MILLIGRAM(S): at 11:15

## 2021-05-26 RX ADMIN — SPIRONOLACTONE 50 MILLIGRAM(S): 25 TABLET, FILM COATED ORAL at 05:34

## 2021-05-26 RX ADMIN — Medication 40 MILLIGRAM(S): at 05:34

## 2021-05-26 RX ADMIN — Medication 1: at 08:15

## 2021-05-26 NOTE — PROGRESS NOTE ADULT - PROVIDER SPECIALTY LIST ADULT
Internal Medicine
Heme/Onc
Internal Medicine
Internal Medicine

## 2021-05-26 NOTE — DISCHARGE NOTE NURSING/CASE MANAGEMENT/SOCIAL WORK - PATIENT PORTAL LINK FT
You can access the FollowMyHealth Patient Portal offered by [facility] by registering at the following website: [email or web address]. By joining [facility]’s FollowMyHealth portal, you will also be able to view your health information using other applications (apps) compatible with our system.

## 2021-05-26 NOTE — PROGRESS NOTE ADULT - ASSESSMENT
· Assessment	  56 year old lady who had profuse vaginal bleeding 1 m ago.  the LMP was several years ago. she was admitted for anemia.    1. anemia,  from vaginal bleeding  s/p transfusion  check iron profile  ferritin is low  will give venofer    2 CKD  ?from HTN  she may need procrit  but need to raise ferritin to at least 100 before procrit    3. vaginal bleeding  GYN consult  endometrium is thick

## 2021-05-26 NOTE — ED PROVIDER NOTE - PATIENT PORTAL LINK FT
You can access the FollowMyHealth Patient Portal offered by [facility] by registering at the following website: [email or web address]. By joining [facility]’s FollowMyHealth portal, you will also be able to view your health information using other applications (apps) compatible with our system.

## 2021-05-26 NOTE — ED PROVIDER NOTE - OBJECTIVE STATEMENT
55 y/o F pt with a PMHx of HTN CAD and DM who was d/c from hospital today after being admitted for symptomatic anemia secondary to vaginal bleeding and returned to nursing home without a covid swab returns back to ED after being instructed to come back to hospital for covid testing. Pt denies any other acute complaints. Allergies: Codeine.

## 2021-05-26 NOTE — ED PROVIDER NOTE - NEUROLOGICAL, MLM
Alert and oriented, no focal deficits, no motor or sensory deficits.
incentive spirometer teaching given as per dr betts

## 2021-05-26 NOTE — ED ADULT NURSE NOTE - OBJECTIVE STATEMENT
receive a call from Ms Pepito Conley case management from  [facility] sending pt back to hospital because pt  was discharged to them w/o covid result   pt awake , alert ox 3 . denies any symptoms

## 2021-09-23 ENCOUNTER — APPOINTMENT (OUTPATIENT)
Age: 56
End: 2021-09-23

## 2021-10-18 ENCOUNTER — APPOINTMENT (OUTPATIENT)
Dept: UROLOGY | Facility: CLINIC | Age: 56
End: 2021-10-18

## 2021-12-29 NOTE — ED ADULT TRIAGE NOTE - NS ED NURSE AMBULANCES
Patient Seen in: [facility] Emergency Department      History   Patient presents with:  Arm or Hand Injury    Stated Complaint: right arm pain    Subjective:   HPI    4year-old male here with right elbow pain after an injury 2 days ago.   He states Mouth/Throat:      Mouth: Mucous membranes are moist.   Eyes:      Pupils: Pupils are equal, round, and reactive to light. Pulmonary:      Effort: Pulmonary effort is normal.   Musculoskeletal:         General: Tenderness and signs of injury present.  No circulation and neurovascular exam was noted to be intact pre and post splint placement. MDM      ASSESSMENT & PLAN:    4year old male with right elbow injury several days ago unimproved.   On exam, although no swelling to elbow, pain with range [facility]

## 2022-03-14 ENCOUNTER — OUTPATIENT (OUTPATIENT)
Dept: OUTPATIENT SERVICES | Facility: HOSPITAL | Age: 57
LOS: 1 days | End: 2022-03-14
Payer: MEDICARE

## 2022-03-14 DIAGNOSIS — Z95.810 PRESENCE OF AUTOMATIC (IMPLANTABLE) CARDIAC DEFIBRILLATOR: Chronic | ICD-10-CM

## 2022-03-14 DIAGNOSIS — Z98.89 OTHER SPECIFIED POSTPROCEDURAL STATES: Chronic | ICD-10-CM

## 2022-03-14 DIAGNOSIS — R06.00 DYSPNEA, UNSPECIFIED: ICD-10-CM

## 2022-03-14 PROCEDURE — 78452 HT MUSCLE IMAGE SPECT MULT: CPT | Mod: MH

## 2022-03-14 PROCEDURE — 93017 CV STRESS TEST TRACING ONLY: CPT

## 2022-03-14 PROCEDURE — A9502: CPT

## 2022-04-19 ENCOUNTER — RESULT REVIEW (OUTPATIENT)
Age: 57
End: 2022-04-19

## 2022-04-20 NOTE — ED PROVIDER NOTE - EYES, MLM
85 yo male ex  smoker, h/o HTN, HLD, DM, CKD, CVA, CHF (mild systolic dysfunction) and atrial fibrillation with sick sinus syndrome s/p pacemaker implantation.  h/o COPD/ZACKARY, TOM colonization/infection, admitted on 4/1 with RLE wet gangrene, s/p R guillotine BKA, and is awaiting a staged right lower extremity AKA. on heparin gtt for PAD  post op course complicated by acute hypoxic respiratory failure 2/2 COPD exacerbation. now resolved  ptn seen on 9 Jaime  Ptn is now stable on po prednisone taper, pulm following  albuterol/atrovent nebs   pulmicort 0.5mg nebs q12h   mucinex 1200mg 2 times daily  singulair 10mg @ bedtime  acapella device/incentive spirometer  on RA 91-92% pulse Ox  steroid induced hyperglycemia, now off insulin drip, on Lantus and prandial Admelog  on IV Zosyn, cleared by Pulm, card, ID for planned for staged RLE AKA. medically cleared  wbc remains elevated, prob 2/2 steroids. ID on board,   MS back at baseline  CKD3/s/p KARLOS due to diuresis in the setting of euvolemia -> improved, renal following  HTN, systolic CHF, Afib stable, cardiology following  awaiting a staged right lower extremity AKA. medically cleared for surgery pending ID clearance           Clear bilaterally, pupils equal, round and reactive to light.

## 2022-05-09 ENCOUNTER — APPOINTMENT (OUTPATIENT)
Dept: SURGERY | Facility: CLINIC | Age: 57
End: 2022-05-09
Payer: MEDICARE

## 2022-05-09 VITALS
HEART RATE: 74 BPM | BODY MASS INDEX: 48.82 KG/M2 | DIASTOLIC BLOOD PRESSURE: 72 MMHG | HEIGHT: 65 IN | WEIGHT: 293 LBS | SYSTOLIC BLOOD PRESSURE: 129 MMHG

## 2022-05-09 VITALS — TEMPERATURE: 97 F

## 2022-05-09 DIAGNOSIS — D36.9 BENIGN NEOPLASM, UNSPECIFIED SITE: ICD-10-CM

## 2022-05-09 DIAGNOSIS — K43.0 INCISIONAL HERNIA WITH OBSTRUCTION, W/OUT GANGRENE: ICD-10-CM

## 2022-05-09 PROCEDURE — 99213 OFFICE O/P EST LOW 20 MIN: CPT

## 2022-05-09 NOTE — ASSESSMENT
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 - surgery was recommended and will discuss after removing a breast mass

## 2022-05-09 NOTE — CONSULT LETTER
[Dear  ___] : Dear  [unfilled], [Consult Letter:] : I had the pleasure of evaluating your patient, [unfilled]. [Please see my note below.] : Please see my note below. [Consult Closing:] : Thank you very much for allowing me to participate in the care of this patient.  If you have any questions, please do not hesitate to contact me. [Sincerely,] : Sincerely, [FreeTextEntry3] : Quang Sauceda MD, FACS

## 2022-05-09 NOTE — DATA REVIEWED
[FreeTextEntry1] : Exam requested by:\par MUSHTAQ MCFADDENMIKAL NP\par [address]\par [place] [address]\par SITE PERFORMED: [facility]\par SITE PHONE: [phone]\par Patient: DEBORAH MCDONNELL\par YOB: 1965\par Phone: [phone]\par MRN: [id number] Acc: [id number]\par Date of Exam: 03-\par  \par EXAM: DIGITAL BILATERAL DIAGNOSTIC MAMMOGRAM AND TOMOSYNTHESIS AND BREAST ULTRASOUND\par \par HISTORY: The patient is seen for right breast bloody nipple discharge. History of left breast biopsy with benign result in 2020. Family history of breast cancer: None.\par Age: 56 years old. \par \par CLINICAL BREAST EXAMINATION: The patient does not recall when she had her last clinical breast exam. \par \par COMPARISON: The present examination has been compared to prior breast imaging studies dating back to 8/17/2012. \par \par MAMMOGRAM:\par \par TECHNIQUE: Full-field digital mammography of bilateral breasts in craniocaudal and mediolateral oblique projections was obtained. Additional imaging is composed of spot compression views and spot magnification views of the right breast. Low-dose full-field digital breast tomosynthesis examination was performed with tomosynthesis acquisitions and synthesized 2D reconstructed mammogram. Computer-aided detection (CAD) was utilized.  \par \par FINDINGS:\par BREAST COMPOSITION: There are scattered areas of fibroglandular density.\par \par There is a 9 mm nodular mass in lateral retroareolar region right breast, increased in size from prior study. There is another 1.1 cm nodular mass in anterior outer lower quadrant right breast, 4 cm from the nipple, new from prior study. There is stable lobular mass in posterior upper-outer quadrant right breast. There is stable postsurgical changes/architectural distortion in anterior inner lower quadrant right breast.\par \par There is a 5 mm nodule with associated biopsy clip in the anterior upper-outer quadrant left breast, decreased in size. There is stable subcentimeter circumscribed nodular density in posterior inner lower quadrant of the left breast. \par \par No significant calcifications are detected.\par \par BREAST ULTRASOUND:  \par \par TECHNIQUE: Complete bilateral breast ultrasound, with evaluation of the four quadrants, retroareolar regions and axillae, was performed. \par \par FINDINGS: \par BREAST ECHOTEXTURE: There is a homogeneous background echotexture - fibroglandular. \par \par Right breast: \par There is a 9 x 6 x 8 mm hypoechoic solid mass in the retroareolar region, correlating to the mammogram finding, indeterminate.\par There is a 10 x 4 x 9 mm hypoechoic solid mass at 7 o'clock position, 2 cm from nipple, correlating to the mammogram finding, indeterminate.\par There is a 1.8 x 0.5 x 1.8 cm oval hypoechoic mass at 9 o'clock position, 14 cm from nipple, correlating to the mammogram finding. This mass is stable on mammogram since prior study in 2012.\par \par Left breast:\par There is a 9 x 4 x 5 mm hypoechoic nodule at 1 o'clock position, 3 cm from nipple, decreased in size and previously biopsied with benign result.\par There is an 8 x 4 x 7 mm oval hypoechoic nodule at 6 o'clock position, 8 cm from nipple, likely correlating to the nodular density in the inner lower quadrant left breast on mammogram, probable benign.\par \par IMPRESSION: \par 1. Indeterminate hypoechoic solid masses in the retroareolar region and 7 o'clock position right breast. Further evaluation with right breast ultrasound-guided biopsy x2 is recommended.\par \par The finding and recommendation for biopsy discussed with patient at time of study on 3/25/2022.\par \par 2. Oval hypoechoic nodule at 6 o'clock position left breast, probable benign. Short-term follow-up with left breast target ultrasound in 6 months is recommended. \par \par FOLLOW-UP: Ultrasound guided biopsy. \par \par ASSESSMENT: BI-RADS Category 4:  Suspicious. \par \par As per the FDA requirements, a layman's letter has been generated and sent to your patient stating the results and recommendations of this breast imaging study. We have entered your patient into our reminder system and will notify them when they are due for their next breast imaging exam. \par \par Thank you for the opportunity to participate in the care of this patient.  \par  \par KALIE LOVE MD  - Electronically Signed: 03- 1:47 PM \par Physician to Physician Direct Line is: [phone]

## 2022-05-09 NOTE — HISTORY OF PRESENT ILLNESS
[de-identified] : This is  a 57 year   old patient presenting today for a breast exam and to discuss the results of her recent  breast imaging. Patient had a BL breast US and   BL breast Mammogram on 2022. Deemed BIRADS category 4.   Ms. MCDONNELL  is s/p Right breast US guided core biopsy ( 2 locations)  on 2022. Right breast 7 o'clock showed Nodular stromal fibrosis and usual ductal hyperplasia.  Right breast retroareolar  consistent Sclerosing intraductal papilloma with atypia. She  denies any trauma and She has no nipple discharge. She  denies any fever, night sweats or loss of appetite.    There is no family history of breast carcinoma.   Menarche at age 6 -1 para-1 and her last menstrual period was in her 40s Patient is on no hormonal replacement therapy.  \par \par PERTINENT HISTORY: \par here is no family history of breast carcinoma. Menarche at age 6 -1 para-1 and her last menstrual period was in 2019. Patient had a BL breast US and mammogram on 2020 that was deemed a BIRADS 4.  Ms. MCDONNELL  is s/p Us guided biopsy LEFT  breast on  2020.  Patient's pathology results were  consistent with  Non-proliferative fibrocystic disease with moderate stromal fibrosis.  Cysts wall lining and cystic formation.\par  [de-identified] :  Patient reports no interval changes to her overall health status or medical history

## 2022-05-09 NOTE — PLAN
[FreeTextEntry1] : Ms. MCDONNELL  was told significance of findings, options, risks and benefits were explained.  Informed consent for excision right breast mass with spot localization and potential risks, benefits and alternatives (surgical options were discussed including non-surgical options or the option of no surgery) to the planned surgery were discussed in depth.  All surgical options were discussed including non-surgical treatments.  She wishes to proceed with surgery.  We will plan for surgery on at the next available date, pending any required insurance pre-certification or pre-approval. She agrees to obtain any necessary pre-operative evaluations and testing prior to surgery.\par Patient advised to seek immediate medical attention with any acute change in symptoms or with the development of any new or worsening symptoms.  Patient's questions and concerns addressed to patient's satisfaction, and patient verbalized an understanding of the information discussed.\par \par

## 2022-05-09 NOTE — PHYSICAL EXAM
[Alert] : alert [Oriented to Person] : oriented to person [Oriented to Place] : oriented to place [Oriented to Time] : oriented to time [Calm] : calm [de-identified] : She  is alert, well-groomed, and in NAD\par   [de-identified] : anicteric.  Nasal mucosa pink, septum midline. Oral mucosa pink.  Tongue midline, Pharynx without exudates.\par   [de-identified] : Neck supple. Trachea midline. Thyroid isthmus barely palpable, lobes not felt.\par   [de-identified] : No chest deformity. Breast are symmetric, Normal contours. No nodules, masses, tenderness, or axillary or supraclavicular  adenopathy. No nipple discharge. no skin retraction  [de-identified] : large incarcerated incisional hernia in the right paraumbilical area.

## 2022-05-26 ENCOUNTER — OUTPATIENT (OUTPATIENT)
Dept: OUTPATIENT SERVICES | Facility: HOSPITAL | Age: 57
LOS: 1 days | End: 2022-05-26
Payer: MEDICARE

## 2022-05-26 VITALS
HEART RATE: 83 BPM | TEMPERATURE: 98 F | SYSTOLIC BLOOD PRESSURE: 142 MMHG | OXYGEN SATURATION: 94 % | DIASTOLIC BLOOD PRESSURE: 64 MMHG | WEIGHT: 293 LBS | RESPIRATION RATE: 18 BRPM | HEIGHT: 65 IN

## 2022-05-26 DIAGNOSIS — Z01.818 ENCOUNTER FOR OTHER PREPROCEDURAL EXAMINATION: ICD-10-CM

## 2022-05-26 DIAGNOSIS — Z98.89 OTHER SPECIFIED POSTPROCEDURAL STATES: Chronic | ICD-10-CM

## 2022-05-26 DIAGNOSIS — Z87.09 PERSONAL HISTORY OF OTHER DISEASES OF THE RESPIRATORY SYSTEM: ICD-10-CM

## 2022-05-26 DIAGNOSIS — E11.9 TYPE 2 DIABETES MELLITUS WITHOUT COMPLICATIONS: ICD-10-CM

## 2022-05-26 DIAGNOSIS — D36.9 BENIGN NEOPLASM, UNSPECIFIED SITE: ICD-10-CM

## 2022-05-26 DIAGNOSIS — I10 ESSENTIAL (PRIMARY) HYPERTENSION: ICD-10-CM

## 2022-05-26 DIAGNOSIS — G47.33 OBSTRUCTIVE SLEEP APNEA (ADULT) (PEDIATRIC): ICD-10-CM

## 2022-05-26 DIAGNOSIS — E66.01 MORBID (SEVERE) OBESITY DUE TO EXCESS CALORIES: ICD-10-CM

## 2022-05-26 DIAGNOSIS — Z86.79 PERSONAL HISTORY OF OTHER DISEASES OF THE CIRCULATORY SYSTEM: ICD-10-CM

## 2022-05-26 DIAGNOSIS — Z95.810 PRESENCE OF AUTOMATIC (IMPLANTABLE) CARDIAC DEFIBRILLATOR: Chronic | ICD-10-CM

## 2022-05-26 DIAGNOSIS — Z95.810 PRESENCE OF AUTOMATIC (IMPLANTABLE) CARDIAC DEFIBRILLATOR: ICD-10-CM

## 2022-05-26 LAB
ANION GAP SERPL CALC-SCNC: 7 MMOL/L — SIGNIFICANT CHANGE UP (ref 5–17)
BUN SERPL-MCNC: 71 MG/DL — HIGH (ref 7–18)
CALCIUM SERPL-MCNC: 8.3 MG/DL — LOW (ref 8.4–10.5)
CHLORIDE SERPL-SCNC: 106 MMOL/L — SIGNIFICANT CHANGE UP (ref 96–108)
CO2 SERPL-SCNC: 26 MMOL/L — SIGNIFICANT CHANGE UP (ref 22–31)
CREAT SERPL-MCNC: 3.04 MG/DL — HIGH (ref 0.5–1.3)
EGFR: 17 ML/MIN/1.73M2 — LOW
GLUCOSE SERPL-MCNC: 222 MG/DL — HIGH (ref 70–99)
HCT VFR BLD CALC: 23.5 % — LOW (ref 34.5–45)
HGB BLD-MCNC: 7 G/DL — CRITICAL LOW (ref 11.5–15.5)
MCHC RBC-ENTMCNC: 29.8 GM/DL — LOW (ref 32–36)
MCHC RBC-ENTMCNC: 31.5 PG — SIGNIFICANT CHANGE UP (ref 27–34)
MCV RBC AUTO: 105.9 FL — HIGH (ref 80–100)
NRBC # BLD: 3 /100 WBCS — HIGH (ref 0–0)
PLATELET # BLD AUTO: 237 K/UL — SIGNIFICANT CHANGE UP (ref 150–400)
POTASSIUM SERPL-MCNC: 5.4 MMOL/L — HIGH (ref 3.5–5.3)
POTASSIUM SERPL-SCNC: 5.4 MMOL/L — HIGH (ref 3.5–5.3)
RBC # BLD: 2.22 M/UL — LOW (ref 3.8–5.2)
RBC # FLD: 17.8 % — HIGH (ref 10.3–14.5)
SODIUM SERPL-SCNC: 139 MMOL/L — SIGNIFICANT CHANGE UP (ref 135–145)
WBC # BLD: 8.87 K/UL — SIGNIFICANT CHANGE UP (ref 3.8–10.5)
WBC # FLD AUTO: 8.87 K/UL — SIGNIFICANT CHANGE UP (ref 3.8–10.5)

## 2022-05-26 PROCEDURE — G0463: CPT

## 2022-05-26 PROCEDURE — 83036 HEMOGLOBIN GLYCOSYLATED A1C: CPT

## 2022-05-26 PROCEDURE — 85027 COMPLETE CBC AUTOMATED: CPT

## 2022-05-26 PROCEDURE — 80048 BASIC METABOLIC PNL TOTAL CA: CPT

## 2022-05-26 PROCEDURE — 36415 COLL VENOUS BLD VENIPUNCTURE: CPT

## 2022-05-26 NOTE — H&P PST ADULT - DOES PATIENT MEET CRITERIA FOR SEPSIS
This writer attempted to contact [facility] on 07/10/18      Reason for call Medication change and unable to leave message. Voicemail full      If patient calls back:   Relay message Your doctor have sent a new prescription Lidocaine ointment to replace the Lidocaine patch that insurance did not cover, (read verbatim), document that pt called and close encounter        Lai Rivera     No

## 2022-05-26 NOTE — H&P PST ADULT - VASCULAR
How Severe Is Your Cyst?: mild
Is This A New Presentation, Or A Follow-Up?: Cyst
Equal and normal pulses (carotid, femoral, dorsalis pedis)

## 2022-05-26 NOTE — H&P PST ADULT - HISTORY OF PRESENT ILLNESS
57 yr old female Morbidly obese (52 BMI) Sleep Apnea on CPAP, HTN, CAD with AICD ( insertion 2011 and interrogated on Feb 2022 awaiting report form facility. CHF (EF 49%) diabetic  57 yr old female Morbidly obese (52 BMI) Sleep Apnea on CPAP, HTN, CAD with AICD ( insertion 2011 and interrogated on Feb 2022 awaiting report form facility. CHF (EF 49%) diabetic (Novolin 70/30 in am and 5pm ) sent A1c to lab along with CBC and BMP (repeat for surgery seen some abnormalities on clearance) Pt seen using walker arthritic , bilateral leg edema +1. Pt stated she had right nipple drainage about a month ago of clear to bloody like fluid proceeded to have Mammo indicating a right breasts mass. Pt had surgical consult and schedule for surgery of excision of right breast mass with spot localization on 6/3/2022.   57 yr old female Morbidly obese (52 BMI) Sleep Apnea on CPAP, HTN, CAD with AICD ( insertion 2011 and interrogated on Feb 2022 awaiting report form facility. CHF (EF 49%) diabetic (Novolin 70/30 in am and 5pm ) sent A1c to lab along with CBC and BMP (repeat for surgery seen some abnormalities on clearance) Pt seen using walker arthritic , bilateral leg edema +1. Pt stated she had right nipple drainage about a month ago of clear to bloody like fluid proceeded to have Mammo indicating a right breasts mass. Pt had surgical consult and schedule for surgery of excision of right breast mass with spot localization on 6/3/2022.  (echo and Stress done here in March 2022) Received call from lab concerning low H/H placed a call to assisted living spoke with Mrs. Mariely FOOTE informed of the results, Dr Barajas was to be expected there this afternoon.  will be informed by her of lab results. I also call and notified Dr. Sauceda on voice mail of the new lab results. 57 yr old female Morbidly obese (52 BMI) Sleep Apnea on CPAP, HTN, CAD with AICD ( insertion 2011 and interrogated on Feb 25, 2022 awaiting report form facility. CHF (EF 49%) diabetic (Novolin 70/30 in am and 5pm ) sent A1c to lab along with CBC and BMP (repeat for surgery seen some abnormalities on clearance) Pt seen using walker arthritic , bilateral leg edema +1. Pt stated she had right nipple drainage about a month ago of clear to bloody like fluid proceeded to have Mammo indicating a right breasts mass. Pt had surgical consult and schedule for surgery of excision of right breast mass with spot localization on 6/3/2022.  (echo and Stress done here in March 2022) Received call from lab concerning low H/H placed a call to assisted living spoke with Mrs. Mariely FOOTE informed of the results, Dr Barajas was to be expected there this afternoon.  will be informed by her of lab results. I also call and notified Dr. Sauceda on voice mail of the new lab results.

## 2022-05-26 NOTE — H&P PST ADULT - PROBLEM SELECTOR PLAN 6
Pt had AICD implanted 2011 implanted in the left chest wall. Pt had interrogation done in Feb 2022(awaiting for report from assisted living facility). Pt had AICD implanted 2011 implanted in the left chest wall. Pt had interrogation done in Feb 22, 2022(awaiting for report from assisted living facility).  Addendum 5/31/2022: Interrogation report attached to chart

## 2022-05-26 NOTE — H&P PST ADULT - NSICDXPASTMEDICALHX_GEN_ALL_CORE_FT
PAST MEDICAL HISTORY:  AICD (automatic cardioverter/defibrillator) present 2/3 2011    Benign neoplasm, unspecified site     CAD (coronary artery disease)     CHF (congestive heart failure)     COPD (chronic obstructive pulmonary disease)     DM (diabetes mellitus)     GERD (gastroesophageal reflux disease)     HTN (hypertension)     RA (rheumatoid arthritis)      PAST MEDICAL HISTORY:  AICD (automatic cardioverter/defibrillator) present 2/3 2011    Benign neoplasm, unspecified site     CAD (coronary artery disease)     CHF (congestive heart failure)     COPD (chronic obstructive pulmonary disease)     DM (diabetes mellitus)     GERD (gastroesophageal reflux disease)     HTN (hypertension)     Morbid obesity with BMI of 50.0-59.9, adult     Obstructive sleep apnea on CPAP     RA (rheumatoid arthritis)

## 2022-05-26 NOTE — H&P PST ADULT - PROBLEM SELECTOR PLAN 1
Pt uses CPAP for sleep apnea. Informed pt to bring in the machine the day of surgery. Pt to maintain patent airway and be monitor      Mallampati Class 3

## 2022-05-26 NOTE — H&P PST ADULT - PROBLEM SELECTOR PLAN 8
Pt given written instructions to take 75% of insulin am of surgery and pt will be monitor during hospital stay.

## 2022-05-26 NOTE — H&P PST ADULT - PROBLEM SELECTOR PLAN 3
schedule for excision of right breast mass with spot localization. Pt given instruction for medication the day of surgery and aware of NPO the night before surgery and the morning of surgery. Pt instructed to wash with chlorhexidene 4% solution for three days including the day of surgery. Pt verbalized understanding and reinforced with assisted facility.

## 2022-05-26 NOTE — H&P PST ADULT - NSICDXPASTSURGICALHX_GEN_ALL_CORE_FT
PAST SURGICAL HISTORY:  H/O  section     History of cholecystectomy     ICD (implantable cardioverter-defibrillator) in place

## 2022-05-26 NOTE — H&P PST ADULT - LAB RESULTS AND INTERPRETATION
CBC 5/26/2022 I was notified by Lab of low H/H 7/23.5 contacted Assisted Living QAMAR Schuster notified her Dr Barajas unable to reach at office but he was expected at the facility. QAMAR Schuster will pass information to him upon his arrival . Karis LLOYD  I notified Dr Sauceda concernng H/H voicemail. CBC 5/26/2022 I was notified by Lab of low H/H 7/23.5 contacted Assisted Living RN Clarence Schuster notified her Dr Barajas unable to reach at office but he was expected at the facility. QAMAR Schuster will pass information to him upon his arrival . Karis LLOYD  I notified Dr Sohail lowng H/H voicemail.  5/31/2022 Addendum: Hemoglobin A1C 9.2 on 5/26/2022. Kristine surgical coordinator notified.

## 2022-05-26 NOTE — H&P PST ADULT - IS PATIENT PREGNANT?
Pt transferred to [facility] room 1015 with belongings: clothing,: shirt, bottoms, shoes, watch, cell phone, cell phone , dentures, gold watch, and keys. Discharge instructions reviewed with patient, he verbalized understanding. no

## 2022-05-26 NOTE — H&P PST ADULT - NS MD HP INPLANTS MED DEV
Automatic Implantable Cardioverter Defibrillator Carmen JACKSON [id number] Serial Number [id number] implanted on 2/3/2011/Automatic Implantable Cardioverter Defibrillator

## 2022-05-26 NOTE — H&P PST ADULT - PROBLEM SELECTOR PLAN 2
Results reviewed and noted. Will continue to monitor. Pt instructed to take medication with sip of water the day of surgery. Pt given written instructions to follow NPO the night of surgery and medication taken with sip of water.

## 2022-05-26 NOTE — H&P PST ADULT - ASSESSMENT
57 yr old female Morbidly obese (52 BMI) Sleep Apnea on CPAP, HTN, CAD with AICD ( insertion 2011 and interrogated on Feb 2022 awaiting report form facility. CHF (EF 49%) diabetic (Novolin 70/30 in am and 5pm ) sent A1c to lab along with CBC and BMP (repeat for surgery seen some abnormalities on clearance) Pt seen using walker arthritic , bilateral leg edema +1. Pt stated she had right nipple drainage about a month ago of clear to bloody like fluid proceeded to have Mammo indicating a right breasts mass. Pt had surgical consult and schedule for surgery of excision of right breast mass with spot localization on 6/3/2022.  (echo and Stress done here in March 2022) Received call from lab concerning low H/H placed a call to assisted living spoke with Mrs. Mariely FOOTE informed of the results, Dr Barajas was to be expected there this afternoon.  will be informed by her of lab results. I also call and notified Dr. Sauceda on voice mail of the new lab results.   57 yr old female Morbidly obese (52 BMI) Sleep Apnea on CPAP, HTN, CAD with AICD ( insertion 2011 and interrogated on Feb 22, 2022 awaiting report form facility. CHF (EF 49%) diabetic (Novolin 70/30 in am and 5pm ) sent A1c to lab along with CBC and BMP (repeat for surgery seen some abnormalities on clearance) Pt seen using walker arthritic , bilateral leg edema +1. Pt stated she had right nipple drainage about a month ago of clear to bloody like fluid proceeded to have Mammo indicating a right breasts mass. Pt had surgical consult and schedule for surgery of excision of right breast mass with spot localization on 6/3/2022.  (echo and Stress done here in March 2022) Received call from lab concerning low H/H placed a call to assisted living spoke with Mrs. Mariely FOOTE informed of the results, Dr Barajas was to be expected there this afternoon.  will be informed by her of lab results. I also call and notified Dr. Sauceda on voice mail of the new lab results.

## 2022-05-26 NOTE — H&P PST ADULT - FALL HARM RISK - UNIVERSAL INTERVENTIONS
Bed in lowest position, wheels locked, appropriate side rails in place/Call bell, personal items and telephone in reach/Instruct patient to call for assistance before getting out of bed or chair/Non-slip footwear when patient is out of bed/[place] to call system/Physically safe environment - no spills, clutter or unnecessary equipment/Purposeful Proactive Rounding/Room/bathroom lighting operational, light cord in reach

## 2022-05-27 LAB
A1C WITH ESTIMATED AVERAGE GLUCOSE RESULT: 9.2 % — HIGH (ref 4–5.6)
ESTIMATED AVERAGE GLUCOSE: 217 MG/DL — HIGH (ref 68–114)

## 2022-06-02 ENCOUNTER — TRANSCRIPTION ENCOUNTER (OUTPATIENT)
Age: 57
End: 2022-06-02

## 2022-06-02 ENCOUNTER — INPATIENT (INPATIENT)
Facility: HOSPITAL | Age: 57
LOS: 3 days | Discharge: OTHER INSTITUTION | DRG: 584 | End: 2022-06-06
Attending: SPECIALIST | Admitting: SPECIALIST
Payer: MEDICARE

## 2022-06-02 VITALS
TEMPERATURE: 99 F | SYSTOLIC BLOOD PRESSURE: 159 MMHG | DIASTOLIC BLOOD PRESSURE: 77 MMHG | RESPIRATION RATE: 18 BRPM | OXYGEN SATURATION: 94 % | HEART RATE: 72 BPM

## 2022-06-02 DIAGNOSIS — Z98.89 OTHER SPECIFIED POSTPROCEDURAL STATES: Chronic | ICD-10-CM

## 2022-06-02 DIAGNOSIS — D36.9 BENIGN NEOPLASM, UNSPECIFIED SITE: ICD-10-CM

## 2022-06-02 DIAGNOSIS — Z95.810 PRESENCE OF AUTOMATIC (IMPLANTABLE) CARDIAC DEFIBRILLATOR: Chronic | ICD-10-CM

## 2022-06-02 LAB
ANION GAP SERPL CALC-SCNC: 4 MMOL/L — LOW (ref 5–17)
APTT BLD: 31.8 SEC — SIGNIFICANT CHANGE UP (ref 27.5–35.5)
BASOPHILS # BLD AUTO: 0.02 K/UL — SIGNIFICANT CHANGE UP (ref 0–0.2)
BASOPHILS NFR BLD AUTO: 0.3 % — SIGNIFICANT CHANGE UP (ref 0–2)
BLD GP AB SCN SERPL QL: SIGNIFICANT CHANGE UP
BUN SERPL-MCNC: 63 MG/DL — HIGH (ref 7–18)
CALCIUM SERPL-MCNC: 9.2 MG/DL — SIGNIFICANT CHANGE UP (ref 8.4–10.5)
CHLORIDE SERPL-SCNC: 106 MMOL/L — SIGNIFICANT CHANGE UP (ref 96–108)
CO2 SERPL-SCNC: 29 MMOL/L — SIGNIFICANT CHANGE UP (ref 22–31)
CREAT SERPL-MCNC: 2.52 MG/DL — HIGH (ref 0.5–1.3)
EGFR: 21 ML/MIN/1.73M2 — LOW
EOSINOPHIL # BLD AUTO: 0.08 K/UL — SIGNIFICANT CHANGE UP (ref 0–0.5)
EOSINOPHIL NFR BLD AUTO: 1.2 % — SIGNIFICANT CHANGE UP (ref 0–6)
GLUCOSE BLDC GLUCOMTR-MCNC: 140 MG/DL — HIGH (ref 70–99)
GLUCOSE BLDC GLUCOMTR-MCNC: 204 MG/DL — HIGH (ref 70–99)
GLUCOSE SERPL-MCNC: 144 MG/DL — HIGH (ref 70–99)
HCT VFR BLD CALC: 25.8 % — LOW (ref 34.5–45)
HGB BLD-MCNC: 7.7 G/DL — LOW (ref 11.5–15.5)
IMM GRANULOCYTES NFR BLD AUTO: 0.6 % — SIGNIFICANT CHANGE UP (ref 0–1.5)
INR BLD: 1.04 RATIO — SIGNIFICANT CHANGE UP (ref 0.88–1.16)
LYMPHOCYTES # BLD AUTO: 1.19 K/UL — SIGNIFICANT CHANGE UP (ref 1–3.3)
LYMPHOCYTES # BLD AUTO: 17.3 % — SIGNIFICANT CHANGE UP (ref 13–44)
MCHC RBC-ENTMCNC: 29.8 GM/DL — LOW (ref 32–36)
MCHC RBC-ENTMCNC: 31.3 PG — SIGNIFICANT CHANGE UP (ref 27–34)
MCV RBC AUTO: 104.9 FL — HIGH (ref 80–100)
MONOCYTES # BLD AUTO: 0.77 K/UL — SIGNIFICANT CHANGE UP (ref 0–0.9)
MONOCYTES NFR BLD AUTO: 11.2 % — SIGNIFICANT CHANGE UP (ref 2–14)
NEUTROPHILS # BLD AUTO: 4.76 K/UL — SIGNIFICANT CHANGE UP (ref 1.8–7.4)
NEUTROPHILS NFR BLD AUTO: 69.4 % — SIGNIFICANT CHANGE UP (ref 43–77)
NRBC # BLD: 2 /100 WBCS — HIGH (ref 0–0)
PLATELET # BLD AUTO: 238 K/UL — SIGNIFICANT CHANGE UP (ref 150–400)
POTASSIUM SERPL-MCNC: 5.2 MMOL/L — SIGNIFICANT CHANGE UP (ref 3.5–5.3)
POTASSIUM SERPL-SCNC: 5.2 MMOL/L — SIGNIFICANT CHANGE UP (ref 3.5–5.3)
PROTHROM AB SERPL-ACNC: 12.4 SEC — SIGNIFICANT CHANGE UP (ref 10.5–13.4)
RBC # BLD: 2.46 M/UL — LOW (ref 3.8–5.2)
RBC # FLD: 18.6 % — HIGH (ref 10.3–14.5)
SARS-COV-2 RNA SPEC QL NAA+PROBE: SIGNIFICANT CHANGE UP
SODIUM SERPL-SCNC: 139 MMOL/L — SIGNIFICANT CHANGE UP (ref 135–145)
WBC # BLD: 6.86 K/UL — SIGNIFICANT CHANGE UP (ref 3.8–10.5)
WBC # FLD AUTO: 6.86 K/UL — SIGNIFICANT CHANGE UP (ref 3.8–10.5)

## 2022-06-02 RX ORDER — GLUCAGON INJECTION, SOLUTION 0.5 MG/.1ML
1 INJECTION, SOLUTION SUBCUTANEOUS ONCE
Refills: 0 | Status: DISCONTINUED | OUTPATIENT
Start: 2022-06-02 | End: 2022-06-03

## 2022-06-02 RX ORDER — SODIUM CHLORIDE 9 MG/ML
1000 INJECTION, SOLUTION INTRAVENOUS
Refills: 0 | Status: DISCONTINUED | OUTPATIENT
Start: 2022-06-02 | End: 2022-06-03

## 2022-06-02 RX ORDER — ATORVASTATIN CALCIUM 80 MG/1
40 TABLET, FILM COATED ORAL AT BEDTIME
Refills: 0 | Status: DISCONTINUED | OUTPATIENT
Start: 2022-06-02 | End: 2022-06-03

## 2022-06-02 RX ORDER — INSULIN GLARGINE 100 [IU]/ML
5 INJECTION, SOLUTION SUBCUTANEOUS AT BEDTIME
Refills: 0 | Status: DISCONTINUED | OUTPATIENT
Start: 2022-06-02 | End: 2022-06-02

## 2022-06-02 RX ORDER — DEXTROSE 50 % IN WATER 50 %
25 SYRINGE (ML) INTRAVENOUS ONCE
Refills: 0 | Status: DISCONTINUED | OUTPATIENT
Start: 2022-06-02 | End: 2022-06-03

## 2022-06-02 RX ORDER — DEXTROSE 50 % IN WATER 50 %
12.5 SYRINGE (ML) INTRAVENOUS ONCE
Refills: 0 | Status: DISCONTINUED | OUTPATIENT
Start: 2022-06-02 | End: 2022-06-03

## 2022-06-02 RX ORDER — DEXTROSE 50 % IN WATER 50 %
15 SYRINGE (ML) INTRAVENOUS ONCE
Refills: 0 | Status: DISCONTINUED | OUTPATIENT
Start: 2022-06-02 | End: 2022-06-03

## 2022-06-02 RX ORDER — CHLORHEXIDINE GLUCONATE 213 G/1000ML
1 SOLUTION TOPICAL
Refills: 0 | Status: DISCONTINUED | OUTPATIENT
Start: 2022-06-02 | End: 2022-06-03

## 2022-06-02 RX ORDER — CARVEDILOL PHOSPHATE 80 MG/1
6.25 CAPSULE, EXTENDED RELEASE ORAL EVERY 12 HOURS
Refills: 0 | Status: DISCONTINUED | OUTPATIENT
Start: 2022-06-02 | End: 2022-06-03

## 2022-06-02 RX ORDER — INSULIN LISPRO 100/ML
VIAL (ML) SUBCUTANEOUS
Refills: 0 | Status: DISCONTINUED | OUTPATIENT
Start: 2022-06-02 | End: 2022-06-03

## 2022-06-02 RX ORDER — SPIRONOLACTONE 25 MG/1
50 TABLET, FILM COATED ORAL DAILY
Refills: 0 | Status: DISCONTINUED | OUTPATIENT
Start: 2022-06-03 | End: 2022-06-03

## 2022-06-02 RX ORDER — INSULIN LISPRO 100/ML
VIAL (ML) SUBCUTANEOUS AT BEDTIME
Refills: 0 | Status: DISCONTINUED | OUTPATIENT
Start: 2022-06-02 | End: 2022-06-03

## 2022-06-02 RX ORDER — ACETAMINOPHEN 500 MG
650 TABLET ORAL EVERY 6 HOURS
Refills: 0 | Status: DISCONTINUED | OUTPATIENT
Start: 2022-06-02 | End: 2022-06-03

## 2022-06-02 RX ORDER — HEPARIN SODIUM 5000 [USP'U]/ML
5000 INJECTION INTRAVENOUS; SUBCUTANEOUS EVERY 8 HOURS
Refills: 0 | Status: DISCONTINUED | OUTPATIENT
Start: 2022-06-02 | End: 2022-06-03

## 2022-06-02 RX ORDER — ALBUTEROL 90 UG/1
2 AEROSOL, METERED ORAL EVERY 6 HOURS
Refills: 0 | Status: DISCONTINUED | OUTPATIENT
Start: 2022-06-02 | End: 2022-06-03

## 2022-06-02 RX ADMIN — HEPARIN SODIUM 5000 UNIT(S): 5000 INJECTION INTRAVENOUS; SUBCUTANEOUS at 22:05

## 2022-06-02 RX ADMIN — ATORVASTATIN CALCIUM 40 MILLIGRAM(S): 80 TABLET, FILM COATED ORAL at 22:05

## 2022-06-02 RX ADMIN — CARVEDILOL PHOSPHATE 6.25 MILLIGRAM(S): 80 CAPSULE, EXTENDED RELEASE ORAL at 18:09

## 2022-06-02 NOTE — CHART NOTE - NSCHARTNOTEFT_GEN_A_CORE
LABS:                        7.7    6.86  )-----------( 238      ( 02 Jun 2022 17:22 )             25.8             06-02    139  |  106  |  63<H>  ----------------------------<  144<H>  5.2   |  29  |  2.52<H>    Ca    9.2      02 Jun 2022 17:22      PT/INR - ( 02 Jun 2022 17:22 )   PT: 12.4 sec;   INR: 1.04 ratio         PTT - ( 02 Jun 2022 17:22 )  PTT:31.8 sec      H/H and case reviewed with anesthesiology team, Dr. Madrid and Dr. Price.   Given the current H/H and patients PMHX no need to give unit of PRBC tonight in preparation for OR tomorrow.   - AM labs ordered   - will monitor H/H in AM   - discussed and agreed with Dr. Sauceda

## 2022-06-02 NOTE — PROGRESS NOTE ADULT - SUBJECTIVE AND OBJECTIVE BOX
Patient is a 57y old  Female who presents with a chief complaint of Right Breast Mass Excision (2022 15:22)    PATIENT IS SEEN AND EXAMINED IN MEDICAL FLOOR.  NGT [    ]    [place] [   ]      GT [   ]    ALLERGIES:  codeine (Urticaria)      Daily     Daily     VITALS:    Vital Signs Last 24 Hrs  T(C): 37.4 (2022 13:43), Max: 37.4 (2022 13:43)  T(F): 99.3 (2022 13:43), Max: 99.3 (2022 13:43)  HR: 72 (2022 13:43) (72 - 72)  BP: 159/77 (2022 13:43) (159/77 - 159/77)  BP(mean): --  RR: 18 (2022 13:43) (18 - 18)  SpO2: 94% (2022 13:43) (94% - 94%)    LABS:    CBC Full  -  ( 2022 17:22 )  WBC Count : 6.86 K/uL  RBC Count : 2.46 M/uL  Hemoglobin : 7.7 g/dL  Hematocrit : 25.8 %  Platelet Count - Automated : 238 K/uL  Mean Cell Volume : 104.9 fl  Mean Cell Hemoglobin : 31.3 pg  Mean Cell Hemoglobin Concentration : 29.8 gm/dL  Auto Neutrophil # : 4.76 K/uL  Auto Lymphocyte # : 1.19 K/uL  Auto Monocyte # : 0.77 K/uL  Auto Eosinophil # : 0.08 K/uL  Auto Basophil # : 0.02 K/uL  Auto Neutrophil % : 69.4 %  Auto Lymphocyte % : 17.3 %  Auto Monocyte % : 11.2 %  Auto Eosinophil % : 1.2 %  Auto Basophil % : 0.3 %    PT/INR - ( 2022 17:22 )   PT: 12.4 sec;   INR: 1.04 ratio         PTT - ( 2022 17:22 )  PTT:31.8 sec      139  |  106  |  63<H>  ----------------------------<  144<H>  5.2   |  29  |  2.52<H>    Ca    9.2      2022 17:22      CAPILLARY BLOOD GLUCOSE      POCT Blood Glucose.: 140 mg/dL (2022 17:01)          Creatinine Trend: 2.52<--, 3.04<--  I&O's Summary              MEDICATIONS:    MEDICATIONS  (STANDING):  atorvastatin 40 milliGRAM(s) Oral at bedtime  carvedilol 6.25 milliGRAM(s) Oral every 12 hours  chlorhexidine 2% Cloths 1 Application(s) Topical two times a day  dextrose 5%. 1000 milliLiter(s) (100 mL/Hr) IV Continuous <Continuous>  dextrose 5%. 1000 milliLiter(s) (50 mL/Hr) IV Continuous <Continuous>  dextrose 50% Injectable 25 Gram(s) IV Push once  dextrose 50% Injectable 12.5 Gram(s) IV Push once  dextrose 50% Injectable 25 Gram(s) IV Push once  glucagon  Injectable 1 milliGRAM(s) IntraMuscular once  heparin   Injectable 5000 Unit(s) SubCutaneous every 8 hours  insulin lispro (ADMELOG) corrective regimen sliding scale   SubCutaneous three times a day before meals  insulin lispro (ADMELOG) corrective regimen sliding scale   SubCutaneous at bedtime      MEDICATIONS  (PRN):  acetaminophen     Tablet .. 650 milliGRAM(s) Oral every 6 hours PRN Temp greater or equal to 38C (100.4F), Mild Pain (1 - 3)  ALBUTerol    90 MICROgram(s) HFA Inhaler 2 Puff(s) Inhalation every 6 hours PRN Shortness of Breath and/or Wheezing  dextrose Oral Gel 15 Gram(s) Oral once PRN Blood Glucose LESS THAN 70 milliGRAM(s)/deciliter      REVIEW OF SYSTEMS:                           ALL ROS DONE [ X   ]    CONSTITUTIONAL:  LETHARGIC [   ], FEVER [   ], UNRESPONSIVE [   ]  CVS:  CP  [   ], SOB, [   ], PALPITATIONS [   ], DIZZYNESS [   ]  RS: COUGH [   ], SPUTUM [   ]  GI: ABDOMINAL PAIN [   ], NAUSEA [   ], VOMITINGS [   ], DIARRHEA [   ], CONSTIPATION [   ]  :  DYSURIA [   ], NOCTURIA [   ], INCREASED FREQUENCY [   ], DRIBLING [   ],  SKELETAL: PAINFUL JOINTS [   ], SWOLLEN JOINTS [   ], NECK ACHE [   ], LOW BACK ACHE [   ],  SKIN : ULCERS [   ], RASH [   ], ITCHING [   ]  CNS: HEAD ACHE [   ], DOUBLE VISION [   ], BLURRED VISION [   ], AMS / CONFUSION [   ], SEIZURES [   ], WEAKNESS [   ],TINGLING / NUMBNESS [   ]    PHYSICAL EXAMINATION:  GENERAL APPEARANCE: NO DISTRESS  HEENT:  NO PALLOR, NO  JVD,  NO   NODES, NECK SUPPLE  CVS: S1 +, S2 +,   RS: AEEB,  OCCASIONAL  RALES +,   NO RONCHI  ABD: SOFT, NT, NO, BS +  EXT: NO PE  SKIN: WARM,   SKELETAL:  ROM ACCEPTABLE  CNS:  AAO X    ,   DEFICITS    RADIOLOGY :      ASSESSMENT :     Benign neoplasm    HTN (hypertension)    DM (diabetes mellitus)    RA (rheumatoid arthritis)    COPD (chronic obstructive pulmonary disease)    AICD (automatic cardioverter/defibrillator) present    CAD (coronary artery disease)    CHF (congestive heart failure)    GERD (gastroesophageal reflux disease)    Benign neoplasm, unspecified site    Morbid obesity with BMI of 50.0-59.9, adult    Obstructive sleep apnea on CPAP    History of cholecystectomy    ICD (implantable cardioverter-defibrillator) in place    H/O  section        PLAN:  HPI:  57 yr old female Morbidly obese (52 BMI) Sleep Apnea on CPAP, HTN, CAD with AICD ( insertion  and interrogated on 2022, CHF (EF 49%) diabetic (Novolin 70/30 in am and 5pm ), Pt seen using walker arthritic , bilateral leg edema +1. Pt stated she had right nipple drainage about a month ago, which led to mammogram and US indicating a right breasts mass.  Patient has history of anemia and had 8 transfusions in the past for low H/H. She currently denies any headache, dizziness and ambulates without difficulty.  Pt  schedule for surgery of excision of right breast mass with spot localization tomorrow. Patient is admitted for medical optimization prior to OR.   Patient denies any breast pain, discharge, abdominal pain, N/V, fever, chills, shortness of breathe or any other constitutional symptoms.  (2022 15:22)    -

## 2022-06-02 NOTE — H&P ADULT - HISTORY OF PRESENT ILLNESS
57 yr old female Morbidly obese (52 BMI) Sleep Apnea on CPAP, HTN, CAD with AICD ( insertion 2011 and interrogated on Feb 25, 2022, CHF (EF 49%) diabetic (Novolin 70/30 in am and 5pm ), Pt seen using walker arthritic , bilateral leg edema +1. Pt stated she had right nipple drainage about a month ago, which led to mammogram and US indicating a right breasts mass.  Patient has history of anemia and had 8 transfusions in the past for low H/H. She currently denies any headache, dizziness and ambulates without difficulty.  Pt  schedule for surgery of excision of right breast mass with spot localization tomorrow. Patient is admitted for medical optimization prior to OR.   Patient denies any breast pain, discharge, abdominal pain, N/V, fever, chills, shortness of breathe or any other constitutional symptoms.

## 2022-06-02 NOTE — CONSULT NOTE ADULT - SUBJECTIVE AND OBJECTIVE BOX
NEPHROLOGY MEDICAL CARE, [facility] - Dr. Linden Jacob/ Dr. Cruz Ledesma/ Dr. Meet Galdamez/ Dr. Jannie Recinos    Patient was seen and examined at bedside.     Consultation requested by:  Quang Sauceda    Reason for Consult:     HPI:  55y/o female Cande Matos Al with PMH of Obesity, CHF HFrEF 25 %, s/p AICD, HTN, B/l LE edema, DM, Gout, CAD, HLD, COPD  came for direct admit from [facility] for low hb. Renal consulted for CKD management. Patient has known ckd and her baseline scr around 3.0 to 3.5mg/dL and last known Scr around 3.0mg/dl on 2022. denies sob, nausea, vomiting, diarrhea.     PMH:   HTN (hypertension)    DM (diabetes mellitus)    RA (rheumatoid arthritis)    COPD (chronic obstructive pulmonary disease)    AICD (automatic cardioverter/defibrillator) present    CAD (coronary artery disease)    CHF (congestive heart failure)    GERD (gastroesophageal reflux disease)    Benign neoplasm, unspecified site    Morbid obesity with BMI of 50.0-59.9, adult    Obstructive sleep apnea on CPAP        PSH:   History of cholecystectomy    ICD (implantable cardioverter-defibrillator) in place    H/O  section        FAMILY HISTORY:  Family history of hypertension in mother        Social History:  non-smoker/ non-alcoholic     Home Meds:  Home Medications:  acetaminophen 325 mg oral tablet: 2 tab(s) orally every 6 hours, As needed, Temp greater or equal to 38C (100.4F) (26 May 2022 13:36)  Aldactone 50 mg oral tablet: 1 tab(s) orally once a day (26 May 2022 13:36)  allopurinol 100 mg oral tablet: 1 tab(s) orally once a day (26 May 2022 13:36)  aspirin 81 mg oral tablet, chewable: 1 tab(s) orally once a day (26 May 2022 13:36)  atorvastatin 40 mg oral tablet: 1 tab(s) orally once a day (26 May 2022 13:36)  Coreg 6.25 mg oral tablet: 1 tab(s) orally 2 times a day (26 May 2022 13:36)  docusate sodium 100 mg oral capsule: 1 cap(s) orally once a day (26 May 2022 13:36)  ferrous sulfate 324 mg (65 mg elemental iron) oral tablet: orally once a day (26 May 2022 13:36)  gabapentin 100 mg oral capsule: 1 cap(s) orally 3 times a day (26 May 2022 13:36)  Lasix 40 mg oral tablet: 1 tab(s) orally 2 times a day (26 May 2022 13:36)  multivitamin: 1 tab(s) orally once a day (26 May 2022 13:36)  NovoLIN 70/30 FlexPen: 10  subcutaneous once a day at 5pm (26 May 2022 13:36)  NovoLIN 70/30 subcutaneous suspension: 6 unit(s) subcutaneous once a day (in the evening) (26 May 2022 13:36)  NovoLIN 70/30 subcutaneous suspension: 24 unit(s) subcutaneous once a day (26 May 2022 13:36)  pantoprazole 40 mg oral delayed release tablet: 1 tab(s) orally once a day (before a meal) (26 May 2022 13:36)  senna oral tablet: 2 tab(s) orally once a day (at bedtime) (26 May 2022 13:36)  sevelamer carbonate 800 mg oral tablet: 2 tab(s) orally 3 times a day (with meals) (26 May 2022 13:36)  Ventolin HFA 90 mcg/inh inhalation aerosol: 2 puff(s) inhaled every 6 hours (26 May 2022 13:36)  Vitamin D3 1000 intl units oral capsule: 1 cap(s) orally once a day (26 May 2022 13:36)      Allergies:  Allergies    codeine (Urticaria)    Intolerances        REVIEW OF SYSTEMS:  CONSTITUTIONAL: No fever; No weight loss; No fatigue  EYES: No eye pain; No visual disturbances; No discharge  ENMT:  No difficulty hearing; No tinnitus;  No vertigo; No sinus; No throat pain  NECK: No pain; No stiffness  BREASTS: No pain; No masses; No nipple discharge  RESPIRATORY: No cough; No wheezing; No chills; No hemoptysis; No shortness of breath  CARDIOVASCULAR: No chest pain; No palpitations; No dizziness; No leg swelling  GASTROINTESTINAL: No abdominal pain; No epigastric pain; No nausea; No vomiting; No hematemesis; No diarrhea; No constipation. No melena   GENITOURINARY: No dysuria No frequency; No hematuria; No incontinence  NEUROLOGICAL: No headaches; No memory loss; No loss of strength; No numbness; No tremors  SKIN: No itching; No burning; No rashes  ENDOCRINE: No heat or cold intolerance; No hair loss  MUSCULOSKELETAL: No joint pain or swelling; No muscle, back, or extremity pain  PSYCHIATRIC: No depression; No anxiety; No mood swings; No difficulty sleeping  HEME/LYMPH: No easy bruising; No bleeding gums  ALLERY AND IMMUNOLOGIC: No hives or eczema    Vital Signs Last 24 Hrs  T(C): --  T(F): --  HR: --  BP: --  BP(mean): --  RR: --  SpO2: --        PHYSICAL EXAM:  General: No acute respiratory distress; obese.  Eyes: conjunctiva and sclera clear  ENMT: Atraumatic, Normocephalic, supple, No JVD present. Moist mucous membranes  Respiratory: Bilateral clear to percussion; No rales, rhonchi, wheezing  Cardiovascular: S1S2+; no m/r/g  Gastrointestinal: Soft, Non-tender, Nondistended; Bowel sounds present, no hepatosplenomegaly.   Neuro:  Awake, Alert & Oriented X3, No focal deficits present.   Ext:  2+ Peripheral Pulses and No edema, No Cyanosis  Skin: No visible rashes        LABS:                Urine studies      Medications:  MEDICATIONS  (STANDING):    MEDICATIONS  (PRN):           NEPHROLOGY MEDICAL CARE, [facility] - Dr. Linden Jacob/ Dr. Cruz Ledesma/ Dr. Meet Galdamez/ Dr. Jannie Recinos    Patient was seen and examined at bedside.     Consultation requested by:     Reason for Consult:     HPI:  55y/o female Cande Matos Al with PMH of Obesity, CHF HFrEF 25 %, s/p AICD, HTN, B/l LE edema, DM, Gout, CAD, HLD, COPD  [facility] and sugery of the rt breast. Renal consulted for CKD management. Patient has known ckd stage 4 and her baseline scr around 3.0 to 3.5mg/dL and last known Scr around 3.0mg/dl on 2022. Patient is having heavy menstruation with clots for past week.  denies sob, nausea, vomiting, diarrhea.     PMH:   HTN (hypertension)    DM (diabetes mellitus)    RA (rheumatoid arthritis)    COPD (chronic obstructive pulmonary disease)    AICD (automatic cardioverter/defibrillator) present    CAD (coronary artery disease)    CHF (congestive heart failure)    GERD (gastroesophageal reflux disease)    Benign neoplasm, unspecified site    Morbid obesity with BMI of 50.0-59.9, adult    Obstructive sleep apnea on CPAP        PSH:   History of cholecystectomy    ICD (implantable cardioverter-defibrillator) in place    H/O  section        FAMILY HISTORY:  Family history of hypertension in mother        Social History:  non-smoker/ non-alcoholic     Home Meds:  Home Medications:  acetaminophen 325 mg oral tablet: 2 tab(s) orally every 6 hours, As needed, Temp greater or equal to 38C (100.4F) (26 May 2022 13:36)  Aldactone 50 mg oral tablet: 1 tab(s) orally once a day (26 May 2022 13:36)  allopurinol 100 mg oral tablet: 1 tab(s) orally once a day (26 May 2022 13:36)  aspirin 81 mg oral tablet, chewable: 1 tab(s) orally once a day (26 May 2022 13:36)  atorvastatin 40 mg oral tablet: 1 tab(s) orally once a day (26 May 2022 13:36)  Coreg 6.25 mg oral tablet: 1 tab(s) orally 2 times a day (26 May 2022 13:36)  docusate sodium 100 mg oral capsule: 1 cap(s) orally once a day (26 May 2022 13:36)  ferrous sulfate 324 mg (65 mg elemental iron) oral tablet: orally once a day (26 May 2022 13:36)  gabapentin 100 mg oral capsule: 1 cap(s) orally 3 times a day (26 May 2022 13:36)  Lasix 40 mg oral tablet: 1 tab(s) orally 2 times a day (26 May 2022 13:36)  multivitamin: 1 tab(s) orally once a day (26 May 2022 13:36)  NovoLIN 70/30 FlexPen: 10  subcutaneous once a day at 5pm (26 May 2022 13:36)  NovoLIN 70/30 subcutaneous suspension: 6 unit(s) subcutaneous once a day (in the evening) (26 May 2022 13:36)  NovoLIN 70/30 subcutaneous suspension: 24 unit(s) subcutaneous once a day (26 May 2022 13:36)  pantoprazole 40 mg oral delayed release tablet: 1 tab(s) orally once a day (before a meal) (26 May 2022 13:36)  senna oral tablet: 2 tab(s) orally once a day (at bedtime) (26 May 2022 13:36)  sevelamer carbonate 800 mg oral tablet: 2 tab(s) orally 3 times a day (with meals) (26 May 2022 13:36)  Ventolin HFA 90 mcg/inh inhalation aerosol: 2 puff(s) inhaled every 6 hours (26 May 2022 13:36)  Vitamin D3 1000 intl units oral capsule: 1 cap(s) orally once a day (26 May 2022 13:36)      Allergies:  Allergies    codeine (Urticaria)    Intolerances        REVIEW OF SYSTEMS:  CONSTITUTIONAL: No fever; No weight loss; No fatigue  EYES: No eye pain; No visual disturbances; No discharge  ENMT:  No difficulty hearing; No tinnitus;  No vertigo; No sinus; No throat pain  NECK: No pain; No stiffness  BREASTS: No pain; No masses; No nipple discharge  RESPIRATORY: No cough; No wheezing; No chills; No hemoptysis; No shortness of breath  CARDIOVASCULAR: No chest pain; No palpitations; No dizziness; No leg swelling  GASTROINTESTINAL: No abdominal pain; No epigastric pain; No nausea; No vomiting; No hematemesis; No diarrhea; No constipation. No melena   GENITOURINARY: No dysuria No frequency; No hematuria; No incontinence  NEUROLOGICAL: No headaches; No memory loss; No loss of strength; No numbness; No tremors  SKIN: No itching; No burning; No rashes  ENDOCRINE: No heat or cold intolerance; No hair loss  MUSCULOSKELETAL: No joint pain or swelling; No muscle, back, or extremity pain  PSYCHIATRIC: No depression; No anxiety; No mood swings; No difficulty sleeping  HEME/LYMPH: No easy bruising; No bleeding gums  ALLERY AND IMMUNOLOGIC: No hives or eczema    Vital Signs Last 24 Hrs  T(C): --  T(F): --  HR: --  BP: --  BP(mean): --  RR: --  SpO2: --        PHYSICAL EXAM:  General: No acute respiratory distress; obese.  Eyes: conjunctiva and sclera clear  ENMT: Atraumatic, Normocephalic, supple, No JVD present. Moist mucous membranes  Respiratory: Bilateral clear to percussion; No rales, rhonchi, wheezing  Cardiovascular: S1S2+; no m/r/g  Gastrointestinal: Soft, Non-tender, Nondistended; Bowel sounds present, no hepatosplenomegaly.   Neuro:  Awake, Alert & Oriented X3, No focal deficits present.   Ext:  2+ Peripheral Pulses and No edema, No Cyanosis  Skin: No visible rashes        LABS:                Urine studies      Medications:  MEDICATIONS  (STANDING):    MEDICATIONS  (PRN):           NEPHROLOGY MEDICAL CARE, [facility] - Dr. Linden Jacob/ Dr. Cruz Ledesma/ Dr. Meet Galdamez/ Dr. Jannie Recinos    Patient was seen and examined at bedside.     Consultation requested by:     Reason for Consult:     HPI:  57y/o female Cande Matos Al with PMH of Obesity, CHF HFrEF 25 %, s/p AICD, HTN, B/l LE edema, DM, Gout, CAD, HLD, COPD  [facility] and sugery of the rt breast. Renal consulted for CKD management. Patient has known ckd stage 4 and her baseline scr around 3.0 to 3.5mg/dL and last known Scr around 3.0mg/dl on 2022. Patient is having heavy menstruation with clots for past week.  denies sob, nausea, vomiting, diarrhea.     PMH:   HTN (hypertension)    DM (diabetes mellitus)    RA (rheumatoid arthritis)    COPD (chronic obstructive pulmonary disease)    AICD (automatic cardioverter/defibrillator) present    CAD (coronary artery disease)    CHF (congestive heart failure)    GERD (gastroesophageal reflux disease)    Benign neoplasm, unspecified site    Morbid obesity with BMI of 50.0-59.9, adult    Obstructive sleep apnea on CPAP        PSH:   History of cholecystectomy    ICD (implantable cardioverter-defibrillator) in place    H/O  section        FAMILY HISTORY:  Family history of hypertension in mother        Social History:  non-smoker/ non-alcoholic     Home Meds:  Home Medications:  acetaminophen 325 mg oral tablet: 2 tab(s) orally every 6 hours, As needed, Temp greater or equal to 38C (100.4F) (26 May 2022 13:36)  Aldactone 50 mg oral tablet: 1 tab(s) orally once a day (26 May 2022 13:36)  allopurinol 100 mg oral tablet: 1 tab(s) orally once a day (26 May 2022 13:36)  aspirin 81 mg oral tablet, chewable: 1 tab(s) orally once a day (26 May 2022 13:36)  atorvastatin 40 mg oral tablet: 1 tab(s) orally once a day (26 May 2022 13:36)  Coreg 6.25 mg oral tablet: 1 tab(s) orally 2 times a day (26 May 2022 13:36)  docusate sodium 100 mg oral capsule: 1 cap(s) orally once a day (26 May 2022 13:36)  ferrous sulfate 324 mg (65 mg elemental iron) oral tablet: orally once a day (26 May 2022 13:36)  gabapentin 100 mg oral capsule: 1 cap(s) orally 3 times a day (26 May 2022 13:36)  Lasix 40 mg oral tablet: 1 tab(s) orally 2 times a day (26 May 2022 13:36)  multivitamin: 1 tab(s) orally once a day (26 May 2022 13:36)  NovoLIN 70/30 FlexPen: 10  subcutaneous once a day at 5pm (26 May 2022 13:36)  NovoLIN 70/30 subcutaneous suspension: 6 unit(s) subcutaneous once a day (in the evening) (26 May 2022 13:36)  NovoLIN 70/30 subcutaneous suspension: 24 unit(s) subcutaneous once a day (26 May 2022 13:36)  pantoprazole 40 mg oral delayed release tablet: 1 tab(s) orally once a day (before a meal) (26 May 2022 13:36)  senna oral tablet: 2 tab(s) orally once a day (at bedtime) (26 May 2022 13:36)  sevelamer carbonate 800 mg oral tablet: 2 tab(s) orally 3 times a day (with meals) (26 May 2022 13:36)  Ventolin HFA 90 mcg/inh inhalation aerosol: 2 puff(s) inhaled every 6 hours (26 May 2022 13:36)  Vitamin D3 1000 intl units oral capsule: 1 cap(s) orally once a day (26 May 2022 13:36)      Allergies:  Allergies    codeine (Urticaria)    Intolerances        REVIEW OF SYSTEMS:  CONSTITUTIONAL: No fever; No weight loss; No fatigue  EYES: No eye pain; No visual disturbances; No discharge  ENMT:  No difficulty hearing; No tinnitus;  No vertigo; No sinus; No throat pain  NECK: No pain; No stiffness  BREASTS: No pain; No masses; No nipple discharge  RESPIRATORY: No cough; No wheezing; No chills; No hemoptysis; No shortness of breath  CARDIOVASCULAR: No chest pain; No palpitations; No dizziness; No leg swelling  GASTROINTESTINAL: No abdominal pain; No epigastric pain; No nausea; No vomiting; No hematemesis; No diarrhea; No constipation. No melena   GENITOURINARY: No dysuria No frequency; No hematuria; No incontinence  NEUROLOGICAL: No headaches; No memory loss; No loss of strength; No numbness; No tremors  SKIN: No itching; No burning; No rashes  ENDOCRINE: No heat or cold intolerance; No hair loss  MUSCULOSKELETAL: No joint pain or swelling; No muscle, back, or extremity pain  PSYCHIATRIC: No depression; No anxiety; No mood swings; No difficulty sleeping  HEME/LYMPH: No easy bruising; No bleeding gums  ALLERY AND IMMUNOLOGIC: No hives or eczema    Vital Signs Last 24 Hrs  T(C): --  T(F): --  HR: --  BP: --  BP(mean): --  RR: --  SpO2: --        PHYSICAL EXAM:  General: No acute respiratory distress; obese.  Eyes: conjunctiva and sclera clear  ENMT: Atraumatic, Normocephalic, supple, No JVD present. Moist mucous membranes  Respiratory: Bilateral clear to percussion; No rales, rhonchi, wheezing  Cardiovascular: S1S2+; no m/r/g  Gastrointestinal: Soft, Non-tender, Nondistended; Bowel sounds present, no hepatosplenomegaly.   Neuro:  Awake, Alert & Oriented X3, No focal deficits present.   Ext:  2+ Peripheral Pulses and b/l edema, No Cyanosis  Skin: No visible rashes        LABS:                Urine studies      Medications:  MEDICATIONS  (STANDING):    MEDICATIONS  (PRN):

## 2022-06-02 NOTE — CONSULT NOTE ADULT - ASSESSMENT
1. CKD stage 4   most likely due to diabetic nephropathy and hypertensive nephrosclerosis  -  -Keep patient euvolemic and renal diet  -Avoid Nephrotoxic Meds/ Agents such as (NSAIDs, IV contrast, Aminoglycosides such as gentamicin, -Gadolinium contrast, Phosphate containing enemas, etc..)  -Adjust Medications according to eGFR  2. Anemia rule out iron deficiency vs anemia of chronic disease vs anemia of ckd:  -check iron panel, ferritin, folate, vitamin B12   -F/u CBC daily  -transfuse if HB < 7.0.  3. HTN: -bp is acceptable. continue bp meds  -titrate bp meds to keep sbp >110 and < 130  4. Mineral Bone Disease:  -Order phos, Vitamin 25 OH, and PTH intact in am    Discussed with patient in detail regarding the renal plan and care  Discussed the assessment and plan with Primary Team/Nurse     1. CKD stage 4 most likely due to diabetic nephropathy and hypertensive nephrosclerosis  -awaiting labs.  -Keep patient euvolemic and renal diet  -Avoid Nephrotoxic Meds/ Agents such as (NSAIDs, IV contrast, Aminoglycosides such as gentamicin, -Gadolinium contrast, Phosphate containing enemas, etc..)  -Adjust Medications according to eGFR  2. Anemia rule out iron deficiency vs anemia of chronic disease vs anemia of ckd or heavy menstrual bleeding  -check iron panel, ferritin, folate, vitamin B12   -F/u CBC daily  -transfuse if HB < 7.0.  3. HTN: -bp is acceptable. continue bp meds  -titrate bp meds to keep sbp >110 and < 130  4. Mineral Bone Disease:  -Order phos, Vitamin 25 OH, and PTH intact in am    Discussed with patient in detail regarding the renal plan and care  Discussed the assessment and plan with Primary Team/Nurse     1. CKD stage 4 most likely due to diabetic nephropathy and hypertensive nephrosclerosis  -Scr is at 2.6mg/dL which is below her baseline scr. Previous baseline scr around 3 to 3.5mg/dl.  will continue to monitor.   -Keep patient euvolemic and renal diet  -Avoid Nephrotoxic Meds/ Agents such as (NSAIDs, IV contrast, Aminoglycosides such as gentamicin, -Gadolinium contrast, Phosphate containing enemas, etc..)  -Adjust Medications according to eGFR  2. Anemia rule out iron deficiency vs anemia of chronic disease vs anemia of ckd or heavy menstrual bleeding  -plan for 1 prbc   -check iron panel, ferritin, folate, vitamin B12 in am.  -F/u CBC daily  -transfuse if HB < 7.0.  3. HTN: -bp is acceptable. continue bp meds  -titrate bp meds to keep sbp >110 and < 130  4. Mineral Bone Disease:  -Order phos, Vitamin 25 OH, and PTH intact in am  5. Rt Breast Mass:  -plan for excision tomorrow.     Discussed with patient in detail regarding the renal plan and care  Discussed the assessment and plan with Primary Team/Nurse

## 2022-06-02 NOTE — CONSULT NOTE ADULT - SUBJECTIVE AND OBJECTIVE BOX
HPI:  57 yr old female Morbidly obese (52 BMI) Sleep Apnea on CPAP, HTN, CAD with AICD ( insertion  and interrogated on 2022, CHF (EF 49%) diabetic (Novolin 70/30 in am and 5pm ), Pt seen using walker arthritic , bilateral leg edema +1. Pt stated she had right nipple drainage about a month ago, which led to mammogram and US indicating a right breasts mass.  Patient has history of anemia and had 8 transfusions in the past for low H/H. She currently denies any headache, dizziness and ambulates without difficulty.  Pt  schedule for surgery of excision of right breast mass with spot localization tomorrow. Patient is admitted for medical optimization prior to OR.   Patient denies any breast pain, discharge, abdominal pain, N/V, fever, chills, shortness of breathe or any other constitutional symptoms.  (2022 15:22)      PAST MEDICAL & SURGICAL HISTORY:  HTN (hypertension)      DM (diabetes mellitus)      RA (rheumatoid arthritis)      COPD (chronic obstructive pulmonary disease)      AICD (automatic cardioverter/defibrillator) present  2/3 2011      CAD (coronary artery disease)      CHF (congestive heart failure)      GERD (gastroesophageal reflux disease)      Benign neoplasm, unspecified site      Morbid obesity with BMI of 50.0-59.9, adult      Obstructive sleep apnea on CPAP      History of cholecystectomy      ICD (implantable cardioverter-defibrillator) in place      H/O  section          codeine (Urticaria)      Social Hx:    FAMILY HISTORY:  Family history of hypertension in mother        acetaminophen     Tablet .. 650 milliGRAM(s) Oral every 6 hours PRN  ALBUTerol    90 MICROgram(s) HFA Inhaler 2 Puff(s) Inhalation every 6 hours PRN  atorvastatin 40 milliGRAM(s) Oral at bedtime  carvedilol 6.25 milliGRAM(s) Oral every 12 hours  chlorhexidine 2% Cloths 1 Application(s) Topical two times a day  dextrose 5%. 1000 milliLiter(s) IV Continuous <Continuous>  dextrose 5%. 1000 milliLiter(s) IV Continuous <Continuous>  dextrose 50% Injectable 25 Gram(s) IV Push once  dextrose 50% Injectable 12.5 Gram(s) IV Push once  dextrose 50% Injectable 25 Gram(s) IV Push once  dextrose Oral Gel 15 Gram(s) Oral once PRN  glucagon  Injectable 1 milliGRAM(s) IntraMuscular once  heparin   Injectable 5000 Unit(s) SubCutaneous every 8 hours  insulin lispro (ADMELOG) corrective regimen sliding scale   SubCutaneous three times a day before meals  insulin lispro (ADMELOG) corrective regimen sliding scale   SubCutaneous at bedtime      MEDICATIONS  (PRN):  acetaminophen     Tablet .. 650 milliGRAM(s) Oral every 6 hours PRN Temp greater or equal to 38C (100.4F), Mild Pain (1 - 3)  ALBUTerol    90 MICROgram(s) HFA Inhaler 2 Puff(s) Inhalation every 6 hours PRN Shortness of Breath and/or Wheezing  dextrose Oral Gel 15 Gram(s) Oral once PRN Blood Glucose LESS THAN 70 milliGRAM(s)/deciliter      T(C): 37.4 (22 @ 13:43), Max: 37.4 (22 @ 13:43)  HR: 72 (22 @ 13:43) (72 - 72)  BP: 159/77 (22 @ 13:43) (159/77 - 159/77)  RR: 18 (22 @ 13:43) (18 - 18)  SpO2: 94% (22 @ 13:43) (94% - 94%)        REVIEW OF SYSTEMS:                           ALL ROS DONE [ X   ]    CONSTITUTIONAL:  LETHARGIC [   ], FEVER [   ], UNRESPONSIVE [   ]  CVS:  CP  [   ], SOB, [   ], PALPITATIONS [   ], DIZZYNESS [   ]  RS: COUGH [   ], SPUTUM [   ]  GI: ABDOMINAL PAIN [   ], NAUSEA [   ], VOMITINGS [   ], DIARRHEA [   ], CONSTIPATION [   ]  :  DYSURIA [   ], NOCTURIA [   ], INCREASED FREQUENCY [   ], DRIBLING [   ],  SKELETAL: PAINFUL JOINTS [   ], SWOLLEN JOINTS [   ], NECK ACHE [   ], LOW BACK ACHE [   ],  SKIN : ULCERS [   ], RASH [   ], ITCHING [   ]  CNS: HEAD ACHE [   ], DOUBLE VISION [   ], BLURRED VISION [   ], AMS / CONFUSION [   ], SEIZURES [   ], WEAKNESS [   ],TINGLING / NUMBNESS [   ]    PHYSICAL EXAMINATION:  GENERAL APPEARANCE: NO DISTRESS  HEENT:  NO PALLOR, NO  JVD,  NO   NODES, NECK SUPPLE  CVS: S1 +, S2 +,   RS: AEEB,  OCCASIONAL  RALES +,   NO RONCHI  ABD: SOFT, NT, NO, BS +  EXT: NO PE  SKIN: WARM,   SKELETAL:  ROM ACCEPTABLE  CNS:  AAO X    ,   DEFICITS    RADIOLOGY :      ASSESSMENT :       PLAN:  HPI:  57 yr old female Morbidly obese (52 BMI) Sleep Apnea on CPAP, HTN, CAD with AICD ( insertion  and interrogated on 2022, CHF (EF 49%) diabetic (Novolin 70/30 in am and 5pm ), Pt seen using walker arthritic , bilateral leg edema +1. Pt stated she had right nipple drainage about a month ago, which led to mammogram and US indicating a right breasts mass.  Patient has history of anemia and had 8 transfusions in the past for low H/H. She currently denies any headache, dizziness and ambulates without difficulty.  Pt  schedule for surgery of excision of right breast mass with spot localization tomorrow. Patient is admitted for medical optimization prior to OR.   Patient denies any breast pain, discharge, abdominal pain, N/V, fever, chills, shortness of breathe or any other constitutional symptoms.  (2022 15:22)    -      HPI:  57 yr old female Morbidly obese (52 BMI) Sleep Apnea on CPAP, HTN, CAD with AICD ( insertion  and interrogated on 2022, CHF (EF 49%) diabetic (Novolin 70/30 in am and 5pm ), Pt seen using walker arthritic , bilateral leg edema +1. Pt stated she had right nipple drainage about a month ago, which led to mammogram and US indicating a right breasts mass.  Patient has history of anemia and had 8 transfusions in the past for low H/H. She currently denies any headache, dizziness and ambulates without difficulty.  Pt  schedule for surgery of excision of right breast mass with spot localization tomorrow. Patient is admitted for medical optimization prior to OR.   Patient denies any breast pain, discharge, abdominal pain, N/V, fever, chills, shortness of breathe or any other constitutional symptoms.  (2022 15:22)      PAST MEDICAL & SURGICAL HISTORY:  HTN (hypertension)      DM (diabetes mellitus)      RA (rheumatoid arthritis)      COPD (chronic obstructive pulmonary disease)      AICD (automatic cardioverter/defibrillator) present  2/3 2011      CAD (coronary artery disease)      CHF (congestive heart failure)      GERD (gastroesophageal reflux disease)      Benign neoplasm, unspecified site      Morbid obesity with BMI of 50.0-59.9, adult      Obstructive sleep apnea on CPAP      History of cholecystectomy      ICD (implantable cardioverter-defibrillator) in place      H/O  section          codeine (Urticaria)      Social Hx:    FAMILY HISTORY:  Family history of hypertension in mother        acetaminophen     Tablet .. 650 milliGRAM(s) Oral every 6 hours PRN  ALBUTerol    90 MICROgram(s) HFA Inhaler 2 Puff(s) Inhalation every 6 hours PRN  atorvastatin 40 milliGRAM(s) Oral at bedtime  carvedilol 6.25 milliGRAM(s) Oral every 12 hours  chlorhexidine 2% Cloths 1 Application(s) Topical two times a day  dextrose 5%. 1000 milliLiter(s) IV Continuous <Continuous>  dextrose 5%. 1000 milliLiter(s) IV Continuous <Continuous>  dextrose 50% Injectable 25 Gram(s) IV Push once  dextrose 50% Injectable 12.5 Gram(s) IV Push once  dextrose 50% Injectable 25 Gram(s) IV Push once  dextrose Oral Gel 15 Gram(s) Oral once PRN  glucagon  Injectable 1 milliGRAM(s) IntraMuscular once  heparin   Injectable 5000 Unit(s) SubCutaneous every 8 hours  insulin lispro (ADMELOG) corrective regimen sliding scale   SubCutaneous three times a day before meals  insulin lispro (ADMELOG) corrective regimen sliding scale   SubCutaneous at bedtime      MEDICATIONS  (PRN):  acetaminophen     Tablet .. 650 milliGRAM(s) Oral every 6 hours PRN Temp greater or equal to 38C (100.4F), Mild Pain (1 - 3)  ALBUTerol    90 MICROgram(s) HFA Inhaler 2 Puff(s) Inhalation every 6 hours PRN Shortness of Breath and/or Wheezing  dextrose Oral Gel 15 Gram(s) Oral once PRN Blood Glucose LESS THAN 70 milliGRAM(s)/deciliter      T(C): 37.4 (22 @ 13:43), Max: 37.4 (22 @ 13:43)  HR: 72 (22 @ 13:43) (72 - 72)  BP: 159/77 (22 @ 13:43) (159/77 - 159/77)  RR: 18 (22 @ 13:43) (18 - 18)  SpO2: 94% (22 @ 13:43) (94% - 94%)        REVIEW OF SYSTEMS:                           ALL ROS DONE [ X   ]    CONSTITUTIONAL:  LETHARGIC [   ], FEVER [   ], UNRESPONSIVE [   ]  CVS:  CP  [   ], SOB, [   ], PALPITATIONS [   ], DIZZYNESS [   ]  RS: COUGH [   ], SPUTUM [   ]  GI: ABDOMINAL PAIN [   ], NAUSEA [   ], VOMITINGS [   ], DIARRHEA [   ], CONSTIPATION [   ]  :  DYSURIA [   ], NOCTURIA [   ], INCREASED FREQUENCY [   ], DRIBLING [   ],  SKELETAL: PAINFUL JOINTS [   ], SWOLLEN JOINTS [   ], NECK ACHE [   ], LOW BACK ACHE [   ],  SKIN : ULCERS [   ], RASH [   ], ITCHING [   ]  CNS: HEAD ACHE [   ], DOUBLE VISION [   ], BLURRED VISION [   ], AMS / CONFUSION [   ], SEIZURES [   ], WEAKNESS [   ],TINGLING / NUMBNESS [   ]    PHYSICAL EXAMINATION:  GENERAL APPEARANCE: NO DISTRESS  HEENT:  NO PALLOR, NO  JVD,  NO   NODES, NECK SUPPLE  CVS: S1 +, S2 +,   RS: AEEB,  OCCASIONAL  RALES +,   NO RONCHI  ABD: SOFT, NT, NO, BS +  EXT: PE ++  SKIN: WARM,   SKELETAL:  ROM ACCEPTABLE  CNS:  AAO X 3    RADIOLOGY :        ASSESSMENT :       PLAN:  HPI:  57 yr old female Morbidly obese (52 BMI) Sleep Apnea on CPAP, HTN, CAD with AICD ( insertion  and interrogated on 2022, CHF (EF 49%) diabetic (Novolin 70/30 in am and 5pm ), Pt seen using walker arthritic , bilateral leg edema +1. Pt stated she had right nipple drainage about a month ago, which led to mammogram and US indicating a right breasts mass.  Patient has history of anemia and had 8 transfusions in the past for low H/H. She currently denies any headache, dizziness and ambulates without difficulty.  Pt  schedule for surgery of excision of right breast mass with spot localization tomorrow. Patient is admitted for medical optimization prior to OR.   Patient denies any breast pain, discharge, abdominal pain, N/V, fever, chills, shortness of breathe or any other constitutional symptoms.  (2022 15:22)    # RIGHT BREAST MASS PLANNED FOR EXCISION ON 6/3  # MEDICAL CLEARANCE FOR SURGERY - RCRI - 4 - 15% 30 DAY RISK OF DEATH, MI OR CARDIAC ARREST  - RECENT NST - NEGATIVE  - F/U CXR    # NORMOCYTIC ANEMIA W/ HX OF POST-MENOPAUSAL VAGINAL BLEEDING, FIBROID UTERUS AND ANEMIA OF CKD , ANEMIA OF CHRONIC DISEASE   - TRANSFUSION THRESHOLD HGB < 7  - GIVING PRBC TRANSFUSION  - TREND HGB  - ON FESO4    # AMPARO ON CPAP    # HX OF CAD W/ AICD  # CHRONIC SYSTOLIC HEART FAILURE S/P AICD  # PVD, B/L LE LYMPHEDEMA  - ON ASA, STATIN, COREG  - ON LASIX, ALDACTONE    # CKD STAGE 4-5 - NEPHROLOGY CONSULT  - ON SEVELAMER    # MORBID OBESITY    # COPD - CONTINUE PRN VENTOLIN,     # HTN - ON COREG    # HLD - ON STATIN    # DM - SSI + FS  GIVEN PATIENT WILL BE NPO AFTER MIDNIGHT  - ONCE PATIENT TOLERATES DIET, WILL RESUME BASAL INSULIN REGIMEN    # GOUT - ON ALLOPURINOL    # GI PPX; DVT PPX

## 2022-06-02 NOTE — H&P ADULT - ASSESSMENT
57 year old female with right breast mass   vitals reviewed     - Admit to surgery, Dr. Sauceda   - plan for OR tomorrow, 6/3 for excision of right breast mass with spot localization    - stat CBC, give PRBC PRN   - NPO, IVF   - restart home meds   - medicine consulted, f/u recs  - cardiology consulted, f/u recs   - endocrine consulted, f/u recs  - DVT ppx, oob, ambulate as tolerated   - pain meds, antiemetic, antipyretic PRN   - discussed and agreed with Dr. Maldonado     Discussed case with Dr. MARY Barajas, as she is familiar with patient from the nursing home and she will make her recommendations.   Patients med rec noted from nursing home, in chart, and medications were  57 year old female with right breast mass   vitals reviewed     - Admit to surgery, Dr. Sauceda   - plan for OR tomorrow, 6/3 for excision of right breast mass with spot localization    - stat CBC, give PRBC PRN   - monitor H/H, monitor BMP   - NPO, IVF   - restart home meds   - medicine consulted, f/u recs  - cardiology consulted, f/u recs   - endocrine consulted, f/u recs  - DVT ppx, oob, ambulate as tolerated   - pain meds, antiemetic, antipyretic PRN   - discussed and agreed with Dr. Maldonado     Discussed case with Dr. MARY Barajas, as she is familiar with patient from the nursing home and she will make her recommendations.   Patients med rec noted from nursing home, in chart, and medications were

## 2022-06-02 NOTE — H&P ADULT - NSHPPHYSICALEXAM_GEN_ALL_CORE
General: obese, Well nourished/Well developed, NAD  Neurology: A&Ox3  Respiratory: unlabored  Breast: warm, nontender, no erythema, no fluctuance, no drainage b/l    Abdominal: Nontender, Nondistended, large nonerythematous nonedematous umbilical hernia noted, healed surgical scar noted at RUQ

## 2022-06-03 ENCOUNTER — TRANSCRIPTION ENCOUNTER (OUTPATIENT)
Age: 57
End: 2022-06-03

## 2022-06-03 ENCOUNTER — APPOINTMENT (OUTPATIENT)
Dept: SURGERY | Facility: HOSPITAL | Age: 57
End: 2022-06-03
Payer: MEDICARE

## 2022-06-03 ENCOUNTER — RESULT REVIEW (OUTPATIENT)
Age: 57
End: 2022-06-03

## 2022-06-03 DIAGNOSIS — E11.9 TYPE 2 DIABETES MELLITUS WITHOUT COMPLICATIONS: ICD-10-CM

## 2022-06-03 LAB
24R-OH-CALCIDIOL SERPL-MCNC: 29.3 NG/ML — LOW (ref 30–80)
ANION GAP SERPL CALC-SCNC: 4 MMOL/L — LOW (ref 5–17)
BUN SERPL-MCNC: 55 MG/DL — HIGH (ref 7–18)
CALCIUM SERPL-MCNC: 9.2 MG/DL — SIGNIFICANT CHANGE UP (ref 8.4–10.5)
CALCIUM SERPL-MCNC: 9.2 MG/DL — SIGNIFICANT CHANGE UP (ref 8.4–10.5)
CHLORIDE SERPL-SCNC: 108 MMOL/L — SIGNIFICANT CHANGE UP (ref 96–108)
CO2 SERPL-SCNC: 28 MMOL/L — SIGNIFICANT CHANGE UP (ref 22–31)
CREAT SERPL-MCNC: 2.39 MG/DL — HIGH (ref 0.5–1.3)
EGFR: 23 ML/MIN/1.73M2 — LOW
FERRITIN SERPL-MCNC: 62 NG/ML — SIGNIFICANT CHANGE UP (ref 15–150)
GLUCOSE BLDC GLUCOMTR-MCNC: 174 MG/DL — HIGH (ref 70–99)
GLUCOSE BLDC GLUCOMTR-MCNC: 178 MG/DL — HIGH (ref 70–99)
GLUCOSE BLDC GLUCOMTR-MCNC: 194 MG/DL — HIGH (ref 70–99)
GLUCOSE BLDC GLUCOMTR-MCNC: 204 MG/DL — HIGH (ref 70–99)
GLUCOSE BLDC GLUCOMTR-MCNC: 216 MG/DL — HIGH (ref 70–99)
GLUCOSE BLDC GLUCOMTR-MCNC: 224 MG/DL — HIGH (ref 70–99)
GLUCOSE BLDC GLUCOMTR-MCNC: 227 MG/DL — HIGH (ref 70–99)
GLUCOSE SERPL-MCNC: 170 MG/DL — HIGH (ref 70–99)
HCT VFR BLD CALC: 25.4 % — LOW (ref 34.5–45)
HGB BLD-MCNC: 7.5 G/DL — LOW (ref 11.5–15.5)
IRON SATN MFR SERPL: 15 % — SIGNIFICANT CHANGE UP (ref 15–50)
IRON SATN MFR SERPL: 43 UG/DL — SIGNIFICANT CHANGE UP (ref 40–150)
MCHC RBC-ENTMCNC: 29.5 GM/DL — LOW (ref 32–36)
MCHC RBC-ENTMCNC: 31.6 PG — SIGNIFICANT CHANGE UP (ref 27–34)
MCV RBC AUTO: 107.2 FL — HIGH (ref 80–100)
NRBC # BLD: 1 /100 WBCS — HIGH (ref 0–0)
PHOSPHATE SERPL-MCNC: 4.5 MG/DL — SIGNIFICANT CHANGE UP (ref 2.5–4.5)
PLATELET # BLD AUTO: 234 K/UL — SIGNIFICANT CHANGE UP (ref 150–400)
POTASSIUM SERPL-MCNC: 5 MMOL/L — SIGNIFICANT CHANGE UP (ref 3.5–5.3)
POTASSIUM SERPL-SCNC: 5 MMOL/L — SIGNIFICANT CHANGE UP (ref 3.5–5.3)
PTH-INTACT FLD-MCNC: 105 PG/ML — HIGH (ref 15–65)
RBC # BLD: 2.37 M/UL — LOW (ref 3.8–5.2)
RBC # FLD: 18.6 % — HIGH (ref 10.3–14.5)
SODIUM SERPL-SCNC: 140 MMOL/L — SIGNIFICANT CHANGE UP (ref 135–145)
TIBC SERPL-MCNC: 292 UG/DL — SIGNIFICANT CHANGE UP (ref 250–450)
UIBC SERPL-MCNC: 249 UG/DL — SIGNIFICANT CHANGE UP (ref 110–370)
WBC # BLD: 6.24 K/UL — SIGNIFICANT CHANGE UP (ref 3.8–10.5)
WBC # FLD AUTO: 6.24 K/UL — SIGNIFICANT CHANGE UP (ref 3.8–10.5)

## 2022-06-03 PROCEDURE — 76098 X-RAY EXAM SURGICAL SPECIMEN: CPT | Mod: 26

## 2022-06-03 PROCEDURE — 19125 EXCISION BREAST LESION: CPT | Mod: RT

## 2022-06-03 PROCEDURE — 71045 X-RAY EXAM CHEST 1 VIEW: CPT | Mod: 26

## 2022-06-03 PROCEDURE — 88307 TISSUE EXAM BY PATHOLOGIST: CPT | Mod: 26

## 2022-06-03 PROCEDURE — 19281 PERQ DEVICE BREAST 1ST IMAG: CPT | Mod: RT

## 2022-06-03 RX ORDER — SODIUM CHLORIDE 9 MG/ML
1000 INJECTION, SOLUTION INTRAVENOUS
Refills: 0 | Status: DISCONTINUED | OUTPATIENT
Start: 2022-06-03 | End: 2022-06-06

## 2022-06-03 RX ORDER — ACETAMINOPHEN 500 MG
650 TABLET ORAL EVERY 6 HOURS
Refills: 0 | Status: DISCONTINUED | OUTPATIENT
Start: 2022-06-03 | End: 2022-06-06

## 2022-06-03 RX ORDER — ATORVASTATIN CALCIUM 80 MG/1
40 TABLET, FILM COATED ORAL AT BEDTIME
Refills: 0 | Status: DISCONTINUED | OUTPATIENT
Start: 2022-06-03 | End: 2022-06-06

## 2022-06-03 RX ORDER — SODIUM CHLORIDE 9 MG/ML
1000 INJECTION, SOLUTION INTRAVENOUS
Refills: 0 | Status: DISCONTINUED | OUTPATIENT
Start: 2022-06-03 | End: 2022-06-03

## 2022-06-03 RX ORDER — ALBUTEROL 90 UG/1
2 AEROSOL, METERED ORAL EVERY 6 HOURS
Refills: 0 | Status: DISCONTINUED | OUTPATIENT
Start: 2022-06-03 | End: 2022-06-06

## 2022-06-03 RX ORDER — ACETAMINOPHEN 500 MG
1000 TABLET ORAL ONCE
Refills: 0 | Status: DISCONTINUED | OUTPATIENT
Start: 2022-06-03 | End: 2022-06-03

## 2022-06-03 RX ORDER — DEXTROSE 50 % IN WATER 50 %
15 SYRINGE (ML) INTRAVENOUS ONCE
Refills: 0 | Status: DISCONTINUED | OUTPATIENT
Start: 2022-06-03 | End: 2022-06-06

## 2022-06-03 RX ORDER — FERROUS SULFATE 325(65) MG
325 TABLET ORAL
Refills: 0 | Status: DISCONTINUED | OUTPATIENT
Start: 2022-06-03 | End: 2022-06-06

## 2022-06-03 RX ORDER — SPIRONOLACTONE 25 MG/1
50 TABLET, FILM COATED ORAL DAILY
Refills: 0 | Status: DISCONTINUED | OUTPATIENT
Start: 2022-06-03 | End: 2022-06-05

## 2022-06-03 RX ORDER — CALCITRIOL 0.5 UG/1
0.25 CAPSULE ORAL DAILY
Refills: 0 | Status: DISCONTINUED | OUTPATIENT
Start: 2022-06-03 | End: 2022-06-06

## 2022-06-03 RX ORDER — GLUCAGON INJECTION, SOLUTION 0.5 MG/.1ML
1 INJECTION, SOLUTION SUBCUTANEOUS ONCE
Refills: 0 | Status: DISCONTINUED | OUTPATIENT
Start: 2022-06-03 | End: 2022-06-06

## 2022-06-03 RX ORDER — FERROUS SULFATE 325(65) MG
325 TABLET ORAL
Refills: 0 | Status: DISCONTINUED | OUTPATIENT
Start: 2022-06-03 | End: 2022-06-03

## 2022-06-03 RX ORDER — ONDANSETRON 8 MG/1
4 TABLET, FILM COATED ORAL ONCE
Refills: 0 | Status: DISCONTINUED | OUTPATIENT
Start: 2022-06-03 | End: 2022-06-03

## 2022-06-03 RX ORDER — FENTANYL CITRATE 50 UG/ML
25 INJECTION INTRAVENOUS
Refills: 0 | Status: DISCONTINUED | OUTPATIENT
Start: 2022-06-03 | End: 2022-06-03

## 2022-06-03 RX ORDER — FENTANYL CITRATE 50 UG/ML
50 INJECTION INTRAVENOUS
Refills: 0 | Status: DISCONTINUED | OUTPATIENT
Start: 2022-06-03 | End: 2022-06-03

## 2022-06-03 RX ORDER — INSULIN LISPRO 100/ML
5 VIAL (ML) SUBCUTANEOUS
Refills: 0 | Status: DISCONTINUED | OUTPATIENT
Start: 2022-06-03 | End: 2022-06-06

## 2022-06-03 RX ORDER — INSULIN GLARGINE 100 [IU]/ML
10 INJECTION, SOLUTION SUBCUTANEOUS AT BEDTIME
Refills: 0 | Status: DISCONTINUED | OUTPATIENT
Start: 2022-06-03 | End: 2022-06-06

## 2022-06-03 RX ORDER — CALCITRIOL 0.5 UG/1
0.25 CAPSULE ORAL DAILY
Refills: 0 | Status: DISCONTINUED | OUTPATIENT
Start: 2022-06-03 | End: 2022-06-03

## 2022-06-03 RX ORDER — DEXTROSE 50 % IN WATER 50 %
25 SYRINGE (ML) INTRAVENOUS ONCE
Refills: 0 | Status: DISCONTINUED | OUTPATIENT
Start: 2022-06-03 | End: 2022-06-06

## 2022-06-03 RX ORDER — HEPARIN SODIUM 5000 [USP'U]/ML
5000 INJECTION INTRAVENOUS; SUBCUTANEOUS EVERY 8 HOURS
Refills: 0 | Status: DISCONTINUED | OUTPATIENT
Start: 2022-06-03 | End: 2022-06-06

## 2022-06-03 RX ORDER — CARVEDILOL PHOSPHATE 80 MG/1
6.25 CAPSULE, EXTENDED RELEASE ORAL EVERY 12 HOURS
Refills: 0 | Status: DISCONTINUED | OUTPATIENT
Start: 2022-06-03 | End: 2022-06-06

## 2022-06-03 RX ORDER — INSULIN LISPRO 100/ML
VIAL (ML) SUBCUTANEOUS
Refills: 0 | Status: DISCONTINUED | OUTPATIENT
Start: 2022-06-03 | End: 2022-06-06

## 2022-06-03 RX ORDER — DEXTROSE 50 % IN WATER 50 %
12.5 SYRINGE (ML) INTRAVENOUS ONCE
Refills: 0 | Status: DISCONTINUED | OUTPATIENT
Start: 2022-06-03 | End: 2022-06-06

## 2022-06-03 RX ADMIN — CHLORHEXIDINE GLUCONATE 1 APPLICATION(S): 213 SOLUTION TOPICAL at 07:05

## 2022-06-03 RX ADMIN — CALCITRIOL 0.25 MICROGRAM(S): 0.5 CAPSULE ORAL at 21:43

## 2022-06-03 RX ADMIN — CARVEDILOL PHOSPHATE 6.25 MILLIGRAM(S): 80 CAPSULE, EXTENDED RELEASE ORAL at 19:23

## 2022-06-03 RX ADMIN — INSULIN GLARGINE 10 UNIT(S): 100 INJECTION, SOLUTION SUBCUTANEOUS at 21:44

## 2022-06-03 RX ADMIN — Medication 4: at 19:22

## 2022-06-03 RX ADMIN — Medication 325 MILLIGRAM(S): at 19:31

## 2022-06-03 RX ADMIN — ATORVASTATIN CALCIUM 40 MILLIGRAM(S): 80 TABLET, FILM COATED ORAL at 21:43

## 2022-06-03 RX ADMIN — Medication 5 UNIT(S): at 19:22

## 2022-06-03 RX ADMIN — HEPARIN SODIUM 5000 UNIT(S): 5000 INJECTION INTRAVENOUS; SUBCUTANEOUS at 21:46

## 2022-06-03 RX ADMIN — HEPARIN SODIUM 5000 UNIT(S): 5000 INJECTION INTRAVENOUS; SUBCUTANEOUS at 19:23

## 2022-06-03 NOTE — CONSULT NOTE ADULT - SUBJECTIVE AND OBJECTIVE BOX
C A R D I O L O G Y  *********************    DATE OF SERVICE: 22    HISTORY OF PRESENT ILLNESS: HPI:  57 yr old female Morbidly obese (52 BMI) Sleep Apnea on CPAP, DM,  HTN, non-obstructive CAD,  ICD ( insertion  and interrogated on 2022, NICM CHF (EF 49%)  normal perfusion on recent stress, Pt seen using walker arthritic , bilateral leg edema +1. Pt stated she had right nipple drainage about a month ago, which led to mammogram and US indicating a right breasts mass.  Patient has history of anemia and had 8 transfusions in the past for low H/H. She currently denies any headache, dizziness and ambulates without difficulty.  Pt  schedule for surgery of excision of right breast mass with spot localization tomorrow. Patient is admitted for medical optimization prior to OR.   Patient denies any breast pain, discharge, abdominal pain, N/V, fever, chills, shortness of breathe or any other constitutional symptoms.  (2022 15:22)      PAST MEDICAL & SURGICAL HISTORY:  HTN (hypertension)  DM (diabetes mellitus)  RA (rheumatoid arthritis)  COPD (chronic obstructive pulmonary disease)  ICD (automatic cardioverter/defibrillator) present  CHF (congestive heart failure  GERD (gastroesophageal reflux disease)  Benign neoplasm, unspecified site  Morbid obesity with BMI of 50.0-59.9, adult  Obstructive sleep apnea on CPAP  History of cholecystectomy  ICD (implantable cardioverter-defibrillator) in place  H/O  section        MEDICATIONS:  MEDICATIONS  (STANDING):  atorvastatin 40 milliGRAM(s) Oral at bedtime  carvedilol 6.25 milliGRAM(s) Oral every 12 hours  chlorhexidine 2% Cloths 1 Application(s) Topical two times a day  dextrose 5%. 1000 milliLiter(s) (100 mL/Hr) IV Continuous <Continuous>  dextrose 5%. 1000 milliLiter(s) (50 mL/Hr) IV Continuous <Continuous>  dextrose 50% Injectable 25 Gram(s) IV Push once  dextrose 50% Injectable 12.5 Gram(s) IV Push once  dextrose 50% Injectable 25 Gram(s) IV Push once  glucagon  Injectable 1 milliGRAM(s) IntraMuscular once  heparin   Injectable 5000 Unit(s) SubCutaneous every 8 hours  insulin lispro (ADMELOG) corrective regimen sliding scale   SubCutaneous three times a day before meals  insulin lispro (ADMELOG) corrective regimen sliding scale   SubCutaneous at bedtime  spironolactone 50 milliGRAM(s) Oral daily      Allergies    codeine (Urticaria)    Intolerances        FAMILY HISTORY:  Family history of hypertension in mother      Non-contributary for premature coronary disease or sudden cardiac death    SOCIAL HISTORY:    [ X] Non-smoker  [ ] Smoker  [ ] Alcohol        REVIEW OF SYSTEMS:  [ ]chest pain  [  ]shortness of breath  [  ]palpitations  [  ]syncope  [ ]near syncope [ ]upper extremity weakness   [ ] lower extremity weakness  [  ]diplopia  [  ]altered mental status   [  ]fevers  [ ]chills [ ]nausea  [ ]vomitting  [  ]dysphagia    [ ]abdominal pain  [ ]melena  [ ]BRBPR    [  ]epistaxis  [  ]rash    [ ]lower extremity edema        [X] All others negative	  [ ] Unable to obtain      LABS:	 	    CARDIAC MARKERS:                                  7.5    6.24  )-----------( 234      ( 2022 07:03 )             25.4     Hb Trend: 7.5<--, 7.7<--    0603    140  |  108  |  55<H>  ----------------------------<  170<H>  5.0   |  28  |  2.39<H>    Ca    9.2      2022 07:03  Phos  4.5           Creatinine Trend: 2.39<--, 2.52<--, 3.04<--    Coags:  PT/INR - ( 2022 17:22 )   PT: 12.4 sec;   INR: 1.04 ratio         PTT - ( 2022 17:22 )  PTT:31.8 sec      PHYSICAL EXAM:  T(C): 36.7 (22 @ 05:00), Max: 37.4 (22 @ 13:43)  HR: 66 (22 @ 05:00) (66 - 75)  BP: 142/51 (22 @ 05:00) (142/51 - 159/77)  RR: 18 (22 @ 05:00) (18 - 18)  SpO2: 95% (22 @ 08:09) (94% - 100%)  Wt(kg): --   BMI (kg/m2): 53.5 (22 @ 05:00)  I&O's Summary    HEENT:  (-)icterus (-)pallor  CV: N S1 S2 1/6 RAYMOND (+)2 Pulses B/l  Resp:  Clear to ausculatation B/L, normal effort  GI: (+) BS Soft, NT, ND  Lymph:  (-)Edema, (-)obvious lymphadenopathy  Skin: Warm to touch, Normal turgor  Psych: Appropriate mood and affect        EC21  Sinus 74 BPM 1st degree AV Block, RBBB, LAFB    RADIOLOGY:         CXR:   NONE    ASSESSMENT/PLAN: 	57y Female  female Morbidly obese (52 BMI) Sleep Apnea on CPAP, DM,  HTN, non-obstructive CAD,  ICD ( insertion  and interrogated on 2022, NICM severe global LV systolic dysfunction,  normal perfusion on recent stress, preop breast surgery.    - No clinical CHF or unstable cardiac syndromes  - By virtue of Being DM, having CKD and hx of CHF she should be treated as an unmodifiable  high cardiac risk patient but optimized form a cardiac prospective  - Cont Periop beta blockers  - Strict periop fluid management.      Manuel Gold MD, [facility]  BEEPER [phone]   C A R D I O L O G Y  *********************    DATE OF SERVICE: 22    HISTORY OF PRESENT ILLNESS: HPI:  57 yr old female Morbidly obese (52 BMI) Sleep Apnea on CPAP, DM,  HTN, non-obstructive CAD,  ICD ( insertion  and interrogated on 2022, NICM CHF (EF 49%)  normal perfusion on recent stress, Pt seen using walker arthritic , bilateral leg edema +1. Pt stated she had right nipple drainage about a month ago, which led to mammogram and US indicating a right breasts mass.  Patient has history of anemia and had 8 transfusions in the past for low H/H. She currently denies any headache, dizziness and ambulates without difficulty.  Pt  schedule for surgery of excision of right breast mass with spot localization tomorrow. Patient is admitted for medical optimization prior to OR.   Patient denies any breast pain, discharge, abdominal pain, N/V, fever, chills, shortness of breathe or any other constitutional symptoms.  (2022 15:22)      PAST MEDICAL & SURGICAL HISTORY:  HTN (hypertension)  DM (diabetes mellitus)  RA (rheumatoid arthritis)  COPD (chronic obstructive pulmonary disease)  ICD (automatic cardioverter/defibrillator) present  CHF (congestive heart failure  GERD (gastroesophageal reflux disease)  Benign neoplasm, unspecified site  Morbid obesity with BMI of 50.0-59.9, adult  Obstructive sleep apnea on CPAP  History of cholecystectomy  ICD (implantable cardioverter-defibrillator) in place  H/O  section        MEDICATIONS:  MEDICATIONS  (STANDING):  atorvastatin 40 milliGRAM(s) Oral at bedtime  carvedilol 6.25 milliGRAM(s) Oral every 12 hours  chlorhexidine 2% Cloths 1 Application(s) Topical two times a day  dextrose 5%. 1000 milliLiter(s) (100 mL/Hr) IV Continuous <Continuous>  dextrose 5%. 1000 milliLiter(s) (50 mL/Hr) IV Continuous <Continuous>  dextrose 50% Injectable 25 Gram(s) IV Push once  dextrose 50% Injectable 12.5 Gram(s) IV Push once  dextrose 50% Injectable 25 Gram(s) IV Push once  glucagon  Injectable 1 milliGRAM(s) IntraMuscular once  heparin   Injectable 5000 Unit(s) SubCutaneous every 8 hours  insulin lispro (ADMELOG) corrective regimen sliding scale   SubCutaneous three times a day before meals  insulin lispro (ADMELOG) corrective regimen sliding scale   SubCutaneous at bedtime  spironolactone 50 milliGRAM(s) Oral daily      Allergies    codeine (Urticaria)    Intolerances        FAMILY HISTORY:  Family history of hypertension in mother      Non-contributary for premature coronary disease or sudden cardiac death    SOCIAL HISTORY:    [ X] Non-smoker  [ ] Smoker  [ ] Alcohol        REVIEW OF SYSTEMS:  [ ]chest pain  [  ]shortness of breath  [  ]palpitations  [  ]syncope  [ ]near syncope [ ]upper extremity weakness   [ ] lower extremity weakness  [  ]diplopia  [  ]altered mental status   [  ]fevers  [ ]chills [ ]nausea  [ ]vomitting  [  ]dysphagia    [ ]abdominal pain  [ ]melena  [ ]BRBPR    [  ]epistaxis  [  ]rash    [ ]lower extremity edema        [X] All others negative	  [ ] Unable to obtain      LABS:	 	    CARDIAC MARKERS:                                  7.5    6.24  )-----------( 234      ( 2022 07:03 )             25.4     Hb Trend: 7.5<--, 7.7<--    0603    140  |  108  |  55<H>  ----------------------------<  170<H>  5.0   |  28  |  2.39<H>    Ca    9.2      2022 07:03  Phos  4.5           Creatinine Trend: 2.39<--, 2.52<--, 3.04<--    Coags:  PT/INR - ( 2022 17:22 )   PT: 12.4 sec;   INR: 1.04 ratio         PTT - ( 2022 17:22 )  PTT:31.8 sec      PHYSICAL EXAM:  T(C): 36.7 (22 @ 05:00), Max: 37.4 (22 @ 13:43)  HR: 66 (22 @ 05:00) (66 - 75)  BP: 142/51 (22 @ 05:00) (142/51 - 159/77)  RR: 18 (22 @ 05:00) (18 - 18)  SpO2: 95% (22 @ 08:09) (94% - 100%)  Wt(kg): --   BMI (kg/m2): 53.5 (22 @ 05:00)  I&O's Summary    HEENT:  (-)icterus (-)pallor  CV: N S1 S2 1/6 RAYMOND (+)2 Pulses B/l  Resp:  Clear to ausculatation B/L, normal effort  GI: (+) BS Soft, NT, ND  Lymph:  (-)Edema, (-)obvious lymphadenopathy  Skin: Warm to touch, Normal turgor  Psych: Appropriate mood and affect        EC21  Sinus 74 BPM 1st degree AV Block, RBBB, LAFB    RADIOLOGY:         CXR:   NONE    ASSESSMENT/PLAN: 	57y Female  female Morbidly obese (52 BMI) Sleep Apnea on CPAP, DM,  HTN, non-obstructive CAD,  ICD ( insertion  and interrogated on 2022, NICM severe global LV systolic dysfunction,  normal perfusion on recent stress, preop breast surgery.    - No clinical CHF or unstable cardiac syndromes  - By virtue of Being DM, having CKD and hx of CHF she should be treated as an unmodifiable  high cardiac risk patient but optimized form a cardiac prospective  - Cont Periop beta blockers  - Strict periop fluid management.    - Will need anesthesia eval given morbid obesity and AMPARO    Manuel Gold MD, [facility]  BEEPER [phone]

## 2022-06-03 NOTE — CONSULT NOTE ADULT - PROBLEM SELECTOR RECOMMENDATION 9
uncont with hyperglycemia  poorly controlled as out pt a1c- 9.1% on mix insulin  rec lantus 10 units qhs for now  add premeal insulin when able to eat  fsg q6  admelog correction doses.  will d/w pt regarding basal bolus insulin tx

## 2022-06-03 NOTE — PROGRESS NOTE ADULT - ASSESSMENT
1. CKD stage 4 most likely due to diabetic nephropathy and hypertensive nephrosclerosis  -Scr is stable at 2.4-2.6mg/dL which is below her baseline scr. Previous baseline scr around 3 to 3.5mg/dl.  will continue to monitor.   -Keep patient euvolemic and renal diet  -Avoid Nephrotoxic Meds/ Agents such as (NSAIDs, IV contrast, Aminoglycosides such as gentamicin, -Gadolinium contrast, Phosphate containing enemas, etc..)  -Adjust Medications according to eGFR  2. Anemia iron deficiency from heavy menstrual bleeding  -iron panel, ferritin noted.   -add ferrous bid  -F/u CBC daily  -transfuse if HB < 7.0.  3. HTN: -bp is acceptable. continue bp meds  -titrate bp meds to keep sbp >110 and < 130  4. Mineral Bone Disease:  -phos, Vitamin 25 OH, and PTH intact 101  -add calcitirol 0.25mcg daily  5. Rt Breast Mass:  -plan for excision today.     Discussed with patient in detail regarding the renal plan and care  Discussed the assessment and plan with Primary Team/Nurse

## 2022-06-03 NOTE — CONSULT NOTE ADULT - SUBJECTIVE AND OBJECTIVE BOX
Patient is a 57y old  Female who presents with a chief complaint of Right Breast Mass Excision (2022 11:19)      HPI:  57 yr old female Morbidly obese (52 BMI) Sleep Apnea on CPAP, HTN, CAD with AICD ( insertion  and interrogated on 2022, CHF (EF 49%) diabetic (Novolin 70/30 in am and 5pm ), Pt seen using walker arthritic , bilateral leg edema +1. Pt stated she had right nipple drainage about a month ago, which led to mammogram and US indicating a right breasts mass.  Patient has history of anemia and had 8 transfusions in the past for low H/H. She currently denies any headache, dizziness and ambulates without difficulty.  Pt  schedule for surgery of excision of right breast mass with spot localization tomorrow. Patient is admitted for medical optimization prior to OR.   Patient denies any breast pain, discharge, abdominal pain, N/V, fever, chills, shortness of breathe or any other constitutional symptoms.  (2022 15:22)  Found to have uncont dm. Pt is on novolg 70/30 insulin as out pt . Currently npo for scheduled surgery. Unable to get hx. hx from chart review and prim team       PAST MEDICAL & SURGICAL HISTORY:  HTN (hypertension)      DM (diabetes mellitus)      RA (rheumatoid arthritis)      COPD (chronic obstructive pulmonary disease)      AICD (automatic cardioverter/defibrillator) present  2/3 2011      CAD (coronary artery disease)      CHF (congestive heart failure)      GERD (gastroesophageal reflux disease)      Benign neoplasm, unspecified site      Morbid obesity with BMI of 50.0-59.9, adult      Obstructive sleep apnea on CPAP      History of cholecystectomy      ICD (implantable cardioverter-defibrillator) in place      H/O  section             MEDICATIONS  (STANDING):  atorvastatin 40 milliGRAM(s) Oral at bedtime  calcitriol   Capsule 0.25 MICROGram(s) Oral daily  carvedilol 6.25 milliGRAM(s) Oral every 12 hours  chlorhexidine 2% Cloths 1 Application(s) Topical two times a day  dextrose 5%. 1000 milliLiter(s) (100 mL/Hr) IV Continuous <Continuous>  dextrose 5%. 1000 milliLiter(s) (50 mL/Hr) IV Continuous <Continuous>  dextrose 50% Injectable 25 Gram(s) IV Push once  dextrose 50% Injectable 12.5 Gram(s) IV Push once  dextrose 50% Injectable 25 Gram(s) IV Push once  ferrous    sulfate 325 milliGRAM(s) Oral two times a day  glucagon  Injectable 1 milliGRAM(s) IntraMuscular once  heparin   Injectable 5000 Unit(s) SubCutaneous every 8 hours  insulin lispro (ADMELOG) corrective regimen sliding scale   SubCutaneous three times a day before meals  insulin lispro (ADMELOG) corrective regimen sliding scale   SubCutaneous at bedtime  spironolactone 50 milliGRAM(s) Oral daily    MEDICATIONS  (PRN):  acetaminophen     Tablet .. 650 milliGRAM(s) Oral every 6 hours PRN Temp greater or equal to 38C (100.4F), Mild Pain (1 - 3)  ALBUTerol    90 MICROgram(s) HFA Inhaler 2 Puff(s) Inhalation every 6 hours PRN Shortness of Breath and/or Wheezing  dextrose Oral Gel 15 Gram(s) Oral once PRN Blood Glucose LESS THAN 70 milliGRAM(s)/deciliter      FAMILY HISTORY:  Family history of hypertension in mother        SOCIAL HISTORY:      REVIEW OF SYSTEMS:  CONSTITUTIONAL: No fever, weight loss, or fatigue  EYES: No eye pain, visual disturbances, or discharge  ENT:  No difficulty hearing, tinnitus, vertigo; No sinus or throat pain  NECK: No pain or stiffness  RESPIRATORY: No cough, wheezing, chills or hemoptysis; No Shortness of Breath  CARDIOVASCULAR: No chest pain, palpitations, passing out, dizziness, or leg swelling  GASTROINTESTINAL: No abdominal or epigastric pain. No nausea, vomiting, or hematemesis; No diarrhea or constipation. No melena or hematochezia.  GENITOURINARY: No dysuria, frequency, hematuria, or incontinence  NEUROLOGICAL: No headaches, memory loss, loss of strength, numbness, or tremors  SKIN: No itching, burning, rashes, or lesions   LYMPH Nodes: No enlarged glands  ENDOCRINE: No heat or cold intolerance; No hair loss  MUSCULOSKELETAL: No joint pain or swelling; No muscle, back, or extremity pain  PSYCHIATRIC: No depression, anxiety, mood swings, or difficulty sleeping  HEME/LYMPH: No easy bruising, or bleeding gums  ALLERGY AND IMMUNOLOGIC: No hives or eczema	        Vital Signs Last 24 Hrs  T(C): 36.7 (2022 05:00), Max: 37.4 (2022 13:43)  T(F): 98.1 (2022 05:00), Max: 99.3 (2022 13:43)  HR: 66 (2022 05:00) (66 - 75)  BP: 142/51 (2022 05:00) (142/51 - 159/77)  BP(mean): --  RR: 18 (2022 05:00) (18 - 18)  SpO2: 95% (2022 08:09) (94% - 100%)      Constitutional:    NC/AT:    HEENT:    Neck:  No JVD, bruits or thyromegaly    Respiratory:  Clear without rales or rhonchi    Cardiovascular:  RR without murmur, rub or gallop.    Gastrointestinal: Soft without hepatosplenomegaly.    Extremities: without cyanosis, clubbing or edema.    Neurological:  Oriented   x      . No gross sensory or motor defects.        LABS:                        7.5    6.24  )-----------( 234      ( 2022 07:03 )             25.4     06-03    140  |  108  |  55<H>  ----------------------------<  170<H>  5.0   |  28  |  2.39<H>    Ca    9.2      2022 07:03  Phos  4.5     06-03          PT/INR - ( 2022 17:22 )   PT: 12.4 sec;   INR: 1.04 ratio         PTT - ( 2022 17:22 )  PTT:31.8 sec    CAPILLARY BLOOD GLUCOSE      POCT Blood Glucose.: 224 mg/dL (2022 11:29)  POCT Blood Glucose.: 216 mg/dL (2022 08:13)  POCT Blood Glucose.: 174 mg/dL (2022 05:55)  POCT Blood Glucose.: 194 mg/dL (2022 23:58)  POCT Blood Glucose.: 204 mg/dL (2022 21:01)  POCT Blood Glucose.: 140 mg/dL (2022 17:01)      RADIOLOGY & ADDITIONAL STUDIES:

## 2022-06-03 NOTE — PROGRESS NOTE ADULT - SUBJECTIVE AND OBJECTIVE BOX
NEPHROLOGY MEDICAL CARE, [facility] - Dr. Linden Jacob/ Dr. Cruz Ledesma/ Dr. Meet Galdamez/ Dr. Jannie Recinos    Patient was seen and examined at bedside.    CC: patient is okay and NAD    Vital Signs Last 24 Hrs  T(C): 36.7 (03 Jun 2022 05:00), Max: 37.4 (02 Jun 2022 13:43)  T(F): 98.1 (03 Jun 2022 05:00), Max: 99.3 (02 Jun 2022 13:43)  HR: 66 (03 Jun 2022 05:00) (66 - 75)  BP: 142/51 (03 Jun 2022 05:00) (142/51 - 159/77)  BP(mean): --  RR: 18 (03 Jun 2022 05:00) (18 - 18)  SpO2: 95% (03 Jun 2022 08:09) (94% - 100%)      PHYSICAL EXAM:  General: No acute respiratory distress; obese.  Eyes: conjunctiva and sclera clear  ENMT: Atraumatic, Normocephalic, supple, No JVD present.   Respiratory: Bilateral clear to percussion; No rales, rhonchi, wheezing  Cardiovascular: S1S2+; no m/r/g  Gastrointestinal: Soft, Non-tender, Nondistended; Bowel sounds present  Neuro:  Awake, Alert & Oriented X3  Ext:  b/l chronic vascular changed of both legs. No Cyanosis  Skin: No visible rashes      MEDICATIONS:  MEDICATIONS  (STANDING):  atorvastatin 40 milliGRAM(s) Oral at bedtime  carvedilol 6.25 milliGRAM(s) Oral every 12 hours  chlorhexidine 2% Cloths 1 Application(s) Topical two times a day  dextrose 5%. 1000 milliLiter(s) (100 mL/Hr) IV Continuous <Continuous>  dextrose 5%. 1000 milliLiter(s) (50 mL/Hr) IV Continuous <Continuous>  dextrose 50% Injectable 25 Gram(s) IV Push once  dextrose 50% Injectable 12.5 Gram(s) IV Push once  dextrose 50% Injectable 25 Gram(s) IV Push once  glucagon  Injectable 1 milliGRAM(s) IntraMuscular once  heparin   Injectable 5000 Unit(s) SubCutaneous every 8 hours  insulin lispro (ADMELOG) corrective regimen sliding scale   SubCutaneous three times a day before meals  insulin lispro (ADMELOG) corrective regimen sliding scale   SubCutaneous at bedtime  spironolactone 50 milliGRAM(s) Oral daily    MEDICATIONS  (PRN):  acetaminophen     Tablet .. 650 milliGRAM(s) Oral every 6 hours PRN Temp greater or equal to 38C (100.4F), Mild Pain (1 - 3)  ALBUTerol    90 MICROgram(s) HFA Inhaler 2 Puff(s) Inhalation every 6 hours PRN Shortness of Breath and/or Wheezing  dextrose Oral Gel 15 Gram(s) Oral once PRN Blood Glucose LESS THAN 70 milliGRAM(s)/deciliter          LABS:                        7.5    6.24  )-----------( 234      ( 03 Jun 2022 07:03 )             25.4     06-03    140  |  108  |  55<H>  ----------------------------<  170<H>  5.0   |  28  |  2.39<H>    Ca    9.2      03 Jun 2022 07:03  Phos  4.5     06-03      PT/INR - ( 02 Jun 2022 17:22 )   PT: 12.4 sec;   INR: 1.04 ratio         PTT - ( 02 Jun 2022 17:22 )  PTT:31.8 sec    Vitamin D, 25-Hydroxy: 29.3 ng/mL (06-03 @ 09:14)  Intact PTH: 105 pg/mL (06-03 @ 09:14)  Phosphorus Level, Serum: 4.5 mg/dL (06-03 @ 07:03)    Urine studies    PTH and Vit D:  Vitamin D, 25-Hydroxy: 29.3 ng/mL (06-03 @ 09:14)  Intact PTH: 105 pg/mL (06-03 @ 09:14)

## 2022-06-03 NOTE — PROGRESS NOTE ADULT - SUBJECTIVE AND OBJECTIVE BOX
Patient is a 57y old  Female who presents with a chief complaint of Right Breast Mass Excision (03 Jun 2022 10:24)    PATIENT IS SEEN AND EXAMINED IN MEDICAL FLOOR.  NGT [    ]    AVILA [   ]      GT [   ]    ALLERGIES:  codeine (Urticaria)      Daily Height in cm: 165.1 (03 Jun 2022 05:00)    Daily     VITALS:    Vital Signs Last 24 Hrs  T(C): 36.7 (03 Jun 2022 05:00), Max: 37.4 (02 Jun 2022 13:43)  T(F): 98.1 (03 Jun 2022 05:00), Max: 99.3 (02 Jun 2022 13:43)  HR: 66 (03 Jun 2022 05:00) (66 - 75)  BP: 142/51 (03 Jun 2022 05:00) (142/51 - 159/77)  BP(mean): --  RR: 18 (03 Jun 2022 05:00) (18 - 18)  SpO2: 95% (03 Jun 2022 08:09) (94% - 100%)    LABS:    CBC Full  -  ( 03 Jun 2022 07:03 )  WBC Count : 6.24 K/uL  RBC Count : 2.37 M/uL  Hemoglobin : 7.5 g/dL  Hematocrit : 25.4 %  Platelet Count - Automated : 234 K/uL  Mean Cell Volume : 107.2 fl  Mean Cell Hemoglobin : 31.6 pg  Mean Cell Hemoglobin Concentration : 29.5 gm/dL  Auto Neutrophil # : x  Auto Lymphocyte # : x  Auto Monocyte # : x  Auto Eosinophil # : x  Auto Basophil # : x  Auto Neutrophil % : x  Auto Lymphocyte % : x  Auto Monocyte % : x  Auto Eosinophil % : x  Auto Basophil % : x    PT/INR - ( 02 Jun 2022 17:22 )   PT: 12.4 sec;   INR: 1.04 ratio         PTT - ( 02 Jun 2022 17:22 )  PTT:31.8 sec  06-03    140  |  108  |  55<H>  ----------------------------<  170<H>  5.0   |  28  |  2.39<H>    Ca    9.2      03 Jun 2022 07:03  Phos  4.5     06-03      CAPILLARY BLOOD GLUCOSE      POCT Blood Glucose.: 216 mg/dL (03 Jun 2022 08:13)  POCT Blood Glucose.: 174 mg/dL (03 Jun 2022 05:55)  POCT Blood Glucose.: 194 mg/dL (02 Jun 2022 23:58)  POCT Blood Glucose.: 204 mg/dL (02 Jun 2022 21:01)  POCT Blood Glucose.: 140 mg/dL (02 Jun 2022 17:01)          Creatinine Trend: 2.39<--, 2.52<--, 3.04<--  I&O's Summary              MEDICATIONS:    MEDICATIONS  (STANDING):  atorvastatin 40 milliGRAM(s) Oral at bedtime  carvedilol 6.25 milliGRAM(s) Oral every 12 hours  chlorhexidine 2% Cloths 1 Application(s) Topical two times a day  dextrose 5%. 1000 milliLiter(s) (100 mL/Hr) IV Continuous <Continuous>  dextrose 5%. 1000 milliLiter(s) (50 mL/Hr) IV Continuous <Continuous>  dextrose 50% Injectable 25 Gram(s) IV Push once  dextrose 50% Injectable 12.5 Gram(s) IV Push once  dextrose 50% Injectable 25 Gram(s) IV Push once  glucagon  Injectable 1 milliGRAM(s) IntraMuscular once  heparin   Injectable 5000 Unit(s) SubCutaneous every 8 hours  insulin lispro (ADMELOG) corrective regimen sliding scale   SubCutaneous three times a day before meals  insulin lispro (ADMELOG) corrective regimen sliding scale   SubCutaneous at bedtime  spironolactone 50 milliGRAM(s) Oral daily      MEDICATIONS  (PRN):  acetaminophen     Tablet .. 650 milliGRAM(s) Oral every 6 hours PRN Temp greater or equal to 38C (100.4F), Mild Pain (1 - 3)  ALBUTerol    90 MICROgram(s) HFA Inhaler 2 Puff(s) Inhalation every 6 hours PRN Shortness of Breath and/or Wheezing  dextrose Oral Gel 15 Gram(s) Oral once PRN Blood Glucose LESS THAN 70 milliGRAM(s)/deciliter      REVIEW OF SYSTEMS:                           ALL ROS DONE [ X   ]    CONSTITUTIONAL:  LETHARGIC [   ], FEVER [   ], UNRESPONSIVE [   ]  CVS:  CP  [   ], SOB, [   ], PALPITATIONS [   ], DIZZYNESS [   ]  RS: COUGH [   ], SPUTUM [   ]  GI: ABDOMINAL PAIN [   ], NAUSEA [   ], VOMITINGS [   ], DIARRHEA [   ], CONSTIPATION [   ]  :  DYSURIA [   ], NOCTURIA [   ], INCREASED FREQUENCY [   ], DRIBLING [   ],  SKELETAL: PAINFUL JOINTS [   ], SWOLLEN JOINTS [   ], NECK ACHE [   ], LOW BACK ACHE [   ],  SKIN : ULCERS [   ], RASH [   ], ITCHING [   ]  CNS: HEAD ACHE [   ], DOUBLE VISION [   ], BLURRED VISION [   ], AMS / CONFUSION [   ], SEIZURES [   ], WEAKNESS [   ],TINGLING / NUMBNESS [   ]      PHYSICAL EXAMINATION:  GENERAL APPEARANCE: NO DISTRESS  HEENT:  NO PALLOR, NO  JVD,  NO   NODES, NECK SUPPLE  CVS: S1 +, S2 +,   RS: AEEB,  OCCASIONAL  RALES +,   NO RONCHI  ABD: SOFT, NT, NO, BS +  EXT: PE ++  SKIN: WARM,   SKELETAL:  ROM ACCEPTABLE  CNS:  AAO X 3    RADIOLOGY :        ASSESSMENT :       PLAN:  HPI:  57 yr old female Morbidly obese (52 BMI) Sleep Apnea on CPAP, HTN, CAD with AICD ( insertion 2011 and interrogated on Feb 25, 2022, CHF (EF 49%) diabetic (Novolin 70/30 in am and 5pm ), Pt seen using walker arthritic , bilateral leg edema +1. Pt stated she had right nipple drainage about a month ago, which led to mammogram and US indicating a right breasts mass.  Patient has history of anemia and had 8 transfusions in the past for low H/H. She currently denies any headache, dizziness and ambulates without difficulty.  Pt  schedule for surgery of excision of right breast mass with spot localization tomorrow. Patient is admitted for medical optimization prior to OR.   Patient denies any breast pain, discharge, abdominal pain, N/V, fever, chills, shortness of breathe or any other constitutional symptoms.  (02 Jun 2022 15:22)    # RIGHT BREAST MASS PLANNED FOR EXCISION ON 6/3  # MEDICAL CLEARANCE FOR SURGERY - RCRI - 4 - 15% 30 DAY RISK OF DEATH, MI OR CARDIAC ARREST  - RECENT NST - NEGATIVE  - F/U CXR    # NORMOCYTIC ANEMIA W/ HX OF POST-MENOPAUSAL VAGINAL BLEEDING, FIBROID UTERUS AND ANEMIA OF CKD , ANEMIA OF CHRONIC DISEASE   - TRANSFUSION THRESHOLD HGB < 7  - GIVING PRBC TRANSFUSION  - TREND HGB  - ON FESO4    # AMPARO ON CPAP    # HX OF CAD W/ AICD  # CHRONIC SYSTOLIC HEART FAILURE S/P AICD  # PVD, B/L LE LYMPHEDEMA  - ON ASA, STATIN, COREG  - ON LASIX, ALDACTONE    # CKD STAGE 4-5 - NEPHROLOGY CONSULT  - ON SEVELAMER    # MORBID OBESITY    # COPD - CONTINUE PRN VENTOLIN,     # HTN - ON COREG    # HLD - ON STATIN    # DM - SSI + FS  GIVEN PATIENT WILL BE NPO AFTER MIDNIGHT  - ONCE PATIENT TOLERATES DIET, WILL RESUME BASAL INSULIN REGIMEN    # GOUT - ON ALLOPURINOL    # GI PPX; DVT PPX   Patient is a 57y old  Female who presents with a chief complaint of Right Breast Mass Excision (03 Jun 2022 10:24)    PATIENT IS SEEN AND EXAMINED IN MEDICAL FLOOR.  NGT [    ]    AVILA [   ]      GT [   ]    ALLERGIES:  codeine (Urticaria)      Daily Height in cm: 165.1 (03 Jun 2022 05:00)    Daily     VITALS:    Vital Signs Last 24 Hrs  T(C): 36.7 (03 Jun 2022 05:00), Max: 37.4 (02 Jun 2022 13:43)  T(F): 98.1 (03 Jun 2022 05:00), Max: 99.3 (02 Jun 2022 13:43)  HR: 66 (03 Jun 2022 05:00) (66 - 75)  BP: 142/51 (03 Jun 2022 05:00) (142/51 - 159/77)  BP(mean): --  RR: 18 (03 Jun 2022 05:00) (18 - 18)  SpO2: 95% (03 Jun 2022 08:09) (94% - 100%)    LABS:    CBC Full  -  ( 03 Jun 2022 07:03 )  WBC Count : 6.24 K/uL  RBC Count : 2.37 M/uL  Hemoglobin : 7.5 g/dL  Hematocrit : 25.4 %  Platelet Count - Automated : 234 K/uL  Mean Cell Volume : 107.2 fl  Mean Cell Hemoglobin : 31.6 pg  Mean Cell Hemoglobin Concentration : 29.5 gm/dL  Auto Neutrophil # : x  Auto Lymphocyte # : x  Auto Monocyte # : x  Auto Eosinophil # : x  Auto Basophil # : x  Auto Neutrophil % : x  Auto Lymphocyte % : x  Auto Monocyte % : x  Auto Eosinophil % : x  Auto Basophil % : x    PT/INR - ( 02 Jun 2022 17:22 )   PT: 12.4 sec;   INR: 1.04 ratio         PTT - ( 02 Jun 2022 17:22 )  PTT:31.8 sec  06-03    140  |  108  |  55<H>  ----------------------------<  170<H>  5.0   |  28  |  2.39<H>    Ca    9.2      03 Jun 2022 07:03  Phos  4.5     06-03      CAPILLARY BLOOD GLUCOSE      POCT Blood Glucose.: 216 mg/dL (03 Jun 2022 08:13)  POCT Blood Glucose.: 174 mg/dL (03 Jun 2022 05:55)  POCT Blood Glucose.: 194 mg/dL (02 Jun 2022 23:58)  POCT Blood Glucose.: 204 mg/dL (02 Jun 2022 21:01)  POCT Blood Glucose.: 140 mg/dL (02 Jun 2022 17:01)          Creatinine Trend: 2.39<--, 2.52<--, 3.04<--  I&O's Summary              MEDICATIONS:    MEDICATIONS  (STANDING):  atorvastatin 40 milliGRAM(s) Oral at bedtime  carvedilol 6.25 milliGRAM(s) Oral every 12 hours  chlorhexidine 2% Cloths 1 Application(s) Topical two times a day  dextrose 5%. 1000 milliLiter(s) (100 mL/Hr) IV Continuous <Continuous>  dextrose 5%. 1000 milliLiter(s) (50 mL/Hr) IV Continuous <Continuous>  dextrose 50% Injectable 25 Gram(s) IV Push once  dextrose 50% Injectable 12.5 Gram(s) IV Push once  dextrose 50% Injectable 25 Gram(s) IV Push once  glucagon  Injectable 1 milliGRAM(s) IntraMuscular once  heparin   Injectable 5000 Unit(s) SubCutaneous every 8 hours  insulin lispro (ADMELOG) corrective regimen sliding scale   SubCutaneous three times a day before meals  insulin lispro (ADMELOG) corrective regimen sliding scale   SubCutaneous at bedtime  spironolactone 50 milliGRAM(s) Oral daily      MEDICATIONS  (PRN):  acetaminophen     Tablet .. 650 milliGRAM(s) Oral every 6 hours PRN Temp greater or equal to 38C (100.4F), Mild Pain (1 - 3)  ALBUTerol    90 MICROgram(s) HFA Inhaler 2 Puff(s) Inhalation every 6 hours PRN Shortness of Breath and/or Wheezing  dextrose Oral Gel 15 Gram(s) Oral once PRN Blood Glucose LESS THAN 70 milliGRAM(s)/deciliter      REVIEW OF SYSTEMS:                           ALL ROS DONE [ X   ]    CONSTITUTIONAL:  LETHARGIC [   ], FEVER [   ], UNRESPONSIVE [   ]  CVS:  CP  [   ], SOB, [   ], PALPITATIONS [   ], DIZZYNESS [   ]  RS: COUGH [   ], SPUTUM [   ]  GI: ABDOMINAL PAIN [   ], NAUSEA [   ], VOMITINGS [   ], DIARRHEA [   ], CONSTIPATION [   ]  :  DYSURIA [   ], NOCTURIA [   ], INCREASED FREQUENCY [   ], DRIBLING [   ],  SKELETAL: PAINFUL JOINTS [   ], SWOLLEN JOINTS [   ], NECK ACHE [   ], LOW BACK ACHE [   ],  SKIN : ULCERS [   ], RASH [   ], ITCHING [   ]  CNS: HEAD ACHE [   ], DOUBLE VISION [   ], BLURRED VISION [   ], AMS / CONFUSION [   ], SEIZURES [   ], WEAKNESS [   ],TINGLING / NUMBNESS [   ]      PHYSICAL EXAMINATION:  GENERAL APPEARANCE: NO DISTRESS  HEENT:  NO PALLOR, NO  JVD,  NO   NODES, NECK SUPPLE  CVS: S1 +, S2 +,   RS: AEEB,  OCCASIONAL  RALES +,   NO RONCHI  ABD: SOFT, NT, NO, BS +  EXT: PE ++  SKIN: WARM,   RIGHT BREAST W/ SURGICAL DRESSING C/D/I  SKELETAL:  ROM ACCEPTABLE  CNS:  AAO X 3    RADIOLOGY :        ASSESSMENT :       PLAN:  HPI:  57 yr old female Morbidly obese (52 BMI) Sleep Apnea on CPAP, HTN, CAD with AICD ( insertion 2011 and interrogated on Feb 25, 2022, CHF (EF 49%) diabetic (Novolin 70/30 in am and 5pm ), Pt seen using walker arthritic , bilateral leg edema +1. Pt stated she had right nipple drainage about a month ago, which led to mammogram and US indicating a right breasts mass.  Patient has history of anemia and had 8 transfusions in the past for low H/H. She currently denies any headache, dizziness and ambulates without difficulty.  Pt  schedule for surgery of excision of right breast mass with spot localization tomorrow. Patient is admitted for medical optimization prior to OR.   Patient denies any breast pain, discharge, abdominal pain, N/V, fever, chills, shortness of breathe or any other constitutional symptoms.  (02 Jun 2022 15:22)    # RIGHT BREAST MASS PLANNED FOR EXCISION ON 6/3  # MEDICAL CLEARANCE FOR SURGERY - RCRI - 4 - 15% 30 DAY RISK OF DEATH, MI OR CARDIAC ARREST  - RECENT NST - NEGATIVE  - F/U CXR    # NORMOCYTIC ANEMIA W/ HX OF POST-MENOPAUSAL VAGINAL BLEEDING, FIBROID UTERUS AND ANEMIA OF CKD , ANEMIA OF CHRONIC DISEASE   - TRANSFUSION THRESHOLD HGB < 7  - GIVING PRBC TRANSFUSION  - TREND HGB  - ON FESO4    # AMPARO ON CPAP    # HX OF CAD W/ AICD  # CHRONIC SYSTOLIC HEART FAILURE S/P AICD  # PVD, B/L LE LYMPHEDEMA  - ON ASA, STATIN, COREG  - ON LASIX, ALDACTONE    # CKD STAGE 4-5 - NEPHROLOGY CONSULT  - ON SEVELAMER    # MORBID OBESITY    # COPD - CONTINUE PRN VENTOLIN,     # HTN - ON COREG    # HLD - ON STATIN    # DM, UNCONTROLLED  - LANTUS + TID INSULIN   - SSI + FS    - NOTED HBA1C      # GOUT - ON ALLOPURINOL    # GI PPX; DVT PPX

## 2022-06-03 NOTE — CONSULT NOTE ADULT - ASSESSMENT
57 yr old female Morbidly obese (52 BMI) Sleep Apnea on CPAP, HTN, CAD with AICD ( insertion 2011 and interrogated on Feb 25, 2022, CHF (EF 49%) diabetic (Novolin 70/30 in am and 5pm ), Pt seen using walker arthritic , bilateral leg edema +1. Pt stated she had right nipple drainage about a month ago, which led to mammogram and US indicating a right breasts mass.   Found to have uncont dm. Pt is on novolg 70/30 insulin as out pt . Currently npo for scheduled surgery.

## 2022-06-03 NOTE — PROGRESS NOTE ADULT - SUBJECTIVE AND OBJECTIVE BOX
INTERVAL HPI/OVERNIGHT EVENTS:    No acute events overnight.   Pt resting comfortably. No acute complaints.       MEDICATIONS  (STANDING):  atorvastatin 40 milliGRAM(s) Oral at bedtime  carvedilol 6.25 milliGRAM(s) Oral every 12 hours  chlorhexidine 2% Cloths 1 Application(s) Topical two times a day  dextrose 5%. 1000 milliLiter(s) (100 mL/Hr) IV Continuous <Continuous>  dextrose 5%. 1000 milliLiter(s) (50 mL/Hr) IV Continuous <Continuous>  dextrose 50% Injectable 25 Gram(s) IV Push once  dextrose 50% Injectable 12.5 Gram(s) IV Push once  dextrose 50% Injectable 25 Gram(s) IV Push once  glucagon  Injectable 1 milliGRAM(s) IntraMuscular once  heparin   Injectable 5000 Unit(s) SubCutaneous every 8 hours  insulin lispro (ADMELOG) corrective regimen sliding scale   SubCutaneous three times a day before meals  insulin lispro (ADMELOG) corrective regimen sliding scale   SubCutaneous at bedtime  spironolactone 50 milliGRAM(s) Oral daily    MEDICATIONS  (PRN):  acetaminophen     Tablet .. 650 milliGRAM(s) Oral every 6 hours PRN Temp greater or equal to 38C (100.4F), Mild Pain (1 - 3)  ALBUTerol    90 MICROgram(s) HFA Inhaler 2 Puff(s) Inhalation every 6 hours PRN Shortness of Breath and/or Wheezing  dextrose Oral Gel 15 Gram(s) Oral once PRN Blood Glucose LESS THAN 70 milliGRAM(s)/deciliter      Vital Signs Last 24 Hrs  T(C): 36.7 (03 Jun 2022 05:00), Max: 37.4 (02 Jun 2022 13:43)  T(F): 98.1 (03 Jun 2022 05:00), Max: 99.3 (02 Jun 2022 13:43)  HR: 66 (03 Jun 2022 05:00) (66 - 75)  BP: 142/51 (03 Jun 2022 05:00) (142/51 - 159/77)  BP(mean): --  RR: 18 (03 Jun 2022 05:00) (18 - 18)  SpO2: 95% (03 Jun 2022 05:00) (94% - 100%)      PHYSICAL EXAM  General: Alert and oriented, not in acute distress  Resp: Breathing unlabored  Abdomen: Soft, nondistended, nontender  : No morales catheter, no dysuria or hematuria  Extremities: No pedal edema    I&O's Detail      LABS:                        7.5    6.24  )-----------( 234      ( 03 Jun 2022 07:03 )             25.4             06-03    140  |  108  |  55<H>  ----------------------------<  170<H>  5.0   |  28  |  2.39<H>    Ca    9.2      03 Jun 2022 07:03  Phos  4.5     06-03

## 2022-06-04 LAB
GLUCOSE BLDC GLUCOMTR-MCNC: 114 MG/DL — HIGH (ref 70–99)
GLUCOSE BLDC GLUCOMTR-MCNC: 161 MG/DL — HIGH (ref 70–99)
GLUCOSE BLDC GLUCOMTR-MCNC: 197 MG/DL — HIGH (ref 70–99)
GLUCOSE BLDC GLUCOMTR-MCNC: 224 MG/DL — HIGH (ref 70–99)

## 2022-06-04 RX ADMIN — ATORVASTATIN CALCIUM 40 MILLIGRAM(S): 80 TABLET, FILM COATED ORAL at 21:18

## 2022-06-04 RX ADMIN — HEPARIN SODIUM 5000 UNIT(S): 5000 INJECTION INTRAVENOUS; SUBCUTANEOUS at 21:17

## 2022-06-04 RX ADMIN — Medication 325 MILLIGRAM(S): at 17:09

## 2022-06-04 RX ADMIN — HEPARIN SODIUM 5000 UNIT(S): 5000 INJECTION INTRAVENOUS; SUBCUTANEOUS at 13:04

## 2022-06-04 RX ADMIN — CARVEDILOL PHOSPHATE 6.25 MILLIGRAM(S): 80 CAPSULE, EXTENDED RELEASE ORAL at 06:39

## 2022-06-04 RX ADMIN — Medication 5 UNIT(S): at 17:06

## 2022-06-04 RX ADMIN — Medication 5 UNIT(S): at 12:20

## 2022-06-04 RX ADMIN — INSULIN GLARGINE 10 UNIT(S): 100 INJECTION, SOLUTION SUBCUTANEOUS at 22:37

## 2022-06-04 RX ADMIN — Medication 325 MILLIGRAM(S): at 06:39

## 2022-06-04 RX ADMIN — Medication 2: at 08:25

## 2022-06-04 RX ADMIN — CALCITRIOL 0.25 MICROGRAM(S): 0.5 CAPSULE ORAL at 13:04

## 2022-06-04 RX ADMIN — Medication 4: at 12:20

## 2022-06-04 RX ADMIN — HEPARIN SODIUM 5000 UNIT(S): 5000 INJECTION INTRAVENOUS; SUBCUTANEOUS at 06:16

## 2022-06-04 RX ADMIN — SPIRONOLACTONE 50 MILLIGRAM(S): 25 TABLET, FILM COATED ORAL at 06:39

## 2022-06-04 RX ADMIN — CARVEDILOL PHOSPHATE 6.25 MILLIGRAM(S): 80 CAPSULE, EXTENDED RELEASE ORAL at 17:09

## 2022-06-04 RX ADMIN — Medication 5 UNIT(S): at 08:26

## 2022-06-04 NOTE — PROGRESS NOTE ADULT - SUBJECTIVE AND OBJECTIVE BOX
INTERVAL HPI/OVERNIGHT EVENTS:    Pt seen and examined at bedside. Offers no acute complaints at this time.     Vital Signs Last 24 Hrs  T(C): 36.8 (04 Jun 2022 05:00), Max: 36.9 (03 Jun 2022 13:42)  T(F): 98.3 (04 Jun 2022 05:00), Max: 98.4 (03 Jun 2022 13:42)  HR: 68 (04 Jun 2022 05:00) (68 - 83)  BP: 116/66 (04 Jun 2022 05:00) (116/66 - 148/67)  BP(mean): 90 (03 Jun 2022 15:00) (84 - 95)  RR: 18 (04 Jun 2022 05:00) (15 - 20)  SpO2: 99% (04 Jun 2022 05:00) (95% - 100%)  I&O's Detail        Physical Exam  General: AAOx3, No acute distress  Breast: rt side, dressing c/d/i       Labs:                        7.5    6.24  )-----------( 234      ( 03 Jun 2022 07:03 )             25.4     06-03    140  |  108  |  55<H>  ----------------------------<  170<H>  5.0   |  28  |  2.39<H>    Ca    9.2      03 Jun 2022 07:03  Phos  4.5     06-03      PT/INR - ( 02 Jun 2022 17:22 )   PT: 12.4 sec;   INR: 1.04 ratio         PTT - ( 02 Jun 2022 17:22 )  PTT:31.8 sec

## 2022-06-04 NOTE — PROGRESS NOTE ADULT - SUBJECTIVE AND OBJECTIVE BOX
Patient is a 57y old  Female who presents with a chief complaint of Right Breast Mass Excision (04 Jun 2022 12:09)    PATIENT IS SEEN AND EXAMINED IN MEDICAL FLOOR.  NGT [    ]    [place] [   ]      GT [   ]    ALLERGIES:  codeine (Urticaria)      Daily     Daily     VITALS:    Vital Signs Last 24 Hrs  T(C): 36.8 (04 Jun 2022 05:00), Max: 36.9 (03 Jun 2022 13:42)  T(F): 98.3 (04 Jun 2022 05:00), Max: 98.4 (03 Jun 2022 13:42)  HR: 68 (04 Jun 2022 05:00) (68 - 83)  BP: 116/66 (04 Jun 2022 05:00) (116/66 - 148/67)  BP(mean): 90 (03 Jun 2022 15:00) (84 - 95)  RR: 18 (04 Jun 2022 05:00) (15 - 20)  SpO2: 99% (04 Jun 2022 05:00) (95% - 100%)    LABS:    CBC Full  -  ( 03 Jun 2022 07:03 )  WBC Count : 6.24 K/uL  RBC Count : 2.37 M/uL  Hemoglobin : 7.5 g/dL  Hematocrit : 25.4 %  Platelet Count - Automated : 234 K/uL  Mean Cell Volume : 107.2 fl  Mean Cell Hemoglobin : 31.6 pg  Mean Cell Hemoglobin Concentration : 29.5 gm/dL  Auto Neutrophil # : x  Auto Lymphocyte # : x  Auto Monocyte # : x  Auto Eosinophil # : x  Auto Basophil # : x  Auto Neutrophil % : x  Auto Lymphocyte % : x  Auto Monocyte % : x  Auto Eosinophil % : x  Auto Basophil % : x    PT/INR - ( 02 Jun 2022 17:22 )   PT: 12.4 sec;   INR: 1.04 ratio         PTT - ( 02 Jun 2022 17:22 )  PTT:31.8 sec  06-03    140  |  108  |  55<H>  ----------------------------<  170<H>  5.0   |  28  |  2.39<H>    Ca    9.2      03 Jun 2022 07:03  Phos  4.5     06-03      CAPILLARY BLOOD GLUCOSE      POCT Blood Glucose.: 224 mg/dL (04 Jun 2022 12:11)  POCT Blood Glucose.: 197 mg/dL (04 Jun 2022 07:52)  POCT Blood Glucose.: 178 mg/dL (03 Jun 2022 21:30)  POCT Blood Glucose.: 227 mg/dL (03 Jun 2022 19:11)  POCT Blood Glucose.: 204 mg/dL (03 Jun 2022 13:54)          Creatinine Trend: 2.39<--, 2.52<--, 3.04<--  I&O's Summary              MEDICATIONS:    MEDICATIONS  (STANDING):  atorvastatin 40 milliGRAM(s) Oral at bedtime  calcitriol   Capsule 0.25 MICROGram(s) Oral daily  carvedilol 6.25 milliGRAM(s) Oral every 12 hours  dextrose 5%. 1000 milliLiter(s) (50 mL/Hr) IV Continuous <Continuous>  dextrose 5%. 1000 milliLiter(s) (100 mL/Hr) IV Continuous <Continuous>  dextrose 50% Injectable 25 Gram(s) IV Push once  dextrose 50% Injectable 12.5 Gram(s) IV Push once  dextrose 50% Injectable 25 Gram(s) IV Push once  ferrous    sulfate 325 milliGRAM(s) Oral two times a day  glucagon  Injectable 1 milliGRAM(s) IntraMuscular once  heparin   Injectable 5000 Unit(s) SubCutaneous every 8 hours  insulin glargine Injectable (LANTUS) 10 Unit(s) SubCutaneous at bedtime  insulin lispro (ADMELOG) corrective regimen sliding scale   SubCutaneous three times a day before meals  insulin lispro Injectable (ADMELOG) 5 Unit(s) SubCutaneous three times a day before meals  spironolactone 50 milliGRAM(s) Oral daily      MEDICATIONS  (PRN):  acetaminophen     Tablet .. 650 milliGRAM(s) Oral every 6 hours PRN Temp greater or equal to 38C (100.4F), Mild Pain (1 - 3)  ALBUTerol    90 MICROgram(s) HFA Inhaler 2 Puff(s) Inhalation every 6 hours PRN Shortness of Breath and/or Wheezing  dextrose Oral Gel 15 Gram(s) Oral once PRN Blood Glucose LESS THAN 70 milliGRAM(s)/deciliter      REVIEW OF SYSTEMS:                           ALL ROS DONE [ X   ]    CONSTITUTIONAL:  LETHARGIC [   ], FEVER [   ], UNRESPONSIVE [   ]  CVS:  CP  [   ], SOB, [   ], PALPITATIONS [   ], DIZZYNESS [   ]  RS: COUGH [   ], SPUTUM [   ]  GI: ABDOMINAL PAIN [   ], NAUSEA [   ], VOMITINGS [   ], DIARRHEA [   ], CONSTIPATION [   ]  :  DYSURIA [   ], NOCTURIA [   ], INCREASED FREQUENCY [   ], DRIBLING [   ],  SKELETAL: PAINFUL JOINTS [   ], SWOLLEN JOINTS [   ], NECK ACHE [   ], LOW BACK ACHE [   ],  SKIN : ULCERS [   ], RASH [   ], ITCHING [   ]  CNS: HEAD ACHE [   ], DOUBLE VISION [   ], BLURRED VISION [   ], AMS / CONFUSION [   ], SEIZURES [   ], WEAKNESS [   ],TINGLING / NUMBNESS [   ]      PHYSICAL EXAMINATION:  GENERAL APPEARANCE: NO DISTRESS  HEENT:  NO PALLOR, NO  JVD,  NO   NODES, NECK SUPPLE  CVS: S1 +, S2 +,   RS: AEEB,  OCCASIONAL  RALES +,   NO RONCHI  ABD: SOFT, NT, NO, BS +  EXT: PE ++  SKIN: WARM,   RIGHT BREAST W/ SURGICAL DRESSING C/D/I  SKELETAL:  ROM ACCEPTABLE  CNS:  AAO X 3    RADIOLOGY :        ASSESSMENT :       PLAN:  HPI:  57 yr old female Morbidly obese (52 BMI) Sleep Apnea on CPAP, HTN, CAD with AICD ( insertion 2011 and interrogated on Feb 25, 2022, CHF (EF 49%) diabetic (Novolin 70/30 in am and 5pm ), Pt seen using walker arthritic , bilateral leg edema +1. Pt stated she had right nipple drainage about a month ago, which led to mammogram and US indicating a right breasts mass.  Patient has history of anemia and had 8 transfusions in the past for low H/H. She currently denies any headache, dizziness and ambulates without difficulty.  Pt  schedule for surgery of excision of right breast mass with spot localization tomorrow. Patient is admitted for medical optimization prior to OR.   Patient denies any breast pain, discharge, abdominal pain, N/V, fever, chills, shortness of breathe or any other constitutional symptoms.  (02 Jun 2022 15:22)    # RIGHT BREAST MASS PLANNED FOR EXCISION ON 6/3  # MEDICAL CLEARANCE FOR SURGERY - RCRI - 4 - 15% 30 DAY RISK OF DEATH, MI OR CARDIAC ARREST  - RECENT NST - NEGATIVE  - F/U CXR    # NORMOCYTIC ANEMIA W/ HX OF POST-MENOPAUSAL VAGINAL BLEEDING, FIBROID UTERUS AND ANEMIA OF CKD , ANEMIA OF CHRONIC DISEASE   - TRANSFUSION THRESHOLD HGB < 7  - GIVING PRBC TRANSFUSION  - TREND HGB  - ON FESO4    # AMPARO ON CPAP    # HX OF CAD W/ AICD  # CHRONIC SYSTOLIC HEART FAILURE S/P AICD  # PVD, B/L LE LYMPHEDEMA  - ON ASA, STATIN, COREG  - ON LASIX, ALDACTONE    # CKD STAGE 4-5 - NEPHROLOGY CONSULT  - ON SEVELAMER    # MORBID OBESITY    # COPD - CONTINUE PRN VENTOLIN,     # HTN - ON COREG    # HLD - ON STATIN    # DM, UNCONTROLLED  - LANTUS + TID INSULIN   - SSI + FS    - NOTED HBA1C      # GOUT - ON ALLOPURINOL    # GI PPX; DVT PPX     Patient is a 57y old  Female who presents with a chief complaint of Right Breast Mass Excision (04 Jun 2022 12:09)    PATIENT IS SEEN AND EXAMINED IN MEDICAL FLOOR.    ALLERGIES:  codeine (Urticaria)    VITALS:    Vital Signs Last 24 Hrs  T(C): 36.8 (04 Jun 2022 05:00), Max: 36.9 (03 Jun 2022 13:42)  T(F): 98.3 (04 Jun 2022 05:00), Max: 98.4 (03 Jun 2022 13:42)  HR: 68 (04 Jun 2022 05:00) (68 - 83)  BP: 116/66 (04 Jun 2022 05:00) (116/66 - 148/67)  BP(mean): 90 (03 Jun 2022 15:00) (84 - 95)  RR: 18 (04 Jun 2022 05:00) (15 - 20)  SpO2: 99% (04 Jun 2022 05:00) (95% - 100%)    LABS:    CBC Full  -  ( 03 Jun 2022 07:03 )  WBC Count : 6.24 K/uL  RBC Count : 2.37 M/uL  Hemoglobin : 7.5 g/dL  Hematocrit : 25.4 %  Platelet Count - Automated : 234 K/uL  Mean Cell Volume : 107.2 fl  Mean Cell Hemoglobin : 31.6 pg  Mean Cell Hemoglobin Concentration : 29.5 gm/dL  Auto Neutrophil # : x  Auto Lymphocyte # : x  Auto Monocyte # : x  Auto Eosinophil # : x  Auto Basophil # : x  Auto Neutrophil % : x  Auto Lymphocyte % : x  Auto Monocyte % : x  Auto Eosinophil % : x  Auto Basophil % : x    PT/INR - ( 02 Jun 2022 17:22 )   PT: 12.4 sec;   INR: 1.04 ratio         PTT - ( 02 Jun 2022 17:22 )  PTT:31.8 sec  06-03    140  |  108  |  55<H>  ----------------------------<  170<H>  5.0   |  28  |  2.39<H>    Ca    9.2      03 Jun 2022 07:03  Phos  4.5     06-03      CAPILLARY BLOOD GLUCOSE      POCT Blood Glucose.: 224 mg/dL (04 Jun 2022 12:11)  POCT Blood Glucose.: 197 mg/dL (04 Jun 2022 07:52)  POCT Blood Glucose.: 178 mg/dL (03 Jun 2022 21:30)  POCT Blood Glucose.: 227 mg/dL (03 Jun 2022 19:11)  POCT Blood Glucose.: 204 mg/dL (03 Jun 2022 13:54)          Creatinine Trend: 2.39<--, 2.52<--, 3.04<--  I&O's Summary              MEDICATIONS:    MEDICATIONS  (STANDING):  atorvastatin 40 milliGRAM(s) Oral at bedtime  calcitriol   Capsule 0.25 MICROGram(s) Oral daily  carvedilol 6.25 milliGRAM(s) Oral every 12 hours  dextrose 5%. 1000 milliLiter(s) (50 mL/Hr) IV Continuous <Continuous>  dextrose 5%. 1000 milliLiter(s) (100 mL/Hr) IV Continuous <Continuous>  dextrose 50% Injectable 25 Gram(s) IV Push once  dextrose 50% Injectable 12.5 Gram(s) IV Push once  dextrose 50% Injectable 25 Gram(s) IV Push once  ferrous    sulfate 325 milliGRAM(s) Oral two times a day  glucagon  Injectable 1 milliGRAM(s) IntraMuscular once  heparin   Injectable 5000 Unit(s) SubCutaneous every 8 hours  insulin glargine Injectable (LANTUS) 10 Unit(s) SubCutaneous at bedtime  insulin lispro (ADMELOG) corrective regimen sliding scale   SubCutaneous three times a day before meals  insulin lispro Injectable (ADMELOG) 5 Unit(s) SubCutaneous three times a day before meals  spironolactone 50 milliGRAM(s) Oral daily      MEDICATIONS  (PRN):  acetaminophen     Tablet .. 650 milliGRAM(s) Oral every 6 hours PRN Temp greater or equal to 38C (100.4F), Mild Pain (1 - 3)  ALBUTerol    90 MICROgram(s) HFA Inhaler 2 Puff(s) Inhalation every 6 hours PRN Shortness of Breath and/or Wheezing  dextrose Oral Gel 15 Gram(s) Oral once PRN Blood Glucose LESS THAN 70 milliGRAM(s)/deciliter      REVIEW OF SYSTEMS:                           ALL ROS DONE [ X   ]    CONSTITUTIONAL:  LETHARGIC [   ], FEVER [   ], UNRESPONSIVE [   ]  CVS:  CP  [   ], SOB, [   ], PALPITATIONS [   ], DIZZYNESS [   ]  RS: COUGH [   ], SPUTUM [   ]  GI: ABDOMINAL PAIN [   ], NAUSEA [   ], VOMITINGS [   ], DIARRHEA [   ], CONSTIPATION [   ]  :  DYSURIA [   ], NOCTURIA [   ], INCREASED FREQUENCY [   ], DRIBLING [   ],  SKELETAL: PAINFUL JOINTS [   ], SWOLLEN JOINTS [   ], NECK ACHE [   ], LOW BACK ACHE [   ],  SKIN : ULCERS [   ], RASH [   ], ITCHING [   ]  CNS: HEAD ACHE [   ], DOUBLE VISION [   ], BLURRED VISION [   ], AMS / CONFUSION [   ], SEIZURES [   ], WEAKNESS [   ],TINGLING / NUMBNESS [   ]      PHYSICAL EXAMINATION:  GENERAL APPEARANCE: NO DISTRESS  HEENT:  NO PALLOR, NO  JVD,  NO   NODES, NECK SUPPLE  CVS: S1 +, S2 +,   RS: AEEB,  OCCASIONAL  RALES +,   NO RONCHI  ABD: SOFT, NT, NO, BS +  EXT: PE ++  SKIN: WARM,   RIGHT BREAST W/ SURGICAL DRESSING  SKELETAL:  ROM ACCEPTABLE  CNS:  AAO X 3    RADIOLOGY :        ASSESSMENT :       PLAN:  HPI:  57 yr old female Morbidly obese (52 BMI) Sleep Apnea on CPAP, HTN, CAD with AICD ( insertion 2011 and interrogated on Feb 25, 2022, CHF (EF 49%) diabetic (Novolin 70/30 in am and 5pm ), Pt seen using walker arthritic , bilateral leg edema +1. Pt stated she had right nipple drainage about a month ago, which led to mammogram and US indicating a right breasts mass.  Patient has history of anemia and had 8 transfusions in the past for low H/H. She currently denies any headache, dizziness and ambulates without difficulty.  Pt  schedule for surgery of excision of right breast mass with spot localization tomorrow. Patient is admitted for medical optimization prior to OR.   Patient denies any breast pain, discharge, abdominal pain, N/V, fever, chills, shortness of breathe or any other constitutional symptoms.  (02 Jun 2022 15:22)    # D/C PLAN FOR A.M. TO [facility] - D/W PATIENT AND STAFF    # RIGHT BREAST MASS PLANNED FOR EXCISION ON 6/3  # MEDICAL CLEARANCE FOR SURGERY - RCRI - 4 - 15% 30 DAY RISK OF DEATH, MI OR CARDIAC ARREST  - RECENT NST - NEGATIVE  - F/U PATH  - SURGERY EVALUATION IN PROGRESS    # NORMOCYTIC ANEMIA W/ HX OF POST-MENOPAUSAL VAGINAL BLEEDING, FIBROID UTERUS AND ANEMIA OF CKD , ANEMIA OF CHRONIC DISEASE   - TRANSFUSION THRESHOLD HGB < 7  - GIVING PRBC TRANSFUSION  - TREND HGB  - ON FESO4    # AMPARO ON CPAP    # HX OF CAD W/ AICD  # CHRONIC SYSTOLIC HEART FAILURE S/P AICD  # PVD, B/L LE LYMPHEDEMA  - ON ASA, STATIN, COREG  - ON LASIX, ALDACTONE    # CKD STAGE 4-5 - NEPHROLOGY CONSULT  - ON SEVELAMER    # MORBID OBESITY    # COPD - CONTINUE PRN VENTOLIN,     # HTN - ON COREG    # HLD - ON STATIN    # DM, UNCONTROLLED  - LANTUS + TID INSULIN   - SSI + FS    - NOTED HBA1C      # GOUT - ON ALLOPURINOL    # GI PPX; DVT PPX

## 2022-06-04 NOTE — PROGRESS NOTE ADULT - ASSESSMENT
1. CKD stage 4 most likely due to diabetic nephropathy and hypertensive nephrosclerosis  -Scr is stable at 2.4-2.6mg/dL which is below her baseline scr. Previous baseline scr around 3 to 3.5mg/dl.  will continue to monitor.   -Renal diet  -Avoid Nephrotoxic Meds/ Agents such as (NSAIDs, IV contrast, Aminoglycosides such as gentamicin, -Gadolinium contrast, Phosphate containing enemas, etc..)  -Adjust Medications according to eGFR    2. Anemia iron deficiency from heavy menstrual bleeding  -iron panel, ferritin noted.   -add ferrous bid  -F/u CBC daily  -transfuse if HB < 7.0.    3. HTN: -bp is acceptable.   - continue bp meds  -titrate bp meds to keep sbp >110 and < 130    4. Mineral Bone Disease:  -phos, Vitamin 25 OH, and PTH intact 101  -add calcitirol 0.25mcg daily

## 2022-06-04 NOTE — PROGRESS NOTE ADULT - SUBJECTIVE AND OBJECTIVE BOX
C A R D I O L O G Y  *********************    DATE OF SERVICE: 22    HISTORY OF PRESENT ILLNESS: HPI:  57 yr old female Morbidly obese (52 BMI) Sleep Apnea on CPAP, DM,  HTN, non-obstructive CAD,  ICD ( insertion  and interrogated on 2022, NICM CHF (EF 49%)  normal perfusion on recent stress, Pt seen using walker arthritic , bilateral leg edema +1. Pt stated she had right nipple drainage about a month ago, which led to mammogram and US indicating a right breasts mass.  Patient has history of anemia and had 8 transfusions in the past for low H/H. She currently denies any headache, dizziness and ambulates without difficulty.  Pt  schedule for surgery of excision of right breast mass with spot localization tomorrow. Patient is admitted for medical optimization prior to OR.   Patient denies any breast pain, discharge, abdominal pain, N/V, fever, chills, shortness of breathe or any other constitutional symptoms.  (2022 15:22)    Resting comfortably, awaiting right breast mass surgery after needle-localization on Friday.  No new complaints.    acetaminophen     Tablet .. 650 milliGRAM(s) Oral every 6 hours PRN  ALBUTerol    90 MICROgram(s) HFA Inhaler 2 Puff(s) Inhalation every 6 hours PRN  atorvastatin 40 milliGRAM(s) Oral at bedtime  calcitriol   Capsule 0.25 MICROGram(s) Oral daily  carvedilol 6.25 milliGRAM(s) Oral every 12 hours  dextrose 5%. 1000 milliLiter(s) IV Continuous <Continuous>  dextrose 5%. 1000 milliLiter(s) IV Continuous <Continuous>  dextrose 50% Injectable 25 Gram(s) IV Push once  dextrose 50% Injectable 12.5 Gram(s) IV Push once  dextrose 50% Injectable 25 Gram(s) IV Push once  dextrose Oral Gel 15 Gram(s) Oral once PRN  ferrous    sulfate 325 milliGRAM(s) Oral two times a day  glucagon  Injectable 1 milliGRAM(s) IntraMuscular once  heparin   Injectable 5000 Unit(s) SubCutaneous every 8 hours  insulin glargine Injectable (LANTUS) 10 Unit(s) SubCutaneous at bedtime  insulin lispro (ADMELOG) corrective regimen sliding scale   SubCutaneous three times a day before meals  insulin lispro Injectable (ADMELOG) 5 Unit(s) SubCutaneous three times a day before meals  spironolactone 50 milliGRAM(s) Oral daily                            7.5    6.24  )-----------( 234      ( 2022 07:03 )             25.4       06    140  |  108  |  55<H>  ----------------------------<  170<H>  5.0   |  28  |  2.39<H>    Ca    9.2      2022 07:03  Phos  4.5           T(C): 36.8 (22 @ 05:00), Max: 36.9 (22 @ 13:42)  HR: 68 (22 @ 05:00) (68 - 83)  BP: 116/66 (22 @ 05:00) (116/66 - 148/67)  RR: 18 (22 @ 05:00) (15 - 20)  SpO2: 99% (22 @ 05:00) (95% - 100%)  Wt(kg): --    I&O's Summary    Gen: well appearing overweight AA woman in no acute distress  HEENT:  (-)icterus (-)pallor  CV: N S1 S2 1/6 RAYMOND (+)2 Pulses B/l, ICD left upper chest.  Resp:  Clear to ausculatation B/L, normal effort  GI: (+) BS Soft, NT, ND  Lymph:  (-)Edema, (-)obvious lymphadenopathy  Skin: Warm to touch, Normal turgor  Psych: Appropriate mood and affect      EC21  Sinus 74 BPM 1st degree AV Block, RBBB, LAFB    RADIOLOGY:         CXR:   NONE    ASSESSMENT/PLAN: 	57y Female  female Morbidly obese (52 BMI) Sleep Apnea on CPAP, DM,  HTN, non-obstructive CAD,  ICD ( insertion  and interrogated on 2022, NICM severe global LV systolic dysfunction,  normal perfusion on recent stress, preop breast surgery.    - No clinical CHF or unstable cardiac syndromes  - By virtue of Being DM, having CKD and hx of CHF she should be treated as an unmodifiable  high cardiac risk patient but optimized form a cardiac prospective  - Cont beta blockers  -ICD has ~1.5 yrs of battery life remaining. Anticipate generator change toward end 2023.  - Will follow.    Josh Larson M.D.  Cardiac Electrophysiology  [phone]

## 2022-06-04 NOTE — PROGRESS NOTE ADULT - ASSESSMENT
s/p rt breast mass excision 6/3; anemia     -Reg diet   -Pain medication PRN   -F/u path   -C/w Home medication   -Med f/u   -Dc planning

## 2022-06-04 NOTE — PROGRESS NOTE ADULT - SUBJECTIVE AND OBJECTIVE BOX
DEBORAH MCDONNELL  57y  Patient is a 57y old  Female who presents with a chief complaint of Right Breast Mass Excision (04 Jun 2022 13:33)    HPI:  Admitted for mastectomy.  Followed for CKD 4.    HEALTH ISSUES - PROBLEM Dx:  DM (diabetes mellitus)          MEDICATIONS  (STANDING):  atorvastatin 40 milliGRAM(s) Oral at bedtime  calcitriol   Capsule 0.25 MICROGram(s) Oral daily  carvedilol 6.25 milliGRAM(s) Oral every 12 hours  dextrose 5%. 1000 milliLiter(s) (50 mL/Hr) IV Continuous <Continuous>  dextrose 5%. 1000 milliLiter(s) (100 mL/Hr) IV Continuous <Continuous>  dextrose 50% Injectable 25 Gram(s) IV Push once  dextrose 50% Injectable 12.5 Gram(s) IV Push once  dextrose 50% Injectable 25 Gram(s) IV Push once  ferrous    sulfate 325 milliGRAM(s) Oral two times a day  glucagon  Injectable 1 milliGRAM(s) IntraMuscular once  heparin   Injectable 5000 Unit(s) SubCutaneous every 8 hours  insulin glargine Injectable (LANTUS) 10 Unit(s) SubCutaneous at bedtime  insulin lispro (ADMELOG) corrective regimen sliding scale   SubCutaneous three times a day before meals  insulin lispro Injectable (ADMELOG) 5 Unit(s) SubCutaneous three times a day before meals  spironolactone 50 milliGRAM(s) Oral daily    MEDICATIONS  (PRN):  acetaminophen     Tablet .. 650 milliGRAM(s) Oral every 6 hours PRN Temp greater or equal to 38C (100.4F), Mild Pain (1 - 3)  ALBUTerol    90 MICROgram(s) HFA Inhaler 2 Puff(s) Inhalation every 6 hours PRN Shortness of Breath and/or Wheezing  dextrose Oral Gel 15 Gram(s) Oral once PRN Blood Glucose LESS THAN 70 milliGRAM(s)/deciliter    Vital Signs Last 24 Hrs  T(C): 36.8 (04 Jun 2022 13:47), Max: 36.8 (04 Jun 2022 05:00)  T(F): 98.2 (04 Jun 2022 13:47), Max: 98.3 (04 Jun 2022 05:00)  HR: 77 (04 Jun 2022 13:47) (68 - 78)  BP: 127/59 (04 Jun 2022 13:47) (116/66 - 148/67)  BP(mean): --  RR: 18 (04 Jun 2022 13:47) (18 - 18)  SpO2: 97% (04 Jun 2022 13:47) (96% - 100%)  Daily     Daily     PHYSICAL EXAM:  Constitutional:  She appears comfortable and not distressed. Not diaphoretic.    Respiratory: The lungs are clear to auscultation. No dullness and expansion is normal.    Cardiovascular: S1 and S2 are normal. No mummurs, rubs or gallops are present.    Gastrointestinal: The abdomen is soft. No tenderness is present. No masses are present. Bowel sounds are normal.    Genitourinary: The bladder is not distended. No CVA tenderness is present.    Extremities: Chronic Venous stasis.    Neurological: Cognition is normal. Tone, power and sensation are normal. Gait is steady.                              7.5    6.24  )-----------( 234      ( 03 Jun 2022 07:03 )             25.4     06-03    140  |  108  |  55<H>  ----------------------------<  170<H>  5.0   |  28  |  2.39<H>    Ca    9.2      03 Jun 2022 07:03  Phos  4.5     06-03

## 2022-06-05 LAB
ANION GAP SERPL CALC-SCNC: 6 MMOL/L — SIGNIFICANT CHANGE UP (ref 5–17)
BUN SERPL-MCNC: 57 MG/DL — HIGH (ref 7–18)
CALCIUM SERPL-MCNC: 9 MG/DL — SIGNIFICANT CHANGE UP (ref 8.4–10.5)
CHLORIDE SERPL-SCNC: 113 MMOL/L — HIGH (ref 96–108)
CO2 SERPL-SCNC: 26 MMOL/L — SIGNIFICANT CHANGE UP (ref 22–31)
CREAT SERPL-MCNC: 2.72 MG/DL — HIGH (ref 0.5–1.3)
EGFR: 20 ML/MIN/1.73M2 — LOW
GLUCOSE BLDC GLUCOMTR-MCNC: 169 MG/DL — HIGH (ref 70–99)
GLUCOSE BLDC GLUCOMTR-MCNC: 196 MG/DL — HIGH (ref 70–99)
GLUCOSE BLDC GLUCOMTR-MCNC: 208 MG/DL — HIGH (ref 70–99)
GLUCOSE BLDC GLUCOMTR-MCNC: 214 MG/DL — HIGH (ref 70–99)
GLUCOSE SERPL-MCNC: 172 MG/DL — HIGH (ref 70–99)
HCT VFR BLD CALC: 24.1 % — LOW (ref 34.5–45)
HGB BLD-MCNC: 7.2 G/DL — LOW (ref 11.5–15.5)
MCHC RBC-ENTMCNC: 29.9 GM/DL — LOW (ref 32–36)
MCHC RBC-ENTMCNC: 31.6 PG — SIGNIFICANT CHANGE UP (ref 27–34)
MCV RBC AUTO: 105.7 FL — HIGH (ref 80–100)
NRBC # BLD: 0 /100 WBCS — SIGNIFICANT CHANGE UP (ref 0–0)
PLATELET # BLD AUTO: 209 K/UL — SIGNIFICANT CHANGE UP (ref 150–400)
POTASSIUM SERPL-MCNC: 5.5 MMOL/L — HIGH (ref 3.5–5.3)
POTASSIUM SERPL-SCNC: 5.5 MMOL/L — HIGH (ref 3.5–5.3)
RBC # BLD: 2.28 M/UL — LOW (ref 3.8–5.2)
RBC # FLD: 17.7 % — HIGH (ref 10.3–14.5)
SODIUM SERPL-SCNC: 145 MMOL/L — SIGNIFICANT CHANGE UP (ref 135–145)
WBC # BLD: 6 K/UL — SIGNIFICANT CHANGE UP (ref 3.8–10.5)
WBC # FLD AUTO: 6 K/UL — SIGNIFICANT CHANGE UP (ref 3.8–10.5)

## 2022-06-05 RX ORDER — SODIUM ZIRCONIUM CYCLOSILICATE 10 G/10G
10 POWDER, FOR SUSPENSION ORAL ONCE
Refills: 0 | Status: COMPLETED | OUTPATIENT
Start: 2022-06-05 | End: 2022-06-05

## 2022-06-05 RX ORDER — FUROSEMIDE 40 MG
40 TABLET ORAL ONCE
Refills: 0 | Status: COMPLETED | OUTPATIENT
Start: 2022-06-05 | End: 2022-06-05

## 2022-06-05 RX ADMIN — SPIRONOLACTONE 50 MILLIGRAM(S): 25 TABLET, FILM COATED ORAL at 05:49

## 2022-06-05 RX ADMIN — HEPARIN SODIUM 5000 UNIT(S): 5000 INJECTION INTRAVENOUS; SUBCUTANEOUS at 05:53

## 2022-06-05 RX ADMIN — Medication 5 UNIT(S): at 12:20

## 2022-06-05 RX ADMIN — Medication 40 MILLIGRAM(S): at 12:48

## 2022-06-05 RX ADMIN — CARVEDILOL PHOSPHATE 6.25 MILLIGRAM(S): 80 CAPSULE, EXTENDED RELEASE ORAL at 17:10

## 2022-06-05 RX ADMIN — CARVEDILOL PHOSPHATE 6.25 MILLIGRAM(S): 80 CAPSULE, EXTENDED RELEASE ORAL at 05:49

## 2022-06-05 RX ADMIN — INSULIN GLARGINE 10 UNIT(S): 100 INJECTION, SOLUTION SUBCUTANEOUS at 21:54

## 2022-06-05 RX ADMIN — Medication 4: at 12:20

## 2022-06-05 RX ADMIN — Medication 325 MILLIGRAM(S): at 05:49

## 2022-06-05 RX ADMIN — HEPARIN SODIUM 5000 UNIT(S): 5000 INJECTION INTRAVENOUS; SUBCUTANEOUS at 14:08

## 2022-06-05 RX ADMIN — Medication 5 UNIT(S): at 08:27

## 2022-06-05 RX ADMIN — Medication 4: at 08:26

## 2022-06-05 RX ADMIN — CALCITRIOL 0.25 MICROGRAM(S): 0.5 CAPSULE ORAL at 11:24

## 2022-06-05 RX ADMIN — Medication 2: at 16:59

## 2022-06-05 RX ADMIN — Medication 325 MILLIGRAM(S): at 17:11

## 2022-06-05 RX ADMIN — ATORVASTATIN CALCIUM 40 MILLIGRAM(S): 80 TABLET, FILM COATED ORAL at 21:53

## 2022-06-05 RX ADMIN — Medication 5 UNIT(S): at 17:00

## 2022-06-05 RX ADMIN — HEPARIN SODIUM 5000 UNIT(S): 5000 INJECTION INTRAVENOUS; SUBCUTANEOUS at 21:53

## 2022-06-05 RX ADMIN — SODIUM ZIRCONIUM CYCLOSILICATE 10 GRAM(S): 10 POWDER, FOR SUSPENSION ORAL at 12:21

## 2022-06-05 NOTE — PROGRESS NOTE ADULT - ASSESSMENT
s/p rt breast mass excision 6/3; anemia, Hyperkalemia      -Lokelma 10mg PO once   -F/u K levels   -Hold Spironolactone   -Reg diet   -Pain medication PRN   -F/u path   -C/w Home medication   -Med f/u   -Renal f/u   -Cradio f/u   -Dc planning in AM 6/6

## 2022-06-05 NOTE — PROGRESS NOTE ADULT - SUBJECTIVE AND OBJECTIVE BOX
C A R D I O L O G Y  *********************    DATE OF SERVICE: 22    HISTORY OF PRESENT ILLNESS: HPI:  57 yr old female Morbidly obese (52 BMI) Sleep Apnea on CPAP, DM,  HTN, non-obstructive CAD,  ICD ( insertion  and interrogated on 2022, NICM CHF (EF 49%)  normal perfusion on recent stress, Pt seen using walker arthritic , bilateral leg edema +1. Pt stated she had right nipple drainage about a month ago, which led to mammogram and US indicating a right breasts mass.  Patient has history of anemia and had 8 transfusions in the past for low H/H. She currently denies any headache, dizziness and ambulates without difficulty.  Pt  schedule for surgery of excision of right breast mass with spot localization tomorrow. Patient is admitted for medical optimization prior to OR.   Patient denies any breast pain, discharge, abdominal pain, N/V, fever, chills, shortness of breathe or any other constitutional symptoms.  (2022 15:22)    s/p lumpectomy. awaiting discharge today.    acetaminophen     Tablet .. 650 milliGRAM(s) Oral every 6 hours PRN  ALBUTerol    90 MICROgram(s) HFA Inhaler 2 Puff(s) Inhalation every 6 hours PRN  atorvastatin 40 milliGRAM(s) Oral at bedtime  calcitriol   Capsule 0.25 MICROGram(s) Oral daily  carvedilol 6.25 milliGRAM(s) Oral every 12 hours  dextrose 5%. 1000 milliLiter(s) IV Continuous <Continuous>  dextrose 5%. 1000 milliLiter(s) IV Continuous <Continuous>  dextrose 50% Injectable 25 Gram(s) IV Push once  dextrose 50% Injectable 12.5 Gram(s) IV Push once  dextrose 50% Injectable 25 Gram(s) IV Push once  dextrose Oral Gel 15 Gram(s) Oral once PRN  ferrous    sulfate 325 milliGRAM(s) Oral two times a day  glucagon  Injectable 1 milliGRAM(s) IntraMuscular once  heparin   Injectable 5000 Unit(s) SubCutaneous every 8 hours  insulin glargine Injectable (LANTUS) 10 Unit(s) SubCutaneous at bedtime  insulin lispro (ADMELOG) corrective regimen sliding scale   SubCutaneous three times a day before meals  insulin lispro Injectable (ADMELOG) 5 Unit(s) SubCutaneous three times a day before meals  spironolactone 50 milliGRAM(s) Oral daily                          7.2    6.00  )-----------( 209      ( 2022 06:10 )             24.1       06    145  |  113<H>  |  57<H>  ----------------------------<  172<H>  5.5<H>   |  26  |  2.72<H>    Ca    9.0      2022 06:10      T(C): 36.9 (22 @ 05:51), Max: 36.9 (22 @ 21:16)  HR: 76 (22 @ 05:51) (76 - 78)  BP: 149/66 (22 @ 05:51) (127/59 - 149/66)  RR: 18 (22 @ 05:51) (18 - 18)  SpO2: 100% (22 @ 05:51) (97% - 100%)  Wt(kg): --    I&O's Summary      Gen: well appearing overweight AA woman in no acute distress  HEENT:  (-)icterus (-)pallor  CV: N S1 S2 1/6 RAYMOND (+)2 Pulses B/l, ICD left upper chest.  Resp:  Clear to ausculatation B/L, normal effort  GI: (+) BS Soft, NT, ND  Lymph:  (-)Edema, (-)obvious lymphadenopathy  Skin: Warm to touch, Normal turgor  Psych: Appropriate mood and affect      EC21  Sinus 74 BPM 1st degree AV Block, RBBB, LAFB    RADIOLOGY:         CXR:   NONE    ASSESSMENT/PLAN: 	57y Female  female Morbidly obese (52 BMI) Sleep Apnea on CPAP, DM,  HTN, non-obstructive CAD,  ICD ( insertion  and interrogated on 2022, NICM severe global LV systolic dysfunction,  normal perfusion on recent stress, preop breast surgery.    -tolerated breast surgery well.  - Cont beta blockers  -ICD has ~1.5 yrs of battery life remaining. Anticipate generator change toward end of .  -I would hold spironolactone and plan to have this resumed in office setting after repeat labs, given K of 5.5 today.  - Will follow.    Josh Larson M.D.  Cardiac Electrophysiology  [phone]

## 2022-06-05 NOTE — PROGRESS NOTE ADULT - SUBJECTIVE AND OBJECTIVE BOX
DEBORAH MCDONNELL  57y  Patient is a 57y old  Female who presents with a chief complaint of Right Breast Mass Excision (05 Jun 2022 12:13)    HPI:  Followed for CKD.  S/P mastectomy.    HEALTH ISSUES - PROBLEM Dx:  DM (diabetes mellitus)          MEDICATIONS  (STANDING):  atorvastatin 40 milliGRAM(s) Oral at bedtime  calcitriol   Capsule 0.25 MICROGram(s) Oral daily  carvedilol 6.25 milliGRAM(s) Oral every 12 hours  dextrose 5%. 1000 milliLiter(s) (50 mL/Hr) IV Continuous <Continuous>  dextrose 5%. 1000 milliLiter(s) (100 mL/Hr) IV Continuous <Continuous>  dextrose 50% Injectable 25 Gram(s) IV Push once  dextrose 50% Injectable 12.5 Gram(s) IV Push once  dextrose 50% Injectable 25 Gram(s) IV Push once  ferrous    sulfate 325 milliGRAM(s) Oral two times a day  glucagon  Injectable 1 milliGRAM(s) IntraMuscular once  heparin   Injectable 5000 Unit(s) SubCutaneous every 8 hours  insulin glargine Injectable (LANTUS) 10 Unit(s) SubCutaneous at bedtime  insulin lispro (ADMELOG) corrective regimen sliding scale   SubCutaneous three times a day before meals  insulin lispro Injectable (ADMELOG) 5 Unit(s) SubCutaneous three times a day before meals    MEDICATIONS  (PRN):  acetaminophen     Tablet .. 650 milliGRAM(s) Oral every 6 hours PRN Temp greater or equal to 38C (100.4F), Mild Pain (1 - 3)  ALBUTerol    90 MICROgram(s) HFA Inhaler 2 Puff(s) Inhalation every 6 hours PRN Shortness of Breath and/or Wheezing  dextrose Oral Gel 15 Gram(s) Oral once PRN Blood Glucose LESS THAN 70 milliGRAM(s)/deciliter    Vital Signs Last 24 Hrs  T(C): 37.1 (05 Jun 2022 14:00), Max: 37.1 (05 Jun 2022 14:00)  T(F): 98.7 (05 Jun 2022 14:00), Max: 98.7 (05 Jun 2022 14:00)  HR: 80 (05 Jun 2022 17:10) (73 - 80)  BP: 156/58 (05 Jun 2022 17:10) (131/65 - 156/58)  BP(mean): --  RR: 18 (05 Jun 2022 14:00) (18 - 18)  SpO2: 94% (05 Jun 2022 14:00) (94% - 100%)  Daily     Daily     PHYSICAL EXAM:  Constitutional:  He appears comfortable and not distressed. Not diaphoretic.    Neck:  The thyroid is normal. Trachea is midline.     Respiratory: The lungs are clear to auscultation. No dullness and expansion is normal.    Cardiovascular: S1 and S2 are normal. No murmurs, rubs or gallops are present.    Gastrointestinal: The abdomen is soft. No tenderness is present. No masses are present. Bowel sounds are normal.    Genitourinary: The bladder is not distended. No CVA tenderness is present.    Extremities: No edema is noted. No deformities are present.    Neurological: Cognition is normal. Tone, power and sensation are normal.     Skin: No lesions are seen  or palpated.    Lymph Nodes: No lymphadenopathy is present.                            7.2    6.00  )-----------( 209      ( 05 Jun 2022 06:10 )             24.1     06-05    145  |  113<H>  |  57<H>  ----------------------------<  172<H>  5.5<H>   |  26  |  2.72<H>    Ca    9.0      05 Jun 2022 06:10

## 2022-06-05 NOTE — PROGRESS NOTE ADULT - SUBJECTIVE AND OBJECTIVE BOX
Interval Events:  pt in nad    Allergies    codeine (Urticaria)    Intolerances      Endocrine/Metabolic Medications:  atorvastatin 40 milliGRAM(s) Oral at bedtime  dextrose 50% Injectable 25 Gram(s) IV Push once  dextrose 50% Injectable 12.5 Gram(s) IV Push once  dextrose 50% Injectable 25 Gram(s) IV Push once  dextrose Oral Gel 15 Gram(s) Oral once PRN  glucagon  Injectable 1 milliGRAM(s) IntraMuscular once  insulin glargine Injectable (LANTUS) 10 Unit(s) SubCutaneous at bedtime  insulin lispro (ADMELOG) corrective regimen sliding scale   SubCutaneous three times a day before meals  insulin lispro Injectable (ADMELOG) 5 Unit(s) SubCutaneous three times a day before meals      Vital Signs Last 24 Hrs  T(C): 36.9 (05 Jun 2022 05:51), Max: 36.9 (04 Jun 2022 21:16)  T(F): 98.5 (05 Jun 2022 05:51), Max: 98.5 (05 Jun 2022 05:51)  HR: 76 (05 Jun 2022 05:51) (76 - 78)  BP: 149/66 (05 Jun 2022 05:51) (127/59 - 149/66)  BP(mean): --  RR: 18 (05 Jun 2022 05:51) (18 - 18)  SpO2: 100% (05 Jun 2022 05:51) (97% - 100%)      PHYSICAL EXAM  All physical exam findings normal, except those marked:  General:	Alert, active, cooperative, NAD, well hydrated  .		[] Abnormal:  Neck		Normal: supple, no cervical adenopathy, no palpable thyroid  .		[] Abnormal:  Cardiovascular	Normal: regular rate, normal S1, S2, no murmurs  .		[] Abnormal:  Respiratory	Normal: no chest wall deformity, normal respiratory pattern, CTA B/L  .		[] Abnormal:  Abdominal	Normal: soft, ND, NT, bowel sounds present, no masses, no organomegaly  .		[] Abnormal:  		Normal normal genitalia, testes descended, circumcised/uncircumcised  .		Darin stage:			Breast darin:  .		Menstrual history:  .		[] Abnormal:  Extremities	Normal: FROM x4  .		[] Abnormal:  Skin		Normal: intact and not indurated, no rash, no acanthosis nigricans  .		[] Abnormal:  Neurologic	Normal: grossly intact  .		[] Abnormal:    LABS                        7.2    6.00  )-----------( 209      ( 05 Jun 2022 06:10 )             24.1                               145    |  113    |  57                  Calcium: 9.0   / iCa: x      (06-05 @ 06:10)    ----------------------------<  172       Magnesium: x                                5.5     |  26     |  2.72             Phosphorous: x          CAPILLARY BLOOD GLUCOSE      POCT Blood Glucose.: 214 mg/dL (05 Jun 2022 11:40)  POCT Blood Glucose.: 208 mg/dL (05 Jun 2022 07:47)  POCT Blood Glucose.: 161 mg/dL (04 Jun 2022 22:29)  POCT Blood Glucose.: 114 mg/dL (04 Jun 2022 16:14)        Assesment/plan    57 yr old female Morbidly obese (52 BMI) Sleep Apnea on CPAP, HTN, CAD with AICD ( insertion 2011 and interrogated on Feb 25, 2022, CHF (EF 49%) diabetic (Novolin 70/30 in am and 5pm ), Pt seen using walker arthritic , bilateral leg edema +1. Pt stated she had right nipple drainage about a month ago, which led to mammogram and US indicating a right breasts mass.   Found to have uncont dm. Pt is on novolg 70/30 insulin as out pt . Currently npo for scheduled surgery.        Problem/Recommendation - 1:  ·  Problem: DM (diabetes mellitus).   ·  Recommendation: uncont with hyperglycemia  poorly controlled as out pt a1c- 9.1% on mix insulin  rec lantus 10 units qhs for now  cont  premeal insulin 5 ac tid  fsg ac and hs  admelog correction doses.  will change insulin to mix insulin upon d/c as pt lives at [facility]

## 2022-06-05 NOTE — PROGRESS NOTE ADULT - SUBJECTIVE AND OBJECTIVE BOX
Patient is a 57y old  Female who presents with a chief complaint of Right Breast Mass Excision (05 Jun 2022 11:04)    PATIENT IS SEEN AND EXAMINED IN MEDICAL FLOOR.  NGT [    ]    [place] [   ]      GT [   ]    ALLERGIES:  codeine (Urticaria)      Daily     Daily     VITALS:    Vital Signs Last 24 Hrs  T(C): 36.9 (05 Jun 2022 05:51), Max: 36.9 (04 Jun 2022 21:16)  T(F): 98.5 (05 Jun 2022 05:51), Max: 98.5 (05 Jun 2022 05:51)  HR: 76 (05 Jun 2022 05:51) (76 - 78)  BP: 149/66 (05 Jun 2022 05:51) (127/59 - 149/66)  BP(mean): --  RR: 18 (05 Jun 2022 05:51) (18 - 18)  SpO2: 100% (05 Jun 2022 05:51) (97% - 100%)    LABS:    CBC Full  -  ( 05 Jun 2022 06:10 )  WBC Count : 6.00 K/uL  RBC Count : 2.28 M/uL  Hemoglobin : 7.2 g/dL  Hematocrit : 24.1 %  Platelet Count - Automated : 209 K/uL  Mean Cell Volume : 105.7 fl  Mean Cell Hemoglobin : 31.6 pg  Mean Cell Hemoglobin Concentration : 29.9 gm/dL  Auto Neutrophil # : x  Auto Lymphocyte # : x  Auto Monocyte # : x  Auto Eosinophil # : x  Auto Basophil # : x  Auto Neutrophil % : x  Auto Lymphocyte % : x  Auto Monocyte % : x  Auto Eosinophil % : x  Auto Basophil % : x      06-05    145  |  113<H>  |  57<H>  ----------------------------<  172<H>  5.5<H>   |  26  |  2.72<H>    Ca    9.0      05 Jun 2022 06:10      CAPILLARY BLOOD GLUCOSE      POCT Blood Glucose.: 214 mg/dL (05 Jun 2022 11:40)  POCT Blood Glucose.: 208 mg/dL (05 Jun 2022 07:47)  POCT Blood Glucose.: 161 mg/dL (04 Jun 2022 22:29)  POCT Blood Glucose.: 114 mg/dL (04 Jun 2022 16:14)          Creatinine Trend: 2.72<--, 2.39<--, 2.52<--, 3.04<--  I&O's Summary              MEDICATIONS:    MEDICATIONS  (STANDING):  atorvastatin 40 milliGRAM(s) Oral at bedtime  calcitriol   Capsule 0.25 MICROGram(s) Oral daily  carvedilol 6.25 milliGRAM(s) Oral every 12 hours  dextrose 5%. 1000 milliLiter(s) (50 mL/Hr) IV Continuous <Continuous>  dextrose 5%. 1000 milliLiter(s) (100 mL/Hr) IV Continuous <Continuous>  dextrose 50% Injectable 25 Gram(s) IV Push once  dextrose 50% Injectable 12.5 Gram(s) IV Push once  dextrose 50% Injectable 25 Gram(s) IV Push once  ferrous    sulfate 325 milliGRAM(s) Oral two times a day  furosemide   Injectable 40 milliGRAM(s) IV Push once  glucagon  Injectable 1 milliGRAM(s) IntraMuscular once  heparin   Injectable 5000 Unit(s) SubCutaneous every 8 hours  insulin glargine Injectable (LANTUS) 10 Unit(s) SubCutaneous at bedtime  insulin lispro (ADMELOG) corrective regimen sliding scale   SubCutaneous three times a day before meals  insulin lispro Injectable (ADMELOG) 5 Unit(s) SubCutaneous three times a day before meals  sodium zirconium cyclosilicate 10 Gram(s) Oral once      MEDICATIONS  (PRN):  acetaminophen     Tablet .. 650 milliGRAM(s) Oral every 6 hours PRN Temp greater or equal to 38C (100.4F), Mild Pain (1 - 3)  ALBUTerol    90 MICROgram(s) HFA Inhaler 2 Puff(s) Inhalation every 6 hours PRN Shortness of Breath and/or Wheezing  dextrose Oral Gel 15 Gram(s) Oral once PRN Blood Glucose LESS THAN 70 milliGRAM(s)/deciliter      REVIEW OF SYSTEMS:                           ALL ROS DONE [ X   ]    CONSTITUTIONAL:  LETHARGIC [   ], FEVER [   ], UNRESPONSIVE [   ]  CVS:  CP  [   ], SOB, [   ], PALPITATIONS [   ], DIZZYNESS [   ]  RS: COUGH [   ], SPUTUM [   ]  GI: ABDOMINAL PAIN [   ], NAUSEA [   ], VOMITINGS [   ], DIARRHEA [   ], CONSTIPATION [   ]  :  DYSURIA [   ], NOCTURIA [   ], INCREASED FREQUENCY [   ], DRIBLING [   ],  SKELETAL: PAINFUL JOINTS [   ], SWOLLEN JOINTS [   ], NECK ACHE [   ], LOW BACK ACHE [   ],  SKIN : ULCERS [   ], RASH [   ], ITCHING [   ]  CNS: HEAD ACHE [   ], DOUBLE VISION [   ], BLURRED VISION [   ], AMS / CONFUSION [   ], SEIZURES [   ], WEAKNESS [   ],TINGLING / NUMBNESS [   ]        PHYSICAL EXAMINATION:  GENERAL APPEARANCE: NO DISTRESS  HEENT:  NO PALLOR, NO  JVD,  NO   NODES, NECK SUPPLE  CVS: S1 +, S2 +,   RS: AEEB,  OCCASIONAL  RALES +,   NO RONCHI  ABD: SOFT, NT, NO, BS +  EXT: PE ++  SKIN: WARM,   RIGHT BREAST W/ SURGICAL DRESSING  SKELETAL:  ROM ACCEPTABLE  CNS:  AAO X 3    RADIOLOGY :        ASSESSMENT :       PLAN:  HPI:  57 yr old female Morbidly obese (52 BMI) Sleep Apnea on CPAP, HTN, CAD with AICD ( insertion 2011 and interrogated on Feb 25, 2022, CHF (EF 49%) diabetic (Novolin 70/30 in am and 5pm ), Pt seen using walker arthritic , bilateral leg edema +1. Pt stated she had right nipple drainage about a month ago, which led to mammogram and US indicating a right breasts mass.  Patient has history of anemia and had 8 transfusions in the past for low H/H. She currently denies any headache, dizziness and ambulates without difficulty.  Pt  schedule for surgery of excision of right breast mass with spot localization tomorrow. Patient is admitted for medical optimization prior to OR.   Patient denies any breast pain, discharge, abdominal pain, N/V, fever, chills, shortness of breathe or any other constitutional symptoms.  (02 Jun 2022 15:22)    # D/C PLAN FOR A.M. TO [facility] - D/W PATIENT AND STAFF    # RIGHT BREAST MASS PLANNED FOR EXCISION ON 6/3  # MEDICAL CLEARANCE FOR SURGERY - RCRI - 4 - 15% 30 DAY RISK OF DEATH, MI OR CARDIAC ARREST  - RECENT NST - NEGATIVE  - F/U PATH  - SURGERY EVALUATION IN PROGRESS    # NORMOCYTIC ANEMIA W/ HX OF POST-MENOPAUSAL VAGINAL BLEEDING, FIBROID UTERUS AND ANEMIA OF CKD , ANEMIA OF CHRONIC DISEASE   - TRANSFUSION THRESHOLD HGB < 7  - GIVING PRBC TRANSFUSION  - TREND HGB  - ON FESO4    # AMPARO ON CPAP    # HX OF CAD W/ AICD  # CHRONIC SYSTOLIC HEART FAILURE S/P AICD  # PVD, B/L LE LYMPHEDEMA  - ON ASA, STATIN, COREG  - ON LASIX, ALDACTONE    # CKD STAGE 4-5 - NEPHROLOGY CONSULT  - ON SEVELAMER    # MORBID OBESITY    # COPD - CONTINUE PRN VENTOLIN,     # HTN - ON COREG    # HLD - ON STATIN    # DM, UNCONTROLLED  - LANTUS + TID INSULIN   - SSI + FS    - NOTED HBA1C      # GOUT - ON ALLOPURINOL    # GI PPX; DVT PPX     Patient is a 57y old  Female who presents with a chief complaint of Right Breast Mass Excision (05 Jun 2022 11:04)    PATIENT IS SEEN AND EXAMINED IN MEDICAL FLOOR.  NGT [    ]    [place]IN [   ]      GT [   ]    ALLERGIES:  codeine (Urticaria)      Daily     Daily     VITALS:    Vital Signs Last 24 Hrs  T(C): 36.9 (05 Jun 2022 05:51), Max: 36.9 (04 Jun 2022 21:16)  T(F): 98.5 (05 Jun 2022 05:51), Max: 98.5 (05 Jun 2022 05:51)  HR: 76 (05 Jun 2022 05:51) (76 - 78)  BP: 149/66 (05 Jun 2022 05:51) (127/59 - 149/66)  BP(mean): --  RR: 18 (05 Jun 2022 05:51) (18 - 18)  SpO2: 100% (05 Jun 2022 05:51) (97% - 100%)    LABS:    CBC Full  -  ( 05 Jun 2022 06:10 )  WBC Count : 6.00 K/uL  RBC Count : 2.28 M/uL  Hemoglobin : 7.2 g/dL  Hematocrit : 24.1 %  Platelet Count - Automated : 209 K/uL  Mean Cell Volume : 105.7 fl  Mean Cell Hemoglobin : 31.6 pg  Mean Cell Hemoglobin Concentration : 29.9 gm/dL  Auto Neutrophil # : x  Auto Lymphocyte # : x  Auto Monocyte # : x  Auto Eosinophil # : x  Auto Basophil # : x  Auto Neutrophil % : x  Auto Lymphocyte % : x  Auto Monocyte % : x  Auto Eosinophil % : x  Auto Basophil % : x      06-05    145  |  113<H>  |  57<H>  ----------------------------<  172<H>  5.5<H>   |  26  |  2.72<H>    Ca    9.0      05 Jun 2022 06:10      CAPILLARY BLOOD GLUCOSE      POCT Blood Glucose.: 214 mg/dL (05 Jun 2022 11:40)  POCT Blood Glucose.: 208 mg/dL (05 Jun 2022 07:47)  POCT Blood Glucose.: 161 mg/dL (04 Jun 2022 22:29)  POCT Blood Glucose.: 114 mg/dL (04 Jun 2022 16:14)          Creatinine Trend: 2.72<--, 2.39<--, 2.52<--, 3.04<--  I&O's Summary              MEDICATIONS:    MEDICATIONS  (STANDING):  atorvastatin 40 milliGRAM(s) Oral at bedtime  calcitriol   Capsule 0.25 MICROGram(s) Oral daily  carvedilol 6.25 milliGRAM(s) Oral every 12 hours  dextrose 5%. 1000 milliLiter(s) (50 mL/Hr) IV Continuous <Continuous>  dextrose 5%. 1000 milliLiter(s) (100 mL/Hr) IV Continuous <Continuous>  dextrose 50% Injectable 25 Gram(s) IV Push once  dextrose 50% Injectable 12.5 Gram(s) IV Push once  dextrose 50% Injectable 25 Gram(s) IV Push once  ferrous    sulfate 325 milliGRAM(s) Oral two times a day  furosemide   Injectable 40 milliGRAM(s) IV Push once  glucagon  Injectable 1 milliGRAM(s) IntraMuscular once  heparin   Injectable 5000 Unit(s) SubCutaneous every 8 hours  insulin glargine Injectable (LANTUS) 10 Unit(s) SubCutaneous at bedtime  insulin lispro (ADMELOG) corrective regimen sliding scale   SubCutaneous three times a day before meals  insulin lispro Injectable (ADMELOG) 5 Unit(s) SubCutaneous three times a day before meals  sodium zirconium cyclosilicate 10 Gram(s) Oral once      MEDICATIONS  (PRN):  acetaminophen     Tablet .. 650 milliGRAM(s) Oral every 6 hours PRN Temp greater or equal to 38C (100.4F), Mild Pain (1 - 3)  ALBUTerol    90 MICROgram(s) HFA Inhaler 2 Puff(s) Inhalation every 6 hours PRN Shortness of Breath and/or Wheezing  dextrose Oral Gel 15 Gram(s) Oral once PRN Blood Glucose LESS THAN 70 milliGRAM(s)/deciliter      REVIEW OF SYSTEMS:                           ALL ROS DONE [ X   ]    CONSTITUTIONAL:  LETHARGIC [   ], FEVER [   ], UNRESPONSIVE [   ]  CVS:  CP  [   ], SOB, [   ], PALPITATIONS [   ], DIZZYNESS [   ]  RS: COUGH [   ], SPUTUM [   ]  GI: ABDOMINAL PAIN [   ], NAUSEA [   ], VOMITINGS [   ], DIARRHEA [   ], CONSTIPATION [   ]  :  DYSURIA [   ], NOCTURIA [   ], INCREASED FREQUENCY [   ], DRIBLING [   ],  SKELETAL: PAINFUL JOINTS [   ], SWOLLEN JOINTS [   ], NECK ACHE [   ], LOW BACK ACHE [   ],  SKIN : ULCERS [   ], RASH [   ], ITCHING [   ]  CNS: HEAD ACHE [   ], DOUBLE VISION [   ], BLURRED VISION [   ], AMS / CONFUSION [   ], SEIZURES [   ], WEAKNESS [   ],TINGLING / NUMBNESS [   ]        PHYSICAL EXAMINATION:  GENERAL APPEARANCE: NO DISTRESS  HEENT:  NO PALLOR, NO  JVD,  NO   NODES, NECK SUPPLE  CVS: S1 +, S2 +,   RS: AEEB,  OCCASIONAL  RALES +,   NO RONCHI  ABD: SOFT, NT, NO, BS +  EXT: PE ++  SKIN: WARM,   RIGHT BREAST W/ SURGICAL DRESSING  SKELETAL:  ROM ACCEPTABLE  CNS:  AAO X 3    RADIOLOGY :          ASSESSMENT :       PLAN:  HPI:  57 yr old female Morbidly obese (52 BMI) Sleep Apnea on CPAP, HTN, CAD with AICD ( insertion 2011 and interrogated on Feb 25, 2022, CHF (EF 49%) diabetic (Novolin 70/30 in am and 5pm ), Pt seen using walker arthritic , bilateral leg edema +1. Pt stated she had right nipple drainage about a month ago, which led to mammogram and US indicating a right breasts mass.  Patient has history of anemia and had 8 transfusions in the past for low H/H. She currently denies any headache, dizziness and ambulates without difficulty.  Pt  schedule for surgery of excision of right breast mass with spot localization tomorrow. Patient is admitted for medical optimization prior to OR.   Patient denies any breast pain, discharge, abdominal pain, N/V, fever, chills, shortness of breathe or any other constitutional symptoms.  (02 Jun 2022 15:22)    # D/C PLAN FOR A.M. TO [facility] - D/W PATIENT AND STAFF    # HYPERKALEMIA   - GIVEN LOKELMA   - MONITOR REPEAT BMP    # RIGHT BREAST MASS PLANNED FOR EXCISION ON 6/3  # MEDICAL CLEARANCE FOR SURGERY - RCRI - 4 - 15% 30 DAY RISK OF DEATH, MI OR CARDIAC ARREST  - RECENT NST - NEGATIVE  - F/U PATH  - SURGERY EVALUATION IN PROGRESS    # NORMOCYTIC ANEMIA W/ HX OF POST-MENOPAUSAL VAGINAL BLEEDING, FIBROID UTERUS AND ANEMIA OF CKD , ANEMIA OF CHRONIC DISEASE   - TRANSFUSION THRESHOLD HGB < 7  - GIVING PRBC TRANSFUSION  - TREND HGB  - ON FESO4    # AMPARO ON CPAP    # HX OF CAD W/ AICD  # CHRONIC SYSTOLIC HEART FAILURE S/P AICD  # PVD, B/L LE LYMPHEDEMA  - ON ASA, STATIN, COREG  - ON LASIX, ALDACTONE    # CKD STAGE 4-5 - NEPHROLOGY CONSULT  - ON SEVELAMER    # MORBID OBESITY    # COPD - CONTINUE PRN VENTOLIN,     # HTN - ON COREG    # HLD - ON STATIN    # DM, UNCONTROLLED  - LANTUS + TID INSULIN   - SSI + FS    - NOTED HBA1C      # GOUT - ON ALLOPURINOL    # GI PPX; DVT PPX

## 2022-06-06 ENCOUNTER — TRANSCRIPTION ENCOUNTER (OUTPATIENT)
Age: 57
End: 2022-06-06

## 2022-06-06 VITALS — OXYGEN SATURATION: 95 %

## 2022-06-06 LAB
ANION GAP SERPL CALC-SCNC: 7 MMOL/L — SIGNIFICANT CHANGE UP (ref 5–17)
BUN SERPL-MCNC: 61 MG/DL — HIGH (ref 7–18)
CALCIUM SERPL-MCNC: 9.2 MG/DL — SIGNIFICANT CHANGE UP (ref 8.4–10.5)
CHLORIDE SERPL-SCNC: 107 MMOL/L — SIGNIFICANT CHANGE UP (ref 96–108)
CO2 SERPL-SCNC: 26 MMOL/L — SIGNIFICANT CHANGE UP (ref 22–31)
CREAT SERPL-MCNC: 2.76 MG/DL — HIGH (ref 0.5–1.3)
EGFR: 19 ML/MIN/1.73M2 — LOW
GLUCOSE BLDC GLUCOMTR-MCNC: 140 MG/DL — HIGH (ref 70–99)
GLUCOSE BLDC GLUCOMTR-MCNC: 215 MG/DL — HIGH (ref 70–99)
GLUCOSE SERPL-MCNC: 196 MG/DL — HIGH (ref 70–99)
HCT VFR BLD CALC: 26.6 % — LOW (ref 34.5–45)
HGB BLD-MCNC: 7.9 G/DL — LOW (ref 11.5–15.5)
MCHC RBC-ENTMCNC: 29.7 GM/DL — LOW (ref 32–36)
MCHC RBC-ENTMCNC: 31.2 PG — SIGNIFICANT CHANGE UP (ref 27–34)
MCV RBC AUTO: 105.1 FL — HIGH (ref 80–100)
NRBC # BLD: 0 /100 WBCS — SIGNIFICANT CHANGE UP (ref 0–0)
PLATELET # BLD AUTO: 248 K/UL — SIGNIFICANT CHANGE UP (ref 150–400)
POTASSIUM SERPL-MCNC: 4.9 MMOL/L — SIGNIFICANT CHANGE UP (ref 3.5–5.3)
POTASSIUM SERPL-SCNC: 4.9 MMOL/L — SIGNIFICANT CHANGE UP (ref 3.5–5.3)
RBC # BLD: 2.53 M/UL — LOW (ref 3.8–5.2)
RBC # FLD: 17.5 % — HIGH (ref 10.3–14.5)
SARS-COV-2 RNA SPEC QL NAA+PROBE: SIGNIFICANT CHANGE UP
SODIUM SERPL-SCNC: 140 MMOL/L — SIGNIFICANT CHANGE UP (ref 135–145)
WBC # BLD: 7.41 K/UL — SIGNIFICANT CHANGE UP (ref 3.8–10.5)
WBC # FLD AUTO: 7.41 K/UL — SIGNIFICANT CHANGE UP (ref 3.8–10.5)

## 2022-06-06 PROCEDURE — 36415 COLL VENOUS BLD VENIPUNCTURE: CPT

## 2022-06-06 PROCEDURE — 84100 ASSAY OF PHOSPHORUS: CPT

## 2022-06-06 PROCEDURE — 86900 BLOOD TYPING SEROLOGIC ABO: CPT

## 2022-06-06 PROCEDURE — 86901 BLOOD TYPING SEROLOGIC RH(D): CPT

## 2022-06-06 PROCEDURE — 85610 PROTHROMBIN TIME: CPT

## 2022-06-06 PROCEDURE — 85025 COMPLETE CBC W/AUTO DIFF WBC: CPT

## 2022-06-06 PROCEDURE — 87635 SARS-COV-2 COVID-19 AMP PRB: CPT

## 2022-06-06 PROCEDURE — 88307 TISSUE EXAM BY PATHOLOGIST: CPT

## 2022-06-06 PROCEDURE — 82728 ASSAY OF FERRITIN: CPT

## 2022-06-06 PROCEDURE — 85730 THROMBOPLASTIN TIME PARTIAL: CPT

## 2022-06-06 PROCEDURE — 85027 COMPLETE CBC AUTOMATED: CPT

## 2022-06-06 PROCEDURE — 82962 GLUCOSE BLOOD TEST: CPT

## 2022-06-06 PROCEDURE — 76098 X-RAY EXAM SURGICAL SPECIMEN: CPT

## 2022-06-06 PROCEDURE — 82306 VITAMIN D 25 HYDROXY: CPT

## 2022-06-06 PROCEDURE — 71045 X-RAY EXAM CHEST 1 VIEW: CPT

## 2022-06-06 PROCEDURE — 86850 RBC ANTIBODY SCREEN: CPT

## 2022-06-06 PROCEDURE — 94660 CPAP INITIATION&MGMT: CPT

## 2022-06-06 PROCEDURE — 83550 IRON BINDING TEST: CPT

## 2022-06-06 PROCEDURE — 83540 ASSAY OF IRON: CPT

## 2022-06-06 PROCEDURE — 80048 BASIC METABOLIC PNL TOTAL CA: CPT

## 2022-06-06 PROCEDURE — 83970 ASSAY OF PARATHORMONE: CPT

## 2022-06-06 PROCEDURE — 19281 PERQ DEVICE BREAST 1ST IMAG: CPT

## 2022-06-06 PROCEDURE — 82310 ASSAY OF CALCIUM: CPT

## 2022-06-06 RX ADMIN — Medication 4: at 08:30

## 2022-06-06 RX ADMIN — Medication 5 UNIT(S): at 08:31

## 2022-06-06 RX ADMIN — Medication 325 MILLIGRAM(S): at 06:13

## 2022-06-06 RX ADMIN — HEPARIN SODIUM 5000 UNIT(S): 5000 INJECTION INTRAVENOUS; SUBCUTANEOUS at 06:14

## 2022-06-06 RX ADMIN — CARVEDILOL PHOSPHATE 6.25 MILLIGRAM(S): 80 CAPSULE, EXTENDED RELEASE ORAL at 06:13

## 2022-06-06 NOTE — DISCHARGE NOTE NURSING/CASE MANAGEMENT/SOCIAL WORK - NSDCPEFALRISK_GEN_ALL_CORE
For information on Fall & Injury Prevention, visit: [email or web address] OR  [email or web address] OR  https://www.cdc.gov/steadi/patient.html

## 2022-06-06 NOTE — PROGRESS NOTE ADULT - PROVIDER SPECIALTY LIST ADULT
Cardiology
Cardiology
Endocrinology
Internal Medicine
Internal Medicine
Surgery
Endocrinology
Internal Medicine
Nephrology
Cardiology
Internal Medicine
Internal Medicine
Nephrology
Nephrology
Surgery

## 2022-06-06 NOTE — DISCHARGE NOTE PROVIDER - CARE PROVIDER_API CALL
Quang Sauceda)  Surgery  [address] [facility], [place] [address]  Phone: [phone]  Fax: [phone]  Follow Up Time:

## 2022-06-06 NOTE — DISCHARGE NOTE PROVIDER - NSDCMRMEDTOKEN_GEN_ALL_CORE_FT
acetaminophen 325 mg oral tablet: 2 tab(s) orally every 6 hours, As needed, Temp greater or equal to 38C (100.4F)  Aldactone 50 mg oral tablet: 1 tab(s) orally once a day  allopurinol 100 mg oral tablet: 1 tab(s) orally once a day  aspirin 81 mg oral tablet, chewable: 1 tab(s) orally once a day  atorvastatin 40 mg oral tablet: 1 tab(s) orally once a day  Coreg 6.25 mg oral tablet: 1 tab(s) orally 2 times a day  docusate sodium 100 mg oral capsule: 1 cap(s) orally once a day  ferrous sulfate 324 mg (65 mg elemental iron) oral tablet: orally once a day  gabapentin 100 mg oral capsule: 1 cap(s) orally 3 times a day  HumaLOG KwikPen 200 units/mL (Concentrated) subcutaneous solution: 6 unit(s) subcutaneous every 8 hours  sliding scale as needed   Lasix 40 mg oral tablet: 1 tab(s) orally 2 times a day  multivitamin: 1 tab(s) orally once a day  NovoLIN 70/30 FlexPen: 10  subcutaneous once a day at 5pm  NovoLIN 70/30 subcutaneous suspension: 6 unit(s) subcutaneous once a day (in the evening)  NovoLIN 70/30 subcutaneous suspension: 24 unit(s) subcutaneous once a day  pantoprazole 40 mg oral delayed release tablet: 1 tab(s) orally once a day (before a meal)  senna oral tablet: 2 tab(s) orally once a day (at bedtime)  sevelamer carbonate 800 mg oral tablet: 2 tab(s) orally 3 times a day (with meals)  Ventolin HFA 90 mcg/inh inhalation aerosol: 2 puff(s) inhaled every 6 hours  Vitamin D3 1000 intl units oral capsule: 1 cap(s) orally once a day

## 2022-06-06 NOTE — DISCHARGE NOTE PROVIDER - HOSPITAL COURSE
57 yr old female Morbidly obese (52 BMI) Sleep Apnea on CPAP, HTN, CAD with AICD ( insertion 2011 and interrogated on Feb 25, 2022, CHF (EF 49%) diabetic (Novolin 70/30 in am and 5pm ), chronic anemia,   Pt was admitted to surgical services, underwent right breast lumpectomy on 6/3/22 with Dr Sauceda. Post operative period complicated with hyperkalemia, corrected, pt stable for discharge to [facility]. Pt to follow up with Dr Sauceda in office.

## 2022-06-06 NOTE — PROGRESS NOTE ADULT - SUBJECTIVE AND OBJECTIVE BOX
C A R D I O L O G Y  **********************************     DATE OF SERVICE: 06-06-22    Patient denies chest pain or shortness of breath.   Review of systems otherwise (-)  	  MEDICATIONS:  MEDICATIONS  (STANDING):  atorvastatin 40 milliGRAM(s) Oral at bedtime  calcitriol   Capsule 0.25 MICROGram(s) Oral daily  carvedilol 6.25 milliGRAM(s) Oral every 12 hours  dextrose 5%. 1000 milliLiter(s) (50 mL/Hr) IV Continuous <Continuous>  dextrose 5%. 1000 milliLiter(s) (100 mL/Hr) IV Continuous <Continuous>  dextrose 50% Injectable 25 Gram(s) IV Push once  dextrose 50% Injectable 12.5 Gram(s) IV Push once  dextrose 50% Injectable 25 Gram(s) IV Push once  ferrous    sulfate 325 milliGRAM(s) Oral two times a day  glucagon  Injectable 1 milliGRAM(s) IntraMuscular once  heparin   Injectable 5000 Unit(s) SubCutaneous every 8 hours  insulin glargine Injectable (LANTUS) 10 Unit(s) SubCutaneous at bedtime  insulin lispro (ADMELOG) corrective regimen sliding scale   SubCutaneous three times a day before meals  insulin lispro Injectable (ADMELOG) 5 Unit(s) SubCutaneous three times a day before meals      LABS:	 	    CARDIAC MARKERS:                                    7.9    7.41  )-----------( 248      ( 06 Jun 2022 07:22 )             26.6     Hemoglobin: 7.9 g/dL (06-06 @ 07:22)  Hemoglobin: 7.2 g/dL (06-05 @ 06:10)  Hemoglobin: 7.5 g/dL (06-03 @ 07:03)  Hemoglobin: 7.7 g/dL (06-02 @ 17:22)      06-06    140  |  107  |  61<H>  ----------------------------<  196<H>  4.9   |  26  |  2.76<H>    Ca    9.2      06 Jun 2022 07:22      Creatinine Trend: 2.76<--, 2.72<--, 2.39<--, 2.52<--, 3.04<--      PHYSICAL EXAM:  T(C): 36.6 (06-06-22 @ 05:00), Max: 37.1 (06-05-22 @ 14:00)  HR: 69 (06-06-22 @ 05:00) (69 - 80)  BP: 137/61 (06-06-22 @ 05:00) (126/54 - 156/58)  RR: 19 (06-06-22 @ 05:00) (18 - 19)  SpO2: 95% (06-06-22 @ 08:26) (94% - 100%)  Wt(kg): --  I&O's Summary        HEENT:  (-)icterus (-)pallor  CV: N S1 S2 1/6 RAYMOND (+)2 Pulses B/l  Resp:  Clear to ausculatation B/L, normal effort  GI: (+) BS Soft, NT, ND  Lymph:  (-)Edema, (-)obvious lymphadenopathy  Skin: Warm to touch, Normal turgor  Psych: Appropriate mood and affect        ASSESSMENT/PLAN: 	57y  Female  female Morbidly obese (52 BMI) Sleep Apnea on CPAP, DM,  HTN, non-obstructive CAD,  ICD ( insertion 2011 and interrogated on Feb 25, 2022, NICM severe global LV systolic dysfunction,  normal perfusion on recent stress, s/p Breast surgery     - progressing well  - well compensated from a CHF prospective  - Cont Periop beta blockers  - Strict periop fluid management.    - surgery f/u    Manuel Gold MD, [facility]  BEEPER [phone]

## 2022-06-06 NOTE — PROGRESS NOTE ADULT - SUBJECTIVE AND OBJECTIVE BOX
Interval Events:  pt in nad    Allergies    codeine (Urticaria)    Intolerances      Endocrine/Metabolic Medications:  atorvastatin 40 milliGRAM(s) Oral at bedtime  dextrose 50% Injectable 25 Gram(s) IV Push once  dextrose 50% Injectable 12.5 Gram(s) IV Push once  dextrose 50% Injectable 25 Gram(s) IV Push once  dextrose Oral Gel 15 Gram(s) Oral once PRN  glucagon  Injectable 1 milliGRAM(s) IntraMuscular once  insulin glargine Injectable (LANTUS) 10 Unit(s) SubCutaneous at bedtime  insulin lispro (ADMELOG) corrective regimen sliding scale   SubCutaneous three times a day before meals  insulin lispro Injectable (ADMELOG) 5 Unit(s) SubCutaneous three times a day before meals      Vital Signs Last 24 Hrs  T(C): 36.6 (06 Jun 2022 05:00), Max: 37.1 (05 Jun 2022 14:00)  T(F): 97.8 (06 Jun 2022 05:00), Max: 98.7 (05 Jun 2022 14:00)  HR: 69 (06 Jun 2022 05:00) (69 - 80)  BP: 137/61 (06 Jun 2022 05:00) (126/54 - 156/58)  BP(mean): 70 (05 Jun 2022 20:32) (70 - 70)  RR: 19 (06 Jun 2022 05:00) (18 - 19)  SpO2: 95% (06 Jun 2022 08:26) (94% - 100%)      PHYSICAL EXAM  All physical exam findings normal, except those marked:  General:	Alert, active, cooperative, NAD, well hydrated  .		[] Abnormal:  Neck		Normal: supple, no cervical adenopathy, no palpable thyroid  .		[] Abnormal:  Cardiovascular	Normal: regular rate, normal S1, S2, no murmurs  .		[] Abnormal:  Respiratory	Normal: no chest wall deformity, normal respiratory pattern, CTA B/L  .		[] Abnormal:  Abdominal	Normal: soft, ND, NT, bowel sounds present, no masses, no organomegaly  .		[] Abnormal:  		Normal normal genitalia, testes descended, circumcised/uncircumcised  .		Darin stage:			Breast darin:  .		Menstrual history:  .		[] Abnormal:  Extremities	Normal: FROM x4  .		[] Abnormal:  Skin		Normal: intact and not indurated, no rash, no acanthosis nigricans  .		[] Abnormal:  Neurologic	Normal: grossly intact  .		[] Abnormal:    LABS                        7.9    7.41  )-----------( 248      ( 06 Jun 2022 07:22 )             26.6                               140    |  107    |  61                  Calcium: 9.2   / iCa: x      (06-06 @ 07:22)    ----------------------------<  196       Magnesium: x                                4.9     |  26     |  2.76             Phosphorous: x          CAPILLARY BLOOD GLUCOSE      POCT Blood Glucose.: 215 mg/dL (06 Jun 2022 07:52)  POCT Blood Glucose.: 196 mg/dL (05 Jun 2022 21:13)  POCT Blood Glucose.: 169 mg/dL (05 Jun 2022 16:42)  POCT Blood Glucose.: 214 mg/dL (05 Jun 2022 11:40)        Assesment/plan    57 yr old female Morbidly obese (52 BMI) Sleep Apnea on CPAP, HTN, CAD with AICD ( insertion 2011 and interrogated on Feb 25, 2022, CHF (EF 49%) diabetic (Novolin 70/30 in am and 5pm ), Pt seen using walker arthritic , bilateral leg edema +1. Pt stated she had right nipple drainage about a month ago, which led to mammogram and US indicating a right breasts mass.   Found to have uncont dm. Pt is on novolg 70/30 insulin as out pt . Currently npo for scheduled surgery.        Problem/Recommendation - 1:  ·  Problem: DM (diabetes mellitus).   ·  Recommendation: uncont with hyperglycemia  poorly controlled as out pt a1c- 9.1% on mix insulin  rec change lantus to 70/30 insulin 24 in am and 14 in pm and titrate as out pt  fsg ac and hs  admelog correction doses.  change insulin to mix insulin upon d/c as pt lives at [facility]

## 2022-06-06 NOTE — DISCHARGE NOTE NURSING/CASE MANAGEMENT/SOCIAL WORK - PATIENT PORTAL LINK FT
You can access the FollowMyHealth Patient Portal offered by [facility] by registering at the following website: [email or web address]. By joining [employer]’s FollowMyHealth portal, you will also be able to view your health information using other applications (apps) compatible with our system.

## 2022-06-06 NOTE — PROGRESS NOTE ADULT - SUBJECTIVE AND OBJECTIVE BOX
INTERVAL HPI/OVERNIGHT EVENTS:  Pt resting comfortably. No acute complaints.   Tolerating diet.   Denies N/V.  Hyperkalemia resolved after tx.    MEDICATIONS  (STANDING):  atorvastatin 40 milliGRAM(s) Oral at bedtime  calcitriol   Capsule 0.25 MICROGram(s) Oral daily  carvedilol 6.25 milliGRAM(s) Oral every 12 hours  dextrose 5%. 1000 milliLiter(s) (50 mL/Hr) IV Continuous <Continuous>  dextrose 5%. 1000 milliLiter(s) (100 mL/Hr) IV Continuous <Continuous>  dextrose 50% Injectable 25 Gram(s) IV Push once  dextrose 50% Injectable 12.5 Gram(s) IV Push once  dextrose 50% Injectable 25 Gram(s) IV Push once  ferrous    sulfate 325 milliGRAM(s) Oral two times a day  glucagon  Injectable 1 milliGRAM(s) IntraMuscular once  heparin   Injectable 5000 Unit(s) SubCutaneous every 8 hours  insulin glargine Injectable (LANTUS) 10 Unit(s) SubCutaneous at bedtime  insulin lispro (ADMELOG) corrective regimen sliding scale   SubCutaneous three times a day before meals  insulin lispro Injectable (ADMELOG) 5 Unit(s) SubCutaneous three times a day before meals    MEDICATIONS  (PRN):  acetaminophen     Tablet .. 650 milliGRAM(s) Oral every 6 hours PRN Temp greater or equal to 38C (100.4F), Mild Pain (1 - 3)  ALBUTerol    90 MICROgram(s) HFA Inhaler 2 Puff(s) Inhalation every 6 hours PRN Shortness of Breath and/or Wheezing  dextrose Oral Gel 15 Gram(s) Oral once PRN Blood Glucose LESS THAN 70 milliGRAM(s)/deciliter    Vital Signs Last 24 Hrs  T(C): 36.6 (06 Jun 2022 05:00), Max: 37.1 (05 Jun 2022 14:00)  T(F): 97.8 (06 Jun 2022 05:00), Max: 98.7 (05 Jun 2022 14:00)  HR: 69 (06 Jun 2022 05:00) (69 - 80)  BP: 137/61 (06 Jun 2022 05:00) (126/54 - 156/58)  BP(mean): 70 (05 Jun 2022 20:32) (70 - 70)  RR: 19 (06 Jun 2022 05:00) (18 - 19)  SpO2: 100% (06 Jun 2022 05:00) (94% - 100%)    Physical:  General: A&Ox3. NAD.  Breast: Large pendulous breast. R Dressing C/D/I    LABS:                        7.9    7.41  )-----------( 248      ( 06 Jun 2022 07:22 )             26.6             06-06    140  |  107  |  61<H>  ----------------------------<  196<H>  4.9   |  26  |  2.76<H>    Ca    9.2      06 Jun 2022 07:22

## 2022-06-06 NOTE — DISCHARGE NOTE PROVIDER - NSDCCPTREATMENT_GEN_ALL_CORE_FT
PRINCIPAL PROCEDURE  Procedure: Right breast lumpectomy  Findings and Treatment: Leave steristrips intact. May shower

## 2022-06-06 NOTE — PROGRESS NOTE ADULT - ASSESSMENT
57y.o. Female 57y.o. Female s/p bx R breast mass POD#3    -Dressing removed, keep steristrips intact  -May shower  -Pain control prn  -Outpt f/u in 2 weeks  -COVID PCR today    Hyperkalemia  -Resolved    HTN  -con't Coreg    HLD  -con't statin    DM  -ISS    Gout  -Allopurinol    CKD w/anemia  -con't Sevelamer  -Trend CBC    COPD  -con't Ventolin    AMPARO  -CPAP

## 2022-06-06 NOTE — PROGRESS NOTE ADULT - SUBJECTIVE AND OBJECTIVE BOX
Patient is a 57y old  Female who presents with a chief complaint of Right Breast Mass Excision (06 Jun 2022 09:21)    PATIENT IS SEEN AND EXAMINED IN MEDICAL FLOOR.  NGT [    ]    [place] [   ]      GT [   ]    ALLERGIES:  codeine (Urticaria)      Daily     Daily     VITALS:    Vital Signs Last 24 Hrs  T(C): 36.6 (06 Jun 2022 05:00), Max: 37.1 (05 Jun 2022 14:00)  T(F): 97.8 (06 Jun 2022 05:00), Max: 98.7 (05 Jun 2022 14:00)  HR: 69 (06 Jun 2022 05:00) (69 - 80)  BP: 137/61 (06 Jun 2022 05:00) (126/54 - 156/58)  BP(mean): 70 (05 Jun 2022 20:32) (70 - 70)  RR: 19 (06 Jun 2022 05:00) (18 - 19)  SpO2: 95% (06 Jun 2022 08:26) (94% - 100%)    LABS:    CBC Full  -  ( 06 Jun 2022 07:22 )  WBC Count : 7.41 K/uL  RBC Count : 2.53 M/uL  Hemoglobin : 7.9 g/dL  Hematocrit : 26.6 %  Platelet Count - Automated : 248 K/uL  Mean Cell Volume : 105.1 fl  Mean Cell Hemoglobin : 31.2 pg  Mean Cell Hemoglobin Concentration : 29.7 gm/dL  Auto Neutrophil # : x  Auto Lymphocyte # : x  Auto Monocyte # : x  Auto Eosinophil # : x  Auto Basophil # : x  Auto Neutrophil % : x  Auto Lymphocyte % : x  Auto Monocyte % : x  Auto Eosinophil % : x  Auto Basophil % : x      06-06    140  |  107  |  61<H>  ----------------------------<  196<H>  4.9   |  26  |  2.76<H>    Ca    9.2      06 Jun 2022 07:22      CAPILLARY BLOOD GLUCOSE      POCT Blood Glucose.: 215 mg/dL (06 Jun 2022 07:52)  POCT Blood Glucose.: 196 mg/dL (05 Jun 2022 21:13)  POCT Blood Glucose.: 169 mg/dL (05 Jun 2022 16:42)  POCT Blood Glucose.: 214 mg/dL (05 Jun 2022 11:40)          Creatinine Trend: 2.76<--, 2.72<--, 2.39<--, 2.52<--, 3.04<--  I&O's Summary              MEDICATIONS:    MEDICATIONS  (STANDING):  atorvastatin 40 milliGRAM(s) Oral at bedtime  calcitriol   Capsule 0.25 MICROGram(s) Oral daily  carvedilol 6.25 milliGRAM(s) Oral every 12 hours  dextrose 5%. 1000 milliLiter(s) (50 mL/Hr) IV Continuous <Continuous>  dextrose 5%. 1000 milliLiter(s) (100 mL/Hr) IV Continuous <Continuous>  dextrose 50% Injectable 25 Gram(s) IV Push once  dextrose 50% Injectable 12.5 Gram(s) IV Push once  dextrose 50% Injectable 25 Gram(s) IV Push once  ferrous    sulfate 325 milliGRAM(s) Oral two times a day  glucagon  Injectable 1 milliGRAM(s) IntraMuscular once  heparin   Injectable 5000 Unit(s) SubCutaneous every 8 hours  insulin glargine Injectable (LANTUS) 10 Unit(s) SubCutaneous at bedtime  insulin lispro (ADMELOG) corrective regimen sliding scale   SubCutaneous three times a day before meals  insulin lispro Injectable (ADMELOG) 5 Unit(s) SubCutaneous three times a day before meals      MEDICATIONS  (PRN):  acetaminophen     Tablet .. 650 milliGRAM(s) Oral every 6 hours PRN Temp greater or equal to 38C (100.4F), Mild Pain (1 - 3)  ALBUTerol    90 MICROgram(s) HFA Inhaler 2 Puff(s) Inhalation every 6 hours PRN Shortness of Breath and/or Wheezing  dextrose Oral Gel 15 Gram(s) Oral once PRN Blood Glucose LESS THAN 70 milliGRAM(s)/deciliter      REVIEW OF SYSTEMS:                           ALL ROS DONE [ X   ]    CONSTITUTIONAL:  LETHARGIC [   ], FEVER [   ], UNRESPONSIVE [   ]  CVS:  CP  [   ], SOB, [   ], PALPITATIONS [   ], DIZZYNESS [   ]  RS: COUGH [   ], SPUTUM [   ]  GI: ABDOMINAL PAIN [   ], NAUSEA [   ], VOMITINGS [   ], DIARRHEA [   ], CONSTIPATION [   ]  :  DYSURIA [   ], NOCTURIA [   ], INCREASED FREQUENCY [   ], DRIBLING [   ],  SKELETAL: PAINFUL JOINTS [   ], SWOLLEN JOINTS [   ], NECK ACHE [   ], LOW BACK ACHE [   ],  SKIN : ULCERS [   ], RASH [   ], ITCHING [   ]  CNS: HEAD ACHE [   ], DOUBLE VISION [   ], BLURRED VISION [   ], AMS / CONFUSION [   ], SEIZURES [   ], WEAKNESS [   ],TINGLING / NUMBNESS [   ]      PHYSICAL EXAMINATION:  GENERAL APPEARANCE: NO DISTRESS  HEENT:  NO PALLOR, NO  JVD,  NO   NODES, NECK SUPPLE  CVS: S1 +, S2 +,   RS: AEEB,  OCCASIONAL  RALES +,   NO RONCHI  ABD: SOFT, NT, NO, BS +  EXT: PE ++  SKIN: WARM,   RIGHT BREAST W/ SURGICAL DRESSING  SKELETAL:  ROM ACCEPTABLE  CNS:  AAO X 3    RADIOLOGY :          ASSESSMENT :       PLAN:  HPI:  57 yr old female Morbidly obese (52 BMI) Sleep Apnea on CPAP, HTN, CAD with AICD ( insertion 2011 and interrogated on Feb 25, 2022, CHF (EF 49%) diabetic (Novolin 70/30 in am and 5pm ), Pt seen using walker arthritic , bilateral leg edema +1. Pt stated she had right nipple drainage about a month ago, which led to mammogram and US indicating a right breasts mass.  Patient has history of anemia and had 8 transfusions in the past for low H/H. She currently denies any headache, dizziness and ambulates without difficulty.  Pt  schedule for surgery of excision of right breast mass with spot localization tomorrow. Patient is admitted for medical optimization prior to OR.   Patient denies any breast pain, discharge, abdominal pain, N/V, fever, chills, shortness of breathe or any other constitutional symptoms.  (02 Jun 2022 15:22)    # D/C PLAN FOR A.M. TO [facility] LIV[facility] - D/W PATIENT AND STAFF    # HYPERKALEMIA   - GIVEN LOKELMA   - MONITOR REPEAT BMP    # RIGHT BREAST MASS PLANNED FOR EXCISION ON 6/3  # MEDICAL CLEARANCE FOR SURGERY - RCRI - 4 - 15% 30 DAY RISK OF DEATH, MI OR CARDIAC ARREST  - RECENT NST - NEGATIVE  - F/U PATH  - SURGERY EVALUATION IN PROGRESS    # NORMOCYTIC ANEMIA W/ HX OF POST-MENOPAUSAL VAGINAL BLEEDING, FIBROID UTERUS AND ANEMIA OF CKD , ANEMIA OF CHRONIC DISEASE   - TRANSFUSION THRESHOLD HGB < 7  - GIVING PRBC TRANSFUSION  - TREND HGB  - ON FESO4    # AMPARO ON CPAP    # HX OF CAD W/ AICD  # CHRONIC SYSTOLIC HEART FAILURE S/P AICD  # PVD, B/L LE LYMPHEDEMA  - ON ASA, STATIN, COREG  - ON LASIX, ALDACTONE    # CKD STAGE 4-5 - NEPHROLOGY CONSULT  - ON SEVELAMER    # MORBID OBESITY    # COPD - CONTINUE PRN VENTOLIN,     # HTN - ON COREG    # HLD - ON STATIN    # DM, UNCONTROLLED  - LANTUS + TID INSULIN   - SSI + FS    - NOTED HBA1C    # GOUT - ON ALLOPURINOL    # GI PPX; DVT PPX

## 2022-06-06 NOTE — DISCHARGE NOTE NURSING/CASE MANAGEMENT/SOCIAL WORK - NSDCVIVACCINE_GEN_ALL_CORE_FT
influenza, injectable, quadrivalent, preservative free; 21-Oct-2015 17:56; Tala Carrillo (RN); [employer]; 2f5p4; IntraMuscular; Deltoid Left.; 0.5 milliLiter(s); VIS (VIS Published: 07-Aug-2015, VIS Presented: 21-Oct-2015);

## 2022-06-07 LAB — SURGICAL PATHOLOGY STUDY: SIGNIFICANT CHANGE UP

## 2022-06-13 PROBLEM — N63.20 BREAST MASS, LEFT: Status: ACTIVE | Noted: 2020-01-30

## 2022-06-16 ENCOUNTER — APPOINTMENT (OUTPATIENT)
Dept: SURGERY | Facility: CLINIC | Age: 57
End: 2022-06-16
Payer: MEDICARE

## 2022-06-16 DIAGNOSIS — N63.20 UNSPECIFIED LUMP IN THE LEFT BREAST, UNSPECIFIED QUADRANT: ICD-10-CM

## 2022-06-16 PROCEDURE — 99024 POSTOP FOLLOW-UP VISIT: CPT

## 2022-06-16 NOTE — PHYSICAL EXAM
[Calm] : calm [de-identified] : She  is alert, well-groomed, and in NAD\par   [de-identified] : right breast Surgical wound is healing well.   no signs of  inflammation or infection.

## 2022-06-16 NOTE — ASSESSMENT
[FreeTextEntry1] : Ms. MCDONNELL is doing well, with excellent post-operative recovery. The surgical incision is healing well and as expected. There is no evidence of infection or complication, and patient is progressing as expected. Post-operative wound care, activity, restrictions and precautions reinforced.  Pathology results were discussed in details. Patient's questions and concerns addressed to patient's satisfaction.\par

## 2022-06-16 NOTE — DATA REVIEWED
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

## 2022-06-16 NOTE — HISTORY OF PRESENT ILLNESS
[de-identified] : Ms. MCDONNELL  is s/p excision right breast mass with spot localization on 2022.  Patient's pathology results were  consistent with Sclerosing intraductal papilloma with florid ductal epithelial hyperplasia with apocrine features. Today  Ms. MCDONNELL offers no complaints. patient reports no fever or  chills. patient reports occasional discomfort in the surgical area.  Her surgical incisions are healing well. No signs of inflammation, infection or exudate. patient reports good bowel movements and appetite. \par \par PERTINENT HISTORY: \par here is no family history of breast carcinoma. Menarche at age 6 -1 para-1 and her last menstrual period was in 2019. Patient had a BL breast US and mammogram on 2020 that was deemed a BIRADS 4. Ms. MCDONNELL is s/p Us guided biopsy LEFT breast on 2020. Patient's pathology results were consistent with Non-proliferative fibrocystic disease with moderate stromal fibrosis. Cysts wall lining and cystic formation.\par

## 2022-09-09 ENCOUNTER — APPOINTMENT (OUTPATIENT)
Dept: OBGYN | Facility: CLINIC | Age: 57
End: 2022-09-09

## 2022-09-11 ENCOUNTER — INPATIENT (INPATIENT)
Facility: HOSPITAL | Age: 57
LOS: 3 days | Discharge: NURSING, REHAB OR LONG-TERM CARE | DRG: 394 | End: 2022-09-15
Attending: SURGERY | Admitting: SURGERY
Payer: MEDICARE

## 2022-09-11 VITALS
DIASTOLIC BLOOD PRESSURE: 67 MMHG | HEIGHT: 65 IN | SYSTOLIC BLOOD PRESSURE: 142 MMHG | HEART RATE: 90 BPM | RESPIRATION RATE: 18 BRPM | TEMPERATURE: 98 F | OXYGEN SATURATION: 97 %

## 2022-09-11 DIAGNOSIS — K46.0 UNSPECIFIED ABDOMINAL HERNIA WITH OBSTRUCTION, WITHOUT GANGRENE: ICD-10-CM

## 2022-09-11 DIAGNOSIS — Z95.810 PRESENCE OF AUTOMATIC (IMPLANTABLE) CARDIAC DEFIBRILLATOR: Chronic | ICD-10-CM

## 2022-09-11 DIAGNOSIS — Z98.89 OTHER SPECIFIED POSTPROCEDURAL STATES: Chronic | ICD-10-CM

## 2022-09-11 LAB
ALBUMIN SERPL ELPH-MCNC: 3.8 G/DL — SIGNIFICANT CHANGE UP (ref 3.5–5)
ALP SERPL-CCNC: 71 U/L — SIGNIFICANT CHANGE UP (ref 40–120)
ALT FLD-CCNC: 14 U/L DA — SIGNIFICANT CHANGE UP (ref 10–60)
ANION GAP SERPL CALC-SCNC: 10 MMOL/L — SIGNIFICANT CHANGE UP (ref 5–17)
APTT BLD: 32.5 SEC — SIGNIFICANT CHANGE UP (ref 27.5–35.5)
AST SERPL-CCNC: 13 U/L — SIGNIFICANT CHANGE UP (ref 10–40)
BASOPHILS # BLD AUTO: 0.02 K/UL — SIGNIFICANT CHANGE UP (ref 0–0.2)
BASOPHILS NFR BLD AUTO: 0.2 % — SIGNIFICANT CHANGE UP (ref 0–2)
BILIRUB SERPL-MCNC: 0.3 MG/DL — SIGNIFICANT CHANGE UP (ref 0.2–1.2)
BUN SERPL-MCNC: 69 MG/DL — HIGH (ref 7–18)
CALCIUM SERPL-MCNC: 9.3 MG/DL — SIGNIFICANT CHANGE UP (ref 8.4–10.5)
CHLORIDE SERPL-SCNC: 101 MMOL/L — SIGNIFICANT CHANGE UP (ref 96–108)
CO2 SERPL-SCNC: 27 MMOL/L — SIGNIFICANT CHANGE UP (ref 22–31)
CREAT SERPL-MCNC: 3.47 MG/DL — HIGH (ref 0.5–1.3)
EGFR: 15 ML/MIN/1.73M2 — LOW
EOSINOPHIL # BLD AUTO: 0 K/UL — SIGNIFICANT CHANGE UP (ref 0–0.5)
EOSINOPHIL NFR BLD AUTO: 0 % — SIGNIFICANT CHANGE UP (ref 0–6)
GLUCOSE SERPL-MCNC: 247 MG/DL — HIGH (ref 70–99)
HCT VFR BLD CALC: 37.2 % — SIGNIFICANT CHANGE UP (ref 34.5–45)
HGB BLD-MCNC: 10.9 G/DL — LOW (ref 11.5–15.5)
IMM GRANULOCYTES NFR BLD AUTO: 0.6 % — SIGNIFICANT CHANGE UP (ref 0–1.5)
INR BLD: 1.11 RATIO — SIGNIFICANT CHANGE UP (ref 0.88–1.16)
LACTATE SERPL-SCNC: 1.6 MMOL/L — SIGNIFICANT CHANGE UP (ref 0.7–2)
LYMPHOCYTES # BLD AUTO: 1.17 K/UL — SIGNIFICANT CHANGE UP (ref 1–3.3)
LYMPHOCYTES # BLD AUTO: 10.7 % — LOW (ref 13–44)
MAGNESIUM SERPL-MCNC: 2.1 MG/DL — SIGNIFICANT CHANGE UP (ref 1.6–2.6)
MCHC RBC-ENTMCNC: 29.3 GM/DL — LOW (ref 32–36)
MCHC RBC-ENTMCNC: 29.7 PG — SIGNIFICANT CHANGE UP (ref 27–34)
MCV RBC AUTO: 101.4 FL — HIGH (ref 80–100)
MONOCYTES # BLD AUTO: 0.78 K/UL — SIGNIFICANT CHANGE UP (ref 0–0.9)
MONOCYTES NFR BLD AUTO: 7.2 % — SIGNIFICANT CHANGE UP (ref 2–14)
NEUTROPHILS # BLD AUTO: 8.86 K/UL — HIGH (ref 1.8–7.4)
NEUTROPHILS NFR BLD AUTO: 81.3 % — HIGH (ref 43–77)
NRBC # BLD: 0 /100 WBCS — SIGNIFICANT CHANGE UP (ref 0–0)
NT-PROBNP SERPL-SCNC: 761 PG/ML — HIGH (ref 0–125)
PLATELET # BLD AUTO: 333 K/UL — SIGNIFICANT CHANGE UP (ref 150–400)
POTASSIUM SERPL-MCNC: 4.3 MMOL/L — SIGNIFICANT CHANGE UP (ref 3.5–5.3)
POTASSIUM SERPL-SCNC: 4.3 MMOL/L — SIGNIFICANT CHANGE UP (ref 3.5–5.3)
PROT SERPL-MCNC: 7.8 G/DL — SIGNIFICANT CHANGE UP (ref 6–8.3)
PROTHROM AB SERPL-ACNC: 13.2 SEC — SIGNIFICANT CHANGE UP (ref 10.5–13.4)
RBC # BLD: 3.67 M/UL — LOW (ref 3.8–5.2)
RBC # FLD: 16.1 % — HIGH (ref 10.3–14.5)
SARS-COV-2 RNA SPEC QL NAA+PROBE: SIGNIFICANT CHANGE UP
SODIUM SERPL-SCNC: 138 MMOL/L — SIGNIFICANT CHANGE UP (ref 135–145)
TROPONIN I, HIGH SENSITIVITY RESULT: 31.3 NG/L — SIGNIFICANT CHANGE UP
WBC # BLD: 10.9 K/UL — HIGH (ref 3.8–10.5)
WBC # FLD AUTO: 10.9 K/UL — HIGH (ref 3.8–10.5)

## 2022-09-11 PROCEDURE — 71045 X-RAY EXAM CHEST 1 VIEW: CPT | Mod: 26

## 2022-09-11 PROCEDURE — 74176 CT ABD & PELVIS W/O CONTRAST: CPT | Mod: 26,MA

## 2022-09-11 PROCEDURE — 71045 X-RAY EXAM CHEST 1 VIEW: CPT | Mod: 26,77

## 2022-09-11 PROCEDURE — 99285 EMERGENCY DEPT VISIT HI MDM: CPT

## 2022-09-11 RX ORDER — FAMOTIDINE 10 MG/ML
20 INJECTION INTRAVENOUS ONCE
Refills: 0 | Status: COMPLETED | OUTPATIENT
Start: 2022-09-11 | End: 2022-09-11

## 2022-09-11 RX ORDER — ONDANSETRON 8 MG/1
4 TABLET, FILM COATED ORAL EVERY 6 HOURS
Refills: 0 | Status: DISCONTINUED | OUTPATIENT
Start: 2022-09-11 | End: 2022-09-15

## 2022-09-11 RX ORDER — ONDANSETRON 8 MG/1
4 TABLET, FILM COATED ORAL ONCE
Refills: 0 | Status: COMPLETED | OUTPATIENT
Start: 2022-09-11 | End: 2022-09-11

## 2022-09-11 RX ORDER — SODIUM CHLORIDE 9 MG/ML
1000 INJECTION, SOLUTION INTRAVENOUS
Refills: 0 | Status: DISCONTINUED | OUTPATIENT
Start: 2022-09-11 | End: 2022-09-13

## 2022-09-11 RX ORDER — ACETAMINOPHEN 500 MG
650 TABLET ORAL ONCE
Refills: 0 | Status: COMPLETED | OUTPATIENT
Start: 2022-09-11 | End: 2022-09-11

## 2022-09-11 RX ADMIN — Medication 650 MILLIGRAM(S): at 12:38

## 2022-09-11 RX ADMIN — FAMOTIDINE 20 MILLIGRAM(S): 10 INJECTION INTRAVENOUS at 12:24

## 2022-09-11 RX ADMIN — Medication 650 MILLIGRAM(S): at 13:08

## 2022-09-11 RX ADMIN — ONDANSETRON 4 MILLIGRAM(S): 8 TABLET, FILM COATED ORAL at 12:23

## 2022-09-11 RX ADMIN — ONDANSETRON 4 MILLIGRAM(S): 8 TABLET, FILM COATED ORAL at 20:54

## 2022-09-11 RX ADMIN — SODIUM CHLORIDE 150 MILLILITER(S): 9 INJECTION, SOLUTION INTRAVENOUS at 20:35

## 2022-09-11 NOTE — ED ADULT TRIAGE NOTE - HEART RATE (BEATS/MIN)
33yo F  currently 12weeks pregnant (Due date 2018) complaining of vomiting since Friday evening. Pt states that her vomiting started suddenly Friday evening, and has been unable to eat or drink since. Pt notes intermitted "morning sickness" throughout this pregnancy, but nothing "this bad". Pt states "even the littlest sip of water makes me throw up". Mild bloody streaking in vomit, notes abdominal cramping, no vaginal bleeding +vaginal discharge. Has not had nausea like this before. No diarrhea. No obvious sick contacts, no recent travel. Mild lower abdominal cramping. Having subjective fevers / chills. Has been trying diclegis at home without help.    Ob/Gyn: Dr. Christoph Brunner 31yo F  currently 12weeks pregnant (Due date 2018) no PMH no PSH complaining of vomiting since Friday evening. Pt states that her vomiting started suddenly Friday evening, and has been unable to eat or drink since then. Pt notes intermitted "morning sickness" throughout this pregnancy and in previous pregnancies, but this has been the worst. Pt states "even the littlest sip of water makes me throw up". Mild bloody streaking in vomit, notes abdominal cramping, no vaginal bleeding, +brown vaginal discharge when vomiting. No diarrhea. No obvious sick contacts, no recent travel. Mild lower abdominal cramping. Having subjective fevers / chills. Has been trying diclegis at home without help. No UTI symptoms, no cough, no dysuria.    Ob/Gyn: Dr. Christoph Brunner [age]yo F  currently 12weeks pregnant (Due date 2018) PMH cervical incompetence no PSH complaining of vomiting since Friday evening. Pt states that her vomiting started suddenly Friday evening, and has been unable to eat or drink since then. Pt notes intermitted "morning sickness" throughout this pregnancy and in previous pregnancies, but this has been the worst. Pt states "even the littlest sip of water makes me throw up". Mild bloody streaking in vomit, notes abdominal cramping, no vaginal bleeding, +brown vaginal discharge when vomiting. No diarrhea. No obvious sick contacts, no recent travel. Mild lower abdominal cramping. Having subjective fevers / chills. Has been trying diclegis at home without help. No UTI symptoms, no cough, no dysuria. A+ blood type, no Rhogam needed.    Ob/Gyn: Dr. Christoph Brunner 31yo F  currently 12weeks pregnant (Due date 2018) PMH cervical incompetence no PSH complaining of vomiting since Friday evening. Pt states that her vomiting started suddenly Friday evening, and has been unable to eat or drink since then. Pt notes intermitted "morning sickness" throughout this pregnancy and in previous pregnancies, but this has been the worst. Pt states "even the littlest sip of water makes me throw up". Mild bloody streaking in vomit, notes abdominal cramping, no vaginal bleeding, +brown vaginal discharge when vomiting. No diarrhea. No obvious sick contacts, no recent travel. Mild lower abdominal cramping. Having subjective fevers / chills, also has some chronic back pain. Has been trying diclegis at home without help. No UTI symptoms, no cough, no dysuria. A+ blood type, no Rhogam needed.    Ob/Gyn: Dr. Christoph Brunner

## 2022-09-11 NOTE — ED PROVIDER NOTE - PHYSICAL EXAMINATION
Afebrile, hemodynamically stable, saturating well on RA  NAD, well appearing, laying comfortably in bed, no WOB, speaking full sentences  Head NCAT  EOMI grossly, anicteric  MMM  No JVD  RRR, nml S1/S2, no m/r/g  Lungs CTAB, no w/r/r  Abd soft, noted ventral abdominal hernia with very mild TTP, nml BS, no rebound or guarding  AAO, CN's 3-12 grossly intact  CORNELL spontaneously, no leg cyanosis or edema  Skin warm, well perfused, no rashes or hives

## 2022-09-11 NOTE — CONSULT NOTE ADULT - SUBJECTIVE AND OBJECTIVE BOX
HPI:  57F with PMHx CAD, CHF, DM, HTN, HLD, PSHx open cholecystectomy and , presents from [facility] facility with nausea, vomiting and abdominal pain since Friday. Reports pain mostly lower abdomen, not sharp is a soreness.  Does not radiate. Is not worsened or lessened by anything. Pt reports she thinks the last time she had flatus/BM was Friday. When asked why patient lives in AL facility pt reports "that's a difficult question." Denies other complaints such as chest pain, shortness of breath, leg pain or swelling or urinary complaints.   Reports she has had her hernias since ~ .    (11 Sep 2022 16:17)      PAST MEDICAL & SURGICAL HISTORY:  HTN (hypertension)      DM (diabetes mellitus)      RA (rheumatoid arthritis)      COPD (chronic obstructive pulmonary disease)      AICD (automatic cardioverter/defibrillator) present  2/3 2011      CAD (coronary artery disease)      CHF (congestive heart failure)      GERD (gastroesophageal reflux disease)      Benign neoplasm, unspecified site      Morbid obesity with BMI of 50.0-59.9, adult      Obstructive sleep apnea on CPAP      History of cholecystectomy      ICD (implantable cardioverter-defibrillator) in place      H/O  section          codeine (Urticaria)      Social Hx:    FAMILY HISTORY:  Family history of hypertension in mother        lactated ringers. 1000 milliLiter(s) IV Continuous <Continuous>  ondansetron Injectable 4 milliGRAM(s) IV Push every 6 hours PRN      MEDICATIONS  (PRN):  ondansetron Injectable 4 milliGRAM(s) IV Push every 6 hours PRN Nausea      T(C): 36.3 (22 @ 21:06), Max: 36.6 (22 @ 11:04)  HR: 79 (22 @ 21:06) (79 - 90)  BP: 127/78 (22 @ 21:06) (125/73 - 142/67)  RR: 18 (22 @ 21:06) (18 - 20)  SpO2: 96% (22 @ 21:06) (96% - 97%)        REVIEW OF SYSTEMS:                           ALL ROS DONE [ X   ]    CONSTITUTIONAL:  LETHARGIC [   ], FEVER [   ], UNRESPONSIVE [   ]  CVS:  CP  [   ], SOB, [   ], PALPITATIONS [   ], DIZZYNESS [   ]  RS: COUGH [   ], SPUTUM [   ]  GI: ABDOMINAL PAIN [   ], NAUSEA [   ], VOMITINGS [   ], DIARRHEA [   ], CONSTIPATION [   ]  :  DYSURIA [   ], NOCTURIA [   ], INCREASED FREQUENCY [   ], DRIBLING [   ],  SKELETAL: PAINFUL JOINTS [   ], SWOLLEN JOINTS [   ], NECK ACHE [   ], LOW BACK ACHE [   ],  SKIN : ULCERS [   ], RASH [   ], ITCHING [   ]  CNS: HEAD ACHE [   ], DOUBLE VISION [   ], BLURRED VISION [   ], AMS / CONFUSION [   ], SEIZURES [   ], WEAKNESS [   ],TINGLING / NUMBNESS [   ]    PHYSICAL EXAMINATION:  GENERAL APPEARANCE: NO DISTRESS  HEENT:  NO PALLOR, NO  JVD,  NO   NODES, NECK SUPPLE  CVS: S1 +, S2 +,   RS: AEEB,  OCCASIONAL  RALES +,   NO RONCHI  ABD: SOFT, NT, NO, BS +  EXT: NO PE  SKIN: WARM,   SKELETAL:  ROM ACCEPTABLE  CNS:  AAO X    ,   DEFICITS    RADIOLOGY :      ASSESSMENT :       PLAN:  HPI:  57F with PMHx CAD, CHF, DM, HTN, HLD, PSHx open cholecystectomy and , presents from [facility] with nausea, vomiting and abdominal pain since Friday. Reports pain mostly lower abdomen, not sharp is a soreness.  Does not radiate. Is not worsened or lessened by anything. Pt reports she thinks the last time she had flatus/BM was Friday. When asked why patient lives in AL facility pt reports "that's a difficult question." Denies other complaints such as chest pain, shortness of breath, leg pain or swelling or urinary complaints.   Reports she has had her hernias since ~ .    (11 Sep 2022 16:17)    -      HPI:  57F with PMHx CAD, CHF, DM, HTN, HLD, PSHx open cholecystectomy and , presents from [facility] facility with nausea, vomiting and abdominal pain since Friday. Reports pain mostly lower abdomen, not sharp is a soreness.  Does not radiate. Is not worsened or lessened by anything. Pt reports she thinks the last time she had flatus/BM was Friday. When asked why patient lives in AL facility pt reports "that's a difficult question." Denies other complaints such as chest pain, shortness of breath, leg pain or swelling or urinary complaints.   Reports she has had her hernias since ~ .    (11 Sep 2022 16:17)      PAST MEDICAL & SURGICAL HISTORY:  HTN (hypertension)      DM (diabetes mellitus)      RA (rheumatoid arthritis)      COPD (chronic obstructive pulmonary disease)      AICD (automatic cardioverter/defibrillator) present  2/3 2011      CAD (coronary artery disease)      CHF (congestive heart failure)      GERD (gastroesophageal reflux disease)      Benign neoplasm, unspecified site      Morbid obesity with BMI of 50.0-59.9, adult      Obstructive sleep apnea on CPAP      History of cholecystectomy      ICD (implantable cardioverter-defibrillator) in place      H/O  section          codeine (Urticaria)      Social Hx:    FAMILY HISTORY:  Family history of hypertension in mother        lactated ringers. 1000 milliLiter(s) IV Continuous <Continuous>  ondansetron Injectable 4 milliGRAM(s) IV Push every 6 hours PRN      MEDICATIONS  (PRN):  ondansetron Injectable 4 milliGRAM(s) IV Push every 6 hours PRN Nausea      T(C): 36.3 (22 @ 21:06), Max: 36.6 (22 @ 11:04)  HR: 79 (22 @ 21:06) (79 - 90)  BP: 127/78 (22 @ 21:06) (125/73 - 142/67)  RR: 18 (22 @ 21:06) (18 - 20)  SpO2: 96% (22 @ 21:06) (96% - 97%)        REVIEW OF SYSTEMS:                           ALL ROS DONE [ X   ]    CONSTITUTIONAL:  LETHARGIC [   ], FEVER [   ], UNRESPONSIVE [   ]  CVS:  CP  [   ], SOB, [   ], PALPITATIONS [   ], DIZZYNESS [   ]  RS: COUGH [   ], SPUTUM [   ]  GI: ABDOMINAL PAIN [   ], NAUSEA [   ], VOMITINGS [   ], DIARRHEA [   ], CONSTIPATION [   ]  :  DYSURIA [   ], NOCTURIA [   ], INCREASED FREQUENCY [   ], DRIBLING [   ],  SKELETAL: PAINFUL JOINTS [   ], SWOLLEN JOINTS [   ], NECK ACHE [   ], LOW BACK ACHE [   ],  SKIN : ULCERS [   ], RASH [   ], ITCHING [   ]  CNS: HEAD ACHE [   ], DOUBLE VISION [   ], BLURRED VISION [   ], AMS / CONFUSION [   ], SEIZURES [   ], WEAKNESS [   ],TINGLING / NUMBNESS [   ]    PHYSICAL EXAMINATION:  GENERAL APPEARANCE: NO DISTRESS  HEENT:  NO PALLOR, NO  JVD,  NO   NODES, NECK SUPPLE  CVS: S1 +, S2 +,   RS: AEEB,  OCCASIONAL  RALES +,   NO RONCHI  ABD: SOFT, NO, BS + [SLUGGISH] , UMBILICAL HERNIA + , KARELY-UMBILICUS TTP +  EXT: PE +  SKIN: WARM,   SKELETAL:  ROM ACCEPTABLE  CNS:  AAO X 3    RADIOLOGY :    ACC: [id number] EXAM:  CT ABDOMEN AND PELVIS                          PROCEDURE DATE:  2022          INTERPRETATION:  CLINICAL INFORMATION: 57 years  Female with abd pain.    COMPARISON: 2021    CONTRAST/COMPLICATIONS:  IV Contrast: NONE  Oral Contrast: NONE  Complications: None reported at time of study completion    PROCEDURE:  CT of the Abdomen and Pelvis was performed.  Sagittal and coronal reformats were performed.    LIMITATIONS: Evaluation of the solid organs, vascular structures and GI   tract is limited without oral and IV contrast.    FINDINGS:  LOWER CHEST: Cardiac pacemaker leads. Cardiomegaly.    LIVER: Within normal limits.  BILE DUCTS: Normal caliber.  GALLBLADDER: Cholecystectomy.  SPLEEN: Within normal limits.  PANCREAS: Within normal limits.  ADRENALS: Stable 3.1 x 2.5 cm low-density left adrenal nodule.  KIDNEYS/URETERS: Bilateral atrophy redemonstrated. No   hydroureteronephrosis. Stable 1.2 cm right lower pole hyperdensity and   3.7 cm right midpole cyst.    BLADDER: Within normal limits.  REPRODUCTIVE ORGANS: Enlarged multi fibroid uterus reidentified.    BOWEL: No bowel obstruction. Appendix is not visualized. No evidence of   inflammation in the pericecal region.. Dilated fluid-filled small bowel   loops within the ventral hernia. Adjacent mesenteric fat stranding and   subcutaneous fat stranding about the hernia sac. Transition point within   the right side of the hernia sac (2:92).  PERITONEUM: No ascites.  VESSELS: Within normal limits.  RETROPERITONEUM/LYMPH NODES: No lymphadenopathy.  ABDOMINAL WALL: Within normal limits.  BONES: Degenerative changes. L4-5 hypertrophic spinal stenosis.    IMPRESSION:  Incarcerated ventral hernia resulting in small bowel obstruction.    Other chronic findings as above.    Findings were discussed with Dr. JÚNIOR BEARD [phone] 2022   2:16 PM by Dr. Erika Jones with read back confirmation.      ASSESSMENT :       PLAN:  HPI:  57F with PMHx CAD, CHF, DM, HTN, HLD, PSHx open cholecystectomy and , presents from [facility] with nausea, vomiting and abdominal pain since Friday. Reports pain mostly lower abdomen, not sharp is a soreness.  Does not radiate. Is not worsened or lessened by anything. Pt reports she thinks the last time she had flatus/BM was Friday. When asked why patient lives in AL facility pt reports "that's a difficult question." Denies other complaints such as chest pain, shortness of breath, leg pain or swelling or urinary complaints.   Reports she has had her hernias since ~ .    (11 Sep 2022 16:17)      # SMALL BOWEL OBSTRUCTION  # R/O INCARCERATED HERNIA  - NPO, NGT SET TO SAMANTA  - MONITORING ABDOMINAL EXAM  - NOTED CT A/P  - REPLETE ELECTROLYTES  - SURGERY EVALUATION IN PROGRESS    # RIGHT BREAST MASS PLANNED S/P EXCISION ON 6/3    # NORMOCYTIC ANEMIA W/ HX OF POST-MENOPAUSAL VAGINAL BLEEDING, FIBROID UTERUS AND ANEMIA OF CKD , ANEMIA OF CHRONIC DISEASE   - TRANSFUSION THRESHOLD HGB < 7  - TREND HGB  - ON FESO4    # AMPARO ON CPAP    # HX OF CAD W/ AICD  # CHRONIC SYSTOLIC HEART FAILURE S/P AICD  # PVD, B/L LE LYMPHEDEMA  - ON ASA, STATIN, COREG  - ON LASIX, ALDACTONE - HOLD GIVEN NPO    # ANJU ON CKD STAGE 4-5  - ON SEVELAMER  - RECOMMEND MONITORING CR CLOSELY AND CONSIDERING NEPHROLOGY EVALUATION    # MORBID OBESITY    # COPD - CONTINUE PRN VENTOLIN    # HTN - ON COREG    # HLD - ON STATIN    # DM, UNCONTROLLED  - LANTUS + TID INSULIN - HOLD GIVEN NPO   - SSI + FS    - F/U HBA1C    # GOUT - ON ALLOPURINOL    # GI PPX; DVT PPX

## 2022-09-11 NOTE — H&P ADULT - NSHPLABSRESULTS_GEN_ALL_CORE
10.9   10.90 )-----------( 333      ( 11 Sep 2022 12:00 )             37.2   09-11    138  |  101  |  69<H>  ----------------------------<  247<H>  4.3   |  27  |  3.47<H>    Ca    9.3      11 Sep 2022 12:00  Mg     2.1     09-11    TPro  7.8  /  Alb  3.8  /  TBili  0.3  /  DBili  x   /  AST  13  /  ALT  14  /  AlkPhos  71  09-11    < from: CT Abdomen and Pelvis No Cont (09.11.22 @ 13:51) >    LIMITATIONS: Evaluation of the solid organs, vascular structures and GI   tract is limited without oral and IV contrast.    FINDINGS:  LOWER CHEST: Cardiac pacemaker leads. Cardiomegaly.    LIVER: Within normal limits.  BILE DUCTS: Normal caliber.  GALLBLADDER: Cholecystectomy.  SPLEEN: Within normal limits.  PANCREAS: Within normal limits.  ADRENALS: Stable 3.1 x 2.5 cm low-density left adrenal nodule.  KIDNEYS/URETERS: Bilateral atrophy redemonstrated. No   hydroureteronephrosis. Stable 1.2 cm right lower pole hyperdensity and   3.7 cm right midpole cyst.    BLADDER: Within normal limits.  REPRODUCTIVE ORGANS: Enlarged multi fibroid uterus reidentified.    BOWEL: No bowel obstruction. Appendix is not visualized. No evidence of   inflammation in the pericecal region.. Dilated fluid-filled small bowel   loops within the ventral hernia. Adjacent mesenteric fat stranding and   subcutaneous fat stranding about the hernia sac. Transition point within   the right side of the hernia sac (2:92).  PERITONEUM: No ascites.  VESSELS: Within normal limits.  RETROPERITONEUM/LYMPH NODES: No lymphadenopathy.  ABDOMINAL WALL: Within normal limits.  BONES: Degenerative changes. L4-5 hypertrophic spinal stenosis.    IMPRESSION:  Incarcerated ventral hernia resulting in small bowel obstruction.  Other chronic findings as above.  Findings were discussed with Dr. JÚNIOR BEARD [phone] 9/11/2022   2:16 PM by Dr. Erika Jones with read back confirmation.  --- End of Report ---  < end of copied text >

## 2022-09-11 NOTE — ED PROVIDER NOTE - CLINICAL SUMMARY MEDICAL DECISION MAKING FREE TEXT BOX
Character and appearance low suspicion for dissection or mesenteric ischemia. Character low suspicion for ACS and ECG similar to prior and trop __________. Character low suspicion for  process Character and appearance low suspicion for dissection or mesenteric ischemia. Character low suspicion for ACS and ECG similar to prior and trop unremarkable. Character low suspicion for  process. Exam c/f incarcerated hernia, confirmed on CT. NAD, well appearing and no vomiting here. Seen by surg and pending decision to admit. Signed off care to Dr. KENNETH Duran.

## 2022-09-11 NOTE — H&P ADULT - ASSESSMENT
57F with SBO secondary to large chronic incarcerated ventral hernia  unclear if true obstruction without oral contrast    Admit to surgical services under the care of Dr. Kessler for observation  NPO/IVF  NGT to lws  Anti-emetic  NO pain meds to eval for bowel ischemia  Medical consult  discussed with Dr. Kessler 57F with SBO secondary to large chronic incarcerated ventral hernia  unclear if true obstruction without oral contrast    Admit to surgical services under the care of Dr. Kessler for observation  NPO/IVF  NGT to lws  Anti-emetic  NO pain meds to eval for bowel ischemia  Medical consult, Dr. Barajas  Hold home medications  discussed with Dr. Kessler

## 2022-09-11 NOTE — ED PROVIDER NOTE - PR
For information on Fall & Injury Prevention, visit [email or web address]

## 2022-09-11 NOTE — H&P ADULT - NS ATTEND AMEND GEN_ALL_CORE FT
Pt seen and examined in ED with TAMI Simpson. Exam- abd obese, large lower abd incisional hernia, is soft and minimally tender on attempts to reduce. Unable to reduce. No guarding no rebound. CT scan images reviewed. Pt likely with chronically incarcerated hernia. Difficult to assess for obstruction as no oral contrast given. Recommend repeat CT with oral contrast only as pt cannot have IV contrast and NGT decompression.

## 2022-09-11 NOTE — ED ADULT NURSE NOTE - OBJECTIVE STATEMENT
Pt states has mid abd pain since Friday ,accompanied by nausea and vomiting,  denies diarrhea  Denies fever ,chills, body aches, cough.  States she gets short of breath while vomiting only  Denies shortness of breath now

## 2022-09-11 NOTE — ED ADULT TRIAGE NOTE - AS TEMP SITE
Alert and oriented to person, place, time/situation. normal mood and affect. no apparent risk to self or others. oral

## 2022-09-11 NOTE — H&P ADULT - HISTORY OF PRESENT ILLNESS
57F with PMHx CAD, CHF, DM, HTN, HLD, PSHx open cholecystectomy and , presents from [facility] facility with nausea, vomiting and abdominal pain since Friday. Pt reports she thinks the last time she had flatus/BM was Friday. When asked why patient lives in AL facility pt reports "that's a difficult question." Denies other complaints such as chest pain, shortness of breath, leg pain or swelling or urinary complaints.    57F with PMHx CAD, CHF, DM, HTN, HLD, PSHx open cholecystectomy and , presents from [facility] facility with nausea, vomiting and abdominal pain since Friday. Reports pain mostly lower abdomen, not sharp is a soreness.  Does not radiate. Is not worsened or lessened by anything. Pt reports she thinks the last time she had flatus/BM was Friday. When asked why patient lives in AL facility pt reports "that's a difficult question." Denies other complaints such as chest pain, shortness of breath, leg pain or swelling or urinary complaints.   Reports she has had her hernias since ~ .

## 2022-09-11 NOTE — PATIENT PROFILE ADULT - FALL HARM RISK - RISK INTERVENTIONS

## 2022-09-11 NOTE — H&P ADULT - NSICDXPASTMEDICALHX_GEN_ALL_CORE_FT
PAST MEDICAL HISTORY:  AICD (automatic cardioverter/defibrillator) present 2/3 2011    Benign neoplasm, unspecified site     CAD (coronary artery disease)     CHF (congestive heart failure)     COPD (chronic obstructive pulmonary disease)     DM (diabetes mellitus)     GERD (gastroesophageal reflux disease)     HTN (hypertension)     Morbid obesity with BMI of 50.0-59.9, adult     Obstructive sleep apnea on CPAP     RA (rheumatoid arthritis)

## 2022-09-11 NOTE — ED ADULT NURSE REASSESSMENT NOTE - NS ED NURSE REASSESS COMMENT FT1
Pt in bed, awake alert oriented x 3. breathing unlabored via 3LNC, NG tube to right nostril to intermittence wall suction, Pt well tolerated. awaiting for inpatient bed assignment.

## 2022-09-11 NOTE — ED PROVIDER NOTE - OBJECTIVE STATEMENT
57yoF with h/o CAD, CHF, DM, HTN, HLD, remote post-[place], presents from [facility] with vomiting since Friday NBNB as well as epigastric pain since after vomiting, intermittent, sharp, nonradiating. Also noted triage report of SOB, when asked pt states was not SOB, just that her pulsox ws low at the facility. On ROS states had a cold last Monday that has resolved. Denies all other symptoms including CP, leg pain or swelling, hematochezia, urinary symptoms.

## 2022-09-11 NOTE — H&P ADULT - NSHPPHYSICALEXAM_GEN_ALL_CORE
General:  Appears stated age, well-groomed, no distress  Eyes: EOMI  HENT: NGT in place with minimal output  Respirations: Unlabored  Abdomen: obese, right sided lower abdominal hernia firm upon palpation, unable to reduce due to chronicity  Extremities: no edema bilaterally  Skin: warm and dry  Musculoskeletal: no calf tenderness b/l   Neuro:  Alert, oriented to time, place and person   Psych: normal affect

## 2022-09-11 NOTE — ED ADULT NURSE NOTE - NSIMPLEMENTINTERV_GEN_ALL_ED
Implemented All Universal Safety Interventions:  [place] to call system. Call bell, personal items and telephone within reach. Instruct patient to call for assistance. Room bathroom lighting operational. Non-slip footwear when patient is off stretcher. Physically safe environment: no spills, clutter or unnecessary equipment. Stretcher in lowest position, wheels locked, appropriate side rails in place.

## 2022-09-11 NOTE — ED ADULT TRIAGE NOTE - HEIGHT IN FEET
Pt comes to clinic for follow up anticoagulation visit. Last INR on 12/5 was 2.0. Dose maintained per protocol. Today's INR is 2.1 and is within goal range. Dose maintained per protocol. Teaching: INR result/instructions reviewed with patient. Discussed importance of carrying up to date med list on their person. Patient verbalized understanding. Patient was instructed to contact the [facility] with any unusual bleeding or bruising, any changes in medications or over the counter medications, start of antibiotics, changes in diet or health status, any acute illnesses, and if there are any other questions or concerns. Patient verbalized understanding of above.  
5

## 2022-09-12 LAB
ANION GAP SERPL CALC-SCNC: 8 MMOL/L — SIGNIFICANT CHANGE UP (ref 5–17)
BASOPHILS # BLD AUTO: 0.03 K/UL — SIGNIFICANT CHANGE UP (ref 0–0.2)
BASOPHILS NFR BLD AUTO: 0.3 % — SIGNIFICANT CHANGE UP (ref 0–2)
BUN SERPL-MCNC: 71 MG/DL — HIGH (ref 7–18)
CALCIUM SERPL-MCNC: 8.9 MG/DL — SIGNIFICANT CHANGE UP (ref 8.4–10.5)
CHLORIDE SERPL-SCNC: 102 MMOL/L — SIGNIFICANT CHANGE UP (ref 96–108)
CO2 SERPL-SCNC: 29 MMOL/L — SIGNIFICANT CHANGE UP (ref 22–31)
CREAT SERPL-MCNC: 3.3 MG/DL — HIGH (ref 0.5–1.3)
EGFR: 15 ML/MIN/1.73M2 — LOW
EOSINOPHIL # BLD AUTO: 0.03 K/UL — SIGNIFICANT CHANGE UP (ref 0–0.5)
EOSINOPHIL NFR BLD AUTO: 0.3 % — SIGNIFICANT CHANGE UP (ref 0–6)
GLUCOSE SERPL-MCNC: 191 MG/DL — HIGH (ref 70–99)
HCT VFR BLD CALC: 37.6 % — SIGNIFICANT CHANGE UP (ref 34.5–45)
HGB BLD-MCNC: 10.7 G/DL — LOW (ref 11.5–15.5)
IMM GRANULOCYTES NFR BLD AUTO: 0.5 % — SIGNIFICANT CHANGE UP (ref 0–1.5)
LACTATE SERPL-SCNC: 1.4 MMOL/L — SIGNIFICANT CHANGE UP (ref 0.7–2)
LYMPHOCYTES # BLD AUTO: 1.15 K/UL — SIGNIFICANT CHANGE UP (ref 1–3.3)
LYMPHOCYTES # BLD AUTO: 11.9 % — LOW (ref 13–44)
MCHC RBC-ENTMCNC: 28.5 GM/DL — LOW (ref 32–36)
MCHC RBC-ENTMCNC: 29.8 PG — SIGNIFICANT CHANGE UP (ref 27–34)
MCV RBC AUTO: 104.7 FL — HIGH (ref 80–100)
MONOCYTES # BLD AUTO: 1.07 K/UL — HIGH (ref 0–0.9)
MONOCYTES NFR BLD AUTO: 11.1 % — SIGNIFICANT CHANGE UP (ref 2–14)
NEUTROPHILS # BLD AUTO: 7.3 K/UL — SIGNIFICANT CHANGE UP (ref 1.8–7.4)
NEUTROPHILS NFR BLD AUTO: 75.9 % — SIGNIFICANT CHANGE UP (ref 43–77)
NRBC # BLD: 0 /100 WBCS — SIGNIFICANT CHANGE UP (ref 0–0)
OSMOLALITY UR: 479 MOS/KG — SIGNIFICANT CHANGE UP (ref 50–1200)
PLATELET # BLD AUTO: 272 K/UL — SIGNIFICANT CHANGE UP (ref 150–400)
POTASSIUM SERPL-MCNC: 3.8 MMOL/L — SIGNIFICANT CHANGE UP (ref 3.5–5.3)
POTASSIUM SERPL-SCNC: 3.8 MMOL/L — SIGNIFICANT CHANGE UP (ref 3.5–5.3)
RBC # BLD: 3.59 M/UL — LOW (ref 3.8–5.2)
RBC # FLD: 15.9 % — HIGH (ref 10.3–14.5)
SODIUM SERPL-SCNC: 139 MMOL/L — SIGNIFICANT CHANGE UP (ref 135–145)
WBC # BLD: 9.63 K/UL — SIGNIFICANT CHANGE UP (ref 3.8–10.5)
WBC # FLD AUTO: 9.63 K/UL — SIGNIFICANT CHANGE UP (ref 3.8–10.5)

## 2022-09-12 PROCEDURE — 74176 CT ABD & PELVIS W/O CONTRAST: CPT | Mod: 26

## 2022-09-12 RX ORDER — HEPARIN SODIUM 5000 [USP'U]/ML
5000 INJECTION INTRAVENOUS; SUBCUTANEOUS EVERY 8 HOURS
Refills: 0 | Status: DISCONTINUED | OUTPATIENT
Start: 2022-09-12 | End: 2022-09-15

## 2022-09-12 RX ORDER — DIATRIZOATE MEGLUMINE 180 MG/ML
30 INJECTION, SOLUTION INTRAVESICAL ONCE
Refills: 0 | Status: COMPLETED | OUTPATIENT
Start: 2022-09-12 | End: 2022-09-12

## 2022-09-12 RX ORDER — IOHEXOL 300 MG/ML
30 INJECTION, SOLUTION INTRAVENOUS ONCE
Refills: 0 | Status: DISCONTINUED | OUTPATIENT
Start: 2022-09-12 | End: 2022-09-12

## 2022-09-12 RX ADMIN — DIATRIZOATE MEGLUMINE 30 MILLILITER(S): 180 INJECTION, SOLUTION INTRAVESICAL at 13:44

## 2022-09-12 RX ADMIN — HEPARIN SODIUM 5000 UNIT(S): 5000 INJECTION INTRAVENOUS; SUBCUTANEOUS at 22:05

## 2022-09-12 RX ADMIN — HEPARIN SODIUM 5000 UNIT(S): 5000 INJECTION INTRAVENOUS; SUBCUTANEOUS at 15:09

## 2022-09-12 NOTE — CONSULT NOTE ADULT - ASSESSMENT
1. ANJU due to pre-renal azotemia due to volume depletion. CT scan abd/pelvis shows no hydro.   -on RL at 175cc/hr. Patient is presently at her baseline but with iv hydration could improve her renal function.   -recommend: urine lytes (uosm, urine sodium, urine creatinine, chloride, potassium), spot protein to creatinine ratio  -Adjust meds to eGFR and avoid IV Gadolinium contrast,NSAIDs, and phosphate enema.  -Monitor I/O's daily.   -Monitor SMA daily.  2. CKD stage 4 most likely due to diabetic nephropathy and hypertensive nephrosclerosis  -Previous Scr around 2.4-2.6mg/dL in June 2022 which was below her baseline scr. Baseline scr around 3 to 3.5mg/dl.  will continue to monitor.   -Keep patient euvolemic and renal diet  -Avoid Nephrotoxic Meds/ Agents such as (NSAIDs, IV contrast, Aminoglycosides such as gentamicin, -Gadolinium contrast, Phosphate containing enemas, etc..)  -Adjust Medications according to eGFR  3. Anemia iron deficiency  -hb is stable.   -F/u CBC daily  -transfuse if HB < 7.0.  4. HTN: -bp is acceptable witout bp meds  -titrate bp meds to keep sbp >110 and < 130  5. Mineral Bone Disease:  -check phos and PTH intact   -was on calcitirol 0.25mcg daily previously.  6. Abd pain due SBO  -npo, iv fluids and NGT placed.  -planning for CT abd/pelvis with oral contrast today.  -Plan as per surgery    Discussed with patient in detail regarding the renal plan and care  Discussed the assessment and plan with Primary Team/Nurse

## 2022-09-12 NOTE — PROGRESS NOTE ADULT - SUBJECTIVE AND OBJECTIVE BOX
Patient is a 57y old  Female who presents with a chief complaint of   PATIENT IS SEEN AND EXAMINED IN MEDICAL FLOOR.  NGT [    ]    AUSTIN [   ]      GT [   ]    ALLERGIES:  codeine (Urticaria)      Daily Height in cm: 165.1 (11 Sep 2022 11:04)    Daily     VITALS:    Vital Signs Last 24 Hrs  T(C): 36.5 (12 Sep 2022 05:00), Max: 36.6 (11 Sep 2022 11:04)  T(F): 97.7 (12 Sep 2022 05:00), Max: 97.8 (11 Sep 2022 11:04)  HR: 82 (12 Sep 2022 05:00) (79 - 90)  BP: 123/68 (12 Sep 2022 05:00) (123/68 - 142/67)  BP(mean): --  RR: 18 (12 Sep 2022 05:00) (18 - 20)  SpO2: 97% (12 Sep 2022 05:00) (96% - 97%)    Parameters below as of 12 Sep 2022 05:00  Patient On (Oxygen Delivery Method): nasal cannula  O2 Flow (L/min): 2      LABS:    CBC Full  -  ( 12 Sep 2022 06:26 )  WBC Count : 9.63 K/uL  RBC Count : 3.59 M/uL  Hemoglobin : 10.7 g/dL  Hematocrit : 37.6 %  Platelet Count - Automated : 272 K/uL  Mean Cell Volume : 104.7 fl  Mean Cell Hemoglobin : 29.8 pg  Mean Cell Hemoglobin Concentration : 28.5 gm/dL  Auto Neutrophil # : 7.30 K/uL  Auto Lymphocyte # : 1.15 K/uL  Auto Monocyte # : 1.07 K/uL  Auto Eosinophil # : 0.03 K/uL  Auto Basophil # : 0.03 K/uL  Auto Neutrophil % : 75.9 %  Auto Lymphocyte % : 11.9 %  Auto Monocyte % : 11.1 %  Auto Eosinophil % : 0.3 %  Auto Basophil % : 0.3 %    PT/INR - ( 11 Sep 2022 12:00 )   PT: 13.2 sec;   INR: 1.11 ratio         PTT - ( 11 Sep 2022 12:00 )  PTT:32.5 sec  12    139  |  102  |  71<H>  ----------------------------<  191<H>  3.8   |  29  |  3.30<H>    Ca    8.9      12 Sep 2022 06:26  Mg     2.1         TPro  7.8  /  Alb  3.8  /  TBili  0.3  /  DBili  x   /  AST  13  /  ALT  14  /  AlkPhos  71  -    CAPILLARY BLOOD GLUCOSE            LIVER FUNCTIONS - ( 11 Sep 2022 12:00 )  Alb: 3.8 g/dL / Pro: 7.8 g/dL / ALK PHOS: 71 U/L / ALT: 14 U/L DA / AST: 13 U/L / GGT: x           Creatinine Trend: 3.30<--, 3.47<--  I&O's Summary              MEDICATIONS:    MEDICATIONS  (STANDING):  lactated ringers. 1000 milliLiter(s) (150 mL/Hr) IV Continuous <Continuous>      MEDICATIONS  (PRN):  ondansetron Injectable 4 milliGRAM(s) IV Push every 6 hours PRN Nausea      REVIEW OF SYSTEMS:                           ALL ROS DONE [ X   ]    CONSTITUTIONAL:  LETHARGIC [   ], FEVER [   ], UNRESPONSIVE [   ]  CVS:  CP  [   ], SOB, [   ], PALPITATIONS [   ], DIZZYNESS [   ]  RS: COUGH [   ], SPUTUM [   ]  GI: ABDOMINAL PAIN [   ], NAUSEA [   ], VOMITINGS [   ], DIARRHEA [   ], CONSTIPATION [   ]  :  DYSURIA [   ], NOCTURIA [   ], INCREASED FREQUENCY [   ], DRIBLING [   ],  SKELETAL: PAINFUL JOINTS [   ], SWOLLEN JOINTS [   ], NECK ACHE [   ], LOW BACK ACHE [   ],  SKIN : ULCERS [   ], RASH [   ], ITCHING [   ]  CNS: HEAD ACHE [   ], DOUBLE VISION [   ], BLURRED VISION [   ], AMS / CONFUSION [   ], SEIZURES [   ], WEAKNESS [   ],TINGLING / NUMBNESS [   ]    PHYSICAL EXAMINATION:  GENERAL APPEARANCE: NO DISTRESS  HEENT:  NO PALLOR, NO  JVD,  NO   NODES, NECK SUPPLE  CVS: S1 +, S2 +,   RS: AEEB,  OCCASIONAL  RALES +,   NO RONCHI  ABD: SOFT, NO, BS + [SLUGGISH] , UMBILICAL HERNIA + , KARELY-UMBILICUS TTP +  EXT: PE +  SKIN: WARM,   SKELETAL:  ROM ACCEPTABLE  CNS:  AAO X 3    RADIOLOGY :    ACC: [id number] EXAM:  CT ABDOMEN AND PELVIS                          PROCEDURE DATE:  2022          INTERPRETATION:  CLINICAL INFORMATION: 57 years  Female with abd pain.    COMPARISON: 2021    CONTRAST/COMPLICATIONS:  IV Contrast: NONE  Oral Contrast: NONE  Complications: None reported at time of study completion    PROCEDURE:  CT of the Abdomen and Pelvis was performed.  Sagittal and coronal reformats were performed.    LIMITATIONS: Evaluation of the solid organs, vascular structures and GI   tract is limited without oral and IV contrast.    FINDINGS:  LOWER CHEST: Cardiac pacemaker leads. Cardiomegaly.    LIVER: Within normal limits.  BILE DUCTS: Normal caliber.  GALLBLADDER: Cholecystectomy.  SPLEEN: Within normal limits.  PANCREAS: Within normal limits.  ADRENALS: Stable 3.1 x 2.5 cm low-density left adrenal nodule.  KIDNEYS/URETERS: Bilateral atrophy redemonstrated. No   hydroureteronephrosis. Stable 1.2 cm right lower pole hyperdensity and   3.7 cm right midpole cyst.    BLADDER: Within normal limits.  REPRODUCTIVE ORGANS: Enlarged multi fibroid uterus reidentified.    BOWEL: No bowel obstruction. Appendix is not visualized. No evidence of   inflammation in the pericecal region.. Dilated fluid-filled small bowel   loops within the ventral hernia. Adjacent mesenteric fat stranding and   subcutaneous fat stranding about the hernia sac. Transition point within   the right side of the hernia sac (2:92).  PERITONEUM: No ascites.  VESSELS: Within normal limits.  RETROPERITONEUM/LYMPH NODES: No lymphadenopathy.  ABDOMINAL WALL: Within normal limits.  BONES: Degenerative changes. L4-5 hypertrophic spinal stenosis.    IMPRESSION:  Incarcerated ventral hernia resulting in small bowel obstruction.    Other chronic findings as above.    Findings were discussed with Dr. JÚNIOR BEARD [phone] 2022   2:16 PM by Dr. Erika Jones with read back confirmation.      ASSESSMENT :       PLAN:  HPI:  57F with PMHx CAD, CHF, DM, HTN, HLD, PSHx open cholecystectomy and , presents from [facility] with nausea, vomiting and abdominal pain since Friday. Reports pain mostly lower abdomen, not sharp is a soreness.  Does not radiate. Is not worsened or lessened by anything. Pt reports she thinks the last time she had flatus/BM was Friday. When asked why patient lives in AL facility pt reports "that's a difficult question." Denies other complaints such as chest pain, shortness of breath, leg pain or swelling or urinary complaints.   Reports she has had her hernias since ~ .    (11 Sep 2022 16:17)      # SMALL BOWEL OBSTRUCTION  # R/O INCARCERATED HERNIA  - NPO, NGT SET TO SAMANTA  - MONITORING ABDOMINAL EXAM  - NOTED CT A/P  - REPLETE ELECTROLYTES  - SURGERY EVALUATION IN PROGRESS    # RIGHT BREAST MASS PLANNED S/P EXCISION ON 6/3    # NORMOCYTIC ANEMIA W/ HX OF POST-MENOPAUSAL VAGINAL BLEEDING, FIBROID UTERUS AND ANEMIA OF CKD , ANEMIA OF CHRONIC DISEASE   - TRANSFUSION THRESHOLD HGB < 7  - TREND HGB  - ON FESO4    # AMPARO ON CPAP    # HX OF CAD W/ AICD  # CHRONIC SYSTOLIC HEART FAILURE S/P AICD  # PVD, B/L LE LYMPHEDEMA  - ON ASA, STATIN, COREG  - ON LASIX, ALDACTONE - HOLD GIVEN NPO    # ANJU ON CKD STAGE 4-5  - ON SEVELAMER  - RECOMMEND MONITORING CR CLOSELY AND CONSIDERING NEPHROLOGY EVALUATION    # MORBID OBESITY    # COPD - CONTINUE PRN VENTOLIN    # HTN - ON COREG    # HLD - ON STATIN    # DM, UNCONTROLLED  - LANTUS + TID INSULIN - HOLD GIVEN NPO   - SSI + FS    - F/U HBA1C    # GOUT - ON ALLOPURINOL    # GI PPX; DVT PPX   Patient is a 57y old  Female who presents with a chief complaint of   PATIENT IS SEEN AND EXAMINED IN MEDICAL FLOOR.  NGT [    ]    AUSTIN [   ]      GT [   ]    ALLERGIES:  codeine (Urticaria)      Daily Height in cm: 165.1 (11 Sep 2022 11:04)    Daily     VITALS:    Vital Signs Last 24 Hrs  T(C): 36.5 (12 Sep 2022 05:00), Max: 36.6 (11 Sep 2022 11:04)  T(F): 97.7 (12 Sep 2022 05:00), Max: 97.8 (11 Sep 2022 11:04)  HR: 82 (12 Sep 2022 05:00) (79 - 90)  BP: 123/68 (12 Sep 2022 05:00) (123/68 - 142/67)  BP(mean): --  RR: 18 (12 Sep 2022 05:00) (18 - 20)  SpO2: 97% (12 Sep 2022 05:00) (96% - 97%)    Parameters below as of 12 Sep 2022 05:00  Patient On (Oxygen Delivery Method): nasal cannula  O2 Flow (L/min): 2      LABS:    CBC Full  -  ( 12 Sep 2022 06:26 )  WBC Count : 9.63 K/uL  RBC Count : 3.59 M/uL  Hemoglobin : 10.7 g/dL  Hematocrit : 37.6 %  Platelet Count - Automated : 272 K/uL  Mean Cell Volume : 104.7 fl  Mean Cell Hemoglobin : 29.8 pg  Mean Cell Hemoglobin Concentration : 28.5 gm/dL  Auto Neutrophil # : 7.30 K/uL  Auto Lymphocyte # : 1.15 K/uL  Auto Monocyte # : 1.07 K/uL  Auto Eosinophil # : 0.03 K/uL  Auto Basophil # : 0.03 K/uL  Auto Neutrophil % : 75.9 %  Auto Lymphocyte % : 11.9 %  Auto Monocyte % : 11.1 %  Auto Eosinophil % : 0.3 %  Auto Basophil % : 0.3 %    PT/INR - ( 11 Sep 2022 12:00 )   PT: 13.2 sec;   INR: 1.11 ratio         PTT - ( 11 Sep 2022 12:00 )  PTT:32.5 sec  12    139  |  102  |  71<H>  ----------------------------<  191<H>  3.8   |  29  |  3.30<H>    Ca    8.9      12 Sep 2022 06:26  Mg     2.1         TPro  7.8  /  Alb  3.8  /  TBili  0.3  /  DBili  x   /  AST  13  /  ALT  14  /  AlkPhos  71  -    CAPILLARY BLOOD GLUCOSE            LIVER FUNCTIONS - ( 11 Sep 2022 12:00 )  Alb: 3.8 g/dL / Pro: 7.8 g/dL / ALK PHOS: 71 U/L / ALT: 14 U/L DA / AST: 13 U/L / GGT: x           Creatinine Trend: 3.30<--, 3.47<--  I&O's Summary              MEDICATIONS:    MEDICATIONS  (STANDING):  lactated ringers. 1000 milliLiter(s) (150 mL/Hr) IV Continuous <Continuous>      MEDICATIONS  (PRN):  ondansetron Injectable 4 milliGRAM(s) IV Push every 6 hours PRN Nausea      REVIEW OF SYSTEMS:                           ALL ROS DONE [ X   ]    CONSTITUTIONAL:  LETHARGIC [   ], FEVER [   ], UNRESPONSIVE [   ]  CVS:  CP  [   ], SOB, [   ], PALPITATIONS [   ], DIZZYNESS [   ]  RS: COUGH [   ], SPUTUM [   ]  GI: ABDOMINAL PAIN [   ], NAUSEA [   ], VOMITINGS [   ], DIARRHEA [   ], CONSTIPATION [   ]  :  DYSURIA [   ], NOCTURIA [   ], INCREASED FREQUENCY [   ], DRIBLING [   ],  SKELETAL: PAINFUL JOINTS [   ], SWOLLEN JOINTS [   ], NECK ACHE [   ], LOW BACK ACHE [   ],  SKIN : ULCERS [   ], RASH [   ], ITCHING [   ]  CNS: HEAD ACHE [   ], DOUBLE VISION [   ], BLURRED VISION [   ], AMS / CONFUSION [   ], SEIZURES [   ], WEAKNESS [   ],TINGLING / NUMBNESS [   ]    PHYSICAL EXAMINATION:  GENERAL APPEARANCE: NO DISTRESS  HEENT:  NO PALLOR, NO  JVD,  NO   NODES, NECK SUPPLE  CVS: S1 +, S2 +,   RS: AEEB,  OCCASIONAL  RALES +,   NO RONCHI  ABD: SOFT, NO, BS + [SLUGGISH] , UMBILICAL HERNIA + , KARELY-UMBILICUS TTP +  EXT: PE +  SKIN: WARM,   SKELETAL:  ROM ACCEPTABLE  CNS:  AAO X 3    RADIOLOGY :    ACC: [id number] EXAM:  CT ABDOMEN AND PELVIS                          PROCEDURE DATE:  2022          INTERPRETATION:  CLINICAL INFORMATION: 57 years  Female with abd pain.    COMPARISON: 2021    CONTRAST/COMPLICATIONS:  IV Contrast: NONE  Oral Contrast: NONE  Complications: None reported at time of study completion    PROCEDURE:  CT of the Abdomen and Pelvis was performed.  Sagittal and coronal reformats were performed.    LIMITATIONS: Evaluation of the solid organs, vascular structures and GI   tract is limited without oral and IV contrast.    FINDINGS:  LOWER CHEST: Cardiac pacemaker leads. Cardiomegaly.    LIVER: Within normal limits.  BILE DUCTS: Normal caliber.  GALLBLADDER: Cholecystectomy.  SPLEEN: Within normal limits.  PANCREAS: Within normal limits.  ADRENALS: Stable 3.1 x 2.5 cm low-density left adrenal nodule.  KIDNEYS/URETERS: Bilateral atrophy redemonstrated. No   hydroureteronephrosis. Stable 1.2 cm right lower pole hyperdensity and   3.7 cm right midpole cyst.    BLADDER: Within normal limits.  REPRODUCTIVE ORGANS: Enlarged multi fibroid uterus reidentified.    BOWEL: No bowel obstruction. Appendix is not visualized. No evidence of   inflammation in the pericecal region.. Dilated fluid-filled small bowel   loops within the ventral hernia. Adjacent mesenteric fat stranding and   subcutaneous fat stranding about the hernia sac. Transition point within   the right side of the hernia sac (2:92).  PERITONEUM: No ascites.  VESSELS: Within normal limits.  RETROPERITONEUM/LYMPH NODES: No lymphadenopathy.  ABDOMINAL WALL: Within normal limits.  BONES: Degenerative changes. L4-5 hypertrophic spinal stenosis.    IMPRESSION:  Incarcerated ventral hernia resulting in small bowel obstruction.    Other chronic findings as above.    Findings were discussed with Dr. JÚNIOR BEARD [phone] 2022   2:16 PM by Dr. Erika Jones with read back confirmation.      ASSESSMENT :       PLAN:  HPI:  57F with PMHx CAD, CHF, DM, HTN, HLD, PSHx open cholecystectomy and , presents from [facility] with nausea, vomiting and abdominal pain since Friday. Reports pain mostly lower abdomen, not sharp is a soreness.  Does not radiate. Is not worsened or lessened by anything. Pt reports she thinks the last time she had flatus/BM was Friday. When asked why patient lives in AL facility pt reports "that's a difficult question." Denies other complaints such as chest pain, shortness of breath, leg pain or swelling or urinary complaints.   Reports she has had her hernias since ~ .    (11 Sep 2022 16:17)      # SMALL BOWEL OBSTRUCTION  # R/O INCARCERATED HERNIA  - NPO, NGT SET TO SAMANTA  - MONITORING ABDOMINAL EXAM  - NOTED CT A/P  - REPLETE ELECTROLYTES  - SURGERY EVALUATION IN PROGRESS    - PLANNED FOR REPEAT CT SCAN W/ ORAL CONTRAST    # RIGHT BREAST MASS PLANNED S/P EXCISION ON 6/3    # NORMOCYTIC ANEMIA W/ HX OF POST-MENOPAUSAL VAGINAL BLEEDING, FIBROID UTERUS AND ANEMIA OF CKD , ANEMIA OF CHRONIC DISEASE   - TRANSFUSION THRESHOLD HGB < 7  - TREND HGB  - ON FESO4    # AMPARO ON CPAP    # HX OF CAD W/ AICD  # CHRONIC SYSTOLIC HEART FAILURE S/P AICD  # PVD, B/L LE LYMPHEDEMA  - ON ASA, STATIN, COREG  - ON LASIX, ALDACTONE - HOLD GIVEN NPO    # ANJU ON CKD STAGE 4-5  - ON SEVELAMER  - RECOMMEND MONITORING CR CLOSELY AND CONSIDERING NEPHROLOGY EVALUATION    # MORBID OBESITY    # COPD - CONTINUE PRN VENTOLIN    # HTN - ON COREG    # HLD - ON STATIN    # DM, UNCONTROLLED  - LANTUS + TID INSULIN - HOLD GIVEN NPO   - SSI + FS    - F/U HBA1C    # GOUT - ON ALLOPURINOL    # GI PPX; DVT PPX

## 2022-09-12 NOTE — PROGRESS NOTE ADULT - ASSESSMENT
57 year old female with small bowel obstruction secondary to incarcerated small bowel obstruction     - continue NGT  - serial exams  - possible surgical intervention   - discuss with Dr. Kessler  57 year old female with small bowel obstruction secondary to incarcerated small bowel obstruction     - continue NGT  - serial exams  - repeat CT scan with ORAL contrast, ordered. Give contrast via NGT  - possible surgical intervention   - discuss with Dr. Kessler

## 2022-09-12 NOTE — CONSULT NOTE ADULT - SUBJECTIVE AND OBJECTIVE BOX
C A R D I O L O G Y  *********************    DATE OF SERVICE: 22    HISTORY OF PRESENT ILLNESS: HPI:  57F with PMHx Severe NICM, SJM/abott  single lead ICD, normal perfusion on nuclear stress earlier this year, DM, HTN, HLD, PSHx open cholecystectomy and , presents from [facility] with nausea, vomiting and abdominal pain since Friday. Reports pain mostly lower abdomen, not sharp is a soreness.  Does not radiate. Is not worsened or lessened by anything. Pt reports she thinks the last time she had flatus/BM was Friday." Denies other complaints such as chest pain, shortness of breath, leg pain or swelling or urinary complaints.   She is diagnosed with SBP  Reports she has had her hernias since ~ .    (11 Sep 2022 16:17)      PAST MEDICAL & SURGICAL HISTORY:  HTN (hypertension)      DM (diabetes mellitus)      RA (rheumatoid arthritis)      COPD (chronic obstructive pulmonary disease)      AICD (automatic cardioverter/defibrillator) present  2/3 2011      CAD (coronary artery disease)      CHF (congestive heart failure)      GERD (gastroesophageal reflux disease)      Benign neoplasm, unspecified site      Morbid obesity with BMI of 50.0-59.9, adult      Obstructive sleep apnea on CPAP      History of cholecystectomy      ICD (implantable cardioverter-defibrillator) in place      H/O  section              MEDICATIONS:  MEDICATIONS  (STANDING):  iohexol 300 mG (iodine)/mL Oral Solution 30 milliLiter(s) Oral once  lactated ringers. 1000 milliLiter(s) (150 mL/Hr) IV Continuous <Continuous>    HOME MEDICATIONS:    Coreg 6.25 mg tablet 1 TABLET BID  Glucotrol XL 5 mg tablet,extended release 1 TABLET DAILY  Lasix 40 mg tablet 1 TABLET DAILY  Lasix 40 mg b.i.d  Lipitor 40 mg tablet 1 TABLET DAILY  NovoLog insulin  Renvela 800 mg tablet TABLET  Tylenol.  Ventolin  allopurinol 100 mg tablet TABLET DAILY  aspirin 81 mg tablet,delayed release 1 TABLET DAILY  gabapentin 100 mg t.i.d.  losartan 25 mg tablet 1 TABLET DAILY      Allergies    codeine (Urticaria)    Intolerances        FAMILY HISTORY:  Family history of hypertension in mother      Non-contributary for premature coronary disease or sudden cardiac death    SOCIAL HISTORY:    [X ] Non-smoker  [ ] Smoker  [ ] Alcohol        REVIEW OF SYSTEMS:  [ ]chest pain  [  ]shortness of breath  [  ]palpitations  [  ]syncope  [ ]near syncope [ ]upper extremity weakness   [ ] lower extremity weakness  [  ]diplopia  [  ]altered mental status   [  ]fevers  [ ]chills [ ]nausea  [ ]vomitting  [  ]dysphagia    [ ]abdominal pain  [ ]melena  [ ]BRBPR    [  ]epistaxis  [  ]rash    [ ]lower extremity edema        [X] All others negative	  [ ] Unable to obtain      LABS:	 	    CARDIAC MARKERS:        Troponin I, High Sensitivity Result: 31.3 ng/L (22 @ 12:00)                            10.7   9.63  )-----------( 272      ( 12 Sep 2022 06:26 )             37.6     Hb Trend: 10.7<--, 10.9<--        139  |  102  |  71<H>  ----------------------------<  191<H>  3.8   |  29  |  3.30<H>    Ca    8.9      12 Sep 2022 06:26  Mg     2.1         TPro  7.8  /  Alb  3.8  /  TBili  0.3  /  DBili  x   /  AST  13  /  ALT  14  /  AlkPhos  71      Creatinine Trend: 3.30<--, 3.47<--    Coags:      proBNP: Serum Pro-Brain Natriuretic Peptide: 761 pg/mL ( @ 12:00)          PHYSICAL EXAM:  T(C): 36.5 (22 @ 05:00), Max: 36.6 (22 @ 11:04)  HR: 82 (22 @ 05:00) (79 - 90)  BP: 123/68 (22 @ 05:00) (123/68 - 142/67)  RR: 18 (22 @ 05:00) (18 - 20)  SpO2: 97% (22 @ 05:00) (96% - 97%)  Wt(kg): --   BMI (kg/m2): 52.4 (22 @ 11:31)  I&O's Summary      Gen: Appears well in NAD  HEENT:  (-)icterus (-)pallor  CV: N S1 S2 1/6 RAYMOND (+)2 Pulses B/l  Resp:  Clear to ausculatation B/L, normal effort  GI: (+) BS Soft, NT, ND  Lymph:  (-)Edema, (-)obvious lymphadenopathy  Skin: Warm to touch, Normal turgor  Psych: Appropriate mood and affect      ECG:  	Sinus 89 BPM, 1st degree AV block, RBBB, LAFB     RADIOLOGY:         CXR:   < from: Xray Chest 1 View-PORTABLE IMMEDIATE (22 @ 12:00) >  Left cardiac device again noted.    Mild increased interstitial lung markings. No pleural effusion.    Heart size cannot be accurately assessed in this projection, but appear   enlarged.    < end of copied text >      ASSESSMENT/PLAN: 	57y Female PMHx Severe NICM, SJM/abott  single lead ICD, normal perfusion on nuclear stress earlier this year, DM, HTN, HLD, PSHx open cholecystectomy and , presents from [facility] with nausea, vomiting and abdominal pain found with SBO.     - Reasonably well compensated from a CHF pros-pective  - NGT and abdominal exams per surgical team  - Strict I+O's  - Keep net even  - Bifascicular block is not knew has been seen on prior EKGs  - will follow with you     I once again thank you for allowing me to participate in the care of your patient.  If you have any questions or concerns please do not hesitate to contact me.    Manuel Gold MD, [facility]  BEEPER [phone]

## 2022-09-12 NOTE — CONSULT NOTE ADULT - SUBJECTIVE AND OBJECTIVE BOX
NEPHROLOGY MEDICAL CARE, [facility] - Dr. Linden Jacob/ Dr. Cruz Ledesma/ Dr. Meet Galdamez/ Dr. Jannie Recinos    Patient was seen and examined at bedside.     Consultation requested by:       Reason for Consult: ANJU on CKD    HPI:  57F with PMHx CAD, CHF, DM, HTN, HLD, PSHx open cholecystectomy and , presents from [facility] with nausea, vomiting and abdominal pain since Friday. Reports pain mostly lower abdomen, not sharp is a soreness.    Renal consulted for CKD management. Patient has known ckd stage 4 and her baseline scr around 3.0 to 3.5mg/dL. Patient was admitted with scr around 3.47mg/dl and given some hydration and his scr improved to 3.30mg/dl. Last known scr around 2.7mg/dL in 2022. in the Er, pt was diagnosed with SBO and patient had NGT placed. denies sob, chest pain, urinary symptoms, NSAIDs usage.      Reports she has had her hernias since ~ .    (11 Sep 2022 16:17)      PMH:   HTN (hypertension)    DM (diabetes mellitus)    RA (rheumatoid arthritis)    COPD (chronic obstructive pulmonary disease)    AICD (automatic cardioverter/defibrillator) present    CAD (coronary artery disease)    CHF (congestive heart failure)    GERD (gastroesophageal reflux disease)    Benign neoplasm, unspecified site    Morbid obesity with BMI of 50.0-59.9, adult    Obstructive sleep apnea on CPAP        PSH:   History of cholecystectomy    ICD (implantable cardioverter-defibrillator) in place    H/O  section        FAMILY HISTORY:  Family history of hypertension in mother        Social History:  non-smoker/ non-alcoholic     Home Meds:  Home Medications:  acetaminophen 325 mg oral tablet: 2 tab(s) orally every 6 hours, As needed, Temp greater or equal to 38C (100.4F) (26 May 2022 13:36)  Aldactone 50 mg oral tablet: 1 tab(s) orally once a day (26 May 2022 13:36)  allopurinol 100 mg oral tablet: 1 tab(s) orally once a day (26 May 2022 13:36)  aspirin 81 mg oral tablet, chewable: 1 tab(s) orally once a day (26 May 2022 13:36)  atorvastatin 40 mg oral tablet: 1 tab(s) orally once a day (26 May 2022 13:36)  Coreg 6.25 mg oral tablet: 1 tab(s) orally 2 times a day (26 May 2022 13:36)  docusate sodium 100 mg oral capsule: 1 cap(s) orally once a day (26 May 2022 13:36)  ferrous sulfate 324 mg (65 mg elemental iron) oral tablet: orally once a day (26 May 2022 13:36)  gabapentin 100 mg oral capsule: 1 cap(s) orally 3 times a day (26 May 2022 13:36)  Lasix 40 mg oral tablet: 1 tab(s) orally 2 times a day (26 May 2022 13:36)  multivitamin: 1 tab(s) orally once a day (26 May 2022 13:36)  NovoLIN 70/30 FlexPen: 10  subcutaneous once a day at 5pm (26 May 2022 13:36)  NovoLIN 70/30 subcutaneous suspension: 6 unit(s) subcutaneous once a day (in the evening) (26 May 2022 13:36)  NovoLIN 70/30 subcutaneous suspension: 24 unit(s) subcutaneous once a day (26 May 2022 13:36)  pantoprazole 40 mg oral delayed release tablet: 1 tab(s) orally once a day (before a meal) (26 May 2022 13:36)  senna oral tablet: 2 tab(s) orally once a day (at bedtime) (26 May 2022 13:36)  sevelamer carbonate 800 mg oral tablet: 2 tab(s) orally 3 times a day (with meals) (26 May 2022 13:36)  Ventolin HFA 90 mcg/inh inhalation aerosol: 2 puff(s) inhaled every 6 hours (26 May 2022 13:36)  Vitamin D3 1000 intl units oral capsule: 1 cap(s) orally once a day (26 May 2022 13:36)      Allergies:  Allergies    codeine (Urticaria)    Intolerances        REVIEW OF SYSTEMS:  CONSTITUTIONAL: No fever; No weight loss; No fatigue  EYES: No eye pain; No visual disturbances; No discharge  ENMT:  No difficulty hearing; No tinnitus;  No vertigo; No sinus; No throat pain  NECK: No pain; No stiffness  BREASTS: No pain; No masses; No nipple discharge  RESPIRATORY: No cough; No wheezing; No chills; No hemoptysis; No shortness of breath  CARDIOVASCULAR: No chest pain; No palpitations; No dizziness; No leg swelling  GASTROINTESTINAL: abdominal pain; No epigastric pain; nausea; vomiting; No hematemesis; No diarrhea; No constipation. No melena   GENITOURINARY: No dysuria No frequency; No hematuria; No incontinence  NEUROLOGICAL: No headaches; No memory loss; No loss of strength; No numbness; No tremors  SKIN: No itching; No burning; No rashes  ENDOCRINE: No heat or cold intolerance; No hair loss  MUSCULOSKELETAL: No joint pain or swelling; No muscle, back, or extremity pain  PSYCHIATRIC: No depression; No anxiety; No mood swings; No difficulty sleeping  HEME/LYMPH: No easy bruising; No bleeding gums  ALLERY AND IMMUNOLOGIC: No hives or eczema    Vital Signs Last 24 Hrs  T(C): 36.3 (12 Sep 2022 14:28), Max: 36.6 (11 Sep 2022 15:40)  T(F): 97.3 (12 Sep 2022 14:28), Max: 97.8 (11 Sep 2022 15:40)  HR: 91 (12 Sep 2022 14:28) (79 - 91)  BP: 124/80 (12 Sep 2022 14:28) (123/68 - 127/78)  BP(mean): --  RR: 18 (12 Sep 2022 14:28) (18 - 20)  SpO2: 94% (12 Sep 2022 14:28) (94% - 97%)    Parameters below as of 12 Sep 2022 14:28  Patient On (Oxygen Delivery Method): room air            PHYSICAL EXAM:  General: No acute respiratory distress. obese  Eyes: conjunctiva and sclera clear  ENMT: Atraumatic, Normocephalic, supple, No JVD present. Moist mucous membranes  Respiratory: Bilateral clear to percussion; No rales, rhonchi, wheezing  Cardiovascular: S1S2+; no m/r/g  Gastrointestinal: Soft, mild tenderness, abdominal hernia present; Bowel sounds present  Neuro:  Awake, Alert & Oriented X3, No focal deficits present.   Ext:  2+ Peripheral Pulses and pedal edema, No Cyanosis  Skin: No visible rashes        LABS:                        10.7   9.63  )-----------( 272      ( 12 Sep 2022 06:26 )             37.6     09-12    139  |  102  |  71<H>  ----------------------------<  191<H>  3.8   |  29  |  3.30<H>    Ca    8.9      12 Sep 2022 06:26  Mg     2.1         TPro  7.8  /  Alb  3.8  /  TBili  0.3  /  DBili  x   /  AST  13  /  ALT  14  /  AlkPhos  71      PT/INR - ( 11 Sep 2022 12:00 )   PT: 13.2 sec;   INR: 1.11 ratio         PTT - ( 11 Sep 2022 12:00 )  PTT:32.5 sec      Urine studies      Medications:  MEDICATIONS  (STANDING):  heparin   Injectable 5000 Unit(s) SubCutaneous every 8 hours  lactated ringers. 1000 milliLiter(s) (175 mL/Hr) IV Continuous <Continuous>    MEDICATIONS  (PRN):  ondansetron Injectable 4 milliGRAM(s) IV Push every 6 hours PRN Nausea

## 2022-09-13 LAB
ANION GAP SERPL CALC-SCNC: 11 MMOL/L — SIGNIFICANT CHANGE UP (ref 5–17)
APTT BLD: 29.7 SEC — SIGNIFICANT CHANGE UP (ref 27.5–35.5)
BUN SERPL-MCNC: 69 MG/DL — HIGH (ref 7–18)
CALCIUM SERPL-MCNC: 8.4 MG/DL — SIGNIFICANT CHANGE UP (ref 8.4–10.5)
CALCIUM SERPL-MCNC: 8.8 MG/DL — SIGNIFICANT CHANGE UP (ref 8.4–10.5)
CHLORIDE SERPL-SCNC: 102 MMOL/L — SIGNIFICANT CHANGE UP (ref 96–108)
CO2 SERPL-SCNC: 30 MMOL/L — SIGNIFICANT CHANGE UP (ref 22–31)
CREAT SERPL-MCNC: 3.07 MG/DL — HIGH (ref 0.5–1.3)
EGFR: 17 ML/MIN/1.73M2 — LOW
GLUCOSE SERPL-MCNC: 173 MG/DL — HIGH (ref 70–99)
HCT VFR BLD CALC: 39.1 % — SIGNIFICANT CHANGE UP (ref 34.5–45)
HGB BLD-MCNC: 10.9 G/DL — LOW (ref 11.5–15.5)
INR BLD: 1.11 RATIO — SIGNIFICANT CHANGE UP (ref 0.88–1.16)
MCHC RBC-ENTMCNC: 27.9 GM/DL — LOW (ref 32–36)
MCHC RBC-ENTMCNC: 29.8 PG — SIGNIFICANT CHANGE UP (ref 27–34)
MCV RBC AUTO: 106.8 FL — HIGH (ref 80–100)
NRBC # BLD: 0 /100 WBCS — SIGNIFICANT CHANGE UP (ref 0–0)
PHOSPHATE SERPL-MCNC: 3.3 MG/DL — SIGNIFICANT CHANGE UP (ref 2.5–4.5)
PLATELET # BLD AUTO: 300 K/UL — SIGNIFICANT CHANGE UP (ref 150–400)
POTASSIUM SERPL-MCNC: 3.7 MMOL/L — SIGNIFICANT CHANGE UP (ref 3.5–5.3)
POTASSIUM SERPL-SCNC: 3.7 MMOL/L — SIGNIFICANT CHANGE UP (ref 3.5–5.3)
PROTHROM AB SERPL-ACNC: 13.2 SEC — SIGNIFICANT CHANGE UP (ref 10.5–13.4)
PTH-INTACT FLD-MCNC: 173 PG/ML — HIGH (ref 15–65)
RBC # BLD: 3.66 M/UL — LOW (ref 3.8–5.2)
RBC # FLD: 16 % — HIGH (ref 10.3–14.5)
SODIUM SERPL-SCNC: 143 MMOL/L — SIGNIFICANT CHANGE UP (ref 135–145)
WBC # BLD: 10.27 K/UL — SIGNIFICANT CHANGE UP (ref 3.8–10.5)
WBC # FLD AUTO: 10.27 K/UL — SIGNIFICANT CHANGE UP (ref 3.8–10.5)

## 2022-09-13 RX ORDER — SODIUM CHLORIDE 9 MG/ML
1000 INJECTION, SOLUTION INTRAVENOUS
Refills: 0 | Status: DISCONTINUED | OUTPATIENT
Start: 2022-09-13 | End: 2022-09-14

## 2022-09-13 RX ADMIN — HEPARIN SODIUM 5000 UNIT(S): 5000 INJECTION INTRAVENOUS; SUBCUTANEOUS at 21:08

## 2022-09-13 RX ADMIN — SODIUM CHLORIDE 125 MILLILITER(S): 9 INJECTION, SOLUTION INTRAVENOUS at 11:30

## 2022-09-13 RX ADMIN — HEPARIN SODIUM 5000 UNIT(S): 5000 INJECTION INTRAVENOUS; SUBCUTANEOUS at 14:12

## 2022-09-13 RX ADMIN — SODIUM CHLORIDE 125 MILLILITER(S): 9 INJECTION, SOLUTION INTRAVENOUS at 21:08

## 2022-09-13 RX ADMIN — HEPARIN SODIUM 5000 UNIT(S): 5000 INJECTION INTRAVENOUS; SUBCUTANEOUS at 05:15

## 2022-09-13 NOTE — PROGRESS NOTE ADULT - ASSESSMENT
1. ANJU due to pre-renal azotemia due to volume depletion. CT scan abd/pelvis shows no hydro.   -Scr is slowly improving and fluids changed to D5 1/2 NS at 125cc/hr. Patient is presently at her baseline but with iv hydration could improve her renal function.   -some urine lytes noted.   -Adjust meds to eGFR and avoid IV Gadolinium contrast,NSAIDs, and phosphate enema.  -Monitor I/O's daily.   -Monitor SMA daily.  2. CKD stage 4 most likely due to diabetic nephropathy and hypertensive nephrosclerosis  -Previous Scr around 2.4-2.6mg/dL in June 2022 which was below her baseline scr. Baseline scr around 3 to 3.5mg/dl.  will continue to monitor.   -Keep patient euvolemic and renal diet  -Avoid Nephrotoxic Meds/ Agents such as (NSAIDs, IV contrast, Aminoglycosides such as gentamicin, -Gadolinium contrast, Phosphate containing enemas, etc..)  -Adjust Medications according to eGFR  3. Anemia iron deficiency  -hb is stable.   -F/u CBC daily  -transfuse if HB < 7.0.  4. HTN: -bp is acceptable witout bp meds  -titrate bp meds to keep sbp >110 and < 130  5. Mineral Bone Disease:  -phos is okay and f/u PTH intact   -was on calcitirol 0.25mcg daily previously.  6. Abd pain due SBO  -clear liq diet. iv fluids and s/p NGT removed.  -CT abd/pelvis with oral contrast shows resolution of SBO.   -Plan as per surgery    Discussed with patient in detail regarding the renal plan and care  Discussed the assessment and plan with Primary Team/Nurse

## 2022-09-13 NOTE — PROGRESS NOTE ADULT - SUBJECTIVE AND OBJECTIVE BOX
NEPHROLOGY MEDICAL CARE, [facility] - Dr. Linden Jacob/ Dr. Cruz Ledesma/ Dr. Meet Galdamez/ Dr. Jannie Recinos    Patient was seen and examined at bedside.    CC: patient is okay and NGT removed this morning.     Vital Signs Last 24 Hrs  T(C): 37.1 (13 Sep 2022 06:26), Max: 37.1 (13 Sep 2022 06:26)  T(F): 98.7 (13 Sep 2022 06:26), Max: 98.7 (13 Sep 2022 06:26)  HR: 95 (13 Sep 2022 06:26) (91 - 95)  BP: 113/54 (13 Sep 2022 06:26) (113/54 - 127/59)  BP(mean): 66 (13 Sep 2022 06:26) (66 - 66)  RR: 20 (13 Sep 2022 06:26) (18 - 20)  SpO2: 97% (13 Sep 2022 06:26) (94% - 97%)    Parameters below as of 13 Sep 2022 06:26  Patient On (Oxygen Delivery Method): room air        09-12 @ 07:01  -  09-13 @ 07:00  --------------------------------------------------------  IN: 2100 mL / OUT: 550 mL / NET: 1550 mL        PHYSICAL EXAM:  General: No acute respiratory distress. obese  Eyes: conjunctiva and sclera clear  ENMT: Atraumatic, Normocephalic, supple, No JVD present. Moist mucous membranes  Respiratory: Bilateral clear to percussion; No rales, rhonchi, wheezing  Cardiovascular: S1S2+; no m/r/g  Gastrointestinal: Soft, mild tenderness, abdominal hernia present; Bowel sounds present  Neuro:  Awake, Alert & Oriented X3  Ext:  pedal edema, No Cyanosis  Skin: No visible rashes      MEDICATIONS:  MEDICATIONS  (STANDING):  dextrose 5% + sodium chloride 0.45%. 1000 milliLiter(s) (125 mL/Hr) IV Continuous <Continuous>  heparin   Injectable 5000 Unit(s) SubCutaneous every 8 hours    MEDICATIONS  (PRN):  ondansetron Injectable 4 milliGRAM(s) IV Push every 6 hours PRN Nausea          LABS:                        10.9   10.27 )-----------( 300      ( 13 Sep 2022 06:30 )             39.1     09-13    143  |  102  |  69<H>  ----------------------------<  173<H>  3.7   |  30  |  3.07<H>    Ca    8.8      13 Sep 2022 06:30  Phos  3.3     09-13      PT/INR - ( 13 Sep 2022 06:30 )   PT: 13.2 sec;   INR: 1.11 ratio         PTT - ( 13 Sep 2022 06:30 )  PTT:29.7 sec    Phosphorus Level, Serum: 3.3 mg/dL (09-13 @ 06:30)    Urine studies  Osmolality, Random Urine: 479 mos/kg (09-12 @ 20:23)    PTH and Vit D:

## 2022-09-13 NOTE — PROGRESS NOTE ADULT - SUBJECTIVE AND OBJECTIVE BOX
Pt s- new complaints   denies flatus/bm  ICU Vital Signs Last 24 Hrs  T(C): 37.1 (13 Sep 2022 06:26), Max: 37.1 (13 Sep 2022 06:26)  T(F): 98.7 (13 Sep 2022 06:26), Max: 98.7 (13 Sep 2022 06:26)  HR: 95 (13 Sep 2022 06:26) (91 - 95)  BP: 113/54 (13 Sep 2022 06:26) (113/54 - 127/59)  BP(mean): 66 (13 Sep 2022 06:26) (66 - 66)  ABP: --  ABP(mean): --  RR: 20 (13 Sep 2022 06:26) (18 - 20)  SpO2: 97% (13 Sep 2022 06:26) (94% - 97%)    O2 Parameters below as of 13 Sep 2022 06:26  Patient On (Oxygen Delivery Method): room air        Alert nad  Abd: soft, mild tenderness at hernia site. no erythema,  no cce    I&O's Detail    12 Sep 2022 07:01  -  13 Sep 2022 07:00  --------------------------------------------------------  IN:    Lactated Ringers: 2100 mL  Total IN: 2100 mL    OUT:    Nasogastric/Oral tube (mL): 550 mL  Total OUT: 550 mL    Total NET: 1550 mL                                10.7   9.63  )-----------( 272      ( 12 Sep 2022 06:26 )             37.6   09-12    139  |  102  |  71<H>  ----------------------------<  191<H>  3.8   |  29  |  3.30<H>    Ca    8.9      12 Sep 2022 06:26  Mg     2.1     09-11    TPro  7.8  /  Alb  3.8  /  TBili  0.3  /  DBili  x   /  AST  13  /  ALT  14  /  AlkPhos  71  09-11

## 2022-09-13 NOTE — PROGRESS NOTE ADULT - SUBJECTIVE AND OBJECTIVE BOX
Patient is a 57y old  Female who presents with a chief complaint of   PATIENT IS SEEN AND EXAMINED IN MEDICAL FLOOR.  CINDYT [    ]    AUSTIN [   ]      GT [   ]    ALLERGIES:  codeine (Urticaria)      Daily     Daily     VITALS:    Vital Signs Last 24 Hrs  T(C): 37.1 (13 Sep 2022 06:26), Max: 37.1 (13 Sep 2022 06:26)  T(F): 98.7 (13 Sep 2022 06:), Max: 98.7 (13 Sep 2022 06:)  HR: 95 (13 Sep 2022 06:) (91 - 95)  BP: 113/54 (13 Sep 2022 06:) (113/54 - 127/59)  BP(mean): 66 (13 Sep 2022 06:) (66 - 66)  RR: 20 (13 Sep 2022 06:) (18 - 20)  SpO2: 97% (13 Sep 2022 06:) (94% - 97%)    Parameters below as of 13 Sep 2022 06:  Patient On (Oxygen Delivery Method): room air        LABS:    CBC Full  -  ( 13 Sep 2022 06:30 )  WBC Count : 10.27 K/uL  RBC Count : 3.66 M/uL  Hemoglobin : 10.9 g/dL  Hematocrit : 39.1 %  Platelet Count - Automated : 300 K/uL  Mean Cell Volume : 106.8 fl  Mean Cell Hemoglobin : 29.8 pg  Mean Cell Hemoglobin Concentration : 27.9 gm/dL  Auto Neutrophil # : x  Auto Lymphocyte # : x  Auto Monocyte # : x  Auto Eosinophil # : x  Auto Basophil # : x  Auto Neutrophil % : x  Auto Lymphocyte % : x  Auto Monocyte % : x  Auto Eosinophil % : x  Auto Basophil % : x    PT/INR - ( 13 Sep 2022 06:30 )   PT: 13.2 sec;   INR: 1.11 ratio         PTT - ( 13 Sep 2022 06:30 )  PTT:29.7 sec      143  |  102  |  69<H>  ----------------------------<  173<H>  3.7   |  30  |  3.07<H>    Ca    8.8      13 Sep 2022 06:30  Phos  3.3     -  Mg     2.1     -11    TPro  7.8  /  Alb  3.8  /  TBili  0.3  /  DBili  x   /  AST  13  /  ALT  14  /  AlkPhos  71  09-11    CAPILLARY BLOOD GLUCOSE            LIVER FUNCTIONS - ( 11 Sep 2022 12:00 )  Alb: 3.8 g/dL / Pro: 7.8 g/dL / ALK PHOS: 71 U/L / ALT: 14 U/L DA / AST: 13 U/L / GGT: x           Creatinine Trend: 3.07<--, 3.30<--, 3.47<--  I&O's Summary    12 Sep 2022 07:01  -  13 Sep 2022 07:00  --------------------------------------------------------  IN: 2100 mL / OUT: 550 mL / NET: 1550 mL                MEDICATIONS:    MEDICATIONS  (STANDING):  heparin   Injectable 5000 Unit(s) SubCutaneous every 8 hours  lactated ringers. 1000 milliLiter(s) (175 mL/Hr) IV Continuous <Continuous>      MEDICATIONS  (PRN):  ondansetron Injectable 4 milliGRAM(s) IV Push every 6 hours PRN Nausea      REVIEW OF SYSTEMS:                           ALL ROS DONE [ X   ]    CONSTITUTIONAL:  LETHARGIC [   ], FEVER [   ], UNRESPONSIVE [   ]  CVS:  CP  [   ], SOB, [   ], PALPITATIONS [   ], DIZZYNESS [   ]  RS: COUGH [   ], SPUTUM [   ]  GI: ABDOMINAL PAIN [   ], NAUSEA [   ], VOMITINGS [   ], DIARRHEA [   ], CONSTIPATION [   ]  :  DYSURIA [   ], NOCTURIA [   ], INCREASED FREQUENCY [   ], DRIBLING [   ],  SKELETAL: PAINFUL JOINTS [   ], SWOLLEN JOINTS [   ], NECK ACHE [   ], LOW BACK ACHE [   ],  SKIN : ULCERS [   ], RASH [   ], ITCHING [   ]  CNS: HEAD ACHE [   ], DOUBLE VISION [   ], BLURRED VISION [   ], AMS / CONFUSION [   ], SEIZURES [   ], WEAKNESS [   ],TINGLING / NUMBNESS [   ]      PHYSICAL EXAMINATION:  GENERAL APPEARANCE: NO DISTRESS  HEENT:  NO PALLOR, NO  JVD,  NO   NODES, NECK SUPPLE  CVS: S1 +, S2 +,   RS: AEEB,  OCCASIONAL  RALES +,   NO RONCHI  ABD: SOFT, NO, BS + [SLUGGISH] , UMBILICAL HERNIA + , KARELY-UMBILICUS TTP +  EXT: PE +  SKIN: WARM,   SKELETAL:  ROM ACCEPTABLE  CNS:  AAO X 3    RADIOLOGY :    ACC: [id number] EXAM:  CT ABDOMEN AND PELVIS                          PROCEDURE DATE:  2022          INTERPRETATION:  CLINICAL INFORMATION: 57 years  Female with abd pain.    COMPARISON: 2021    CONTRAST/COMPLICATIONS:  IV Contrast: NONE  Oral Contrast: NONE  Complications: None reported at time of study completion    PROCEDURE:  CT of the Abdomen and Pelvis was performed.  Sagittal and coronal reformats were performed.    LIMITATIONS: Evaluation of the solid organs, vascular structures and GI   tract is limited without oral and IV contrast.    FINDINGS:  LOWER CHEST: Cardiac pacemaker leads. Cardiomegaly.    LIVER: Within normal limits.  BILE DUCTS: Normal caliber.  GALLBLADDER: Cholecystectomy.  SPLEEN: Within normal limits.  PANCREAS: Within normal limits.  ADRENALS: Stable 3.1 x 2.5 cm low-density left adrenal nodule.  KIDNEYS/URETERS: Bilateral atrophy redemonstrated. No   hydroureteronephrosis. Stable 1.2 cm right lower pole hyperdensity and   3.7 cm right midpole cyst.    BLADDER: Within normal limits.  REPRODUCTIVE ORGANS: Enlarged multi fibroid uterus reidentified.    BOWEL: No bowel obstruction. Appendix is not visualized. No evidence of   inflammation in the pericecal region.. Dilated fluid-filled small bowel   loops within the ventral hernia. Adjacent mesenteric fat stranding and   subcutaneous fat stranding about the hernia sac. Transition point within   the right side of the hernia sac (2:92).  PERITONEUM: No ascites.  VESSELS: Within normal limits.  RETROPERITONEUM/LYMPH NODES: No lymphadenopathy.  ABDOMINAL WALL: Within normal limits.  BONES: Degenerative changes. L4-5 hypertrophic spinal stenosis.    IMPRESSION:  Incarcerated ventral hernia resulting in small bowel obstruction.    Other chronic findings as above.    Findings were discussed with Dr. JÚNIOR BEARD [id number] 2022   2:16 PM by Dr. Erika Jones with read back confirmation.      ASSESSMENT :       PLAN:  HPI:  57F with PMHx CAD, CHF, DM, HTN, HLD, PSHx open cholecystectomy and , presents from [facility] with nausea, vomiting and abdominal pain since Friday. Reports pain mostly lower abdomen, not sharp is a soreness.  Does not radiate. Is not worsened or lessened by anything. Pt reports she thinks the last time she had flatus/BM was Friday. When asked why patient lives in AL facility pt reports "that's a difficult question." Denies other complaints such as chest pain, shortness of breath, leg pain or swelling or urinary complaints.   Reports she has had her hernias since ~ .    (11 Sep 2022 16:17)      # SMALL BOWEL OBSTRUCTION  # R/O INCARCERATED HERNIA  - NPO, NGT SET TO SAMANTA  - MONITORING ABDOMINAL EXAM  - NOTED CT A/P  - REPLETE ELECTROLYTES  - SURGERY EVALUATION IN PROGRESS    - PLANNED FOR REPEAT CT SCAN W/ ORAL CONTRAST    # RIGHT BREAST MASS PLANNED S/P EXCISION ON 6/3    # NORMOCYTIC ANEMIA W/ HX OF POST-MENOPAUSAL VAGINAL BLEEDING, FIBROID UTERUS AND ANEMIA OF CKD , ANEMIA OF CHRONIC DISEASE   - TRANSFUSION THRESHOLD HGB < 7  - TREND HGB  - ON FESO4    # AMPARO ON CPAP    # HX OF CAD W/ AICD  # CHRONIC SYSTOLIC HEART FAILURE S/P AICD  # PVD, B/L LE LYMPHEDEMA  - ON ASA, STATIN, COREG  - ON LASIX, ALDACTONE - HOLD GIVEN NPO    # ANJU ON CKD STAGE 4-5  - ON SEVELAMER  - RECOMMEND MONITORING CR CLOSELY AND CONSIDERING NEPHROLOGY EVALUATION    # MORBID OBESITY    # COPD - CONTINUE PRN VENTOLIN    # HTN - ON COREG    # HLD - ON STATIN    # DM, UNCONTROLLED  - LANTUS + TID INSULIN - HOLD GIVEN NPO   - SSI + FS    - F/U HBA1C    # GOUT - ON ALLOPURINOL    # GI PPX; DVT PPX     Patient is a 57y old  Female who presents with a chief complaint of   PATIENT IS SEEN AND EXAMINED IN MEDICAL FLOOR.  CINDYT [    ]    AUSTIN [   ]      GT [   ]    ALLERGIES:  codeine (Urticaria)      Daily     Daily     VITALS:    Vital Signs Last 24 Hrs  T(C): 37.1 (13 Sep 2022 06:26), Max: 37.1 (13 Sep 2022 06:26)  T(F): 98.7 (13 Sep 2022 06:), Max: 98.7 (13 Sep 2022 06:)  HR: 95 (13 Sep 2022 06:) (91 - 95)  BP: 113/54 (13 Sep 2022 06:) (113/54 - 127/59)  BP(mean): 66 (13 Sep 2022 06:) (66 - 66)  RR: 20 (13 Sep 2022 06:) (18 - 20)  SpO2: 97% (13 Sep 2022 06:) (94% - 97%)    Parameters below as of 13 Sep 2022 06:  Patient On (Oxygen Delivery Method): room air        LABS:    CBC Full  -  ( 13 Sep 2022 06:30 )  WBC Count : 10.27 K/uL  RBC Count : 3.66 M/uL  Hemoglobin : 10.9 g/dL  Hematocrit : 39.1 %  Platelet Count - Automated : 300 K/uL  Mean Cell Volume : 106.8 fl  Mean Cell Hemoglobin : 29.8 pg  Mean Cell Hemoglobin Concentration : 27.9 gm/dL  Auto Neutrophil # : x  Auto Lymphocyte # : x  Auto Monocyte # : x  Auto Eosinophil # : x  Auto Basophil # : x  Auto Neutrophil % : x  Auto Lymphocyte % : x  Auto Monocyte % : x  Auto Eosinophil % : x  Auto Basophil % : x    PT/INR - ( 13 Sep 2022 06:30 )   PT: 13.2 sec;   INR: 1.11 ratio         PTT - ( 13 Sep 2022 06:30 )  PTT:29.7 sec      143  |  102  |  69<H>  ----------------------------<  173<H>  3.7   |  30  |  3.07<H>    Ca    8.8      13 Sep 2022 06:30  Phos  3.3     -  Mg     2.1     -11    TPro  7.8  /  Alb  3.8  /  TBili  0.3  /  DBili  x   /  AST  13  /  ALT  14  /  AlkPhos  71  09-11    CAPILLARY BLOOD GLUCOSE            LIVER FUNCTIONS - ( 11 Sep 2022 12:00 )  Alb: 3.8 g/dL / Pro: 7.8 g/dL / ALK PHOS: 71 U/L / ALT: 14 U/L DA / AST: 13 U/L / GGT: x           Creatinine Trend: 3.07<--, 3.30<--, 3.47<--  I&O's Summary    12 Sep 2022 07:01  -  13 Sep 2022 07:00  --------------------------------------------------------  IN: 2100 mL / OUT: 550 mL / NET: 1550 mL                MEDICATIONS:    MEDICATIONS  (STANDING):  heparin   Injectable 5000 Unit(s) SubCutaneous every 8 hours  lactated ringers. 1000 milliLiter(s) (175 mL/Hr) IV Continuous <Continuous>      MEDICATIONS  (PRN):  ondansetron Injectable 4 milliGRAM(s) IV Push every 6 hours PRN Nausea      REVIEW OF SYSTEMS:                           ALL ROS DONE [ X   ]    CONSTITUTIONAL:  LETHARGIC [   ], FEVER [   ], UNRESPONSIVE [   ]  CVS:  CP  [   ], SOB, [   ], PALPITATIONS [   ], DIZZYNESS [   ]  RS: COUGH [   ], SPUTUM [   ]  GI: ABDOMINAL PAIN [   ], NAUSEA [   ], VOMITINGS [   ], DIARRHEA [   ], CONSTIPATION [   ]  :  DYSURIA [   ], NOCTURIA [   ], INCREASED FREQUENCY [   ], DRIBLING [   ],  SKELETAL: PAINFUL JOINTS [   ], SWOLLEN JOINTS [   ], NECK ACHE [   ], LOW BACK ACHE [   ],  SKIN : ULCERS [   ], RASH [   ], ITCHING [   ]  CNS: HEAD ACHE [   ], DOUBLE VISION [   ], BLURRED VISION [   ], AMS / CONFUSION [   ], SEIZURES [   ], WEAKNESS [   ],TINGLING / NUMBNESS [   ]      PHYSICAL EXAMINATION:  GENERAL APPEARANCE: NO DISTRESS  HEENT:  NO PALLOR, NO  JVD,  NO   NODES, NECK SUPPLE  CVS: S1 +, S2 +,   RS: AEEB,  OCCASIONAL  RALES +,   NO RONCHI  ABD: SOFT, NO, BS + , UMBILICAL HERNIA + , KARELY-UMBILICUS TTP + [IMPROVING]  EXT: PE +  SKIN: WARM,   SKELETAL:  ROM ACCEPTABLE  CNS:  AAO X 3    RADIOLOGY :    ACC: [id number] EXAM:  CT ABDOMEN AND PELVIS OC                          PROCEDURE DATE:  2022          INTERPRETATION:  CLINICAL INFORMATION: Small bowel obstruction.   Incarcerated hernia.    COMPARISON: 2022    CONTRAST/COMPLICATIONS:  IV Contrast: NONE  Oral Contrast: Gastroview  Complications: None reported at time of study completion    PROCEDURE:  CT of the Abdomen and Pelvis was performed.  Sagittal and coronal reformats were performed.    FINDINGS:  LOWER CHEST: Cardiomegaly. Small bilateral atelectasis.    LIVER: Within normal limits.  BILE DUCTS: Slight common bile duct dilatation, probably due to post   cholecystectomy status.  GALLBLADDER: Cholecystectomy clips are present.  SPLEEN: A few nonspecific small hypodense splenic lesions measuring up to   1.4 cm.  PANCREAS: Within normal limits.  ADRENALS: Mild right adrenal thickening. Grossly stable nonspecific 3.3   cm left adrenal nodule.  KIDNEYS/URETERS: Within normal limits. Indeterminate 1.8 cm slightly   hyperdense lesion in the right kidney. A few indeterminate hypodense   lesions in the kidneys bilaterally measuring up to 4.1 cm on the right.   No evidence for a calculus in the kidneys or ureters. No hydronephrosis.    BLADDER: Within normal limits.  REPRODUCTIVE ORGANS: A few myometrial masses and one of them is   calcified, likely representing uterine fibroids. The adnexa appear   grossly unremarkable.    BOWEL:  Appendix within normal limits. New NG tube terminates in the   stomach. Small hiatal hernia. No bowel obstruction. Oral contrast has   reached the rectum. Large ventral hernia is again noted with herniation   of the small bowel. Apparent segmental mural thickening of the small   bowel within the hernia and anterior abdomen may represent bowel ischemia   or a nonspecific enteritis. Clinical correlation is recommended.  PERITONEUM: No free air or ascites.  VESSELS: Mild calcified atherosclerotic disease.  RETROPERITONEUM/LYMPH NODES: No lymphadenopathy.  ABDOMINAL WALL: Please see bowel.  BONES: Degenerative spondylosis.    IMPRESSION:  No bowel obstruction. Oral contrast has reached the rectum. Large ventral   hernia is again noted with herniation of the small bowel. Apparent   segmental mural thickening of the small bowel within the hernia and   anterior abdomen may represent bowel ischemia or a nonspecific enteritis.   Clinical correlation is recommended.    Slight common bile duct dilatation, probably due to post cholecystectomy   status.    Indeterminate lesions in the kidneys bilaterally. Nonspecific 3.3 cm left   adrenal nodule. If clinically indicated, abdominal MR without and with IV   contrast may be pursued for further evaluation.    PA Ms. Keane is informed with read back.    ASSESSMENT :       PLAN:  HPI:  57F with PMHx CAD, CHF, DM, HTN, HLD, PSHx open cholecystectomy and , presents from [facility] with nausea, vomiting and abdominal pain since Friday. Reports pain mostly lower abdomen, not sharp is a soreness.  Does not radiate. Is not worsened or lessened by anything. Pt reports she thinks the last time she had flatus/BM was Friday. When asked why patient lives in AL facility pt reports "that's a difficult question." Denies other complaints such as chest pain, shortness of breath, leg pain or swelling or urinary complaints.   Reports she has had her hernias since ~ .    (11 Sep 2022 16:17)      # SMALL BOWEL OBSTRUCTION  # R/O INCARCERATED HERNIA  # ENTERITIS  - S/P NGT, DIET ADVANCED PER SURGERY  - MONITORING ABDOMINAL EXAM  - NOTED REPEAT CT A/P  - REPLETE ELECTROLYTES  - SURGERY EVALUATION IN PROGRESS    # RIGHT BREAST MASS PLANNED S/P EXCISION ON 6/3    # NORMOCYTIC ANEMIA W/ HX OF POST-MENOPAUSAL VAGINAL BLEEDING, FIBROID UTERUS AND ANEMIA OF CKD , ANEMIA OF CHRONIC DISEASE   - TRANSFUSION THRESHOLD HGB < 7  - TREND HGB  - ON FESO4    # AMPARO ON CPAP    # HX OF CAD W/ AICD  # CHRONIC SYSTOLIC HEART FAILURE S/P AICD  # PVD, B/L LE LYMPHEDEMA  - ON ASA, STATIN, COREG  - ON LASIX, ALDACTONE - HOLD GIVEN NPO    # ANJU ON CKD STAGE 4-5  - ON SEVELAMER  - NEPHROLOGY CONSULT IN PROGRESS    # MORBID OBESITY    # COPD - CONTINUE PRN VENTOLIN    # HTN - ON COREG    # HLD - ON STATIN    # DM, UNCONTROLLED  - LANTUS + TID INSULIN - HOLD GIVEN NPO   - SSI + FS    - F/U HBA1C    # GOUT - ON ALLOPURINOL    # GI PPX; DVT PPX

## 2022-09-13 NOTE — PROGRESS NOTE ADULT - SUBJECTIVE AND OBJECTIVE BOX
C A R D I O L O G Y  **********************************     DATE OF SERVICE: 22    Patient denies chest pain or shortness of breath.   Review of systems otherwise (-)  	  MEDICATIONS:  MEDICATIONS  (STANDING):  dextrose 5% + sodium chloride 0.45%. 1000 milliLiter(s) (125 mL/Hr) IV Continuous <Continuous>  heparin   Injectable 5000 Unit(s) SubCutaneous every 8 hours      LABS:	 	    CARDIAC MARKERS:                          10.9   10.27 )-----------( 300      ( 13 Sep 2022 06:30 )             39.1     Hemoglobin: 10.9 g/dL ( @ 06:30)  Hemoglobin: 10.7 g/dL ( @ 06:26)  Hemoglobin: 10.9 g/dL ( @ 12:00)          143  |  102  |  69<H>  ----------------------------<  173<H>  3.7   |  30  |  3.07<H>    Ca    8.8      13 Sep 2022 06:30  Phos  3.3           Creatinine Trend: 3.07<--, 3.30<--, 3.47<--    COAGS:   PT/INR - ( 13 Sep 2022 06:30 )   PT: 13.2 sec;   INR: 1.11 ratio         PTT - ( 13 Sep 2022 06:30 )  PTT:29.7 sec        PHYSICAL EXAM:  T(C): 37.1 (22 @ 06:26), Max: 37.1 (22 @ 06:26)  HR: 95 (22 @ 06:26) (91 - 95)  BP: 113/54 (22 @ 06:26) (113/54 - 127/59)  RR: 20 (22 @ 06:26) (18 - 20)  SpO2: 97% (22 @ 06:26) (94% - 97%)  Wt(kg): --  I&O's Summary    12 Sep 2022 07:01  -  13 Sep 2022 07:00  --------------------------------------------------------  IN: 2100 mL / OUT: 550 mL / NET: 1550 mL        HEENT:  (-)icterus (-)pallor  CV: N S1 S2 1/6 RAYMOND (+)2 Pulses B/l  Resp:  Clear to ausculatation B/L, normal effort  GI: (+) BS Soft, NT, ND  Lymph:  (-)Edema, (-)obvious lymphadenopathy  Skin: Warm to touch, Normal turgor  Psych: Appropriate mood and affect          ASSESSMENT/PLAN: 	57y  Female PMHx Severe NICM, SJM/abott  single lead ICD, normal perfusion on nuclear stress earlier this year, DM, HTN, HLD, PSHx open cholecystectomy and , presents from [facility] with nausea, vomiting and abdominal pain found with SBO.     - Reasonably well compensated from a CHF prospective  - NGT and abdominal exams per surgical team  - Strict I+O's  - Keep net even  - Bifascicular block is not knew has been seen on prior EKGs  - diet per surgical team    Manuel Gold MD, [facility]  BEEPER [phone]

## 2022-09-14 LAB
ANION GAP SERPL CALC-SCNC: 7 MMOL/L — SIGNIFICANT CHANGE UP (ref 5–17)
APPEARANCE UR: CLEAR — SIGNIFICANT CHANGE UP
BILIRUB UR-MCNC: NEGATIVE — SIGNIFICANT CHANGE UP
BUN SERPL-MCNC: 61 MG/DL — HIGH (ref 7–18)
CALCIUM SERPL-MCNC: 8.3 MG/DL — LOW (ref 8.4–10.5)
CHLORIDE SERPL-SCNC: 100 MMOL/L — SIGNIFICANT CHANGE UP (ref 96–108)
CO2 SERPL-SCNC: 30 MMOL/L — SIGNIFICANT CHANGE UP (ref 22–31)
COLOR SPEC: YELLOW — SIGNIFICANT CHANGE UP
CREAT ?TM UR-MCNC: 140 MG/DL — SIGNIFICANT CHANGE UP
CREAT SERPL-MCNC: 2.79 MG/DL — HIGH (ref 0.5–1.3)
DIFF PNL FLD: ABNORMAL
EGFR: 19 ML/MIN/1.73M2 — LOW
GLUCOSE BLDC GLUCOMTR-MCNC: 169 MG/DL — HIGH (ref 70–99)
GLUCOSE BLDC GLUCOMTR-MCNC: 196 MG/DL — HIGH (ref 70–99)
GLUCOSE BLDC GLUCOMTR-MCNC: 211 MG/DL — HIGH (ref 70–99)
GLUCOSE SERPL-MCNC: 289 MG/DL — HIGH (ref 70–99)
GLUCOSE UR QL: NEGATIVE — SIGNIFICANT CHANGE UP
HCT VFR BLD CALC: 36.7 % — SIGNIFICANT CHANGE UP (ref 34.5–45)
HGB BLD-MCNC: 10.4 G/DL — LOW (ref 11.5–15.5)
KETONES UR-MCNC: NEGATIVE — SIGNIFICANT CHANGE UP
LEUKOCYTE ESTERASE UR-ACNC: ABNORMAL
MCHC RBC-ENTMCNC: 28.3 GM/DL — LOW (ref 32–36)
MCHC RBC-ENTMCNC: 29.7 PG — SIGNIFICANT CHANGE UP (ref 27–34)
MCV RBC AUTO: 104.9 FL — HIGH (ref 80–100)
NITRITE UR-MCNC: NEGATIVE — SIGNIFICANT CHANGE UP
NRBC # BLD: 0 /100 WBCS — SIGNIFICANT CHANGE UP (ref 0–0)
PH UR: 5 — SIGNIFICANT CHANGE UP (ref 5–8)
PLATELET # BLD AUTO: 253 K/UL — SIGNIFICANT CHANGE UP (ref 150–400)
POTASSIUM SERPL-MCNC: 4.3 MMOL/L — SIGNIFICANT CHANGE UP (ref 3.5–5.3)
POTASSIUM SERPL-SCNC: 4.3 MMOL/L — SIGNIFICANT CHANGE UP (ref 3.5–5.3)
POTASSIUM UR-SCNC: 22 MMOL/L — SIGNIFICANT CHANGE UP
PROT ?TM UR-MCNC: 41 MG/DL — HIGH (ref 0–12)
PROT UR-MCNC: 30 MG/DL
RBC # BLD: 3.5 M/UL — LOW (ref 3.8–5.2)
RBC # FLD: 15.7 % — HIGH (ref 10.3–14.5)
SARS-COV-2 RNA SPEC QL NAA+PROBE: SIGNIFICANT CHANGE UP
SODIUM SERPL-SCNC: 137 MMOL/L — SIGNIFICANT CHANGE UP (ref 135–145)
SODIUM UR-SCNC: 35 MMOL/L — SIGNIFICANT CHANGE UP
SP GR SPEC: 1.01 — SIGNIFICANT CHANGE UP (ref 1.01–1.02)
UROBILINOGEN FLD QL: NEGATIVE — SIGNIFICANT CHANGE UP
WBC # BLD: 11.74 K/UL — HIGH (ref 3.8–10.5)
WBC # FLD AUTO: 11.74 K/UL — HIGH (ref 3.8–10.5)

## 2022-09-14 PROCEDURE — 71045 X-RAY EXAM CHEST 1 VIEW: CPT | Mod: 26

## 2022-09-14 RX ORDER — INSULIN LISPRO 100/ML
VIAL (ML) SUBCUTANEOUS
Refills: 0 | Status: DISCONTINUED | OUTPATIENT
Start: 2022-09-14 | End: 2022-09-15

## 2022-09-14 RX ORDER — DEXTROSE 50 % IN WATER 50 %
25 SYRINGE (ML) INTRAVENOUS ONCE
Refills: 0 | Status: DISCONTINUED | OUTPATIENT
Start: 2022-09-14 | End: 2022-09-15

## 2022-09-14 RX ORDER — ALBUTEROL 90 UG/1
2 AEROSOL, METERED ORAL EVERY 6 HOURS
Refills: 0 | Status: DISCONTINUED | OUTPATIENT
Start: 2022-09-14 | End: 2022-09-15

## 2022-09-14 RX ORDER — INSULIN LISPRO 100/ML
VIAL (ML) SUBCUTANEOUS AT BEDTIME
Refills: 0 | Status: DISCONTINUED | OUTPATIENT
Start: 2022-09-14 | End: 2022-09-15

## 2022-09-14 RX ORDER — CARVEDILOL PHOSPHATE 80 MG/1
6.25 CAPSULE, EXTENDED RELEASE ORAL EVERY 12 HOURS
Refills: 0 | Status: DISCONTINUED | OUTPATIENT
Start: 2022-09-14 | End: 2022-09-15

## 2022-09-14 RX ORDER — DEXTROSE 50 % IN WATER 50 %
15 SYRINGE (ML) INTRAVENOUS ONCE
Refills: 0 | Status: DISCONTINUED | OUTPATIENT
Start: 2022-09-14 | End: 2022-09-15

## 2022-09-14 RX ORDER — ASPIRIN/CALCIUM CARB/MAGNESIUM 324 MG
81 TABLET ORAL DAILY
Refills: 0 | Status: DISCONTINUED | OUTPATIENT
Start: 2022-09-14 | End: 2022-09-15

## 2022-09-14 RX ORDER — PANTOPRAZOLE SODIUM 20 MG/1
40 TABLET, DELAYED RELEASE ORAL DAILY
Refills: 0 | Status: DISCONTINUED | OUTPATIENT
Start: 2022-09-14 | End: 2022-09-15

## 2022-09-14 RX ORDER — DEXTROSE 50 % IN WATER 50 %
12.5 SYRINGE (ML) INTRAVENOUS ONCE
Refills: 0 | Status: DISCONTINUED | OUTPATIENT
Start: 2022-09-14 | End: 2022-09-15

## 2022-09-14 RX ORDER — GLUCAGON INJECTION, SOLUTION 0.5 MG/.1ML
1 INJECTION, SOLUTION SUBCUTANEOUS ONCE
Refills: 0 | Status: DISCONTINUED | OUTPATIENT
Start: 2022-09-14 | End: 2022-09-15

## 2022-09-14 RX ORDER — SODIUM CHLORIDE 9 MG/ML
1000 INJECTION, SOLUTION INTRAVENOUS
Refills: 0 | Status: DISCONTINUED | OUTPATIENT
Start: 2022-09-14 | End: 2022-09-15

## 2022-09-14 RX ORDER — ATORVASTATIN CALCIUM 80 MG/1
40 TABLET, FILM COATED ORAL AT BEDTIME
Refills: 0 | Status: DISCONTINUED | OUTPATIENT
Start: 2022-09-14 | End: 2022-09-15

## 2022-09-14 RX ADMIN — SODIUM CHLORIDE 125 MILLILITER(S): 9 INJECTION, SOLUTION INTRAVENOUS at 06:04

## 2022-09-14 RX ADMIN — ATORVASTATIN CALCIUM 40 MILLIGRAM(S): 80 TABLET, FILM COATED ORAL at 21:26

## 2022-09-14 RX ADMIN — HEPARIN SODIUM 5000 UNIT(S): 5000 INJECTION INTRAVENOUS; SUBCUTANEOUS at 13:31

## 2022-09-14 RX ADMIN — Medication 2: at 16:48

## 2022-09-14 RX ADMIN — Medication 81 MILLIGRAM(S): at 12:06

## 2022-09-14 RX ADMIN — HEPARIN SODIUM 5000 UNIT(S): 5000 INJECTION INTRAVENOUS; SUBCUTANEOUS at 06:03

## 2022-09-14 RX ADMIN — CARVEDILOL PHOSPHATE 6.25 MILLIGRAM(S): 80 CAPSULE, EXTENDED RELEASE ORAL at 17:16

## 2022-09-14 RX ADMIN — HEPARIN SODIUM 5000 UNIT(S): 5000 INJECTION INTRAVENOUS; SUBCUTANEOUS at 21:26

## 2022-09-14 RX ADMIN — Medication 4: at 12:07

## 2022-09-14 RX ADMIN — PANTOPRAZOLE SODIUM 40 MILLIGRAM(S): 20 TABLET, DELAYED RELEASE ORAL at 12:06

## 2022-09-14 NOTE — PROGRESS NOTE ADULT - ASSESSMENT
57y.o. Female with resolving SBO, irreducible ventral hernia    -Advance to clemencia fulls as tolerated  -Observation  -Elective hernia repair after weight loss  -OOB ambulate  -d/c planning once derrick diet    CHF  -O2 support prn  -Ventolin prn  -Restart ASA, statin, coreg  -f/u med team re: Lasix & Aldactone    CKD  -Renal function at baseline per nephrology  -Restart Sevelamer    HTN  -BP mildly hypertensive    HLD  -Statin    DM  -Resume Lantus & insulin  -f/u A1c    Gout  -Allopurinol

## 2022-09-14 NOTE — PHYSICAL THERAPY INITIAL EVALUATION ADULT - GAIT DISTANCE, PT EVAL
SPo2 on RA at rest (as pt. does not use supplemental 02 during day) was 83% which improved to 94% with guided breathing at rest. With 120ft amb, spo2 on RA decreased to 76% with pt. reported "a lot" of exertion. HR max 118. With ~3 minute seated rest and guided breathing, Spo2 on RA and HR improved to baseline

## 2022-09-14 NOTE — PROGRESS NOTE ADULT - SUBJECTIVE AND OBJECTIVE BOX
NEPHROLOGY MEDICAL CARE, [facility] - Dr. Linden Jacob/ Dr. Cruz Ledesma/ Dr. Meet Galdamez/ Dr. Jannie Recinos    Patient was seen and examined at bedside.    CC: patient is okay and NAD    Vital Signs Last 24 Hrs  T(C): 36.6 (14 Sep 2022 14:39), Max: 36.8 (14 Sep 2022 05:05)  T(F): 97.9 (14 Sep 2022 14:39), Max: 98.2 (14 Sep 2022 05:05)  HR: 81 (14 Sep 2022 14:39) (78 - 83)  BP: 116/73 (14 Sep 2022 14:39) (116/73 - 147/67)  BP(mean): --  RR: 18 (14 Sep 2022 14:39) (18 - 18)  SpO2: 96% (14 Sep 2022 14:39) (96% - 100%)    Parameters below as of 14 Sep 2022 14:39  Patient On (Oxygen Delivery Method): nasal cannula          PHYSICAL EXAM:  General: No acute respiratory distress. obese  Eyes: conjunctiva and sclera clear  ENMT: Atraumatic, Normocephalic, supple, No JVD present. Moist mucous membranes  Respiratory: Bilateral clear to percussion; No rales, rhonchi, wheezing  Cardiovascular: S1S2+; no m/r/g  Gastrointestinal: Soft, no tenderness, abdominal hernia present; Bowel sounds present  Neuro:  Awake, Alert & Oriented X3  Ext:  pedal edema, No Cyanosis  Skin: No visible rashes      MEDICATIONS:  MEDICATIONS  (STANDING):  aspirin enteric coated 81 milliGRAM(s) Oral daily  atorvastatin 40 milliGRAM(s) Oral at bedtime  carvedilol 6.25 milliGRAM(s) Oral every 12 hours  dextrose 5%. 1000 milliLiter(s) (100 mL/Hr) IV Continuous <Continuous>  dextrose 5%. 1000 milliLiter(s) (50 mL/Hr) IV Continuous <Continuous>  dextrose 50% Injectable 25 Gram(s) IV Push once  dextrose 50% Injectable 12.5 Gram(s) IV Push once  dextrose 50% Injectable 25 Gram(s) IV Push once  glucagon  Injectable 1 milliGRAM(s) IntraMuscular once  heparin   Injectable 5000 Unit(s) SubCutaneous every 8 hours  insulin lispro (ADMELOG) corrective regimen sliding scale   SubCutaneous three times a day before meals  insulin lispro (ADMELOG) corrective regimen sliding scale   SubCutaneous at bedtime  pantoprazole  Injectable 40 milliGRAM(s) IV Push daily    MEDICATIONS  (PRN):  ALBUTerol    90 MICROgram(s) HFA Inhaler 2 Puff(s) Inhalation every 6 hours PRN Shortness of Breath and/or Wheezing  dextrose Oral Gel 15 Gram(s) Oral once PRN Blood Glucose LESS THAN 70 milliGRAM(s)/deciliter  ondansetron Injectable 4 milliGRAM(s) IV Push every 6 hours PRN Nausea          LABS:                        10.4   11.74 )-----------( 253      ( 14 Sep 2022 08:10 )             36.7     09-14    137  |  100  |  61<H>  ----------------------------<  289<H>  4.3   |  30  |  2.79<H>    Ca    8.3<L>      14 Sep 2022 08:10  Phos  3.3     09-13      PT/INR - ( 13 Sep 2022 06:30 )   PT: 13.2 sec;   INR: 1.11 ratio         PTT - ( 13 Sep 2022 06:30 )  PTT:29.7 sec      Urine studies  Osmolality, Random Urine: 479 mos/kg (09-12 @ 20:23)    PTH and Vit D:

## 2022-09-14 NOTE — PROGRESS NOTE ADULT - SUBJECTIVE AND OBJECTIVE BOX
Patient is a 57y old  Female who presents with a chief complaint of   PATIENT IS SEEN AND EXAMINED IN MEDICAL FLOOR.  NGT [    ]    [place]USTIN [   ]      GT [   ]    ALLERGIES:  codeine (Urticaria)      Daily     Daily     VITALS:    Vital Signs Last 24 Hrs  T(C): 36.8 (14 Sep 2022 05:05), Max: 37.1 (13 Sep 2022 14:17)  T(F): 98.2 (14 Sep 2022 05:05), Max: 98.7 (13 Sep 2022 14:17)  HR: 80 (14 Sep 2022 05:05) (78 - 95)  BP: 132/49 (14 Sep 2022 05:05) (106/62 - 147/67)  BP(mean): --  RR: 18 (14 Sep 2022 05:05) (18 - 20)  SpO2: 100% (14 Sep 2022 05:05) (93% - 100%)    Parameters below as of 14 Sep 2022 05:05  Patient On (Oxygen Delivery Method): nasal cannula  O2 Flow (L/min): 2      LABS:    CBC Full  -  ( 14 Sep 2022 08:10 )  WBC Count : 11.74 K/uL  RBC Count : 3.50 M/uL  Hemoglobin : 10.4 g/dL  Hematocrit : 36.7 %  Platelet Count - Automated : 253 K/uL  Mean Cell Volume : 104.9 fl  Mean Cell Hemoglobin : 29.7 pg  Mean Cell Hemoglobin Concentration : 28.3 gm/dL  Auto Neutrophil # : x  Auto Lymphocyte # : x  Auto Monocyte # : x  Auto Eosinophil # : x  Auto Basophil # : x  Auto Neutrophil % : x  Auto Lymphocyte % : x  Auto Monocyte % : x  Auto Eosinophil % : x  Auto Basophil % : x    PT/INR - ( 13 Sep 2022 06:30 )   PT: 13.2 sec;   INR: 1.11 ratio         PTT - ( 13 Sep 2022 06:30 )  PTT:29.7 sec      137  |  100  |  61<H>  ----------------------------<  289<H>  4.3   |  30  |  2.79<H>    Ca    8.3<L>      14 Sep 2022 08:10  Phos  3.3     09-13      CAPILLARY BLOOD GLUCOSE              Creatinine Trend: 2.79<--, 3.07<--, 3.30<--, 3.47<--  I&O's Summary              MEDICATIONS:    MEDICATIONS  (STANDING):  aspirin enteric coated 81 milliGRAM(s) Oral daily  atorvastatin 40 milliGRAM(s) Oral at bedtime  dextrose 5%. 1000 milliLiter(s) (50 mL/Hr) IV Continuous <Continuous>  dextrose 5%. 1000 milliLiter(s) (100 mL/Hr) IV Continuous <Continuous>  dextrose 50% Injectable 25 Gram(s) IV Push once  dextrose 50% Injectable 12.5 Gram(s) IV Push once  dextrose 50% Injectable 25 Gram(s) IV Push once  glucagon  Injectable 1 milliGRAM(s) IntraMuscular once  heparin   Injectable 5000 Unit(s) SubCutaneous every 8 hours  insulin lispro (ADMELOG) corrective regimen sliding scale   SubCutaneous three times a day before meals  insulin lispro (ADMELOG) corrective regimen sliding scale   SubCutaneous at bedtime  pantoprazole  Injectable 40 milliGRAM(s) IV Push daily      MEDICATIONS  (PRN):  dextrose Oral Gel 15 Gram(s) Oral once PRN Blood Glucose LESS THAN 70 milliGRAM(s)/deciliter  ondansetron Injectable 4 milliGRAM(s) IV Push every 6 hours PRN Nausea      REVIEW OF SYSTEMS:                           ALL ROS DONE [ X   ]    CONSTITUTIONAL:  LETHARGIC [   ], FEVER [   ], UNRESPONSIVE [   ]  CVS:  CP  [   ], SOB, [   ], PALPITATIONS [   ], DIZZYNESS [   ]  RS: COUGH [   ], SPUTUM [   ]  GI: ABDOMINAL PAIN [   ], NAUSEA [   ], VOMITINGS [   ], DIARRHEA [   ], CONSTIPATION [   ]  :  DYSURIA [   ], NOCTURIA [   ], INCREASED FREQUENCY [   ], DRIBLING [   ],  SKELETAL: PAINFUL JOINTS [   ], SWOLLEN JOINTS [   ], NECK ACHE [   ], LOW BACK ACHE [   ],  SKIN : ULCERS [   ], RASH [   ], ITCHING [   ]  CNS: HEAD ACHE [   ], DOUBLE VISION [   ], BLURRED VISION [   ], AMS / CONFUSION [   ], SEIZURES [   ], WEAKNESS [   ],TINGLING / NUMBNESS [   ]      PHYSICAL EXAMINATION:  GENERAL APPEARANCE: NO DISTRESS  HEENT:  NO PALLOR, NO  JVD,  NO   NODES, NECK SUPPLE  CVS: S1 +, S2 +,   RS: AEEB,  OCCASIONAL  RALES +,   NO RONCHI  ABD: SOFT, NO, BS + , UMBILICAL HERNIA + , KARELY-UMBILICUS TTP + [IMPROVING]  EXT: PE +  SKIN: WARM,   SKELETAL:  ROM ACCEPTABLE  CNS:  AAO X 3    RADIOLOGY :    ACC: [id number] EXAM:  CT ABDOMEN AND PELVIS OC                          PROCEDURE DATE:  2022          INTERPRETATION:  CLINICAL INFORMATION: Small bowel obstruction.   Incarcerated hernia.    COMPARISON: 2022    CONTRAST/COMPLICATIONS:  IV Contrast: NONE  Oral Contrast: Gastroview  Complications: None reported at time of study completion    PROCEDURE:  CT of the Abdomen and Pelvis was performed.  Sagittal and coronal reformats were performed.    FINDINGS:  LOWER CHEST: Cardiomegaly. Small bilateral atelectasis.    LIVER: Within normal limits.  BILE DUCTS: Slight common bile duct dilatation, probably due to post   cholecystectomy status.  GALLBLADDER: Cholecystectomy clips are present.  SPLEEN: A few nonspecific small hypodense splenic lesions measuring up to   1.4 cm.  PANCREAS: Within normal limits.  ADRENALS: Mild right adrenal thickening. Grossly stable nonspecific 3.3   cm left adrenal nodule.  KIDNEYS/URETERS: Within normal limits. Indeterminate 1.8 cm slightly   hyperdense lesion in the right kidney. A few indeterminate hypodense   lesions in the kidneys bilaterally measuring up to 4.1 cm on the right.   No evidence for a calculus in the kidneys or ureters. No hydronephrosis.    BLADDER: Within normal limits.  REPRODUCTIVE ORGANS: A few myometrial masses and one of them is   calcified, likely representing uterine fibroids. The adnexa appear   grossly unremarkable.    BOWEL:  Appendix within normal limits. New NG tube terminates in the   stomach. Small hiatal hernia. No bowel obstruction. Oral contrast has   reached the rectum. Large ventral hernia is again noted with herniation   of the small bowel. Apparent segmental mural thickening of the small   bowel within the hernia and anterior abdomen may represent bowel ischemia   or a nonspecific enteritis. Clinical correlation is recommended.  PERITONEUM: No free air or ascites.  VESSELS: Mild calcified atherosclerotic disease.  RETROPERITONEUM/LYMPH NODES: No lymphadenopathy.  ABDOMINAL WALL: Please see bowel.  BONES: Degenerative spondylosis.    IMPRESSION:  No bowel obstruction. Oral contrast has reached the rectum. Large ventral   hernia is again noted with herniation of the small bowel. Apparent   segmental mural thickening of the small bowel within the hernia and   anterior abdomen may represent bowel ischemia or a nonspecific enteritis.   Clinical correlation is recommended.    Slight common bile duct dilatation, probably due to post cholecystectomy   status.    Indeterminate lesions in the kidneys bilaterally. Nonspecific 3.3 cm left   adrenal nodule. If clinically indicated, abdominal MR without and with IV   contrast may be pursued for further evaluation.    PA MsAmy Lakhwinder is informed with read back.    ASSESSMENT :       PLAN:  HPI:  57F with PMHx CAD, CHF, DM, HTN, HLD, PSHx open cholecystectomy and , presents from [facility] with nausea, vomiting and abdominal pain since Friday. Reports pain mostly lower abdomen, not sharp is a soreness.  Does not radiate. Is not worsened or lessened by anything. Pt reports she thinks the last time she had flatus/BM was Friday. When asked why patient lives in AL facility pt reports "that's a difficult question." Denies other complaints such as chest pain, shortness of breath, leg pain or swelling or urinary complaints.   Reports she has had her hernias since ~ .    (11 Sep 2022 16:17)      # SMALL BOWEL OBSTRUCTION  # R/O INCARCERATED HERNIA  # ENTERITIS  - S/P NGT, DIET ADVANCED PER SURGERY  - MONITORING ABDOMINAL EXAM  - NOTED REPEAT CT A/P  - REPLETE ELECTROLYTES  - SURGERY EVALUATION IN PROGRESS    # RIGHT BREAST MASS PLANNED S/P EXCISION ON 6/3    # NORMOCYTIC ANEMIA W/ HX OF POST-MENOPAUSAL VAGINAL BLEEDING, FIBROID UTERUS AND ANEMIA OF CKD , ANEMIA OF CHRONIC DISEASE   - TRANSFUSION THRESHOLD HGB < 7  - TREND HGB  - ON FESO4    # AMPARO ON CPAP    # HX OF CAD W/ AICD  # CHRONIC SYSTOLIC HEART FAILURE S/P AICD  # PVD, B/L LE LYMPHEDEMA  - ON ASA, STATIN, COREG  - ON LASIX, ALDACTONE - HOLD GIVEN NPO    # ANJU ON CKD STAGE 4-5  - ON SEVELAMER  - NEPHROLOGY CONSULT IN PROGRESS    # MORBID OBESITY    # COPD - CONTINUE PRN VENTOLIN    # HTN - ON COREG    # HLD - ON STATIN    # DM, UNCONTROLLED  - LANTUS + TID INSULIN - HOLD GIVEN NPO   - SSI + FS    - F/U HBA1C    # GOUT - ON ALLOPURINOL    # GI PPX; DVT PPX     Patient is a 57y old  Female who presents with a chief complaint of   PATIENT IS SEEN AND EXAMINED IN MEDICAL FLOOR.  NGT [    ]    AUSTIN [   ]      GT [   ]    ALLERGIES:  codeine (Urticaria)      Daily     Daily     VITALS:    Vital Signs Last 24 Hrs  T(C): 36.8 (14 Sep 2022 05:05), Max: 37.1 (13 Sep 2022 14:17)  T(F): 98.2 (14 Sep 2022 05:05), Max: 98.7 (13 Sep 2022 14:17)  HR: 80 (14 Sep 2022 05:05) (78 - 95)  BP: 132/49 (14 Sep 2022 05:05) (106/62 - 147/67)  BP(mean): --  RR: 18 (14 Sep 2022 05:05) (18 - 20)  SpO2: 100% (14 Sep 2022 05:05) (93% - 100%)    Parameters below as of 14 Sep 2022 05:05  Patient On (Oxygen Delivery Method): nasal cannula  O2 Flow (L/min): 2      LABS:    CBC Full  -  ( 14 Sep 2022 08:10 )  WBC Count : 11.74 K/uL  RBC Count : 3.50 M/uL  Hemoglobin : 10.4 g/dL  Hematocrit : 36.7 %  Platelet Count - Automated : 253 K/uL  Mean Cell Volume : 104.9 fl  Mean Cell Hemoglobin : 29.7 pg  Mean Cell Hemoglobin Concentration : 28.3 gm/dL  Auto Neutrophil # : x  Auto Lymphocyte # : x  Auto Monocyte # : x  Auto Eosinophil # : x  Auto Basophil # : x  Auto Neutrophil % : x  Auto Lymphocyte % : x  Auto Monocyte % : x  Auto Eosinophil % : x  Auto Basophil % : x    PT/INR - ( 13 Sep 2022 06:30 )   PT: 13.2 sec;   INR: 1.11 ratio         PTT - ( 13 Sep 2022 06:30 )  PTT:29.7 sec      137  |  100  |  61<H>  ----------------------------<  289<H>  4.3   |  30  |  2.79<H>    Ca    8.3<L>      14 Sep 2022 08:10  Phos  3.3     09-13      CAPILLARY BLOOD GLUCOSE              Creatinine Trend: 2.79<--, 3.07<--, 3.30<--, 3.47<--  I&O's Summary              MEDICATIONS:    MEDICATIONS  (STANDING):  aspirin enteric coated 81 milliGRAM(s) Oral daily  atorvastatin 40 milliGRAM(s) Oral at bedtime  dextrose 5%. 1000 milliLiter(s) (50 mL/Hr) IV Continuous <Continuous>  dextrose 5%. 1000 milliLiter(s) (100 mL/Hr) IV Continuous <Continuous>  dextrose 50% Injectable 25 Gram(s) IV Push once  dextrose 50% Injectable 12.5 Gram(s) IV Push once  dextrose 50% Injectable 25 Gram(s) IV Push once  glucagon  Injectable 1 milliGRAM(s) IntraMuscular once  heparin   Injectable 5000 Unit(s) SubCutaneous every 8 hours  insulin lispro (ADMELOG) corrective regimen sliding scale   SubCutaneous three times a day before meals  insulin lispro (ADMELOG) corrective regimen sliding scale   SubCutaneous at bedtime  pantoprazole  Injectable 40 milliGRAM(s) IV Push daily      MEDICATIONS  (PRN):  dextrose Oral Gel 15 Gram(s) Oral once PRN Blood Glucose LESS THAN 70 milliGRAM(s)/deciliter  ondansetron Injectable 4 milliGRAM(s) IV Push every 6 hours PRN Nausea      REVIEW OF SYSTEMS:                           ALL ROS DONE [ X   ]    CONSTITUTIONAL:  LETHARGIC [   ], FEVER [   ], UNRESPONSIVE [   ]  CVS:  CP  [   ], SOB, [   ], PALPITATIONS [   ], DIZZYNESS [   ]  RS: COUGH [   ], SPUTUM [   ]  GI: ABDOMINAL PAIN [   ], NAUSEA [   ], VOMITINGS [   ], DIARRHEA [   ], CONSTIPATION [   ]  :  DYSURIA [   ], NOCTURIA [   ], INCREASED FREQUENCY [   ], DRIBLING [   ],  SKELETAL: PAINFUL JOINTS [   ], SWOLLEN JOINTS [   ], NECK ACHE [   ], LOW BACK ACHE [   ],  SKIN : ULCERS [   ], RASH [   ], ITCHING [   ]  CNS: HEAD ACHE [   ], DOUBLE VISION [   ], BLURRED VISION [   ], AMS / CONFUSION [   ], SEIZURES [   ], WEAKNESS [   ],TINGLING / NUMBNESS [   ]      PHYSICAL EXAMINATION:  GENERAL APPEARANCE: NO DISTRESS  HEENT:  NO PALLOR, NO  JVD,  NO   NODES, NECK SUPPLE  CVS: S1 +, S2 +,   RS: AEEB,  OCCASIONAL  RALES +,   NO RONCHI  ABD: SOFT, NO, BS + , UMBILICAL HERNIA + , KARELY-UMBILICUS TTP + [IMPROVING]  EXT: PE +  SKIN: WARM,   SKELETAL:  ROM ACCEPTABLE  CNS:  AAO X 3    RADIOLOGY :    ACC: [id number] EXAM:  CT ABDOMEN AND PELVIS OC                          PROCEDURE DATE:  2022          INTERPRETATION:  CLINICAL INFORMATION: Small bowel obstruction.   Incarcerated hernia.    COMPARISON: 2022    CONTRAST/COMPLICATIONS:  IV Contrast: NONE  Oral Contrast: Gastroview  Complications: None reported at time of study completion    PROCEDURE:  CT of the Abdomen and Pelvis was performed.  Sagittal and coronal reformats were performed.    FINDINGS:  LOWER CHEST: Cardiomegaly. Small bilateral atelectasis.    LIVER: Within normal limits.  BILE DUCTS: Slight common bile duct dilatation, probably due to post   cholecystectomy status.  GALLBLADDER: Cholecystectomy clips are present.  SPLEEN: A few nonspecific small hypodense splenic lesions measuring up to   1.4 cm.  PANCREAS: Within normal limits.  ADRENALS: Mild right adrenal thickening. Grossly stable nonspecific 3.3   cm left adrenal nodule.  KIDNEYS/URETERS: Within normal limits. Indeterminate 1.8 cm slightly   hyperdense lesion in the right kidney. A few indeterminate hypodense   lesions in the kidneys bilaterally measuring up to 4.1 cm on the right.   No evidence for a calculus in the kidneys or ureters. No hydronephrosis.    BLADDER: Within normal limits.  REPRODUCTIVE ORGANS: A few myometrial masses and one of them is   calcified, likely representing uterine fibroids. The adnexa appear   grossly unremarkable.    BOWEL:  Appendix within normal limits. New NG tube terminates in the   stomach. Small hiatal hernia. No bowel obstruction. Oral contrast has   reached the rectum. Large ventral hernia is again noted with herniation   of the small bowel. Apparent segmental mural thickening of the small   bowel within the hernia and anterior abdomen may represent bowel ischemia   or a nonspecific enteritis. Clinical correlation is recommended.  PERITONEUM: No free air or ascites.  VESSELS: Mild calcified atherosclerotic disease.  RETROPERITONEUM/LYMPH NODES: No lymphadenopathy.  ABDOMINAL WALL: Please see bowel.  BONES: Degenerative spondylosis.    IMPRESSION:  No bowel obstruction. Oral contrast has reached the rectum. Large ventral   hernia is again noted with herniation of the small bowel. Apparent   segmental mural thickening of the small bowel within the hernia and   anterior abdomen may represent bowel ischemia or a nonspecific enteritis.   Clinical correlation is recommended.    Slight common bile duct dilatation, probably due to post cholecystectomy   status.    Indeterminate lesions in the kidneys bilaterally. Nonspecific 3.3 cm left   adrenal nodule. If clinically indicated, abdominal MR without and with IV   contrast may be pursued for further evaluation.    PA Ms. Keane is informed with read back.    ASSESSMENT :       PLAN:  HPI:  57F with PMHx CAD, CHF, DM, HTN, HLD, PSHx open cholecystectomy and , presents from [facility] with nausea, vomiting and abdominal pain since Friday. Reports pain mostly lower abdomen, not sharp is a soreness.  Does not radiate. Is not worsened or lessened by anything. Pt reports she thinks the last time she had flatus/BM was Friday. When asked why patient lives in AL facility pt reports "that's a difficult question." Denies other complaints such as chest pain, shortness of breath, leg pain or swelling or urinary complaints.   Reports she has had her hernias since ~ .    (11 Sep 2022 16:17)    # NOTED PT EVAL - RECC FOR RAFA ? -- IF PATIENT AGREEABLE D/C TO [facility] WHEN MEDICALLY STABLE    # SMALL BOWEL OBSTRUCTION  # R/O INCARCERATED HERNIA  # ENTERITIS  - S/P NGT, DIET ADVANCED PER SURGERY  - MONITORING ABDOMINAL EXAM  - NOTED REPEAT CT A/P  - REPLETE ELECTROLYTES  - SURGERY EVALUATION IN PROGRESS    # RIGHT BREAST MASS PLANNED S/P EXCISION ON 6/3    # NORMOCYTIC ANEMIA W/ HX OF POST-MENOPAUSAL VAGINAL BLEEDING, FIBROID UTERUS AND ANEMIA OF CKD , ANEMIA OF CHRONIC DISEASE   - TRANSFUSION THRESHOLD HGB < 7  - TREND HGB  - ON FESO4    # AMPARO ON CPAP    # HX OF CAD W/ AICD  # CHRONIC SYSTOLIC HEART FAILURE S/P AICD  # PVD, B/L LE LYMPHEDEMA  - ON ASA, STATIN, COREG  - ON LASIX, ALDACTONE - HOLD GIVEN NPO    # ANJU ON CKD STAGE 4-5 - IMPROVING  - ON SEVELAMER  - NEPHROLOGY CONSULT IN PROGRESS    # MORBID OBESITY    # COPD - CONTINUE PRN VENTOLIN    # HTN - ON COREG    # HLD - ON STATIN    # DM, UNCONTROLLED  - LANTUS + TID INSULIN - HOLD GIVEN NPO   - SSI + FS    - F/U HBA1C    # GOUT - ON ALLOPURINOL    # GI PPX; DVT PPX

## 2022-09-14 NOTE — PHYSICAL THERAPY INITIAL EVALUATION ADULT - DIAGNOSIS, PT EVAL
(ICF Model) Pt. present w/impairments in Body Structures/Function, incl: Strength, Balance, endurance, leading to deficits in performing the below noted Activities (Limitations).

## 2022-09-14 NOTE — PROGRESS NOTE ADULT - ASSESSMENT
1. ANJU due to pre-renal azotemia due to volume depletion. CT scan abd/pelvis shows no hydro.   -Scr is slowly improving to 2.7mg/dL and on D5 1/2 NS at 125cc/hr. Patient is presently at her baseline but with iv hydration could improve her renal function.   -some urine lytes noted.   -Adjust meds to eGFR and avoid IV Gadolinium contrast,NSAIDs, and phosphate enema.  -Monitor I/O's daily.   -Monitor SMA daily.  2. CKD stage 4 most likely due to diabetic nephropathy and hypertensive nephrosclerosis  -Previous Scr around 2.4-2.6mg/dL in June 2022 which was below her baseline scr. Baseline scr around 3 to 3.5mg/dl.  will continue to monitor.   -Keep patient euvolemic and renal diet  -Avoid Nephrotoxic Meds/ Agents such as (NSAIDs, IV contrast, Aminoglycosides such as gentamicin, -Gadolinium contrast, Phosphate containing enemas, etc..)  -Adjust Medications according to eGFR  3. Anemia iron deficiency  -hb is stable.   -F/u CBC daily  -transfuse if HB < 7.0.  4. HTN: -bp is acceptable witout bp meds  -titrate bp meds to keep sbp >110 and < 130  5. Mineral Bone Disease:  -phos is okay and f/u PTH intact   -was on calcitirol 0.25mcg daily previously.  6. Abd pain due SBO  -clear liq diet. iv fluids and s/p NGT removed.  -CT abd/pelvis with oral contrast shows resolution of SBO.   -Plan as per surgery    Discussed with patient in detail regarding the renal plan and care  Discussed the assessment and plan with Primary Team/Nurse

## 2022-09-14 NOTE — PHYSICAL THERAPY INITIAL EVALUATION ADULT - GENERAL OBSERVATIONS, REHAB EVAL
Consult received, chart reviewed. Patient received sitting EOB, NAD, +NC. Patient agreed to EVALUATION from Physical Therapist.

## 2022-09-14 NOTE — PHYSICAL THERAPY INITIAL EVALUATION ADULT - MANUAL MUSCLE TESTING RESULTS, REHAB EVAL
BUE 4+/5, except Lt shoulder 4-/5. Rt hip 3-/5, Lt hip 4/5. knees 4+/5. Ankles at least 3+/5 functionally

## 2022-09-14 NOTE — PHYSICAL THERAPY INITIAL EVALUATION ADULT - ACTIVE RANGE OF MOTION EXAMINATION, REHAB EVAL
except Rt hip ~2/3 range (pt reports chronic weakness/bilateral upper extremity Active ROM was WFL (within functional limits)

## 2022-09-14 NOTE — PHYSICAL THERAPY INITIAL EVALUATION ADULT - PLANNED THERAPY INTERVENTIONS, PT EVAL
aerobic capacity/endurance/balance training/gait training/neuromuscular re-education/ROM/strengthening/transfer training

## 2022-09-14 NOTE — PROGRESS NOTE ADULT - SUBJECTIVE AND OBJECTIVE BOX
C A R D I O L O G Y  **********************************     DATE OF SERVICE: 22    Patient denies chest pain or shortness of breath.   Review of symptoms otherwise negative.    ALBUTerol    90 MICROgram(s) HFA Inhaler 2 Puff(s) Inhalation every 6 hours PRN  aspirin enteric coated 81 milliGRAM(s) Oral daily  atorvastatin 40 milliGRAM(s) Oral at bedtime  dextrose 5%. 1000 milliLiter(s) IV Continuous <Continuous>  dextrose 5%. 1000 milliLiter(s) IV Continuous <Continuous>  dextrose 50% Injectable 25 Gram(s) IV Push once  dextrose 50% Injectable 12.5 Gram(s) IV Push once  dextrose 50% Injectable 25 Gram(s) IV Push once  dextrose Oral Gel 15 Gram(s) Oral once PRN  glucagon  Injectable 1 milliGRAM(s) IntraMuscular once  heparin   Injectable 5000 Unit(s) SubCutaneous every 8 hours  insulin lispro (ADMELOG) corrective regimen sliding scale   SubCutaneous three times a day before meals  insulin lispro (ADMELOG) corrective regimen sliding scale   SubCutaneous at bedtime  ondansetron Injectable 4 milliGRAM(s) IV Push every 6 hours PRN  pantoprazole  Injectable 40 milliGRAM(s) IV Push daily                            10.4   11.74 )-----------( 253      ( 14 Sep 2022 08:10 )             36.7       Hemoglobin: 10.4 g/dL ( @ 08:10)  Hemoglobin: 10.9 g/dL ( @ 06:30)  Hemoglobin: 10.7 g/dL ( @ 06:26)  Hemoglobin: 10.9 g/dL ( @ 12:00)          137  |  100  |  61<H>  ----------------------------<  289<H>  4.3   |  30  |  2.79<H>    Ca    8.3<L>      14 Sep 2022 08:10  Phos  3.3           Creatinine Trend: 2.79<--, 3.07<--, 3.30<--, 3.47<--    COAGS:           T(C): 36.8 (22 @ 05:05), Max: 37.1 (22 @ 14:17)  HR: 83 (22 @ 09:15) (78 - 95)  BP: 134/76 (22 @ 09:40) (106/62 - 147/67)  RR: 18 (22 @ 05:05) (18 - 20)  SpO2: 100% (22 @ 05:05) (93% - 100%)  Wt(kg): --    I&O's Summary      HEENT:  (-)icterus (-)pallor  CV: N S1 S2 1/6 RAYMOND (+)2 Pulses B/l  Resp:  Clear to ausculatation B/L, normal effort  GI: (+) BS Soft, NT, ND  Lymph:  (-)Edema, (-)obvious lymphadenopathy  Skin: Warm to touch, Normal turgor  Psych: Appropriate mood and affect          ASSESSMENT/PLAN: 	57y  Female PMHx Severe NICM, SJM/abott  single lead ICD, normal perfusion on nuclear stress earlier this year, DM, HTN, HLD, PSHx open cholecystectomy and , presents from [facility] with nausea, vomiting and abdominal pain found with SBO.     - Reasonably well compensated from a CHF prospective  - Strict I+O's, monitor real fx   - Keep net even  - please restart coreg 6.25 mg PO BID  - Bifascicular block is not knew has been seen on prior EKGs  - diet per surgical team    Manuel Gold MD, [facility]  BEEPER [phone]

## 2022-09-14 NOTE — PROGRESS NOTE ADULT - SUBJECTIVE AND OBJECTIVE BOX
INTERVAL HPI/OVERNIGHT EVENTS:  Pt resting comfortably. No acute complaints.   Tolerating clear liquid diet.   +flatus/BM.   Denies N/V.  On 2L NC.     MEDICATIONS  (STANDING):  heparin   Injectable 5000 Unit(s) SubCutaneous every 8 hours    MEDICATIONS  (PRN):  ondansetron Injectable 4 milliGRAM(s) IV Push every 6 hours PRN Nausea    Vital Signs Last 24 Hrs  T(C): 36.8 (14 Sep 2022 05:05), Max: 37.1 (13 Sep 2022 14:17)  T(F): 98.2 (14 Sep 2022 05:05), Max: 98.7 (13 Sep 2022 14:17)  HR: 80 (14 Sep 2022 05:05) (78 - 95)  BP: 132/49 (14 Sep 2022 05:05) (106/62 - 147/67)  BP(mean): --  RR: 18 (14 Sep 2022 05:05) (18 - 20)  SpO2: 100% (14 Sep 2022 05:05) (93% - 100%)    Parameters below as of 14 Sep 2022 05:05  Patient On (Oxygen Delivery Method): nasal cannula  O2 Flow (L/min): 2    Physical:  General: A&Ox3. NAD.  Abdomen: Soft, protuberant, stable irreducible hernia, nontender.    LABS:                        10.9   10.27 )-----------( 300      ( 13 Sep 2022 06:30 )             39.1             09-13    143  |  102  |  69<H>  ----------------------------<  173<H>  3.7   |  30  |  3.07<H>    Ca    8.8      13 Sep 2022 06:30  Phos  3.3     09-13

## 2022-09-15 ENCOUNTER — TRANSCRIPTION ENCOUNTER (OUTPATIENT)
Age: 57
End: 2022-09-15

## 2022-09-15 VITALS
HEART RATE: 69 BPM | TEMPERATURE: 98 F | RESPIRATION RATE: 20 BRPM | DIASTOLIC BLOOD PRESSURE: 74 MMHG | SYSTOLIC BLOOD PRESSURE: 120 MMHG | OXYGEN SATURATION: 100 %

## 2022-09-15 LAB
A1C WITH ESTIMATED AVERAGE GLUCOSE RESULT: 8.2 % — HIGH (ref 4–5.6)
ANION GAP SERPL CALC-SCNC: 7 MMOL/L — SIGNIFICANT CHANGE UP (ref 5–17)
BUN SERPL-MCNC: 54 MG/DL — HIGH (ref 7–18)
CALCIUM SERPL-MCNC: 8.4 MG/DL — SIGNIFICANT CHANGE UP (ref 8.4–10.5)
CHLORIDE SERPL-SCNC: 102 MMOL/L — SIGNIFICANT CHANGE UP (ref 96–108)
CO2 SERPL-SCNC: 31 MMOL/L — SIGNIFICANT CHANGE UP (ref 22–31)
CREAT SERPL-MCNC: 2.43 MG/DL — HIGH (ref 0.5–1.3)
EGFR: 22 ML/MIN/1.73M2 — LOW
ESTIMATED AVERAGE GLUCOSE: 189 MG/DL — HIGH (ref 68–114)
GLUCOSE BLDC GLUCOMTR-MCNC: 171 MG/DL — HIGH (ref 70–99)
GLUCOSE BLDC GLUCOMTR-MCNC: 223 MG/DL — HIGH (ref 70–99)
GLUCOSE SERPL-MCNC: 168 MG/DL — HIGH (ref 70–99)
HCT VFR BLD CALC: 33 % — LOW (ref 34.5–45)
HGB BLD-MCNC: 9.2 G/DL — LOW (ref 11.5–15.5)
MCHC RBC-ENTMCNC: 27.9 GM/DL — LOW (ref 32–36)
MCHC RBC-ENTMCNC: 29.5 PG — SIGNIFICANT CHANGE UP (ref 27–34)
MCV RBC AUTO: 105.8 FL — HIGH (ref 80–100)
NRBC # BLD: 0 /100 WBCS — SIGNIFICANT CHANGE UP (ref 0–0)
PLATELET # BLD AUTO: 216 K/UL — SIGNIFICANT CHANGE UP (ref 150–400)
POTASSIUM SERPL-MCNC: 3.8 MMOL/L — SIGNIFICANT CHANGE UP (ref 3.5–5.3)
POTASSIUM SERPL-SCNC: 3.8 MMOL/L — SIGNIFICANT CHANGE UP (ref 3.5–5.3)
RBC # BLD: 3.12 M/UL — LOW (ref 3.8–5.2)
RBC # FLD: 15.5 % — HIGH (ref 10.3–14.5)
SODIUM SERPL-SCNC: 140 MMOL/L — SIGNIFICANT CHANGE UP (ref 135–145)
UUN UR-MCNC: 907 MG/DL — SIGNIFICANT CHANGE UP
WBC # BLD: 6.06 K/UL — SIGNIFICANT CHANGE UP (ref 3.8–10.5)
WBC # FLD AUTO: 6.06 K/UL — SIGNIFICANT CHANGE UP (ref 3.8–10.5)

## 2022-09-15 RX ORDER — POLYETHYLENE GLYCOL 3350 17 G/17G
17 POWDER, FOR SOLUTION ORAL ONCE
Refills: 0 | Status: COMPLETED | OUTPATIENT
Start: 2022-09-15 | End: 2022-09-15

## 2022-09-15 RX ADMIN — PANTOPRAZOLE SODIUM 40 MILLIGRAM(S): 20 TABLET, DELAYED RELEASE ORAL at 11:44

## 2022-09-15 RX ADMIN — Medication 81 MILLIGRAM(S): at 11:44

## 2022-09-15 RX ADMIN — Medication 4: at 11:44

## 2022-09-15 RX ADMIN — CARVEDILOL PHOSPHATE 6.25 MILLIGRAM(S): 80 CAPSULE, EXTENDED RELEASE ORAL at 06:20

## 2022-09-15 RX ADMIN — Medication 2: at 08:30

## 2022-09-15 RX ADMIN — HEPARIN SODIUM 5000 UNIT(S): 5000 INJECTION INTRAVENOUS; SUBCUTANEOUS at 06:20

## 2022-09-15 NOTE — DISCHARGE NOTE NURSING/CASE MANAGEMENT/SOCIAL WORK - NSDCPEFALRISK_GEN_ALL_CORE
For information on Fall & Injury Prevention, visit: [email or web address] OR  [email or web address] OR  https://www.cdc.gov/steadi/patient.html

## 2022-09-15 NOTE — DISCHARGE NOTE PROVIDER - NSDCFUSCHEDAPPT_GEN_ALL_CORE_FT
Quang Sauceda  [facility]  [facility] [address] [facility]  Scheduled Appointment: 10/17/2022

## 2022-09-15 NOTE — PROGRESS NOTE ADULT - PROVIDER SPECIALTY LIST ADULT
Cardiology
Cardiology
Internal Medicine
Nephrology
Surgery
Surgery
Cardiology
Internal Medicine
Nephrology
Surgery
Surgery

## 2022-09-15 NOTE — PROGRESS NOTE ADULT - NS ATTEND AMEND GEN_ALL_CORE FT
Pt seen and examined. Agree with note as outlined above by TAMI Alvarado. Reports feels better overall. Tolerating diet, just advanced to low residue soft foods. Has been up and ambulating.  Abd exam: obese, lower abdomen with large chronically incarcerated incisional hernia. Mild discomfort on palpation. No guarding no rebound.  Plan for discharge to [facility] once pt medically stable. If tolerates diet, stable for d/c from surgical standpoint. Recommend outpatient followup for medical/surgical weight loss to enable complex hernia repair.
Pt seen and examined. Laying in bed, son at bedside. Asked son why pt lives in assisted living, he reports it is because she cannot bear weight on joints and has significant trouble with ambulating. Pt with morbid obesity, BMI> 50, weight a significant contributing factor.  NGT output- clear, gastric contents, non bilious, NGT removed.  Abd- obese. Lower abd hernia mildly tender with deep palpation. No guarding no rebound.  CT shows resolution of SBO, + enteritis. repeat lac 1.4. Doubt ischemic bowel within hernia  Trial clears  Consult PT to assist with mobilization OOB, pt reports uses a walker.
Pt seen and examined. Sitting up in bed. Ambulated with Physical therapy. +flatus/BM. Denies N/V tolerating clears.  Abd- obese, large hernia with mild tenderness on palpation no guarding no rebound.   Advance diet as tolerated, resume home meds per medicine
Pt seen and examined. CT completed. Demonstrates contrast passing through bowel distal to hernia and no evidence of SBO. Does demonstrate bowel thickening however, present in small bowel within hernia and also within abdominal cavity.   Reports is passing flatus and had a BM. Repeat lactate stable at 1.4. Doubt bowel ischemic, likely enteritis. d/c NGT. If possible obtain stool studies. Trial clears in AM.

## 2022-09-15 NOTE — PROGRESS NOTE ADULT - SUBJECTIVE AND OBJECTIVE BOX
Patient is a 57y old  Female who presents with a chief complaint of   PATIENT IS SEEN AND EXAMINED IN MEDICAL FLOOR.  NGT [    ]    [place] [   ]      GT [   ]    ALLERGIES:  codeine (Urticaria)      Daily     Daily     VITALS:    Vital Signs Last 24 Hrs  T(C): 36.5 (15 Sep 2022 06:24), Max: 36.8 (14 Sep 2022 20:40)  T(F): 97.7 (15 Sep 2022 06:24), Max: 98.3 (14 Sep 2022 20:40)  HR: 84 (15 Sep 2022 06:24) (77 - 84)  BP: 115/48 (15 Sep 2022 06:24) (115/48 - 126/63)  BP(mean): 64 (15 Sep 2022 06:24) (64 - 64)  RR: 20 (15 Sep 2022 06:24) (18 - 20)  SpO2: 98% (15 Sep 2022 06:24) (95% - 98%)    Parameters below as of 15 Sep 2022 06:24  Patient On (Oxygen Delivery Method): BiPAP/CPAP        LABS:    CBC Full  -  ( 15 Sep 2022 05:53 )  WBC Count : 6.06 K/uL  RBC Count : 3.12 M/uL  Hemoglobin : 9.2 g/dL  Hematocrit : 33.0 %  Platelet Count - Automated : 216 K/uL  Mean Cell Volume : 105.8 fl  Mean Cell Hemoglobin : 29.5 pg  Mean Cell Hemoglobin Concentration : 27.9 gm/dL  Auto Neutrophil # : x  Auto Lymphocyte # : x  Auto Monocyte # : x  Auto Eosinophil # : x  Auto Basophil # : x  Auto Neutrophil % : x  Auto Lymphocyte % : x  Auto Monocyte % : x  Auto Eosinophil % : x  Auto Basophil % : x      -15    140  |  102  |  54<H>  ----------------------------<  168<H>  3.8   |  31  |  2.43<H>    Ca    8.4      15 Sep 2022 05:53      CAPILLARY BLOOD GLUCOSE      POCT Blood Glucose.: 171 mg/dL (15 Sep 2022 07:52)  POCT Blood Glucose.: 196 mg/dL (14 Sep 2022 21:18)  POCT Blood Glucose.: 169 mg/dL (14 Sep 2022 16:29)  POCT Blood Glucose.: 211 mg/dL (14 Sep 2022 12:01)          Creatinine Trend: 2.43<--, 2.79<--, 3.07<--, 3.30<--, 3.47<--  I&O's Summary              MEDICATIONS:    MEDICATIONS  (STANDING):  aspirin enteric coated 81 milliGRAM(s) Oral daily  atorvastatin 40 milliGRAM(s) Oral at bedtime  carvedilol 6.25 milliGRAM(s) Oral every 12 hours  dextrose 5%. 1000 milliLiter(s) (100 mL/Hr) IV Continuous <Continuous>  dextrose 5%. 1000 milliLiter(s) (50 mL/Hr) IV Continuous <Continuous>  dextrose 50% Injectable 25 Gram(s) IV Push once  dextrose 50% Injectable 12.5 Gram(s) IV Push once  dextrose 50% Injectable 25 Gram(s) IV Push once  glucagon  Injectable 1 milliGRAM(s) IntraMuscular once  heparin   Injectable 5000 Unit(s) SubCutaneous every 8 hours  insulin lispro (ADMELOG) corrective regimen sliding scale   SubCutaneous three times a day before meals  insulin lispro (ADMELOG) corrective regimen sliding scale   SubCutaneous at bedtime  pantoprazole  Injectable 40 milliGRAM(s) IV Push daily      MEDICATIONS  (PRN):  ALBUTerol    90 MICROgram(s) HFA Inhaler 2 Puff(s) Inhalation every 6 hours PRN Shortness of Breath and/or Wheezing  dextrose Oral Gel 15 Gram(s) Oral once PRN Blood Glucose LESS THAN 70 milliGRAM(s)/deciliter  ondansetron Injectable 4 milliGRAM(s) IV Push every 6 hours PRN Nausea      REVIEW OF SYSTEMS:                           ALL ROS DONE [ X   ]    CONSTITUTIONAL:  LETHARGIC [   ], FEVER [   ], UNRESPONSIVE [   ]  CVS:  CP  [   ], SOB, [   ], PALPITATIONS [   ], DIZZYNESS [   ]  RS: COUGH [   ], SPUTUM [   ]  GI: ABDOMINAL PAIN [   ], NAUSEA [   ], VOMITINGS [   ], DIARRHEA [   ], CONSTIPATION [   ]  :  DYSURIA [   ], NOCTURIA [   ], INCREASED FREQUENCY [   ], DRIBLING [   ],  SKELETAL: PAINFUL JOINTS [   ], SWOLLEN JOINTS [   ], NECK ACHE [   ], LOW BACK ACHE [   ],  SKIN : ULCERS [   ], RASH [   ], ITCHING [   ]  CNS: HEAD ACHE [   ], DOUBLE VISION [   ], BLURRED VISION [   ], AMS / CONFUSION [   ], SEIZURES [   ], WEAKNESS [   ],TINGLING / NUMBNESS [   ]      PHYSICAL EXAMINATION:  GENERAL APPEARANCE: NO DISTRESS  HEENT:  NO PALLOR, NO  JVD,  NO   NODES, NECK SUPPLE  CVS: S1 +, S2 +,   RS: AEEB,  OCCASIONAL  RALES +,   NO RONCHI  ABD: SOFT, NO, BS + , UMBILICAL HERNIA + , KARELY-UMBILICUS TTP + [IMPROVING]  EXT: PE +  SKIN: WARM,   SKELETAL:  ROM ACCEPTABLE  CNS:  AAO X 3    RADIOLOGY :    ACC: [id number] EXAM:  CT ABDOMEN AND PELVIS OC                          PROCEDURE DATE:  2022          INTERPRETATION:  CLINICAL INFORMATION: Small bowel obstruction.   Incarcerated hernia.    COMPARISON: 2022    CONTRAST/COMPLICATIONS:  IV Contrast: NONE  Oral Contrast: Gastroview  Complications: None reported at time of study completion    PROCEDURE:  CT of the Abdomen and Pelvis was performed.  Sagittal and coronal reformats were performed.    FINDINGS:  LOWER CHEST: Cardiomegaly. Small bilateral atelectasis.    LIVER: Within normal limits.  BILE DUCTS: Slight common bile duct dilatation, probably due to post   cholecystectomy status.  GALLBLADDER: Cholecystectomy clips are present.  SPLEEN: A few nonspecific small hypodense splenic lesions measuring up to   1.4 cm.  PANCREAS: Within normal limits.  ADRENALS: Mild right adrenal thickening. Grossly stable nonspecific 3.3   cm left adrenal nodule.  KIDNEYS/URETERS: Within normal limits. Indeterminate 1.8 cm slightly   hyperdense lesion in the right kidney. A few indeterminate hypodense   lesions in the kidneys bilaterally measuring up to 4.1 cm on the right.   No evidence for a calculus in the kidneys or ureters. No hydronephrosis.    BLADDER: Within normal limits.  REPRODUCTIVE ORGANS: A few myometrial masses and one of them is   calcified, likely representing uterine fibroids. The adnexa appear   grossly unremarkable.    BOWEL:  Appendix within normal limits. New NG tube terminates in the   stomach. Small hiatal hernia. No bowel obstruction. Oral contrast has   reached the rectum. Large ventral hernia is again noted with herniation   of the small bowel. Apparent segmental mural thickening of the small   bowel within the hernia and anterior abdomen may represent bowel ischemia   or a nonspecific enteritis. Clinical correlation is recommended.  PERITONEUM: No free air or ascites.  VESSELS: Mild calcified atherosclerotic disease.  RETROPERITONEUM/LYMPH NODES: No lymphadenopathy.  ABDOMINAL WALL: Please see bowel.  BONES: Degenerative spondylosis.    IMPRESSION:  No bowel obstruction. Oral contrast has reached the rectum. Large ventral   hernia is again noted with herniation of the small bowel. Apparent   segmental mural thickening of the small bowel within the hernia and   anterior abdomen may represent bowel ischemia or a nonspecific enteritis.   Clinical correlation is recommended.    Slight common bile duct dilatation, probably due to post cholecystectomy   status.    Indeterminate lesions in the kidneys bilaterally. Nonspecific 3.3 cm left   adrenal nodule. If clinically indicated, abdominal MR without and with IV   contrast may be pursued for further evaluation.    PA Ms. Keane is informed with read back.    ASSESSMENT :       PLAN:  HPI:  57F with PMHx CAD, CHF, DM, HTN, HLD, PSHx open cholecystectomy and , presents from [facility] with nausea, vomiting and abdominal pain since Friday. Reports pain mostly lower abdomen, not sharp is a soreness.  Does not radiate. Is not worsened or lessened by anything. Pt reports she thinks the last time she had flatus/BM was Friday. When asked why patient lives in AL facility pt reports "that's a difficult question." Denies other complaints such as chest pain, shortness of breath, leg pain or swelling or urinary complaints.   Reports she has had her hernias since ~ .    (11 Sep 2022 16:17)    # NOTED PT EVAL - RECC FOR RAFA ? -- IF PATIENT AGREEABLE D/C TO [facility] WHEN MEDICALLY STABLE    # SMALL BOWEL OBSTRUCTION  # R/O INCARCERATED HERNIA  # ENTERITIS  - S/P NGT, DIET ADVANCED PER SURGERY  - MONITORING ABDOMINAL EXAM  - NOTED REPEAT CT A/P  - REPLETE ELECTROLYTES  - SURGERY EVALUATION IN PROGRESS    # RIGHT BREAST MASS PLANNED S/P EXCISION ON 6/3    # NORMOCYTIC ANEMIA W/ HX OF POST-MENOPAUSAL VAGINAL BLEEDING, FIBROID UTERUS AND ANEMIA OF CKD , ANEMIA OF CHRONIC DISEASE   - TRANSFUSION THRESHOLD HGB < 7  - TREND HGB  - ON FESO4    # AMPARO ON CPAP    # HX OF CAD W/ AICD  # CHRONIC SYSTOLIC HEART FAILURE S/P AICD  # PVD, B/L LE LYMPHEDEMA  - ON ASA, STATIN, COREG  - ON LASIX, ALDACTONE - HOLD GIVEN NPO    # ANJU ON CKD STAGE 4-5 - IMPROVING  - ON SEVELAMER  - NEPHROLOGY CONSULT IN PROGRESS    # MORBID OBESITY    # COPD - CONTINUE PRN VENTOLIN    # HTN - ON COREG    # HLD - ON STATIN    # DM, UNCONTROLLED  - LANTUS + TID INSULIN - HOLD GIVEN NPO   - SSI + FS    - F/U HBA1C    # GOUT - ON ALLOPURINOL    # GI PPX; DVT PPX

## 2022-09-15 NOTE — DISCHARGE NOTE PROVIDER - CARE PROVIDER_API CALL
Tala Kessler)  Surgery  [address]  [place] [address]  Phone: [phone]  Fax: [phone]  Follow Up Time:

## 2022-09-15 NOTE — DISCHARGE NOTE PROVIDER - ATTENDING DISCHARGE PHYSICAL EXAMINATION:
Pt seen and examined. Reports feeling fine, no complaints. Reports passing flatus and having bowel movements. NAD, afebrile, HD stable. Abd- obese, non distended. Large chronically incarcerated abdominal wall hernia non tender on palpation. No guarding no rebound.  Pt stable for discharge to rehab. Follow up as outpatient for medical/surgical weight loss to prior to hernia repair.

## 2022-09-15 NOTE — DISCHARGE NOTE PROVIDER - HOSPITAL COURSE
57F with PMHx CAD, CHF, DM, HTN, HLD, PSHx open cholecystectomy and , presents from [facility] facility with nausea, vomiting and abdominal pain since Friday. Reports pain mostly lower abdomen, not sharp is a soreness.  Does not radiate. Is not worsened or lessened by anything. Pt reports she thinks the last time she had flatus/BM was Friday. When asked why patient lives in AL facility pt reports "that's a difficult question." Denies other complaints such as chest pain, shortness of breath, leg pain or swelling or urinary complaints.   Reports she has had her hernias since ~ .   CT abd/pelvis done in ED and showed:  < from: CT Abdomen and Pelvis No Cont (22 @ 13:51) >    FINDINGS:  LOWER CHEST: Cardiac pacemaker leads. Cardiomegaly.    LIVER: Within normal limits.  BILE DUCTS: Normal caliber.  GALLBLADDER: Cholecystectomy.  SPLEEN: Within normal limits.  PANCREAS: Within normal limits.  ADRENALS: Stable 3.1 x 2.5 cm low-density left adrenal nodule.  KIDNEYS/URETERS: Bilateral atrophy redemonstrated. No   hydroureteronephrosis. Stable 1.2 cm right lower pole hyperdensity and   3.7 cm right midpole cyst.    BLADDER: Within normal limits.  REPRODUCTIVE ORGANS: Enlarged multi fibroid uterus reidentified.    BOWEL: No bowel obstruction. Appendix is not visualized. No evidence of   inflammation in the pericecal region.. Dilated fluid-filled small bowel   loops within the ventral hernia. Adjacent mesenteric fat stranding and   subcutaneous fat stranding about the hernia sac. Transition point within   the right side of the hernia sac (2:92).  PERITONEUM: No ascites.  VESSELS: Within normal limits.  RETROPERITONEUM/LYMPH NODES: No lymphadenopathy.  ABDOMINAL WALL: Within normal limits.  BONES: Degenerative changes. L4-5 hypertrophic spinal stenosis.    IMPRESSION:  Incarcerated ventral hernia resulting in small bowel obstruction.    Other chronic findings as above.    Findings were discussed with Dr. JÚNIOR BEARD [phone] 2022   2:16 PM by Dr. Erika Jones with read back confirmation.    --- End of Report ---    < end of copied text >    Pt admitted to surgical services, SBO was treated conservatively. Rpt CT abd/ pelvis w/ PO contrast showed resolution of SBO. NGT removed, bowel function returned, diet was advanced as tolerated. Pt currently stable for discharge, pt to be discharged to [facility]. Pt to follow up with Dr. Kessler in office for elective ventral hernia repair. 57F with PMHx CAD, CHF, DM, HTN, HLD, PSHx open cholecystectomy and , presents from [facility] facility with nausea, vomiting and abdominal pain since Friday. Reports pain mostly lower abdomen, not sharp is a soreness.  Does not radiate. Is not worsened or lessened by anything. Pt reports she thinks the last time she had flatus/BM was Friday. When asked why patient lives in AL facility pt reports "that's a difficult question." Denies other complaints such as chest pain, shortness of breath, leg pain or swelling or urinary complaints.   Reports she has had her hernias since ~ .   CT abd/pelvis done in ED and showed:  < from: CT Abdomen and Pelvis No Cont (22 @ 13:51) >    FINDINGS:  LOWER CHEST: Cardiac pacemaker leads. Cardiomegaly.    LIVER: Within normal limits.  BILE DUCTS: Normal caliber.  GALLBLADDER: Cholecystectomy.  SPLEEN: Within normal limits.  PANCREAS: Within normal limits.  ADRENALS: Stable 3.1 x 2.5 cm low-density left adrenal nodule.  KIDNEYS/URETERS: Bilateral atrophy redemonstrated. No   hydroureteronephrosis. Stable 1.2 cm right lower pole hyperdensity and   3.7 cm right midpole cyst.    BLADDER: Within normal limits.  REPRODUCTIVE ORGANS: Enlarged multi fibroid uterus reidentified.    BOWEL: No bowel obstruction. Appendix is not visualized. No evidence of   inflammation in the pericecal region.. Dilated fluid-filled small bowel   loops within the ventral hernia. Adjacent mesenteric fat stranding and   subcutaneous fat stranding about the hernia sac. Transition point within   the right side of the hernia sac (2:92).  PERITONEUM: No ascites.  VESSELS: Within normal limits.  RETROPERITONEUM/LYMPH NODES: No lymphadenopathy.  ABDOMINAL WALL: Within normal limits.  BONES: Degenerative changes. L4-5 hypertrophic spinal stenosis.    IMPRESSION:  Incarcerated ventral hernia resulting in small bowel obstruction.    Other chronic findings as above.    Findings were discussed with Dr. JÚNIOR BEARD [phone] 2022   2:16 PM by Dr. Erika Jones with read back confirmation.    --- End of Report ---    < end of copied text >    Pt admitted to surgical services, SBO was treated conservatively. Rpt CT abd/ pelvis w/ PO contrast showed resolution of SBO. NGT removed, bowel function returned, diet was advanced as tolerated.  Pt also follwoed by Medicine team and restarted back on home medications as indicated. Pt currently stable for discharge, pt to be discharged to [facility]. Pt to follow up with Dr. Kessler in office for elective ventral hernia repair.

## 2022-09-15 NOTE — DISCHARGE NOTE PROVIDER - NSDCCPCAREPLAN_GEN_ALL_CORE_FT
PRINCIPAL DISCHARGE DIAGNOSIS  Diagnosis: Incarcerated hernia  Assessment and Plan of Treatment: resolved

## 2022-09-15 NOTE — PROGRESS NOTE ADULT - ASSESSMENT
57y.o. Female with resoled SBO, irreducible ventral hernia    -C/w clemencia fulls   -Elective hernia repair after weight loss  -OOB ambulate  -DVT ppx   -d/c planning       CHF  -O2 support prn  -Ventolin prn  -ASA, statin, coreg  -f/u med team re: Lasix & Aldactone    CKD  -Renal function at baseline per nephrology  -Sevelamer    HTN  -BP mildly hypertensive    HLD  -Statin    DM  -Lantus & insulin  -f/u A1c    Gout  -Allopurinol

## 2022-09-15 NOTE — PROGRESS NOTE ADULT - SUBJECTIVE AND OBJECTIVE BOX
C A R D I O L O G Y  **********************************     DATE OF SERVICE: 09-15-22    Patient denies chest pain or shortness of breath.   Review of symptoms otherwise negative.    ALBUTerol    90 MICROgram(s) HFA Inhaler 2 Puff(s) Inhalation every 6 hours PRN  aspirin enteric coated 81 milliGRAM(s) Oral daily  atorvastatin 40 milliGRAM(s) Oral at bedtime  carvedilol 6.25 milliGRAM(s) Oral every 12 hours  dextrose 5%. 1000 milliLiter(s) IV Continuous <Continuous>  dextrose 5%. 1000 milliLiter(s) IV Continuous <Continuous>  dextrose 50% Injectable 25 Gram(s) IV Push once  dextrose 50% Injectable 12.5 Gram(s) IV Push once  dextrose 50% Injectable 25 Gram(s) IV Push once  dextrose Oral Gel 15 Gram(s) Oral once PRN  glucagon  Injectable 1 milliGRAM(s) IntraMuscular once  heparin   Injectable 5000 Unit(s) SubCutaneous every 8 hours  insulin lispro (ADMELOG) corrective regimen sliding scale   SubCutaneous three times a day before meals  insulin lispro (ADMELOG) corrective regimen sliding scale   SubCutaneous at bedtime  ondansetron Injectable 4 milliGRAM(s) IV Push every 6 hours PRN  pantoprazole  Injectable 40 milliGRAM(s) IV Push daily                            9.2    6.06  )-----------( 216      ( 15 Sep 2022 05:53 )             33.0       Hemoglobin: 9.2 g/dL (09-15 @ 05:53)  Hemoglobin: 10.4 g/dL ( @ 08:10)  Hemoglobin: 10.9 g/dL ( @ 06:30)  Hemoglobin: 10.7 g/dL ( @ 06:26)  Hemoglobin: 10.9 g/dL ( @ 12:00)      09-15    140  |  102  |  54<H>  ----------------------------<  168<H>  3.8   |  31  |  2.43<H>    Ca    8.4      15 Sep 2022 05:53      Creatinine Trend: 2.43<--, 2.79<--, 3.07<--, 3.30<--, 3.47<--    COAGS:           T(C): 36.5 (09-15-22 @ 06:24), Max: 36.8 (22 @ 20:40)  HR: 84 (09-15-22 @ 06:24) (77 - 84)  BP: 115/48 (09-15-22 @ 06:24) (115/48 - 126/63)  RR: 20 (09-15-22 @ 06:24) (18 - 20)  SpO2: 98% (09-15-22 @ 06:24) (95% - 98%)  Wt(kg): --    I&O's Summary      HEENT:  (-)icterus (-)pallor  CV: N S1 S2 1/6 RAYMOND (+)2 Pulses B/l  Resp:  Clear to ausculatation B/L, normal effort  GI: (+) BS Soft, NT, ND  Lymph:  (-)Edema, (-)obvious lymphadenopathy  Skin: Warm to touch, Normal turgor  Psych: Appropriate mood and affect          ASSESSMENT/PLAN: 	57y  Female PMHx Severe NICM, SJM/abott  single lead ICD, normal perfusion on nuclear stress earlier this year, DM, HTN, HLD, PSHx open cholecystectomy and , presents from [facility] with nausea, vomiting and abdominal pain found with SBO.     - Reasonably well compensated from a CHF prospective  - Strict I+O's, monitor real fx   - Keep net even  - tolerting coreg 6.25 mg PO BID  - Bifascicular block is not knew has been seen on prior EKGs  - diet per surgical team  - Crt appears at baseline would restart Losartan 25 mg PO daily and Lasix 40 mg PO BID if tolerating diet    Manuel Gold MD, [facility]  BEEPER [phone]

## 2022-09-15 NOTE — PROGRESS NOTE ADULT - NS ATTEND OPT1A GEN_ALL_CORE
Exam/Medical decision making
History/Exam/Medical decision making
History/Exam/Medical decision making
Exam/Medical decision making

## 2022-09-15 NOTE — PROGRESS NOTE ADULT - NSPROGADDITIONALINFOA_GEN_ALL_CORE
Pt seen and examined. Agree with note as outlined above by TAMI Alvarado. Reports feels better overall. Tolerating diet, just advanced to low residue soft foods. Has been up and ambulating.  Abd exam: obese, lower abdomen with large chronically incarcerated incisional hernia. Mild discomfort on palpation. No guarding no rebound.  Plan for discharge to [facility] once pt medically stable. If tolerates diet, stable for d/c from surgical standpoint. Recommend outpatient followup for medical/surgical weight loss to enable complex hernia repair.
Pt seen and examined. CT completed. Demonstrates contrast passing through bowel distal to hernia and no evidence of SBO. Does demonstrate bowel thickening however, present in small bowel within hernia and also within abdominal cavity.   Reports is passing flatus and had a BM. Repeat lactate stable at 1.4. Doubt bowel ischemic, likely enteritis. d/c NGT. If possible obtain stool studies. Trial clears in AM.

## 2022-09-16 LAB
CULTURE RESULTS: SIGNIFICANT CHANGE UP
SPECIMEN SOURCE: SIGNIFICANT CHANGE UP

## 2022-09-20 PROCEDURE — 83970 ASSAY OF PARATHORMONE: CPT

## 2022-09-20 PROCEDURE — 82962 GLUCOSE BLOOD TEST: CPT

## 2022-09-20 PROCEDURE — 83935 ASSAY OF URINE OSMOLALITY: CPT

## 2022-09-20 PROCEDURE — 86850 RBC ANTIBODY SCREEN: CPT

## 2022-09-20 PROCEDURE — 87635 SARS-COV-2 COVID-19 AMP PRB: CPT

## 2022-09-20 PROCEDURE — 71045 X-RAY EXAM CHEST 1 VIEW: CPT

## 2022-09-20 PROCEDURE — 96374 THER/PROPH/DIAG INJ IV PUSH: CPT

## 2022-09-20 PROCEDURE — 83605 ASSAY OF LACTIC ACID: CPT

## 2022-09-20 PROCEDURE — 87086 URINE CULTURE/COLONY COUNT: CPT

## 2022-09-20 PROCEDURE — 84133 ASSAY OF URINE POTASSIUM: CPT

## 2022-09-20 PROCEDURE — 84300 ASSAY OF URINE SODIUM: CPT

## 2022-09-20 PROCEDURE — 85027 COMPLETE CBC AUTOMATED: CPT

## 2022-09-20 PROCEDURE — 85730 THROMBOPLASTIN TIME PARTIAL: CPT

## 2022-09-20 PROCEDURE — 36415 COLL VENOUS BLD VENIPUNCTURE: CPT

## 2022-09-20 PROCEDURE — 96375 TX/PRO/DX INJ NEW DRUG ADDON: CPT

## 2022-09-20 PROCEDURE — 86900 BLOOD TYPING SEROLOGIC ABO: CPT

## 2022-09-20 PROCEDURE — 80048 BASIC METABOLIC PNL TOTAL CA: CPT

## 2022-09-20 PROCEDURE — 74176 CT ABD & PELVIS W/O CONTRAST: CPT

## 2022-09-20 PROCEDURE — 82009 KETONE BODYS QUAL: CPT

## 2022-09-20 PROCEDURE — 86901 BLOOD TYPING SEROLOGIC RH(D): CPT

## 2022-09-20 PROCEDURE — 97162 PT EVAL MOD COMPLEX 30 MIN: CPT

## 2022-09-20 PROCEDURE — 80053 COMPREHEN METABOLIC PANEL: CPT

## 2022-09-20 PROCEDURE — 84484 ASSAY OF TROPONIN QUANT: CPT

## 2022-09-20 PROCEDURE — 84156 ASSAY OF PROTEIN URINE: CPT

## 2022-09-20 PROCEDURE — 94660 CPAP INITIATION&MGMT: CPT

## 2022-09-20 PROCEDURE — 99285 EMERGENCY DEPT VISIT HI MDM: CPT | Mod: 25

## 2022-09-20 PROCEDURE — 93005 ELECTROCARDIOGRAM TRACING: CPT

## 2022-09-20 PROCEDURE — 84100 ASSAY OF PHOSPHORUS: CPT

## 2022-09-20 PROCEDURE — 83880 ASSAY OF NATRIURETIC PEPTIDE: CPT

## 2022-09-20 PROCEDURE — 83036 HEMOGLOBIN GLYCOSYLATED A1C: CPT

## 2022-09-20 PROCEDURE — 82310 ASSAY OF CALCIUM: CPT

## 2022-09-20 PROCEDURE — 85610 PROTHROMBIN TIME: CPT

## 2022-09-20 PROCEDURE — 85025 COMPLETE CBC W/AUTO DIFF WBC: CPT

## 2022-09-20 PROCEDURE — 81001 URINALYSIS AUTO W/SCOPE: CPT

## 2022-09-20 PROCEDURE — 84540 ASSAY OF URINE/UREA-N: CPT

## 2022-09-20 PROCEDURE — 83735 ASSAY OF MAGNESIUM: CPT

## 2022-09-20 PROCEDURE — 82570 ASSAY OF URINE CREATININE: CPT

## 2022-10-17 ENCOUNTER — APPOINTMENT (OUTPATIENT)
Dept: SURGERY | Facility: CLINIC | Age: 57
End: 2022-10-17

## 2022-12-22 NOTE — DISCHARGE NOTE PROVIDER - PROVIDER RX CONTACT NUMBER
You can access the FollowMyHealth Patient Portal offered by [facility] by registering at the following website: [email or web address]. By joining [facility]’s FollowMyHealth portal, you will also be able to view your health information using other applications (apps) compatible with our system. [phone]

## 2023-01-01 ENCOUNTER — TRANSCRIPTION ENCOUNTER (OUTPATIENT)
Age: 58
End: 2023-01-01

## 2023-01-01 ENCOUNTER — APPOINTMENT (OUTPATIENT)
Dept: OBGYN | Facility: CLINIC | Age: 58
End: 2023-01-01

## 2023-01-01 ENCOUNTER — RESULT REVIEW (OUTPATIENT)
Age: 58
End: 2023-01-01

## 2023-01-01 ENCOUNTER — INPATIENT (INPATIENT)
Facility: HOSPITAL | Age: 58
LOS: 3 days | DRG: 329 | End: 2023-11-01
Attending: STUDENT IN AN ORGANIZED HEALTH CARE EDUCATION/TRAINING PROGRAM | Admitting: STUDENT IN AN ORGANIZED HEALTH CARE EDUCATION/TRAINING PROGRAM
Payer: MEDICARE

## 2023-01-01 VITALS
DIASTOLIC BLOOD PRESSURE: 83 MMHG | TEMPERATURE: 98 F | RESPIRATION RATE: 16 BRPM | HEART RATE: 84 BPM | SYSTOLIC BLOOD PRESSURE: 145 MMHG | WEIGHT: 293 LBS | OXYGEN SATURATION: 93 %

## 2023-01-01 VITALS — TEMPERATURE: 99 F

## 2023-01-01 DIAGNOSIS — Z98.89 OTHER SPECIFIED POSTPROCEDURAL STATES: Chronic | ICD-10-CM

## 2023-01-01 DIAGNOSIS — K56.609 UNSPECIFIED INTESTINAL OBSTRUCTION, UNSPECIFIED AS TO PARTIAL VERSUS COMPLETE OBSTRUCTION: ICD-10-CM

## 2023-01-01 DIAGNOSIS — Z95.810 PRESENCE OF AUTOMATIC (IMPLANTABLE) CARDIAC DEFIBRILLATOR: Chronic | ICD-10-CM

## 2023-01-01 LAB
ALBUMIN SERPL ELPH-MCNC: 1 G/DL — LOW (ref 3.5–5)
ALBUMIN SERPL ELPH-MCNC: 1 G/DL — LOW (ref 3.5–5)
ALBUMIN SERPL ELPH-MCNC: 1.1 G/DL — LOW (ref 3.5–5)
ALBUMIN SERPL ELPH-MCNC: 1.1 G/DL — LOW (ref 3.5–5)
ALBUMIN SERPL ELPH-MCNC: 1.3 G/DL — LOW (ref 3.5–5)
ALBUMIN SERPL ELPH-MCNC: 1.3 G/DL — LOW (ref 3.5–5)
ALBUMIN SERPL ELPH-MCNC: 1.8 G/DL — LOW (ref 3.5–5)
ALBUMIN SERPL ELPH-MCNC: 1.8 G/DL — LOW (ref 3.5–5)
ALBUMIN SERPL ELPH-MCNC: 1.9 G/DL — LOW (ref 3.5–5)
ALBUMIN SERPL ELPH-MCNC: 1.9 G/DL — LOW (ref 3.5–5)
ALBUMIN SERPL ELPH-MCNC: 2 G/DL — LOW (ref 3.5–5)
ALBUMIN SERPL ELPH-MCNC: 2 G/DL — LOW (ref 3.5–5)
ALBUMIN SERPL ELPH-MCNC: 2.3 G/DL — LOW (ref 3.5–5)
ALBUMIN SERPL ELPH-MCNC: 2.3 G/DL — LOW (ref 3.5–5)
ALBUMIN SERPL ELPH-MCNC: 3.4 G/DL — LOW (ref 3.5–5)
ALBUMIN SERPL ELPH-MCNC: 3.4 G/DL — LOW (ref 3.5–5)
ALP SERPL-CCNC: 49 U/L — SIGNIFICANT CHANGE UP (ref 40–120)
ALP SERPL-CCNC: 49 U/L — SIGNIFICANT CHANGE UP (ref 40–120)
ALP SERPL-CCNC: 50 U/L — SIGNIFICANT CHANGE UP (ref 40–120)
ALP SERPL-CCNC: 50 U/L — SIGNIFICANT CHANGE UP (ref 40–120)
ALP SERPL-CCNC: 52 U/L — SIGNIFICANT CHANGE UP (ref 40–120)
ALP SERPL-CCNC: 52 U/L — SIGNIFICANT CHANGE UP (ref 40–120)
ALP SERPL-CCNC: 55 U/L — SIGNIFICANT CHANGE UP (ref 40–120)
ALP SERPL-CCNC: 55 U/L — SIGNIFICANT CHANGE UP (ref 40–120)
ALP SERPL-CCNC: 62 U/L — SIGNIFICANT CHANGE UP (ref 40–120)
ALP SERPL-CCNC: 62 U/L — SIGNIFICANT CHANGE UP (ref 40–120)
ALP SERPL-CCNC: 63 U/L — SIGNIFICANT CHANGE UP (ref 40–120)
ALP SERPL-CCNC: 82 U/L — SIGNIFICANT CHANGE UP (ref 40–120)
ALP SERPL-CCNC: 82 U/L — SIGNIFICANT CHANGE UP (ref 40–120)
ALT FLD-CCNC: 1199 U/L DA — HIGH (ref 10–60)
ALT FLD-CCNC: 1199 U/L DA — HIGH (ref 10–60)
ALT FLD-CCNC: 12 U/L DA — SIGNIFICANT CHANGE UP (ref 10–60)
ALT FLD-CCNC: 12 U/L DA — SIGNIFICANT CHANGE UP (ref 10–60)
ALT FLD-CCNC: 126 U/L DA — HIGH (ref 10–60)
ALT FLD-CCNC: 126 U/L DA — HIGH (ref 10–60)
ALT FLD-CCNC: 14 U/L DA — SIGNIFICANT CHANGE UP (ref 10–60)
ALT FLD-CCNC: 14 U/L DA — SIGNIFICANT CHANGE UP (ref 10–60)
ALT FLD-CCNC: 321 U/L DA — HIGH (ref 10–60)
ALT FLD-CCNC: 321 U/L DA — HIGH (ref 10–60)
ALT FLD-CCNC: 37 U/L DA — SIGNIFICANT CHANGE UP (ref 10–60)
ALT FLD-CCNC: 37 U/L DA — SIGNIFICANT CHANGE UP (ref 10–60)
ALT FLD-CCNC: 582 U/L DA — HIGH (ref 10–60)
ALT FLD-CCNC: 582 U/L DA — HIGH (ref 10–60)
ALT FLD-CCNC: 84 U/L DA — HIGH (ref 10–60)
ALT FLD-CCNC: 84 U/L DA — HIGH (ref 10–60)
ANION GAP SERPL CALC-SCNC: 10 MMOL/L — SIGNIFICANT CHANGE UP (ref 5–17)
ANION GAP SERPL CALC-SCNC: 10 MMOL/L — SIGNIFICANT CHANGE UP (ref 5–17)
ANION GAP SERPL CALC-SCNC: 11 MMOL/L — SIGNIFICANT CHANGE UP (ref 5–17)
ANION GAP SERPL CALC-SCNC: 16 MMOL/L — SIGNIFICANT CHANGE UP (ref 5–17)
ANION GAP SERPL CALC-SCNC: 19 MMOL/L — HIGH (ref 5–17)
ANION GAP SERPL CALC-SCNC: 19 MMOL/L — HIGH (ref 5–17)
ANION GAP SERPL CALC-SCNC: 2 MMOL/L — LOW (ref 5–17)
ANION GAP SERPL CALC-SCNC: 2 MMOL/L — LOW (ref 5–17)
ANION GAP SERPL CALC-SCNC: 5 MMOL/L — SIGNIFICANT CHANGE UP (ref 5–17)
ANION GAP SERPL CALC-SCNC: 5 MMOL/L — SIGNIFICANT CHANGE UP (ref 5–17)
ANISOCYTOSIS BLD QL: SLIGHT — SIGNIFICANT CHANGE UP
ANISOCYTOSIS BLD QL: SLIGHT — SIGNIFICANT CHANGE UP
AST SERPL-CCNC: 10 U/L — SIGNIFICANT CHANGE UP (ref 10–40)
AST SERPL-CCNC: 10 U/L — SIGNIFICANT CHANGE UP (ref 10–40)
AST SERPL-CCNC: 1218 U/L — HIGH (ref 10–40)
AST SERPL-CCNC: 1218 U/L — HIGH (ref 10–40)
AST SERPL-CCNC: 147 U/L — HIGH (ref 10–40)
AST SERPL-CCNC: 147 U/L — HIGH (ref 10–40)
AST SERPL-CCNC: 15 U/L — SIGNIFICANT CHANGE UP (ref 10–40)
AST SERPL-CCNC: 15 U/L — SIGNIFICANT CHANGE UP (ref 10–40)
AST SERPL-CCNC: 207 U/L — HIGH (ref 10–40)
AST SERPL-CCNC: 207 U/L — HIGH (ref 10–40)
AST SERPL-CCNC: 2730 U/L — HIGH (ref 10–40)
AST SERPL-CCNC: 2730 U/L — HIGH (ref 10–40)
AST SERPL-CCNC: 535 U/L — HIGH (ref 10–40)
AST SERPL-CCNC: 535 U/L — HIGH (ref 10–40)
AST SERPL-CCNC: 57 U/L — HIGH (ref 10–40)
AST SERPL-CCNC: 57 U/L — HIGH (ref 10–40)
BASE EXCESS BLDA CALC-SCNC: -1.8 MMOL/L — SIGNIFICANT CHANGE UP (ref -2–3)
BASE EXCESS BLDA CALC-SCNC: -1.8 MMOL/L — SIGNIFICANT CHANGE UP (ref -2–3)
BASE EXCESS BLDA CALC-SCNC: -12.6 MMOL/L — LOW (ref -2–3)
BASE EXCESS BLDA CALC-SCNC: -12.6 MMOL/L — LOW (ref -2–3)
BASE EXCESS BLDA CALC-SCNC: -19.7 MMOL/L — LOW (ref -2–3)
BASE EXCESS BLDA CALC-SCNC: -19.7 MMOL/L — LOW (ref -2–3)
BASE EXCESS BLDA CALC-SCNC: -20.6 MMOL/L — LOW (ref -2–3)
BASE EXCESS BLDA CALC-SCNC: -20.6 MMOL/L — LOW (ref -2–3)
BASE EXCESS BLDA CALC-SCNC: -8.5 MMOL/L — LOW (ref -2–3)
BASE EXCESS BLDA CALC-SCNC: -8.5 MMOL/L — LOW (ref -2–3)
BASE EXCESS BLDV CALC-SCNC: -8.6 MMOL/L — SIGNIFICANT CHANGE UP
BASE EXCESS BLDV CALC-SCNC: -8.6 MMOL/L — SIGNIFICANT CHANGE UP
BASE EXCESS BLDV CALC-SCNC: 1 MMOL/L — SIGNIFICANT CHANGE UP
BASE EXCESS BLDV CALC-SCNC: 1 MMOL/L — SIGNIFICANT CHANGE UP
BASE EXCESS BLDV CALC-SCNC: SIGNIFICANT CHANGE UP MMOL/L
BASE EXCESS BLDV CALC-SCNC: SIGNIFICANT CHANGE UP MMOL/L
BASOPHILS # BLD AUTO: 0 K/UL — SIGNIFICANT CHANGE UP (ref 0–0.2)
BASOPHILS # BLD AUTO: 0 K/UL — SIGNIFICANT CHANGE UP (ref 0–0.2)
BASOPHILS # BLD AUTO: 0.01 K/UL — SIGNIFICANT CHANGE UP (ref 0–0.2)
BASOPHILS # BLD AUTO: 0.01 K/UL — SIGNIFICANT CHANGE UP (ref 0–0.2)
BASOPHILS # BLD AUTO: 0.02 K/UL — SIGNIFICANT CHANGE UP (ref 0–0.2)
BASOPHILS # BLD AUTO: 0.02 K/UL — SIGNIFICANT CHANGE UP (ref 0–0.2)
BASOPHILS NFR BLD AUTO: 0 % — SIGNIFICANT CHANGE UP (ref 0–2)
BASOPHILS NFR BLD AUTO: 0 % — SIGNIFICANT CHANGE UP (ref 0–2)
BASOPHILS NFR BLD AUTO: 0.2 % — SIGNIFICANT CHANGE UP (ref 0–2)
BASOPHILS NFR BLD AUTO: 0.2 % — SIGNIFICANT CHANGE UP (ref 0–2)
BASOPHILS NFR BLD AUTO: 0.3 % — SIGNIFICANT CHANGE UP (ref 0–2)
BASOPHILS NFR BLD AUTO: 0.3 % — SIGNIFICANT CHANGE UP (ref 0–2)
BILIRUB DIRECT SERPL-MCNC: 0.2 MG/DL — SIGNIFICANT CHANGE UP (ref 0–0.3)
BILIRUB DIRECT SERPL-MCNC: 0.2 MG/DL — SIGNIFICANT CHANGE UP (ref 0–0.3)
BILIRUB INDIRECT FLD-MCNC: 0.2 MG/DL — SIGNIFICANT CHANGE UP (ref 0.2–1)
BILIRUB INDIRECT FLD-MCNC: 0.2 MG/DL — SIGNIFICANT CHANGE UP (ref 0.2–1)
BILIRUB SERPL-MCNC: 0.3 MG/DL — SIGNIFICANT CHANGE UP (ref 0.2–1.2)
BILIRUB SERPL-MCNC: 0.3 MG/DL — SIGNIFICANT CHANGE UP (ref 0.2–1.2)
BILIRUB SERPL-MCNC: 0.4 MG/DL — SIGNIFICANT CHANGE UP (ref 0.2–1.2)
BILIRUB SERPL-MCNC: 0.5 MG/DL — SIGNIFICANT CHANGE UP (ref 0.2–1.2)
BILIRUB SERPL-MCNC: 0.6 MG/DL — SIGNIFICANT CHANGE UP (ref 0.2–1.2)
BILIRUB SERPL-MCNC: 0.6 MG/DL — SIGNIFICANT CHANGE UP (ref 0.2–1.2)
BILIRUB SERPL-MCNC: 0.8 MG/DL — SIGNIFICANT CHANGE UP (ref 0.2–1.2)
BILIRUB SERPL-MCNC: 0.8 MG/DL — SIGNIFICANT CHANGE UP (ref 0.2–1.2)
BLD GP AB SCN SERPL QL: SIGNIFICANT CHANGE UP
BLD GP AB SCN SERPL QL: SIGNIFICANT CHANGE UP
BLOOD GAS COMMENTS ARTERIAL: SIGNIFICANT CHANGE UP
BUN SERPL-MCNC: 66 MG/DL — HIGH (ref 7–18)
BUN SERPL-MCNC: 66 MG/DL — HIGH (ref 7–18)
BUN SERPL-MCNC: 73 MG/DL — HIGH (ref 7–18)
BUN SERPL-MCNC: 73 MG/DL — HIGH (ref 7–18)
BUN SERPL-MCNC: 75 MG/DL — HIGH (ref 7–18)
BUN SERPL-MCNC: 78 MG/DL — HIGH (ref 7–18)
BUN SERPL-MCNC: 83 MG/DL — HIGH (ref 7–18)
BUN SERPL-MCNC: 83 MG/DL — HIGH (ref 7–18)
BUN SERPL-MCNC: 86 MG/DL — HIGH (ref 7–18)
BUN SERPL-MCNC: 86 MG/DL — HIGH (ref 7–18)
BURR CELLS BLD QL SMEAR: PRESENT — SIGNIFICANT CHANGE UP
BURR CELLS BLD QL SMEAR: PRESENT — SIGNIFICANT CHANGE UP
CA-I SERPL-SCNC: SIGNIFICANT CHANGE UP MMOL/L (ref 1.15–1.33)
CALCIUM SERPL-MCNC: 5.1 MG/DL — CRITICAL LOW (ref 8.4–10.5)
CALCIUM SERPL-MCNC: 5.1 MG/DL — CRITICAL LOW (ref 8.4–10.5)
CALCIUM SERPL-MCNC: 6.1 MG/DL — CRITICAL LOW (ref 8.4–10.5)
CALCIUM SERPL-MCNC: 6.1 MG/DL — CRITICAL LOW (ref 8.4–10.5)
CALCIUM SERPL-MCNC: 6.3 MG/DL — CRITICAL LOW (ref 8.4–10.5)
CALCIUM SERPL-MCNC: 6.3 MG/DL — CRITICAL LOW (ref 8.4–10.5)
CALCIUM SERPL-MCNC: 6.6 MG/DL — LOW (ref 8.4–10.5)
CALCIUM SERPL-MCNC: 6.6 MG/DL — LOW (ref 8.4–10.5)
CALCIUM SERPL-MCNC: 7.7 MG/DL — LOW (ref 8.4–10.5)
CALCIUM SERPL-MCNC: 7.7 MG/DL — LOW (ref 8.4–10.5)
CALCIUM SERPL-MCNC: 8.9 MG/DL — SIGNIFICANT CHANGE UP (ref 8.4–10.5)
CALCIUM SERPL-MCNC: 8.9 MG/DL — SIGNIFICANT CHANGE UP (ref 8.4–10.5)
CALCIUM SERPL-MCNC: <5 MG/DL — CRITICAL LOW (ref 8.4–10.5)
CHLORIDE SERPL-SCNC: 110 MMOL/L — HIGH (ref 96–108)
CHLORIDE SERPL-SCNC: 110 MMOL/L — HIGH (ref 96–108)
CHLORIDE SERPL-SCNC: 114 MMOL/L — HIGH (ref 96–108)
CHLORIDE SERPL-SCNC: 114 MMOL/L — HIGH (ref 96–108)
CHLORIDE SERPL-SCNC: 115 MMOL/L — HIGH (ref 96–108)
CHLORIDE SERPL-SCNC: 115 MMOL/L — HIGH (ref 96–108)
CHLORIDE SERPL-SCNC: 116 MMOL/L — HIGH (ref 96–108)
CHLORIDE SERPL-SCNC: 117 MMOL/L — HIGH (ref 96–108)
CHLORIDE SERPL-SCNC: 117 MMOL/L — HIGH (ref 96–108)
CHLORIDE SERPL-SCNC: 119 MMOL/L — HIGH (ref 96–108)
CHLORIDE SERPL-SCNC: 119 MMOL/L — HIGH (ref 96–108)
CHLORIDE SERPL-SCNC: 120 MMOL/L — HIGH (ref 96–108)
CHLORIDE SERPL-SCNC: 120 MMOL/L — HIGH (ref 96–108)
CO2 SERPL-SCNC: 12 MMOL/L — LOW (ref 22–31)
CO2 SERPL-SCNC: 12 MMOL/L — LOW (ref 22–31)
CO2 SERPL-SCNC: 13 MMOL/L — LOW (ref 22–31)
CO2 SERPL-SCNC: 13 MMOL/L — LOW (ref 22–31)
CO2 SERPL-SCNC: 20 MMOL/L — LOW (ref 22–31)
CO2 SERPL-SCNC: 25 MMOL/L — SIGNIFICANT CHANGE UP (ref 22–31)
CO2 SERPL-SCNC: 25 MMOL/L — SIGNIFICANT CHANGE UP (ref 22–31)
CO2 SERPL-SCNC: 31 MMOL/L — SIGNIFICANT CHANGE UP (ref 22–31)
CO2 SERPL-SCNC: 31 MMOL/L — SIGNIFICANT CHANGE UP (ref 22–31)
CO2 SERPL-SCNC: 9 MMOL/L — CRITICAL LOW (ref 22–31)
CO2 SERPL-SCNC: 9 MMOL/L — CRITICAL LOW (ref 22–31)
CREAT SERPL-MCNC: 2.64 MG/DL — HIGH (ref 0.5–1.3)
CREAT SERPL-MCNC: 2.64 MG/DL — HIGH (ref 0.5–1.3)
CREAT SERPL-MCNC: 2.83 MG/DL — HIGH (ref 0.5–1.3)
CREAT SERPL-MCNC: 2.83 MG/DL — HIGH (ref 0.5–1.3)
CREAT SERPL-MCNC: 3.29 MG/DL — HIGH (ref 0.5–1.3)
CREAT SERPL-MCNC: 3.29 MG/DL — HIGH (ref 0.5–1.3)
CREAT SERPL-MCNC: 3.75 MG/DL — HIGH (ref 0.5–1.3)
CREAT SERPL-MCNC: 3.75 MG/DL — HIGH (ref 0.5–1.3)
CREAT SERPL-MCNC: 3.91 MG/DL — HIGH (ref 0.5–1.3)
CREAT SERPL-MCNC: 3.91 MG/DL — HIGH (ref 0.5–1.3)
CREAT SERPL-MCNC: 4.02 MG/DL — HIGH (ref 0.5–1.3)
CREAT SERPL-MCNC: 4.02 MG/DL — HIGH (ref 0.5–1.3)
CREAT SERPL-MCNC: 4.15 MG/DL — HIGH (ref 0.5–1.3)
CREAT SERPL-MCNC: 4.15 MG/DL — HIGH (ref 0.5–1.3)
CREAT SERPL-MCNC: 4.2 MG/DL — HIGH (ref 0.5–1.3)
CREAT SERPL-MCNC: 4.2 MG/DL — HIGH (ref 0.5–1.3)
EGFR: 12 ML/MIN/1.73M2 — LOW
EGFR: 13 ML/MIN/1.73M2 — LOW
EGFR: 15 ML/MIN/1.73M2 — LOW
EGFR: 15 ML/MIN/1.73M2 — LOW
EGFR: 19 ML/MIN/1.73M2 — LOW
EGFR: 19 ML/MIN/1.73M2 — LOW
EGFR: 20 ML/MIN/1.73M2 — LOW
EGFR: 20 ML/MIN/1.73M2 — LOW
EOSINOPHIL # BLD AUTO: 0 K/UL — SIGNIFICANT CHANGE UP (ref 0–0.5)
EOSINOPHIL # BLD AUTO: 0.01 K/UL — SIGNIFICANT CHANGE UP (ref 0–0.5)
EOSINOPHIL # BLD AUTO: 0.01 K/UL — SIGNIFICANT CHANGE UP (ref 0–0.5)
EOSINOPHIL NFR BLD AUTO: 0 % — SIGNIFICANT CHANGE UP (ref 0–6)
EOSINOPHIL NFR BLD AUTO: 0.1 % — SIGNIFICANT CHANGE UP (ref 0–6)
EOSINOPHIL NFR BLD AUTO: 0.1 % — SIGNIFICANT CHANGE UP (ref 0–6)
GAS PNL BLDA: SIGNIFICANT CHANGE UP
GAS PNL BLDV: 142 MMOL/L — SIGNIFICANT CHANGE UP (ref 136–145)
GAS PNL BLDV: 142 MMOL/L — SIGNIFICANT CHANGE UP (ref 136–145)
GAS PNL BLDV: 144 MMOL/L — SIGNIFICANT CHANGE UP (ref 136–145)
GAS PNL BLDV: 144 MMOL/L — SIGNIFICANT CHANGE UP (ref 136–145)
GAS PNL BLDV: SIGNIFICANT CHANGE UP
GLUCOSE BLDC GLUCOMTR-MCNC: 109 MG/DL — HIGH (ref 70–99)
GLUCOSE BLDC GLUCOMTR-MCNC: 109 MG/DL — HIGH (ref 70–99)
GLUCOSE BLDC GLUCOMTR-MCNC: 119 MG/DL — HIGH (ref 70–99)
GLUCOSE BLDC GLUCOMTR-MCNC: 119 MG/DL — HIGH (ref 70–99)
GLUCOSE BLDC GLUCOMTR-MCNC: 136 MG/DL — HIGH (ref 70–99)
GLUCOSE BLDC GLUCOMTR-MCNC: 136 MG/DL — HIGH (ref 70–99)
GLUCOSE BLDC GLUCOMTR-MCNC: 163 MG/DL — HIGH (ref 70–99)
GLUCOSE BLDC GLUCOMTR-MCNC: 163 MG/DL — HIGH (ref 70–99)
GLUCOSE BLDC GLUCOMTR-MCNC: 25 MG/DL — CRITICAL LOW (ref 70–99)
GLUCOSE BLDC GLUCOMTR-MCNC: 25 MG/DL — CRITICAL LOW (ref 70–99)
GLUCOSE BLDC GLUCOMTR-MCNC: 28 MG/DL — CRITICAL LOW (ref 70–99)
GLUCOSE BLDC GLUCOMTR-MCNC: 28 MG/DL — CRITICAL LOW (ref 70–99)
GLUCOSE BLDC GLUCOMTR-MCNC: 36 MG/DL — CRITICAL LOW (ref 70–99)
GLUCOSE BLDC GLUCOMTR-MCNC: 36 MG/DL — CRITICAL LOW (ref 70–99)
GLUCOSE BLDC GLUCOMTR-MCNC: 44 MG/DL — CRITICAL LOW (ref 70–99)
GLUCOSE BLDC GLUCOMTR-MCNC: 44 MG/DL — CRITICAL LOW (ref 70–99)
GLUCOSE BLDC GLUCOMTR-MCNC: 73 MG/DL — SIGNIFICANT CHANGE UP (ref 70–99)
GLUCOSE BLDC GLUCOMTR-MCNC: 73 MG/DL — SIGNIFICANT CHANGE UP (ref 70–99)
GLUCOSE BLDC GLUCOMTR-MCNC: 79 MG/DL — SIGNIFICANT CHANGE UP (ref 70–99)
GLUCOSE BLDC GLUCOMTR-MCNC: 79 MG/DL — SIGNIFICANT CHANGE UP (ref 70–99)
GLUCOSE BLDC GLUCOMTR-MCNC: 80 MG/DL — SIGNIFICANT CHANGE UP (ref 70–99)
GLUCOSE BLDC GLUCOMTR-MCNC: 80 MG/DL — SIGNIFICANT CHANGE UP (ref 70–99)
GLUCOSE BLDC GLUCOMTR-MCNC: 95 MG/DL — SIGNIFICANT CHANGE UP (ref 70–99)
GLUCOSE BLDC GLUCOMTR-MCNC: 95 MG/DL — SIGNIFICANT CHANGE UP (ref 70–99)
GLUCOSE BLDC GLUCOMTR-MCNC: 98 MG/DL — SIGNIFICANT CHANGE UP (ref 70–99)
GLUCOSE BLDC GLUCOMTR-MCNC: 98 MG/DL — SIGNIFICANT CHANGE UP (ref 70–99)
GLUCOSE BLDC GLUCOMTR-MCNC: <10 MG/DL — CRITICAL LOW (ref 70–99)
GLUCOSE SERPL-MCNC: 133 MG/DL — HIGH (ref 70–99)
GLUCOSE SERPL-MCNC: 133 MG/DL — HIGH (ref 70–99)
GLUCOSE SERPL-MCNC: 137 MG/DL — HIGH (ref 70–99)
GLUCOSE SERPL-MCNC: 137 MG/DL — HIGH (ref 70–99)
GLUCOSE SERPL-MCNC: 139 MG/DL — HIGH (ref 70–99)
GLUCOSE SERPL-MCNC: 139 MG/DL — HIGH (ref 70–99)
GLUCOSE SERPL-MCNC: 156 MG/DL — HIGH (ref 70–99)
GLUCOSE SERPL-MCNC: 156 MG/DL — HIGH (ref 70–99)
GLUCOSE SERPL-MCNC: 162 MG/DL — HIGH (ref 70–99)
GLUCOSE SERPL-MCNC: 162 MG/DL — HIGH (ref 70–99)
GLUCOSE SERPL-MCNC: 175 MG/DL — HIGH (ref 70–99)
GLUCOSE SERPL-MCNC: 175 MG/DL — HIGH (ref 70–99)
GLUCOSE SERPL-MCNC: 183 MG/DL — HIGH (ref 70–99)
GLUCOSE SERPL-MCNC: 183 MG/DL — HIGH (ref 70–99)
GLUCOSE SERPL-MCNC: 67 MG/DL — LOW (ref 70–99)
GLUCOSE SERPL-MCNC: 67 MG/DL — LOW (ref 70–99)
HBV SURFACE AG SER-ACNC: SIGNIFICANT CHANGE UP
HBV SURFACE AG SER-ACNC: SIGNIFICANT CHANGE UP
HCO3 BLDA-SCNC: 11 MMOL/L — LOW (ref 21–28)
HCO3 BLDA-SCNC: 11 MMOL/L — LOW (ref 21–28)
HCO3 BLDA-SCNC: 15 MMOL/L — LOW (ref 21–28)
HCO3 BLDA-SCNC: 15 MMOL/L — LOW (ref 21–28)
HCO3 BLDA-SCNC: 19 MMOL/L — LOW (ref 21–28)
HCO3 BLDA-SCNC: 19 MMOL/L — LOW (ref 21–28)
HCO3 BLDA-SCNC: 26 MMOL/L — SIGNIFICANT CHANGE UP (ref 21–28)
HCO3 BLDA-SCNC: 26 MMOL/L — SIGNIFICANT CHANGE UP (ref 21–28)
HCO3 BLDA-SCNC: 9 MMOL/L — CRITICAL LOW (ref 21–28)
HCO3 BLDA-SCNC: 9 MMOL/L — CRITICAL LOW (ref 21–28)
HCO3 BLDV-SCNC: 20 MMOL/L — LOW (ref 22–29)
HCO3 BLDV-SCNC: 20 MMOL/L — LOW (ref 22–29)
HCO3 BLDV-SCNC: 28 MMOL/L — SIGNIFICANT CHANGE UP (ref 22–29)
HCO3 BLDV-SCNC: 28 MMOL/L — SIGNIFICANT CHANGE UP (ref 22–29)
HCO3 BLDV-SCNC: SIGNIFICANT CHANGE UP MMOL/L (ref 22–29)
HCO3 BLDV-SCNC: SIGNIFICANT CHANGE UP MMOL/L (ref 22–29)
HCT VFR BLD CALC: 30.9 % — LOW (ref 34.5–45)
HCT VFR BLD CALC: 30.9 % — LOW (ref 34.5–45)
HCT VFR BLD CALC: 31.3 % — LOW (ref 34.5–45)
HCT VFR BLD CALC: 31.3 % — LOW (ref 34.5–45)
HCT VFR BLD CALC: 33.6 % — LOW (ref 34.5–45)
HCT VFR BLD CALC: 33.6 % — LOW (ref 34.5–45)
HCT VFR BLD CALC: 38 % — SIGNIFICANT CHANGE UP (ref 34.5–45)
HCT VFR BLD CALC: 38 % — SIGNIFICANT CHANGE UP (ref 34.5–45)
HCT VFR BLD CALC: 38.4 % — SIGNIFICANT CHANGE UP (ref 34.5–45)
HCT VFR BLD CALC: 38.4 % — SIGNIFICANT CHANGE UP (ref 34.5–45)
HCT VFR BLD CALC: 38.5 % — SIGNIFICANT CHANGE UP (ref 34.5–45)
HCT VFR BLD CALC: 38.5 % — SIGNIFICANT CHANGE UP (ref 34.5–45)
HCT VFR BLD CALC: 40.3 % — SIGNIFICANT CHANGE UP (ref 34.5–45)
HCT VFR BLD CALC: 40.3 % — SIGNIFICANT CHANGE UP (ref 34.5–45)
HGB BLD-MCNC: 11 G/DL — LOW (ref 11.5–15.5)
HGB BLD-MCNC: 11 G/DL — LOW (ref 11.5–15.5)
HGB BLD-MCNC: 11.1 G/DL — LOW (ref 11.5–15.5)
HGB BLD-MCNC: 11.2 G/DL — LOW (ref 11.5–15.5)
HGB BLD-MCNC: 11.2 G/DL — LOW (ref 11.5–15.5)
HGB BLD-MCNC: 8.4 G/DL — LOW (ref 11.5–15.5)
HGB BLD-MCNC: 8.4 G/DL — LOW (ref 11.5–15.5)
HGB BLD-MCNC: 8.5 G/DL — LOW (ref 11.5–15.5)
HGB BLD-MCNC: 8.5 G/DL — LOW (ref 11.5–15.5)
HGB BLD-MCNC: 9.3 G/DL — LOW (ref 11.5–15.5)
HGB BLD-MCNC: 9.3 G/DL — LOW (ref 11.5–15.5)
HIV 1 & 2 AB SERPL IA.RAPID: SIGNIFICANT CHANGE UP
HIV 1 & 2 AB SERPL IA.RAPID: SIGNIFICANT CHANGE UP
HOROWITZ INDEX BLDA+IHG-RTO: 50 — SIGNIFICANT CHANGE UP
HOROWITZ INDEX BLDA+IHG-RTO: 60 — SIGNIFICANT CHANGE UP
HOROWITZ INDEX BLDA+IHG-RTO: 60 — SIGNIFICANT CHANGE UP
HOROWITZ INDEX BLDV+IHG-RTO: 50 — SIGNIFICANT CHANGE UP
HYPOCHROMIA BLD QL: SLIGHT — SIGNIFICANT CHANGE UP
HYPOCHROMIA BLD QL: SLIGHT — SIGNIFICANT CHANGE UP
HYPOSEGMENTATION: PRESENT — SIGNIFICANT CHANGE UP
HYPOSEGMENTATION: PRESENT — SIGNIFICANT CHANGE UP
IMM GRANULOCYTES NFR BLD AUTO: 0.2 % — SIGNIFICANT CHANGE UP (ref 0–0.9)
IMM GRANULOCYTES NFR BLD AUTO: 0.2 % — SIGNIFICANT CHANGE UP (ref 0–0.9)
IMM GRANULOCYTES NFR BLD AUTO: 2 % — HIGH (ref 0–0.9)
IMM GRANULOCYTES NFR BLD AUTO: 2 % — HIGH (ref 0–0.9)
INR BLD: 1.87 RATIO — HIGH (ref 0.85–1.18)
INR BLD: 1.87 RATIO — HIGH (ref 0.85–1.18)
LACTATE BLDV-MCNC: 11.2 MMOL/L — CRITICAL HIGH (ref 0.5–2)
LACTATE BLDV-MCNC: 11.2 MMOL/L — CRITICAL HIGH (ref 0.5–2)
LACTATE BLDV-MCNC: 3.8 MMOL/L — HIGH (ref 0.5–2)
LACTATE BLDV-MCNC: 3.8 MMOL/L — HIGH (ref 0.5–2)
LACTATE SERPL-SCNC: 0.7 MMOL/L — SIGNIFICANT CHANGE UP (ref 0.7–2)
LACTATE SERPL-SCNC: 0.7 MMOL/L — SIGNIFICANT CHANGE UP (ref 0.7–2)
LACTATE SERPL-SCNC: 11.8 MMOL/L — CRITICAL HIGH (ref 0.7–2)
LACTATE SERPL-SCNC: 11.8 MMOL/L — CRITICAL HIGH (ref 0.7–2)
LACTATE SERPL-SCNC: 12.6 MMOL/L — CRITICAL HIGH (ref 0.7–2)
LACTATE SERPL-SCNC: 12.6 MMOL/L — CRITICAL HIGH (ref 0.7–2)
LACTATE SERPL-SCNC: 13.3 MMOL/L — CRITICAL HIGH (ref 0.7–2)
LACTATE SERPL-SCNC: 13.3 MMOL/L — CRITICAL HIGH (ref 0.7–2)
LACTATE SERPL-SCNC: 14.3 MMOL/L — CRITICAL HIGH (ref 0.7–2)
LACTATE SERPL-SCNC: 14.3 MMOL/L — CRITICAL HIGH (ref 0.7–2)
LACTATE SERPL-SCNC: 4.1 MMOL/L — CRITICAL HIGH (ref 0.7–2)
LACTATE SERPL-SCNC: 4.1 MMOL/L — CRITICAL HIGH (ref 0.7–2)
LACTATE SERPL-SCNC: 4.4 MMOL/L — CRITICAL HIGH (ref 0.7–2)
LACTATE SERPL-SCNC: 4.4 MMOL/L — CRITICAL HIGH (ref 0.7–2)
LACTATE SERPL-SCNC: 4.9 MMOL/L — CRITICAL HIGH (ref 0.7–2)
LACTATE SERPL-SCNC: 4.9 MMOL/L — CRITICAL HIGH (ref 0.7–2)
LACTATE SERPL-SCNC: 5 MMOL/L — CRITICAL HIGH (ref 0.7–2)
LACTATE SERPL-SCNC: 5 MMOL/L — CRITICAL HIGH (ref 0.7–2)
LACTATE SERPL-SCNC: 5.9 MMOL/L — CRITICAL HIGH (ref 0.7–2)
LACTATE SERPL-SCNC: 5.9 MMOL/L — CRITICAL HIGH (ref 0.7–2)
LIDOCAIN IGE QN: 19 U/L — SIGNIFICANT CHANGE UP (ref 13–75)
LIDOCAIN IGE QN: 19 U/L — SIGNIFICANT CHANGE UP (ref 13–75)
LYMPHOCYTES # BLD AUTO: 0.27 K/UL — LOW (ref 1–3.3)
LYMPHOCYTES # BLD AUTO: 0.27 K/UL — LOW (ref 1–3.3)
LYMPHOCYTES # BLD AUTO: 0.79 K/UL — LOW (ref 1–3.3)
LYMPHOCYTES # BLD AUTO: 0.79 K/UL — LOW (ref 1–3.3)
LYMPHOCYTES # BLD AUTO: 1.47 K/UL — SIGNIFICANT CHANGE UP (ref 1–3.3)
LYMPHOCYTES # BLD AUTO: 1.47 K/UL — SIGNIFICANT CHANGE UP (ref 1–3.3)
LYMPHOCYTES # BLD AUTO: 7.6 % — LOW (ref 13–44)
LYMPHOCYTES # BLD AUTO: 7.6 % — LOW (ref 13–44)
LYMPHOCYTES # BLD AUTO: 8 % — LOW (ref 13–44)
LYMPHOCYTES # BLD AUTO: 8 % — LOW (ref 13–44)
LYMPHOCYTES # BLD AUTO: 8.2 % — LOW (ref 13–44)
LYMPHOCYTES # BLD AUTO: 8.2 % — LOW (ref 13–44)
MACROCYTES BLD QL: SIGNIFICANT CHANGE UP
MACROCYTES BLD QL: SIGNIFICANT CHANGE UP
MAGNESIUM SERPL-MCNC: 1.6 MG/DL — SIGNIFICANT CHANGE UP (ref 1.6–2.6)
MAGNESIUM SERPL-MCNC: 1.6 MG/DL — SIGNIFICANT CHANGE UP (ref 1.6–2.6)
MAGNESIUM SERPL-MCNC: 1.7 MG/DL — SIGNIFICANT CHANGE UP (ref 1.6–2.6)
MAGNESIUM SERPL-MCNC: 1.8 MG/DL — SIGNIFICANT CHANGE UP (ref 1.6–2.6)
MAGNESIUM SERPL-MCNC: 1.8 MG/DL — SIGNIFICANT CHANGE UP (ref 1.6–2.6)
MAGNESIUM SERPL-MCNC: 2 MG/DL — SIGNIFICANT CHANGE UP (ref 1.6–2.6)
MAGNESIUM SERPL-MCNC: 2 MG/DL — SIGNIFICANT CHANGE UP (ref 1.6–2.6)
MAGNESIUM SERPL-MCNC: 2.1 MG/DL — SIGNIFICANT CHANGE UP (ref 1.6–2.6)
MAGNESIUM SERPL-MCNC: 2.1 MG/DL — SIGNIFICANT CHANGE UP (ref 1.6–2.6)
MANUAL SMEAR VERIFICATION: SIGNIFICANT CHANGE UP
MANUAL SMEAR VERIFICATION: SIGNIFICANT CHANGE UP
MCHC RBC-ENTMCNC: 27.2 GM/DL — LOW (ref 32–36)
MCHC RBC-ENTMCNC: 27.5 GM/DL — LOW (ref 32–36)
MCHC RBC-ENTMCNC: 27.5 GM/DL — LOW (ref 32–36)
MCHC RBC-ENTMCNC: 27.7 GM/DL — LOW (ref 32–36)
MCHC RBC-ENTMCNC: 27.7 GM/DL — LOW (ref 32–36)
MCHC RBC-ENTMCNC: 28.6 GM/DL — LOW (ref 32–36)
MCHC RBC-ENTMCNC: 28.6 GM/DL — LOW (ref 32–36)
MCHC RBC-ENTMCNC: 28.7 PG — SIGNIFICANT CHANGE UP (ref 27–34)
MCHC RBC-ENTMCNC: 28.7 PG — SIGNIFICANT CHANGE UP (ref 27–34)
MCHC RBC-ENTMCNC: 28.9 GM/DL — LOW (ref 32–36)
MCHC RBC-ENTMCNC: 28.9 GM/DL — LOW (ref 32–36)
MCHC RBC-ENTMCNC: 29.2 PG — SIGNIFICANT CHANGE UP (ref 27–34)
MCHC RBC-ENTMCNC: 29.2 PG — SIGNIFICANT CHANGE UP (ref 27–34)
MCHC RBC-ENTMCNC: 29.5 GM/DL — LOW (ref 32–36)
MCHC RBC-ENTMCNC: 29.5 GM/DL — LOW (ref 32–36)
MCHC RBC-ENTMCNC: 29.9 PG — SIGNIFICANT CHANGE UP (ref 27–34)
MCHC RBC-ENTMCNC: 29.9 PG — SIGNIFICANT CHANGE UP (ref 27–34)
MCHC RBC-ENTMCNC: 30.2 PG — SIGNIFICANT CHANGE UP (ref 27–34)
MCHC RBC-ENTMCNC: 30.2 PG — SIGNIFICANT CHANGE UP (ref 27–34)
MCHC RBC-ENTMCNC: 30.4 PG — SIGNIFICANT CHANGE UP (ref 27–34)
MCHC RBC-ENTMCNC: 30.4 PG — SIGNIFICANT CHANGE UP (ref 27–34)
MCHC RBC-ENTMCNC: 30.5 PG — SIGNIFICANT CHANGE UP (ref 27–34)
MCV RBC AUTO: 102.1 FL — HIGH (ref 80–100)
MCV RBC AUTO: 102.1 FL — HIGH (ref 80–100)
MCV RBC AUTO: 103.3 FL — HIGH (ref 80–100)
MCV RBC AUTO: 103.3 FL — HIGH (ref 80–100)
MCV RBC AUTO: 103.5 FL — HIGH (ref 80–100)
MCV RBC AUTO: 103.5 FL — HIGH (ref 80–100)
MCV RBC AUTO: 104.1 FL — HIGH (ref 80–100)
MCV RBC AUTO: 104.1 FL — HIGH (ref 80–100)
MCV RBC AUTO: 110.2 FL — HIGH (ref 80–100)
MCV RBC AUTO: 110.2 FL — HIGH (ref 80–100)
MCV RBC AUTO: 111.4 FL — HIGH (ref 80–100)
MCV RBC AUTO: 111.4 FL — HIGH (ref 80–100)
MCV RBC AUTO: 112.4 FL — HIGH (ref 80–100)
MCV RBC AUTO: 112.4 FL — HIGH (ref 80–100)
METAMYELOCYTES # FLD: 4 % — HIGH (ref 0–0)
METAMYELOCYTES # FLD: 4 % — HIGH (ref 0–0)
MONOCYTES # BLD AUTO: 0.03 K/UL — SIGNIFICANT CHANGE UP (ref 0–0.9)
MONOCYTES # BLD AUTO: 0.03 K/UL — SIGNIFICANT CHANGE UP (ref 0–0.9)
MONOCYTES # BLD AUTO: 0.18 K/UL — SIGNIFICANT CHANGE UP (ref 0–0.9)
MONOCYTES # BLD AUTO: 0.18 K/UL — SIGNIFICANT CHANGE UP (ref 0–0.9)
MONOCYTES # BLD AUTO: 0.85 K/UL — SIGNIFICANT CHANGE UP (ref 0–0.9)
MONOCYTES # BLD AUTO: 0.85 K/UL — SIGNIFICANT CHANGE UP (ref 0–0.9)
MONOCYTES NFR BLD AUTO: 0.8 % — LOW (ref 2–14)
MONOCYTES NFR BLD AUTO: 0.8 % — LOW (ref 2–14)
MONOCYTES NFR BLD AUTO: 1 % — LOW (ref 2–14)
MONOCYTES NFR BLD AUTO: 1 % — LOW (ref 2–14)
MONOCYTES NFR BLD AUTO: 8.8 % — SIGNIFICANT CHANGE UP (ref 2–14)
MONOCYTES NFR BLD AUTO: 8.8 % — SIGNIFICANT CHANGE UP (ref 2–14)
MRSA PCR RESULT.: SIGNIFICANT CHANGE UP
MRSA PCR RESULT.: SIGNIFICANT CHANGE UP
MYELOCYTES NFR BLD: 17 % — HIGH (ref 0–0)
MYELOCYTES NFR BLD: 17 % — HIGH (ref 0–0)
NEUTROPHILS # BLD AUTO: 12.88 K/UL — HIGH (ref 1.8–7.4)
NEUTROPHILS # BLD AUTO: 12.88 K/UL — HIGH (ref 1.8–7.4)
NEUTROPHILS # BLD AUTO: 3.18 K/UL — SIGNIFICANT CHANGE UP (ref 1.8–7.4)
NEUTROPHILS # BLD AUTO: 3.18 K/UL — SIGNIFICANT CHANGE UP (ref 1.8–7.4)
NEUTROPHILS # BLD AUTO: 7.96 K/UL — HIGH (ref 1.8–7.4)
NEUTROPHILS # BLD AUTO: 7.96 K/UL — HIGH (ref 1.8–7.4)
NEUTROPHILS NFR BLD AUTO: 17 % — LOW (ref 43–77)
NEUTROPHILS NFR BLD AUTO: 17 % — LOW (ref 43–77)
NEUTROPHILS NFR BLD AUTO: 82.5 % — HIGH (ref 43–77)
NEUTROPHILS NFR BLD AUTO: 82.5 % — HIGH (ref 43–77)
NEUTROPHILS NFR BLD AUTO: 89.3 % — HIGH (ref 43–77)
NEUTROPHILS NFR BLD AUTO: 89.3 % — HIGH (ref 43–77)
NEUTS BAND # BLD: 53 % — HIGH (ref 0–8)
NEUTS BAND # BLD: 53 % — HIGH (ref 0–8)
NRBC # BLD: 0 /100 WBCS — SIGNIFICANT CHANGE UP (ref 0–0)
NRBC # BLD: 0 /100 WBCS — SIGNIFICANT CHANGE UP (ref 0–0)
NRBC # BLD: 2 /100 WBCS — HIGH (ref 0–0)
NRBC # BLD: 2 /100 WBCS — HIGH (ref 0–0)
NRBC # BLD: 2 /100 — HIGH (ref 0–0)
NRBC # BLD: 2 /100 — HIGH (ref 0–0)
NRBC # BLD: 3 /100 WBCS — HIGH (ref 0–0)
NRBC # BLD: 6 /100 WBCS — HIGH (ref 0–0)
NRBC # BLD: 6 /100 WBCS — HIGH (ref 0–0)
OSMOLALITY SERPL: 346 MOSMOL/KG — HIGH (ref 280–301)
OSMOLALITY SERPL: 346 MOSMOL/KG — HIGH (ref 280–301)
PCO2 BLDA: 36 MMHG — HIGH (ref 32–35)
PCO2 BLDA: 36 MMHG — HIGH (ref 32–35)
PCO2 BLDA: 42 MMHG — HIGH (ref 32–35)
PCO2 BLDA: 47 MMHG — HIGH (ref 32–35)
PCO2 BLDA: 47 MMHG — HIGH (ref 32–35)
PCO2 BLDA: 55 MMHG — HIGH (ref 32–35)
PCO2 BLDA: 55 MMHG — HIGH (ref 32–35)
PCO2 BLDV: 56 MMHG — HIGH (ref 39–42)
PCO2 BLDV: 68 MMHG — HIGH (ref 39–42)
PCO2 BLDV: 68 MMHG — HIGH (ref 39–42)
PH BLDA: 7.01 — CRITICAL LOW (ref 7.35–7.45)
PH BLDA: 7.01 — CRITICAL LOW (ref 7.35–7.45)
PH BLDA: 7.02 — CRITICAL LOW (ref 7.35–7.45)
PH BLDA: 7.02 — CRITICAL LOW (ref 7.35–7.45)
PH BLDA: 7.17 — CRITICAL LOW (ref 7.35–7.45)
PH BLDA: 7.17 — CRITICAL LOW (ref 7.35–7.45)
PH BLDA: 7.22 — LOW (ref 7.35–7.45)
PH BLDA: 7.22 — LOW (ref 7.35–7.45)
PH BLDA: 7.28 — LOW (ref 7.35–7.45)
PH BLDA: 7.28 — LOW (ref 7.35–7.45)
PH BLDV: 7.17 — LOW (ref 7.32–7.43)
PH BLDV: 7.17 — LOW (ref 7.32–7.43)
PH BLDV: 7.31 — LOW (ref 7.32–7.43)
PH BLDV: 7.31 — LOW (ref 7.32–7.43)
PH BLDV: <7 — LOW (ref 7.32–7.43)
PH BLDV: <7 — LOW (ref 7.32–7.43)
PHOSPHATE SERPL-MCNC: 3.5 MG/DL — SIGNIFICANT CHANGE UP (ref 2.5–4.5)
PHOSPHATE SERPL-MCNC: 3.5 MG/DL — SIGNIFICANT CHANGE UP (ref 2.5–4.5)
PHOSPHATE SERPL-MCNC: 4.3 MG/DL — SIGNIFICANT CHANGE UP (ref 2.5–4.5)
PHOSPHATE SERPL-MCNC: 4.3 MG/DL — SIGNIFICANT CHANGE UP (ref 2.5–4.5)
PHOSPHATE SERPL-MCNC: 4.7 MG/DL — HIGH (ref 2.5–4.5)
PHOSPHATE SERPL-MCNC: 4.7 MG/DL — HIGH (ref 2.5–4.5)
PHOSPHATE SERPL-MCNC: 7.4 MG/DL — HIGH (ref 2.5–4.5)
PHOSPHATE SERPL-MCNC: 7.4 MG/DL — HIGH (ref 2.5–4.5)
PHOSPHATE SERPL-MCNC: 7.7 MG/DL — HIGH (ref 2.5–4.5)
PHOSPHATE SERPL-MCNC: 7.7 MG/DL — HIGH (ref 2.5–4.5)
PHOSPHATE SERPL-MCNC: 7.8 MG/DL — HIGH (ref 2.5–4.5)
PHOSPHATE SERPL-MCNC: 7.8 MG/DL — HIGH (ref 2.5–4.5)
PLAT MORPH BLD: NORMAL — SIGNIFICANT CHANGE UP
PLAT MORPH BLD: NORMAL — SIGNIFICANT CHANGE UP
PLATELET # BLD AUTO: 102 K/UL — LOW (ref 150–400)
PLATELET # BLD AUTO: 102 K/UL — LOW (ref 150–400)
PLATELET # BLD AUTO: 119 K/UL — LOW (ref 150–400)
PLATELET # BLD AUTO: 119 K/UL — LOW (ref 150–400)
PLATELET # BLD AUTO: 142 K/UL — LOW (ref 150–400)
PLATELET # BLD AUTO: 142 K/UL — LOW (ref 150–400)
PLATELET # BLD AUTO: 214 K/UL — SIGNIFICANT CHANGE UP (ref 150–400)
PLATELET # BLD AUTO: 214 K/UL — SIGNIFICANT CHANGE UP (ref 150–400)
PLATELET # BLD AUTO: 61 K/UL — LOW (ref 150–400)
PLATELET # BLD AUTO: 61 K/UL — LOW (ref 150–400)
PLATELET # BLD AUTO: 75 K/UL — LOW (ref 150–400)
PLATELET # BLD AUTO: 75 K/UL — LOW (ref 150–400)
PLATELET # BLD AUTO: 81 K/UL — LOW (ref 150–400)
PLATELET # BLD AUTO: 81 K/UL — LOW (ref 150–400)
PLATELET COUNT - ESTIMATE: ABNORMAL
PLATELET COUNT - ESTIMATE: ABNORMAL
PO2 BLDA: 54 MMHG — LOW (ref 83–108)
PO2 BLDA: 54 MMHG — LOW (ref 83–108)
PO2 BLDA: 61 MMHG — LOW (ref 83–108)
PO2 BLDA: 61 MMHG — LOW (ref 83–108)
PO2 BLDA: 62 MMHG — LOW (ref 83–108)
PO2 BLDA: 62 MMHG — LOW (ref 83–108)
PO2 BLDA: 68 MMHG — LOW (ref 83–108)
PO2 BLDA: 68 MMHG — LOW (ref 83–108)
PO2 BLDA: 69 MMHG — LOW (ref 83–108)
PO2 BLDA: 69 MMHG — LOW (ref 83–108)
PO2 BLDV: 38 MMHG — SIGNIFICANT CHANGE UP
PO2 BLDV: 38 MMHG — SIGNIFICANT CHANGE UP
PO2 BLDV: 42 MMHG — SIGNIFICANT CHANGE UP
PO2 BLDV: 42 MMHG — SIGNIFICANT CHANGE UP
PO2 BLDV: 45 MMHG — SIGNIFICANT CHANGE UP
PO2 BLDV: 45 MMHG — SIGNIFICANT CHANGE UP
POIKILOCYTOSIS BLD QL AUTO: SLIGHT — SIGNIFICANT CHANGE UP
POIKILOCYTOSIS BLD QL AUTO: SLIGHT — SIGNIFICANT CHANGE UP
POLYCHROMASIA BLD QL SMEAR: SLIGHT — SIGNIFICANT CHANGE UP
POLYCHROMASIA BLD QL SMEAR: SLIGHT — SIGNIFICANT CHANGE UP
POTASSIUM BLDV-SCNC: 4.8 MMOL/L — SIGNIFICANT CHANGE UP (ref 3.5–5.1)
POTASSIUM BLDV-SCNC: 4.8 MMOL/L — SIGNIFICANT CHANGE UP (ref 3.5–5.1)
POTASSIUM BLDV-SCNC: 5 MMOL/L — SIGNIFICANT CHANGE UP (ref 3.5–5.1)
POTASSIUM BLDV-SCNC: 5 MMOL/L — SIGNIFICANT CHANGE UP (ref 3.5–5.1)
POTASSIUM SERPL-MCNC: 4.4 MMOL/L — SIGNIFICANT CHANGE UP (ref 3.5–5.3)
POTASSIUM SERPL-MCNC: 4.4 MMOL/L — SIGNIFICANT CHANGE UP (ref 3.5–5.3)
POTASSIUM SERPL-MCNC: 4.5 MMOL/L — SIGNIFICANT CHANGE UP (ref 3.5–5.3)
POTASSIUM SERPL-MCNC: 4.6 MMOL/L — SIGNIFICANT CHANGE UP (ref 3.5–5.3)
POTASSIUM SERPL-MCNC: 4.6 MMOL/L — SIGNIFICANT CHANGE UP (ref 3.5–5.3)
POTASSIUM SERPL-MCNC: 4.8 MMOL/L — SIGNIFICANT CHANGE UP (ref 3.5–5.3)
POTASSIUM SERPL-MCNC: 4.8 MMOL/L — SIGNIFICANT CHANGE UP (ref 3.5–5.3)
POTASSIUM SERPL-MCNC: 4.9 MMOL/L — SIGNIFICANT CHANGE UP (ref 3.5–5.3)
POTASSIUM SERPL-MCNC: 4.9 MMOL/L — SIGNIFICANT CHANGE UP (ref 3.5–5.3)
POTASSIUM SERPL-MCNC: 5.6 MMOL/L — HIGH (ref 3.5–5.3)
POTASSIUM SERPL-MCNC: 5.6 MMOL/L — HIGH (ref 3.5–5.3)
POTASSIUM SERPL-MCNC: 5.8 MMOL/L — HIGH (ref 3.5–5.3)
POTASSIUM SERPL-MCNC: 5.8 MMOL/L — HIGH (ref 3.5–5.3)
POTASSIUM SERPL-SCNC: 4.4 MMOL/L — SIGNIFICANT CHANGE UP (ref 3.5–5.3)
POTASSIUM SERPL-SCNC: 4.4 MMOL/L — SIGNIFICANT CHANGE UP (ref 3.5–5.3)
POTASSIUM SERPL-SCNC: 4.5 MMOL/L — SIGNIFICANT CHANGE UP (ref 3.5–5.3)
POTASSIUM SERPL-SCNC: 4.6 MMOL/L — SIGNIFICANT CHANGE UP (ref 3.5–5.3)
POTASSIUM SERPL-SCNC: 4.6 MMOL/L — SIGNIFICANT CHANGE UP (ref 3.5–5.3)
POTASSIUM SERPL-SCNC: 4.8 MMOL/L — SIGNIFICANT CHANGE UP (ref 3.5–5.3)
POTASSIUM SERPL-SCNC: 4.8 MMOL/L — SIGNIFICANT CHANGE UP (ref 3.5–5.3)
POTASSIUM SERPL-SCNC: 4.9 MMOL/L — SIGNIFICANT CHANGE UP (ref 3.5–5.3)
POTASSIUM SERPL-SCNC: 4.9 MMOL/L — SIGNIFICANT CHANGE UP (ref 3.5–5.3)
POTASSIUM SERPL-SCNC: 5.6 MMOL/L — HIGH (ref 3.5–5.3)
POTASSIUM SERPL-SCNC: 5.6 MMOL/L — HIGH (ref 3.5–5.3)
POTASSIUM SERPL-SCNC: 5.8 MMOL/L — HIGH (ref 3.5–5.3)
POTASSIUM SERPL-SCNC: 5.8 MMOL/L — HIGH (ref 3.5–5.3)
PROT SERPL-MCNC: 3.3 G/DL — LOW (ref 6–8.3)
PROT SERPL-MCNC: 3.3 G/DL — LOW (ref 6–8.3)
PROT SERPL-MCNC: 3.4 G/DL — LOW (ref 6–8.3)
PROT SERPL-MCNC: 3.4 G/DL — LOW (ref 6–8.3)
PROT SERPL-MCNC: 3.6 G/DL — LOW (ref 6–8.3)
PROT SERPL-MCNC: 3.6 G/DL — LOW (ref 6–8.3)
PROT SERPL-MCNC: 4.4 G/DL — LOW (ref 6–8.3)
PROT SERPL-MCNC: 4.4 G/DL — LOW (ref 6–8.3)
PROT SERPL-MCNC: 4.5 G/DL — LOW (ref 6–8.3)
PROT SERPL-MCNC: 4.5 G/DL — LOW (ref 6–8.3)
PROT SERPL-MCNC: 4.7 G/DL — LOW (ref 6–8.3)
PROT SERPL-MCNC: 4.7 G/DL — LOW (ref 6–8.3)
PROT SERPL-MCNC: 5.3 G/DL — LOW (ref 6–8.3)
PROT SERPL-MCNC: 5.3 G/DL — LOW (ref 6–8.3)
PROT SERPL-MCNC: 7.2 G/DL — SIGNIFICANT CHANGE UP (ref 6–8.3)
PROT SERPL-MCNC: 7.2 G/DL — SIGNIFICANT CHANGE UP (ref 6–8.3)
PROTHROM AB SERPL-ACNC: 20.9 SEC — HIGH (ref 9.5–13)
PROTHROM AB SERPL-ACNC: 20.9 SEC — HIGH (ref 9.5–13)
RBC # BLD: 2.75 M/UL — LOW (ref 3.8–5.2)
RBC # BLD: 2.75 M/UL — LOW (ref 3.8–5.2)
RBC # BLD: 2.81 M/UL — LOW (ref 3.8–5.2)
RBC # BLD: 2.81 M/UL — LOW (ref 3.8–5.2)
RBC # BLD: 3.05 M/UL — LOW (ref 3.8–5.2)
RBC # BLD: 3.05 M/UL — LOW (ref 3.8–5.2)
RBC # BLD: 3.68 M/UL — LOW (ref 3.8–5.2)
RBC # BLD: 3.68 M/UL — LOW (ref 3.8–5.2)
RBC # BLD: 3.71 M/UL — LOW (ref 3.8–5.2)
RBC # BLD: 3.71 M/UL — LOW (ref 3.8–5.2)
RBC # BLD: 3.77 M/UL — LOW (ref 3.8–5.2)
RBC # BLD: 3.77 M/UL — LOW (ref 3.8–5.2)
RBC # BLD: 3.87 M/UL — SIGNIFICANT CHANGE UP (ref 3.8–5.2)
RBC # BLD: 3.87 M/UL — SIGNIFICANT CHANGE UP (ref 3.8–5.2)
RBC # FLD: 15.2 % — HIGH (ref 10.3–14.5)
RBC # FLD: 15.3 % — HIGH (ref 10.3–14.5)
RBC # FLD: 15.3 % — HIGH (ref 10.3–14.5)
RBC # FLD: 15.4 % — HIGH (ref 10.3–14.5)
RBC # FLD: 15.4 % — HIGH (ref 10.3–14.5)
RBC # FLD: 15.6 % — HIGH (ref 10.3–14.5)
RBC # FLD: 15.6 % — HIGH (ref 10.3–14.5)
RBC # FLD: 15.7 % — HIGH (ref 10.3–14.5)
RBC # FLD: 15.7 % — HIGH (ref 10.3–14.5)
RBC # FLD: 15.9 % — HIGH (ref 10.3–14.5)
RBC # FLD: 15.9 % — HIGH (ref 10.3–14.5)
RBC BLD AUTO: ABNORMAL
RBC BLD AUTO: ABNORMAL
S AUREUS DNA NOSE QL NAA+PROBE: SIGNIFICANT CHANGE UP
S AUREUS DNA NOSE QL NAA+PROBE: SIGNIFICANT CHANGE UP
SAO2 % BLDA: 83 % — SIGNIFICANT CHANGE UP
SAO2 % BLDA: 83 % — SIGNIFICANT CHANGE UP
SAO2 % BLDA: 88 % — SIGNIFICANT CHANGE UP
SAO2 % BLDA: 88 % — SIGNIFICANT CHANGE UP
SAO2 % BLDA: 90 % — SIGNIFICANT CHANGE UP
SAO2 % BLDA: 90 % — SIGNIFICANT CHANGE UP
SAO2 % BLDA: 93 % — SIGNIFICANT CHANGE UP
SAO2 % BLDA: 93 % — SIGNIFICANT CHANGE UP
SAO2 % BLDA: 94 % — SIGNIFICANT CHANGE UP
SAO2 % BLDA: 94 % — SIGNIFICANT CHANGE UP
SAO2 % BLDV: 55.4 % — SIGNIFICANT CHANGE UP
SAO2 % BLDV: 55.4 % — SIGNIFICANT CHANGE UP
SAO2 % BLDV: 59.1 % — SIGNIFICANT CHANGE UP
SAO2 % BLDV: 59.1 % — SIGNIFICANT CHANGE UP
SAO2 % BLDV: 70.5 % — SIGNIFICANT CHANGE UP
SAO2 % BLDV: 70.5 % — SIGNIFICANT CHANGE UP
SARS-COV-2 N GENE NPH QL NAA+PROBE: NOT DETECTED
SMUDGE CELLS # BLD: PRESENT — SIGNIFICANT CHANGE UP
SMUDGE CELLS # BLD: PRESENT — SIGNIFICANT CHANGE UP
SODIUM SERPL-SCNC: 143 MMOL/L — SIGNIFICANT CHANGE UP (ref 135–145)
SODIUM SERPL-SCNC: 143 MMOL/L — SIGNIFICANT CHANGE UP (ref 135–145)
SODIUM SERPL-SCNC: 144 MMOL/L — SIGNIFICANT CHANGE UP (ref 135–145)
SODIUM SERPL-SCNC: 145 MMOL/L — SIGNIFICANT CHANGE UP (ref 135–145)
SODIUM SERPL-SCNC: 145 MMOL/L — SIGNIFICANT CHANGE UP (ref 135–145)
SODIUM SERPL-SCNC: 147 MMOL/L — HIGH (ref 135–145)
SODIUM SERPL-SCNC: 148 MMOL/L — HIGH (ref 135–145)
SODIUM SERPL-SCNC: 148 MMOL/L — HIGH (ref 135–145)
SODIUM SERPL-SCNC: 149 MMOL/L — HIGH (ref 135–145)
SODIUM SERPL-SCNC: 149 MMOL/L — HIGH (ref 135–145)
TRIGL SERPL-MCNC: 160 MG/DL — HIGH
TRIGL SERPL-MCNC: 160 MG/DL — HIGH
TROPONIN I, HIGH SENSITIVITY RESULT: 1427.2 NG/L — HIGH
TROPONIN I, HIGH SENSITIVITY RESULT: 1427.2 NG/L — HIGH
TROPONIN I, HIGH SENSITIVITY RESULT: 1452 NG/L — HIGH
TROPONIN I, HIGH SENSITIVITY RESULT: 1452 NG/L — HIGH
TROPONIN I, HIGH SENSITIVITY RESULT: 161.7 NG/L — HIGH
TROPONIN I, HIGH SENSITIVITY RESULT: 161.7 NG/L — HIGH
TROPONIN I, HIGH SENSITIVITY RESULT: 34.3 NG/L — SIGNIFICANT CHANGE UP
TROPONIN I, HIGH SENSITIVITY RESULT: 34.3 NG/L — SIGNIFICANT CHANGE UP
TROPONIN I, HIGH SENSITIVITY RESULT: 896.7 NG/L — HIGH
TROPONIN I, HIGH SENSITIVITY RESULT: 896.7 NG/L — HIGH
TSH SERPL-MCNC: 0.83 UU/ML — SIGNIFICANT CHANGE UP (ref 0.34–4.82)
TSH SERPL-MCNC: 0.83 UU/ML — SIGNIFICANT CHANGE UP (ref 0.34–4.82)
WBC # BLD: 13.15 K/UL — HIGH (ref 3.8–10.5)
WBC # BLD: 13.15 K/UL — HIGH (ref 3.8–10.5)
WBC # BLD: 16.75 K/UL — HIGH (ref 3.8–10.5)
WBC # BLD: 16.75 K/UL — HIGH (ref 3.8–10.5)
WBC # BLD: 18.4 K/UL — HIGH (ref 3.8–10.5)
WBC # BLD: 18.4 K/UL — HIGH (ref 3.8–10.5)
WBC # BLD: 19.37 K/UL — HIGH (ref 3.8–10.5)
WBC # BLD: 19.37 K/UL — HIGH (ref 3.8–10.5)
WBC # BLD: 20.85 K/UL — HIGH (ref 3.8–10.5)
WBC # BLD: 20.85 K/UL — HIGH (ref 3.8–10.5)
WBC # BLD: 3.56 K/UL — LOW (ref 3.8–10.5)
WBC # BLD: 3.56 K/UL — LOW (ref 3.8–10.5)
WBC # BLD: 9.65 K/UL — SIGNIFICANT CHANGE UP (ref 3.8–10.5)
WBC # BLD: 9.65 K/UL — SIGNIFICANT CHANGE UP (ref 3.8–10.5)
WBC # FLD AUTO: 13.15 K/UL — HIGH (ref 3.8–10.5)
WBC # FLD AUTO: 13.15 K/UL — HIGH (ref 3.8–10.5)
WBC # FLD AUTO: 16.75 K/UL — HIGH (ref 3.8–10.5)
WBC # FLD AUTO: 16.75 K/UL — HIGH (ref 3.8–10.5)
WBC # FLD AUTO: 18.4 K/UL — HIGH (ref 3.8–10.5)
WBC # FLD AUTO: 18.4 K/UL — HIGH (ref 3.8–10.5)
WBC # FLD AUTO: 19.37 K/UL — HIGH (ref 3.8–10.5)
WBC # FLD AUTO: 19.37 K/UL — HIGH (ref 3.8–10.5)
WBC # FLD AUTO: 20.85 K/UL — HIGH (ref 3.8–10.5)
WBC # FLD AUTO: 20.85 K/UL — HIGH (ref 3.8–10.5)
WBC # FLD AUTO: 3.56 K/UL — LOW (ref 3.8–10.5)
WBC # FLD AUTO: 3.56 K/UL — LOW (ref 3.8–10.5)
WBC # FLD AUTO: 9.65 K/UL — SIGNIFICANT CHANGE UP (ref 3.8–10.5)
WBC # FLD AUTO: 9.65 K/UL — SIGNIFICANT CHANGE UP (ref 3.8–10.5)

## 2023-01-01 PROCEDURE — 49616 RPR AA HRN RCR 3-10 NCR/STRN: CPT | Mod: AS

## 2023-01-01 PROCEDURE — 93306 TTE W/DOPPLER COMPLETE: CPT

## 2023-01-01 PROCEDURE — 84443 ASSAY THYROID STIM HORMONE: CPT

## 2023-01-01 PROCEDURE — 99285 EMERGENCY DEPT VISIT HI MDM: CPT

## 2023-01-01 PROCEDURE — 93005 ELECTROCARDIOGRAM TRACING: CPT

## 2023-01-01 PROCEDURE — 49616 RPR AA HRN RCR 3-10 NCR/STRN: CPT

## 2023-01-01 PROCEDURE — 82330 ASSAY OF CALCIUM: CPT

## 2023-01-01 PROCEDURE — 83930 ASSAY OF BLOOD OSMOLALITY: CPT

## 2023-01-01 PROCEDURE — 84100 ASSAY OF PHOSPHORUS: CPT

## 2023-01-01 PROCEDURE — 80048 BASIC METABOLIC PNL TOTAL CA: CPT

## 2023-01-01 PROCEDURE — 36415 COLL VENOUS BLD VENIPUNCTURE: CPT

## 2023-01-01 PROCEDURE — 85027 COMPLETE CBC AUTOMATED: CPT

## 2023-01-01 PROCEDURE — 82962 GLUCOSE BLOOD TEST: CPT

## 2023-01-01 PROCEDURE — 87340 HEPATITIS B SURFACE AG IA: CPT

## 2023-01-01 PROCEDURE — 99222 1ST HOSP IP/OBS MODERATE 55: CPT

## 2023-01-01 PROCEDURE — 44120 REMOVAL OF SMALL INTESTINE: CPT

## 2023-01-01 PROCEDURE — 83690 ASSAY OF LIPASE: CPT

## 2023-01-01 PROCEDURE — 74176 CT ABD & PELVIS W/O CONTRAST: CPT | Mod: MA

## 2023-01-01 PROCEDURE — 99285 EMERGENCY DEPT VISIT HI MDM: CPT | Mod: 25

## 2023-01-01 PROCEDURE — 71045 X-RAY EXAM CHEST 1 VIEW: CPT | Mod: 26

## 2023-01-01 PROCEDURE — 44120 REMOVAL OF SMALL INTESTINE: CPT | Mod: AS

## 2023-01-01 PROCEDURE — 99261: CPT

## 2023-01-01 PROCEDURE — 88307 TISSUE EXAM BY PATHOLOGIST: CPT | Mod: 26

## 2023-01-01 PROCEDURE — 86850 RBC ANTIBODY SCREEN: CPT

## 2023-01-01 PROCEDURE — 85610 PROTHROMBIN TIME: CPT

## 2023-01-01 PROCEDURE — 80076 HEPATIC FUNCTION PANEL: CPT

## 2023-01-01 PROCEDURE — 88307 TISSUE EXAM BY PATHOLOGIST: CPT

## 2023-01-01 PROCEDURE — 94003 VENT MGMT INPAT SUBQ DAY: CPT

## 2023-01-01 PROCEDURE — 71045 X-RAY EXAM CHEST 1 VIEW: CPT

## 2023-01-01 PROCEDURE — 82803 BLOOD GASES ANY COMBINATION: CPT

## 2023-01-01 PROCEDURE — 84484 ASSAY OF TROPONIN QUANT: CPT

## 2023-01-01 PROCEDURE — 84478 ASSAY OF TRIGLYCERIDES: CPT

## 2023-01-01 PROCEDURE — 85025 COMPLETE CBC W/AUTO DIFF WBC: CPT

## 2023-01-01 PROCEDURE — 86901 BLOOD TYPING SEROLOGIC RH(D): CPT

## 2023-01-01 PROCEDURE — 84295 ASSAY OF SERUM SODIUM: CPT

## 2023-01-01 PROCEDURE — 74018 RADEX ABDOMEN 1 VIEW: CPT | Mod: 26

## 2023-01-01 PROCEDURE — 87640 STAPH A DNA AMP PROBE: CPT

## 2023-01-01 PROCEDURE — 87641 MR-STAPH DNA AMP PROBE: CPT

## 2023-01-01 PROCEDURE — 83605 ASSAY OF LACTIC ACID: CPT

## 2023-01-01 PROCEDURE — 99291 CRITICAL CARE FIRST HOUR: CPT

## 2023-01-01 PROCEDURE — 74176 CT ABD & PELVIS W/O CONTRAST: CPT | Mod: 26,MA

## 2023-01-01 PROCEDURE — 86900 BLOOD TYPING SEROLOGIC ABO: CPT

## 2023-01-01 PROCEDURE — 93010 ELECTROCARDIOGRAM REPORT: CPT

## 2023-01-01 PROCEDURE — 80053 COMPREHEN METABOLIC PANEL: CPT

## 2023-01-01 PROCEDURE — 84132 ASSAY OF SERUM POTASSIUM: CPT

## 2023-01-01 PROCEDURE — 83735 ASSAY OF MAGNESIUM: CPT

## 2023-01-01 PROCEDURE — 86703 HIV-1/HIV-2 1 RESULT ANTBDY: CPT

## 2023-01-01 PROCEDURE — 87040 BLOOD CULTURE FOR BACTERIA: CPT

## 2023-01-01 PROCEDURE — 74018 RADEX ABDOMEN 1 VIEW: CPT

## 2023-01-01 DEVICE — DEVICE ABSORBATACK 5MM 30 TACK FIXATION: Type: IMPLANTABLE DEVICE | Site: ABDOMINAL | Status: FUNCTIONAL

## 2023-01-01 DEVICE — STAPLER COVIDIEN TRI-STAPLE 60MM TAN RELOAD: Type: IMPLANTABLE DEVICE | Site: ABDOMINAL | Status: FUNCTIONAL

## 2023-01-01 DEVICE — IMPLANTABLE DEVICE: Type: IMPLANTABLE DEVICE | Site: ABDOMINAL | Status: FUNCTIONAL

## 2023-01-01 RX ORDER — PIPERACILLIN AND TAZOBACTAM 4; .5 G/20ML; G/20ML
3.38 INJECTION, POWDER, LYOPHILIZED, FOR SOLUTION INTRAVENOUS EVERY 8 HOURS
Refills: 0 | Status: DISCONTINUED | OUTPATIENT
Start: 2023-01-01 | End: 2023-01-01

## 2023-01-01 RX ORDER — ATORVASTATIN CALCIUM 80 MG/1
1 TABLET, FILM COATED ORAL
Qty: 0 | Refills: 0 | DISCHARGE

## 2023-01-01 RX ORDER — NOREPINEPHRINE BITARTRATE/D5W 8 MG/250ML
0.1 PLASTIC BAG, INJECTION (ML) INTRAVENOUS
Qty: 64 | Refills: 0 | Status: DISCONTINUED | OUTPATIENT
Start: 2023-01-01 | End: 2023-01-01

## 2023-01-01 RX ORDER — FERROUS FUMARATE 350(115)MG
1 TABLET ORAL
Refills: 0 | DISCHARGE

## 2023-01-01 RX ORDER — ASPIRIN/CALCIUM CARB/MAGNESIUM 324 MG
1 TABLET ORAL
Refills: 0 | DISCHARGE

## 2023-01-01 RX ORDER — INSULIN NPH HUM/REG INSULIN HM 70-30/ML
6 VIAL (ML) SUBCUTANEOUS
Qty: 0 | Refills: 0 | DISCHARGE

## 2023-01-01 RX ORDER — CARVEDILOL PHOSPHATE 80 MG/1
1 CAPSULE, EXTENDED RELEASE ORAL
Refills: 0 | DISCHARGE

## 2023-01-01 RX ORDER — LISINOPRIL 2.5 MG/1
1 TABLET ORAL
Refills: 0 | DISCHARGE

## 2023-01-01 RX ORDER — FENTANYL CITRATE 50 UG/ML
1 INJECTION INTRAVENOUS
Qty: 2500 | Refills: 0 | Status: DISCONTINUED | OUTPATIENT
Start: 2023-01-01 | End: 2023-01-01

## 2023-01-01 RX ORDER — CHOLECALCIFEROL (VITAMIN D3) 125 MCG
10 CAPSULE ORAL
Refills: 0 | DISCHARGE

## 2023-01-01 RX ORDER — DEXTROSE 50 % IN WATER 50 %
12.5 SYRINGE (ML) INTRAVENOUS ONCE
Refills: 0 | Status: DISCONTINUED | OUTPATIENT
Start: 2023-01-01 | End: 2023-01-01

## 2023-01-01 RX ORDER — FUROSEMIDE 40 MG
1 TABLET ORAL
Qty: 0 | Refills: 0 | DISCHARGE

## 2023-01-01 RX ORDER — SODIUM BICARBONATE 1 MEQ/ML
150 SYRINGE (ML) INTRAVENOUS ONCE
Refills: 0 | Status: COMPLETED | OUTPATIENT
Start: 2023-01-01 | End: 2023-01-01

## 2023-01-01 RX ORDER — PANTOPRAZOLE SODIUM 20 MG/1
40 TABLET, DELAYED RELEASE ORAL DAILY
Refills: 0 | Status: DISCONTINUED | OUTPATIENT
Start: 2023-01-01 | End: 2023-01-01

## 2023-01-01 RX ORDER — DEXTROSE 50 % IN WATER 50 %
25 SYRINGE (ML) INTRAVENOUS ONCE
Refills: 0 | Status: DISCONTINUED | OUTPATIENT
Start: 2023-01-01 | End: 2023-01-01

## 2023-01-01 RX ORDER — PIPERACILLIN AND TAZOBACTAM 4; .5 G/20ML; G/20ML
3.38 INJECTION, POWDER, LYOPHILIZED, FOR SOLUTION INTRAVENOUS ONCE
Refills: 0 | Status: COMPLETED | OUTPATIENT
Start: 2023-01-01 | End: 2023-01-01

## 2023-01-01 RX ORDER — INSULIN ASPART 100 [IU]/ML
14 INJECTION, SUSPENSION SUBCUTANEOUS
Refills: 0 | DISCHARGE

## 2023-01-01 RX ORDER — MEROPENEM 1 G/30ML
500 INJECTION INTRAVENOUS EVERY 12 HOURS
Refills: 0 | Status: DISCONTINUED | OUTPATIENT
Start: 2023-01-01 | End: 2023-01-01

## 2023-01-01 RX ORDER — PIPERACILLIN AND TAZOBACTAM 4; .5 G/20ML; G/20ML
3.38 INJECTION, POWDER, LYOPHILIZED, FOR SOLUTION INTRAVENOUS ONCE
Refills: 0 | Status: DISCONTINUED | OUTPATIENT
Start: 2023-01-01 | End: 2023-01-01

## 2023-01-01 RX ORDER — ACETAMINOPHEN 500 MG
1000 TABLET ORAL ONCE
Refills: 0 | Status: COMPLETED | OUTPATIENT
Start: 2023-01-01 | End: 2023-01-01

## 2023-01-01 RX ORDER — INSULIN NPH HUM/REG INSULIN HM 70-30/ML
24 VIAL (ML) SUBCUTANEOUS
Qty: 0 | Refills: 0 | DISCHARGE

## 2023-01-01 RX ORDER — HYDROCORTISONE 20 MG
50 TABLET ORAL EVERY 6 HOURS
Refills: 0 | Status: DISCONTINUED | OUTPATIENT
Start: 2023-01-01 | End: 2023-01-01

## 2023-01-01 RX ORDER — SODIUM CHLORIDE 9 MG/ML
1000 INJECTION, SOLUTION INTRAVENOUS
Refills: 0 | Status: DISCONTINUED | OUTPATIENT
Start: 2023-01-01 | End: 2023-01-01

## 2023-01-01 RX ORDER — NOREPINEPHRINE BITARTRATE/D5W 8 MG/250ML
0.44 PLASTIC BAG, INJECTION (ML) INTRAVENOUS
Qty: 16 | Refills: 0 | Status: DISCONTINUED | OUTPATIENT
Start: 2023-01-01 | End: 2023-01-01

## 2023-01-01 RX ORDER — FENTANYL CITRATE 50 UG/ML
50 INJECTION INTRAVENOUS EVERY 6 HOURS
Refills: 0 | Status: DISCONTINUED | OUTPATIENT
Start: 2023-01-01 | End: 2023-01-01

## 2023-01-01 RX ORDER — VANCOMYCIN HCL 1 G
1500 VIAL (EA) INTRAVENOUS ONCE
Refills: 0 | Status: COMPLETED | OUTPATIENT
Start: 2023-01-01 | End: 2023-01-01

## 2023-01-01 RX ORDER — NYSTATIN 500MM UNIT
0 POWDER (EA) MISCELLANEOUS
Refills: 0 | DISCHARGE

## 2023-01-01 RX ORDER — SEVELAMER CARBONATE 2400 MG/1
2 POWDER, FOR SUSPENSION ORAL
Refills: 0 | DISCHARGE

## 2023-01-01 RX ORDER — GLUCAGON INJECTION, SOLUTION 0.5 MG/.1ML
1 INJECTION, SOLUTION SUBCUTANEOUS ONCE
Refills: 0 | Status: DISCONTINUED | OUTPATIENT
Start: 2023-01-01 | End: 2023-01-01

## 2023-01-01 RX ORDER — PHENYLEPHRINE HYDROCHLORIDE 10 MG/ML
8.7 INJECTION INTRAVENOUS
Qty: 320 | Refills: 0 | Status: DISCONTINUED | OUTPATIENT
Start: 2023-01-01 | End: 2023-01-01

## 2023-01-01 RX ORDER — DIATRIZOATE MEGLUMINE 180 MG/ML
30 INJECTION, SOLUTION INTRAVESICAL ONCE
Refills: 0 | Status: COMPLETED | OUTPATIENT
Start: 2023-01-01 | End: 2023-01-01

## 2023-01-01 RX ORDER — DEXTROSE 50 % IN WATER 50 %
50 SYRINGE (ML) INTRAVENOUS ONCE
Refills: 0 | Status: COMPLETED | OUTPATIENT
Start: 2023-01-01 | End: 2023-01-01

## 2023-01-01 RX ORDER — SODIUM CHLORIDE 9 MG/ML
10 INJECTION INTRAMUSCULAR; INTRAVENOUS; SUBCUTANEOUS
Refills: 0 | Status: DISCONTINUED | OUTPATIENT
Start: 2023-01-01 | End: 2023-01-01

## 2023-01-01 RX ORDER — GABAPENTIN 400 MG/1
1 CAPSULE ORAL
Qty: 0 | Refills: 0 | DISCHARGE

## 2023-01-01 RX ORDER — LIDOCAINE 4 G/100G
1 CREAM TOPICAL
Refills: 0 | DISCHARGE

## 2023-01-01 RX ORDER — FERROUS SULFATE 325(65) MG
0 TABLET ORAL
Qty: 0 | Refills: 0 | DISCHARGE

## 2023-01-01 RX ORDER — ENOXAPARIN SODIUM 100 MG/ML
40 INJECTION SUBCUTANEOUS EVERY 24 HOURS
Refills: 0 | Status: DISCONTINUED | OUTPATIENT
Start: 2023-01-01 | End: 2023-01-01

## 2023-01-01 RX ORDER — CHLORHEXIDINE GLUCONATE 213 G/1000ML
15 SOLUTION TOPICAL EVERY 12 HOURS
Refills: 0 | Status: DISCONTINUED | OUTPATIENT
Start: 2023-01-01 | End: 2023-01-01

## 2023-01-01 RX ORDER — NOREPINEPHRINE BITARTRATE/D5W 8 MG/250ML
1 PLASTIC BAG, INJECTION (ML) INTRAVENOUS
Qty: 64 | Refills: 0 | Status: DISCONTINUED | OUTPATIENT
Start: 2023-01-01 | End: 2023-01-01

## 2023-01-01 RX ORDER — SENNA PLUS 8.6 MG/1
2 TABLET ORAL
Refills: 0 | DISCHARGE

## 2023-01-01 RX ORDER — DEXTROSE 50 % IN WATER 50 %
15 SYRINGE (ML) INTRAVENOUS ONCE
Refills: 0 | Status: DISCONTINUED | OUTPATIENT
Start: 2023-01-01 | End: 2023-01-01

## 2023-01-01 RX ORDER — PIPERACILLIN AND TAZOBACTAM 4; .5 G/20ML; G/20ML
3.38 INJECTION, POWDER, LYOPHILIZED, FOR SOLUTION INTRAVENOUS EVERY 12 HOURS
Refills: 0 | Status: DISCONTINUED | OUTPATIENT
Start: 2023-01-01 | End: 2023-01-01

## 2023-01-01 RX ORDER — PHENYLEPHRINE HYDROCHLORIDE 10 MG/ML
1 INJECTION INTRAVENOUS
Qty: 320 | Refills: 0 | Status: DISCONTINUED | OUTPATIENT
Start: 2023-01-01 | End: 2023-01-01

## 2023-01-01 RX ORDER — INSULIN LISPRO 100/ML
VIAL (ML) SUBCUTANEOUS EVERY 6 HOURS
Refills: 0 | Status: DISCONTINUED | OUTPATIENT
Start: 2023-01-01 | End: 2023-01-01

## 2023-01-01 RX ORDER — ALBUTEROL 90 UG/1
2 AEROSOL, METERED ORAL
Qty: 0 | Refills: 0 | DISCHARGE

## 2023-01-01 RX ORDER — VASOPRESSIN 20 [USP'U]/ML
0.04 INJECTION INTRAVENOUS
Qty: 40 | Refills: 0 | Status: DISCONTINUED | OUTPATIENT
Start: 2023-01-01 | End: 2023-01-01

## 2023-01-01 RX ORDER — ALBUTEROL 90 UG/1
2 AEROSOL, METERED ORAL
Refills: 0 | DISCHARGE

## 2023-01-01 RX ORDER — GABAPENTIN 400 MG/1
1 CAPSULE ORAL
Refills: 0 | DISCHARGE

## 2023-01-01 RX ORDER — CHLORHEXIDINE GLUCONATE 213 G/1000ML
1 SOLUTION TOPICAL DAILY
Refills: 0 | Status: DISCONTINUED | OUTPATIENT
Start: 2023-01-01 | End: 2023-01-01

## 2023-01-01 RX ORDER — POLYETHYLENE GLYCOL 3350 17 G/17G
1 POWDER, FOR SOLUTION ORAL
Refills: 0 | DISCHARGE

## 2023-01-01 RX ORDER — SODIUM CHLORIDE 9 MG/ML
1000 INJECTION, SOLUTION INTRAVENOUS ONCE
Refills: 0 | Status: DISCONTINUED | OUTPATIENT
Start: 2023-01-01 | End: 2023-01-01

## 2023-01-01 RX ORDER — ALLOPURINOL 300 MG
0 TABLET ORAL
Refills: 0 | DISCHARGE

## 2023-01-01 RX ORDER — PROPOFOL 10 MG/ML
10 INJECTION, EMULSION INTRAVENOUS
Qty: 1000 | Refills: 0 | Status: DISCONTINUED | OUTPATIENT
Start: 2023-01-01 | End: 2023-01-01

## 2023-01-01 RX ORDER — CALCITRIOL 0.5 UG/1
1 CAPSULE ORAL
Refills: 0 | DISCHARGE

## 2023-01-01 RX ORDER — FENTANYL CITRATE 50 UG/ML
100 INJECTION INTRAVENOUS EVERY 4 HOURS
Refills: 0 | Status: DISCONTINUED | OUTPATIENT
Start: 2023-01-01 | End: 2023-01-01

## 2023-01-01 RX ORDER — PANTOPRAZOLE SODIUM 20 MG/1
1 TABLET, DELAYED RELEASE ORAL
Refills: 0 | DISCHARGE

## 2023-01-01 RX ORDER — METHYLENE BLUE 65 MG
100 TABLET ORAL ONCE
Refills: 0 | Status: COMPLETED | OUTPATIENT
Start: 2023-01-01 | End: 2023-01-01

## 2023-01-01 RX ORDER — PHENYLEPHRINE HYDROCHLORIDE 10 MG/ML
1 INJECTION INTRAVENOUS
Qty: 40 | Refills: 0 | Status: DISCONTINUED | OUTPATIENT
Start: 2023-01-01 | End: 2023-01-01

## 2023-01-01 RX ORDER — INSULIN HUMAN 100 [IU]/ML
10 INJECTION, SOLUTION SUBCUTANEOUS ONCE
Refills: 0 | Status: COMPLETED | OUTPATIENT
Start: 2023-01-01 | End: 2023-01-01

## 2023-01-01 RX ORDER — CHOLECALCIFEROL (VITAMIN D3) 125 MCG
1 CAPSULE ORAL
Qty: 0 | Refills: 0 | DISCHARGE

## 2023-01-01 RX ORDER — HALOBETASOL PROPIONATE 0.5 MG/G
1 OINTMENT TOPICAL
Refills: 0 | DISCHARGE

## 2023-01-01 RX ORDER — CARVEDILOL PHOSPHATE 80 MG/1
1 CAPSULE, EXTENDED RELEASE ORAL
Qty: 0 | Refills: 0 | DISCHARGE

## 2023-01-01 RX ORDER — SPIRONOLACTONE 25 MG/1
1 TABLET, FILM COATED ORAL
Qty: 0 | Refills: 0 | DISCHARGE

## 2023-01-01 RX ORDER — INSULIN LISPRO 100/ML
VIAL (ML) SUBCUTANEOUS
Refills: 0 | Status: DISCONTINUED | OUTPATIENT
Start: 2023-01-01 | End: 2023-01-01

## 2023-01-01 RX ORDER — INSULIN NPH HUM/REG INSULIN HM 70-30/ML
28 VIAL (ML) SUBCUTANEOUS
Refills: 0 | DISCHARGE

## 2023-01-01 RX ORDER — INSULIN NPH HUM/REG INSULIN HM 70-30/ML
10 VIAL (ML) SUBCUTANEOUS
Qty: 0 | Refills: 0 | DISCHARGE

## 2023-01-01 RX ORDER — SODIUM CHLORIDE 9 MG/ML
1000 INJECTION, SOLUTION INTRAVENOUS ONCE
Refills: 0 | Status: COMPLETED | OUTPATIENT
Start: 2023-01-01 | End: 2023-01-01

## 2023-01-01 RX ORDER — AMIODARONE HYDROCHLORIDE 400 MG/1
150 TABLET ORAL ONCE
Refills: 0 | Status: COMPLETED | OUTPATIENT
Start: 2023-01-01 | End: 2023-01-01

## 2023-01-01 RX ORDER — ACETAMINOPHEN 500 MG
2 TABLET ORAL
Refills: 0 | DISCHARGE

## 2023-01-01 RX ORDER — SODIUM BICARBONATE 1 MEQ/ML
0.22 SYRINGE (ML) INTRAVENOUS
Qty: 150 | Refills: 0 | Status: DISCONTINUED | OUTPATIENT
Start: 2023-01-01 | End: 2023-01-01

## 2023-01-01 RX ORDER — FUROSEMIDE 40 MG
1 TABLET ORAL
Refills: 0 | DISCHARGE

## 2023-01-01 RX ORDER — ATORVASTATIN CALCIUM 80 MG/1
1 TABLET, FILM COATED ORAL
Refills: 0 | DISCHARGE

## 2023-01-01 RX ORDER — HYDROCORTISONE 20 MG
100 TABLET ORAL ONCE
Refills: 0 | Status: COMPLETED | OUTPATIENT
Start: 2023-01-01 | End: 2023-01-01

## 2023-01-01 RX ADMIN — Medication 1000 MILLIGRAM(S): at 21:30

## 2023-01-01 RX ADMIN — PROPOFOL 8.17 MICROGRAM(S)/KG/MIN: 10 INJECTION, EMULSION INTRAVENOUS at 08:49

## 2023-01-01 RX ADMIN — Medication 200 MEQ/KG/HR: at 01:36

## 2023-01-01 RX ADMIN — Medication 50 MILLILITER(S): at 01:38

## 2023-01-01 RX ADMIN — PHENYLEPHRINE HYDROCHLORIDE 224 MICROGRAM(S)/KG/MIN: 10 INJECTION INTRAVENOUS at 14:20

## 2023-01-01 RX ADMIN — Medication 129 MICROGRAM(S)/KG/MIN: at 09:30

## 2023-01-01 RX ADMIN — PIPERACILLIN AND TAZOBACTAM 25 GRAM(S): 4; .5 INJECTION, POWDER, LYOPHILIZED, FOR SOLUTION INTRAVENOUS at 07:00

## 2023-01-01 RX ADMIN — PHENYLEPHRINE HYDROCHLORIDE 224 MICROGRAM(S)/KG/MIN: 10 INJECTION INTRAVENOUS at 01:46

## 2023-01-01 RX ADMIN — PHENYLEPHRINE HYDROCHLORIDE 224 MICROGRAM(S)/KG/MIN: 10 INJECTION INTRAVENOUS at 15:06

## 2023-01-01 RX ADMIN — Medication 400 MILLIGRAM(S): at 08:49

## 2023-01-01 RX ADMIN — CHLORHEXIDINE GLUCONATE 15 MILLILITER(S): 213 SOLUTION TOPICAL at 17:15

## 2023-01-01 RX ADMIN — Medication 50 MILLIGRAM(S): at 17:22

## 2023-01-01 RX ADMIN — Medication 129 MICROGRAM(S)/KG/MIN: at 17:47

## 2023-01-01 RX ADMIN — Medication 12.8 MICROGRAM(S)/KG/MIN: at 04:52

## 2023-01-01 RX ADMIN — PIPERACILLIN AND TAZOBACTAM 25 GRAM(S): 4; .5 INJECTION, POWDER, LYOPHILIZED, FOR SOLUTION INTRAVENOUS at 06:24

## 2023-01-01 RX ADMIN — Medication 129 MICROGRAM(S)/KG/MIN: at 20:30

## 2023-01-01 RX ADMIN — Medication 2: at 06:47

## 2023-01-01 RX ADMIN — Medication 150 MILLIEQUIVALENT(S): at 12:17

## 2023-01-01 RX ADMIN — VASOPRESSIN 6 UNIT(S)/MIN: 20 INJECTION INTRAVENOUS at 17:23

## 2023-01-01 RX ADMIN — DIATRIZOATE MEGLUMINE 30 MILLILITER(S): 180 INJECTION, SOLUTION INTRAVESICAL at 21:05

## 2023-01-01 RX ADMIN — ENOXAPARIN SODIUM 40 MILLIGRAM(S): 100 INJECTION SUBCUTANEOUS at 06:07

## 2023-01-01 RX ADMIN — Medication 129 MICROGRAM(S)/KG/MIN: at 13:21

## 2023-01-01 RX ADMIN — MEROPENEM 100 MILLIGRAM(S): 1 INJECTION INTRAVENOUS at 17:22

## 2023-01-01 RX ADMIN — Medication 129 MICROGRAM(S)/KG/MIN: at 01:45

## 2023-01-01 RX ADMIN — Medication 50 MILLIGRAM(S): at 01:11

## 2023-01-01 RX ADMIN — Medication 50 MILLIGRAM(S): at 06:25

## 2023-01-01 RX ADMIN — FENTANYL CITRATE 50 MICROGRAM(S): 50 INJECTION INTRAVENOUS at 14:41

## 2023-01-01 RX ADMIN — FENTANYL CITRATE 50 MICROGRAM(S): 50 INJECTION INTRAVENOUS at 15:05

## 2023-01-01 RX ADMIN — Medication 150 MILLIEQUIVALENT(S): at 11:33

## 2023-01-01 RX ADMIN — Medication 129 MICROGRAM(S)/KG/MIN: at 18:16

## 2023-01-01 RX ADMIN — PHENYLEPHRINE HYDROCHLORIDE 224 MICROGRAM(S)/KG/MIN: 10 INJECTION INTRAVENOUS at 15:34

## 2023-01-01 RX ADMIN — PHENYLEPHRINE HYDROCHLORIDE 224 MICROGRAM(S)/KG/MIN: 10 INJECTION INTRAVENOUS at 11:18

## 2023-01-01 RX ADMIN — Medication 50 MILLILITER(S): at 18:16

## 2023-01-01 RX ADMIN — Medication 50 MILLIGRAM(S): at 06:58

## 2023-01-01 RX ADMIN — Medication 129 MICROGRAM(S)/KG/MIN: at 17:23

## 2023-01-01 RX ADMIN — FENTANYL CITRATE 13.6 MICROGRAM(S)/KG/HR: 50 INJECTION INTRAVENOUS at 00:51

## 2023-01-01 RX ADMIN — INSULIN HUMAN 10 UNIT(S): 100 INJECTION, SOLUTION SUBCUTANEOUS at 06:58

## 2023-01-01 RX ADMIN — PHENYLEPHRINE HYDROCHLORIDE 25.5 MICROGRAM(S)/KG/MIN: 10 INJECTION INTRAVENOUS at 08:49

## 2023-01-01 RX ADMIN — Medication 129 MICROGRAM(S)/KG/MIN: at 15:07

## 2023-01-01 RX ADMIN — Medication 50 MILLIGRAM(S): at 12:17

## 2023-01-01 RX ADMIN — Medication 129 MICROGRAM(S)/KG/MIN: at 16:34

## 2023-01-01 RX ADMIN — PANTOPRAZOLE SODIUM 40 MILLIGRAM(S): 20 TABLET, DELAYED RELEASE ORAL at 13:48

## 2023-01-01 RX ADMIN — Medication 400 MILLIGRAM(S): at 20:30

## 2023-01-01 RX ADMIN — AMIODARONE HYDROCHLORIDE 600 MILLIGRAM(S): 400 TABLET ORAL at 15:34

## 2023-01-01 RX ADMIN — Medication 50 MILLIGRAM(S): at 00:07

## 2023-01-01 RX ADMIN — Medication 1000 MILLIGRAM(S): at 09:42

## 2023-01-01 RX ADMIN — Medication 50 MILLILITER(S): at 06:58

## 2023-01-01 RX ADMIN — PHENYLEPHRINE HYDROCHLORIDE 25.5 MICROGRAM(S)/KG/MIN: 10 INJECTION INTRAVENOUS at 00:51

## 2023-01-01 RX ADMIN — CHLORHEXIDINE GLUCONATE 15 MILLILITER(S): 213 SOLUTION TOPICAL at 18:46

## 2023-01-01 RX ADMIN — PROPOFOL 8.17 MICROGRAM(S)/KG/MIN: 10 INJECTION, EMULSION INTRAVENOUS at 07:25

## 2023-01-01 RX ADMIN — Medication 150 MILLIEQUIVALENT(S): at 17:22

## 2023-01-01 RX ADMIN — Medication 12.8 MICROGRAM(S)/KG/MIN: at 08:49

## 2023-01-01 RX ADMIN — PHENYLEPHRINE HYDROCHLORIDE 224 MICROGRAM(S)/KG/MIN: 10 INJECTION INTRAVENOUS at 07:26

## 2023-01-01 RX ADMIN — VASOPRESSIN 6 UNIT(S)/MIN: 20 INJECTION INTRAVENOUS at 00:51

## 2023-01-01 RX ADMIN — CHLORHEXIDINE GLUCONATE 1 APPLICATION(S): 213 SOLUTION TOPICAL at 11:08

## 2023-01-01 RX ADMIN — PROPOFOL 8.17 MICROGRAM(S)/KG/MIN: 10 INJECTION, EMULSION INTRAVENOUS at 00:52

## 2023-01-01 RX ADMIN — PANTOPRAZOLE SODIUM 40 MILLIGRAM(S): 20 TABLET, DELAYED RELEASE ORAL at 12:17

## 2023-01-01 RX ADMIN — Medication 129 MICROGRAM(S)/KG/MIN: at 13:00

## 2023-01-01 RX ADMIN — Medication 100 MILLIGRAM(S): at 23:42

## 2023-01-01 RX ADMIN — Medication 150 MILLIEQUIVALENT(S): at 19:00

## 2023-01-01 RX ADMIN — PIPERACILLIN AND TAZOBACTAM 200 GRAM(S): 4; .5 INJECTION, POWDER, LYOPHILIZED, FOR SOLUTION INTRAVENOUS at 23:43

## 2023-01-01 RX ADMIN — Medication 50 MILLIGRAM(S): at 13:49

## 2023-01-01 RX ADMIN — VASOPRESSIN 6 UNIT(S)/MIN: 20 INJECTION INTRAVENOUS at 19:33

## 2023-01-01 RX ADMIN — Medication 120 MILLIGRAM(S): at 16:42

## 2023-01-01 RX ADMIN — CHLORHEXIDINE GLUCONATE 15 MILLILITER(S): 213 SOLUTION TOPICAL at 07:00

## 2023-01-01 RX ADMIN — Medication 129 MICROGRAM(S)/KG/MIN: at 12:18

## 2023-01-01 RX ADMIN — Medication 50 MILLIGRAM(S): at 18:46

## 2023-01-01 RX ADMIN — Medication 1000 MILLIGRAM(S): at 14:50

## 2023-01-01 RX ADMIN — Medication 300 MILLIGRAM(S): at 23:43

## 2023-01-01 RX ADMIN — PROPOFOL 8.17 MICROGRAM(S)/KG/MIN: 10 INJECTION, EMULSION INTRAVENOUS at 18:47

## 2023-01-01 RX ADMIN — Medication 50 MILLIGRAM(S): at 01:42

## 2023-01-01 RX ADMIN — SODIUM CHLORIDE 1000 MILLILITER(S): 9 INJECTION, SOLUTION INTRAVENOUS at 08:49

## 2023-01-01 RX ADMIN — Medication 129 MICROGRAM(S)/KG/MIN: at 01:42

## 2023-01-01 RX ADMIN — Medication 400 MILLIGRAM(S): at 14:21

## 2023-01-01 RX ADMIN — CHLORHEXIDINE GLUCONATE 1 APPLICATION(S): 213 SOLUTION TOPICAL at 13:48

## 2023-01-01 RX ADMIN — PIPERACILLIN AND TAZOBACTAM 25 GRAM(S): 4; .5 INJECTION, POWDER, LYOPHILIZED, FOR SOLUTION INTRAVENOUS at 18:48

## 2023-01-01 NOTE — ED ADULT NURSE NOTE - FALL HARM RISK CONCLUSION
Pediatric History & Physical Exam       HISTORY OF PRESENT ILLNESS:     Chief Complaint: Vomiting and diarrhea     History of Present Illness: Baby  is a 3 m.o. Male  who was admitted on 2023 for a 3-week history of vomiting and diarrhea.  History was provided by foster parents.  Foster parents state that over the past 3 weeks, baby has had diarrhea.  They describe this diarrhea as green and watery.  They state that it is most prevalent it was up to 15 times per day.  They state that it was large enough quantity to be running up his back.  They deny any blood in these bowel movements.  They state that the patient has been able to feed normally during this entire time and normally feeds about 4 ounces every 3-4 hours religiously.  They have been changing formulas in an attempt to try and find one that baby can tolerate better.  Most recent is Nutramigen.  State that there is minimal spit up with these feeds and whenever baby spits up he is happy.  There was 1 episode of vomiting the entire feed this morning.  This vomitus was NBNB.  Parents do state for the last 24 hours feeding has been a little decreased and baby has not been producing as much wet diapers as normal.  They deny any nasal congestion, cough, shortness of breath, increased work of breathing, abdominal pain, fussiness, fatigue, or any other concerning symptoms.  They state he has been acting normally.  There has been no sick contacts at home.  Baby has received his 2-month vaccinations.  There has been no recent travel.  There is no identifiable exposures.      PAST MEDICAL HISTORY:     Primary Care Physician:  Vickie Moreno MD    Past Medical History:  None     Past Surgical History:  None    Birth/Developmental History:  Baby was born via CS secondary to breech at term. Birth weight was 3.025 kg and length was 48.2 cm ofc 34 cm. APGARs were 7 and 8. Maternal tox screen was positive for methamphetamine, cocaine, methadone. He was admitted to the  "NICU for 48 days for DOTTIE. PNL: The Hepatitis B and C Ab were neg, HIV NR, syphillis negative, mother was blood type O+, baby was blood type O.    Allergies:  NKDA    Home Medications:  None    Social History: Lives at home with foster parents.  No smokers at home.    Family History:   Positive for unknown  There is no family history of unknown    Immunizations:  UTD    Review of Systems: I have reviewed at least 10 organs systems and found them to be negative except as described above.     OBJECTIVE:     Vitals:   BP (!) 116/77   Pulse 122   Temp 37.3 °C (99.1 °F) (Axillary)   Resp 42   Ht 0.584 m (1' 11\")   Wt 5.375 kg (11 lb 13.6 oz)   SpO2 98%  Weight:    Physical Exam:  Gen:  NAD, anterior fontanelle is spongy, not sunken. Baby producing tears. MMM.  HEENT: MMM, EOMI  Cardio: RRR, clear s1/s2, no murmur  Resp:  Equal bilat, clear to auscultation  GI/: Soft, non-distended, no TTP, normal bowel sounds, no guarding/rebound  Neuro: Non-focal, Gross intact, no deficits  Skin/Extremities: Cap refill <3sec, warm/well perfused, seborrheic dermatitis on scalp, normal extremities    Labs:   Recent Labs     06/08/23  1328   WBC 8.3   RBC 4.22   HEMOGLOBIN 11.5   HEMATOCRIT 33.5   MCV 79.4*   MCH 27.3   RDW 37.4   PLATELETCT 739*   MPV 9.6*   NEUTSPOLYS 65.30*   LYMPHOCYTES 22.20*   MONOCYTES 10.60   EOSINOPHILS 1.60   BASOPHILS 0.10      Recent Labs     06/08/23  1328   SODIUM 140   POTASSIUM 4.0   CHLORIDE 104   CO2 15*   GLUCOSE 95   BUN 16      Latest Reference Range & Units 06/08/23 11:40 06/08/23 13:27 06/08/23 15:35   POC Glucose, Blood 40 - 99 mg/dL 41 93 81      06/08/23 12:45   Significant Indicator NEG (IP)   Site STOOL (IP)   Source STL (IP)   (IP): In Process    Imaging: None    ASSESSMENT/PLAN:   3 m.o. 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  Patient has been producing less wet diapers in the last 24 hours and is also been eating less.  Labs completed in ER concerning for dehydration with a bicarb of 15.  CBC was largely WNL other than an increased platelet count which could be an acute phase reactant or due to hemoconcentration.  Etiology at this point is viral gastroenteritis versus milk protein allergy/FPIES.  Patient is status post 1 IV bolus in the ER.  Plan:  -Admit to pediatrics  -Strict I's and O's  -Daily weights  -Start IV fluids until patient is tolerating p.o. feeds with minimal diarrhea/vomiting  -Start Elecare formula for potential milk protein allergy/ FPIES as patient has had significant diarrhea only mildly improved with hypoallergenic formula trial of alimentum and Nutramigen at home. [facility] prescription upon discharge to be given to foster mom.   -Follow-up on stool occult blood and stool cultures    Seborrheic dermatitis  Supportive care. Will resolve over time    Dispo: Inpatient for IV fluids and dehydration.  Change to elemental formula     Ronnie Whitaker MD   PGY-3 FM Resident     As attending physician, I personally performed a history and physical examination on this patient and reviewed pertinent labs/diagnostics/test results and dicussed this with parent or family member if present at bedside. I provided face to face coordination of the health care team, inclusive of the resident, medical student and/or nurse practioner who was involved for the day on this patient, as well as the nursing staff.  I performed a bedside assesment and directed the patient's assessment, I answered the staff and parental questions  and coordinated management and plan of care as reflected in the documentation above.  Greater than 50% of my time was spent counseling and coordinating care.            Fall with Harm Risk

## 2023-05-17 NOTE — PROGRESS NOTE ADULT - SUBJECTIVE AND OBJECTIVE BOX
"Babak Campbell  Saw Fiore, ORALIA    "
NP Note discussed with  Primary Attending    Patient is a 56y old  Female who presents with a chief complaint of low hgb (23 May 2021 22:25)      INTERVAL HPI/OVERNIGHT EVENTS: Patient seen and examined at bedside. Patient comfortable sitting in bed eating, no new complaints    MEDICATIONS  (STANDING):  allopurinol 100 milliGRAM(s) Oral daily  aspirin  chewable 81 milliGRAM(s) Oral daily  atorvastatin 40 milliGRAM(s) Oral at bedtime  carvedilol 6.25 milliGRAM(s) Oral every 12 hours  cholecalciferol 1000 Unit(s) Oral daily  dextrose 40% Gel 15 Gram(s) Oral once  dextrose 50% Injectable 25 Gram(s) IV Push once  furosemide    Tablet 40 milliGRAM(s) Oral two times a day  gabapentin 100 milliGRAM(s) Oral three times a day  insulin lispro (ADMELOG) corrective regimen sliding scale   SubCutaneous Before meals and at bedtime  multivitamin 1 Tablet(s) Oral daily  pantoprazole    Tablet 40 milliGRAM(s) Oral before breakfast  senna 2 Tablet(s) Oral at bedtime  sevelamer carbonate 800 milliGRAM(s) Oral three times a day with meals  spironolactone 50 milliGRAM(s) Oral daily    MEDICATIONS  (PRN):  ALBUTerol    90 MICROgram(s) HFA Inhaler 2 Puff(s) Inhalation every 6 hours PRN Shortness of Breath and/or Wheezing      __________________________________________________  REVIEW OF SYSTEMS:    CONSTITUTIONAL: No fever,   EYES: no acute visual disturbances  NECK: No pain or stiffness  RESPIRATORY: No cough; No shortness of breath  CARDIOVASCULAR: No chest pain, no palpitations  GASTROINTESTINAL: No pain. No nausea or vomiting; No diarrhea   NEUROLOGICAL: No headache or numbness, no tremors  MUSCULOSKELETAL: No joint pain, no muscle pain  GENITOURINARY: no dysuria, no frequency, no hesitancy  PSYCHIATRY: no depression , no anxiety  ALL OTHER  ROS negative        Vital Signs Last 24 Hrs  T(C): 36.6 (24 May 2021 13:27), Max: 36.6 (23 May 2021 20:50)  T(F): 97.9 (24 May 2021 13:27), Max: 97.9 (24 May 2021 05:10)  HR: 72 (24 May 2021 13:27) (72 - 81)  BP: 148/77 (24 May 2021 13:27) (99/57 - 148/77)  BP(mean): --  RR: 17 (24 May 2021 13:27) (17 - 18)  SpO2: 96% (24 May 2021 13:27) (94% - 96%)    ________________________________________________  PHYSICAL EXAM:  GENERAL: NAD  HEENT: Normocephalic;  conjunctivae and sclerae clear; moist mucous membranes;   NECK : supple  CHEST/LUNG: Clear to auscultation bilaterally with good air entry   HEART: S1 S2  regular; no murmurs, gallops or rubs  ABDOMEN: Soft, Nontender, Nondistended; Bowel sounds present  EXTREMITIES: no cyanosis; no edema; no calf tenderness  SKIN: warm and dry; no rash  NERVOUS SYSTEM:  Awake and alert; Oriented  to place, person and time ; no new deficits    _________________________________________________  LABS:                        8.7    11.60 )-----------( 377      ( 24 May 2021 06:12 )             29.9     05-24    138  |  107  |  126<H>  ----------------------------<  159<H>  4.6   |  22  |  3.92<H>    Ca    9.3      24 May 2021 06:12  Phos  5.2     05-24  Mg     2.6     05-24    TPro  7.6  /  Alb  3.6  /  TBili  0.4  /  DBili  x   /  AST  12  /  ALT  14  /  AlkPhos  79  05-24        CAPILLARY BLOOD GLUCOSE      POCT Blood Glucose.: 181 mg/dL (24 May 2021 08:00)  POCT Blood Glucose.: 151 mg/dL (23 May 2021 21:37)  POCT Blood Glucose.: 153 mg/dL (23 May 2021 16:45)        RADIOLOGY & ADDITIONAL TESTS:  < from: US Transvaginal (05.22.21 @ 22:22) >    EXAM:  US TRANSVAGINAL                          EXAM:  US PELVIC COMPLETE                            PROCEDURE DATE:  05/22/2021          INTERPRETATION:  CLINICAL INFORMATION: Anemia and irregular menstruation. Rule out leiomyoma.    LMP: 4/4/2021    COMPARISON: None available.    TECHNIQUE: Endovaginal and transabdominal pelvic sonogram. Color and Spectral Doppler was performed.    FINDINGS:    Uterus: 11.3 x 6.2 x 10.2 cm with multiple fibroids largest measuring 4.3 x 3.9 x 3.7 cm and 2.7 x 3.3 x 2.5 cm.  Endometrium: Thickened measuring 14 mm.    Right ovary: Not visualized.  Left ovary: Not visualized.    Fluid: None.    IMPRESSION:    Fibroid uterus.      < end of copied text >    Imaging  Reviewed:  YES    Consultant(s) Notes Reviewed:   YES      Plan of care was discussed with patient and /or primary care giver; all questions and concerns were addressed 
NP Note discussed with  Primary Attending    Patient is a 56y old  Female who presents with a chief complaint of low hgb (24 May 2021 19:18)      INTERVAL HPI/OVERNIGHT EVENTS: Patient seen and examined at bedside, no new complaints    MEDICATIONS  (STANDING):  allopurinol 100 milliGRAM(s) Oral daily  aspirin  chewable 81 milliGRAM(s) Oral daily  atorvastatin 40 milliGRAM(s) Oral at bedtime  carvedilol 6.25 milliGRAM(s) Oral every 12 hours  cholecalciferol 1000 Unit(s) Oral daily  dextrose 40% Gel 15 Gram(s) Oral once  dextrose 5%. 1000 milliLiter(s) (100 mL/Hr) IV Continuous <Continuous>  dextrose 50% Injectable 25 Gram(s) IV Push once  furosemide    Tablet 40 milliGRAM(s) Oral two times a day  gabapentin 100 milliGRAM(s) Oral three times a day  glucagon  Injectable 1 milliGRAM(s) IntraMuscular once  insulin glargine Injectable (LANTUS) 18 Unit(s) SubCutaneous every morning  insulin glargine Injectable (LANTUS) 6 Unit(s) SubCutaneous at bedtime  insulin lispro (ADMELOG) corrective regimen sliding scale   SubCutaneous at bedtime  insulin lispro (ADMELOG) corrective regimen sliding scale   SubCutaneous three times a day before meals  insulin lispro (ADMELOG) corrective regimen sliding scale   SubCutaneous at bedtime  insulin lispro Injectable (ADMELOG) 6 Unit(s) SubCutaneous three times a day before meals  iron sucrose IVPB 300 milliGRAM(s) IV Intermittent every 24 hours  multivitamin 1 Tablet(s) Oral daily  pantoprazole    Tablet 40 milliGRAM(s) Oral before breakfast  senna 2 Tablet(s) Oral at bedtime  sevelamer carbonate 800 milliGRAM(s) Oral three times a day with meals  spironolactone 50 milliGRAM(s) Oral daily    MEDICATIONS  (PRN):  ALBUTerol    90 MICROgram(s) HFA Inhaler 2 Puff(s) Inhalation every 6 hours PRN Shortness of Breath and/or Wheezing      __________________________________________________  REVIEW OF SYSTEMS:    CONSTITUTIONAL: No fever,   EYES: no acute visual disturbances  NECK: No pain or stiffness  RESPIRATORY: No cough; No shortness of breath  CARDIOVASCULAR: No chest pain, no palpitations  GASTROINTESTINAL: No pain. No nausea or vomiting; No diarrhea   NEUROLOGICAL: No headache or numbness, no tremors  MUSCULOSKELETAL: No joint pain, no muscle pain  GENITOURINARY: no dysuria, no frequency, no hesitancy  PSYCHIATRY: no depression , no anxiety  ALL OTHER  ROS negative        Vital Signs Last 24 Hrs  T(C): 36.7 (25 May 2021 05:29), Max: 36.8 (24 May 2021 19:22)  T(F): 98 (25 May 2021 05:29), Max: 98.2 (24 May 2021 19:22)  HR: 67 (25 May 2021 05:29) (64 - 72)  BP: 136/73 (25 May 2021 05:29) (127/68 - 148/77)  BP(mean): --  RR: 17 (25 May 2021 05:29) (17 - 18)  SpO2: 98% (25 May 2021 05:29) (95% - 98%)    ________________________________________________  PHYSICAL EXAM:  GENERAL: NAD  HEENT: Normocephalic;  conjunctivae and sclerae clear; moist mucous membranes;   NECK : supple  CHEST/LUNG: Clear to auscultation bilaterally with good air entry   HEART: S1 S2  regular; no murmurs, gallops or rubs  ABDOMEN: Soft, Nontender, Nondistended; Bowel sounds present  EXTREMITIES: no cyanosis; no edema; no calf tenderness  SKIN: warm and dry; no rash  NERVOUS SYSTEM:  Awake and alert; Oriented  to place, person and time ; no new deficits    _________________________________________________  LABS:                        8.9    10.24 )-----------( 339      ( 25 May 2021 05:50 )             30.8     05-25    141  |  110<H>  |  118<H>  ----------------------------<  146<H>  4.7   |  22  |  3.36<H>    Ca    9.5      25 May 2021 05:50  Phos  5.2     05-24  Mg     2.6     05-24    TPro  7.6  /  Alb  3.6  /  TBili  0.4  /  DBili  x   /  AST  12  /  ALT  14  /  AlkPhos  79  05-24        CAPILLARY BLOOD GLUCOSE      POCT Blood Glucose.: 183 mg/dL (25 May 2021 12:07)  POCT Blood Glucose.: 150 mg/dL (24 May 2021 21:00)  POCT Blood Glucose.: 173 mg/dL (24 May 2021 16:30)        RADIOLOGY & ADDITIONAL TESTS:  < from: US Transvaginal (05.22.21 @ 22:22) >    EXAM:  US TRANSVAGINAL                          EXAM:  US PELVIC COMPLETE                            PROCEDURE DATE:  05/22/2021          INTERPRETATION:  CLINICAL INFORMATION: Anemia and irregular menstruation. Rule out leiomyoma.    LMP: 4/4/2021    COMPARISON: None available.    TECHNIQUE: Endovaginal and transabdominal pelvic sonogram. Color and Spectral Doppler was performed.    FINDINGS:    Uterus: 11.3 x 6.2 x 10.2 cm with multiple fibroids largest measuring 4.3 x 3.9 x 3.7 cm and 2.7 x 3.3 x 2.5 cm.  Endometrium: Thickened measuring 14 mm.    Right ovary: Not visualized.  Left ovary: Not visualized.    Fluid: None.    IMPRESSION:    Fibroid uterus.      < end of copied text >    Imaging  Reviewed:  YES    Consultant(s) Notes Reviewed:   YES      Plan of care was discussed with patient and /or primary care giver; all questions and concerns were addressed 
Patient is a 56y old  Female who presents with a chief complaint of low hgb (21 May 2021 00:00)    PATIENT IS SEEN AND EXAMINED IN MEDICAL FLOOR.  NGT [    ]    AUSTIN [   ]      GT [   ]    ALLERGIES:  codeine (Urticaria)      Daily     Daily Weight in k.6 (21 May 2021 07:56)    VITALS:    Vital Signs Last 24 Hrs  T(C): 36.7 (21 May 2021 21:07), Max: 36.8 (21 May 2021 05:26)  T(F): 98.1 (21 May 2021 21:07), Max: 98.3 (21 May 2021 05:26)  HR: 65 (21 May 2021 21:07) (60 - 75)  BP: 118/73 (21 May 2021 21:07) (118/73 - 153/72)  BP(mean): --  RR: 19 (21 May 2021 21:07) (15 - 19)  SpO2: 99% (21 May 2021 21:07) (93% - 100%)    LABS:    CBC Full  -  ( 21 May 2021 20:48 )  WBC Count : 12.92 K/uL  RBC Count : 3.55 M/uL  Hemoglobin : 8.3 g/dL  Hematocrit : 28.7 %  Platelet Count - Automated : 413 K/uL  Mean Cell Volume : 80.8 fl  Mean Cell Hemoglobin : 23.4 pg  Mean Cell Hemoglobin Concentration : 28.9 gm/dL  Auto Neutrophil # : x  Auto Lymphocyte # : x  Auto Monocyte # : x  Auto Eosinophil # : x  Auto Basophil # : x  Auto Neutrophil % : x  Auto Lymphocyte % : x  Auto Monocyte % : x  Auto Eosinophil % : x  Auto Basophil % : x          139  |  106  |  91<H>  ----------------------------<  147<H>  4.9   |  23  |  3.15<H>    Ca    8.7      21 May 2021 07:39  Phos  4.6     -  Mg     2.6         TPro  7.5  /  Alb  3.4<L>  /  TBili  0.3  /  DBili  x   /  AST  13  /  ALT  12  /  AlkPhos  87      CAPILLARY BLOOD GLUCOSE            LIVER FUNCTIONS - ( 20 May 2021 18:58 )  Alb: 3.4 g/dL / Pro: 7.5 g/dL / ALK PHOS: 87 U/L / ALT: 12 U/L DA / AST: 13 U/L / GGT: x           Creatinine Trend: 3.15<--, 3.45<--  I&O's Summary              MEDICATIONS:    MEDICATIONS  (STANDING):  allopurinol 100 milliGRAM(s) Oral daily  aspirin  chewable 81 milliGRAM(s) Oral daily  atorvastatin 40 milliGRAM(s) Oral at bedtime  carvedilol 6.25 milliGRAM(s) Oral every 12 hours  cholecalciferol 1000 Unit(s) Oral daily  dextrose 40% Gel 15 Gram(s) Oral once  dextrose 50% Injectable 25 Gram(s) IV Push once  furosemide    Tablet 40 milliGRAM(s) Oral two times a day  gabapentin 100 milliGRAM(s) Oral three times a day  insulin lispro (ADMELOG) corrective regimen sliding scale   SubCutaneous Before meals and at bedtime  multivitamin 1 Tablet(s) Oral daily  pantoprazole    Tablet 40 milliGRAM(s) Oral before breakfast  senna 2 Tablet(s) Oral at bedtime  sevelamer carbonate 800 milliGRAM(s) Oral three times a day with meals  spironolactone 50 milliGRAM(s) Oral daily      MEDICATIONS  (PRN):  ALBUTerol    90 MICROgram(s) HFA Inhaler 2 Puff(s) Inhalation every 6 hours PRN Shortness of Breath and/or Wheezing      REVIEW OF SYSTEMS:                           ALL ROS DONE [ X   ]    CONSTITUTIONAL:  LETHARGIC [   ], FEVER [   ], UNRESPONSIVE [   ]  CVS:  CP  [   ], SOB, [   ], PALPITATIONS [   ], DIZZYNESS [   ]  RS: COUGH [   ], SPUTUM [   ]  GI: ABDOMINAL PAIN [   ], NAUSEA [   ], VOMITINGS [   ], DIARRHEA [   ], CONSTIPATION [   ]  :  DYSURIA [   ], NOCTURIA [   ], INCREASED FREQUENCY [   ], DRIBLING [   ],  SKELETAL: PAINFUL JOINTS [   ], SWOLLEN JOINTS [   ], NECK ACHE [   ], LOW BACK ACHE [   ],  SKIN : ULCERS [   ], RASH [   ], ITCHING [   ]  CNS: HEAD ACHE [   ], DOUBLE VISION [   ], BLURRED VISION [   ], AMS / CONFUSION [   ], SEIZURES [   ], WEAKNESS [   ],TINGLING / NUMBNESS [   ]    PHYSICAL EXAMINATION:  GENERAL APPEARANCE: NO DISTRESS  HEENT:  NO PALLOR, NO  JVD,  NO   NODES, NECK SUPPLE  CVS: S1 +, S2 +,   RS: AEEB,  OCCASIONAL  RALES +,   NO RONCHI  ABD: SOFT, NT, NO, BS +  EXT: PE  SKIN: WARM  SKELETAL:  ROM ACCEPTABLE  CNS:  AAO X 3 ,   DEFICITS    RADIOLOGY :      ASSESSMENT :     Anemia    HTN (hypertension)    DM (diabetes mellitus)    RA (rheumatoid arthritis)    COPD (chronic obstructive pulmonary disease)    AICD (automatic cardioverter/defibrillator) present    CAD (coronary artery disease)    CHF (congestive heart failure)    GERD (gastroesophageal reflux disease)    History of cholecystectomy    ICD (implantable cardioverter-defibrillator) in place        PLAN:  HPI:  57 yo F from [facility] assisted living with h/o CAD, AICD, CHF, COPD, DM, HTN presents with anemia Hb 5.2 on labs . Pt states her last transfusion was a few months ago (earlier in year) due to vaginal bleeding. Her last MP bleeding was 1 month. Pt denies all symptoms including black or bloody stool, hematuria, vaginal bleeding, CP, SOB, dizziness, change in LE edema, fever, vomiting, abd pain or any other complains  (21 May 2021 00:00)    # SYMPTOMATIC ANEMIA W/ HX OF POST-MENOPAUSAL VAGINAL BLEEDING - S/P PRBC TRANSFUSION, FOBT NEGATIVE, GIVEN HX OF CAD TRANSFUSION THRESHOLD OF HGB < 8  - PATIENT REPORTS LAST EPISODE OF VAGINAL BLEEDING WAS SEVERAL WEEKS AGO, DENIES MELENA/HEMATOCHEZIA  # SUSPECT LEUKOCYTOSIS IS REACTIVE - MONITOR  # HX OF CAD  # CHRONIC SYSTOLIC HEART FAILURE S/P AICD  # CKD   # MORBID OBESITY  # COPD  # HTN  # DM  # GI PPX; SCDS FOR DVT PPX    LUANN NATARAJAN MD
Patient is a 56y old  Female who presents with a chief complaint of low hgb (21 May 2021 22:22)    PATIENT IS SEEN AND EXAMINED IN MEDICAL FLOOR.      ALLERGIES:  codeine (Urticaria)      VITALS:    Vital Signs Last 24 Hrs  T(C): 36.7 (22 May 2021 20:22), Max: 37.1 (22 May 2021 05:33)  T(F): 98 (22 May 2021 20:22), Max: 98.8 (22 May 2021 05:33)  HR: 72 (22 May 2021 20:22) (64 - 81)  BP: 121/67 (22 May 2021 20:22) (121/67 - 172/68)  BP(mean): --  RR: 18 (22 May 2021 20:22) (16 - 19)  SpO2: 95% (22 May 2021 20:22) (94% - 98%)    LABS:    CBC Full  -  ( 22 May 2021 17:04 )  WBC Count : 12.33 K/uL  RBC Count : 3.95 M/uL  Hemoglobin : 9.5 g/dL  Hematocrit : 32.1 %  Platelet Count - Automated : 405 K/uL  Mean Cell Volume : 81.3 fl  Mean Cell Hemoglobin : 24.1 pg  Mean Cell Hemoglobin Concentration : 29.6 gm/dL  Auto Neutrophil # : x  Auto Lymphocyte # : x  Auto Monocyte # : x  Auto Eosinophil # : x  Auto Basophil # : x  Auto Neutrophil % : x  Auto Lymphocyte % : x  Auto Monocyte % : x  Auto Eosinophil % : x  Auto Basophil % : x      05-22    139  |  105  |  103<H>  ----------------------------<  180<H>  4.7   |  22  |  3.84<H>    Ca    9.0      22 May 2021 07:24  Phos  5.0     05-22  Mg     2.6     05-22    TPro  7.3  /  Alb  3.1<L>  /  TBili  0.3  /  DBili  x   /  AST  8<L>  /  ALT  11  /  AlkPhos  83  05-22    CAPILLARY BLOOD GLUCOSE            LIVER FUNCTIONS - ( 22 May 2021 07:24 )  Alb: 3.1 g/dL / Pro: 7.3 g/dL / ALK PHOS: 83 U/L / ALT: 11 U/L DA / AST: 8 U/L / GGT: x           Creatinine Trend: 3.84<--, 3.15<--, 3.45<--  I&O's Summary              MEDICATIONS:    MEDICATIONS  (STANDING):  allopurinol 100 milliGRAM(s) Oral daily  aspirin  chewable 81 milliGRAM(s) Oral daily  atorvastatin 40 milliGRAM(s) Oral at bedtime  carvedilol 6.25 milliGRAM(s) Oral every 12 hours  cholecalciferol 1000 Unit(s) Oral daily  dextrose 40% Gel 15 Gram(s) Oral once  dextrose 50% Injectable 25 Gram(s) IV Push once  furosemide    Tablet 40 milliGRAM(s) Oral two times a day  gabapentin 100 milliGRAM(s) Oral three times a day  insulin lispro (ADMELOG) corrective regimen sliding scale   SubCutaneous Before meals and at bedtime  multivitamin 1 Tablet(s) Oral daily  pantoprazole    Tablet 40 milliGRAM(s) Oral before breakfast  senna 2 Tablet(s) Oral at bedtime  sevelamer carbonate 800 milliGRAM(s) Oral three times a day with meals  spironolactone 50 milliGRAM(s) Oral daily      MEDICATIONS  (PRN):  ALBUTerol    90 MICROgram(s) HFA Inhaler 2 Puff(s) Inhalation every 6 hours PRN Shortness of Breath and/or Wheezing        REVIEW OF SYSTEMS:                           ALL ROS DONE [ X   ]      CONSTITUTIONAL:  LETHARGIC [   ], FEVER [   ], UNRESPONSIVE [   ]  CVS:  CP  [   ], SOB, [   ], PALPITATIONS [   ], DIZZYNESS [   ]  RS: COUGH [   ], SPUTUM [   ]  GI: ABDOMINAL PAIN [   ], NAUSEA [   ], VOMITINGS [   ], DIARRHEA [   ], CONSTIPATION [   ]  :  DYSURIA [   ], NOCTURIA [   ], INCREASED FREQUENCY [   ], DRIBLING [   ],  SKELETAL: PAINFUL JOINTS [   ], SWOLLEN JOINTS [   ], NECK ACHE [   ], LOW BACK ACHE [   ],  SKIN : ULCERS [   ], RASH [   ], ITCHING [   ]  CNS: HEAD ACHE [   ], DOUBLE VISION [   ], BLURRED VISION [   ], AMS / CONFUSION [   ], SEIZURES [   ], WEAKNESS [   ],TINGLING / NUMBNESS [   ]    PHYSICAL EXAMINATION:  GENERAL APPEARANCE: NO DISTRESS  HEENT:  NO PALLOR, NO  JVD,  NO   NODES, NECK SUPPLE  CVS: S1 +, S2 +,   RS: AEEB,  OCCASIONAL  RALES +,   NO RONCHI  ABD: SOFT, NT, NO, BS +  EXT: PE  SKIN: WARM  SKELETAL:  ROM ACCEPTABLE  CNS:  AAO X 3 ,   DEFICITS    RADIOLOGY :      ASSESSMENT :     Anemia    HTN (hypertension)    DM (diabetes mellitus)    RA (rheumatoid arthritis)    COPD (chronic obstructive pulmonary disease)    AICD (automatic cardioverter/defibrillator) present    CAD (coronary artery disease)    CHF (congestive heart failure)    GERD (gastroesophageal reflux disease)    History of cholecystectomy    ICD (implantable cardioverter-defibrillator) in place        PLAN:  HPI:  57 yo F from [facility] assisted living with h/o CAD, AICD, CHF, COPD, DM, HTN presents with anemia Hb 5.2 on labs 5/19. Pt states her last transfusion was a few months ago (earlier in year) due to vaginal bleeding. Her last MP bleeding was 1 month. Pt denies all symptoms including black or bloody stool, hematuria, vaginal bleeding, CP, SOB, dizziness, change in LE edema, fever, vomiting, abd pain or any other complains  (21 May 2021 00:00)      # SYMPTOMATIC ANEMIA W/ HX OF POST-MENOPAUSAL VAGINAL BLEEDING - S/P PRBC TRANSFUSION, FOBT NEGATIVE, GIVEN HX OF CAD TRANSFUSION THRESHOLD OF HGB < 8 -  OBTAIN GI CONSULT FOR EGD AND COLONOSCOPY, ALSO R/O HEMOLYTIC ANEMIA , OR HEMOGLOBINOPATHY  - PATIENT REPORTS LAST EPISODE OF VAGINAL BLEEDING WAS SEVERAL WEEKS AGO, DENIES MELENA/HEMATOCHEZIA  # SUSPECT LEUKOCYTOSIS IS REACTIVE - MONITOR  # HX OF CAD  # CHRONIC SYSTOLIC HEART FAILURE S/P AICD  # CKD   # MORBID OBESITY  # COPD  # HTN  # DM  # GI PPX; SCDS FOR DVT PPX    #  DR. CHELSEY NATARAJAN  
Patient is a 56y old  Female who presents with a chief complaint of low hgb (22 May 2021 22:45)    PATIENT IS SEEN AND EXAMINED IN MEDICAL FLOOR.    CINDYT [    ]    AUSTIN [   ]      GT [   ]    ALLERGIES:  codeine (Urticaria)      Daily     Daily Weight in k.2 (23 May 2021 05:08)    VITALS:    Vital Signs Last 24 Hrs  T(C): 36.6 (23 May 2021 20:50), Max: 36.8 (23 May 2021 05:08)  T(F): 97.8 (23 May 2021 20:50), Max: 98.3 (23 May 2021 05:08)  HR: 81 (23 May 2021 20:50) (73 - 81)  BP: 131/78 (23 May 2021 20:50) (95/57 - 131/78)  BP(mean): --  RR: 18 (23 May 2021 20:50) (18 - 18)  SpO2: 96% (23 May 2021 20:50) (93% - 96%)    LABS:    CBC Full  -  ( 23 May 2021 07:36 )  WBC Count : 12.06 K/uL  RBC Count : 3.69 M/uL  Hemoglobin : 8.9 g/dL  Hematocrit : 30.1 %  Platelet Count - Automated : 386 K/uL  Mean Cell Volume : 81.6 fl  Mean Cell Hemoglobin : 24.1 pg  Mean Cell Hemoglobin Concentration : 29.6 gm/dL  Auto Neutrophil # : 8.93 K/uL  Auto Lymphocyte # : 1.49 K/uL  Auto Monocyte # : 1.39 K/uL  Auto Eosinophil # : 0.10 K/uL  Auto Basophil # : 0.06 K/uL  Auto Neutrophil % : 74.1 %  Auto Lymphocyte % : 12.4 %  Auto Monocyte % : 11.5 %  Auto Eosinophil % : 0.8 %  Auto Basophil % : 0.5 %      05-23    138  |  106  |  116<H>  ----------------------------<  164<H>  4.6   |  23  |  3.82<H>    Ca    9.0      23 May 2021 07:36  Phos  5.2     05-  Mg     2.5     05-    TPro  7.6  /  Alb  3.6  /  TBili  0.4  /  DBili  x   /  AST  11  /  ALT  10  /  AlkPhos  78  05-23    CAPILLARY BLOOD GLUCOSE            LIVER FUNCTIONS - ( 23 May 2021 07:36 )  Alb: 3.6 g/dL / Pro: 7.6 g/dL / ALK PHOS: 78 U/L / ALT: 10 U/L DA / AST: 11 U/L / GGT: x           Creatinine Trend: 3.82<--, 3.84<--, 3.15<--, 3.45<--  I&O's Summary              MEDICATIONS:    MEDICATIONS  (STANDING):  allopurinol 100 milliGRAM(s) Oral daily  aspirin  chewable 81 milliGRAM(s) Oral daily  atorvastatin 40 milliGRAM(s) Oral at bedtime  carvedilol 6.25 milliGRAM(s) Oral every 12 hours  cholecalciferol 1000 Unit(s) Oral daily  dextrose 40% Gel 15 Gram(s) Oral once  dextrose 50% Injectable 25 Gram(s) IV Push once  furosemide    Tablet 40 milliGRAM(s) Oral two times a day  gabapentin 100 milliGRAM(s) Oral three times a day  insulin lispro (ADMELOG) corrective regimen sliding scale   SubCutaneous Before meals and at bedtime  multivitamin 1 Tablet(s) Oral daily  pantoprazole    Tablet 40 milliGRAM(s) Oral before breakfast  senna 2 Tablet(s) Oral at bedtime  sevelamer carbonate 800 milliGRAM(s) Oral three times a day with meals  spironolactone 50 milliGRAM(s) Oral daily      MEDICATIONS  (PRN):  ALBUTerol    90 MICROgram(s) HFA Inhaler 2 Puff(s) Inhalation every 6 hours PRN Shortness of Breath and/or Wheezing        REVIEW OF SYSTEMS:                           ALL ROS DONE [ X   ]      CONSTITUTIONAL:  LETHARGIC [   ], FEVER [   ], UNRESPONSIVE [   ]  CVS:  CP  [   ], SOB, [   ], PALPITATIONS [   ], DIZZYNESS [   ]  RS: COUGH [   ], SPUTUM [   ]  GI: ABDOMINAL PAIN [   ], NAUSEA [   ], VOMITINGS [   ], DIARRHEA [   ], CONSTIPATION [   ]  :  DYSURIA [   ], NOCTURIA [   ], INCREASED FREQUENCY [   ], DRIBLING [   ],  SKELETAL: PAINFUL JOINTS [   ], SWOLLEN JOINTS [   ], NECK ACHE [   ], LOW BACK ACHE [   ],  SKIN : ULCERS [   ], RASH [   ], ITCHING [   ]  CNS: HEAD ACHE [   ], DOUBLE VISION [   ], BLURRED VISION [   ], AMS / CONFUSION [   ], SEIZURES [   ], WEAKNESS [   ],TINGLING / NUMBNESS [   ]    PHYSICAL EXAMINATION:  GENERAL APPEARANCE: NO DISTRESS  HEENT:  NO PALLOR, NO  JVD,  NO   NODES, NECK SUPPLE  CVS: S1 +, S2 +,   RS: AEEB,  OCCASIONAL  RALES +,   NO RONCHI  ABD: SOFT, NT, NO, BS +  EXT: PE  SKIN: WARM  SKELETAL:  ROM ACCEPTABLE  CNS:  AAO X 3 ,   DEFICITS        RADIOLOGY :    < from: US Transvaginal (21 @ 22:22) >    IMPRESSION:    Fibroid uterus.    < end of copied text >        ASSESSMENT :     Anemia    HTN (hypertension)    DM (diabetes mellitus)    RA (rheumatoid arthritis)    COPD (chronic obstructive pulmonary disease)    AICD (automatic cardioverter/defibrillator) present    CAD (coronary artery disease)    CHF (congestive heart failure)    GERD (gastroesophageal reflux disease)    History of cholecystectomy    ICD (implantable cardioverter-defibrillator) in place        PLAN:  HPI:  55 yo F from [facility] assisted living with h/o CAD, AICD, CHF, COPD, DM, HTN presents with anemia Hb 5.2 on labs . Pt states her last transfusion was a few months ago (earlier in year) due to vaginal bleeding. Her last MP bleeding was 1 month. Pt denies all symptoms including black or bloody stool, hematuria, vaginal bleeding, CP, SOB, dizziness, change in LE edema, fever, vomiting, abd pain or any other complains  (21 May 2021 00:00)      # SYMPTOMATIC ANEMIA W/ HX OF POST-MENOPAUSAL VAGINAL BLEEDING, FIBROID UTERUS  - S/P PRBC TRANSFUSION, FOBT NEGATIVE, GIVEN HX OF CAD TRANSFUSION THRESHOLD OF HGB < 8 -  OBTAIN GI CONSULT FOR EGD AND COLONOSCOPY, ALSO R/O HEMOLYTIC ANEMIA , OR HEMOGLOBINOPATHY ( TO CALL  IN AM - HEMATOLOGY CONSULT ) , REQUESTED DR. BALDERAS, NEPHROLOGY. MAY NEED TO START INJ. EPOGEN AND FESO4 IN AM ( DUE TO ANEMIA OF CKD )     - PATIENT REPORTS LAST EPISODE OF VAGINAL BLEEDING WAS SEVERAL WEEKS AGO, DENIES MELENA/HEMATOCHEZIA  #  ANEMIA OF CKD , ANEMIA OF CHRONIC DISEASE   # SUSPECT LEUKOCYTOSIS IS REACTIVE - MONITOR  # HX OF CAD  # CHRONIC SYSTOLIC HEART FAILURE S/P AICD  # CKD STAGE 4-5  # MORBID OBESITY  # COPD  # HTN  # DM  # GI PPX; SCDS FOR DVT PPX    #  DR. CHELSEY NATARAJAN  
Patient is a 56y old  Female who presents with a chief complaint of low hgb (25 May 2021 13:05)    PATIENT IS SEEN AND EXAMINED IN MEDICAL FLOOR.  NGT [    ]    AUSTIN [   ]      GT [   ]    ALLERGIES:  codeine (Urticaria)      Daily     Daily     VITALS:    Vital Signs Last 24 Hrs  T(C): 36.8 (25 May 2021 13:39), Max: 36.8 (24 May 2021 19:22)  T(F): 98.3 (25 May 2021 13:39), Max: 98.3 (25 May 2021 13:39)  HR: 67 (25 May 2021 13:39) (64 - 69)  BP: 145/72 (25 May 2021 13:39) (127/68 - 145/79)  BP(mean): --  RR: 17 (25 May 2021 13:39) (17 - 18)  SpO2: 98% (25 May 2021 13:39) (95% - 98%)    LABS:    CBC Full  -  ( 25 May 2021 05:50 )  WBC Count : 10.24 K/uL  RBC Count : 3.69 M/uL  Hemoglobin : 8.9 g/dL  Hematocrit : 30.8 %  Platelet Count - Automated : 339 K/uL  Mean Cell Volume : 83.5 fl  Mean Cell Hemoglobin : 24.1 pg  Mean Cell Hemoglobin Concentration : 28.9 gm/dL  Auto Neutrophil # : x  Auto Lymphocyte # : x  Auto Monocyte # : x  Auto Eosinophil # : x  Auto Basophil # : x  Auto Neutrophil % : x  Auto Lymphocyte % : x  Auto Monocyte % : x  Auto Eosinophil % : x  Auto Basophil % : x      05-25    141  |  110<H>  |  118<H>  ----------------------------<  146<H>  4.7   |  22  |  3.36<H>    Ca    9.5      25 May 2021 05:50  Phos  5.2     05-24  Mg     2.6     05-24    TPro  7.6  /  Alb  3.6  /  TBili  0.4  /  DBili  x   /  AST  12  /  ALT  14  /  AlkPhos  79  05-24    CAPILLARY BLOOD GLUCOSE      POCT Blood Glucose.: 183 mg/dL (25 May 2021 12:07)  POCT Blood Glucose.: 155 mg/dL (25 May 2021 07:30)  POCT Blood Glucose.: 150 mg/dL (24 May 2021 21:00)  POCT Blood Glucose.: 173 mg/dL (24 May 2021 16:30)        LIVER FUNCTIONS - ( 24 May 2021 06:12 )  Alb: 3.6 g/dL / Pro: 7.6 g/dL / ALK PHOS: 79 U/L / ALT: 14 U/L DA / AST: 12 U/L / GGT: x           Creatinine Trend: 3.36<--, 3.92<--, 3.82<--, 3.84<--, 3.15<--, 3.45<--  I&O's Summary              MEDICATIONS:    MEDICATIONS  (STANDING):  allopurinol 100 milliGRAM(s) Oral daily  aspirin  chewable 81 milliGRAM(s) Oral daily  atorvastatin 40 milliGRAM(s) Oral at bedtime  carvedilol 6.25 milliGRAM(s) Oral every 12 hours  cholecalciferol 1000 Unit(s) Oral daily  dextrose 40% Gel 15 Gram(s) Oral once  dextrose 5%. 1000 milliLiter(s) (100 mL/Hr) IV Continuous <Continuous>  dextrose 50% Injectable 25 Gram(s) IV Push once  furosemide    Tablet 40 milliGRAM(s) Oral two times a day  gabapentin 100 milliGRAM(s) Oral three times a day  glucagon  Injectable 1 milliGRAM(s) IntraMuscular once  insulin glargine Injectable (LANTUS) 18 Unit(s) SubCutaneous every morning  insulin glargine Injectable (LANTUS) 6 Unit(s) SubCutaneous at bedtime  insulin lispro (ADMELOG) corrective regimen sliding scale   SubCutaneous at bedtime  insulin lispro (ADMELOG) corrective regimen sliding scale   SubCutaneous three times a day before meals  insulin lispro (ADMELOG) corrective regimen sliding scale   SubCutaneous at bedtime  insulin lispro Injectable (ADMELOG) 6 Unit(s) SubCutaneous three times a day before meals  iron sucrose IVPB 300 milliGRAM(s) IV Intermittent every 24 hours  multivitamin 1 Tablet(s) Oral daily  pantoprazole    Tablet 40 milliGRAM(s) Oral before breakfast  senna 2 Tablet(s) Oral at bedtime  sevelamer carbonate 800 milliGRAM(s) Oral three times a day with meals  spironolactone 50 milliGRAM(s) Oral daily      MEDICATIONS  (PRN):  ALBUTerol    90 MICROgram(s) HFA Inhaler 2 Puff(s) Inhalation every 6 hours PRN Shortness of Breath and/or Wheezing      REVIEW OF SYSTEMS:                           ALL ROS DONE [ X   ]    CONSTITUTIONAL:  LETHARGIC [   ], FEVER [   ], UNRESPONSIVE [   ]  CVS:  CP  [   ], SOB, [   ], PALPITATIONS [   ], DIZZYNESS [   ]  RS: COUGH [   ], SPUTUM [   ]  GI: ABDOMINAL PAIN [   ], NAUSEA [   ], VOMITINGS [   ], DIARRHEA [   ], CONSTIPATION [   ]  :  DYSURIA [   ], NOCTURIA [   ], INCREASED FREQUENCY [   ], DRIBLING [   ],  SKELETAL: PAINFUL JOINTS [   ], SWOLLEN JOINTS [   ], NECK ACHE [   ], LOW BACK ACHE [   ],  SKIN : ULCERS [   ], RASH [   ], ITCHING [   ]  CNS: HEAD ACHE [   ], DOUBLE VISION [   ], BLURRED VISION [   ], AMS / CONFUSION [   ], SEIZURES [   ], WEAKNESS [   ],TINGLING / NUMBNESS [   ]    PHYSICAL EXAMINATION:  GENERAL APPEARANCE: NO DISTRESS  HEENT:  NO PALLOR, NO  JVD,  NO   NODES, NECK SUPPLE  CVS: S1 +, S2 +,   RS: AEEB,  OCCASIONAL  RALES +,   NO RONCHI  ABD: SOFT, NT, NO, BS +  EXT: PE  SKIN: WARM  SKELETAL:  ROM ACCEPTABLE  CNS:  AAO X 3 ,   DEFICITS        RADIOLOGY :    < from: US Transvaginal (05.22.21 @ 22:22) >    IMPRESSION:    Fibroid uterus.    < end of copied text >        ASSESSMENT :     Anemia    HTN (hypertension)    DM (diabetes mellitus)    RA (rheumatoid arthritis)    COPD (chronic obstructive pulmonary disease)    AICD (automatic cardioverter/defibrillator) present    CAD (coronary artery disease)    CHF (congestive heart failure)    GERD (gastroesophageal reflux disease)    History of cholecystectomy    ICD (implantable cardioverter-defibrillator) in place        PLAN:  HPI:  57 yo F from [facility] assisted living with h/o CAD, AICD, CHF, COPD, DM, HTN presents with anemia Hb 5.2 on labs 5/19. Pt states her last transfusion was a few months ago (earlier in year) due to vaginal bleeding. Her last MP bleeding was 1 month. Pt denies all symptoms including black or bloody stool, hematuria, vaginal bleeding, CP, SOB, dizziness, change in LE edema, fever, vomiting, abd pain or any other complains  (21 May 2021 00:00)      # SYMPTOMATIC ANEMIA W/ HX OF POST-MENOPAUSAL VAGINAL BLEEDING, FIBROID UTERUS  - S/P PRBC TRANSFUSION, FOBT NEGATIVE, GIVEN HX OF CAD TRANSFUSION THRESHOLD OF HGB < 8 -  OBTAIN GI CONSULT FOR EGD AND COLONOSCOPY [MAY NEED TO DO AS OUTPATIENT], ? HEMOLYTIC ANEMIA OR HEMOGLOBINOPATHY (  - HEMATOLOGY CONSULT ) , REQUESTED DR. BALDERAS, NEPHROLOGY. MAY NEED TO START INJ. EPOGEN AND FESO4 IN AM ( DUE TO ANEMIA OF CKD )   - PATIENT REPORTS LAST EPISODE OF VAGINAL BLEEDING WAS SEVERAL WEEKS AGO, DENIES MELENA/HEMATOCHEZIA  - STARTED ON VENOFER AND EPOGEN  #  ANEMIA OF CKD , ANEMIA OF CHRONIC DISEASE   # SUSPECT LEUKOCYTOSIS IS REACTIVE - MONITOR  # HX OF CAD  # CHRONIC SYSTOLIC HEART FAILURE S/P AICD  # CKD STAGE 4-5  # MORBID OBESITY  # COPD  # HTN  # DM  # GI PPX; SCDS FOR DVT PPX    LUANN NATARAJAN MD COVERING DONTAE NATARAJAN MD
condition stable  on venofer  H/H stable    MEDICATIONS  (STANDING):  allopurinol 100 milliGRAM(s) Oral daily  aspirin  chewable 81 milliGRAM(s) Oral daily  atorvastatin 40 milliGRAM(s) Oral at bedtime  carvedilol 6.25 milliGRAM(s) Oral every 12 hours  cholecalciferol 1000 Unit(s) Oral daily  dextrose 40% Gel 15 Gram(s) Oral once  dextrose 5%. 1000 milliLiter(s) (100 mL/Hr) IV Continuous <Continuous>  dextrose 50% Injectable 25 Gram(s) IV Push once  epoetin harley-epbx (RETACRIT) Injectable 4000 Unit(s) SubCutaneous once  furosemide    Tablet 40 milliGRAM(s) Oral two times a day  gabapentin 100 milliGRAM(s) Oral three times a day  glucagon  Injectable 1 milliGRAM(s) IntraMuscular once  insulin glargine Injectable (LANTUS) 18 Unit(s) SubCutaneous every morning  insulin glargine Injectable (LANTUS) 6 Unit(s) SubCutaneous at bedtime  insulin lispro (ADMELOG) corrective regimen sliding scale   SubCutaneous three times a day before meals  insulin lispro (ADMELOG) corrective regimen sliding scale   SubCutaneous at bedtime  insulin lispro (ADMELOG) corrective regimen sliding scale   SubCutaneous at bedtime  insulin lispro Injectable (ADMELOG) 6 Unit(s) SubCutaneous three times a day before meals  multivitamin 1 Tablet(s) Oral daily  pantoprazole    Tablet 40 milliGRAM(s) Oral before breakfast  senna 2 Tablet(s) Oral at bedtime  sevelamer carbonate 800 milliGRAM(s) Oral three times a day with meals  spironolactone 50 milliGRAM(s) Oral daily    MEDICATIONS  (PRN):  ALBUTerol    90 MICROgram(s) HFA Inhaler 2 Puff(s) Inhalation every 6 hours PRN Shortness of Breath and/or Wheezing      Allergies    codeine (Urticaria)    Intolerances        Vital Signs Last 24 Hrs  T(C): 36.7 (26 May 2021 05:18), Max: 36.8 (25 May 2021 13:39)  T(F): 98 (26 May 2021 05:18), Max: 98.3 (25 May 2021 13:39)  HR: 72 (26 May 2021 05:18) (67 - 72)  BP: 115/61 (26 May 2021 05:18) (115/61 - 145/72)  BP(mean): --  RR: 16 (26 May 2021 05:18) (16 - 18)  SpO2: 97% (26 May 2021 05:18) (95% - 98%)    PHYSICAL EXAM  General: adult in NAD  HEENT: clear oropharynx, anicteric sclera, pink conjunctiva  Neck: supple  CV: normal S1/S2 with no murmur rubs or gallops  Lungs: positive air movement b/l ant lungs,clear to auscultation, no wheezes, no rales  Abdomen: soft non-tender non-distended, no hepatosplenomegaly  Ext: no clubbing cyanosis or edema  Skin: no rashes and no petechiae  Neuro: alert and oriented X 4, no focal deficits  LABS:                          8.9    8.94  )-----------( 339      ( 26 May 2021 07:02 )             30.6         Mean Cell Volume : 83.6 fl  Mean Cell Hemoglobin : 24.3 pg  Mean Cell Hemoglobin Concentration : 29.1 gm/dL  Auto Neutrophil # : x  Auto Lymphocyte # : x  Auto Monocyte # : x  Auto Eosinophil # : x  Auto Basophil # : x  Auto Neutrophil % : x  Auto Lymphocyte % : x  Auto Monocyte % : x  Auto Eosinophil % : x  Auto Basophil % : x    Serial CBC  Hematocrit 30.6  Hemoglobin 8.9  Plat 339  RBC 3.66  WBC 8.94  Serial CBC  Hematocrit 30.8  Hemoglobin 8.9  Plat 339  RBC 3.69  WBC 10.24  Serial CBC  Hematocrit 29.9  Hemoglobin 8.7  Plat 377  RBC 3.63  WBC 11.60  Serial CBC  Hematocrit 30.1  Hemoglobin 8.9  Plat 386  RBC 3.69  WBC 12.06  Serial CBC  Hematocrit 32.1  Hemoglobin 9.5  Plat 405  RBC 3.95  WBC 12.33    05-26    142  |  110<H>  |  114<H>  ----------------------------<  148<H>  5.0   |  22  |  3.71<H>    Ca    9.4      26 May 2021 07:02            Reticulocyte Percent: 2.2 % (05-23 @ 07:36)  Ferritin, Serum: 10 ng/mL (05-21 @ 09:41)  Folate, Serum: 17.2 ng/mL (05-21 @ 09:41)  Vitamin B12, Serum: 1513 pg/mL (05-21 @ 09:41)  Iron - Total Binding Capacity.: 393 ug/dL (05-21 @ 07:39)  Reticulocyte Percent: 1.9 % (05-21 @ 07:39)  Iron - Total Binding Capacity.: 423 ug/dL (05-20 @ 18:58)            BLOOD SMEAR INTERPRETATION:       RADIOLOGY & ADDITIONAL STUDIES:    
Patient is a 56y old  Female who presents with a chief complaint of low hgb (24 May 2021 16:12)    PATIENT IS SEEN AND EXAMINED IN MEDICAL FLOOR.  NGT [    ]    AUSTIN [   ]      GT [   ]    ALLERGIES:  codeine (Urticaria)      Daily     Daily Weight in k.9 (24 May 2021 05:10)    VITALS:    Vital Signs Last 24 Hrs  T(C): 36.6 (24 May 2021 13:27), Max: 36.6 (23 May 2021 20:50)  T(F): 97.9 (24 May 2021 13:27), Max: 97.9 (24 May 2021 05:10)  HR: 64 (24 May 2021 16:40) (64 - 81)  BP: 145/79 (24 May 2021 16:40) (99/57 - 148/77)  BP(mean): --  RR: 17 (24 May 2021 16:40) (17 - 18)  SpO2: 97% (24 May 2021 16:40) (94% - 97%)    LABS:    CBC Full  -  ( 24 May 2021 06:12 )  WBC Count : 11.60 K/uL  RBC Count : 3.63 M/uL  Hemoglobin : 8.7 g/dL  Hematocrit : 29.9 %  Platelet Count - Automated : 377 K/uL  Mean Cell Volume : 82.4 fl  Mean Cell Hemoglobin : 24.0 pg  Mean Cell Hemoglobin Concentration : 29.1 gm/dL  Auto Neutrophil # : 8.31 K/uL  Auto Lymphocyte # : 1.73 K/uL  Auto Monocyte # : 1.31 K/uL  Auto Eosinophil # : 0.11 K/uL  Auto Basophil # : 0.06 K/uL  Auto Neutrophil % : 71.7 %  Auto Lymphocyte % : 14.9 %  Auto Monocyte % : 11.3 %  Auto Eosinophil % : 0.9 %  Auto Basophil % : 0.5 %      05-24    138  |  107  |  126<H>  ----------------------------<  159<H>  4.6   |  22  |  3.92<H>    Ca    9.3      24 May 2021 06:12  Phos  5.2     05-24  Mg     2.6     -    TPro  7.6  /  Alb  3.6  /  TBili  0.4  /  DBili  x   /  AST  12  /  ALT  14  /  AlkPhos  79  05-24    CAPILLARY BLOOD GLUCOSE      POCT Blood Glucose.: 173 mg/dL (24 May 2021 16:30)  POCT Blood Glucose.: 181 mg/dL (24 May 2021 08:00)  POCT Blood Glucose.: 151 mg/dL (23 May 2021 21:37)        LIVER FUNCTIONS - ( 24 May 2021 06:12 )  Alb: 3.6 g/dL / Pro: 7.6 g/dL / ALK PHOS: 79 U/L / ALT: 14 U/L DA / AST: 12 U/L / GGT: x           Creatinine Trend: 3.92<--, 3.82<--, 3.84<--, 3.15<--, 3.45<--  I&O's Summary              MEDICATIONS:    MEDICATIONS  (STANDING):  allopurinol 100 milliGRAM(s) Oral daily  aspirin  chewable 81 milliGRAM(s) Oral daily  atorvastatin 40 milliGRAM(s) Oral at bedtime  carvedilol 6.25 milliGRAM(s) Oral every 12 hours  cholecalciferol 1000 Unit(s) Oral daily  dextrose 40% Gel 15 Gram(s) Oral once  dextrose 5%. 1000 milliLiter(s) (100 mL/Hr) IV Continuous <Continuous>  dextrose 50% Injectable 25 Gram(s) IV Push once  furosemide    Tablet 40 milliGRAM(s) Oral two times a day  gabapentin 100 milliGRAM(s) Oral three times a day  glucagon  Injectable 1 milliGRAM(s) IntraMuscular once  insulin glargine Injectable (LANTUS) 18 Unit(s) SubCutaneous every morning  insulin glargine Injectable (LANTUS) 6 Unit(s) SubCutaneous at bedtime  insulin lispro (ADMELOG) corrective regimen sliding scale   SubCutaneous Before meals and at bedtime  insulin lispro (ADMELOG) corrective regimen sliding scale   SubCutaneous at bedtime  insulin lispro Injectable (ADMELOG) 6 Unit(s) SubCutaneous three times a day before meals  multivitamin 1 Tablet(s) Oral daily  pantoprazole    Tablet 40 milliGRAM(s) Oral before breakfast  senna 2 Tablet(s) Oral at bedtime  sevelamer carbonate 800 milliGRAM(s) Oral three times a day with meals  spironolactone 50 milliGRAM(s) Oral daily      MEDICATIONS  (PRN):  ALBUTerol    90 MICROgram(s) HFA Inhaler 2 Puff(s) Inhalation every 6 hours PRN Shortness of Breath and/or Wheezing      REVIEW OF SYSTEMS:                           ALL ROS DONE [ X   ]    CONSTITUTIONAL:  LETHARGIC [   ], FEVER [   ], UNRESPONSIVE [   ]  CVS:  CP  [   ], SOB, [   ], PALPITATIONS [   ], DIZZYNESS [   ]  RS: COUGH [   ], SPUTUM [   ]  GI: ABDOMINAL PAIN [   ], NAUSEA [   ], VOMITINGS [   ], DIARRHEA [   ], CONSTIPATION [   ]  :  DYSURIA [   ], NOCTURIA [   ], INCREASED FREQUENCY [   ], DRIBLING [   ],  SKELETAL: PAINFUL JOINTS [   ], SWOLLEN JOINTS [   ], NECK ACHE [   ], LOW BACK ACHE [   ],  SKIN : ULCERS [   ], RASH [   ], ITCHING [   ]  CNS: HEAD ACHE [   ], DOUBLE VISION [   ], BLURRED VISION [   ], AMS / CONFUSION [   ], SEIZURES [   ], WEAKNESS [   ],TINGLING / NUMBNESS [   ]    PHYSICAL EXAMINATION:  GENERAL APPEARANCE: NO DISTRESS  HEENT:  NO PALLOR, NO  JVD,  NO   NODES, NECK SUPPLE  CVS: S1 +, S2 +,   RS: AEEB,  OCCASIONAL  RALES +,   NO RONCHI  ABD: SOFT, NT, NO, BS +  EXT: PE  SKIN: WARM  SKELETAL:  ROM ACCEPTABLE  CNS:  AAO X 3 ,   DEFICITS        RADIOLOGY :    < from: US Transvaginal (21 @ 22:22) >    IMPRESSION:    Fibroid uterus.    < end of copied text >        ASSESSMENT :     Anemia    HTN (hypertension)    DM (diabetes mellitus)    RA (rheumatoid arthritis)    COPD (chronic obstructive pulmonary disease)    AICD (automatic cardioverter/defibrillator) present    CAD (coronary artery disease)    CHF (congestive heart failure)    GERD (gastroesophageal reflux disease)    History of cholecystectomy    ICD (implantable cardioverter-defibrillator) in place        PLAN:  HPI:  55 yo F from [facility] assisted living with h/o CAD, AICD, CHF, COPD, DM, HTN presents with anemia Hb 5.2 on labs . Pt states her last transfusion was a few months ago (earlier in year) due to vaginal bleeding. Her last MP bleeding was 1 month. Pt denies all symptoms including black or bloody stool, hematuria, vaginal bleeding, CP, SOB, dizziness, change in LE edema, fever, vomiting, abd pain or any other complains  (21 May 2021 00:00)      # SYMPTOMATIC ANEMIA W/ HX OF POST-MENOPAUSAL VAGINAL BLEEDING, FIBROID UTERUS  - S/P PRBC TRANSFUSION, FOBT NEGATIVE, GIVEN HX OF CAD TRANSFUSION THRESHOLD OF HGB < 8 -  OBTAIN GI CONSULT FOR EGD AND COLONOSCOPY [MAY NEED TO DO AS OUTPATIENT], ALSO R/O HEMOLYTIC ANEMIA , OR HEMOGLOBINOPATHY (  - HEMATOLOGY CONSULT ) , REQUESTED DR. BALDERAS, NEPHROLOGY. MAY NEED TO START INJ. EPOGEN AND FESO4 IN AM ( DUE TO ANEMIA OF CKD )     - PATIENT REPORTS LAST EPISODE OF VAGINAL BLEEDING WAS SEVERAL WEEKS AGO, DENIES MELENA/HEMATOCHEZIA  #  ANEMIA OF CKD , ANEMIA OF CHRONIC DISEASE   # SUSPECT LEUKOCYTOSIS IS REACTIVE - MONITOR  # HX OF CAD  # CHRONIC SYSTOLIC HEART FAILURE S/P AICD  # CKD STAGE 4-5  # MORBID OBESITY  # COPD  # HTN  # DM  # GI PPX; SCDS FOR DVT PPX

## 2023-05-23 NOTE — ED PROVIDER NOTE - NS HIV RISK FACTOR YES
Post-Care Instructions: I reviewed with the patient in detail post-care instructions. Patient is to wear sun protection. Patients may expect sunburn like redness, discomfort and scabbing. Declined

## 2023-05-23 NOTE — ED PROVIDER NOTE - CARDIOVASCULAR NEGATIVE STATEMENT, MLM
After consent right shoulder injection with kenalog performed. Posterior subacromial approach. No complications.
no chest pain and no edema.

## 2023-05-25 NOTE — ED ADULT NURSE NOTE - NSIMPLEMENTINTERV_GEN_ALL_ED
Implemented All Universal Safety Interventions:  [place] to call system. Call bell, personal items and telephone within reach. Instruct patient to call for assistance. Room bathroom lighting operational. Non-slip footwear when patient is off stretcher. Physically safe environment: no spills, clutter or unnecessary equipment. Stretcher in lowest position, wheels locked, appropriate side rails in place. Alert and oriented to person, place and time/Patient baseline mental status

## 2023-06-09 NOTE — BRIEF OPERATIVE NOTE - NSICDXBRIEFPOSTOP_GEN_ALL_CORE_FT
POST-OP DIAGNOSIS:  Breast lesion 03-Jun-2022 13:34:54  Brandon Newby   no tonsillar swelling/no exudate/THROAT RED/uvula midline/NO VESICLES/ULCERS/NO DROOLING/NO TONGUE ELEVATION/NO STRIDOR

## 2023-06-14 NOTE — ED PROVIDER NOTE - ATTENDING CONTRIBUTION TO CARE
Completed nutrition education for general CKD. Pt is eating out daily sometimes for all meals and has very high sodium intake. Spent a lot of time reviewing sodium. Also discussed protein specifically considering she is a bariatric pt. Thanks for the referral. 
I performed the initial face to face bedside interview with this patient regarding history of present illness, review of symptoms and past medical, social and family history.  I completed an independent physical examination.  I was the initial provider who evaluated this patient.  The history, review of symptoms and examination was documented by the scribe in my presence and I attest to the accuracy of the documentation.  I have signed out the follow up of any pending tests (i.e. labs, radiological studies) to the PA. I have discussed the patient’s plan of care and disposition with the PA.

## 2023-07-25 NOTE — PROGRESS NOTE ADULT - PROBLEM/PLAN-3
Sommer Barron (:  1987) is a 43 y.o. female,Established patient, here for evaluation of the following chief complaint(s):  No chief complaint on file. ASSESSMENT/PLAN:  1. Seasonal allergic rhinitis, unspecified trigger    Recommended otc zyrtec or allegra  Call if increasing or new symptoms   2. UTI symptoms  -     POCT Urinalysis no Micro  -     Culture, Urine      Return if symptoms worsen or fail to improve. Subjective   SUBJECTIVE/OBJECTIVE:  URI   This is a new problem. The current episode started 1 to 4 weeks ago. The problem has been waxing and waning. There has been no fever. Associated symptoms include congestion and a plugged ear sensation. Associated symptoms comments: Ear fullness  Swollen lymph node under right ear  Some drainage and tightness in her throat. Review of Systems   Constitutional:  Positive for activity change. HENT:  Positive for congestion and postnasal drip. Ear pressure   Respiratory: Negative. Objective   Physical Exam  Constitutional:       General: She is not in acute distress. Appearance: She is well-developed. HENT:      Head: Normocephalic. Right Ear: Tympanic membrane, ear canal and external ear normal.      Left Ear: Tympanic membrane, ear canal and external ear normal.      Nose: Mucosal edema present. Mouth/Throat:      Pharynx: Posterior oropharyngeal erythema present. Eyes:      General:         Left eye: Left eye discharge: pnd . Conjunctiva/sclera: Conjunctivae normal.   Neck:      Thyroid: No thyromegaly. Cardiovascular:      Rate and Rhythm: Normal rate. Pulmonary:      Effort: Pulmonary effort is normal. No respiratory distress. Breath sounds: Normal breath sounds. No wheezing or rales. Lymphadenopathy:      Cervical: Cervical adenopathy (right just under ear with slightly enlarged lymph node) present. Skin:     General: Skin is warm and dry. Findings: No rash.    Neurological:
DISPLAY PLAN FREE TEXT

## 2023-08-15 NOTE — ED ADULT NURSE NOTE - GASTROINTESTINAL ASSESSMENT
Nephrology Progress Note          62 year old/female  with PMH of ESRD on HD is seen today for management of ESRD.     Subjective     Overnight events noted.   S/p HD yesterday-- extra treatment  Hd today again on regular schedule       Intake/Output Summary (Last 24 hours) at 8/15/2023 0720  Last data filed at 8/14/2023 1710  Gross per 24 hour   Intake --   Output 2700 ml   Net -2700 ml        Labs and vitals reviewed.       Assessment:  ESRD: HD on TTS schedule, HD today-- extra for fluid removal.   Anemia:   MBD  HD access; LUE AVG  HTN: well controlled     Plan:  HD today on MWF schedule.   Renal diet   Labs marlena am        Objective       Last Recorded Vitals  Blood pressure (!) 159/84, pulse 80, temperature 97.7 °F (36.5 °C), temperature source Oral, resp. rate 16, weight 94.5 kg (208 lb 5.4 oz), SpO2 90 %.  Body mass index is 32.63 kg/m².      NAD  Awake  No Supplemental oxygen  LUE AVG   CTAB  S1 and S2 are audible   Soft, nt abdomen  + edema  Non focal grossly normal neuro exam   Normal speech      LABORATORY DATA:    Lab Results   Component Value Date    SODIUM 134 (L) 08/14/2023    POTASSIUM 4.1 08/14/2023    CO2 25 08/14/2023    BUN 67 (H) 08/14/2023    CREATININE 7.66 (H) 08/14/2023    GLUCOSE 118 (H) 08/14/2023      Lab Results   Component Value Date    CALCIUM 8.4 08/14/2023         Inpatient Medications:  Current Facility-Administered Medications   Medication Dose Route Frequency Provider Last Rate Last Admin   • [Held by provider] hydrALAZINE (APRESOLINE) tablet 50 mg  50 mg Oral Q8H Darrion Bah DO       • NIFEdipine XL (PROCARDIA XL) ER tablet 30 mg  30 mg Oral Nightly Darrion Bah DO   30 mg at 08/14/23 2054   • B complex-vitamin C-folic acid (NEPHRO-LEONARD) tablet 0.8 mg  1 tablet Oral Daily Darrino Bah DO   0.8 mg at 08/14/23 0846   • fidaxomicin (DIFICID) tablet 200 mg  200 mg Oral 2 times per day Mariana Bhandari MD   200 mg at 08/14/23 2055   • apixaBAN (ELIQUIS) tablet 5 mg  5 mg  Oral 2 times per day Mohsin, Sana, DO   5 mg at 08/14/23 2055   • atorvastatin (LIPITOR) tablet 40 mg  40 mg Oral Q Evening Mohsin, Sana, DO   40 mg at 08/14/23 1826   • carvedilol (COREG) tablet 25 mg  25 mg Oral 2 times per day Mohsin, Sana, DO   25 mg at 08/14/23 2054   • isosorbide mononitrate (IMDUR) ER tablet 30 mg  30 mg Oral Daily Mohsin, Sana, DO   30 mg at 08/14/23 0846   • lipase-protease-amylase 36,000-114,000-180,000 units (CREON) per capsule 2 capsule  2 capsule Oral TID  Mohsin, Sana, DO   2 capsule at 08/14/23 1826   • losartan (COZAAR) tablet 100 mg  100 mg Oral Daily Mohsin, Sana, DO   100 mg at 08/13/23 0922   • magnesium oxide (MAG-OX) tablet 400 mg  400 mg Oral BID Mohsin, Sana, DO   400 mg at 08/14/23 2055   • sevelamer carbonate (RENVELA) tablet 1,600 mg  1,600 mg Oral TID  Mohsin, Sana, DO   1,600 mg at 08/14/23 1756   • umeclidinium bromide (INCRUSE ELLIPTA) 62.5 MCG/ACT inhaler 1 puff  1 puff Inhalation Daily Resp Mohsin, Sana, DO   1 puff at 08/14/23 0847   • venlafaxine (EFFEXOR) tablet 75 mg  75 mg Oral Daily Mohsin, Sana, DO   75 mg at 08/14/23 0846   • sodium chloride (PF) 0.9 % injection 2 mL  2 mL Intracatheter 2 times per day Mohsin, Sana, DO   2 mL at 08/14/23 2055   • levothyroxine (SYNTHROID, LEVOTHROID) tablet 225 mcg  225 mcg Oral QAM AC Mohsin, Sana, DO   225 mcg at 08/15/23 0547            Felipa Gutierrez MD  [facility]  [phone]    8/15/2023           WDL

## 2023-10-28 NOTE — ED ADULT NURSE NOTE - NSFALLRISKINTERV_ED_ALL_ED

## 2023-10-28 NOTE — ED ADULT NURSE NOTE - CHIEF COMPLAINT
Previously Declined (within the last year) The patient is a 58y Female complaining of pain, abdominal.

## 2023-10-29 NOTE — H&P ADULT - NSHPLABSRESULTS_GEN_ALL_CORE
11.0   9.65  )-----------( 214      ( 28 Oct 2023 18:50 )             38.5     10-28    143  |  110<H>  |  75<H>  ----------------------------<  133<H>  4.9   |  31  |  2.83<H>    Ca    8.9      28 Oct 2023 18:50    TPro  7.2  /  Alb  3.4<L>  /  TBili  0.5  /  DBili  x   /  AST  10  /  ALT  14  /  AlkPhos  62  10-28      < from: CT Abdomen and Pelvis w/ Oral Cont (10.28.23 @ 23:32) >    ACC: [id number] EXAM:  CT ABDOMEN AND PELVIS OC   ORDERED BY: PIYUSH VALENCIA     PROCEDURE DATE:  10/28/2023        < end of copied text >    < from: CT Abdomen and Pelvis w/ Oral Cont (10.28.23 @ 23:32) >    IMPRESSION:  Small bowel obstruction secondary to a large complex multi septated   anterior abdominal wall hernia which contains and obstructs small bowel.   Edema adjacent to the obstructed loops suggestive of at least moderate   and possibly high-grade obstruction. No extraluminal air or pneumatosis.    Interval enlargement of 2 soft tissue density masses off the right   kidney, concerning for neoplasm such as renal cell carcinoma. Less likely   growing complex cysts. This warrants follow-up imaging. Renal ultrasound  would likely confirm that these are solid masses. Renal protocol CT or   MRI with and without contrast would be definitive.    Low-density splenic lesions which, while statistically most likely   benign, are not definitively evaluated and have enlarged slightly. The   most definitive imaging for splenic lesions would be MRI with and without   gadolinium although the patient has an implanted cardiac device and may   not be able to undergo MRI.    Several other chronic findings similar to prior.    Findings were discussed with Dr. PIYUSH VALENCIA [phone] 10/29/2023 12:02   AM by Dr. Snider with read back confirmation.    --- End of Report ---        < end of copied text >

## 2023-10-29 NOTE — CONSULT NOTE ADULT - SUBJECTIVE AND OBJECTIVE BOX
EP Attending  HPI  58 F from [facility] [place] with pmhx of DM on insulin, HLD, Asthma/Copd req o2, multiple cardiac conditions, presents to ED c/o 3 days of abdominal pain and vomiting. The patient states she started having abdominal pain 3 days ago which has been worsening over time. The patient had associated vomiting, last episode was 1pm 10/28. She admits to constipation with last bowel movement 2 days ago which is abnormal for her. Denies fever, chills or other complaints at this time. ED provider consulted general surgery for SBO with irreducible ventral hernia. The patient was seen and examined at bedside by surgery service. The patient states she has sig past abdominal surgical history of  and gallbladder removal. She states she has had an SBO in the past -last episode was about a year ago which was resolved with NGT placement. The patient has had a ventral hernia for many years and states it is normally the size it appears at bedside today. The patient states she has had a colonoscopy 5 years ago which was normal. (29 Oct 2023 01:23)    Last saw Dr Guerrero ~2 yrs ago. At that time, her ICD was ~18months away from elective replacement status. She has a chronic nonischemic cardiomyopathy, and is not pacing-dependent.  No reported prior ICD shocks.  Over the last few years, QRS has widened from 85-95ms --> 150ms+ with RBBB/LAFB bifascicular block.  No fainting, no palpitations. Does not know her medications, as these are all dosed by [facility].  Has abdominal pain ,nausea, and vomiting.  Feeling better w/ NGT in place but otherwise is too fatigued to provide a more in-depth interview at this time.  10 pt ROS otherwise negative.    PAST MEDICAL & SURGICAL HISTORY:  HTN (hypertension)  DM (diabetes mellitus)  RA (rheumatoid arthritis)  COPD (chronic obstructive pulmonary disease)  AICD (automatic cardioverter/defibrillator) present  2/3 2011  CAD (coronary artery disease)  CHF (congestive heart failure)  GERD (gastroesophageal reflux disease)  Benign neoplasm, unspecified site  Morbid obesity with BMI of 50.0-59.9, adult  Obstructive sleep apnea on CPAP  History of cholecystectomy  H/O  section      MEDICATIONS  (STANDING):  dextrose 5%. 1000 milliLiter(s) (50 mL/Hr) IV Continuous <Continuous>  dextrose 5%. 1000 milliLiter(s) (100 mL/Hr) IV Continuous <Continuous>  dextrose 50% Injectable 25 Gram(s) IV Push once  dextrose 50% Injectable 25 Gram(s) IV Push once  dextrose 50% Injectable 12.5 Gram(s) IV Push once  enoxaparin Injectable 40 milliGRAM(s) SubCutaneous every 24 hours  glucagon  Injectable 1 milliGRAM(s) IntraMuscular once  insulin lispro (ADMELOG) corrective regimen sliding scale   SubCutaneous three times a day before meals  lactated ringers. 1000 milliLiter(s) (150 mL/Hr) IV Continuous <Continuous>      Allergies    codeine (Urticaria)    Intolerances        FAMILY HISTORY:  Family history of hypertension in mother      Non-contributary for premature coronary disease or sudden cardiac death    SOCIAL HISTORY:    [x ] Non-smoker  [ ] Smoker  [ ] Alcohol    PHYSICAL EXAM:  T(C): 36.8 (10-29-23 @ 06:07), Max: 36.8 (10-29-23 @ 01:00)  HR: 87 (10-29-23 @ 06:07) (84 - 89)  BP: 144/81 (10-29-23 @ 06:07) (133/83 - 155/87)  RR: 20 (10-29-23 @ 06:07) (16 - 20)  SpO2: 95% (10-29-23 @ 06:07) (93% - 96%)  Wt(kg): --    General: central obesity, no acute distress, alert and oriented x 3  Head: normocephalic, no trauma  Neck: no JVD, no bruit, supple, not enlarged  CV: S1S2, no S3, regular rate, rhythm is SINUS, no murmurs.    Lungs: clear BL, no rales or wheezes  Abdomen: bowel sounds +, soft, nontender, nondistended  Extremities: no clubbing, cyanosis or edema  Neuro: Moves all 4 extremities, sensation intact x 4 extremities  Skin: warm and moist, normal turgor  Psych: Mood and affect are appropriate for circumstances  MSK: normal range of motion and strength x4 extremities.      TELEMETRY: none	    ECG: NSR, bifascicular block  CXR: Abbott single chamber ICD, with lead in RV apex.  	  	  LABS:	 	                          11.0   9.65  )-----------( 214      ( 28 Oct 2023 18:50 )             38.5     10-28    143  |  110<H>  |  75<H>  ----------------------------<  133<H>  4.9   |  31  |  2.83<H>    Ca    8.9      28 Oct 2023 18:50    TPro  7.2  /  Alb  3.4<L>  /  TBili  0.5  /  DBili  x   /  AST  10  /  ALT  14  /  AlkPhos  62  10-28    ASSESSMENT/PLAN: Ms Vasquez is a pleasant 58y Female here w/ irreducible ventral hernia causing nausea and vomiting.  She has a chronic nonischemic cardiomyopathy with EF ~35%.  Can likely hold diuretics while NPO and getting gastric suction.  Her last visit w/ Dr Guerrero was >1 yr ago.  Should re-check an echo on this admission, and we can optimize her blood pressure and volume status based on those findings. Difficult to assess volume status at bedside due to obesity, but at very least there is no symptoamtic orthopnea, and no LE edema.  Her ICD is very likely at elective replacement timing.  Will request her Abbott ICD be checked.  She is not pacing dependent.  If she needs to go to the operating room, the ICD tachy-therapy can be disabled either via Abbott interrogator, or with a magnet placed over the generator.  Will follow with you.    We may wish to change the generator after management of the abdomen / prior to discharge.      Josh Larson M.D.  Cardiac Electrophysiology    office [phone]  pager [phone]

## 2023-10-29 NOTE — H&P ADULT - HISTORY OF PRESENT ILLNESS
GENERAL SURGERY CONSULTATION NOTE    58 [facility] [place] [place] with pmhx of DM on insulin, HLD, Asthma/Copd req o2, multiple cardiac conditions, presents to ED c/o 3 days of abdominal pain and vomiting. The patient states she started having abdominal pain 3 days ago which has been worsening over time. The patient had associated vomiting, last episode was 1pm 10/28. She admits to constipation with last bowel movement 2 days ago which is abnormal for her. Denies fever, chills or other complaints at this time. ED provider consulted general surgery for SBO with irreducible ventral hernia. The patient was seen and examined at bedside by surgery service. The patient states she has sig past abdominal surgical history of  and gallbladder removal. She states she has had an SBO in the past -last episode was about a year ago which was resolved with NGT placement. The patient has had a ventral hernia for many years and states it is normally the size it appears at bedside today. The patient states she has had a colonoscopy 5 years ago which was normal.

## 2023-10-29 NOTE — CHART NOTE - NSCHARTNOTEFT_GEN_A_CORE
Pt POD 0 s/p Repair, ventral hernia, laparoscopic, with component release   Small bowel resection   intubated, sedated, on pressor support    JPs x2 R bloody 100+cc, L ss  UO 250cc in OR    ICU Vital Signs Last 24 Hrs  T(C): 36.4 (29 Oct 2023 14:37), Max: 36.8 (29 Oct 2023 01:00)  T(F): 97.5 (29 Oct 2023 14:37), Max: 98.3 (29 Oct 2023 06:07)  HR: 120 (29 Oct 2023 21:14) (78 - 120)  BP: 149/75 (29 Oct 2023 14:37) (111/69 - 155/87)  BP(mean): --  ABP: --  ABP(mean): --  RR: 18 (29 Oct 2023 14:37) (18 - 20)  SpO2: 94% (29 Oct 2023 21:14) (94% - 99%)    O2 Parameters below as of 29 Oct 2023 14:37  Patient On (Oxygen Delivery Method): room air  O2 Flow (L/min): 4                            11.2   3.56  )-----------( 142      ( 29 Oct 2023 21:50 )             38.0   10-29    144  |  114<H>  |  73<H>  ----------------------------<  139<H>  4.5   |  25  |  2.64<H>    Ca    7.7<L>      29 Oct 2023 21:50  Phos  3.5     10-29  Mg     2.0     10-29    TPro  5.3<L>  /  Alb  2.3<L>  /  TBili  0.8  /  DBili  x   /  AST  15  /  ALT  12  /  AlkPhos  63  10-29    abd dressing CDI  JPs intact    guarded post-op  vent per ICU  IOs, monitor UO  abx, NGT

## 2023-10-29 NOTE — H&P ADULT - NSHPPHYSICALEXAM_GEN_ALL_CORE
T(C): 36.8 (10-29-23 @ 01:00), Max: 36.8 (10-29-23 @ 01:00)  HR: 84 (10-29-23 @ 01:00) (84 - 84)  BP: 155/87 (10-29-23 @ 01:00) (145/83 - 155/87)  RR: 20 (10-29-23 @ 01:00) (16 - 20)  SpO2: 96% (10-29-23 @ 01:00) (93% - 96%)    CONSTITUTIONAL: Well groomed, no apparent distress  RESP: No respiratory distress, no use of accessory muscles; on O2 nasal cannula at bedside  GI: Softly distended, nontender to palpation, no rebound, no guarding; palpable large hernia in RLQ which is irreducible and nontender to palpation

## 2023-10-29 NOTE — PACU DISCHARGE NOTE - COMMENTS
BP 93/60 HR85 RR12 Spo2 100 Temp 37.0C on phenylephrine 200mcg/min.  Sent to ICU directly from OR intubated and sedated due to perforation of bowel causing sepsis with rising pressor requirement.

## 2023-10-29 NOTE — CONSULT NOTE ADULT - ATTENDING COMMENTS
58 F with pmhx of DM on insulin, HLD, Asthma/Copd on home O2, multiple non-ischemic cardiomyopathy (ECHO EF 30% 2019) with AICD, bifascicular block (LAFB, RBBB), initially admitted for small bowel obstruction secondary to a large complex multi septated anterior abdominal wall hernia. Patient is POD #0 laparoscopic ventral hernia repair converted to ex-lap for small bowel resection for perforated incarcerated ventral hernia. Post-op patient hypotensive, receiving phenylephrine drip, transferred to ICU for additional care.    #POD 0, ventral hernia repair, small bowel resection  #Hypotension likely multifactorial, septic shock with possible cardiogenic component. Low suspicion for hemodynamically significant PE at this time, (POCUS with appropriate size RV, no septal flattening or D sign)  #Acute on chronic hypoxic respiratory failure  #Troponinemia, suspect demand ischemia   #Lactic acidosis  #Non-ischemic cardiomyopathy, HFrEF   #Asthma/COPD  #CKD    Plan:  - sedation with propofol goal RASS 0- -2  - vent bundle, daily SAT/SBT, adjust vent settings per ABG  - continue pressor support with norepi, vaso  - s/p 5L crystalloid resuscitation, hold additional IVF for now, reassess volume status   - hold BP meds, negative inotropes  - trend tropnin, EKG  - obtain blood cx  - broad spectrum coverage for possible intra-abdominal infection, pip-tazo, 1 dose vancomycin   - trend lactate  - stress dose steroids, hydrocortisone 100mg IV now, then 50mg q6h  - ISS q6h  - NPO   - f/u surgery for continued management s/p small bowel resection, monitor SHWETHA drain (x2) output  - no AC for now given bloody output from SHWETHA drain

## 2023-10-29 NOTE — CONSULT NOTE ADULT - SUBJECTIVE AND OBJECTIVE BOX
Patient is a 58y old  Female who presents with a chief complaint of SBO w/ irreducible ventral hernia (29 Oct 2023 10:18)      HPI:  GENERAL SURGERY CONSULTATION NOTE    58 F [facility] with pmhx of DM on insulin, HLD, Asthma/Copd req o2, multiple cardiac conditions, presents to ED c/o 3 days of abdominal pain and vomiting.  Upon evaluation in ED, patient noted to be afebrile and hemodynamically stable. Patient on CT scan of abdomen found to have an Small bowel obstruction secondary to a large complex multi septated anterior abdominal wall hernia which contains and obstructs small bowel. ED provider consulted general surgery for SBO with irreducible ventral hernia. Patient is s/p laparoscopic ventral hernia repair and small bowel resection. ICU consulted for shock requiring vasopressors and post operative monitoring.     Patient currentnly intubated.     Allergies    codeine (Urticaria)    Intolerances        MEDICATIONS  (STANDING):  chlorhexidine 2% Cloths 1 Application(s) Topical daily  dextrose 5%. 1000 milliLiter(s) (50 mL/Hr) IV Continuous <Continuous>  dextrose 5%. 1000 milliLiter(s) (100 mL/Hr) IV Continuous <Continuous>  dextrose 50% Injectable 25 Gram(s) IV Push once  dextrose 50% Injectable 25 Gram(s) IV Push once  dextrose 50% Injectable 12.5 Gram(s) IV Push once  enoxaparin Injectable 40 milliGRAM(s) SubCutaneous every 24 hours  glucagon  Injectable 1 milliGRAM(s) IntraMuscular once  insulin lispro (ADMELOG) corrective regimen sliding scale   SubCutaneous three times a day before meals  lactated ringers. 1000 milliLiter(s) (150 mL/Hr) IV Continuous <Continuous>  piperacillin/tazobactam IVPB. 3.375 Gram(s) IV Intermittent once  vasopressin Infusion 0.04 Unit(s)/Min (6 mL/Hr) IV Continuous <Continuous>    MEDICATIONS  (PRN):  dextrose Oral Gel 15 Gram(s) Oral once PRN Blood Glucose LESS THAN 70 milliGRAM(s)/deciliter      Daily     Daily     Drug Dosing Weight  Height (cm): 165.1 (11 Sep 2022 11:04)  Weight (kg): 136.1 (28 Oct 2023 17:05)  BMI (kg/m2): 49.9 (28 Oct 2023 17:05)  BSA (m2): 2.35 (28 Oct 2023 17:05)    PAST MEDICAL & SURGICAL HISTORY:  HTN (hypertension)      DM (diabetes mellitus)      RA (rheumatoid arthritis)      COPD (chronic obstructive pulmonary disease)      AICD (automatic cardioverter/defibrillator) present  2/3 2011      CAD (coronary artery disease)      CHF (congestive heart failure)      GERD (gastroesophageal reflux disease)      Benign neoplasm, unspecified site      Morbid obesity with BMI of 50.0-59.9, adult      Obstructive sleep apnea on CPAP      History of cholecystectomy      ICD (implantable cardioverter-defibrillator) in place      H/O  section          FAMILY HISTORY:  Family history of hypertension in mother        SOCIAL HISTORY:    ADVANCE DIRECTIVES:    REVIEW OF SYSTEMS:  Unable to assess         ICU Vital Signs Last 24 Hrs  T(C): 36.4 (29 Oct 2023 14:37), Max: 36.8 (29 Oct 2023 01:00)  T(F): 97.5 (29 Oct 2023 14:37), Max: 98.3 (29 Oct 2023 06:07)  HR: 78 (29 Oct 2023 14:37) (78 - 89)  BP: 149/75 (29 Oct 2023 14:37) (111/69 - 155/87)  BP(mean): --  ABP: --  ABP(mean): --  RR: 18 (29 Oct 2023 14:37) (18 - 20)  SpO2: 99% (29 Oct 2023 14:37) (95% - 99%)    O2 Parameters below as of 29 Oct 2023 14:37  Patient On (Oxygen Delivery Method): room air  O2 Flow (L/min): 4              I&O's Detail      PHYSICAL EXAM:    GENERAL: NAD, well-groomed, well-developed  HEAD:  Atraumatic, Normocephalic  EYES: EOMI, PERRLA, conjunctiva and sclera clear  ENMT: No tonsillar erythema, exudates, or enlargement; Moist mucous membranes, Good dentition, No lesions  NECK: Supple, No JVD, Normal thyroid  NERVOUS SYSTEM:  Alert & Oriented X3, Good concentration; Motor Strength 5/5 B/L upper and lower extremities; DTRs 2+ intact and symmetric  CHEST/LUNG: Clear to percussion bilaterally; No rales, rhonchi, wheezing, or rubs  HEART: Regular rate and rhythm; No murmurs, rubs, or gallops  ABDOMEN: Soft, Nontender, Nondistended; Bowel sounds present  EXTREMITIES:  2+ Peripheral Pulses, No clubbing, cyanosis, or edema  LYMPH: No lymphadenopathy noted  SKIN: No rashes or lesions    LABS:  CBC Full  -  ( 28 Oct 2023 18:50 )  WBC Count : 9.65 K/uL  RBC Count : 3.77 M/uL  Hemoglobin : 11.0 g/dL  Hematocrit : 38.5 %  Platelet Count - Automated : 214 K/uL  Mean Cell Volume : 102.1 fl  Mean Cell Hemoglobin : 29.2 pg  Mean Cell Hemoglobin Concentration : 28.6 gm/dL  Auto Neutrophil # : 7.96 K/uL  Auto Lymphocyte # : 0.79 K/uL  Auto Monocyte # : 0.85 K/uL  Auto Eosinophil # : 0.01 K/uL  Auto Basophil # : 0.02 K/uL  Auto Neutrophil % : 82.5 %  Auto Lymphocyte % : 8.2 %  Auto Monocyte % : 8.8 %  Auto Eosinophil % : 0.1 %  Auto Basophil % : 0.2 %    10-28    143  |  110<H>  |  75<H>  ----------------------------<  133<H>  4.9   |  31  |  2.83<H>    Ca    8.9      28 Oct 2023 18:50    TPro  7.2  /  Alb  3.4<L>  /  TBili  0.5  /  DBili  x   /  AST  10  /  ALT  14  /  AlkPhos  62  10-    CAPILLARY BLOOD GLUCOSE      POCT Blood Glucose.: 136 mg/dL (29 Oct 2023 19:56)      Urinalysis Basic - ( 28 Oct 2023 18:50 )    Color: x / Appearance: x / SG: x / pH: x  Gluc: 133 mg/dL / Ketone: x  / Bili: x / Urobili: x   Blood: x / Protein: x / Nitrite: x   Leuk Esterase: x / RBC: x / WBC x   Sq Epi: x / Non Sq Epi: x / Bacteria: x              EKG:    ECHO, US:    RADIOLOGY:    CRITICAL CARE TIME SPENT:   Patient is a 58y old  Female who presents with a chief complaint of SBO w/ irreducible ventral hernia (29 Oct 2023 10:18)      HPI:  GENERAL SURGERY CONSULTATION NOTE    58 [facility] with pmhx of DM on insulin, HLD, Asthma/Copd req o2, CHF (last echo  EF 30%, s/p AICD), presents to ED c/o 3 days of abdominal pain and vomiting.  Upon evaluation in ED, patient noted to be afebrile and hemodynamically stable. Patient on CT scan of abdomen found to have an Small bowel obstruction secondary to a large complex multi septated anterior abdominal wall hernia which contains and obstructs small bowel. ED provider consulted general surgery for SBO with irreducible ventral hernia. Patient is s/p laparoscopic ventral hernia repair and small bowel resection. ICU consulted for shock requiring vasopressors and post operative monitoring.     Patient currently intubated and sedated. Patient unable to illicit any complaints.     Allergies    codeine (Urticaria)    Intolerances        MEDICATIONS  (STANDING):  chlorhexidine 2% Cloths 1 Application(s) Topical daily  dextrose 5%. 1000 milliLiter(s) (50 mL/Hr) IV Continuous <Continuous>  dextrose 5%. 1000 milliLiter(s) (100 mL/Hr) IV Continuous <Continuous>  dextrose 50% Injectable 25 Gram(s) IV Push once  dextrose 50% Injectable 25 Gram(s) IV Push once  dextrose 50% Injectable 12.5 Gram(s) IV Push once  enoxaparin Injectable 40 milliGRAM(s) SubCutaneous every 24 hours  glucagon  Injectable 1 milliGRAM(s) IntraMuscular once  insulin lispro (ADMELOG) corrective regimen sliding scale   SubCutaneous three times a day before meals  lactated ringers. 1000 milliLiter(s) (150 mL/Hr) IV Continuous <Continuous>  piperacillin/tazobactam IVPB. 3.375 Gram(s) IV Intermittent once  vasopressin Infusion 0.04 Unit(s)/Min (6 mL/Hr) IV Continuous <Continuous>    MEDICATIONS  (PRN):  dextrose Oral Gel 15 Gram(s) Oral once PRN Blood Glucose LESS THAN 70 milliGRAM(s)/deciliter      Daily     Daily     Drug Dosing Weight  Height (cm): 165.1 (11 Sep 2022 11:04)  Weight (kg): 136.1 (28 Oct 2023 17:05)  BMI (kg/m2): 49.9 (28 Oct 2023 17:05)  BSA (m2): 2.35 (28 Oct 2023 17:05)    PAST MEDICAL & SURGICAL HISTORY:  HTN (hypertension)      DM (diabetes mellitus)      RA (rheumatoid arthritis)      COPD (chronic obstructive pulmonary disease)      AICD (automatic cardioverter/defibrillator) present  2/3 2011      CAD (coronary artery disease)      CHF (congestive heart failure)      GERD (gastroesophageal reflux disease)      Benign neoplasm, unspecified site      Morbid obesity with BMI of 50.0-59.9, adult      Obstructive sleep apnea on CPAP      History of cholecystectomy      ICD (implantable cardioverter-defibrillator) in place      H/O  section          FAMILY HISTORY:  Family history of hypertension in mother        SOCIAL HISTORY:    ADVANCE DIRECTIVES:    REVIEW OF SYSTEMS:  Unable to assess         ICU Vital Signs Last 24 Hrs  T(C): 36.4 (29 Oct 2023 14:37), Max: 36.8 (29 Oct 2023 01:00)  T(F): 97.5 (29 Oct 2023 14:37), Max: 98.3 (29 Oct 2023 06:07)  HR: 78 (29 Oct 2023 14:37) (78 - 89)  BP: 149/75 (29 Oct 2023 14:37) (111/69 - 155/87)  BP(mean): --  ABP: --  ABP(mean): --  RR: 18 (29 Oct 2023 14:37) (18 - 20)  SpO2: 99% (29 Oct 2023 14:37) (95% - 99%)    O2 Parameters below as of 29 Oct 2023 14:37  Patient On (Oxygen Delivery Method): room air  O2 Flow (L/min): 4              I&O's Detail      PHYSICAL EXAM:    GENERAL: NAD, well-groomed, well-developed  HEAD:  Atraumatic, Normocephalic  EYES: EOMI, PERRLA, conjunctiva and sclera clear  ENMT: No tonsillar erythema, exudates, or enlargement; Moist mucous membranes, Good dentition, No lesions  NECK: Supple, No JVD, Normal thyroid  NERVOUS SYSTEM:  Alert & Oriented X3, Good concentration; Motor Strength 5/5 B/L upper and lower extremities; DTRs 2+ intact and symmetric  CHEST/LUNG: Clear to percussion bilaterally; No rales, rhonchi, wheezing, or rubs  HEART: Regular rate and rhythm; No murmurs, rubs, or gallops  ABDOMEN: Soft, Nontender, Nondistended; Bowel sounds present  EXTREMITIES:  2+ Peripheral Pulses, No clubbing, cyanosis, or edema  LYMPH: No lymphadenopathy noted  SKIN: No rashes or lesions    LABS:  CBC Full  -  ( 28 Oct 2023 18:50 )  WBC Count : 9.65 K/uL  RBC Count : 3.77 M/uL  Hemoglobin : 11.0 g/dL  Hematocrit : 38.5 %  Platelet Count - Automated : 214 K/uL  Mean Cell Volume : 102.1 fl  Mean Cell Hemoglobin : 29.2 pg  Mean Cell Hemoglobin Concentration : 28.6 gm/dL  Auto Neutrophil # : 7.96 K/uL  Auto Lymphocyte # : 0.79 K/uL  Auto Monocyte # : 0.85 K/uL  Auto Eosinophil # : 0.01 K/uL  Auto Basophil # : 0.02 K/uL  Auto Neutrophil % : 82.5 %  Auto Lymphocyte % : 8.2 %  Auto Monocyte % : 8.8 %  Auto Eosinophil % : 0.1 %  Auto Basophil % : 0.2 %    10-28    143  |  110<H>  |  75<H>  ----------------------------<  133<H>  4.9   |  31  |  2.83<H>    Ca    8.9      28 Oct 2023 18:50    TPro  7.2  /  Alb  3.4<L>  /  TBili  0.5  /  DBili  x   /  AST  10  /  ALT  14  /  AlkPhos  62  10-28    CAPILLARY BLOOD GLUCOSE      POCT Blood Glucose.: 136 mg/dL (29 Oct 2023 19:56)      Urinalysis Basic - ( 28 Oct 2023 18:50 )    Color: x / Appearance: x / SG: x / pH: x  Gluc: 133 mg/dL / Ketone: x  / Bili: x / Urobili: x   Blood: x / Protein: x / Nitrite: x   Leuk Esterase: x / RBC: x / WBC x   Sq Epi: x / Non Sq Epi: x / Bacteria: x              EKG:    ECHO, US:    RADIOLOGY:    CRITICAL CARE TIME SPENT:   Patient is a 58y old  Female who presents with a chief complaint of SBO w/ irreducible ventral hernia (29 Oct 2023 10:18)      HPI:  GENERAL SURGERY CONSULTATION NOTE    58 F [facility] with pmhx of DM on insulin, HLD, Asthma/Copd req o2, CHF (last echo  EF 30%, s/p AICD), presents to ED c/o 3 days of abdominal pain and vomiting.  Upon evaluation in ED, patient noted to be afebrile and hemodynamically stable. Patient on CT scan of abdomen found to have an Small bowel obstruction secondary to a large complex multi septated anterior abdominal wall hernia which contains and obstructs small bowel. ED provider consulted general surgery for SBO with irreducible ventral hernia. Patient is s/p laparoscopic ventral hernia repair and small bowel resection. ICU consulted for shock requiring vasopressors and post operative monitoring.     Patient currently intubated and sedated. Patient unable to illicit any further complaints.     Allergies    codeine (Urticaria)    Intolerances        MEDICATIONS  (STANDING):  chlorhexidine 2% Cloths 1 Application(s) Topical daily  dextrose 5%. 1000 milliLiter(s) (50 mL/Hr) IV Continuous <Continuous>  dextrose 5%. 1000 milliLiter(s) (100 mL/Hr) IV Continuous <Continuous>  dextrose 50% Injectable 25 Gram(s) IV Push once  dextrose 50% Injectable 25 Gram(s) IV Push once  dextrose 50% Injectable 12.5 Gram(s) IV Push once  enoxaparin Injectable 40 milliGRAM(s) SubCutaneous every 24 hours  glucagon  Injectable 1 milliGRAM(s) IntraMuscular once  insulin lispro (ADMELOG) corrective regimen sliding scale   SubCutaneous three times a day before meals  lactated ringers. 1000 milliLiter(s) (150 mL/Hr) IV Continuous <Continuous>  piperacillin/tazobactam IVPB. 3.375 Gram(s) IV Intermittent once  vasopressin Infusion 0.04 Unit(s)/Min (6 mL/Hr) IV Continuous <Continuous>    MEDICATIONS  (PRN):  dextrose Oral Gel 15 Gram(s) Oral once PRN Blood Glucose LESS THAN 70 milliGRAM(s)/deciliter      Daily     Daily     Drug Dosing Weight  Height (cm): 165.1 (11 Sep 2022 11:04)  Weight (kg): 136.1 (28 Oct 2023 17:05)  BMI (kg/m2): 49.9 (28 Oct 2023 17:05)  BSA (m2): 2.35 (28 Oct 2023 17:05)    PAST MEDICAL & SURGICAL HISTORY:  HTN (hypertension)      DM (diabetes mellitus)      RA (rheumatoid arthritis)      COPD (chronic obstructive pulmonary disease)      AICD (automatic cardioverter/defibrillator) present  2/3 2011      CAD (coronary artery disease)      CHF (congestive heart failure)      GERD (gastroesophageal reflux disease)      Benign neoplasm, unspecified site      Morbid obesity with BMI of 50.0-59.9, adult      Obstructive sleep apnea on CPAP      History of cholecystectomy      ICD (implantable cardioverter-defibrillator) in place      H/O  section          FAMILY HISTORY:  Family history of hypertension in mother        SOCIAL HISTORY:    ADVANCE DIRECTIVES:    REVIEW OF SYSTEMS:  Unable to assess         ICU Vital Signs Last 24 Hrs  T(C): 36.4 (29 Oct 2023 14:37), Max: 36.8 (29 Oct 2023 01:00)  T(F): 97.5 (29 Oct 2023 14:37), Max: 98.3 (29 Oct 2023 06:07)  HR: 78 (29 Oct 2023 14:37) (78 - 89)  BP: 149/75 (29 Oct 2023 14:37) (111/69 - 155/87)  BP(mean): --  ABP: --  ABP(mean): --  RR: 18 (29 Oct 2023 14:37) (18 - 20)  SpO2: 99% (29 Oct 2023 14:37) (95% - 99%)    O2 Parameters below as of 29 Oct 2023 14:37  Patient On (Oxygen Delivery Method): room air  O2 Flow (L/min): 4              I&O's Detail      PHYSICAL EXAM:    GENERAL: NAD, well-groomed, well-developed, intubated and sedated   HEAD:  Atraumatic, Normocephalic  EYES: EOMI, PERRLA  ENMT: Moist mucous membranes, Good dentition, No lesions  NECK: Supple, RIJ CVC noted   NERVOUS SYSTEM:  sedated, unable to asses   CHEST/LUNG: Clear to auscultation bilaterally; No rales, rhonchi, wheezing, or rubs  HEART: Regular rate and rhythm; No murmurs, rubs, or gallops  ABDOMEN: Soft, Nontender, Nondistended; Bowel sounds present, SHWETHA drain on abdomen noted   EXTREMITIES:  2+ Peripheral Pulses, No clubbing, cyanosis, or edema  LYMPH: No lymphadenopathy noted  SKIN: No rashes or lesions    LABS:  CBC Full  -  ( 28 Oct 2023 18:50 )  WBC Count : 9.65 K/uL  RBC Count : 3.77 M/uL  Hemoglobin : 11.0 g/dL  Hematocrit : 38.5 %  Platelet Count - Automated : 214 K/uL  Mean Cell Volume : 102.1 fl  Mean Cell Hemoglobin : 29.2 pg  Mean Cell Hemoglobin Concentration : 28.6 gm/dL  Auto Neutrophil # : 7.96 K/uL  Auto Lymphocyte # : 0.79 K/uL  Auto Monocyte # : 0.85 K/uL  Auto Eosinophil # : 0.01 K/uL  Auto Basophil # : 0.02 K/uL  Auto Neutrophil % : 82.5 %  Auto Lymphocyte % : 8.2 %  Auto Monocyte % : 8.8 %  Auto Eosinophil % : 0.1 %  Auto Basophil % : 0.2 %    10-28    143  |  110<H>  |  75<H>  ----------------------------<  133<H>  4.9   |  31  |  2.83<H>    Ca    8.9      28 Oct 2023 18:50    TPro  7.2  /  Alb  3.4<L>  /  TBili  0.5  /  DBili  x   /  AST  10  /  ALT  14  /  AlkPhos  62  10-28    CAPILLARY BLOOD GLUCOSE      POCT Blood Glucose.: 136 mg/dL (29 Oct 2023 19:56)      Urinalysis Basic - ( 28 Oct 2023 18:50 )    Color: x / Appearance: x / SG: x / pH: x  Gluc: 133 mg/dL / Ketone: x  / Bili: x / Urobili: x   Blood: x / Protein: x / Nitrite: x   Leuk Esterase: x / RBC: x / WBC x   Sq Epi: x / Non Sq Epi: x / Bacteria: x              EKG:    ECHO, US:    RADIOLOGY:    CRITICAL CARE TIME SPENT:

## 2023-10-29 NOTE — ED PROVIDER NOTE - CLINICAL SUMMARY MEDICAL DECISION MAKING FREE TEXT BOX
57 y/o woman, sent from [facility], h/o HTN, DM, COPD, on oxygen via NC (4 liters continuously), c/o diffuse abdominal pain and distention x 2 days, with vomiting today--CT A/P with PO contrast, labs, reassess.  Pt declines pain medication.

## 2023-10-29 NOTE — BRIEF OPERATIVE NOTE - NSICDXBRIEFPROCEDURE_GEN_ALL_CORE_FT
PROCEDURES:  Repair, ventral hernia, laparoscopic, with component release 29-Oct-2023 20:50:46  Thomas Breen  Small bowel resection 29-Oct-2023 20:51:30  Thomas Breen

## 2023-10-29 NOTE — PATIENT PROFILE ADULT - FALL HARM RISK - HARM RISK INTERVENTIONS
Assistance with ambulation/Assistance OOB with selected safe patient handling equipment/Communicate Risk of Fall with Harm to all staff/Discuss with provider need for PT consult/Monitor gait and stability/Reinforce activity limits and safety measures with patient and family/Tailored Fall Risk Interventions/Visual Cue: Yellow wristband and red socks/Bed in lowest position, wheels locked, appropriate side rails in place/Call bell, personal items and telephone in reach/Instruct patient to call for assistance before getting out of bed or chair/Non-slip footwear when patient is out of bed/[place] to call system/Physically safe environment - no spills, clutter or unnecessary equipment/Purposeful Proactive Rounding/Room/bathroom lighting operational, light cord in reach

## 2023-10-29 NOTE — PROGRESS NOTE ADULT - ASSESSMENT
59 yo F h/o COPD, DM with small bowel obstruction secondary to large ventral hernia.  1. Continue NPO  2. Serial abd exams  3. IV fluids  4. AM labs  5. DVT prophylaxis  6. ISS/FS

## 2023-10-29 NOTE — PROGRESS NOTE ADULT - SUBJECTIVE AND OBJECTIVE BOX
Pt seen at bedside  Patient is a 58y old  Female who presents with a chief complaint of SBO w/ irreducible ventral hernia (29 Oct 2023 01:23)      INTERVAL HPI/OVERNIGHT EVENTS:  Patient states has some abdominal pain but falls asleep while talking. Denies flatus/BM. Denies nausea or vomiting.    Vital Signs Last 24 Hrs  T(C): 36.8 (29 Oct 2023 06:07), Max: 36.8 (29 Oct 2023 01:00)  T(F): 98.3 (29 Oct 2023 06:07), Max: 98.3 (29 Oct 2023 06:07)  HR: 87 (29 Oct 2023 06:07) (84 - 89)  BP: 144/81 (29 Oct 2023 06:07) (133/83 - 155/87)  BP(mean): --  RR: 20 (29 Oct 2023 06:07) (16 - 20)  SpO2: 95% (29 Oct 2023 06:07) (93% - 96%)    Parameters below as of 29 Oct 2023 06:07  Patient On (Oxygen Delivery Method): nasal cannula  O2 Flow (L/min): 4      Physical Exam:    Gen: easily arousable from sleep. NAD  HEENT: anicteric, NGT functioning with minimal output  Chest: equal chest rise, no accessory muscle use  Abd: obese, large abdominal hernia, firm.  Tenderness with palpation, no guarding or rigidity  Ext:  warm to touch no c/c/e      MEDICATIONS  (STANDING):  dextrose 5%. 1000 milliLiter(s) (50 mL/Hr) IV Continuous <Continuous>  dextrose 5%. 1000 milliLiter(s) (100 mL/Hr) IV Continuous <Continuous>  dextrose 50% Injectable 25 Gram(s) IV Push once  dextrose 50% Injectable 25 Gram(s) IV Push once  dextrose 50% Injectable 12.5 Gram(s) IV Push once  enoxaparin Injectable 40 milliGRAM(s) SubCutaneous every 24 hours  glucagon  Injectable 1 milliGRAM(s) IntraMuscular once  insulin lispro (ADMELOG) corrective regimen sliding scale   SubCutaneous three times a day before meals  lactated ringers. 1000 milliLiter(s) (150 mL/Hr) IV Continuous <Continuous>    MEDICATIONS  (PRN):  dextrose Oral Gel 15 Gram(s) Oral once PRN Blood Glucose LESS THAN 70 milliGRAM(s)/deciliter      Labs:                          11.0   9.65  )-----------( 214      ( 28 Oct 2023 18:50 )             38.5     10-28    143  |  110<H>  |  75<H>  ----------------------------<  133<H>  4.9   |  31  |  2.83<H>    Ca    8.9      28 Oct 2023 18:50    TPro  7.2  /  Alb  3.4<L>  /  TBili  0.5  /  DBili  x   /  AST  10  /  ALT  14  /  AlkPhos  62  10-28

## 2023-10-29 NOTE — ED PROVIDER NOTE - OBJECTIVE STATEMENT
57 y/o woman, sent from [facility], h/o HTN, DM, COPD, on oxygen via [place] (4 liters continuously), c/o diffuse abdominal pain and distention x 2 days, with vomiting today.  Decreased PO intake.  No fever/diarrhea/dysuria.  No shortness of breath.    PMD Dr. Barajas.

## 2023-10-29 NOTE — CONSULT NOTE ADULT - ASSESSMENT
58 F from [facility] with pmhx of DM on insulin, HLD, Asthma/Copd req o2, multiple cardiac conditions, presents to ED c/o 3 days of abdominal pain and vomiting.  Upon evaluation in ED, patient noted to be afebrile and hemodynamically stable. Patient on CT scan of abdomen found to have an Small bowel obstruction secondary to a large complex multi septated anterior abdominal wall hernia which contains and obstructs small bowel. ED provider consulted general surgery for SBO with irreducible ventral hernia. Patient is s/p laparoscopic ventral hernia repair and small bowel resection. ICU consulted for shock requiring vasopressors and post operative monitoring.  58 F from [facility] with pmhx of DM on insulin, HLD, Asthma/Copd req o2, multiple cardiac conditions, presents to ED c/o 3 days of abdominal pain and vomiting.  Upon evaluation in ED, patient noted to be afebrile and hemodynamically stable. Patient on CT scan of abdomen found to have an Small bowel obstruction secondary to a large complex multi septated anterior abdominal wall hernia which contains and obstructs small bowel. ED provider consulted general surgery for SBO with irreducible ventral hernia. Patient is s/p laparoscopic ventral hernia repair and small bowel resection. ICU consulted for septic shock requiring vasopressors and post operative monitoring.     #Septic Shock  #Small bowel obstruction 2/2 irreducible ventral hernia s/p laparoscopic repair and small bowel resection  #Asthma   #CKD  # Hx of right renal masses   #Chronic Systolic HF s/p AICD   #Moderate Pulm HTN   #DM  #HTN  #HLD  #Elevated troponin 2/2 demand ischemia   # Lactic acidosis  #Chronic Anemia     Neurological  - intubated and sedated   - continue with propofol for vent synchrony   - f/u triglyceride levels in AM     Cardiac  # Shock   - patient s/p small bowel resection and ventral hernia repair   - given 5L IVF in OR  - bedside pocus showing decreased Left ventricular contractility (last echo noted  in 2019, showing EF 30-35% with severe left ventricular systolic dysfunction)  - lactate noted to 4.1, no leukocytosis, afebrile   - can be Septic shock vs questionable cardiogenic shock   - continue with phenylephrine and vasopressin, levo discontinued due to persistent tachycardic   - titrate pressors to keep MAP>65mmHg   - continue with zosyn, s/p Vanc  - continue with stress dose steroids   - hold home medications of coreg, lisinopril, hold BP meds   - trend lactate  - f/u blood cultures   - f/u repeat Echocardiogram     #Elevated troponin   - patient noted to have Troponin of 161.7  - can be in setting of demand ischemia vs impaired renal clearance   - trend troponin levels   - f/u echocardiogram     #HTN  - noted to be on lisinopril and coreg  - hold due to shock     #HLD  - NPO, hold statin     #Chronic Systolic Heart Failure   - last echo done in 2019 showing EF 30-35%, severe left ventricular dysfunction, moderate pulm htn  - Bedside POCUS showing decreased left ventricular contractility   - hold coreg   - repeat echocardiogram in AM     Pulmonology  - intubated, continue with sedation     #Hx of asthma   - noted to be on albuterol at home   - hold due to intubation     GI  # SBO 2/2 Incarcerated ventral hernia s/p laparoscopic hernia repair with bowel resection   - NPO   - continue with NGT to suction   - monitor SHWETHA drainage   - continue with zosyn   - f/u blood cultures   - trend lactate   - Surgical reccs appreciated     Infectious disease   #Septic shock   - patient s/p small bowel resection and ventral hernia repair   - given 5L IVF in OR  - bedside pocus showing decreased Left ventricular contractility (last echo noted  in 2019, showing EF 30-35% with severe left ventricular systolic dysfunction)  - lactate noted to 4.1, no leukocytosis   - continue with phenylephrine and vasopressin, levo discontinued due to persistent tachycardia    - titrate pressors to keep MAP>65mmHg   - continue with zosyn, s/p Vanc  - continue with stress dose steroids   - hold home medications of coreg, lisinopril, hold BP meds   - trend lactate  - f/u blood cultures     Renal  #Renal Masses on CT imaging  - noted on CT abdomen to have Interval enlargement of 2 soft tissue density masses off the right kidney, concerning for neoplasm such as renal cell carcinoma  - prior CT abdomen in 9/22 showing right renal lesions measuring 1.8 and 4.1 cm   - consider MR imaging    #Hx of CKD   - patient noted with baseline Scr of 2.7-3  - currently Scr of 2.64  - avoid nephrotoxic agents   - monitor Scr     Endo   #Hx of DM   - noted to be on sliding scale and humulin 70/30 at facility   - NPO  - continue NGT  - SSI coverage     Heme  #Chronic Anemia   - noted to have hgb of 11.2  - baseline 9-10   - monitor Hgb    Skin   - SHWETHA drain noted on abdomen   - Right IJ CVC noted   - no acute issues     Prophylaxis   SCD for GI prophylaxis   PPI     Disposition  - Patient accepted and transferred to ICU

## 2023-10-29 NOTE — H&P ADULT - ASSESSMENT
58F w/ SBO 2/2 RLQ large irreducible ventral hernia  AVSS, WCT and lactate WNL       Plan:   -NPO  -IVF while NPO  -NGT, monitor output  -[facility] consult for med management, patient of Dr. Barajas  -DVT ppx  -Serial abdominal exams  -Trend labs  -Discussed with Dr. Maldonado

## 2023-10-29 NOTE — ED PROVIDER NOTE - GASTROINTESTINAL, MLM
Abdomen soft, tender to palpation, distended with palpable incarcerated ventral hernia, no guarding.

## 2023-10-30 NOTE — CONSULT NOTE ADULT - ASSESSMENT
1. ANJU due to ATN from Septic Shock  Electrolytes are stable but anuric. awaiting rpt labs if electrolyte abnormalities present then will consider to initiate dialysis. d/w son about may need dialysis if no improvement.  -send hep s AG.    -recommend: urinalysis, urine lytes (uosm, urine sodium, urine creatinine, chloride, potassium), spot protein to creatinine ratio  -Adjust meds to eGFR and avoid IV Gadolinium contrast,NSAIDs, and phosphate enema.  -Monitor I/O's daily.   -Monitor SMA daily.  2. CKD stage 4 most likely due to diabetic nephropathy and hypertensive nephrosclerosis  -Baseline Scr around 2.4-2.6mg/dL    -Avoid Nephrotoxic Meds/ Agents such as (NSAIDs, IV contrast, Aminoglycosides such as gentamicin, -Gadolinium contrast, Phosphate containing enemas, etc..)  -Adjust Medications according to eGFR  3. Anemia iron deficiency  -hb is stable.   -F/u CBC daily  -transfuse if HB < 7.0.  4. Hypotensive due to Septic shock:  -on 3 pressors  -on IV abx  -monitor BP.  5. Mineral Bone Disease:  -phos is okay   6. Abd pain due SBO with incarcerated hernia.  -S/p repair of ventral hernia and Small Bowel Resection on 10/29  -Plan as per surgery  7. Pulmon:  -s/p intubated  -Plan as per ICU    Patient is critically ill. Time Spent: 35mins.  Discussed with son at bedside in detail regarding the renal plan and care  Discussed the assessment and plan with ICU Team/Nurse   1. ANJU due to ATN from Septic Shock  Electrolytes are stable but anuric. awaiting rpt labs if electrolyte abnormalities present then will consider to initiate dialysis. d/w son about may need dialysis if no improvement.  -send hep s AG.    -recommend: urinalysis, urine lytes (uosm, urine sodium, urine creatinine, chloride, potassium), spot protein to creatinine ratio  -Adjust meds to eGFR and avoid IV Gadolinium contrast,NSAIDs, and phosphate enema.  -Monitor I/O's daily.   -Monitor SMA daily.  2. CKD stage 4 most likely due to diabetic nephropathy and hypertensive nephrosclerosis  -Baseline Scr around 2.4-2.6mg/dL    -Avoid Nephrotoxic Meds/ Agents such as (NSAIDs, IV contrast, Aminoglycosides such as gentamicin, -Gadolinium contrast, Phosphate containing enemas, etc..)  -Adjust Medications according to eGFR  3. Anemia iron deficiency  -hb is stable.   -F/u CBC daily  -transfuse if HB < 7.0.  4. Hypotensive due to Septic shock:  -on 3 pressors  -on IV abx  -monitor BP.  5. Mineral Bone Disease:  -phos is okay   6. Abd pain due SBO with incarcerated hernia.  -S/p repair of ventral hernia and Small Bowel Resection on 10/29  -Plan as per surgery  7. Pulmon:  -s/p intubated  -Plan as per ICU    Addendum: d/w ICU np; abg noted with worsening acidosis to 7.1 and also worsening scr with anuria still present.  recommend to initiate Hd with no UF higher bicarbonate bath.     Patient is critically ill. Time Spent: 35mins.  Discussed with son at bedside in detail regarding the renal plan and care  Discussed the assessment and plan with ICU Team/Nurse

## 2023-10-30 NOTE — CONSULT NOTE ADULT - SUBJECTIVE AND OBJECTIVE BOX
NEPHROLOGY MEDICAL CARE, [facility] - Dr. Linden Jacob/ Dr. Cruz Ledesma/ Dr. Meet Galdamez/ Dr. Jannie Recinos    Date of Service: 10-30-23    Patient was seen and examined at bedside.     Consultation requested by:  Emmanuel Navarro    Reason for Consult: ANJU on CKD    HPI:  GENERAL SURGERY CONSULTATION NOTE    58 [facility] with pmhx of DM on insulin, HLD, Asthma/Copd req o2, multiple cardiac conditions, presents to ED c/o 3 days of abdominal pain and vomiting. The patient states she started having abdominal pain 3 days ago which has been worsening over time. The patient had associated vomiting, last episode was 1pm 10/28. She admits to constipation with last bowel movement 2 days ago which is abnormal for her. Denies fever, chills or other complaints at this time. ED provider consulted general surgery for SBO with irreducible ventral hernia. The patient was seen and examined at bedside by surgery service. The patient states she has sig past abdominal surgical history of  and gallbladder removal. She states she has had an SBO in the past -last episode was about a year ago which was resolved with NGT placement. The patient has had a ventral hernia for many years and states it is normally the size it appears at bedside today. The patient states she has had a colonoscopy 5 years ago which was normal. (29 Oct 2023 01:23). Patient was admitted with Scr around 2.8mg/dL and patient has known kidney disease and her baseline scr around Scr around 2.4-2.6mg/dL last year. Patient was transferred to ICU for sepsis. Patient has not been making any urine and requiring 3 pressors.       PMH:   HTN (hypertension)    DM (diabetes mellitus)    RA (rheumatoid arthritis)    COPD (chronic obstructive pulmonary disease)    AICD (automatic cardioverter/defibrillator) present    CAD (coronary artery disease)    CHF (congestive heart failure)    GERD (gastroesophageal reflux disease)    Benign neoplasm, unspecified site    Morbid obesity with BMI of 50.0-59.9, adult    Obstructive sleep apnea on CPAP        PSH:   History of cholecystectomy    ICD (implantable cardioverter-defibrillator) in place    H/O  section        FAMILY HISTORY:  Family history of hypertension in mother        Social History:  non-smoker/ non-alcoholic     Home Meds:  Home Medications:  acetaminophen 325 mg oral capsule: 2 cap(s) orally 3 times a day as needed for  mild pain (29 Oct 2023 21:29)  allopurinol 100 mg oral tablet: orally once a day (29 Oct 2023 21:28)  aspirin 81 mg oral tablet: 1 tab(s) orally once a day (29 Oct 2023 21:28)  calcitriol 0.25 mcg oral capsule: 1 cap(s) orally once a day (29 Oct 2023 21:28)  cholecalciferol 25 mcg/10 mL (1000 intl units/10 mL) oral liquid: 10 milliliter(s) orally once a day (29 Oct 2023 21:29)  Coreg 6.25 mg oral tablet: 1 tab(s) orally 2 times a day (29 Oct 2023 21:28)  ferrous fumarate 325 mg (106 mg elemental iron) oral tablet: 1 tab(s) orally once a day (29 Oct 2023 21:30)  gabapentin 100 mg oral capsule: 1 cap(s) orally 3 times a day (29 Oct 2023 21:30)  halobetasol 0.05% topical cream: Apply topically to affected area once a day (at bedtime) (29 Oct 2023 21:30)  Lasix 40 mg oral tablet: 1 tab(s) orally 2 times a day (29 Oct 2023 21:30)  lidocaine 4% topical cream: Apply topically to affected area 3 times a day (29 Oct 2023 21:30)  Lipitor 40 mg oral tablet: 1 tab(s) orally once a day (at bedtime) (29 Oct 2023 21:30)  Multiple Vitamins oral tablet: 1 tab(s) orally once a day (29 Oct 2023 21:30)  Mylanta Maximum Strength 400 mg-400 mg-40 mg/5 mL oral suspension: 10 milliliter(s) orally once a day only for three days (29 Oct 2023 21:30)  NovoLIN 70/30 In[place] subcutaneous suspension: 28 unit(s) subcutaneous once a day (29 Oct 2023 21:30)  NovoLOG Mix 70/30 subcutaneous suspension: 14 unit(s) subcutaneous once a day (at bedtime) at 5pm (29 Oct 2023 21:30)  nystatin 100,000,000 units compounding powder: compounding (29 Oct 2023 21:30)  polyethylene glycol 3350 oral kit: 1 application orally once a day (29 Oct 2023 21:30)  Protonix 40 mg oral delayed release tablet: 1 tab(s) orally once a day (29 Oct 2023 21:30)  Renvela 800 mg oral tablet: 2 tab(s) orally 3 times a day (with meals) (29 Oct 2023 21:29)  senna (sennosides) 8.6 mg oral tablet: 2 tab(s) orally 3 times a day (29 Oct 2023 21:29)  Ventolin 90 mcg/inh inhalation aerosol: 2 puff(s) inhaled every 6 hours as needed for  shortness of breath and/or wheezing (29 Oct 2023 21:29)  Zestril 5 mg oral tablet: 1 tab(s) orally once a day (29 Oct 2023 21:28)      Allergies:  Allergies    codeine (Urticaria)    Intolerances        REVIEW OF SYSTEMS:  unable to obtain.    Vital Signs Last 24 Hrs  T(C): 37.8 (30 Oct 2023 09:15), Max: 38.1 (30 Oct 2023 08:15)  T(F): 100 (30 Oct 2023 09:15), Max: 100.6 (30 Oct 2023 08:15)  HR: 142 (30 Oct 2023 12:12) (112 - 142)  BP: 85/67 (30 Oct 2023 00:00) (65/24 - 104/69)  BP(mean): 74 (30 Oct 2023 00:00) (36 - 88)  RR: 27 (30 Oct 2023 09:15) (19 - 28)  SpO2: 92% (30 Oct 2023 12:12) (88% - 99%)    Parameters below as of 30 Oct 2023 08:45  Patient On (Oxygen Delivery Method): ventilator  O2 Flow (L/min): 50      10-29 @ :  -  10-30 @ 07:00  --------------------------------------------------------  IN: 2562 mL / OUT: 620 mL / NET: 1942 mL    10-30 @ 07:  -  10-30 @ 14:39  --------------------------------------------------------  IN: 1442.2 mL / OUT: 0 mL / NET: 1442.2 mL          PHYSICAL EXAM:  General: No acute respiratory distress on vent.  Eyes: conjunctiva and sclera clear  ENMT: Atraumatic, Normocephalic, supple, ET on vent. NGT present  Respiratory: Bilateral poor air entry; No rales, rhonchi, wheezing  Cardiovascular: S1S2+; no m/r/g  Gastrointestinal: surgical site with bandage.  : morales's cath with no urine   Neuro: sedated   Ext:  2+ Peripheral Pulses and pedal edema, No Cyanosis  Skin: No visible rashes        LABS:                        11.1   13.15 )-----------( 119      ( 30 Oct 2023 03:55 )             40.3     10-    147<H>  |  116<H>  |  83<H>  ----------------------------<  156<H>  4.8   |  20<L>  |  3.75<H>    Ca    6.3<LL>      30 Oct 2023 08:55  Phos  4.3     10-30  Mg     2.1     10-30    TPro  4.4<L>  /  Alb  1.9<L>  /  TBili  0.4  /  DBili  x   /  AST  147<H>  /  ALT  84<H>  /  AlkPhos  55  10-30    PT/INR - ( 30 Oct 2023 03:55 )   PT: 20.9 sec;   INR: 1.87 ratio           Urinalysis Basic - ( 30 Oct 2023 08:55 )    Color: x / Appearance: x / SG: x / pH: x  Gluc: 156 mg/dL / Ketone: x  / Bili: x / Urobili: x   Blood: x / Protein: x / Nitrite: x   Leuk Esterase: x / RBC: x / WBC x   Sq Epi: x / Non Sq Epi: x / Bacteria: x      Magnesium: 2.1 mg/dL (10-30 @ 03:55)  Phosphorus: 4.3 mg/dL (10-30 @ 03:55)  Magnesium: 2.0 mg/dL (10-29 @ 21:50)  Phosphorus: 3.5 mg/dL (10-29 @ 21:50)    Urine studies      Medications:  MEDICATIONS  (STANDING):  chlorhexidine 0.12% Liquid 15 milliLiter(s) Oral Mucosa every 12 hours  chlorhexidine 2% Cloths 1 Application(s) Topical daily  hydrocortisone sodium succinate Injectable 50 milliGRAM(s) IV Push every 6 hours  insulin lispro (ADMELOG) corrective regimen sliding scale   SubCutaneous every 6 hours  norepinephrine Infusion 1 MICROgram(s)/kG/Min (129 mL/Hr) IV Continuous <Continuous>  pantoprazole  Injectable 40 milliGRAM(s) IV Push daily  phenylephrine    Infusion 8.7 MICROgram(s)/kG/Min (224 mL/Hr) IV Continuous <Continuous>  piperacillin/tazobactam IVPB.. 3.375 Gram(s) IV Intermittent every 12 hours  propofol Infusion 10 MICROgram(s)/kG/Min (8.17 mL/Hr) IV Continuous <Continuous>  vasopressin Infusion 0.04 Unit(s)/Min (6 mL/Hr) IV Continuous <Continuous>    MEDICATIONS  (PRN):  fentaNYL    Injectable 50 MICROGram(s) IV Push every 6 hours PRN respiratory distress

## 2023-10-30 NOTE — PROGRESS NOTE ADULT - ASSESSMENT
58 year old female S/p repair of ventral hernia and Small Bowel Resection POD#1  Guarded prognosis. On three pressors with hypotension  Leukocytosis, lactate 4.4, ANJU, elevated troponin    - continue NGT to suction  - monitor drain output, record output  - hydration and pressor support per ICU   - continue zosyn   - continue care per ICU   - discuss with Dr. Maldonado

## 2023-10-30 NOTE — PROCEDURE NOTE - NSPOSTPRCRAD_GEN_A_CORE
central line located in the superior vena cava/no pneumothorax/post-procedure radiography performed
Right femoral/central line located in the/post procedure radiography not performed

## 2023-10-30 NOTE — PROGRESS NOTE ADULT - SUBJECTIVE AND OBJECTIVE BOX
INTERVAL HPI/OVERNIGHT EVENTS:       PRESSORS: [ ] YES [ ] NO  WHICH:    ANTIBIOTICS:                  DATE STARTED:  ANTIBIOTICS:                  DATE STARTED:    Antimicrobial:  piperacillin/tazobactam IVPB.. 3.375 Gram(s) IV Intermittent every 8 hours    Cardiovascular:  norepinephrine Infusion 0.1 MICROgram(s)/kG/Min IV Continuous <Continuous>  phenylephrine    Infusion 1 MICROgram(s)/kG/Min IV Continuous <Continuous>    Pulmonary:    Hematalogic:    Other:  chlorhexidine 0.12% Liquid 15 milliLiter(s) Oral Mucosa every 12 hours  chlorhexidine 2% Cloths 1 Application(s) Topical daily  fentaNYL    Injectable 100 MICROGram(s) IV Push every 4 hours PRN  hydrocortisone sodium succinate Injectable 50 milliGRAM(s) IV Push every 6 hours  insulin lispro (ADMELOG) corrective regimen sliding scale   SubCutaneous three times a day before meals  lactated ringers Bolus 1000 milliLiter(s) IV Bolus once  pantoprazole  Injectable 40 milliGRAM(s) IV Push daily  propofol Infusion 10 MICROgram(s)/kG/Min IV Continuous <Continuous>  vasopressin Infusion 0.04 Unit(s)/Min IV Continuous <Continuous>    chlorhexidine 0.12% Liquid 15 milliLiter(s) Oral Mucosa every 12 hours  chlorhexidine 2% Cloths 1 Application(s) Topical daily  fentaNYL    Injectable 100 MICROGram(s) IV Push every 4 hours PRN  hydrocortisone sodium succinate Injectable 50 milliGRAM(s) IV Push every 6 hours  insulin lispro (ADMELOG) corrective regimen sliding scale   SubCutaneous three times a day before meals  lactated ringers Bolus 1000 milliLiter(s) IV Bolus once  norepinephrine Infusion 0.1 MICROgram(s)/kG/Min IV Continuous <Continuous>  pantoprazole  Injectable 40 milliGRAM(s) IV Push daily  phenylephrine    Infusion 1 MICROgram(s)/kG/Min IV Continuous <Continuous>  piperacillin/tazobactam IVPB.. 3.375 Gram(s) IV Intermittent every 8 hours  propofol Infusion 10 MICROgram(s)/kG/Min IV Continuous <Continuous>  vasopressin Infusion 0.04 Unit(s)/Min IV Continuous <Continuous>    Drug Dosing Weight  Height (cm): 165.1 (11 Sep 2022 11:04)  Weight (kg): 137.1 (29 Oct 2023 22:00)  BMI (kg/m2): 50.3 (29 Oct 2023 22:00)  BSA (m2): 2.36 (29 Oct 2023 22:00)    PHYSICAL EXAM:  GENERAL: NAD  EYES: EOMI, PERRLA  NECK: Supple, No JVD; Trachea midline: No LAD   NERVOUS SYSTEM:  Alert & Oriented X3,  Motor Strength 5/5 B/L upper and lower extremities  CHEST/LUNG:  breath sounds present bilaterally, No rales, rhonchi, wheezing  HEART: Regular rate and rhythm; No murmurs, rubs, or gallops  ABDOMEN: Soft, Nontender, Nondistended; Bowel sounds present, no pain or masses on palpation  : voiding well, Avila in place  EXTREMITIES:  2+ Peripheral Pulses, No clubbing, cyanosis, or edema  SKIN: warm, intact, no lesions     LINES/DRAINS/DEVICES  CENTRAL LINE: [ ] YES [ ] NO  LOCATION:     AVILA: [ ] YES [ ] NO     A-LINE:  [ ] YES [ ] NO  LOCATION:       ICU Vital Signs Last 24 Hrs  T(C): 37.9 (30 Oct 2023 07:15), Max: 37.9 (30 Oct 2023 07:00)  T(F): 100.2 (30 Oct 2023 07:15), Max: 100.2 (30 Oct 2023 07:00)  HR: 126 (30 Oct 2023 07:15) (78 - 142)  BP: 85/67 (30 Oct 2023 00:00) (65/24 - 149/75)  BP(mean): 74 (30 Oct 2023 00:00) (36 - 88)  ABP: 83/54 (30 Oct 2023 07:15) (72/46 - 124/63)  ABP(mean): 63 (30 Oct 2023 07:15) (53 - 79)  RR: 21 (30 Oct 2023 07:15) (18 - 28)  SpO2: 95% (30 Oct 2023 07:15) (88% - 99%)    O2 Parameters below as of 30 Oct 2023 04:00  Patient On (Oxygen Delivery Method): ventilator            ABG - ( 30 Oct 2023 03:19 )  pH, Arterial: 7.21  pH, Blood: x     /  pCO2: 54    /  pO2: 77    / HCO3: 22    / Base Excess: -6.8  /  SaO2: 96                    10-29 @ 07:01  -  10-30 @ 07:00  --------------------------------------------------------  IN: 2453 mL / OUT: 620 mL / NET: 1833 mL        Mode: AC/ CMV (Assist Control/ Continuous Mandatory Ventilation)  RR (machine): 16  TV (machine): 400  FiO2: 80  PEEP: 8  ITime: 1.1  MAP: 15  PIP: 28        LABS:  CBC Full  -  ( 30 Oct 2023 03:55 )  WBC Count : 13.15 K/uL  RBC Count : 3.87 M/uL  Hemoglobin : 11.1 g/dL  Hematocrit : 40.3 %  Platelet Count - Automated : 119 K/uL  Mean Cell Volume : 104.1 fl  Mean Cell Hemoglobin : 28.7 pg  Mean Cell Hemoglobin Concentration : 27.5 gm/dL  Auto Neutrophil # : x  Auto Lymphocyte # : x  Auto Monocyte # : x  Auto Eosinophil # : x  Auto Basophil # : x  Auto Neutrophil % : x  Auto Lymphocyte % : x  Auto Monocyte % : x  Auto Eosinophil % : x  Auto Basophil % : x    10-30    145  |  115<H>  |  78<H>  ----------------------------<  183<H>  4.4   |  20<L>  |  3.29<H>    Ca    6.6<L>      30 Oct 2023 03:55  Phos  4.3     10-30  Mg     2.1     10-30    TPro  4.7<L>  /  Alb  2.0<L>  /  TBili  0.6  /  DBili  x   /  AST  57<H>  /  ALT  37  /  AlkPhos  63  10-30    PT/INR - ( 30 Oct 2023 03:55 )   PT: 20.9 sec;   INR: 1.87 ratio           Urinalysis Basic - ( 30 Oct 2023 03:55 )    Color: x / Appearance: x / SG: x / pH: x  Gluc: 183 mg/dL / Ketone: x  / Bili: x / Urobili: x   Blood: x / Protein: x / Nitrite: x   Leuk Esterase: x / RBC: x / WBC x   Sq Epi: x / Non Sq Epi: x / Bacteria: x          RADIOLOGY & ADDITIONAL STUDIES REVIEWED DURING TEAM ROUNDS    [ ]GOALS OF CARE DISCUSSION WITH PATIENT/FAMILY/PROXY:    CRITICAL CARE TIME SPENT: 35 minutes   INTERVAL HPI/OVERNIGHT EVENTS:       PRESSORS: [ ] YES [ ] NO  WHICH:    ANTIBIOTICS:                  DATE STARTED:  ANTIBIOTICS:                  DATE STARTED:    Antimicrobial:  piperacillin/tazobactam IVPB.. 3.375 Gram(s) IV Intermittent every 8 hours    Cardiovascular:  norepinephrine Infusion 0.1 MICROgram(s)/kG/Min IV Continuous <Continuous>  phenylephrine    Infusion 1 MICROgram(s)/kG/Min IV Continuous <Continuous>    Pulmonary:    Hematalogic:    Other:  chlorhexidine 0.12% Liquid 15 milliLiter(s) Oral Mucosa every 12 hours  chlorhexidine 2% Cloths 1 Application(s) Topical daily  fentaNYL    Injectable 100 MICROGram(s) IV Push every 4 hours PRN  hydrocortisone sodium succinate Injectable 50 milliGRAM(s) IV Push every 6 hours  insulin lispro (ADMELOG) corrective regimen sliding scale   SubCutaneous three times a day before meals  lactated ringers Bolus 1000 milliLiter(s) IV Bolus once  pantoprazole  Injectable 40 milliGRAM(s) IV Push daily  propofol Infusion 10 MICROgram(s)/kG/Min IV Continuous <Continuous>  vasopressin Infusion 0.04 Unit(s)/Min IV Continuous <Continuous>    chlorhexidine 0.12% Liquid 15 milliLiter(s) Oral Mucosa every 12 hours  chlorhexidine 2% Cloths 1 Application(s) Topical daily  fentaNYL    Injectable 100 MICROGram(s) IV Push every 4 hours PRN  hydrocortisone sodium succinate Injectable 50 milliGRAM(s) IV Push every 6 hours  insulin lispro (ADMELOG) corrective regimen sliding scale   SubCutaneous three times a day before meals  lactated ringers Bolus 1000 milliLiter(s) IV Bolus once  norepinephrine Infusion 0.1 MICROgram(s)/kG/Min IV Continuous <Continuous>  pantoprazole  Injectable 40 milliGRAM(s) IV Push daily  phenylephrine    Infusion 1 MICROgram(s)/kG/Min IV Continuous <Continuous>  piperacillin/tazobactam IVPB.. 3.375 Gram(s) IV Intermittent every 8 hours  propofol Infusion 10 MICROgram(s)/kG/Min IV Continuous <Continuous>  vasopressin Infusion 0.04 Unit(s)/Min IV Continuous <Continuous>    Drug Dosing Weight  Height (cm): 165.1 (11 Sep 2022 11:04)  Weight (kg): 137.1 (29 Oct 2023 22:00)  BMI (kg/m2): 50.3 (29 Oct 2023 22:00)  BSA (m2): 2.36 (29 Oct 2023 22:00)    PHYSICAL EXAM:  GENERAL: intubated, sedated, skin cool and pale, ill-appearing  EYES: pupils sluggish, symmetric bilaterally  NECK: Supple, No JVD; Trachea midline  NERVOUS SYSTEM:  sedated, responds to voice. following some commands  CHEST/LUNG:  breath sounds present and diminished bilaterally, No rales, rhonchi, wheezing  HEART: sinus tach on monitor, regular rate and rhythm; No murmurs, rubs, or gallops  ABDOMEN: Soft, Nontender, Nondistended; Bowel sounds present, no pain or masses on palpation  : oliguric, Avila in place  EXTREMITIES:  2+ Peripheral Pulses, No clubbing, cyanosis, or edema  SKIN: cool on hyperthermia blanket, intact, no lesions     LINES/DRAINS/DEVICES  CENTRAL LINE: [ ] YES [ ] NO  LOCATION:     AVILA: [ ] YES [ ] NO     A-LINE:  [ ] YES [ ] NO  LOCATION:       ICU Vital Signs Last 24 Hrs  T(C): 37.9 (30 Oct 2023 07:15), Max: 37.9 (30 Oct 2023 07:00)  T(F): 100.2 (30 Oct 2023 07:15), Max: 100.2 (30 Oct 2023 07:00)  HR: 126 (30 Oct 2023 07:15) (78 - 142)  BP: 85/67 (30 Oct 2023 00:00) (65/24 - 149/75)  BP(mean): 74 (30 Oct 2023 00:00) (36 - 88)  ABP: 83/54 (30 Oct 2023 07:15) (72/46 - 124/63)  ABP(mean): 63 (30 Oct 2023 07:15) (53 - 79)  RR: 21 (30 Oct 2023 07:15) (18 - 28)  SpO2: 95% (30 Oct 2023 07:15) (88% - 99%)    O2 Parameters below as of 30 Oct 2023 04:00  Patient On (Oxygen Delivery Method): ventilator            ABG - ( 30 Oct 2023 03:19 )  pH, Arterial: 7.21  pH, Blood: x     /  pCO2: 54    /  pO2: 77    / HCO3: 22    / Base Excess: -6.8  /  SaO2: 96                    10-29 @ 07:01  -  10-30 @ 07:00  --------------------------------------------------------  IN: 2453 mL / OUT: 620 mL / NET: 1833 mL        Mode: AC/ CMV (Assist Control/ Continuous Mandatory Ventilation)  RR (machine): 16  TV (machine): 400  FiO2: 80  PEEP: 8  ITime: 1.1  MAP: 15  PIP: 28        LABS:  CBC Full  -  ( 30 Oct 2023 03:55 )  WBC Count : 13.15 K/uL  RBC Count : 3.87 M/uL  Hemoglobin : 11.1 g/dL  Hematocrit : 40.3 %  Platelet Count - Automated : 119 K/uL  Mean Cell Volume : 104.1 fl  Mean Cell Hemoglobin : 28.7 pg  Mean Cell Hemoglobin Concentration : 27.5 gm/dL  Auto Neutrophil # : x  Auto Lymphocyte # : x  Auto Monocyte # : x  Auto Eosinophil # : x  Auto Basophil # : x  Auto Neutrophil % : x  Auto Lymphocyte % : x  Auto Monocyte % : x  Auto Eosinophil % : x  Auto Basophil % : x    10-30    145  |  115<H>  |  78<H>  ----------------------------<  183<H>  4.4   |  20<L>  |  3.29<H>    Ca    6.6<L>      30 Oct 2023 03:55  Phos  4.3     10-30  Mg     2.1     10-30    TPro  4.7<L>  /  Alb  2.0<L>  /  TBili  0.6  /  DBili  x   /  AST  57<H>  /  ALT  37  /  AlkPhos  63  10-30    PT/INR - ( 30 Oct 2023 03:55 )   PT: 20.9 sec;   INR: 1.87 ratio           Urinalysis Basic - ( 30 Oct 2023 03:55 )    Color: x / Appearance: x / SG: x / pH: x  Gluc: 183 mg/dL / Ketone: x  / Bili: x / Urobili: x   Blood: x / Protein: x / Nitrite: x   Leuk Esterase: x / RBC: x / WBC x   Sq Epi: x / Non Sq Epi: x / Bacteria: x          RADIOLOGY & ADDITIONAL STUDIES REVIEWED DURING TEAM ROUNDS    [ ]GOALS OF CARE DISCUSSION WITH PATIENT/FAMILY/PROXY:    CRITICAL CARE TIME SPENT: 35 minutes

## 2023-10-30 NOTE — PROGRESS NOTE ADULT - ASSESSMENT
· Assessment	  58 F [facility] with pmhx of DM on insulin, HLD, Asthma/Copd req o2, multiple cardiac conditions, presents to ED c/o 3 days of abdominal pain and vomiting.  Upon evaluation in ED, patient noted to be afebrile and hemodynamically stable. Patient on CT scan of abdomen found to have an Small bowel obstruction secondary to a large complex multi septated anterior abdominal wall hernia which contains and obstructs small bowel. ED provider consulted general surgery for SBO with irreducible ventral hernia. Patient is s/p laparoscopic ventral hernia repair and small bowel resection. ICU consulted for septic shock requiring vasopressors and post operative monitoring.     #Septic Shock  #Small bowel obstruction 2/2 irreducible ventral hernia s/p laparoscopic repair and small bowel resection  #Asthma   #CKD  # Hx of right renal masses   #Chronic Systolic HF s/p AICD   #Moderate Pulm HTN   #DM  #HTN  #HLD  #Elevated troponin 2/2 demand ischemia   # Lactic acidosis  #Chronic Anemia     Neurological  - intubated and sedated   - continue with propofol for vent synchrony   - f/u triglyceride levels in AM     Cardiac  # Shock   - patient s/p small bowel resection and ventral hernia repair   - given 5L IVF in OR  - bedside pocus showing decreased Left ventricular contractility (last echo noted  in 2019, showing EF 30-35% with severe left ventricular systolic dysfunction)  - lactate noted to 4.1, no leukocytosis, afebrile   - can be Septic shock vs questionable cardiogenic shock   - continue with phenylephrine and vasopressin, levo discontinued due to persistent tachycardic   - titrate pressors to keep MAP>65mmHg   - continue with zosyn, s/p Vanc  - continue with stress dose steroids   - hold home medications of coreg, lisinopril, hold BP meds   - trend lactate  - f/u blood cultures   - f/u repeat Echocardiogram     #Elevated troponin   - patient noted to have Troponin of 161.7  - can be in setting of demand ischemia vs impaired renal clearance   - trend troponin levels   - f/u echocardiogram     #HTN  - noted to be on lisinopril and coreg  - hold due to shock     #HLD  - NPO, hold statin     #Chronic Systolic Heart Failure   - last echo done in 2019 showing EF 30-35%, severe left ventricular dysfunction, moderate pulm htn  - Bedside POCUS showing decreased left ventricular contractility   - hold coreg   - repeat echocardiogram in AM     Pulmonology  - intubated, continue with sedation     #Hx of asthma   - noted to be on albuterol at home   - hold due to intubation     GI  # SBO 2/2 Incarcerated ventral hernia s/p laparoscopic hernia repair with bowel resection   - NPO   - continue with NGT to suction   - monitor SHWETHA drainage   - continue with zosyn   - f/u blood cultures   - trend lactate   - Surgical reccs appreciated     Infectious disease   #Septic shock   - patient s/p small bowel resection and ventral hernia repair   - given 5L IVF in OR  - bedside pocus showing decreased Left ventricular contractility (last echo noted  in 2019, showing EF 30-35% with severe left ventricular systolic dysfunction)  - lactate noted to 4.1, no leukocytosis   - continue with phenylephrine and vasopressin, levo discontinued due to persistent tachycardia    - titrate pressors to keep MAP>65mmHg   - continue with zosyn, s/p Vanc  - continue with stress dose steroids   - hold home medications of coreg, lisinopril, hold BP meds   - trend lactate  - f/u blood cultures     Renal  #Renal Masses on CT imaging  - noted on CT abdomen to have Interval enlargement of 2 soft tissue density masses off the right kidney, concerning for neoplasm such as renal cell carcinoma  - prior CT abdomen in 9/22 showing right renal lesions measuring 1.8 and 4.1 cm   - consider MR imaging    #Hx of CKD   - patient noted with baseline Scr of 2.7-3  - currently Scr of 2.64  - avoid nephrotoxic agents   - monitor Scr     Endo   #Hx of DM   - noted to be on sliding scale and humulin 70/30 at facility   - NPO  - continue NGT  - SSI coverage     Heme  #Chronic Anemia   - noted to have hgb of 11.2  - baseline 9-10   - monitor Hgb    Skin   - SHWETHA drain noted on abdomen   - Right IJ CVC noted   - no acute issues     Prophylaxis   SCD for GI prophylaxis   PPI     Disposition  - Patient accepted and transferred to ICU      · Assessment	  58 [facility] pmhx of DM on insulin, HLD, Asthma/Copd req o2, multiple cardiac conditions, presents to ED c/o 3 days of abdominal pain and vomiting.  Upon evaluation in ED, patient noted to be afebrile and hemodynamically stable. Patient on CT scan of abdomen found to have an Small bowel obstruction secondary to a large complex multi septated anterior abdominal wall hernia which contains and obstructs small bowel. ED provider consulted general surgery for SBO with irreducible ventral hernia. Patient is s/p laparoscopic ventral hernia repair and small bowel resection. ICU consulted for septic shock requiring vasopressors and post operative monitoring.     #Septic Shock  #Small bowel obstruction 2/2 irreducible ventral hernia s/p laparoscopic repair and small bowel resection  #Asthma   #CKD  # Hx of right renal masses   #Chronic Systolic HF s/p AICD   #Moderate Pulm HTN   #DM  #HTN  #HLD  #Elevated troponin 2/2 demand ischemia   # Lactic acidosis  #Chronic Anemia     Neurological  #sedation  #analgesia  - mechanically ventilated, continue sedation with propofol   - triglycerides 160   - analgesia with fentanyl 50mg q6 push prn pain/distress      Cardiac  # Septic Shock +/- cardiogenic component   - patient s/p small bowel resection and ventral hernia repair with spillage of components 10/29; received 5L in OR  - bedside POCUS 10/30 again showing decreased Left ventricular contractility (last echo noted  in 2019, showing EF 30-35% with severe left ventricular systolic dysfunction)  - on maximal vaspressor support with norepinephrine, phenylephrine, and fixed dose vasopressin   - titrate pressors to keep MAP 65-75mmHg   - continue with stress dose steroids   - continue with zosyn 3.375mg q8 hours, s/p Vanc x1 dose  - hold home antihypertensives in the setting of severe shock state (coreg, lisinopril)  - trend lactate  - f/u blood cultures   - f/u repeat Echocardiogram     #Elevated troponin   - may be in setting of demand ischemia  - initial troponin 161.7, up trending >1427,   - will trend troponin levels   - f/u echocardiogram     #HTN  - noted to be on lisinopril and coreg  - held due to shock state     #HLD  - NPO, hold statin     #Chronic Systolic Heart Failure   - last echo done in 2019 showing EF 30-35%, severe left ventricular dysfunction, moderate pulm htn  - Bedside POCUS 10/30 continues to demonstrate decreased left ventricular contractility   - hold coreg   - f/u echocardiogram     Pulmonology  #AHRF in the setting of severe septic shock +/- cardiogenic component  - intubated, continue with sedation     #Hx of asthma   - noted to be on albuterol at home   - held at present    GI  # SBO 2/2 Incarcerated ventral hernia s/p laparoscopic hernia repair with bowel resection   - NPO   - continue with NGT to suction   - monitor SHWETHA drainage   - continue with zosyn 3.375mg q8 hours  - f/u blood cultures   - trend lactate   - Surgical recs appreciated     Infectious disease   #Septic shock   - patient s/p small bowel resection and ventral hernia repair   - given 5L IVF in OR  - bedside pocus showing decreased Left ventricular contractility (last echo noted  in 2019, showing EF 30-35% with severe left ventricular systolic dysfunction)  - lactate noted to 4.1, no leukocytosis   - continue with phenylephrine and vasopressin, levo discontinued due to persistent tachycardia    - titrate pressors to keep MAP>65mmHg   - continue with zosyn, s/p Vanc  - continue with stress dose steroids   - hold home medications of coreg, lisinopril, hold BP meds   - trend lactate  - f/u blood cultures     Renal  #Renal Masses on CT imaging  - noted on CT abdomen to have Interval enlargement of 2 soft tissue density masses off the right kidney, concerning for neoplasm such as renal cell carcinoma  - prior CT abdomen in 9/22 showing right renal lesions measuring 1.8 and 4.1 cm   - consider MR imaging    #Hx of CKD   - patient noted with baseline Scr of 2.7-3  - currently Scr of 2.64  - avoid nephrotoxic agents   - monitor Scr     Endo   #Hx of DM   - noted to be on sliding scale and humulin 70/30 at facility   - NPO  - continue NGT  - SSI coverage     Heme  #Chronic Anemia   - noted to have hgb of 11.2  - baseline 9-10   - monitor Hgb    Skin   - SHWETHA drain noted on abdomen   - Right IJ CVC noted   - no acute issues     Prophylaxis   SCD for GI prophylaxis   PPI     Disposition  - Patient accepted and transferred to ICU      · Assessment	  58 [facility] with pmhx of DM on insulin, HLD, Asthma/Copd req o2, multiple cardiac conditions, presents to ED c/o 3 days of abdominal pain and vomiting.  Upon evaluation in ED, patient noted to be afebrile and hemodynamically stable. Patient on CT scan of abdomen found to have an Small bowel obstruction secondary to a large complex multi septated anterior abdominal wall hernia which contains and obstructs small bowel. ED provider consulted general surgery for SBO with irreducible ventral hernia. Patient is s/p laparoscopic ventral hernia repair and small bowel resection. ICU consulted for septic shock requiring vasopressors and post operative monitoring.     #Septic Shock  #Small bowel obstruction 2/2 irreducible ventral hernia s/p laparoscopic repair and small bowel resection  #Asthma   #CKD  # Hx of right renal masses   #Chronic Systolic HF s/p AICD   #Moderate Pulm HTN   #DM  #HTN  #HLD  #Elevated troponin 2/2 demand ischemia   # Lactic acidosis  #Chronic Anemia     Neurological  #sedation  #analgesia  - mechanically ventilated, continue sedation with propofol   - triglycerides 160   - analgesia with fentanyl 50mg q6 push prn pain/distress      Cardiac  # Septic Shock +/- cardiogenic component   - patient s/p small bowel resection and ventral hernia repair with spillage of components 10/29; received 5L in OR  - bedside POCUS 10/30 again showing decreased Left ventricular contractility (last echo noted  in 2019, showing EF 30-35% with severe left ventricular systolic dysfunction)  - on maximal vasopressor support with norepinephrine, phenylephrine, and fixed dose vasopressin   - titrate pressors to keep MAP 65-75mmHg   - continue with stress dose steroids   - continue with zosyn 3.375mg q8 hours, s/p Vanc x1 dose  - hold home antihypertensives in the setting of severe shock state (coreg, lisinopril)  - trend lactate  - f/u blood cultures   - f/u repeat Echocardiogram     #Elevated troponin   - may be in setting of demand ischemia  - initial troponin 161.7, up trending >1427,   - will trend troponin levels   - f/u echocardiogram     #HTN  - noted to be on lisinopril and coreg  - held due to shock state     #HLD  - NPO, hold statin     #Chronic Systolic Heart Failure   - last echo done in 2019 showing EF 30-35%, severe left ventricular dysfunction, moderate pulm htn  - Bedside POCUS 10/30 continues to demonstrate decreased left ventricular contractility   - hold coreg   - f/u echocardiogram     Pulmonology  #AHRF in the setting of severe septic shock +/- cardiogenic component  - mechanically ventilated, settings adjusted per ABG  - 26/450/8/90%    #Hx of asthma   - noted to be on albuterol at home   - held at present    GI  # SBO 2/2 Incarcerated ventral hernia s/p laparoscopic hernia repair with bowel resection   - NPO   - continue with NGT to suction   - monitor SHWETHA drainage   - continue with zosyn 3.375mg q8 hours  - f/u blood cultures   - trend lactate   - Surgical recs appreciated     Infectious disease   # Septic Shock +/- cardiogenic component in the setting of SBO with component release   - patient s/p small bowel resection and ventral hernia repair with component release 10/29; received 5L in OR  - bedside pocus showing decreased Left ventricular contractility (last echo noted  in 2019, showing EF 30-35% with severe left ventricular systolic dysfunction)  - on maximal vaspressor support with norepinephrine, phenylephrine, and fixed dose vasopressin   - titrate pressors to keep MAP 65-75mmHg   - continue with zosyn 3.375mg q8 hours, s/p Vanc x1 dose  - continue with stress dose steroids   - hold home medications of coreg, lisinopril, hold BP meds   - trend lactate  - f/u blood cultures     Renal  #Renal Masses on CT imaging  - noted on CT abdomen to have Interval enlargement of 2 soft tissue density masses off the right kidney, concerning for neoplasm such as renal cell carcinoma  - prior CT abdomen in 9/22 showing right renal lesions measuring 1.8 and 4.1 cm   - consider MR imaging    #ANJU on CKD, now likely oliguric ATN 2/2 severe septic shock    - patient noted with baseline Scr of 2.7-3  - Scr of 2.64 and up trending, likely pre- renal ANJU now oliguric ATN in the setting of severe shock state   - optimize perfusion with vasopressor support   - avoid nephrotoxic agents   - monitor Scr, UOP, and BMP    Endo   #Hx of DM   - noted to be on sliding scale and humulin 70/30 at facility   - NPO, FS q6 hours  - ISS    Heme  #Chronic Anemia   - noted to have hgb of 11.2  - baseline 9-10   - monitor Hgb    Skin   - SHWETHA drains noted on abdomen   - Right IJ CVC noted   - no acute issues     Prophylaxis   SCD for GI prophylaxis   PPI     Disposition  ICU, condition grave

## 2023-10-30 NOTE — PROCEDURE NOTE - NSPROCDETAILS_GEN_ALL_CORE
guidewire recovered/lumen(s) aspirated and flushed/sterile dressing applied/sterile technique, catheter placed/ultrasound guidance with use of sterile gel and probe cove
location identified, draped/prepped, sterile technique used, needle inserted/introduced/positive blood return obtained via catheter/connected to a pressurized flush line/sutured in place/hemostasis with direct pressure, dressing applied/all materials/supplies accounted for at end of procedure
guidewire recovered/lumen(s) aspirated and flushed/sterile dressing applied/sterile technique, catheter placed/ultrasound guidance with use of sterile gel and probe cove

## 2023-10-30 NOTE — CONSULT NOTE ADULT - SUBJECTIVE AND OBJECTIVE BOX
HPI:    58 F from [facility] historian with pmhx of DM on insulin, HLD, Asthma/Copd req o2, multiple cardiac conditions, presents to ED c/o 3 days of abdominal pain and vomiting. The patient states she started having abdominal pain 3 days ago which has been worsening over time. The patient had associated vomiting, last episode was 1pm 10/28. She admits to constipation with last bowel movement 2 days ago which is abnormal for her. Denies fever, chills or other complaints at this time. ED provider consulted general surgery for SBO with irreducible ventral hernia. The patient was seen and examined at bedside by surgery service. The patient states she has sig past abdominal surgical history of  and gallbladder removal. She states she has had an SBO in the past - last episode was about a year ago which was resolved with NGT placement. The patient has had a ventral hernia for many years and states it is normally the size it appears at bedside today. The patient states she has had a colonoscopy 5 years ago which was normal. (29 Oct 2023 01:23)      PAST MEDICAL & SURGICAL HISTORY:  HTN (hypertension)  DM (diabetes mellitus)  RA (rheumatoid arthritis)  COPD (chronic obstructive pulmonary disease)  AICD (automatic cardioverter/defibrillator) present  2/3 2011    CAD (coronary artery disease)  CHF (congestive heart failure)  GERD (gastroesophageal reflux disease)  Benign neoplasm, unspecified site  Morbid obesity with BMI of 50.0-59.9, adult  Obstructive sleep apnea on CPAP  History of cholecystectomy    ICD (implantable cardioverter-defibrillator) in place    H/O  section    ALLERGIES:  codeine (Urticaria)      Social Hx: NONSMOKER    FAMILY HISTORY:  Family history of hypertension in mother      MEDICATION:  chlorhexidine 0.12% Liquid 15 milliLiter(s) Oral Mucosa every 12 hours  chlorhexidine 2% Cloths 1 Application(s) Topical daily  fentaNYL    Injectable 50 MICROGram(s) IV Push every 6 hours PRN  hydrocortisone sodium succinate Injectable 50 milliGRAM(s) IV Push every 6 hours  insulin lispro (ADMELOG) corrective regimen sliding scale   SubCutaneous every 6 hours  norepinephrine Infusion 1 MICROgram(s)/kG/Min IV Continuous <Continuous>  pantoprazole  Injectable 40 milliGRAM(s) IV Push daily  phenylephrine    Infusion 8.7 MICROgram(s)/kG/Min IV Continuous <Continuous>  piperacillin/tazobactam IVPB.. 3.375 Gram(s) IV Intermittent every 12 hours  propofol Infusion 10 MICROgram(s)/kG/Min IV Continuous <Continuous>  sodium bicarbonate  Infusion 0.219 mEq/kG/Hr IV Continuous <Continuous>  sodium chloride 0.9% lock flush 10 milliLiter(s) IV Push every 1 hour PRN  vasopressin Infusion 0.04 Unit(s)/Min IV Continuous <Continuous>      MEDICATIONS  (PRN):  fentaNYL    Injectable 50 MICROGram(s) IV Push every 6 hours PRN respiratory distress  sodium chloride 0.9% lock flush 10 milliLiter(s) IV Push every 1 hour PRN Pre/post blood products, medications, blood draw, and to maintain line patency      T(C): 36.7 (10-30-23 @ 22:30), Max: 38.4 (10-30-23 @ 12:30)  HR: 121 (10-30-23 @ 22:30) (96 - 151)  BP: 103/68 (10-30-23 @ 14:30) (65/24 - 103/68)  RR: 27 (10-30-23 @ 22:30) (19 - 28)  SpO2: 98% (10-30-23 @ 20:37) (85% - 99%)        REVIEW OF SYSTEMS:                           ALL ROS DONE [ X   ]    CONSTITUTIONAL:  LETHARGIC [   ], FEVER [   ], UNRESPONSIVE [   ]  CVS:  CP  [   ], SOB, [   ], PALPITATIONS [   ], DIZZYNESS [   ]  RS: COUGH [   ], SPUTUM [   ]  GI: ABDOMINAL PAIN [   ], NAUSEA [   ], VOMITINGS [   ], DIARRHEA [   ], CONSTIPATION [   ]  :  DYSURIA [   ], NOCTURIA [   ], INCREASED FREQUENCY [   ], DRIBLING [   ],  SKELETAL: PAINFUL JOINTS [   ], SWOLLEN JOINTS [   ], NECK ACHE [   ], LOW BACK ACHE [   ],  SKIN : ULCERS [   ], RASH [   ], ITCHING [   ]  CNS: HEAD ACHE [   ], DOUBLE VISION [   ], BLURRED VISION [   ], AMS / CONFUSION [   ], SEIZURES [   ], WEAKNESS [   ],TINGLING / NUMBNESS [   ]    PHYSICAL EXAMINATION:  GENERAL APPEARANCE: NO DISTRESS, OBESE  HEENT:  NO PALLOR, NO  JVD,  NO   NODES, NECK SUPPLE  CVS: S1 +, S2 +,   RS: AEEB,  OCCASIONAL  RALES +,   NO RONCHI    INTUBATED+  ABD: SOFT, NT, NO, BS +  EXT: NO PE  SKIN: WARM,    ABDOMINAL WALL SURGICAL DRESS C/D/I + , SHWETHA DRAINS +  SKELETAL:  ROM ACCEPTABLE  CNS:  AAO X  0    RADIOLOGY :    RADIOLOGY AND READINGS REVIEWED    ASSESSMENT :   PLAN:  HPI:  GENERAL SURGERY CONSULTATION NOTE    58 F from [facility] with pmhx of DM on insulin, HLD, Asthma/Copd req o2, multiple cardiac conditions, presents to ED c/o 3 days of abdominal pain and vomiting. The patient states she started having abdominal pain 3 days ago which has been worsening over time. The patient had associated vomiting, last episode was 1pm 10/28. She admits to constipation with last bowel movement 2 days ago which is abnormal for her. Denies fever, chills or other complaints at this time. ED provider consulted general surgery for SBO with irreducible ventral hernia. The patient was seen and examined at bedside by surgery service. The patient states she has sig past abdominal surgical history of  and gallbladder removal. She states she has had an SBO in the past -last episode was about a year ago which was resolved with NGT placement. The patient has had a ventral hernia for many years and states it is normally the size it appears at bedside today. The patient states she has had a colonoscopy 5 years ago which was normal. (29 Oct 2023 01:23)    # PROGNOSIS IS POOR. CRITICAL CARE D/W FAMILY.     # SEPTIC SHOCK S/T SUSPECTED INTRA-ABDOMINAL INFECTION  # SMALL BOWEL OBSTRUCTION 2/2 IRREDUCIBLE VENTRAL HERNIA S/P LAPAROSCOPIC VENTRAL HERNIA REPAIR CONVERTED TO EX-LAP FOR SMALL BOWEL RESECTION FOR PERFORATED INCARCERATED VENTRAL HERNIA [10/29]  # ACUTE ON CHRONIC HYPOXIC RESPIRATORY FAILURE [UNDERLYING ASTHMA/COPD, ? POSSIBLE AMPARO]  - ON ZOSYN, F/U BCX  - NOTED CT A/P  - INTUBATED  - ON MULTIPLE VASOPRESSORS  - S/P IVF  - METHYLENE BLUE X 1 DOSE  - STRESS DOSE STEROIDS  - NPO  - CRITICAL CARE MANAGEMENT IN PROGRESS  - SURGERY MANAGEMENT IN PROGRESS    # SVT - SUSPECTED NEW ONSET A.FIB  # ELEVATED TROPONINS - SUSPECT DEMAND ISCHEMIA  # HFrEF. S/P AICD  - F/U ECHO  - CARDIOLOGY CONSULT  - EP CONSULT    # ANJU ON CKD4 - SUSPECTED ATN  # ANURIC  - STRICT IS AND OS  - S/P IVF  - NEPHROLOGY D/W FAMILY REGARDING POSSIBLE ? HD  - NEPHROLOGY CONSULT IN PROGRESS    # LACTIC ACIDOSIS  - TRENDING LACTATE    # RIGHT RENAL MASS  - WILL EVALUATE FURTHER W/ MRI WHEN MEDICALLY STABLE    # ANEMIA  - TREND HGB    # DM  - SSI + FS    # HLD  # HTN  # GI PPX ; AT BOOTS HPI:    58 F from [facility] historian with pmhx of DM on insulin, HLD, Asthma/Copd req o2, multiple cardiac conditions, presents to ED c/o 3 days of abdominal pain and vomiting. The patient states she started having abdominal pain 3 days ago which has been worsening over time. The patient had associated vomiting, last episode was 1pm 10/28. She admits to constipation with last bowel movement 2 days ago which is abnormal for her. Denies fever, chills or other complaints at this time. ED provider consulted general surgery for SBO with irreducible ventral hernia. The patient was seen and examined at bedside by surgery service. The patient states she has sig past abdominal surgical history of  and gallbladder removal. She states she has had an SBO in the past - last episode was about a year ago which was resolved with NGT placement. The patient has had a ventral hernia for many years and states it is normally the size it appears at bedside today. The patient states she has had a colonoscopy 5 years ago which was normal. (29 Oct 2023 01:23)      PAST MEDICAL & SURGICAL HISTORY:  HTN (hypertension)  DM (diabetes mellitus)  RA (rheumatoid arthritis)  COPD (chronic obstructive pulmonary disease)  AICD (automatic cardioverter/defibrillator) present  2/3 2011    CAD (coronary artery disease)  CHF (congestive heart failure)  GERD (gastroesophageal reflux disease)  Benign neoplasm, unspecified site  Morbid obesity with BMI of 50.0-59.9, adult  Obstructive sleep apnea on CPAP  History of cholecystectomy    ICD (implantable cardioverter-defibrillator) in place    H/O  section    ALLERGIES:  codeine (Urticaria)      Social Hx: NONSMOKER    FAMILY HISTORY:  Family history of hypertension in mother      MEDICATION:  chlorhexidine 0.12% Liquid 15 milliLiter(s) Oral Mucosa every 12 hours  chlorhexidine 2% Cloths 1 Application(s) Topical daily  fentaNYL    Injectable 50 MICROGram(s) IV Push every 6 hours PRN  hydrocortisone sodium succinate Injectable 50 milliGRAM(s) IV Push every 6 hours  insulin lispro (ADMELOG) corrective regimen sliding scale   SubCutaneous every 6 hours  norepinephrine Infusion 1 MICROgram(s)/kG/Min IV Continuous <Continuous>  pantoprazole  Injectable 40 milliGRAM(s) IV Push daily  phenylephrine    Infusion 8.7 MICROgram(s)/kG/Min IV Continuous <Continuous>  piperacillin/tazobactam IVPB.. 3.375 Gram(s) IV Intermittent every 12 hours  propofol Infusion 10 MICROgram(s)/kG/Min IV Continuous <Continuous>  sodium bicarbonate  Infusion 0.219 mEq/kG/Hr IV Continuous <Continuous>  sodium chloride 0.9% lock flush 10 milliLiter(s) IV Push every 1 hour PRN  vasopressin Infusion 0.04 Unit(s)/Min IV Continuous <Continuous>      MEDICATIONS  (PRN):  fentaNYL    Injectable 50 MICROGram(s) IV Push every 6 hours PRN respiratory distress  sodium chloride 0.9% lock flush 10 milliLiter(s) IV Push every 1 hour PRN Pre/post blood products, medications, blood draw, and to maintain line patency      T(C): 36.7 (10-30-23 @ 22:30), Max: 38.4 (10-30-23 @ 12:30)  HR: 121 (10-30-23 @ 22:30) (96 - 151)  BP: 103/68 (10-30-23 @ 14:30) (65/24 - 103/68)  RR: 27 (10-30-23 @ 22:30) (19 - 28)  SpO2: 98% (10-30-23 @ 20:37) (85% - 99%)        REVIEW OF SYSTEMS:                           ALL ROS DONE [ X   ]    CONSTITUTIONAL:  LETHARGIC [   ], FEVER [   ], UNRESPONSIVE [   ]  CVS:  CP  [   ], SOB, [   ], PALPITATIONS [   ], DIZZYNESS [   ]  RS: COUGH [   ], SPUTUM [   ]  GI: ABDOMINAL PAIN [   ], NAUSEA [   ], VOMITINGS [   ], DIARRHEA [   ], CONSTIPATION [   ]  :  DYSURIA [   ], NOCTURIA [   ], INCREASED FREQUENCY [   ], DRIBLING [   ],  SKELETAL: PAINFUL JOINTS [   ], SWOLLEN JOINTS [   ], NECK ACHE [   ], LOW BACK ACHE [   ],  SKIN : ULCERS [   ], RASH [   ], ITCHING [   ]  CNS: HEAD ACHE [   ], DOUBLE VISION [   ], BLURRED VISION [   ], AMS / CONFUSION [   ], SEIZURES [   ], WEAKNESS [   ],TINGLING / NUMBNESS [   ]    PHYSICAL EXAMINATION:  GENERAL APPEARANCE: NO DISTRESS, OBESE  HEENT:  NO PALLOR, NO  JVD,  NO   NODES, NECK SUPPLE  CVS: S1 +, S2 +,   RS: AEEB,  OCCASIONAL  RALES +,   NO RONCHI    INTUBATED+  ABD: SOFT, NT, NO, BS +  EXT: NO PE  SKIN: WARM,    ABDOMINAL WALL SURGICAL DRESS C/D/I + , SHWETHA DRAINS +  SKELETAL:  ROM ACCEPTABLE  CNS:  AAO X  0    RADIOLOGY :    RADIOLOGY AND READINGS REVIEWED    ASSESSMENT :   PLAN:  HPI:  GENERAL SURGERY CONSULTATION NOTE    58 F from [facility] with pmhx of DM on insulin, HLD, Asthma/Copd req o2, multiple cardiac conditions, presents to ED c/o 3 days of abdominal pain and vomiting. The patient states she started having abdominal pain 3 days ago which has been worsening over time. The patient had associated vomiting, last episode was 1pm 10/28. She admits to constipation with last bowel movement 2 days ago which is abnormal for her. Denies fever, chills or other complaints at this time. ED provider consulted general surgery for SBO with irreducible ventral hernia. The patient was seen and examined at bedside by surgery service. The patient states she has sig past abdominal surgical history of  and gallbladder removal. She states she has had an SBO in the past -last episode was about a year ago which was resolved with NGT placement. The patient has had a ventral hernia for many years and states it is normally the size it appears at bedside today. The patient states she has had a colonoscopy 5 years ago which was normal. (29 Oct 2023 01:23)    # PROGNOSIS IS POOR. CRITICAL CARE D/W FAMILY.     # SEPTIC SHOCK S/T SUSPECTED INTRA-ABDOMINAL INFECTION, W/ MULTIORGAN FAILURE  # SMALL BOWEL OBSTRUCTION 2/2 IRREDUCIBLE VENTRAL HERNIA S/P LAPAROSCOPIC VENTRAL HERNIA REPAIR CONVERTED TO EX-LAP FOR SMALL BOWEL RESECTION FOR PERFORATED INCARCERATED VENTRAL HERNIA [10/29]  # ACUTE ON CHRONIC HYPOXIC RESPIRATORY FAILURE [UNDERLYING ASTHMA/COPD, ? POSSIBLE AMPARO]  - ON ZOSYN, F/U BCX  - NOTED CT A/P  - INTUBATED  - ON MULTIPLE VASOPRESSORS  - S/P IVF  - METHYLENE BLUE X 1 DOSE  - STRESS DOSE STEROIDS  - NPO  - CRITICAL CARE MANAGEMENT IN PROGRESS  - SURGERY MANAGEMENT IN PROGRESS    # SVT - SUSPECTED NEW ONSET A.FIB  # ELEVATED TROPONINS - SUSPECT DEMAND ISCHEMIA  # HFrEF. S/P AICD  - F/U ECHO  - CARDIOLOGY CONSULT  - EP CONSULT    # ANJU ON CKD4 - SUSPECTED ATN  # ANURIC  - STRICT IS AND OS  - S/P IVF  - NEPHROLOGY D/W FAMILY REGARDING POSSIBLE ? HD  - NEPHROLOGY CONSULT IN PROGRESS    # LACTIC ACIDOSIS  - TRENDING LACTATE    # RIGHT RENAL MASS  - WILL EVALUATE FURTHER W/ MRI WHEN MEDICALLY STABLE    # TRANSAMINITIS  - TREND LFTS    # ANEMIA  - TREND HGB    # DM  - SSI + FS    # HLD  # HTN  # GI PPX ; AT BOOTS

## 2023-10-30 NOTE — CONSULT NOTE ADULT - SUBJECTIVE AND OBJECTIVE BOX
C A R D I O L O G Y  *********************    DATE OF SERVICE: 10-30-23    HISTORY OF PRESENT ILLNESS:       58 F from [facility] historian with pmhx female with a past medical history of morbid obesity, hypertension, hyperlipidemia, known systolic congestive heart failure, NiICM  status post SJM  ICD implantation with known history of advanced COPD, diabetic vasculopathy and retinopathy  presents to ED c/o 3 days of abdominal pain and vomiting. The patient states she started having abdominal pain 3 days ago which has been worsening over time. The patient had associated vomiting, last episode was 1pm 10/28. She admits to constipation with last bowel movement 2 days ago which is abnormal for her. Denies fever, chills or other complaints at this time. ED provider consulted general surgery for SBO with irreducible ventral hernia. The patient was seen and examined at bedside by surgery service. The patient states she has sig past abdominal surgical history of  and gallbladder removal. She states she has had an SBO in the past -last episode was about a year ago which was resolved with NGT placement. The patient has had a ventral hernia for many years and states it is normally the size it appears at bedside today. she was taken to the OR for perforated incarcerated hernia Currently intubated in ICU on pressor support      PAST MEDICAL & SURGICAL HISTORY:  HTN (hypertension)  DM (diabetes mellitus)  RA (rheumatoid arthritis)  COPD (chronic obstructive pulmonary disease)  AICD (automatic cardioverter/defibrillator) present  2/3 2011  CAD (coronary artery disease)  CHF (congestive heart failure)  GERD (gastroesophageal reflux disease)  Benign neoplasm, unspecified site  Morbid obesity with BMI of 50.0-59.9, adult  Obstructive sleep apnea on CPAP  History of cholecystectomy  ICD (implantable cardioverter-defibrillator) in place  H/O  section      MEDICATIONS:  MEDICATIONS  (STANDING):  chlorhexidine 0.12% Liquid 15 milliLiter(s) Oral Mucosa every 12 hours  chlorhexidine 2% Cloths 1 Application(s) Topical daily  hydrocortisone sodium succinate Injectable 50 milliGRAM(s) IV Push every 6 hours  insulin lispro (ADMELOG) corrective regimen sliding scale   SubCutaneous every 6 hours  norepinephrine Infusion 0.1 MICROgram(s)/kG/Min (12.8 mL/Hr) IV Continuous <Continuous>  pantoprazole  Injectable 40 milliGRAM(s) IV Push daily  phenylephrine    Infusion 1 MICROgram(s)/kG/Min (25.5 mL/Hr) IV Continuous <Continuous>  piperacillin/tazobactam IVPB.. 3.375 Gram(s) IV Intermittent every 8 hours  propofol Infusion 10 MICROgram(s)/kG/Min (8.17 mL/Hr) IV Continuous <Continuous>  sodium bicarbonate  Injectable 150 milliEquivalent(s) IV Push once  vasopressin Infusion 0.04 Unit(s)/Min (6 mL/Hr) IV Continuous <Continuous>      Allergies    codeine (Urticaria)    Intolerances        FAMILY HISTORY:  Family history of hypertension in mother      Non-contributary for premature coronary disease or sudden cardiac death    SOCIAL HISTORY:    [ ] Non-smoker  [ ] Smoker  [ ] Alcohol    FLU VACCINE THIS YEAR STARTS IN AUGUST:  [ ] Yes    [ ] No    IF OVER 65 HAVE YOU EVER HAD A PNA VACCINE:  [ ] Yes    [ ] No       [ ] N/A      REVIEW OF SYSTEMS:  [ ]chest pain  [  ]shortness of breath  [  ]palpitations  [  ]syncope  [ ]near syncope [ ]upper extremity weakness   [ ] lower extremity weakness  [  ]diplopia  [  ]altered mental status   [  ]fevers  [ ]chills [ ]nausea  [ ]vomitting  [  ]dysphagia    [ ]abdominal pain  [ ]melena  [ ]BRBPR    [  ]epistaxis  [  ]rash    [ ]lower extremity edema        [ ] All others negative	  [X ] Unable to obtain      LABS:	 	    CARDIAC MARKERS:        Troponin I, High Sensitivity Result: 1427.2 ng/L (10-30-23 @ 03:55)  Troponin I, High Sensitivity Result: 161.7 ng/L (10-29-23 @ 22:40)  Troponin I, High Sensitivity Result: 34.3 ng/L (10-28-23 @ 18:50)                            11.1   13.15 )-----------( 119      ( 30 Oct 2023 03:55 )             40.3     Hb Trend: 11.1<--, 11.2<--    10-30    147<H>  |  116<H>  |  83<H>  ----------------------------<  156<H>  4.8   |  20<L>  |  3.75<H>    Ca    6.3<LL>      30 Oct 2023 08:55  Phos  4.3     10-30  Mg     2.1     10-30    TPro  4.4<L>  /  Alb  1.9<L>  /  TBili  0.4  /  DBili  x   /  AST  147<H>  /  ALT  84<H>  /  AlkPhos  55  10-30    Creatinine Trend: 3.75<--, 3.29<--, 2.64<--, 2.83<--    Coags:  PT/INR - ( 30 Oct 2023 03:55 )   PT: 20.9 sec;   INR: 1.87 ratio           TSH: Thyroid Stimulating Hormone, Serum: 0.83 uU/mL (10-30 @ 03:55)      PHYSICAL EXAM:  T(C): 37.9 (10-30-23 @ 08:45), Max: 38.1 (10-30-23 @ 08:15)  HR: 131 (10-30-23 @ 08:54) (78 - 142)  BP: 85/67 (10-30-23 @ 00:00) (65/24 - 149/75)  RR: 26 (10-30-23 @ 08:45) (18 - 28)  SpO2: 95% (10-30-23 @ 08:54) (88% - 99%)  Wt(kg): --     I&O's Summary    29 Oct 2023 07:01  -  30 Oct 2023 07:00  --------------------------------------------------------  IN: 2562 mL / OUT: 620 mL / NET: 1942 mL    30 Oct 2023 07:  -  30 Oct 2023 11:23  --------------------------------------------------------  IN: 1442.2 mL / OUT: 0 mL / NET: 1442.2 mL      HEENT:  (-)icterus (-)pallor  CV: N S1 S2 1/6 RAYMOND (+)2 Pulses B/l  Resp:  Clear to ausculatation B/L, normal effort  GI: (+) BS Soft, NT, ND  Lymph:  (-)Edema, (-)obvious lymphadenopathy  Skin: Warm to touch, Normal turgor  Psych: unable to assess mood and affect      ECG:  	Sinus Bifascicular block     RADIOLOGY:         CXR:  < from: Xray Chest 1 View-PORTABLE IMMEDIATE (Xray Chest 1 View-PORTABLE IMMEDIATE .) (10.29.23 @ 22:18) >   Enteric tube tip extends into left hemiabdomen. Tip not   included on image. Side port may be near the GE junction. Consider   advancing the enteric tube.    ET tube tip approximately 4.4 cm above the minnie.    Right IJ line with tip in the right atrium. No pneumothorax.    No focal lung consolidation.    < end of copied text >      ASSESSMENT/PLAN: 	58y Female female with a past medical history of morbid obesity, hypertension, hyperlipidemia, known systolic congestive heart failure, NiICM  status post SJM  ICD implantation with known history of advanced COPD, diabetic vasculopathy and retinopathy  presents to ED c/o 3 days of abdominal pain and vomiting s/p abdominal surger y for perforated hernia.    # Cardiomyopathy  - not in pulmonary edema  - tolerated procedure    # Tachycardia  - Appears to be in new afib please check 12 lead EKG    # Shock  - Pressor support per ICU    # EP eval noted interrogate SJM ICD    I once again thank you for allowing me to participate in the care of your patient.  If you have any questions or concerns please do not hesitate to contact me.    Manuel Gold MD, [facility]  BEEPER [phone]

## 2023-10-30 NOTE — PROGRESS NOTE ADULT - SUBJECTIVE AND OBJECTIVE BOX
S/p repair of ventral hernia and Small Bowel Resection POD#1  Patient seen and examined at bedside in ICU   On 3 pressors. Intubated.     Vital Signs Last 24 Hrs  T(F): 100.2 (10-30-23 @ 07:15), Max: 100.2 (10-30-23 @ 07:00)  HR: 126 (10-30-23 @ 07:15)  BP: 85/67 (10-30-23 @ 00:00)  RR: 21 (10-30-23 @ 07:15)  SpO2: 95% (10-30-23 @ 07:15)  POCT Blood Glucose.: 163 mg/dL (30 Oct 2023 06:36)    GENERAL: Sedated  CHEST/LUNG: intubated on vent  ABDOMEN: obese, moderate distention, firm. SHWETHA x2 with serosanguineous output: 240ml and 230ml since OR; NGT connected to LWS with 200cc of bilious output once placed to suction  : morales catheter in place     I&O's Detail    29 Oct 2023 07:01  -  30 Oct 2023 07:00  --------------------------------------------------------  IN:    FentaNYL: 112 mL    Norepinephrine: 210 mL    Phenylephrine: 500 mL    Phenylephrine: 1519 mL    Propofol: 64 mL    Vasopressin: 48 mL  Total IN: 2453 mL    OUT:    Blood Loss (mL): 150 mL    Bulb (mL): 240 mL    Bulb (mL): 230 mL    Indwelling Catheter - Urethral (mL): 0 mL    Intermittent Catheterization - Urethral (mL): 0 mL  Total OUT: 620 mL    Total NET: 1833 mL    LABS:                        11.1   13.15 )-----------( 119      ( 30 Oct 2023 03:55 )             40.3     10-30    145  |  115<H>  |  78<H>  ----------------------------<  183<H>  4.4   |  20<L>  |  3.29<H>    Ca    6.6<L>      30 Oct 2023 03:55  Phos  4.3     10-30  Mg     2.1     10-30    TPro  4.7<L>  /  Alb  2.0<L>  /  TBili  0.6  /  DBili  x   /  AST  57<H>  /  ALT  37  /  AlkPhos  63  10-30    PT/INR - ( 30 Oct 2023 03:55 )   PT: 20.9 sec;   INR: 1.87 ratio      Lactate, Blood: 4.4: TYPE:(C=Critical, N=Notification, A=Abnormal) c  TESTS: lact  DATE/TIME CALLED: 10/30/2023 05:30:08 EDT  CALLED TO: md. Jewell, n  READ BACK (2 Patient Identifiers)(Y/N): y  READ BACK VALUES (Y/N): y  CALLED BY: shazia mmol/L (10.30.23 @ 03:55)

## 2023-10-30 NOTE — PROGRESS NOTE ADULT - SUBJECTIVE AND OBJECTIVE BOX
EP Attending  HPI  58 F from [facility] with pmhx of DM on insulin, HLD, Asthma/Copd req o2, multiple cardiac conditions, presents to ED c/o 3 days of abdominal pain and vomiting. The patient states she started having abdominal pain 3 days ago which has been worsening over time. The patient had associated vomiting, last episode was 1pm 10/28. She admits to constipation with last bowel movement 2 days ago which is abnormal for her. Denies fever, chills or other complaints at this time. ED provider consulted general surgery for SBO with irreducible ventral hernia. The patient was seen and examined at bedside by surgery service. The patient states she has sig past abdominal surgical history of  and gallbladder removal. She states she has had an SBO in the past -last episode was about a year ago which was resolved with NGT placement. The patient has had a ventral hernia for many years and states it is normally the size it appears at bedside today. The patient states she has had a colonoscopy 5 years ago which was normal. (29 Oct 2023 01:23)    Last saw Dr Guerrero ~2 yrs ago. At that time, her ICD was ~18months away from elective replacement status. She has a chronic nonischemic cardiomyopathy, and is not pacing-dependent.  No reported prior ICD shocks.  Over the last few years, QRS has widened from 85-95ms --> 150ms+ with RBBB/LAFB bifascicular block.  No fainting, no palpitations. Does not know her medications, as these are all dosed by [facility].  Has abdominal pain ,nausea, and vomiting.  Feeling better w/ NGT in place but otherwise is too fatigued to provide a more in-depth interview at this time.  10 pt ROS otherwise negative.  Date of service 10/30 -intubated, sedated, 3 pressors, recovering from laparoscopic surgery to reduce/repair incarcerated ventral hernia.  unable to answer questions due to sedation.    PAST MEDICAL & SURGICAL HISTORY:  HTN (hypertension)  DM (diabetes mellitus)  RA (rheumatoid arthritis)  COPD (chronic obstructive pulmonary disease)  AICD (automatic cardioverter/defibrillator) present  2/3 2011  CAD (coronary artery disease)  CHF (congestive heart failure)  GERD (gastroesophageal reflux disease)  Benign neoplasm, unspecified site  Morbid obesity with BMI of 50.0-59.9, adult  Obstructive sleep apnea on CPAP  History of cholecystectomy  H/O  section    chlorhexidine 0.12% Liquid 15 milliLiter(s) Oral Mucosa every 12 hours  chlorhexidine 2% Cloths 1 Application(s) Topical daily  fentaNYL    Injectable 50 MICROGram(s) IV Push every 6 hours PRN  hydrocortisone sodium succinate Injectable 50 milliGRAM(s) IV Push every 6 hours  insulin lispro (ADMELOG) corrective regimen sliding scale   SubCutaneous every 6 hours  norepinephrine Infusion 1 MICROgram(s)/kG/Min IV Continuous <Continuous>  pantoprazole  Injectable 40 milliGRAM(s) IV Push daily  phenylephrine    Infusion 8.7 MICROgram(s)/kG/Min IV Continuous <Continuous>  piperacillin/tazobactam IVPB.. 3.375 Gram(s) IV Intermittent every 12 hours  propofol Infusion 10 MICROgram(s)/kG/Min IV Continuous <Continuous>  vasopressin Infusion 0.04 Unit(s)/Min IV Continuous <Continuous>                            11.1   13.15 )-----------( 119      ( 30 Oct 2023 03:55 )             40.3       10-30    147<H>  |  116<H>  |  83<H>  ----------------------------<  156<H>  4.8   |  20<L>  |  3.75<H>    Ca    6.3<LL>      30 Oct 2023 08:55  Phos  4.3     10-30  Mg     2.1     10-30    TPro  4.4<L>  /  Alb  1.9<L>  /  TBili  0.4  /  DBili  x   /  AST  147<H>  /  ALT  84<H>  /  AlkPhos  55  10-30    T(C): 37.9 (10-30-23 @ 08:45), Max: 38.1 (10-30-23 @ 08:15)  HR: 142 (10-30-23 @ 12:12) (78 - 142)  BP: 85/67 (10-30-23 @ 00:00) (65/24 - 149/75)  RR: 26 (10-30-23 @ 08:45) (18 - 28)  SpO2: 92% (10-30-23 @ 12:12) (88% - 99%)  Wt(kg): --    I&O's Summary    29 Oct 2023 07:01  -  30 Oct 2023 07:00  --------------------------------------------------------  IN: 2562 mL / OUT: 620 mL / NET: 1942 mL    30 Oct 2023 07:01  -  30 Oct 2023 12:43  --------------------------------------------------------  IN: 1442.2 mL / OUT: 0 mL / NET: 1442.2 mL        General: central obesity, no acute distress, intubated and sedated  Head: normocephalic, no trauma  Neck: no JVD, no bruit, supple, not enlarged  CV: rapid regular S1S2, no S3, regular rate, rhythm is SINUS TACHY, no murmurs.    Lungs: clear BL, no rales or wheezes  Abdomen: bowel sounds diminished, nondistended.  Extremities: no clubbing, cyanosis or edema  Neuro: Moves all 4 extremities, sensation intact x 4 extremities  Skin: warm and moist, normal turgor  Psych: Mood and affect unable to determine due to sedation.  MSK: range of motion and strength unable to determine due to sedation.      TELEMETRY: sinus tachy, baseline bifascicular block ~145-150ms duration.  ECG: NSR, bifascicular block  CXR: [employer] single chamber ICD, with lead in RV apex.  	  	  ASSESSMENT/PLAN: Ms Vasquez is a pleasant 58y Female here w/ irreducible ventral hernia causing nausea and vomiting.  She has a chronic nonischemic cardiomyopathy with EF ~35%.  Can likely hold diuretics while NPO and getting gastric suction.  s/p laparoscopic ventral hernia repair on 10/29.  remains intubated/sedated.    Her last visit w/ Dr Guerrero was >1 yr ago.  Should re-check an echo on this admission, and we can optimize her blood pressure and volume status based on those findings. Difficult to assess volume status at bedside due to obesity, but at very least there is no symptoamtic orthopnea, and no LE edema.  Her ICD is very likely at elective replacement timing.  Will request her [place] ICD be checked (device company rep should be coming today.  She is not pacing dependent.    Will follow with you.    We may wish to change the generator after management of the abdomen / prior to discharge.      Josh Larson M.D.  Cardiac Electrophysiology    office [phone]  pager [phone] EP Attending  HPI  58 [facility] DM on insulin, HLD, Asthma/Copd req o2, multiple cardiac conditions, presents to ED c/o 3 days of abdominal pain and vomiting. The patient states she started having abdominal pain 3 days ago which has been worsening over time. The patient had associated vomiting, last episode was 1pm 10/28. She admits to constipation with last bowel movement 2 days ago which is abnormal for her. Denies fever, chills or other complaints at this time. ED provider consulted general surgery for SBO with irreducible ventral hernia. The patient was seen and examined at bedside by surgery service. The patient states she has sig past abdominal surgical history of  and gallbladder removal. She states she has had an SBO in the past -last episode was about a year ago which was resolved with NGT placement. The patient has had a ventral hernia for many years and states it is normally the size it appears at bedside today. The patient states she has had a colonoscopy 5 years ago which was normal. (29 Oct 2023 01:23)    Last saw Dr Guerrero ~2 yrs ago. At that time, her ICD was ~18months away from elective replacement status. She has a chronic nonischemic cardiomyopathy, and is not pacing-dependent.  No reported prior ICD shocks.  Over the last few years, QRS has widened from 85-95ms --> 150ms+ with RBBB/LAFB bifascicular block.  No fainting, no palpitations. Does not know her medications, as these are all dosed by [facility].  Has abdominal pain ,nausea, and vomiting.  Feeling better w/ NGT in place but otherwise is too fatigued to provide a more in-depth interview at this time.  10 pt ROS otherwise negative.  Date of service 10/30 -intubated, sedated, 3 pressors, recovering from laparoscopic surgery to reduce/repair incarcerated ventral hernia.  unable to answer questions due to sedation.    PAST MEDICAL & SURGICAL HISTORY:  HTN (hypertension)  DM (diabetes mellitus)  RA (rheumatoid arthritis)  COPD (chronic obstructive pulmonary disease)  AICD (automatic cardioverter/defibrillator) present  2/3 2011  CAD (coronary artery disease)  CHF (congestive heart failure)  GERD (gastroesophageal reflux disease)  Benign neoplasm, unspecified site  Morbid obesity with BMI of 50.0-59.9, adult  Obstructive sleep apnea on CPAP  History of cholecystectomy  H/O  section    chlorhexidine 0.12% Liquid 15 milliLiter(s) Oral Mucosa every 12 hours  chlorhexidine 2% Cloths 1 Application(s) Topical daily  fentaNYL    Injectable 50 MICROGram(s) IV Push every 6 hours PRN  hydrocortisone sodium succinate Injectable 50 milliGRAM(s) IV Push every 6 hours  insulin lispro (ADMELOG) corrective regimen sliding scale   SubCutaneous every 6 hours  norepinephrine Infusion 1 MICROgram(s)/kG/Min IV Continuous <Continuous>  pantoprazole  Injectable 40 milliGRAM(s) IV Push daily  phenylephrine    Infusion 8.7 MICROgram(s)/kG/Min IV Continuous <Continuous>  piperacillin/tazobactam IVPB.. 3.375 Gram(s) IV Intermittent every 12 hours  propofol Infusion 10 MICROgram(s)/kG/Min IV Continuous <Continuous>  vasopressin Infusion 0.04 Unit(s)/Min IV Continuous <Continuous>                            11.1   13.15 )-----------( 119      ( 30 Oct 2023 03:55 )             40.3       10-30    147<H>  |  116<H>  |  83<H>  ----------------------------<  156<H>  4.8   |  20<L>  |  3.75<H>    Ca    6.3<LL>      30 Oct 2023 08:55  Phos  4.3     10-30  Mg     2.1     10-30    TPro  4.4<L>  /  Alb  1.9<L>  /  TBili  0.4  /  DBili  x   /  AST  147<H>  /  ALT  84<H>  /  AlkPhos  55  10-30    T(C): 37.9 (10-30-23 @ 08:45), Max: 38.1 (10-30-23 @ 08:15)  HR: 142 (10-30-23 @ 12:12) (78 - 142)  BP: 85/67 (10-30-23 @ 00:00) (65/24 - 149/75)  RR: 26 (10-30-23 @ 08:45) (18 - 28)  SpO2: 92% (10-30-23 @ 12:12) (88% - 99%)  Wt(kg): --    I&O's Summary    29 Oct 2023 07:01  -  30 Oct 2023 07:00  --------------------------------------------------------  IN: 2562 mL / OUT: 620 mL / NET: 1942 mL    30 Oct 2023 07:01  -  30 Oct 2023 12:43  --------------------------------------------------------  IN: 1442.2 mL / OUT: 0 mL / NET: 1442.2 mL        General: central obesity, no acute distress, intubated and sedated  Head: normocephalic, no trauma  Neck: no JVD, no bruit, supple, not enlarged  CV: rapid regular S1S2, no S3, regular rate, rhythm is SINUS TACHY, no murmurs.    Lungs: clear BL, no rales or wheezes  Abdomen: bowel sounds diminished, nondistended.  Extremities: no clubbing, cyanosis or edema  Neuro: Moves all 4 extremities, sensation intact x 4 extremities  Skin: warm and moist, normal turgor  Psych: Mood and affect unable to determine due to sedation.  MSK: range of motion and strength unable to determine due to sedation.      TELEMETRY: sinus tachy, baseline bifascicular block ~145-150ms duration.  ECG: NSR, bifascicular block  CXR: Abbott single chamber ICD, with lead in RV apex.  	  	  ASSESSMENT/PLAN: Ms Vasquez is a pleasant 58y Female here w/ irreducible ventral hernia causing nausea and vomiting.  She has a chronic nonischemic cardiomyopathy with EF ~35%.  Can likely hold diuretics while NPO and getting gastric suction.  s/p laparoscopic ventral hernia repair on 10/29.  remains intubated/sedated.    Her last visit w/ Dr Guerrero was >1 yr ago.  Should re-check an echo on this admission, and we can optimize her blood pressure and volume status based on those findings. Difficult to assess volume status at bedside due to obesity, but at very least there is no symptoamtic orthopnea, and no LE edema.  Device checked by [employer] company rep on Monday 10/30.  Device reached elective replacement battery indicator in 2023.  She is not pacing dependent.    We may wish to change the generator after management of the abdomen / prior to discharge if she is in suitable condition.  Must weigh risk of infection vs benefit of getting procedure done.  Will follow with you.        Josh Larson M.D.  Cardiac Electrophysiology    office [phone]  pager [phone]

## 2023-10-30 NOTE — PROCEDURE NOTE - NSINDICATIONS_GEN_A_CORE
critical illness/emergency venous access/hemodynamic monitoring/venous access/volume resuscitation
critical patient/monitoring purposes
critical illness/dialysis/CRRT/emergency venous access

## 2023-10-31 NOTE — CONSULT NOTE ADULT - REASON FOR ADMISSION
SBO w/ irreducible ventral hernia

## 2023-10-31 NOTE — PROGRESS NOTE ADULT - SUBJECTIVE AND OBJECTIVE BOX
INTERVAL HPI/OVERNIGHT EVENTS:       PRESSORS: [ ] YES [ ] NO  WHICH:    ANTIBIOTICS:                  DATE STARTED:  ANTIBIOTICS:                  DATE STARTED:    Antimicrobial:  piperacillin/tazobactam IVPB.. 3.375 Gram(s) IV Intermittent every 12 hours    Cardiovascular:  norepinephrine Infusion 1 MICROgram(s)/kG/Min IV Continuous <Continuous>  phenylephrine    Infusion 8.7 MICROgram(s)/kG/Min IV Continuous <Continuous>    Pulmonary:    Hematalogic:    Other:  chlorhexidine 0.12% Liquid 15 milliLiter(s) Oral Mucosa every 12 hours  chlorhexidine 2% Cloths 1 Application(s) Topical daily  fentaNYL    Injectable 50 MICROGram(s) IV Push every 6 hours PRN  hydrocortisone sodium succinate Injectable 50 milliGRAM(s) IV Push every 6 hours  insulin lispro (ADMELOG) corrective regimen sliding scale   SubCutaneous every 6 hours  pantoprazole  Injectable 40 milliGRAM(s) IV Push daily  propofol Infusion 10 MICROgram(s)/kG/Min IV Continuous <Continuous>  sodium bicarbonate  Infusion 0.219 mEq/kG/Hr IV Continuous <Continuous>  sodium chloride 0.9% lock flush 10 milliLiter(s) IV Push every 1 hour PRN  vasopressin Infusion 0.04 Unit(s)/Min IV Continuous <Continuous>    chlorhexidine 0.12% Liquid 15 milliLiter(s) Oral Mucosa every 12 hours  chlorhexidine 2% Cloths 1 Application(s) Topical daily  fentaNYL    Injectable 50 MICROGram(s) IV Push every 6 hours PRN  hydrocortisone sodium succinate Injectable 50 milliGRAM(s) IV Push every 6 hours  insulin lispro (ADMELOG) corrective regimen sliding scale   SubCutaneous every 6 hours  norepinephrine Infusion 1 MICROgram(s)/kG/Min IV Continuous <Continuous>  pantoprazole  Injectable 40 milliGRAM(s) IV Push daily  phenylephrine    Infusion 8.7 MICROgram(s)/kG/Min IV Continuous <Continuous>  piperacillin/tazobactam IVPB.. 3.375 Gram(s) IV Intermittent every 12 hours  propofol Infusion 10 MICROgram(s)/kG/Min IV Continuous <Continuous>  sodium bicarbonate  Infusion 0.219 mEq/kG/Hr IV Continuous <Continuous>  sodium chloride 0.9% lock flush 10 milliLiter(s) IV Push every 1 hour PRN  vasopressin Infusion 0.04 Unit(s)/Min IV Continuous <Continuous>    Drug Dosing Weight  Height (cm): 164 (30 Oct 2023 08:15)  Weight (kg): 137.1 (29 Oct 2023 22:00)  BMI (kg/m2): 51 (30 Oct 2023 08:15)  BSA (m2): 2.35 (30 Oct 2023 08:15)    PHYSICAL EXAM:      LINES/DRAINS/DEVICES  CENTRAL LINE: [ ] YES [ ] NO  LOCATION:     AVILA: [ ] YES [ ] NO     A-LINE:  [ ] YES [ ] NO  LOCATION:       ICU Vital Signs Last 24 Hrs  T(C): 37.3 (31 Oct 2023 08:00), Max: 38.4 (30 Oct 2023 12:30)  T(F): 99.1 (31 Oct 2023 08:00), Max: 101.1 (30 Oct 2023 12:30)  HR: 79 (31 Oct 2023 08:00) (79 - 151)  BP: 103/68 (30 Oct 2023 14:30) (103/68 - 103/68)  BP(mean): 80 (30 Oct 2023 14:30) (80 - 80)  ABP: 76/47 (31 Oct 2023 08:00) (67/48 - 131/72)  ABP(mean): 55 (31 Oct 2023 08:00) (55 - 90)  RR: 26 (31 Oct 2023 08:00) (20 - 32)  SpO2: 53% (31 Oct 2023 05:00) (53% - 98%)    O2 Parameters below as of 30 Oct 2023 21:00  Patient On (Oxygen Delivery Method): ventilator        ABG - ( 31 Oct 2023 04:00 )  pH, Arterial: 7.01  pH, Blood: x     /  pCO2: 36    /  pO2: 68    / HCO3: 9     / Base Excess: -20.6 /  SaO2: 94            10-30 @ 07:01  -  10-31 @ 07:00  --------------------------------------------------------  IN: 35261.4 mL / OUT: 1585 mL / NET: 16020.4 mL        Mode: AC/ CMV (Assist Control/ Continuous Mandatory Ventilation)  RR (machine): 26  TV (machine): 400  FiO2: 70  PEEP: 8  ITime: 1  MAP: 17  PIP: 46        LABS:  CBC Full  -  ( 31 Oct 2023 05:25 )  WBC Count : x  RBC Count : 2.75 M/uL  Hemoglobin : 8.4 g/dL  Hematocrit : 30.9 %  Platelet Count - Automated : x  Mean Cell Volume : 112.4 fl  Mean Cell Hemoglobin : 30.5 pg  Mean Cell Hemoglobin Concentration : 27.2 gm/dL  Auto Neutrophil # : x  Auto Lymphocyte # : x  Auto Monocyte # : x  Auto Eosinophil # : x  Auto Basophil # : x  Auto Neutrophil % : x  Auto Lymphocyte % : x  Auto Monocyte % : x  Auto Eosinophil % : x  Auto Basophil % : x    10-31    144  |  119<H>  |  75<H>  ----------------------------<  175<H>  5.6<H>   |  9<LL>  |  3.91<H>    Ca    <5.0<LL>      31 Oct 2023 03:44  Phos  7.7     10-31  Mg     1.7     10-31    TPro  3.3<L>  /  Alb  1.0<L>  /  TBili  0.3  /  DBili  x   /  AST  1218<H>  /  ALT  582<H>  /  AlkPhos  49  10-31    PT/INR - ( 30 Oct 2023 03:55 )   PT: 20.9 sec;   INR: 1.87 ratio          Urinalysis Basic - ( 31 Oct 2023 03:44 )    Color: x / Appearance: x / SG: x / pH: x  Gluc: 175 mg/dL / Ketone: x  / Bili: x / Urobili: x   Blood: x / Protein: x / Nitrite: x   Leuk Esterase: x / RBC: x / WBC x   Sq Epi: x / Non Sq Epi: x / Bacteria: x      Culture Results:   No growth at 24 hours (10-29 @ 22:30)  Culture Results:   No growth at 24 hours (10-29 @ 22:15)      RADIOLOGY & ADDITIONAL STUDIES REVIEWED DURING TEAM ROUNDS    [ ]GOALS OF CARE DISCUSSION WITH PATIENT/FAMILY/PROXY:    CRITICAL CARE TIME SPENT: 35 minutes   INTERVAL HPI/OVERNIGHT EVENTS:   - Patient intubated with no sedation, synchronous with ventilator, remains in profound septic shock despite being maxed out on Levo, vaso, and phenyl. Lact 14.3, anuric, not tolerating HD due to profound hypotension. Patient's son Fernando at bedside.     PRESSORS: [X] YES [ ] NO    Antimicrobial:  piperacillin/tazobactam IVPB.. 3.375 Gram(s) IV Intermittent every 12 hours    Cardiovascular:  norepinephrine Infusion 1 MICROgram(s)/kG/Min IV Continuous <Continuous>  phenylephrine    Infusion 8.7 MICROgram(s)/kG/Min IV Continuous <Continuous>    Pulmonary:    Hematalogic:    Other:  chlorhexidine 0.12% Liquid 15 milliLiter(s) Oral Mucosa every 12 hours  chlorhexidine 2% Cloths 1 Application(s) Topical daily  fentaNYL    Injectable 50 MICROGram(s) IV Push every 6 hours PRN  hydrocortisone sodium succinate Injectable 50 milliGRAM(s) IV Push every 6 hours  insulin lispro (ADMELOG) corrective regimen sliding scale   SubCutaneous every 6 hours  pantoprazole  Injectable 40 milliGRAM(s) IV Push daily  propofol Infusion 10 MICROgram(s)/kG/Min IV Continuous <Continuous>  sodium bicarbonate  Infusion 0.219 mEq/kG/Hr IV Continuous <Continuous>  sodium chloride 0.9% lock flush 10 milliLiter(s) IV Push every 1 hour PRN  vasopressin Infusion 0.04 Unit(s)/Min IV Continuous <Continuous>    chlorhexidine 0.12% Liquid 15 milliLiter(s) Oral Mucosa every 12 hours  chlorhexidine 2% Cloths 1 Application(s) Topical daily  fentaNYL    Injectable 50 MICROGram(s) IV Push every 6 hours PRN  hydrocortisone sodium succinate Injectable 50 milliGRAM(s) IV Push every 6 hours  insulin lispro (ADMELOG) corrective regimen sliding scale   SubCutaneous every 6 hours  norepinephrine Infusion 1 MICROgram(s)/kG/Min IV Continuous <Continuous>  pantoprazole  Injectable 40 milliGRAM(s) IV Push daily  phenylephrine    Infusion 8.7 MICROgram(s)/kG/Min IV Continuous <Continuous>  piperacillin/tazobactam IVPB.. 3.375 Gram(s) IV Intermittent every 12 hours  propofol Infusion 10 MICROgram(s)/kG/Min IV Continuous <Continuous>  sodium bicarbonate  Infusion 0.219 mEq/kG/Hr IV Continuous <Continuous>  sodium chloride 0.9% lock flush 10 milliLiter(s) IV Push every 1 hour PRN  vasopressin Infusion 0.04 Unit(s)/Min IV Continuous <Continuous>    Drug Dosing Weight  Height (cm): 164 (30 Oct 2023 08:15)  Weight (kg): 137.1 (29 Oct 2023 22:00)  BMI (kg/m2): 51 (30 Oct 2023 08:15)  BSA (m2): 2.35 (30 Oct 2023 08:15)    PHYSICAL EXAM:  GENERAL: intubated, skin cool and pale, ill-appearing.   EYES: pupils dilated and nonreactive to light, symmetric bilaterally. Conjunctival edema.   NECK: Supple, No JVD; Trachea midline  NERVOUS SYSTEM: Not following commands, minimal gag reflex.   CHEST/LUNG:  breath sounds present and diminished bilaterally, No rales, rhonchi, wheezing  HEART: NSR, regular rate and rhythm; No murmurs, rubs, or gallops  ABDOMEN: Soft, distended; hypoactive bowel sounds present. Surgical dressing in place. 2 SHWETHA drains with serosanguinous drainage.   : anuric, Avila in place  EXTREMITIES:  2+ Peripheral Pulses, No clubbing, cyanosis, or edema  SKIN: cool on hyperthermia blanket, intact, no lesions     LINES/DRAINS/DEVICES  CENTRAL LINE: [X] YES [ ] NO  LOCATION:     AVILA: [X] YES [X] NO     A-LINE:  [X ] YES [ ] NO      ICU Vital Signs Last 24 Hrs  T(C): 37.3 (31 Oct 2023 08:00), Max: 38.4 (30 Oct 2023 12:30)  T(F): 99.1 (31 Oct 2023 08:00), Max: 101.1 (30 Oct 2023 12:30)  HR: 79 (31 Oct 2023 08:00) (79 - 151)  BP: 103/68 (30 Oct 2023 14:30) (103/68 - 103/68)  BP(mean): 80 (30 Oct 2023 14:30) (80 - 80)  ABP: 76/47 (31 Oct 2023 08:00) (67/48 - 131/72)  ABP(mean): 55 (31 Oct 2023 08:00) (55 - 90)  RR: 26 (31 Oct 2023 08:00) (20 - 32)  SpO2: 53% (31 Oct 2023 05:00) (53% - 98%)    O2 Parameters below as of 30 Oct 2023 21:00  Patient On (Oxygen Delivery Method): ventilator      ABG - ( 31 Oct 2023 04:00 )  pH, Arterial: 7.01  pH, Blood: x     /  pCO2: 36    /  pO2: 68    / HCO3: 9     / Base Excess: -20.6 /  SaO2: 94          10-30 @ 07:01  -  10-31 @ 07:00  --------------------------------------------------------  IN: 86452.4 mL / OUT: 1585 mL / NET: 42057.4 mL      Mode: AC/ CMV (Assist Control/ Continuous Mandatory Ventilation)  RR (machine): 26  TV (machine): 400  FiO2: 70  PEEP: 8  ITime: 1  MAP: 17  PIP: 46      LABS:  CBC Full  -  ( 31 Oct 2023 05:25 )  WBC Count : x  RBC Count : 2.75 M/uL  Hemoglobin : 8.4 g/dL  Hematocrit : 30.9 %  Platelet Count - Automated : x  Mean Cell Volume : 112.4 fl  Mean Cell Hemoglobin : 30.5 pg  Mean Cell Hemoglobin Concentration : 27.2 gm/dL  Auto Neutrophil # : x  Auto Lymphocyte # : x  Auto Monocyte # : x  Auto Eosinophil # : x  Auto Basophil # : x  Auto Neutrophil % : x  Auto Lymphocyte % : x  Auto Monocyte % : x  Auto Eosinophil % : x  Auto Basophil % : x    10-31    144  |  119<H>  |  75<H>  ----------------------------<  175<H>  5.6<H>   |  9<LL>  |  3.91<H>    Ca    <5.0<LL>      31 Oct 2023 03:44  Phos  7.7     10-31  Mg     1.7     10-31    TPro  3.3<L>  /  Alb  1.0<L>  /  TBili  0.3  /  DBili  x   /  AST  1218<H>  /  ALT  582<H>  /  AlkPhos  49  10-31    PT/INR - ( 30 Oct 2023 03:55 )   PT: 20.9 sec;   INR: 1.87 ratio      Urinalysis Basic - ( 31 Oct 2023 03:44 )    Color: x / Appearance: x / SG: x / pH: x  Gluc: 175 mg/dL / Ketone: x  / Bili: x / Urobili: x   Blood: x / Protein: x / Nitrite: x   Leuk Esterase: x / RBC: x / WBC x   Sq Epi: x / Non Sq Epi: x / Bacteria: x      Culture Results:   No growth at 24 hours (10-29 @ 22:30)  Culture Results:   No growth at 24 hours (10-29 @ 22:15)      RADIOLOGY & ADDITIONAL STUDIES REVIEWED DURING TEAM ROUNDS    [X]GOALS OF CARE DISCUSSION WITH PATIENT/FAMILY/PROXY: Berry King    CRITICAL CARE TIME SPENT: 35 minutes

## 2023-10-31 NOTE — PROGRESS NOTE ADULT - SUBJECTIVE AND OBJECTIVE BOX
Patient is a 58y old  Female who presents with a chief complaint of SBO w/ irreducible ventral hernia (31 Oct 2023 11:03)    PATIENT IS SEEN AND EXAMINED IN ICU.      ALLERGIES:  codeine (Urticaria)      VITALS:    Vital Signs Last 24 Hrs  T(C): 38.7 (31 Oct 2023 18:45), Max: 38.7 (31 Oct 2023 18:30)  T(F): 101.7 (31 Oct 2023 18:45), Max: 101.7 (31 Oct 2023 18:30)  HR: 88 (31 Oct 2023 18:45) (40 - 139)  BP: --  BP(mean): --  RR: 27 (31 Oct 2023 18:45) (0 - 32)  SpO2: 53% (31 Oct 2023 05:00) (53% - 98%)    Parameters below as of 30 Oct 2023 21:00  Patient On (Oxygen Delivery Method): ventilator        LABS:    CBC Full  -  ( 31 Oct 2023 15:29 )  WBC Count : 20.85 K/uL  RBC Count : 2.81 M/uL  Hemoglobin : 8.5 g/dL  Hematocrit : 31.3 %  Platelet Count - Automated : 61 K/uL  Mean Cell Volume : 111.4 fl  Mean Cell Hemoglobin : 30.2 pg  Mean Cell Hemoglobin Concentration : 27.2 gm/dL  Auto Neutrophil # : x  Auto Lymphocyte # : x  Auto Monocyte # : x  Auto Eosinophil # : x  Auto Basophil # : x  Auto Neutrophil % : x  Auto Lymphocyte % : x  Auto Monocyte % : x  Auto Eosinophil % : x  Auto Basophil % : x    PT/INR - ( 30 Oct 2023 03:55 )   PT: 20.9 sec;   INR: 1.87 ratio           10-31    149<H>  |  120<H>  |  66<H>  ----------------------------<  67<L>  5.8<H>   |  13<L>  |  4.20<H>    Ca    <5.0<LL>      31 Oct 2023 15:29  Phos  7.4     10-31  Mg     1.6     10-31    TPro  3.4<L>  /  Alb  1.1<L>  /  TBili  0.5  /  DBili  x   /  AST  2730<H>  /  ALT  1199<H>  /  AlkPhos  82  10-31    CAPILLARY BLOOD GLUCOSE      POCT Blood Glucose.: 73 mg/dL (31 Oct 2023 18:52)  POCT Blood Glucose.: 36 mg/dL (31 Oct 2023 17:55)  POCT Blood Glucose.: 25 mg/dL (31 Oct 2023 17:52)  POCT Blood Glucose.: 79 mg/dL (31 Oct 2023 12:44)  POCT Blood Glucose.: 28 mg/dL (31 Oct 2023 12:42)  POCT Blood Glucose.: 44 mg/dL (31 Oct 2023 12:40)  POCT Blood Glucose.: 95 mg/dL (31 Oct 2023 06:51)  POCT Blood Glucose.: 80 mg/dL (31 Oct 2023 00:05)        LIVER FUNCTIONS - ( 31 Oct 2023 15:29 )  Alb: 1.1 g/dL / Pro: 3.4 g/dL / ALK PHOS: 82 U/L / ALT: 1199 U/L DA / AST: 2730 U/L / GGT: x           Creatinine Trend: 4.20<--, 3.91<--, 4.15<--, 4.02<--, 3.75<--, 3.29<--  I&O's Summary    30 Oct 2023 07:01  -  31 Oct 2023 07:00  --------------------------------------------------------  IN: 06938.4 mL / OUT: 1585 mL / NET: 38494.4 mL    31 Oct 2023 07:01  -  31 Oct 2023 20:03  --------------------------------------------------------  IN: 9441 mL / OUT: 490 mL / NET: 8951 mL        ABG - ( 31 Oct 2023 16:27 )  pH, Arterial: 7.01  pH, Blood: x     /  pCO2: 43    /  pO2: 89    / HCO3: 11    / Base Excess: -19.8 /  SaO2: 98                  .Blood Blood-Peripheral  10-29 @ 22:30   No growth at 24 hours  --  --      .Blood Blood-Peripheral  10- @ 22:15   No growth at 24 hours  --  --          MEDICATIONS:    MEDICATIONS  (STANDING):  chlorhexidine 0.12% Liquid 15 milliLiter(s) Oral Mucosa every 12 hours  chlorhexidine 2% Cloths 1 Application(s) Topical daily  hydrocortisone sodium succinate Injectable 50 milliGRAM(s) IV Push every 6 hours  insulin lispro (ADMELOG) corrective regimen sliding scale   SubCutaneous every 6 hours  meropenem  IVPB 500 milliGRAM(s) IV Intermittent every 12 hours  norepinephrine Infusion 1 MICROgram(s)/kG/Min (129 mL/Hr) IV Continuous <Continuous>  pantoprazole  Injectable 40 milliGRAM(s) IV Push daily  phenylephrine    Infusion 8.7 MICROgram(s)/kG/Min (224 mL/Hr) IV Continuous <Continuous>  vasopressin Infusion 0.04 Unit(s)/Min (6 mL/Hr) IV Continuous <Continuous>      MEDICATIONS  (PRN):  fentaNYL    Injectable 50 MICROGram(s) IV Push every 6 hours PRN respiratory distress  sodium chloride 0.9% lock flush 10 milliLiter(s) IV Push every 1 hour PRN Pre/post blood products, medications, blood draw, and to maintain line patency      REVIEW OF SYSTEMS:                           ALL ROS DONE [ X   ]    CONSTITUTIONAL:  LETHARGIC [   ], FEVER [   ], UNRESPONSIVE [   ]  CVS:  CP  [   ], SOB, [   ], PALPITATIONS [   ], DIZZYNESS [   ]  RS: COUGH [   ], SPUTUM [   ]  GI: ABDOMINAL PAIN [   ], NAUSEA [   ], VOMITINGS [   ], DIARRHEA [   ], CONSTIPATION [   ]  :  DYSURIA [   ], NOCTURIA [   ], INCREASED FREQUENCY [   ], DRIBLING [   ],  SKELETAL: PAINFUL JOINTS [   ], SWOLLEN JOINTS [   ], NECK ACHE [   ], LOW BACK ACHE [   ],  SKIN : ULCERS [   ], RASH [   ], ITCHING [   ]  CNS: HEAD ACHE [   ], DOUBLE VISION [   ], BLURRED VISION [   ], AMS / CONFUSION [   ], SEIZURES [   ], WEAKNESS [   ],TINGLING / NUMBNESS [   ]      PHYSICAL EXAMINATION:  GENERAL APPEARANCE: NO DISTRESS, OBESE  HEENT:  NO PALLOR, NO  JVD,  NO   NODES, NECK SUPPLE  CVS: S1 +, S2 +,   RS: AEEB,  OCCASIONAL  RALES +,   NO RONCHI    INTUBATED+  ABD: SOFT, NT, NO, BS +  EXT: NO PE  SKIN: WARM,    ABDOMINAL WALL SURGICAL DRESS C/D/I + , SHWETHA DRAINS +  SKELETAL:  ROM ACCEPTABLE  CNS:  AAO X  0    RADIOLOGY :    RADIOLOGY AND READINGS REVIEWED    ASSESSMENT :   PLAN:  HPI:  GENERAL SURGERY CONSULTATION NOTE    58 F [facility] with pmhx of DM on insulin, HLD, Asthma/Copd req o2, multiple cardiac conditions, presents to ED c/o 3 days of abdominal pain and vomiting. The patient states she started having abdominal pain 3 days ago which has been worsening over time. The patient had associated vomiting, last episode was 1pm 10/28. She admits to constipation with last bowel movement 2 days ago which is abnormal for her. Denies fever, chills or other complaints at this time. ED provider consulted general surgery for SBO with irreducible ventral hernia. The patient was seen and examined at bedside by surgery service. The patient states she has sig past abdominal surgical history of  and gallbladder removal. She states she has had an SBO in the past -last episode was about a year ago which was resolved with NGT placement. The patient has had a ventral hernia for many years and states it is normally the size it appears at bedside today. The patient states she has had a colonoscopy 5 years ago which was normal. (29 Oct 2023 01:23)    # PROGNOSIS IS POOR. CRITICAL CARE D/W FAMILY.     # SEPTIC SHOCK S/T SUSPECTED INTRA-ABDOMINAL INFECTION, W/ MULTIORGAN FAILURE  # SMALL BOWEL OBSTRUCTION 2/2 IRREDUCIBLE VENTRAL HERNIA S/P LAPAROSCOPIC VENTRAL HERNIA REPAIR CONVERTED TO EX-LAP FOR SMALL BOWEL RESECTION FOR PERFORATED INCARCERATED VENTRAL HERNIA [10/29]  # ACUTE ON CHRONIC HYPOXIC RESPIRATORY FAILURE [UNDERLYING ASTHMA/COPD, ? POSSIBLE AMPARO]  - ON MEROPENEM [S/P ZOSYN], F/U BCX  - NOTED CT A/P  - INTUBATED  - ON MULTIPLE VASOPRESSORS  - S/P IVF  - METHYLENE BLUE X 1 DOSE  - STRESS DOSE STEROIDS  - NPO  - CRITICAL CARE MANAGEMENT IN PROGRESS  - SURGERY MANAGEMENT IN PROGRESS    # SVT - SUSPECTED NEW ONSET A.FIB  # ELEVATED TROPONINS - SUSPECT DEMAND ISCHEMIA  # HFrEF. S/P AICD  - F/U ECHO  - CARDIOLOGY CONSULT  - EP CONSULT    # ANJU ON CKD4 - SUSPECTED ATN   # ANURIC  - STRICT IS AND OS  - S/P IVF  - STARTED ON HD, DID NOT TOLERATE  - NEPHROLOGY CONSULT IN PROGRESS    # LACTIC ACIDOSIS  - TRENDING LACTATE    # RIGHT RENAL MASS  - WILL EVALUATE FURTHER W/ MRI WHEN MEDICALLY STABLE    # TRANSAMINITIS  - TREND LFTS    # ANEMIA  - TREND HGB    # DM  - SSI + FS    # HLD  # HTN  # GI PPX ; AT BOOTS     Patient is a 58y old  Female who presents with a chief complaint of SBO w/ irreducible ventral hernia (31 Oct 2023 11:03)    PATIENT IS SEEN AND EXAMINED IN ICU.      ALLERGIES:  codeine (Urticaria)      VITALS:    Vital Signs Last 24 Hrs  T(C): 38.7 (31 Oct 2023 18:45), Max: 38.7 (31 Oct 2023 18:30)  T(F): 101.7 (31 Oct 2023 18:45), Max: 101.7 (31 Oct 2023 18:30)  HR: 88 (31 Oct 2023 18:45) (40 - 139)  BP: --  BP(mean): --  RR: 27 (31 Oct 2023 18:45) (0 - 32)  SpO2: 53% (31 Oct 2023 05:00) (53% - 98%)    Parameters below as of 30 Oct 2023 21:00  Patient On (Oxygen Delivery Method): ventilator        LABS:    CBC Full  -  ( 31 Oct 2023 15:29 )  WBC Count : 20.85 K/uL  RBC Count : 2.81 M/uL  Hemoglobin : 8.5 g/dL  Hematocrit : 31.3 %  Platelet Count - Automated : 61 K/uL  Mean Cell Volume : 111.4 fl  Mean Cell Hemoglobin : 30.2 pg  Mean Cell Hemoglobin Concentration : 27.2 gm/dL  Auto Neutrophil # : x  Auto Lymphocyte # : x  Auto Monocyte # : x  Auto Eosinophil # : x  Auto Basophil # : x  Auto Neutrophil % : x  Auto Lymphocyte % : x  Auto Monocyte % : x  Auto Eosinophil % : x  Auto Basophil % : x    PT/INR - ( 30 Oct 2023 03:55 )   PT: 20.9 sec;   INR: 1.87 ratio           10-31    149<H>  |  120<H>  |  66<H>  ----------------------------<  67<L>  5.8<H>   |  13<L>  |  4.20<H>    Ca    <5.0<LL>      31 Oct 2023 15:29  Phos  7.4     10-31  Mg     1.6     10-31    TPro  3.4<L>  /  Alb  1.1<L>  /  TBili  0.5  /  DBili  x   /  AST  2730<H>  /  ALT  1199<H>  /  AlkPhos  82  10-31    CAPILLARY BLOOD GLUCOSE      POCT Blood Glucose.: 73 mg/dL (31 Oct 2023 18:52)  POCT Blood Glucose.: 36 mg/dL (31 Oct 2023 17:55)  POCT Blood Glucose.: 25 mg/dL (31 Oct 2023 17:52)  POCT Blood Glucose.: 79 mg/dL (31 Oct 2023 12:44)  POCT Blood Glucose.: 28 mg/dL (31 Oct 2023 12:42)  POCT Blood Glucose.: 44 mg/dL (31 Oct 2023 12:40)  POCT Blood Glucose.: 95 mg/dL (31 Oct 2023 06:51)  POCT Blood Glucose.: 80 mg/dL (31 Oct 2023 00:05)        LIVER FUNCTIONS - ( 31 Oct 2023 15:29 )  Alb: 1.1 g/dL / Pro: 3.4 g/dL / ALK PHOS: 82 U/L / ALT: 1199 U/L DA / AST: 2730 U/L / GGT: x           Creatinine Trend: 4.20<--, 3.91<--, 4.15<--, 4.02<--, 3.75<--, 3.29<--  I&O's Summary    30 Oct 2023 07:01  -  31 Oct 2023 07:00  --------------------------------------------------------  IN: 70284.4 mL / OUT: 1585 mL / NET: 59158.4 mL    31 Oct 2023 07:01  -  31 Oct 2023 20:03  --------------------------------------------------------  IN: 9441 mL / OUT: 490 mL / NET: 8951 mL        ABG - ( 31 Oct 2023 16:27 )  pH, Arterial: 7.01  pH, Blood: x     /  pCO2: 43    /  pO2: 89    / HCO3: 11    / Base Excess: -19.8 /  SaO2: 98                  .Blood Blood-Peripheral  10-29 @ 22:30   No growth at 24 hours  --  --      .Blood Blood-Peripheral  10- @ 22:15   No growth at 24 hours  --  --          MEDICATIONS:    MEDICATIONS  (STANDING):  chlorhexidine 0.12% Liquid 15 milliLiter(s) Oral Mucosa every 12 hours  chlorhexidine 2% Cloths 1 Application(s) Topical daily  hydrocortisone sodium succinate Injectable 50 milliGRAM(s) IV Push every 6 hours  insulin lispro (ADMELOG) corrective regimen sliding scale   SubCutaneous every 6 hours  meropenem  IVPB 500 milliGRAM(s) IV Intermittent every 12 hours  norepinephrine Infusion 1 MICROgram(s)/kG/Min (129 mL/Hr) IV Continuous <Continuous>  pantoprazole  Injectable 40 milliGRAM(s) IV Push daily  phenylephrine    Infusion 8.7 MICROgram(s)/kG/Min (224 mL/Hr) IV Continuous <Continuous>  vasopressin Infusion 0.04 Unit(s)/Min (6 mL/Hr) IV Continuous <Continuous>      MEDICATIONS  (PRN):  fentaNYL    Injectable 50 MICROGram(s) IV Push every 6 hours PRN respiratory distress  sodium chloride 0.9% lock flush 10 milliLiter(s) IV Push every 1 hour PRN Pre/post blood products, medications, blood draw, and to maintain line patency      REVIEW OF SYSTEMS:                           ALL ROS DONE [ X   ]    CONSTITUTIONAL:  LETHARGIC [   ], FEVER [   ], UNRESPONSIVE [   ]  CVS:  CP  [   ], SOB, [   ], PALPITATIONS [   ], DIZZYNESS [   ]  RS: COUGH [   ], SPUTUM [   ]  GI: ABDOMINAL PAIN [   ], NAUSEA [   ], VOMITINGS [   ], DIARRHEA [   ], CONSTIPATION [   ]  :  DYSURIA [   ], NOCTURIA [   ], INCREASED FREQUENCY [   ], DRIBLING [   ],  SKELETAL: PAINFUL JOINTS [   ], SWOLLEN JOINTS [   ], NECK ACHE [   ], LOW BACK ACHE [   ],  SKIN : ULCERS [   ], RASH [   ], ITCHING [   ]  CNS: HEAD ACHE [   ], DOUBLE VISION [   ], BLURRED VISION [   ], AMS / CONFUSION [   ], SEIZURES [   ], WEAKNESS [   ],TINGLING / NUMBNESS [   ]      PHYSICAL EXAMINATION:  GENERAL APPEARANCE: NO DISTRESS, OBESE  HEENT:  NO PALLOR, NO  JVD,  NO   NODES, NECK SUPPLE  CVS: S1 +, S2 +,   RS: AEEB,  OCCASIONAL  RALES +,   NO RONCHI    INTUBATED+  ABD: SOFT, NT, NO, BS +  EXT: PE ++  SKIN: WARM,    ABDOMINAL WALL SURGICAL DRESS C/D/I + , SHWETHA DRAINS +  CVC +  SKELETAL:  ROM ACCEPTABLE  CNS:  AAO X  0    RADIOLOGY :    RADIOLOGY AND READINGS REVIEWED    ASSESSMENT :   PLAN:  HPI:  GENERAL SURGERY CONSULTATION NOTE    58 [facility] with pmhx of DM on insulin, HLD, Asthma/Copd req o2, multiple cardiac conditions, presents to ED c/o 3 days of abdominal pain and vomiting. The patient states she started having abdominal pain 3 days ago which has been worsening over time. The patient had associated vomiting, last episode was 1pm 10/28. She admits to constipation with last bowel movement 2 days ago which is abnormal for her. Denies fever, chills or other complaints at this time. ED provider consulted general surgery for SBO with irreducible ventral hernia. The patient was seen and examined at bedside by surgery service. The patient states she has sig past abdominal surgical history of  and gallbladder removal. She states she has had an SBO in the past -last episode was about a year ago which was resolved with NGT placement. The patient has had a ventral hernia for many years and states it is normally the size it appears at bedside today. The patient states she has had a colonoscopy 5 years ago which was normal. (29 Oct 2023 01:23)    # PROGNOSIS IS POOR. CRITICAL CARE TEAM D/W FAMILY. FAMILY WISHES FOR GOC OF DNR/DNI.     # SEPTIC SHOCK S/T SUSPECTED INTRA-ABDOMINAL INFECTION, W/ MULTIORGAN FAILURE  # SMALL BOWEL OBSTRUCTION 2/2 IRREDUCIBLE VENTRAL HERNIA S/P LAPAROSCOPIC VENTRAL HERNIA REPAIR CONVERTED TO EX-LAP FOR SMALL BOWEL RESECTION FOR PERFORATED INCARCERATED VENTRAL HERNIA [10/29]  # ACUTE ON CHRONIC HYPOXIC RESPIRATORY FAILURE [UNDERLYING ASTHMA/COPD, ? POSSIBLE AMPARO]  - ON MEROPENEM [S/P ZOSYN], F/U BCX  - NOTED CT A/P  - INTUBATED  - ON MULTIPLE VASOPRESSORS  - S/P IVF  - METHYLENE BLUE X 1 DOSE  - STRESS DOSE STEROIDS  - NPO  - CRITICAL CARE MANAGEMENT IN PROGRESS  - SURGERY MANAGEMENT IN PROGRESS    # SVT - SUSPECTED NEW ONSET A.FIB  # ELEVATED TROPONINS - SUSPECT DEMAND ISCHEMIA  # HFrEF. S/P AICD  - F/U ECHO   - CARDIOLOGY CONSULT  - EP CONSULT    # ANJU ON CKD4 - SUSPECTED ATN   # ANURIC  - STRICT IS AND OS  - S/P IVF  - BICARB DRIP  - STARTED ON HD, DID NOT TOLERATE ON 10/30  - NEPHROLOGY CONSULT IN PROGRESS    # LACTIC ACIDOSIS  - TRENDING LACTATE    # RIGHT RENAL MASS  - WILL EVALUATE FURTHER W/ MRI WHEN MEDICALLY STABLE    # SUSPECT ISCHEMIC HEPATITIS  # TRANSAMINITIS  - TREND LFTS    # ANEMIA  - TREND HGB    # DM  - SSI + FS    # HLD  # HTN  # GI PPX ; AT BOOTS     Patient is a 58y old  Female who presents with a chief complaint of SBO w/ irreducible ventral hernia (31 Oct 2023 11:03)    PATIENT IS SEEN AND EXAMINED EARLIER IN ICU.      ALLERGIES:  codeine (Urticaria)      VITALS:    Vital Signs Last 24 Hrs  T(C): 38.7 (31 Oct 2023 18:45), Max: 38.7 (31 Oct 2023 18:30)  T(F): 101.7 (31 Oct 2023 18:45), Max: 101.7 (31 Oct 2023 18:30)  HR: 88 (31 Oct 2023 18:45) (40 - 139)  BP: --  BP(mean): --  RR: 27 (31 Oct 2023 18:45) (0 - 32)  SpO2: 53% (31 Oct 2023 05:00) (53% - 98%)    Parameters below as of 30 Oct 2023 21:00  Patient On (Oxygen Delivery Method): ventilator        LABS:    CBC Full  -  ( 31 Oct 2023 15:29 )  WBC Count : 20.85 K/uL  RBC Count : 2.81 M/uL  Hemoglobin : 8.5 g/dL  Hematocrit : 31.3 %  Platelet Count - Automated : 61 K/uL  Mean Cell Volume : 111.4 fl  Mean Cell Hemoglobin : 30.2 pg  Mean Cell Hemoglobin Concentration : 27.2 gm/dL  Auto Neutrophil # : x  Auto Lymphocyte # : x  Auto Monocyte # : x  Auto Eosinophil # : x  Auto Basophil # : x  Auto Neutrophil % : x  Auto Lymphocyte % : x  Auto Monocyte % : x  Auto Eosinophil % : x  Auto Basophil % : x    PT/INR - ( 30 Oct 2023 03:55 )   PT: 20.9 sec;   INR: 1.87 ratio           10-31    149<H>  |  120<H>  |  66<H>  ----------------------------<  67<L>  5.8<H>   |  13<L>  |  4.20<H>    Ca    <5.0<LL>      31 Oct 2023 15:29  Phos  7.4     10-31  Mg     1.6     10-31    TPro  3.4<L>  /  Alb  1.1<L>  /  TBili  0.5  /  DBili  x   /  AST  2730<H>  /  ALT  1199<H>  /  AlkPhos  82  10-31    CAPILLARY BLOOD GLUCOSE      POCT Blood Glucose.: 73 mg/dL (31 Oct 2023 18:52)  POCT Blood Glucose.: 36 mg/dL (31 Oct 2023 17:55)  POCT Blood Glucose.: 25 mg/dL (31 Oct 2023 17:52)  POCT Blood Glucose.: 79 mg/dL (31 Oct 2023 12:44)  POCT Blood Glucose.: 28 mg/dL (31 Oct 2023 12:42)  POCT Blood Glucose.: 44 mg/dL (31 Oct 2023 12:40)  POCT Blood Glucose.: 95 mg/dL (31 Oct 2023 06:51)  POCT Blood Glucose.: 80 mg/dL (31 Oct 2023 00:05)        LIVER FUNCTIONS - ( 31 Oct 2023 15:29 )  Alb: 1.1 g/dL / Pro: 3.4 g/dL / ALK PHOS: 82 U/L / ALT: 1199 U/L DA / AST: 2730 U/L / GGT: x           Creatinine Trend: 4.20<--, 3.91<--, 4.15<--, 4.02<--, 3.75<--, 3.29<--  I&O's Summary    30 Oct 2023 07:01  -  31 Oct 2023 07:00  --------------------------------------------------------  IN: 89762.4 mL / OUT: 1585 mL / NET: 47358.4 mL    31 Oct 2023 07:01  -  31 Oct 2023 20:03  --------------------------------------------------------  IN: 9441 mL / OUT: 490 mL / NET: 8951 mL        ABG - ( 31 Oct 2023 16:27 )  pH, Arterial: 7.01  pH, Blood: x     /  pCO2: 43    /  pO2: 89    / HCO3: 11    / Base Excess: -19.8 /  SaO2: 98                  .Blood Blood-Peripheral  10-29 @ 22:30   No growth at 24 hours  --  --      .Blood Blood-Peripheral  10-29 @ 22:15   No growth at 24 hours  --  --          MEDICATIONS:    MEDICATIONS  (STANDING):  chlorhexidine 0.12% Liquid 15 milliLiter(s) Oral Mucosa every 12 hours  chlorhexidine 2% Cloths 1 Application(s) Topical daily  hydrocortisone sodium succinate Injectable 50 milliGRAM(s) IV Push every 6 hours  insulin lispro (ADMELOG) corrective regimen sliding scale   SubCutaneous every 6 hours  meropenem  IVPB 500 milliGRAM(s) IV Intermittent every 12 hours  norepinephrine Infusion 1 MICROgram(s)/kG/Min (129 mL/Hr) IV Continuous <Continuous>  pantoprazole  Injectable 40 milliGRAM(s) IV Push daily  phenylephrine    Infusion 8.7 MICROgram(s)/kG/Min (224 mL/Hr) IV Continuous <Continuous>  vasopressin Infusion 0.04 Unit(s)/Min (6 mL/Hr) IV Continuous <Continuous>      MEDICATIONS  (PRN):  fentaNYL    Injectable 50 MICROGram(s) IV Push every 6 hours PRN respiratory distress  sodium chloride 0.9% lock flush 10 milliLiter(s) IV Push every 1 hour PRN Pre/post blood products, medications, blood draw, and to maintain line patency      REVIEW OF SYSTEMS:                           ALL ROS DONE [ X   ]    CONSTITUTIONAL:  LETHARGIC [   ], FEVER [   ], UNRESPONSIVE [   ]  CVS:  CP  [   ], SOB, [   ], PALPITATIONS [   ], DIZZYNESS [   ]  RS: COUGH [   ], SPUTUM [   ]  GI: ABDOMINAL PAIN [   ], NAUSEA [   ], VOMITINGS [   ], DIARRHEA [   ], CONSTIPATION [   ]  :  DYSURIA [   ], NOCTURIA [   ], INCREASED FREQUENCY [   ], DRIBLING [   ],  SKELETAL: PAINFUL JOINTS [   ], SWOLLEN JOINTS [   ], NECK ACHE [   ], LOW BACK ACHE [   ],  SKIN : ULCERS [   ], RASH [   ], ITCHING [   ]  CNS: HEAD ACHE [   ], DOUBLE VISION [   ], BLURRED VISION [   ], AMS / CONFUSION [   ], SEIZURES [   ], WEAKNESS [   ],TINGLING / NUMBNESS [   ]      PHYSICAL EXAMINATION:  GENERAL APPEARANCE: NO DISTRESS, OBESE  HEENT:  NO PALLOR, NO  JVD,  NO   NODES, NECK SUPPLE  CVS: S1 +, S2 +,   RS: AEEB,  OCCASIONAL  RALES +,   NO RONCHI    INTUBATED+  ABD: SOFT, NT, NO, BS +  EXT: PE ++  SKIN: WARM,    ABDOMINAL WALL SURGICAL DRESS C/D/I + , SHWETHA DRAINS +  CVC +  SKELETAL:  ROM ACCEPTABLE  CNS:  AAO X  0    RADIOLOGY :    RADIOLOGY AND READINGS REVIEWED    ASSESSMENT :   PLAN:  HPI:  GENERAL SURGERY CONSULTATION NOTE    58 [facility] with pmhx of DM on insulin, HLD, Asthma/Copd req o2, multiple cardiac conditions, presents to ED c/o 3 days of abdominal pain and vomiting. The patient states she started having abdominal pain 3 days ago which has been worsening over time. The patient had associated vomiting, last episode was 1pm 10/28. She admits to constipation with last bowel movement 2 days ago which is abnormal for her. Denies fever, chills or other complaints at this time. ED provider consulted general surgery for SBO with irreducible ventral hernia. The patient was seen and examined at bedside by surgery service. The patient states she has sig past abdominal surgical history of  and gallbladder removal. She states she has had an SBO in the past -last episode was about a year ago which was resolved with NGT placement. The patient has had a ventral hernia for many years and states it is normally the size it appears at bedside today. The patient states she has had a colonoscopy 5 years ago which was normal. (29 Oct 2023 01:23)    # PROGNOSIS IS POOR. CRITICAL CARE TEAM D/W FAMILY. FAMILY WISHES FOR GOC OF DNR/DNI.     # SEPTIC SHOCK S/T SUSPECTED INTRA-ABDOMINAL INFECTION, W/ MULTIORGAN FAILURE  # SMALL BOWEL OBSTRUCTION 2/2 IRREDUCIBLE VENTRAL HERNIA S/P LAPAROSCOPIC VENTRAL HERNIA REPAIR CONVERTED TO EX-LAP FOR SMALL BOWEL RESECTION FOR PERFORATED INCARCERATED VENTRAL HERNIA [10/29]  # ACUTE ON CHRONIC HYPOXIC RESPIRATORY FAILURE [UNDERLYING ASTHMA/COPD, ? POSSIBLE AMPARO]  - ON MEROPENEM [S/P ZOSYN], F/U BCX  - NOTED CT A/P  - INTUBATED  - ON MULTIPLE VASOPRESSORS  - S/P IVF  - METHYLENE BLUE X 1 DOSE  - STRESS DOSE STEROIDS  - NPO  - CRITICAL CARE MANAGEMENT IN PROGRESS  - SURGERY MANAGEMENT IN PROGRESS    # SVT - SUSPECTED NEW ONSET A.FIB  # ELEVATED TROPONINS - SUSPECT DEMAND ISCHEMIA  # HFrEF. S/P AICD  - F/U ECHO   - CARDIOLOGY CONSULT  - EP CONSULT    # ANJU ON CKD4 - SUSPECTED ATN   # ANURIC  - STRICT IS AND OS  - S/P IVF  - BICARB DRIP  - STARTED ON HD, DID NOT TOLERATE ON 10/30  - NEPHROLOGY CONSULT IN PROGRESS    # LACTIC ACIDOSIS  - TRENDING LACTATE    # RIGHT RENAL MASS  - WILL EVALUATE FURTHER W/ MRI WHEN MEDICALLY STABLE    # SUSPECT ISCHEMIC HEPATITIS  # TRANSAMINITIS  - TREND LFTS    # ANEMIA  - TREND HGB    # DM  - SSI + FS    # HLD  # HTN  # GI PPX ; AT BOOTS

## 2023-10-31 NOTE — PROGRESS NOTE ADULT - REASON FOR ADMISSION
SBO w/ irreducible ventral hernia

## 2023-10-31 NOTE — PROGRESS NOTE ADULT - SUBJECTIVE AND OBJECTIVE BOX
C A R D I O L O G Y  **********************************     DATE OF SERVICE: 10-31-23     remains intubaetd critically ill on multiple pressors    MEDICATIONS:  MEDICATIONS  (STANDING):  chlorhexidine 0.12% Liquid 15 milliLiter(s) Oral Mucosa every 12 hours  chlorhexidine 2% Cloths 1 Application(s) Topical daily  hydrocortisone sodium succinate Injectable 50 milliGRAM(s) IV Push every 6 hours  insulin lispro (ADMELOG) corrective regimen sliding scale   SubCutaneous every 6 hours  meropenem  IVPB 500 milliGRAM(s) IV Intermittent every 12 hours  norepinephrine Infusion 1 MICROgram(s)/kG/Min (129 mL/Hr) IV Continuous <Continuous>  pantoprazole  Injectable 40 milliGRAM(s) IV Push daily  phenylephrine    Infusion 8.7 MICROgram(s)/kG/Min (224 mL/Hr) IV Continuous <Continuous>  sodium bicarbonate  Injectable 150 milliEquivalent(s) IV Push once  vasopressin Infusion 0.04 Unit(s)/Min (6 mL/Hr) IV Continuous <Continuous>      LABS:	 	    CARDIAC MARKERS:        Troponin I, High Sensitivity Result: 896.7 ng/L (10-30-23 @ 23:00)  Troponin I, High Sensitivity Result: 1452.0 ng/L (10-30-23 @ 14:45)  Troponin I, High Sensitivity Result: 1427.2 ng/L (10-30-23 @ 03:55)  Troponin I, High Sensitivity Result: 161.7 ng/L (10-29-23 @ 22:40)                              8.4    18.40 )-----------( 75       ( 31 Oct 2023 05:25 )             30.9     Hemoglobin: 8.4 g/dL (10-31 @ 05:25)  Hemoglobin: 9.3 g/dL (10-30 @ 23:00)  Hemoglobin: 11.1 g/dL (10-30 @ 14:45)  Hemoglobin: 11.1 g/dL (10-30 @ 03:55)  Hemoglobin: 11.2 g/dL (10-29 @ 21:50)      10-31    144  |  119<H>  |  75<H>  ----------------------------<  175<H>  5.6<H>   |  9<LL>  |  3.91<H>    Ca    <5.0<LL>      31 Oct 2023 03:44  Phos  7.7     10-31  Mg     1.7     10-31    TPro  3.3<L>  /  Alb  1.0<L>  /  TBili  0.3  /  DBili  x   /  AST  1218<H>  /  ALT  582<H>  /  AlkPhos  49  10-31    Creatinine Trend: 3.91<--, 4.15<--, 4.02<--, 3.75<--, 3.29<--, 2.64<--      PHYSICAL EXAM:  T(C): 37.4 (10-31-23 @ 10:30), Max: 38.4 (10-30-23 @ 12:30)  HR: 81 (10-31-23 @ 10:30) (79 - 151)  BP: 103/68 (10-30-23 @ 14:30) (103/68 - 103/68)  RR: 26 (10-31-23 @ 10:30) (22 - 32)  SpO2: 53% (10-31-23 @ 05:00) (53% - 98%)  Wt(kg): --  I&O's Summary    30 Oct 2023 07:01  -  31 Oct 2023 07:00  --------------------------------------------------------  IN: 02871.4 mL / OUT: 1585 mL / NET: 22942.4 mL        HEENT:  (-)icterus (-)pallor  CV: N S1 S2 1/6 RAYMOND (+)2 Pulses B/l  Resp:  Clear to ausculatation B/L, normal effort  GI: (+) BS Soft, NT, ND  Lymph:  (-)Edema, (-)obvious lymphadenopathy  Skin: Warm to touch, Normal turgor  Psych: unable to assess mood and affect      TELEMETRY: 	  sinus        ASSESSMENT/PLAN: 	58y  Female  past medical history of morbid obesity, hypertension, hyperlipidemia, known systolic congestive heart failure, NiICM  status post SJM  ICD implantation with known history of advanced COPD, diabetic vasculopathy and retinopathy  presents to ED c/o 3 days of abdominal pain and vomiting s/p abdominal surger y for perforated hernia.      # Shock with multiorgan failure   - Pressor support per ICU  - Profound Acidosis, prognosis appears grave       # Sepsis  - ID f/u  - cont broad spectrum abx per ID    # NSTEMI  - Type II mI in the setting of shock and critical illness    # EP eval appreciated  - ICD is at TETO     Manuel Gold MD, [facility]  BEEPER [phone]

## 2023-10-31 NOTE — CONSULT NOTE ADULT - ASSESSMENT
...in progress HPI: History obtained from Admission records and MICU since pt is currently intubated/septic shock  58 [facility] pmhx of DM on insulin, HLD, Asthma/Copd req o2, multiple cardiac conditions, presented to ED c/o 3 days of abdominal pain and vomiting PTA worsening over time associated to vomiting, last episode was 1pm 10/28. She reported constipation with last bowel movement 2 days before admission. Denied fever, chills or other complaints, ED provider consulted general surgery for SBO with irreducible ventral hernia. On admission she reported ho SBO in the past -last episode was about a year ago which was resolved with NGT placement. The patient has had a ventral hernia for many years as well and reported colonoscopy 5 years ago which was normal. (29 Oct 2023 01:23)  S/P Ex-Lap incarcerated ventral hernia 10/29 with presence of perforated component and feculent material intraOP.   Pt is currently in the ICU septic shock, 3 pressors, ID consulted for abx guidance.     Allergies: codeine (Urticaria)    PAST MEDICAL & SURGICAL HISTORY:  HTN (hypertension)  DM (diabetes mellitus)  RA (rheumatoid arthritis)  COPD (chronic obstructive pulmonary disease)  AICD (automatic cardioverter/defibrillator) present 2/3 2011  CAD (coronary artery disease)  CHF (congestive heart failure)  GERD (gastroesophageal reflux disease)  Benign neoplasm, unspecified site  Morbid obesity with BMI of 50.0-59.9, adult  Obstructive sleep apnea on CPAP  History of cholecystectomy  ICD (implantable cardioverter-defibrillator) in place  H/O  section    SOCIAL HISTORY: No smoking ;no alcohol abuse, no IVDU  FAMILY HISTORY: Family history of hypertension in mother  no pertinent related medical history    REVIEW OF SYSTEMS: Unable to obtain, pt intubated, sedated    PHYSICAL EXAM:  VITALS: Vital Signs Last 24 Hrs  T(C): 37.4 (31 Oct 2023 10:30), Max: 38.4 (30 Oct 2023 14:20)  T(F): 99.3 (31 Oct 2023 10:30), Max: 101.1 (30 Oct 2023 14:20)  HR: 82 (31 Oct 2023 12:14) (79 - 151)  BP: 103/68 (30 Oct 2023 14:30) (103/68 - 103/68)  BP(mean): 80 (30 Oct 2023 14:30) (80 - 80)  RR: 26 (31 Oct 2023 10:30) (22 - 32)  SpO2: 53% (31 Oct 2023 05:00) (53% - 98%)    Parameters below as of 30 Oct 2023 21:00  Patient On (Oxygen Delivery Method): ventilator    Gen: AOx0, intubated, sedated, critically ill  RIJ + site OK(10/29)  [place] +  HEAD:  Atraumatic, Normocephalic  EYES: PERRLA  ENT: Moist mucous membranes  NECK: Supple, No JVD  CV: S1+S2 normal, no murmurs   Resp: ETT +, coarse breath sounds  Abd: distended s/p Ex-Lap, SHWETHA with murky brown fluid  NGT to suction   Ext: NoLE edema, no cyanosis, LE pulses present  : no dysuria, no gross hematuria, [place] (+/-)  IV/Skin: ++ No thrombophlebitis, no skin rash  Msk: no joint swelling  Neuro: AAOx0, no focal signs on gross examination  Psych: no anxiety, no delusional ideas, no suicidal ideation    LABS/DIAGNOSTIC TESTS:                        8.4    18.40 )-----------( 75       ( 31 Oct 2023 05:25 )             30.9     WBC Count: 18.40 K/uL (10-31 @ 05:25)  WBC Count: 19.37 K/uL (10-30 @ 23:00)  WBC Count: 16.75 K/uL (10-30 @ 14:45)  WBC Count: 13.15 K/uL (10-30 @ 03:55)  WBC Count: 3.56 K/uL (10-29 @ 21:50)  WBC Count: 9.65 K/uL (10-28 @ 18:50)    10-31    144  |  119<H>  |  75<H>  ----------------------------<  175<H>  5.6<H>   |  9<LL>  |  3.91<H>    Ca    <5.0<LL>      31 Oct 2023 03:44  Phos  7.7     10-31  Mg     1.7     10-31    TPro  3.3<L>  /  Alb  1.0<L>  /  TBili  0.3  /  DBili  x   /  AST  1218<H>  /  ALT  582<H>  /  AlkPhos  49  10-31    Urinalysis Basic - Urine Microscopic-Add On (NC) (22 @ 20:20)    Red Blood Cell - Urine: 25-50 /HPF   White Blood Cell - Urine: 6-10 /HPF   Bacteria: Trace /HPF   Epithelial Cells: Occasional /HPF    LACTATE:Lactate, Blood: 14.3 mmol/L (10-31 @ 05:25)  Lactate, Blood: 13.3 mmol/L (10-30 @ 23:00)  Lactate, Blood: 12.6 mmol/L (10-30 @ 18:50)  Lactate, Blood: 5.9 mmol/L (10-30 @ 14:45)    CULTURES:   Culture - Blood (collected 10-29-23 @ 22:30)  Source: .Blood Blood-Peripheral  Preliminary Report (10-31-23 @ 03:02):    No growth at 24 hours    Culture - Blood (collected 10-29-23 @ 22:15)  Source: .Blood Blood-Peripheral  Preliminary Report (10-31-23 @ 03:02):    No growth at 24 hours    RADIOLOGY: < from: CT Abdomen and Pelvis w/ Oral Cont (10.28.23 @ 23:32) >  IMPRESSION:  Small bowel obstruction secondary to a large complex multi septated   anterior abdominal wall hernia which contains and obstructs small bowel.   Edema adjacent to the obstructed loops suggestive of at least moderate   and possibly high-grade obstruction. No extraluminal air or pneumatosis.    Interval enlargement of 2 soft tissue density masses off the right   kidney, concerning for neoplasm such as renal cell carcinoma. Less likely   growing complex cysts. This warrants follow-up imaging. Renal ultrasound  would likely confirm that these are solid masses. Renal protocol CT or   MRI with and without contrast would be definitive.    Low-density splenic lesions which, while statistically most likely   benign, are not definitively evaluated and have enlarged slightly. The   most definitive imaging for splenic lesions would be MRI with and without   gadolinium although the patient has an implanted cardiac device and may   not be able to undergo MRI.      ANTIBIOTICS:  meropenem  IVPB 500 every 12 hours    IMPRESSION:  58 [place], Asthma/Copd req o2, multiple cardiac conditions admitted for acute abdomen/perforated incarcerated ventral hernia    -Septic shock with multiorgan failure refractory to vasopressor support (3 pressors)  Acute abdomen / acute peritonitis   -S/P Ex-Lap for perforated incarcerated hernia with hernia repair and small bowel resection 10/29  -ANJU  -Lactic acidosis  -R kidney mass concerning for neoplasm     PLAN:  change zosyn to meropenem 500 mg q12 hrs IV  Follow up cx (blood cx- neg 24 hrs, no evidence of MRSA)  Monitor renal function  F/up surgery recs  Vent as per MICU  Pt critically ill not improving despite resuscitation measures poor prognosis  Discussed with Dr. Gerardo    Please reach ID with any questions or concerns  Dr. Madeline Godoy  Available in Teams

## 2023-10-31 NOTE — CONSULT NOTE ADULT - CONSULT REQUESTED DATE/TIME
31-Oct-2023 10:19
29-Oct-2023 10:19
30-Oct-2023
30-Oct-2023 11:23
30-Oct-2023 14:39
29-Oct-2023 21:06

## 2023-10-31 NOTE — CONSULT NOTE ADULT - CONSULT REASON
Afib, cardiomyopathy
SBO W/ IRREDUCIBLE VENTRAL HERNIA
abnormal ekg
septic shock
ANJU on CKD
Post operative monitoring 2/2 perforated Incarcerated ventral hernia s/p bowel resection

## 2023-10-31 NOTE — PROGRESS NOTE ADULT - SUBJECTIVE AND OBJECTIVE BOX
NEPHROLOGY MEDICAL CARE, [facility] - Dr. Linden Jacob/ Dr. Cruz Ledesma/ Dr. Meet Galdamez/ Dr. Jannie Recinos    Date of Service: 10-31-23    Patient was seen and examined at bedside.    CC: patient's eyes are open  pt is still on 3 pressors with sbp around 80s  pt is still anuric.    Vital Signs Last 24 Hrs  T(C): 37.3 (31 Oct 2023 09:15), Max: 38.4 (30 Oct 2023 12:30)  T(F): 99.1 (31 Oct 2023 09:15), Max: 101.1 (30 Oct 2023 12:30)  HR: 79 (31 Oct 2023 09:15) (79 - 151)  BP: 103/68 (30 Oct 2023 14:30) (103/68 - 103/68)  BP(mean): 80 (30 Oct 2023 14:30) (80 - 80)  RR: 26 (31 Oct 2023 09:15) (22 - 32)  SpO2: 53% (31 Oct 2023 05:00) (53% - 98%)    Parameters below as of 30 Oct 2023 21:00  Patient On (Oxygen Delivery Method): ventilator        10-30 @ 07:01  -  10-31 @ 07:00  --------------------------------------------------------  IN: 54476.4 mL / OUT: 1585 mL / NET: 22104.4 mL        PHYSICAL EXAM:  General: No acute respiratory distress on vent.  Eyes: conjunctiva and sclera clear  ENMT: Atraumatic, Normocephalic, supple, ET on vent. NGT present  Respiratory: Bilateral poor air entry; No rales, rhonchi, wheezing  Cardiovascular: S1S2+; no m/r/g  Gastrointestinal: surgical site with bandage.  : morales's cath with no urine   Neuro: sedated   Ext:  2+ Peripheral Pulses and pedal edema, No Cyanosis  Skin: No visible rashes  Dialysis Access: Rt femoral dialysis cath.    MEDICATIONS:  MEDICATIONS  (STANDING):  chlorhexidine 0.12% Liquid 15 milliLiter(s) Oral Mucosa every 12 hours  chlorhexidine 2% Cloths 1 Application(s) Topical daily  hydrocortisone sodium succinate Injectable 50 milliGRAM(s) IV Push every 6 hours  insulin lispro (ADMELOG) corrective regimen sliding scale   SubCutaneous every 6 hours  meropenem  IVPB 500 milliGRAM(s) IV Intermittent every 12 hours  norepinephrine Infusion 1 MICROgram(s)/kG/Min (129 mL/Hr) IV Continuous <Continuous>  pantoprazole  Injectable 40 milliGRAM(s) IV Push daily  phenylephrine    Infusion 8.7 MICROgram(s)/kG/Min (224 mL/Hr) IV Continuous <Continuous>  piperacillin/tazobactam IVPB.. 3.375 Gram(s) IV Intermittent every 12 hours  propofol Infusion 10 MICROgram(s)/kG/Min (8.17 mL/Hr) IV Continuous <Continuous>  sodium bicarbonate  Injectable 150 milliEquivalent(s) IV Push once  vasopressin Infusion 0.04 Unit(s)/Min (6 mL/Hr) IV Continuous <Continuous>    MEDICATIONS  (PRN):  fentaNYL    Injectable 50 MICROGram(s) IV Push every 6 hours PRN respiratory distress  sodium chloride 0.9% lock flush 10 milliLiter(s) IV Push every 1 hour PRN Pre/post blood products, medications, blood draw, and to maintain line patency          LABS:                        8.4    18.40 )-----------( 75       ( 31 Oct 2023 05:25 )             30.9     10-31    144  |  119<H>  |  75<H>  ----------------------------<  175<H>  5.6<H>   |  9<LL>  |  3.91<H>    Ca    <5.0<LL>      31 Oct 2023 03:44  Phos  7.7     10-31  Mg     1.7     10-31    TPro  3.3<L>  /  Alb  1.0<L>  /  TBili  0.3  /  DBili  x   /  AST  1218<H>  /  ALT  582<H>  /  AlkPhos  49  10-31    PT/INR - ( 30 Oct 2023 03:55 )   PT: 20.9 sec;   INR: 1.87 ratio           Urinalysis Basic - ( 31 Oct 2023 03:44 )    Color: x / Appearance: x / SG: x / pH: x  Gluc: 175 mg/dL / Ketone: x  / Bili: x / Urobili: x   Blood: x / Protein: x / Nitrite: x   Leuk Esterase: x / RBC: x / WBC x   Sq Epi: x / Non Sq Epi: x / Bacteria: x      Magnesium: 1.7 mg/dL (10-31 @ 03:44)  Phosphorus: 7.7 mg/dL (10-31 @ 03:44)  Magnesium: 1.7 mg/dL (10-30 @ 23:00)  Phosphorus: 7.8 mg/dL (10-30 @ 23:00)  Magnesium: 1.8 mg/dL (10-30 @ 14:45)  Phosphorus: 4.7 mg/dL (10-30 @ 14:45)    Urine studies    PTH and Vit D:

## 2023-10-31 NOTE — PROGRESS NOTE ADULT - ATTENDING COMMENTS
58 F with pmhx of DM on insulin, HLD, Asthma/Copd on home O2, multiple non-ischemic cardiomyopathy (ECHO EF 30% 2019) with AICD, bifascicular block (LAFB, RBBB), initially admitted for small bowel obstruction secondary to a large complex multi septated anterior abdominal wall hernia. Patient is POD #0 laparoscopic ventral hernia repair converted to ex-lap for small bowel resection for perforated incarcerated ventral hernia. Post-op patient hypotensive, receiving phenylephrine drip, transferred to ICU for additional care.    #SBO 2/2 RLQ large irreducible ventral hernia s/p ventral hernia repair, small bowel resection  #Shock   #Acute on chronic hypoxic respiratory failure  #Troponinemia, suspect demand ischemia   #SVT - appears like new onset Afib  #Lactic acidosis  #Non-ischemic cardiomyopathy, HFrEF   #ANJU on CKD stage 4 likely has ATN   #Asthma/COPD    Plan:  - sedation with propofol goal RASS 0- -2  - vent bundle, daily SAT/SBT, adjust vent settings per ABG  - continue pressor support with norepi, phenylephrine, vaso, escalating dose of vasopressors  - Given Methylene blue x 1 dose   - s/p 5L crystalloid resuscitation, hold additional IVF for now, reassess volume status   - trend tropnin, EKG  - obtain blood cx  - broad spectrum coverage for possible intra-abdominal infection, pip-tazo  - trend lactate  - stress dose steroids, hydrocortisone 100mg IV now, then 50mg q6h  - ISS q6h  - NPO   - f/u surgery for continued management s/p small bowel resection, monitor SHWETHA drain (x2) output  - no AC for now given bloody output from SHWETHA drain  - May need HD as remains anuric despite fluid resuscitation   - Nephrology consult   - DVT and stress ulcer prophylaxis  - Prognosis is poor given multiple organ failure  - Discussed with surgical attending
58 F with pmhx of DM on insulin, HLD, Asthma/Copd on home O2, multiple non-ischemic cardiomyopathy (ECHO EF 30% 2019) with AICD, bifascicular block (LAFB, RBBB), initially admitted for small bowel obstruction secondary to a large complex multi septated anterior abdominal wall hernia. Patient is POD #0 laparoscopic ventral hernia repair converted to ex-lap for small bowel resection for perforated incarcerated ventral hernia. Post-op patient hypotensive, receiving phenylephrine drip, transferred to ICU for additional care.    #SBO 2/2 RLQ large irreducible ventral hernia s/p ventral hernia repair, small bowel resection  #Shock   #Acute on chronic hypoxic respiratory failure  #Troponinemia, suspect demand ischemia   #SVT - appears like new onset Afib  #Lactic acidosis  #Non-ischemic cardiomyopathy, HFrEF   #ANJU on CKD stage 4 likely has ATN   #Asthma/COPD    Plan:  - Unresponsive despite holding sedation, pupils dilated   - vent bundle, daily SAT/SBT, adjust vent settings per ABG  - continue pressor support with norepi, phenylephrine, vaso, escalating dose of vasopressors   - Given Methylene blue x 1 dose   - s/p 5L crystalloid resuscitation, hold additional IVF for now, reassess volume status   - Stress dose steroids   - Awaiting blood cx  - broad spectrum coverage for possible intra-abdominal infection, changed to meropenem  - trend lactate - continues to climb  - ISS q6h  - NPO   - f/u surgery for continued management s/p small bowel resection, monitor SHWETHA drain (x2) output  - no AC for now given bloody output from SHWETHA drain  - Did not tolerated HD attempt yesterday   - Nephrology consult   - Sodium bicarb supplementation given refractory acidosis   - DVT and stress ulcer prophylaxis  - Prognosis is poor given multiple organ failure  - DNR code status is appropriate as patient is actively declining despite aggressive medical interventions

## 2023-10-31 NOTE — PROGRESS NOTE ADULT - SUBJECTIVE AND OBJECTIVE BOX
INTERVAL HPI/OVERNIGHT EVENTS:    Pt seen and examined at bedside. Pt intubated and sedated x3 pressors.     Vital Signs Last 24 Hrs  T(C): 37.3 (31 Oct 2023 08:15), Max: 38.4 (30 Oct 2023 12:30)  T(F): 99.1 (31 Oct 2023 08:15), Max: 101.1 (30 Oct 2023 12:30)  HR: 79 (31 Oct 2023 08:15) (79 - 151)  BP: 103/68 (30 Oct 2023 14:30) (103/68 - 103/68)  BP(mean): 80 (30 Oct 2023 14:30) (80 - 80)  RR: 26 (31 Oct 2023 08:15) (20 - 32)  SpO2: 53% (31 Oct 2023 05:00) (53% - 98%)    Parameters below as of 30 Oct 2023 21:00  Patient On (Oxygen Delivery Method): ventilator      I&O's Detail    30 Oct 2023 07:01  -  31 Oct 2023 07:00  --------------------------------------------------------  IN:    IV PiggyBack: 300 mL    Lactated Ringers Bolus: 1000 mL    Norepinephrine: 545 mL    Norepinephrine: 62116 mL    Phenylephrine: 1095 mL    Phenylephrine: 4617 mL    Propofol: 194.4 mL    Sodium Bicarbonate: 2400 mL    Vasopressin: 30 mL    Vasopressin: 114 mL  Total IN: 49955.4 mL    OUT:    Bulb (mL): 450 mL    Bulb (mL): 230 mL    Indwelling Catheter - Urethral (mL): 5 mL    Nasogastric/Oral tube (mL): 700 mL    Voided (mL): 200 mL  Total OUT: 1585 mL    Total NET: 93395.4 mL        pantoprazole  Injectable 40 milliGRAM(s) IV Push daily  piperacillin/tazobactam IVPB.. 3.375 Gram(s) IV Intermittent every 12 hours      Physical Exam  General: AAOx3, No acute distress  Skin: No jaundice, no icterus  ABDOMEN: obese, moderate distention, firm. SHWETHA x2 with serosanguineous output with murky tinge: L 450cc/R 230cc per 24hr , NGT connected to LWS 700cc/24hr brown bilious output once placed to suction  : morales catheter in place    Drains/Tubes:     Labs:                        8.4    18.40 )-----------( 75       ( 31 Oct 2023 05:25 )             30.9     10-31    144  |  119<H>  |  75<H>  ----------------------------<  175<H>  5.6<H>   |  9<LL>  |  3.91<H>    Ca    <5.0<LL>      31 Oct 2023 03:44  Phos  7.7     10-31  Mg     1.7     10-31    TPro  3.3<L>  /  Alb  1.0<L>  /  TBili  0.3  /  DBili  x   /  AST  1218<H>  /  ALT  582<H>  /  AlkPhos  49  10-31    PT/INR - ( 30 Oct 2023 03:55 )   PT: 20.9 sec;   INR: 1.87 ratio             RADIOLOGY & ADDITIONAL STUDIES:    58yFemale

## 2023-10-31 NOTE — PROGRESS NOTE ADULT - ASSESSMENT
1. ANJU due to ATN from Septic Shock  -attempted HD without UF yesterday for 15minutes then became hypotensive to 70s so it was stopped.  -Patient unable to tolerate dialysis due to hypotension and recommend palliative care and comfort measures. continue conservative measures.   -Adjust meds to eGFR and avoid IV Gadolinium contrast,NSAIDs, and phosphate enema.  -Monitor I/O's daily.   -Monitor SMA daily.  2. CKD stage 4 most likely due to diabetic nephropathy and hypertensive nephrosclerosis  -Baseline Scr around 2.4-2.6mg/dL    -Avoid Nephrotoxic Meds/ Agents such as (NSAIDs, IV contrast, Aminoglycosides such as gentamicin, -Gadolinium contrast, Phosphate containing enemas, etc..)  -Adjust Medications according to eGFR  3. Anemia iron deficiency  -hb is stable.   -F/u CBC daily  -transfuse if HB < 7.0.  4. Hypotensive due to Septic shock:  -on 3 pressors and unable to wean.  -on IV abx  -monitor BP.  5. Mineral Bone Disease:  -phos is okay   6. Abd pain due SBO with incarcerated hernia.  -S/p repair of ventral hernia and Small Bowel Resection on 10/29  -Plan as per surgery  7. Pulmon:  -s/p intubated  -Plan as per ICU  8. Acidosis due to lactic acidosis  -abg noted  -on bicarbonate drip; give iv bicarbonate push as needed  -monitor abg and CO2.  9. Hyperkalemia:  -give medical management  -monitor K.       Patient is critically ill. Time Spent: 35mins.  Discussed the assessment and plan with ICU Team/Nurse

## 2023-10-31 NOTE — PROGRESS NOTE ADULT - PROVIDER SPECIALTY LIST ADULT
Cardiology
Internal Medicine
Surgery
Surgery
Critical Care
Electrophysiology
Nephrology
Critical Care
Surgery

## 2023-10-31 NOTE — PROGRESS NOTE ADULT - ASSESSMENT
58 [facility] with pmhx of DM on insulin, HLD, Asthma/Copd req o2, multiple cardiac conditions, presents to ED c/o 3 days of abdominal pain and vomiting.  Upon evaluation in ED, patient noted to be afebrile and hemodynamically stable. Patient on CT scan of abdomen found to have an Small bowel obstruction secondary to a large complex multi septated anterior abdominal wall hernia which contains and obstructs small bowel. ED provider consulted general surgery for SBO with irreducible ventral hernia. Patient is s/p laparoscopic ventral hernia repair and small bowel resection. ICU consulted for septic shock requiring vasopressors and post operative monitoring.     #Septic Shock  #Small bowel obstruction 2/2 irreducible ventral hernia s/p laparoscopic repair and small bowel resection  #Asthma   #CKD  # Hx of right renal masses   #Chronic Systolic HF s/p AICD   #Moderate Pulm HTN   #DM  #HTN  #HLD  #Elevated troponin 2/2 demand ischemia   # Lactic acidosis  #Chronic Anemia     Neurological  #sedation  #analgesia  - mechanically ventilated, continue sedation with propofol   - triglycerides 160   - analgesia with fentanyl 50mg q6 push prn pain/distress      Cardiac  # Septic Shock +/- cardiogenic component   - patient s/p small bowel resection and ventral hernia repair with spillage of components 10/29; received 5L in OR  - bedside POCUS 10/30 again showing decreased Left ventricular contractility (last echo noted  in 2019, showing EF 30-35% with severe left ventricular systolic dysfunction)  - on maximal vasopressor support with norepinephrine, phenylephrine, and fixed dose vasopressin   - titrate pressors to keep MAP 65-75mmHg   - continue with stress dose steroids   - continue with zosyn 3.375mg q8 hours, s/p Vanc x1 dose  - hold home antihypertensives in the setting of severe shock state (coreg, lisinopril)  - trend lactate  - f/u blood cultures   - f/u repeat Echocardiogram     #Elevated troponin   - may be in setting of demand ischemia  - initial troponin 161.7, up trending >1427,   - will trend troponin levels   - f/u echocardiogram     #HTN  - noted to be on lisinopril and coreg  - held due to shock state     #HLD  - NPO, hold statin     #Chronic Systolic Heart Failure   - last echo done in 2019 showing EF 30-35%, severe left ventricular dysfunction, moderate pulm htn  - Bedside POCUS 10/30 continues to demonstrate decreased left ventricular contractility   - hold coreg   - f/u echocardiogram     Pulmonology  #AHRF in the setting of severe septic shock +/- cardiogenic component  - mechanically ventilated, settings adjusted per ABG  - 26/450/8/90%    #Hx of asthma   - noted to be on albuterol at home   - held at present    GI  # SBO 2/2 Incarcerated ventral hernia s/p laparoscopic hernia repair with bowel resection   - NPO   - continue with NGT to suction   - monitor SHWETHA drainage   - continue with zosyn 3.375mg q8 hours  - f/u blood cultures   - trend lactate   - Surgical recs appreciated     Infectious disease   # Septic Shock +/- cardiogenic component in the setting of SBO with component release   - patient s/p small bowel resection and ventral hernia repair with component release 10/29; received 5L in OR  - bedside pocus showing decreased Left ventricular contractility (last echo noted  in 2019, showing EF 30-35% with severe left ventricular systolic dysfunction)  - on maximal vaspressor support with norepinephrine, phenylephrine, and fixed dose vasopressin   - titrate pressors to keep MAP 65-75mmHg   - continue with zosyn 3.375mg q8 hours, s/p Vanc x1 dose  - continue with stress dose steroids   - hold home medications of coreg, lisinopril, hold BP meds   - trend lactate  - f/u blood cultures     Renal  #Renal Masses on CT imaging  - noted on CT abdomen to have Interval enlargement of 2 soft tissue density masses off the right kidney, concerning for neoplasm such as renal cell carcinoma  - prior CT abdomen in 9/22 showing right renal lesions measuring 1.8 and 4.1 cm   - consider MR imaging    #ANJU on CKD, now likely oliguric ATN 2/2 severe septic shock    - patient noted with baseline Scr of 2.7-3  - Scr of 2.64 and up trending, likely pre- renal ANJU now oliguric ATN in the setting of severe shock state   - optimize perfusion with vasopressor support   - avoid nephrotoxic agents   - monitor Scr, UOP, and BMP    Endo   #Hx of DM   - noted to be on sliding scale and humulin 70/30 at facility   - NPO, FS q6 hours  - ISS    Heme  #Chronic Anemia   - noted to have hgb of 11.2  - baseline 9-10   - monitor Hgb    Skin   - SHWETHA drains noted on abdomen   - Right IJ CVC noted   - no acute issues     Prophylaxis   SCD for GI prophylaxis   PPI     Disposition  ICU, condition grave   58 F from [facility] with pmhx of DM on insulin, HLD, Asthma/Copd req o2, multiple cardiac conditions with c/o abdominal pain and vomiting s/p laproscopic ventral hernia repair found to have small bowel perforation and feculent peritonitis s/p small bowel resection and admitted to ICU for septic shock.     #Septic Shock  #Small bowel obstruction 2/2 irreducible ventral hernia s/p laparoscopic repair and small bowel resection  #Asthma   #CKD  # Hx of right renal masses   #Chronic Systolic HF s/p AICD   #Moderate Pulm HTN   #DM  #HTN  #HLD  #Elevated troponin 2/2 demand ischemia   # Lactic acidosis  #Chronic Anemia     Neurological  #Metabolic Uremic Encephalopathy   - mechanically ventilated, SAT today.   - analgesia with fentanyl 50mg q6 push prn pain/distress    Cardiac  # Severe Septic Shock +/- cardiogenic component with MODS  #Chronic Systolic Heart Failure   - patient s/p small bowel resection and ventral hernia repair with spillage of components 10/29; received 5L in OR  - bedside POCUS 10/30 again showing decreased Left ventricular contractility (last echo noted  in 2019, showing EF 30-35% with severe left ventricular systolic dysfunction)  - on maximal vasopressor support with norepinephrine, phenylephrine, and fixed dose vasopressin   - s/p 1 dose of methylene blue 10/30  - titrate pressors to keep MAP 65-75mmHg   - continue with stress dose steroids   - hold home antihypertensives in the setting of severe shock state (coreg, lisinopril)  - trend lactate  - f/u repeat Echocardiogram     #Elevated troponin   - may be in setting of demand ischemia  - initial troponin 161.7, up trending >1427 now trending down to 896  - f/u echocardiogram     #HLD  - NPO, hold statin     Pulmonology  #AHRF in the setting of severe septic shock +/- cardiogenic component  - mechanically ventilated, settings adjusted per ABG  - 26/450/8/90%  - Chest Xray 10/29 with no pneumothorax or consolidations     #Hx of asthma   - noted to be on albuterol at home   - held at present    GI  # SBO 2/2 Incarcerated ventral hernia s/p laparoscopic hernia repair with bowel resection   - NPO   - continue with NGT to suction   - monitor SHWETHA drainage   - continue with zosyn 3.375mg q8 hours  - f/u blood cultures   - trend lactate   - Surgical recs appreciated     Infectious disease   # Septic Shock +/- cardiogenic component in the setting of SBO with component release   - Leukocytosis 18.4, Bands 53%  - zosyn 3.375mg q8 hours, s/p Vanc x1 dose  - Per ID, abx broadened to Meropenem  - continue with stress dose steroids   - trend lactate  - BCx 10/29/23 NGTD    Renal  #Metabolic Disarray   # HAGMA and NAGMA  - Replete lytes as needed  - Sodium bicarb drip changed to sodium bicarb pushes based on ABG to prevent worsening third spacing  - Vent settings adjusted for Metabolic Acidosis     #Renal Masses on CT imaging  - noted on CT abdomen to have Interval enlargement of 2 soft tissue density masses off the right kidney, concerning for neoplasm such as renal cell carcinoma  - prior CT abdomen in 9/22 showing right renal lesions measuring 1.8 and 4.1 cm   - consider MR imaging    #ANJU on CKD, now likely oliguric ATN 2/2 severe septic shock    - patient noted with baseline Scr of 2.7-3  - Scr of 2.64 and up trending, likely pre- renal ANJU now oliguric ATN in the setting of severe shock state   - optimize perfusion with vasopressor support   - avoid nephrotoxic agents   - monitor Scr, UOP, and BMP  - Patient did not tolerate HD on 10/30 due to severe septic shock     Endo   #Hx of DM   - noted to be on sliding scale and humulin 70/30 at facility   - NPO, FS q6 hours  - ISS    Heme  #Chronic Anemia   # Thrombocytopenia iso of severe septic shock   - noted to have hgb of 11.2  - baseline 9-10   - monitor Hgb    Skin   - SHWETHA drains noted on abdomen   - Right IJ CVC noted   - no acute issues     Prophylaxis   SCD for GI prophylaxis   PPI     Disposition  ICU, Poor prognosis   DNR/DNI   58 F from [facility] with pmhx of DM on insulin, HLD, Asthma/Copd req o2, multiple cardiac conditions with c/o abdominal pain and vomiting s/p laproscopic ventral hernia repair found to have small bowel perforation and feculent peritonitis s/p small bowel resection and admitted to ICU for septic shock.     #Septic Shock  #Small bowel obstruction 2/2 irreducible ventral hernia s/p laparoscopic repair and small bowel resection  #Asthma   #CKD  # Hx of right renal masses   #Chronic Systolic HF s/p AICD   #Moderate Pulm HTN   #DM  #HTN  #HLD  #Elevated troponin 2/2 demand ischemia   # Lactic acidosis  #Chronic Anemia     Neurological  #Metabolic Uremic Encephalopathy   - mechanically ventilated, SAT today.   - analgesia with fentanyl 50mg q6 push prn pain/distress    Cardiac  # Severe Septic Shock +/- cardiogenic component with MODS  #Chronic Systolic Heart Failure   - patient s/p small bowel resection and ventral hernia repair with spillage of components 10/29; received 5L in OR  - bedside POCUS 10/30 again showing decreased Left ventricular contractility (last echo noted  in 2019, showing EF 30-35% with severe left ventricular systolic dysfunction)  - on maximal vasopressor support with norepinephrine, phenylephrine, and fixed dose vasopressin   - s/p 1 dose of methylene blue 10/30  - titrate pressors to keep MAP 65-75mmHg   - continue with stress dose steroids   - hold home antihypertensives in the setting of severe shock state (coreg, lisinopril)  - trend lactate  - f/u repeat Echocardiogram     #Elevated troponin   - may be in setting of demand ischemia  - initial troponin 161.7, up trending >1427 now trending down to 896  - f/u echocardiogram     #HLD  - NPO, hold statin     Pulmonology  #AHRF in the setting of severe septic shock +/- cardiogenic component  - mechanically ventilated, settings adjusted per ABG  - 26/450/8/90%  - Chest Xray 10/29 with no pneumothorax or consolidations     #Hx of asthma   - noted to be on albuterol at home   - held at present    GI  # SBO 2/2 Incarcerated ventral hernia s/p laparoscopic hernia repair with bowel resection   #Shock liver   - NPO   - continue with NGT to suction   - monitor SHWETHA drainage   - continue with zosyn 3.375mg q8 hours  - Trend LFTs  - f/u blood cultures   - trend lactate   - Surgical recs appreciated     Infectious disease   # Septic Shock +/- cardiogenic component in the setting of SBO with component release   - Leukocytosis 18.4, Bands 53%  - zosyn 3.375mg q8 hours, s/p Vanc x1 dose  - Per ID, abx broadened to Meropenem  - continue with stress dose steroids   - trend lactate  - BCx 10/29/23 NGTD    Renal  #Metabolic Disarray   # HAGMA and NAGMA  - Replete lytes as needed  - Sodium bicarb drip changed to sodium bicarb pushes based on ABG to prevent worsening third spacing  - Vent settings adjusted for Metabolic Acidosis     #Renal Masses on CT imaging  - noted on CT abdomen to have Interval enlargement of 2 soft tissue density masses off the right kidney, concerning for neoplasm such as renal cell carcinoma  - prior CT abdomen in 9/22 showing right renal lesions measuring 1.8 and 4.1 cm   - consider MR imaging    #ANJU on CKD, now likely oliguric ATN 2/2 severe septic shock    - patient noted with baseline Scr of 2.7-3  - Scr of 2.64 and up trending, likely pre- renal ANJU now oliguric ATN in the setting of severe shock state   - optimize perfusion with vasopressor support   - avoid nephrotoxic agents   - monitor Scr, UOP, and BMP  - Patient did not tolerate HD on 10/30 due to severe septic shock     Endo   #Hx of DM   - noted to be on sliding scale and humulin 70/30 at facility   - NPO, FS q6 hours  - ISS    Heme  #Chronic Anemia   # Thrombocytopenia iso of severe septic shock   - noted to have hgb of 11.2  - baseline 9-10   - monitor Hgb    Skin   - SHWETHA drains noted on abdomen   - Right IJ CVC noted   - no acute issues     Prophylaxis   SCD for GI prophylaxis   PPI     Disposition  ICU, Poor prognosis   DNR/DNI

## 2023-10-31 NOTE — CHART NOTE - NSCHARTNOTEFT_GEN_A_CORE
I spoke with patient's son Fernando who was at bedside to discuss patient's GOC. Given patient's profound septic shock and multi organ dysfunction refractory to max vasopressors patient have a poor prognosis. Fernando expressed having a difficult time hearing this but that he understood that in the event that patient sustained a cardiac event that CPR would not improve her overall prognosis. He wishes to continue current medical management but stated that if her heart were to stop that he would not want her to have CPR done. Patient's code status changed to DNR/DNI and MOLST formed filled out.

## 2023-10-31 NOTE — PROGRESS NOTE ADULT - ASSESSMENT
Assessment:   58 year old female S/p repair of ventral hernia and Small Bowel Resection POD#2  Guarded prognosis. On three pressors with hypotension. unable to tolerate dialysis yesterday.   Leukocytosis 18, lactate 14, ANJU, elevated troponin    Plan:   - continue NGT to suction  - monitor drain output, record output  - hydration and pressor support per ICU   - continue zosyn   - continue care per ICU   - discuss with Dr. Maldonado

## 2023-11-01 NOTE — DISCHARGE NOTE FOR THE EXPIRED PATIENT - HOSPITAL COURSE
58F from [facility] with PMHx of IDDM, HLD, Asthma/Copd req o2, multiple cardiac conditions with c/o abdominal pain and vomiting for 3 days. CT scan of abdomen found to have a Small bowel obstruction secondary to a large complex multi septated anterior abdominal wall hernia which contains and obstructs small bowel. Pt was admitted to surgery for laproscopic ventral hernia repair which was converted to open as found to have feculent peritionitis. Pt admitted to ICU for severe septic shock refractory to max triple pressors, broad spectrum antibiotics, and developed multi-organ dysfunction. Attempted HD for refractory acidosis but patient was not able to tolerate. Overall prognosis poor. After discussion with diana Gallardo at bedside, pt was made DNR/DNI status. In early AM 11/1/23, cardiac monitor showed asystole. Patient was seen and examined at bedside. On examination, patient was unresponsive, bilateral pupils dilated, non-responsive to light, no bilateral conjunctival reflex present, no heart sounds auscultated, no spontaneous breath sounds present, no carotid or peripheral pulses present. Patient pronounced dead at 5:11 am on 11/1/2023. Attending, Dr. Calderon was informed regarding patient’s status. Condolences were offered to son at bedside

## 2023-11-02 LAB
SURGICAL PATHOLOGY STUDY: SIGNIFICANT CHANGE UP
SURGICAL PATHOLOGY STUDY: SIGNIFICANT CHANGE UP

## 2023-11-04 LAB
CULTURE RESULTS: SIGNIFICANT CHANGE UP
SPECIMEN SOURCE: SIGNIFICANT CHANGE UP

## 2023-11-15 NOTE — PHYSICAL THERAPY INITIAL EVALUATION ADULT - ASSISTIVE DEVICE FOR TRANSFER: GAIT, REHAB EVAL
I placed a 20-gauge IV line in the left upper extremity using ultrasound guidance.  Patient's skin and ultrasound was prepped using chlorhexidine with Tegaderm.  20-gauge IV catheter was introduced using dynamic guidance via ultrasound.  No complications.  Patient's IV lele blood and pushed saline without difficulty.  Postprocedure examination showed 2+ peripheral pulses with no hematoma.  Soft compartments.  Patient is neurovascular intact.      Bessie Can MD  11/15/23 0638    
I placed a 20-gauge IV line in the right upper extremity using ultrasound guidance.  Patient's skin and ultrasound was prepped using chlorhexidine with Tegaderm.  20-gauge IV catheter was introduced using dynamic guidance via ultrasound.  No complications.  Patient's IV lele blood and pushed saline without difficulty.  Postprocedure examination showed 2+ peripheral pulses with no hematoma.  Soft compartments.  Patient is neurovascular intact.      Bessie Can MD  11/15/23 0637    
rolling walker

## 2023-12-26 NOTE — DISCHARGE NOTE ADULT - PROVIDER RX CONTACT NUMBER
[Dear  ___] : Dear ~SABINA, [Courtesy Letter:] : I had the pleasure of seeing your patient, [unfilled], in my office today. [Please see my note below.] : Please see my note below. [Consult Closing:] : Thank you very much for allowing me to participate in the care of this patient.  If you have any questions, please do not hesitate to contact me. [Sincerely,] : Sincerely, [FreeTextEntry3] : Arely Pardo MD MSc Pediatric Nephrology [facility]  [phone]  [phone]

## 2024-01-01 NOTE — H&P ADULT - NSTELEHEALTH_GEN_ALL_CORE
Katey message from mom's chart.    \"Padmini Mukherjee! I had my cousin Peter Sung (he says hi!) check out Heber’s hips and of course it wasn’t acting up for him hah. But after talking with him we decided to take your advice & have it be checked out since you felt it- I’ve actually felt slight “pops” in his leg after you mentioned it so we better just get it looked into! So I’m wondering should we schedule an ultrasound in town or just go ahead and call Dr. Valadez and have them do whatever they think best there? I wasn’t sure what would be easiest/quickest. It would be handy if we could do this before the end of the year but I understand we may not be able to get in asap. I’d just need his # or wherever he is out of. Thanks a lot!\"        Please let her know the following:    I'm glad she had Peter Sung check Heber - great idea!  I did some additional reading after his visit, and I am not convinced that what I was feeling on exam is congenital hip dysplasia, however, I do still recommend he get evaluated further to be certain.  After 6 months of age, the bones have developed enough that ultrasound isn't adequate and Xray is needed for evaluation.  Given that info and knowing she is reluctant to have an Xray, I do recommend an evaluation by Dr. Valadez.  He is at [facility] in [place].  [phone].  Please let her know all this.  I did try to call to let her know myself, but did not leave a vm.  I'm happy to speak with her if she has questions.    Thanks!  KUNAL   No

## 2024-01-01 NOTE — PATIENT PROFILE ADULT - FALL HARM RISK - FACTORS
Term male infant born at 38 weeks and 2 days via  to a 37 year old,  mother. Apgars were 8 and 9 at 1 and 5 minutes respectively. Infant was AGA. Prenatal labs were as follows: HIV negative, RPR negative, Intrapartum RPR non reactive, HBsAg negative, Rubella immune, GBS negative. Maternal blood type A+.  Maternal history of multiple sclerosis (asymptomatic, no medications). Mother is of advanced maternal age, genetic testing denied).    Weight: 3540g - 81%  Length: 52cm - 90%  Head Circumference: 35cm - 81%    PHYSICAL EXAM  General: Infant appears active, with normal color, normal  cry.  Skin: Intact, no lesions, no jaundice.  Head: Scalp is normal with open, soft, flat fontanels, (+)over riding sutures, no edema or hematoma.  EENT: Eyes with nl light reflex b/l, sclera clear, Ears symmetric, cartilage well formed, no pits or tags, Nares patent b/l, palate intact, lips and tongue normal.  Cardiovascular: Strong, regular heart beat with no murmur, PMI normal, 2+ b/l femoral pulses. Thorax appears symmetric.  Respiratory: Normal spontaneous respirations with no retractions, clear to auscultation b/l.  Abdominal: Soft, normal bowel sounds, no masses palpated, no spleen palpated, umbilicus nl with 2 art 1 vein.  Back: Spine normal with no midline defects, anus patent, (+)sacral dimple without base  Hips: Hips normal b/l, neg ortalani,  neg galaviz  Musculoskeletal: Ext normal x 4, 10 fingers 10 toes b/l. No clavicular crepitus or tenderness.  Neurology: Good tone, no lethargy, normal cry, suck, grasp, donavan, gag, swallow.  Genitalia: Penis present, central urethral opening, (+)high riding testes but descended bilaterally
Impaired gait/Weakness

## 2024-03-31 NOTE — PATIENT PROFILE ADULT - FUNCTIONAL SCREEN CURRENT LEVEL: DRESSING, MLM
0 = independent
Lexy Granda  Obstetrics and Gynecology  [address] [place] [address], [address]  [place] [address]  Phone: [phone]  Fax: [phone]  Follow Up Time: Routine

## 2024-04-18 NOTE — PATIENT PROFILE ADULT - BRADEN FRICTION AND SHEAR
Return to emergency department for any worsening symptoms including but not limited to worsening chest pain, shortness of breath, worsening symptoms with exertion, lower extremity swelling, weakness, numbness, abdominal pain, etc. Please follow with your primary care provider in the next few days for reevaluation of your symptoms.  Please follow with [place] [facility]  The Emergency Department is not intended to be a substitute for an effort to provide complete medical care. The imaging, if any, have often been interpreted on a preliminary basis pending final reading by the radiologist. If your blood pressure was greater than 140/90, please have this blood pressure rechecked by your primary care provider in the next several days.     (3) no apparent problem

## 2024-04-24 NOTE — ED PROVIDER NOTE - CARE PLAN
[Follow-Up Visit] : a follow-up visit for [ADHD] : ADHD [Autism Spectrum Disorder] : autism spectrum disorder [Mother] : mother Principal Discharge DX:	Anemia

## 2024-05-05 NOTE — ED PROVIDER NOTE - NS ED MD EM SELECTION
Safety checks every 15 min. Pt. Remained safe throughout shift. Pt. Denied current safety concerns. Pt. Makes no other needs known at this time, no nursing interventions needed at this time. Writer will continue to monitor. Pt. Reports an improvement in her nausea and reports that she no longer feels the urge to \"throw up.\" Saltine crackers and ice water were helpful. Pt. Reports that she ate her HS snack too fast, and also reports that she has not been eating much at home compared to the hospital. Pt. Took HS medication and PRN benadryl, and trazodone.         
abd pain
[id number] Comprehensive

## 2024-06-02 NOTE — H&P ADULT - PROBLEM/PLAN-5
No contact.  One attempt made. Left message on voicemail at [phone]  Chart routed to provider's office.”    Reason for Disposition  • Message left on unidentified voice mail.  Phone number verified.    Protocols used: No Contact or Duplicate Contact Call-A-JAMSHID    
Regarding: F [place] 73y/oDiarrhea since last night  ----- Message from Carin Hsu sent at 6/1/2024  1:10 PM CDT -----  Patient Name: Ira Kapoor    Specialist or PCP Name: last Cee    Symptoms: Diarrhea since last night     Pregnant (females aged 13-60. If Yes, how long?) : no    Call Back # : [phone]    Which State are you currently located in?: [place]    Name of Clinic Site / Acct# : [id number] w [facility]    
DISPLAY PLAN FREE TEXT

## 2024-09-02 NOTE — DISCHARGE NOTE ADULT - FUNCTIONAL SCREEN CURRENT LEVEL: AMBULATION, MLM
(2) assistive person [Mother] : mother [Fruit] : fruit [Vegetables] : vegetables [Meat] : meat [Grains] : grains [Eggs] : eggs [Dairy] : dairy [___ stools per day] : [unfilled]  stools per day [Loose] : stools are loose consistency [Normal] : Normal [In own bed] : In own bed [Brushing teeth] : Brushing teeth [Yes] : Patient goes to dentist yearly [Toothpaste] : Primary Fluoride Source: Toothpaste [Playtime (60 min/d)] : Playtime 60 min a day [< 2 hrs of screen time] : Less than 2 hrs of screen time [Appropiate parent-child-sibling interaction] : Appropriate parent-child-sibling interaction [Child Cooperates] : Child cooperates [Parent has appropriate responses to behavior] : Parent has appropriate responses to behavior [Grade ___] : Grade [unfilled] [No difficulties with Homework] : No difficulties with homework [Adequate performance] : Adequate performance [Adequate attention] : Adequate attention [No] : Not at  exposure [Car seat in back seat] : Car seat in back seat [Carbon Monoxide Detectors] : Carbon monoxide detectors [Smoke Detectors] : Smoke detectors [Supervised outdoor play] : Supervised outdoor play [YES] : Yes [Are there any children in your household?] : There are children in the household. [Have you attended a firearm safety workshop or class?] : A firearm safety workshop or class has been attended. [Exposure to electronic nicotine delivery system] : No exposure to electronic nicotine delivery system [Are there any unlocked firearms stored in your household?] : No unlocked firearms in the household. [Are there any firearms stored in your household that are loaded?] : No firearms are stored in the household loaded. [Has anyone in the household been feeling low/depressed/been struggling?] : No one in the household has been feeling low/depressed/been struggling.

## 2024-09-05 NOTE — DISCHARGE NOTE ADULT - PRINCIPAL DIAGNOSIS
----- Message from Daniela Gutiérrez sent at 9/5/2024 10:02 AM CDT -----  Type:  RX Refill Request    Who Called:   Refill or New Rx:Refill  RX Name and Strength:atorvastatin (LIPITOR) 80 MG tablet  How is the patient currently taking it? (ex. 1XDay):  Is this a 30 day or 90 day RX:  Preferred Pharmacy with phone number:    [facility] ([place]) - [address] [address]  Phone: [phone] Fax: [phone]     Local or Mail Order:Mail order  Ordering Provider:Landon Smith  Would the patient rather a call back or a response via [facility]? Callback  Best Call Back Number:[phone]  Additional Information:  
Approved 1 refill.  Schedule patient for an appointment.  Must be seen for future refills  
Care Due:                  Date            Visit Type   Department     Provider  --------------------------------------------------------------------------------                                EP -                              PRIMARY      [facility] FAMILY  Last Visit: 01-      CARE ([facility])   MEDICINE       Landon Smith  Next Visit: None Scheduled  None         None Found                                                            Last  Test          Frequency    Reason                     Performed    Due Date  --------------------------------------------------------------------------------    Office Visit  15 months..  atorvastatin,              01-   04-                             chlorthalidone...........    [employer] Embedded Care Due Messages. Reference number: [id number].   9/05/2024 9:11:56 AM CDT  
Refill Routing Note   Medication(s) are not appropriate for processing by [facility] for the following reason(s):        Patient not seen by provider within 15 months    [facility] action(s):  Defer   Requires appointment : Yes               Appointments  past 12m or future 3m with PCP    Date Provider   Last Visit   1/10/2023 Landon Smith MD   Next Visit   Visit date not found Landon Smith MD   ED visits in past 90 days: 0        Note composed:2:58 PM 09/05/2024          
Acute respiratory failure with hypercapnia

## 2024-10-02 NOTE — ED ADULT TRIAGE NOTE - NS ED NURSE DIRECT TO ROOM YN
Left: Abdomen.   Scrubbed with ChloroPrep With Tint.    Hair: N/A.  Skin prep dry before draping.  Prepped by: Shannen Callahan PA-C 10/2/2024 10:33 AM. Yes

## 2024-11-18 NOTE — H&P ADULT - PROBLEM SELECTOR PLAN 6
yes... patient is on metformin 500mg BID and Januvia 50 mg daily   hold home medications   sliding scale   lantus at bed time and humalog premeals patient is on glipizide XL 5 mg daily   hold home medications   sliding scale   lantus at bed time and humalog premeals

## 2024-11-27 NOTE — PROGRESS NOTE ADULT - SUBJECTIVE AND OBJECTIVE BOX
----- Message from Merlyn Kumar MD sent at 11/27/2024 11:10 AM CST -----  Please advise patient that results positive for blood in the urine and sugar encouraged him to push fluids advised him to see the urologist   INTERVAL HPI/OVERNIGHT EVENTS:    Pt seen and examined at bedside. Offers no acute complaints at this time.     Vital Signs Last 24 Hrs  T(C): 36.9 (05 Jun 2022 05:51), Max: 36.9 (04 Jun 2022 21:16)  T(F): 98.5 (05 Jun 2022 05:51), Max: 98.5 (05 Jun 2022 05:51)  HR: 76 (05 Jun 2022 05:51) (76 - 78)  BP: 149/66 (05 Jun 2022 05:51) (127/59 - 149/66)  BP(mean): --  RR: 18 (05 Jun 2022 05:51) (18 - 18)  SpO2: 100% (05 Jun 2022 05:51) (97% - 100%)  I&O's Detail      Physical Exam  General: AAOx3, No acute distress  Breast: rt side, dressing c/d/i       Labs:                        7.2    6.00  )-----------( 209      ( 05 Jun 2022 06:10 )             24.1     06-05    145  |  113<H>  |  57<H>  ----------------------------<  172<H>  5.5<H>   |  26  |  2.72<H>    Ca    9.0      05 Jun 2022 06:10

## 2025-01-02 NOTE — ED ADULT NURSE NOTE - NSFALLRSKOUTCOME_ED_ALL_ED
Future Appointments   Date Time Provider Department Center   8/6/2025 11:00 AM Alcides Alcantar MD [facility] Neurology -     [facility] 11/19/2024    
Universal Safety Interventions

## 2025-01-29 NOTE — H&P ADULT - NSHPPOAPULMEMBOLUS_GEN_A_CORE
Vancomycin Dosing by Pharmacy- INITIAL    Roro Marshall is a 45 y.o. year old female who Pharmacy has been consulted for vancomycin dosing for cellulitis, skin and soft tissue. Based on the patient's indication and renal status this patient will be dosed based on a goal AUC of 400-600.     Renal function is currently stable.    Visit Vitals  /59 (BP Location: Left arm, Patient Position: Lying)   Pulse (!) 118   Temp 37.4 °C (99.3 °F) (Oral)   Resp 20        Lab Results   Component Value Date    CREATININE 0.51 2025    CREATININE 0.40 2024    CREATININE 0.5 2023    CREATININE 0.5 2023    CREATININE 0.4 2022    CREATININE 0.4 2022        Patient weight is as follows:   Vitals:    25 1713   Weight: 145 kg (320 lb)       Cultures:  No results found for the encounter in last 14 days.        No intake/output data recorded.  I/O during current shift:  No intake/output data recorded.    Temp (24hrs), Av.9 °C (98.4 °F), Min:36.5 °C (97.7 °F), Max:37.4 °C (99.3 °F)         Assessment/Plan     Patient will not be given a loading dose.  Will initiate vancomycin maintenance,  2000 mg every 8 hours.    This dosing regimen is predicted by [employer] to result in the following pharmacokinetic parameters:  Loading dose: N/A  Regimen: 2000 mg IV every 8 hours.  Start time: 06:53 on 2025  Exposure target: AUC24 (range)400-600 mg/L.hr   CEM22-51: 525 mg/L.hr  AUC24,ss: 526 mg/L.hr  Probability of AUC24 > 400: 69 %  Ctrough,ss: 11.9 mg/L  Probability of Ctrough,ss > 20: 29 %      Follow-up level will be ordered on  at 0500 unless clinically indicated sooner.  Will continue to monitor renal function daily while on vancomycin and order serum creatinine at least every 48 hours if not already ordered.  Follow for continued vancomycin needs, clinical response, and signs/symptoms of toxicity.       Rashawn Barbosa, PharmD        no
